# Patient Record
Sex: MALE | Race: WHITE | Employment: PART TIME | URBAN - METROPOLITAN AREA
[De-identification: names, ages, dates, MRNs, and addresses within clinical notes are randomized per-mention and may not be internally consistent; named-entity substitution may affect disease eponyms.]

---

## 2017-01-09 RX ORDER — KETOCONAZOLE 20 MG/G
CREAM TOPICAL
Qty: 60 G | Refills: 0 | Status: SHIPPED | OUTPATIENT
Start: 2017-01-09 | End: 2021-09-23

## 2017-02-24 ENCOUNTER — APPOINTMENT (OUTPATIENT)
Dept: GENERAL RADIOLOGY | Facility: HOSPITAL | Age: 67
DRG: 189 | End: 2017-02-24
Attending: EMERGENCY MEDICINE
Payer: COMMERCIAL

## 2017-02-24 ENCOUNTER — HOSPITAL ENCOUNTER (INPATIENT)
Facility: HOSPITAL | Age: 67
LOS: 3 days | Discharge: HOME | DRG: 189 | End: 2017-02-27
Attending: EMERGENCY MEDICINE | Admitting: HOSPITALIST
Payer: COMMERCIAL

## 2017-02-24 DIAGNOSIS — J44.1 COPD EXACERBATION (HCC): Primary | ICD-10-CM

## 2017-02-24 PROBLEM — R73.9 HYPERGLYCEMIA: Status: ACTIVE | Noted: 2017-02-24

## 2017-02-24 LAB
ALBUMIN SERPL BCP-MCNC: 3.6 G/DL (ref 3.5–4.8)
ALBUMIN/GLOB SERPL: 1.3 {RATIO} (ref 1–2)
ALP SERPL-CCNC: 66 U/L (ref 32–100)
ALT SERPL-CCNC: 23 U/L (ref 17–63)
ANION GAP SERPL CALC-SCNC: 2 MMOL/L (ref 0–18)
AST SERPL-CCNC: 17 U/L (ref 15–41)
BASOPHILS # BLD: 0.1 K/UL (ref 0–0.2)
BASOPHILS NFR BLD: 1 %
BILIRUB SERPL-MCNC: 0.5 MG/DL (ref 0.3–1.2)
BUN SERPL-MCNC: 20 MG/DL (ref 8–20)
BUN/CREAT SERPL: 18.5 (ref 10–20)
CALCIUM SERPL-MCNC: 8.5 MG/DL (ref 8.5–10.5)
CHLORIDE SERPL-SCNC: 106 MMOL/L (ref 95–110)
CO2 SERPL-SCNC: 30 MMOL/L (ref 22–32)
CREAT SERPL-MCNC: 1.08 MG/DL (ref 0.5–1.5)
EOSINOPHIL # BLD: 0.1 K/UL (ref 0–0.7)
EOSINOPHIL NFR BLD: 2 %
ERYTHROCYTE [DISTWIDTH] IN BLOOD BY AUTOMATED COUNT: 14.9 % (ref 11–15)
GLOBULIN PLAS-MCNC: 2.8 G/DL (ref 2.5–3.7)
GLUCOSE SERPL-MCNC: 119 MG/DL (ref 70–99)
HCT VFR BLD AUTO: 48.2 % (ref 41–52)
HGB BLD-MCNC: 15.6 G/DL (ref 13.5–17.5)
LYMPHOCYTES # BLD: 1.3 K/UL (ref 1–4)
LYMPHOCYTES NFR BLD: 18 %
MCH RBC QN AUTO: 30.6 PG (ref 27–32)
MCHC RBC AUTO-ENTMCNC: 32.4 G/DL (ref 32–37)
MCV RBC AUTO: 94.3 FL (ref 80–100)
MONOCYTES # BLD: 1.2 K/UL (ref 0–1)
MONOCYTES NFR BLD: 17 %
NEUTROPHILS # BLD AUTO: 4.5 K/UL (ref 1.8–7.7)
NEUTROPHILS NFR BLD: 62 %
OSMOLALITY UR CALC.SUM OF ELEC: 290 MOSM/KG (ref 275–295)
PLATELET # BLD AUTO: 136 K/UL (ref 140–400)
PMV BLD AUTO: 9.9 FL (ref 7.4–10.3)
POTASSIUM SERPL-SCNC: 4.6 MMOL/L (ref 3.3–5.1)
PROT SERPL-MCNC: 6.4 G/DL (ref 5.9–8.4)
RBC # BLD AUTO: 5.11 M/UL (ref 4.5–5.9)
SODIUM SERPL-SCNC: 138 MMOL/L (ref 136–144)
WBC # BLD AUTO: 7.3 K/UL (ref 4–11)

## 2017-02-24 PROCEDURE — 99222 1ST HOSP IP/OBS MODERATE 55: CPT | Performed by: HOSPITALIST

## 2017-02-24 PROCEDURE — 71010 XR CHEST AP PORTABLE  (CPT=71010): CPT

## 2017-02-24 RX ORDER — METHYLPREDNISOLONE SODIUM SUCCINATE 40 MG/ML
40 INJECTION, POWDER, LYOPHILIZED, FOR SOLUTION INTRAMUSCULAR; INTRAVENOUS EVERY 6 HOURS
Status: DISCONTINUED | OUTPATIENT
Start: 2017-02-24 | End: 2017-02-24

## 2017-02-24 RX ORDER — IPRATROPIUM BROMIDE AND ALBUTEROL SULFATE 2.5; .5 MG/3ML; MG/3ML
3 SOLUTION RESPIRATORY (INHALATION)
Status: DISCONTINUED | OUTPATIENT
Start: 2017-02-24 | End: 2017-02-26

## 2017-02-24 RX ORDER — ALBUTEROL SULFATE 2.5 MG/3ML
2.5 SOLUTION RESPIRATORY (INHALATION) ONCE
Status: COMPLETED | OUTPATIENT
Start: 2017-02-24 | End: 2017-02-24

## 2017-02-24 RX ORDER — 0.9 % SODIUM CHLORIDE 0.9 %
3 VIAL (ML) INJECTION AS NEEDED
Status: DISCONTINUED | OUTPATIENT
Start: 2017-02-24 | End: 2017-02-27

## 2017-02-24 RX ORDER — METHYLPREDNISOLONE SODIUM SUCCINATE 125 MG/2ML
125 INJECTION, POWDER, LYOPHILIZED, FOR SOLUTION INTRAMUSCULAR; INTRAVENOUS ONCE
Status: COMPLETED | OUTPATIENT
Start: 2017-02-24 | End: 2017-02-24

## 2017-02-24 RX ORDER — FUROSEMIDE 20 MG/1
20 TABLET ORAL DAILY
Status: DISCONTINUED | OUTPATIENT
Start: 2017-02-24 | End: 2017-02-27

## 2017-02-24 RX ORDER — METHYLPREDNISOLONE SODIUM SUCCINATE 40 MG/ML
40 INJECTION, POWDER, LYOPHILIZED, FOR SOLUTION INTRAMUSCULAR; INTRAVENOUS EVERY 8 HOURS
Status: DISCONTINUED | OUTPATIENT
Start: 2017-02-24 | End: 2017-02-26

## 2017-02-24 RX ORDER — HEPARIN SODIUM 5000 [USP'U]/ML
5000 INJECTION, SOLUTION INTRAVENOUS; SUBCUTANEOUS EVERY 8 HOURS
Status: DISCONTINUED | OUTPATIENT
Start: 2017-02-24 | End: 2017-02-27

## 2017-02-24 RX ORDER — 0.9 % SODIUM CHLORIDE 0.9 %
VIAL (ML) INJECTION
Status: COMPLETED
Start: 2017-02-24 | End: 2017-02-24

## 2017-02-24 RX ORDER — IPRATROPIUM BROMIDE AND ALBUTEROL SULFATE 2.5; .5 MG/3ML; MG/3ML
3 SOLUTION RESPIRATORY (INHALATION) ONCE
Status: COMPLETED | OUTPATIENT
Start: 2017-02-24 | End: 2017-02-24

## 2017-02-24 RX ORDER — ACETAMINOPHEN 325 MG/1
650 TABLET ORAL EVERY 6 HOURS PRN
Status: DISCONTINUED | OUTPATIENT
Start: 2017-02-24 | End: 2017-02-27

## 2017-02-24 RX ORDER — ONDANSETRON 2 MG/ML
4 INJECTION INTRAMUSCULAR; INTRAVENOUS EVERY 6 HOURS PRN
Status: DISCONTINUED | OUTPATIENT
Start: 2017-02-24 | End: 2017-02-27

## 2017-02-24 NOTE — ED PROVIDER NOTES
Patient Seen in: [facility] AND [facility] Emergency Department    History   Patient presents with:  Chest Pain Angina (cardiovascular)    Stated Complaint: sob cp    HPI    28-year-old male presents emergency department with 2 days of increased shortness of tucker Day Smoker        Packs/Day: 1.00  Years: 40        Types: Cigarettes    Comment: 0.20 units per day    Alcohol Use: Yes           0.0 oz/week       0 Standard drinks or equivalent per week       Comment: socially      Review of Systems    Positive for sta PANEL (14) - Abnormal; Notable for the following:     Glucose 119 (*)     All other components within normal limits   CBC W/ DIFFERENTIAL - Abnormal; Notable for the following:      (*)     Monocyte Absolute 1.2 (*)     All other components within n diagnosis)    Disposition:  Admit    Follow-up:  No follow-up provider specified.     Medications Prescribed:  Current Discharge Medication List        Present on Admission  Date Reviewed: 10/27/2016          ICD-10-CM Noted POA    COPD exacerbation ([facility] [address] J

## 2017-02-24 NOTE — H&P
[facility] Status:  In[facility]    1950 MRN [id number]   Location [facility] 3W/SW Attending Edmundo Byrne MD   Hosp Day # 0 PCP Jalen Hollins MD     Date:  2017 Dose Informant Patient Reported? Taking? Albuterol Sulfate  (90 BASE) MCG/ACT Inhalation Aero Soln 2/23/2017 at Unknown time  No Yes   Sig: Inhale 2 puffs into the lungs every 6 (six) hours as needed for Wheezing.  inhale 2 puff by inhalation route round and reactive to light, extraocular movements are intact, Normal conjunctiva. HENT:  Normocephalic, oral mucosa is moist.  Head:  Normocephalic, atraumatic.   Neck:  Supple, non-tender, no carotid bruit, no jugular venous distention, no lymphadenopath use    Prophylaxis  Subcutaneous heparin    CODE STATUS  Full    Primary care physician  Bibi Gilbert MD    Disposition  Clinical course will dictate outcome      Rubens Jarquin MD  2/24/2017  6:32 AM

## 2017-02-24 NOTE — RESPIRATORY THERAPY NOTE
MARIA M ASSESSMENT    Pulse ox. Study done one week ago at home. Patient does not know result. patient using 5 lpm nasal cannula, spo2 91%. Never done sleep study.

## 2017-02-24 NOTE — PLAN OF CARE
Patient/Family Goals    • Patient/Family Long Term Goal Progressing    • Patient/Family Short Term Goal Progressing        RESPIRATORY - ADULT    • Achieves optimal ventilation and oxygenation Progressing          Patient was on non-rebreather in ER, after

## 2017-02-24 NOTE — DIETARY NOTE
NUTRITION UPDATE:  Pt seen to d/t reported poor PO intake PTA. Pt states he had been eating well PTA & his appetite/intake is good this admission (100% for meals recorded). Pt declined supplement, did not feel it was needed.  Wt stable at 230-235# until rec

## 2017-02-24 NOTE — DISCHARGE PLANNING
ELDER following up on d/c planning for the patient. MD ordering a home nebulizer. Order for completed and faxed to [address] [facility].      Kelly Hampton [facility]  [id number]

## 2017-02-24 NOTE — PAYOR COMM NOTE
NO FAX# PROVIDED TO FAX CLINICALS  Assessment and Plan:    Acute exacerbation of COPD  We will start patient on duo nebs every 4 hours and as needed along with Solu-Medrol 40 mg IV every 6 hours.  Patient will require substantial teaching to understand the

## 2017-02-25 LAB
ANION GAP SERPL CALC-SCNC: 6 MMOL/L (ref 0–18)
BASOPHILS # BLD: 0 K/UL (ref 0–0.2)
BASOPHILS NFR BLD: 0 %
BUN SERPL-MCNC: 22 MG/DL (ref 8–20)
BUN/CREAT SERPL: 25.3 (ref 10–20)
CALCIUM SERPL-MCNC: 9.6 MG/DL (ref 8.5–10.5)
CHLORIDE SERPL-SCNC: 102 MMOL/L (ref 95–110)
CO2 SERPL-SCNC: 32 MMOL/L (ref 22–32)
CREAT SERPL-MCNC: 0.87 MG/DL (ref 0.5–1.5)
EOSINOPHIL # BLD: 0 K/UL (ref 0–0.7)
EOSINOPHIL NFR BLD: 0 %
ERYTHROCYTE [DISTWIDTH] IN BLOOD BY AUTOMATED COUNT: 14.8 % (ref 11–15)
GLUCOSE SERPL-MCNC: 154 MG/DL (ref 70–99)
HCT VFR BLD AUTO: 49.4 % (ref 41–52)
HGB BLD-MCNC: 16 G/DL (ref 13.5–17.5)
LYMPHOCYTES # BLD: 0.8 K/UL (ref 1–4)
LYMPHOCYTES NFR BLD: 7 %
MAGNESIUM SERPL-MCNC: 2.2 MG/DL (ref 1.8–2.5)
MCH RBC QN AUTO: 30.3 PG (ref 27–32)
MCHC RBC AUTO-ENTMCNC: 32.4 G/DL (ref 32–37)
MCV RBC AUTO: 93.6 FL (ref 80–100)
MONOCYTES # BLD: 0.6 K/UL (ref 0–1)
MONOCYTES NFR BLD: 6 %
NEUTROPHILS # BLD AUTO: 9.6 K/UL (ref 1.8–7.7)
NEUTROPHILS NFR BLD: 87 %
OSMOLALITY UR CALC.SUM OF ELEC: 296 MOSM/KG (ref 275–295)
PLATELET # BLD AUTO: 155 K/UL (ref 140–400)
PMV BLD AUTO: 9.7 FL (ref 7.4–10.3)
POTASSIUM SERPL-SCNC: 4.7 MMOL/L (ref 3.3–5.1)
RBC # BLD AUTO: 5.28 M/UL (ref 4.5–5.9)
SODIUM SERPL-SCNC: 140 MMOL/L (ref 136–144)
WBC # BLD AUTO: 11.1 K/UL (ref 4–11)

## 2017-02-25 PROCEDURE — 99233 SBSQ HOSP IP/OBS HIGH 50: CPT | Performed by: HOSPITALIST

## 2017-02-25 RX ORDER — ECHINACEA PURPUREA EXTRACT 125 MG
1 TABLET ORAL
Status: DISCONTINUED | OUTPATIENT
Start: 2017-02-25 | End: 2017-02-27

## 2017-02-25 RX ORDER — LEVOFLOXACIN 750 MG/1
750 TABLET ORAL DAILY
Status: DISCONTINUED | OUTPATIENT
Start: 2017-02-25 | End: 2017-02-27

## 2017-02-25 NOTE — PLAN OF CARE
Patient/Family Goals    • Patient/Family Long Term Goal Progressing    • Patient/Family Short Term Goal Progressing        RESPIRATORY - ADULT    • Achieves optimal ventilation and oxygenation Progressing          PATIENT BREATHING BETTER WITH NEBS AND SOL

## 2017-02-25 NOTE — PLAN OF CARE
Pt ambulated approximate 150ft on room air. resp status labored 26, though patient states he does not feel sob. Lowest o2 saturation 80%. Highest heart rate 170.

## 2017-02-25 NOTE — PLAN OF CARE
RESPIRATORY - ADULT    • Achieves optimal ventilation and oxygenation Not Progressing          Patient/Family Goals    • Patient/Family Long Term Goal Progressing    • Patient/Family Short Term Goal Progressing        Pt continues with oxygen at 5l per n/c

## 2017-02-25 NOTE — PROGRESS NOTES
[facility]    Progress Note    Deepti Mary Patient Status:  Inpatient    1950 MRN [id number]   Location [facility] 3W/SW Attending Nishant Hightower Day # 1 PCP Danis Diez MD     Subjective: copd          Results:     Lab Results  Component Value Date   WBC 11.1* 02/25/2017   HGB 16.0 02/25/2017   HCT 49.4 02/25/2017    02/25/2017   CREATSERUM 0.87 02/25/2017   BUN 22* 02/25/2017    02/25/2017   K 4.7 02/25/2017    02/25/201

## 2017-02-26 LAB
ANION GAP SERPL CALC-SCNC: 6 MMOL/L (ref 0–18)
BASOPHILS # BLD: 0 K/UL (ref 0–0.2)
BASOPHILS NFR BLD: 0 %
BUN SERPL-MCNC: 23 MG/DL (ref 8–20)
BUN/CREAT SERPL: 24.7 (ref 10–20)
CALCIUM SERPL-MCNC: 9.2 MG/DL (ref 8.5–10.5)
CHLORIDE SERPL-SCNC: 101 MMOL/L (ref 95–110)
CO2 SERPL-SCNC: 32 MMOL/L (ref 22–32)
CREAT SERPL-MCNC: 0.93 MG/DL (ref 0.5–1.5)
EOSINOPHIL # BLD: 0 K/UL (ref 0–0.7)
EOSINOPHIL NFR BLD: 0 %
ERYTHROCYTE [DISTWIDTH] IN BLOOD BY AUTOMATED COUNT: 14.7 % (ref 11–15)
GLUCOSE SERPL-MCNC: 139 MG/DL (ref 70–99)
HCT VFR BLD AUTO: 49.5 % (ref 41–52)
HGB BLD-MCNC: 16.2 G/DL (ref 13.5–17.5)
LYMPHOCYTES # BLD: 0.8 K/UL (ref 1–4)
LYMPHOCYTES NFR BLD: 7 %
MAGNESIUM SERPL-MCNC: 2.1 MG/DL (ref 1.8–2.5)
MCH RBC QN AUTO: 30.9 PG (ref 27–32)
MCHC RBC AUTO-ENTMCNC: 32.7 G/DL (ref 32–37)
MCV RBC AUTO: 94.6 FL (ref 80–100)
MONOCYTES # BLD: 0.4 K/UL (ref 0–1)
MONOCYTES NFR BLD: 4 %
NEUTROPHILS # BLD AUTO: 9.6 K/UL (ref 1.8–7.7)
NEUTROPHILS NFR BLD: 89 %
OSMOLALITY UR CALC.SUM OF ELEC: 294 MOSM/KG (ref 275–295)
PLATELET # BLD AUTO: 156 K/UL (ref 140–400)
PMV BLD AUTO: 10.2 FL (ref 7.4–10.3)
POTASSIUM SERPL-SCNC: 4.5 MMOL/L (ref 3.3–5.1)
RBC # BLD AUTO: 5.23 M/UL (ref 4.5–5.9)
SODIUM SERPL-SCNC: 139 MMOL/L (ref 136–144)
WBC # BLD AUTO: 10.7 K/UL (ref 4–11)

## 2017-02-26 PROCEDURE — 99232 SBSQ HOSP IP/OBS MODERATE 35: CPT | Performed by: HOSPITALIST

## 2017-02-26 RX ORDER — IPRATROPIUM BROMIDE AND ALBUTEROL SULFATE 2.5; .5 MG/3ML; MG/3ML
3 SOLUTION RESPIRATORY (INHALATION)
Status: DISCONTINUED | OUTPATIENT
Start: 2017-02-26 | End: 2017-02-27

## 2017-02-26 RX ORDER — IPRATROPIUM BROMIDE AND ALBUTEROL SULFATE 2.5; .5 MG/3ML; MG/3ML
3 SOLUTION RESPIRATORY (INHALATION) EVERY 4 HOURS PRN
Status: DISCONTINUED | OUTPATIENT
Start: 2017-02-26 | End: 2017-02-27

## 2017-02-26 RX ORDER — METHYLPREDNISOLONE SODIUM SUCCINATE 40 MG/ML
30 INJECTION, POWDER, LYOPHILIZED, FOR SOLUTION INTRAMUSCULAR; INTRAVENOUS EVERY 8 HOURS
Status: DISCONTINUED | OUTPATIENT
Start: 2017-02-26 | End: 2017-02-27

## 2017-02-26 NOTE — PLAN OF CARE
Patient/Family Goals    • Patient/Family Long Term Goal Progressing    • Patient/Family Short Term Goal Progressing          RESPIRATORY - ADULT    • Achieves optimal ventilation and oxygenation Not Progressing          Patient still needs O2 NC, resting i

## 2017-02-26 NOTE — PROGRESS NOTES
[facility]    Progress Note    Neto Arreaga Patient Status:  Inpatient    1950 MRN [id number]   Location [facility] 3W/SW Attending Nishant Hightower Day # 2 PCP Taj Conner MD     Subjective: tobacco use    Prophylaxis  Subcutaneous heparin    CODE STATUS  Full    Primary care physician  Elzbieta Weir MD    Disposition  Home when better    26 min spent on pt of which 15 min spent counseling pt and wife about bronchitis copd        Result

## 2017-02-27 VITALS
RESPIRATION RATE: 20 BRPM | DIASTOLIC BLOOD PRESSURE: 92 MMHG | TEMPERATURE: 98 F | HEART RATE: 114 BPM | SYSTOLIC BLOOD PRESSURE: 128 MMHG | WEIGHT: 230.5 LBS | OXYGEN SATURATION: 92 % | BODY MASS INDEX: 36.18 KG/M2 | HEIGHT: 67 IN

## 2017-02-27 LAB
ANION GAP SERPL CALC-SCNC: 7 MMOL/L (ref 0–18)
BASOPHILS # BLD: 0 K/UL (ref 0–0.2)
BASOPHILS NFR BLD: 0 %
BUN SERPL-MCNC: 27 MG/DL (ref 8–20)
BUN/CREAT SERPL: 30.3 (ref 10–20)
CALCIUM SERPL-MCNC: 9.4 MG/DL (ref 8.5–10.5)
CHLORIDE SERPL-SCNC: 101 MMOL/L (ref 95–110)
CO2 SERPL-SCNC: 31 MMOL/L (ref 22–32)
CREAT SERPL-MCNC: 0.89 MG/DL (ref 0.5–1.5)
EOSINOPHIL # BLD: 0 K/UL (ref 0–0.7)
EOSINOPHIL NFR BLD: 0 %
ERYTHROCYTE [DISTWIDTH] IN BLOOD BY AUTOMATED COUNT: 15 % (ref 11–15)
GLUCOSE SERPL-MCNC: 164 MG/DL (ref 70–99)
HCT VFR BLD AUTO: 49.3 % (ref 41–52)
HGB BLD-MCNC: 15.9 G/DL (ref 13.5–17.5)
LYMPHOCYTES # BLD: 1 K/UL (ref 1–4)
LYMPHOCYTES NFR BLD: 8 %
MAGNESIUM SERPL-MCNC: 2.1 MG/DL (ref 1.8–2.5)
MCH RBC QN AUTO: 30.5 PG (ref 27–32)
MCHC RBC AUTO-ENTMCNC: 32.2 G/DL (ref 32–37)
MCV RBC AUTO: 94.7 FL (ref 80–100)
MONOCYTES # BLD: 0.8 K/UL (ref 0–1)
MONOCYTES NFR BLD: 7 %
NEUTROPHILS # BLD AUTO: 10.2 K/UL (ref 1.8–7.7)
NEUTROPHILS NFR BLD: 85 %
OSMOLALITY UR CALC.SUM OF ELEC: 297 MOSM/KG (ref 275–295)
PLATELET # BLD AUTO: 164 K/UL (ref 140–400)
PMV BLD AUTO: 9.9 FL (ref 7.4–10.3)
POTASSIUM SERPL-SCNC: 4.7 MMOL/L (ref 3.3–5.1)
RBC # BLD AUTO: 5.21 M/UL (ref 4.5–5.9)
SODIUM SERPL-SCNC: 139 MMOL/L (ref 136–144)
WBC # BLD AUTO: 12.1 K/UL (ref 4–11)

## 2017-02-27 PROCEDURE — 99239 HOSP IP/OBS DSCHRG MGMT >30: CPT | Performed by: HOSPITALIST

## 2017-02-27 RX ORDER — IPRATROPIUM BROMIDE AND ALBUTEROL SULFATE 2.5; .5 MG/3ML; MG/3ML
3 SOLUTION RESPIRATORY (INHALATION) EVERY 4 HOURS PRN
Qty: 360 ML | Refills: 0 | Status: SHIPPED | OUTPATIENT
Start: 2017-02-27 | End: 2017-03-29

## 2017-02-27 RX ORDER — ECHINACEA PURPUREA EXTRACT 125 MG
1 TABLET ORAL
Qty: 1 BOTTLE | Refills: 0 | Status: SHIPPED | OUTPATIENT
Start: 2017-02-27

## 2017-02-27 RX ORDER — LEVOFLOXACIN 750 MG/1
750 TABLET ORAL DAILY
Qty: 6 TABLET | Refills: 0 | Status: SHIPPED | OUTPATIENT
Start: 2017-02-27 | End: 2018-07-28 | Stop reason: ALTCHOICE

## 2017-02-27 RX ORDER — PREDNISONE 20 MG/1
60 TABLET ORAL DAILY
Qty: 15 TABLET | Refills: 0 | Status: SHIPPED | OUTPATIENT
Start: 2017-02-27 | End: 2018-07-28 | Stop reason: ALTCHOICE

## 2017-02-27 NOTE — DISCHARGE PLANNING
2/27/17 CM Discharge planning / MDO for home nebulizer   Per wife pt has home O2 from [facility], requested nebulizer be ordered from [facility]. Referral and orders faxed to [facility] office requesting nebulizer be delivered to pt's home later today.  80

## 2017-02-27 NOTE — PLAN OF CARE
Patient/Family Goals    • Patient/Family Long Term Goal  Be able to go home. Progressing    • Patient/Family Short Term Goal  Follow plan of care, be stable.  Progressing        RESPIRATORY - ADULT    • Achieves optimal ventilation and oxygenation Progressi

## 2017-02-27 NOTE — PAYOR COMM NOTE
CLINICALS  ORDER  Sulema Chente #[id number]    Admission Info: Inpatient (Adm: 02/24/17)   Hospital Account: [de-identified]    Description: 73year old M   Primary Service: Medical   Unit Info: [facility] 3-W/SW             Admission Orders        ADMIT TO INPATI 230 lb 9.6 oz (104.599 kg), SpO2 92 %. Nursing note and vitals reviewed. Constitutional: He is oriented to person, place, and time and obese. He appears well-developed. HENT:    Head: Normocephalic and atraumatic.    Cardiovascular: Normal rate and r 2.1  02/26/2017    TROP  0.00  10/18/2016                          Hortensia Harris MD  2/26/2017            Nilsa Bishop #[id number]  (68 year old M)       [facility] [id number] Barnes MD Physician Signed  Progress Not continue daily Lasix.     Tobacco abuse  Patient encouraged to maintain abstinence from tobacco use    Prophylaxis  Subcutaneous heparin    CODE STATUS  Full    Primary care physician  Elizabeth Salter MD    Disposition  Home when better    38 min spent Rochelle Stallings MD      Date:  2/24/2017  Date of Admission:  2/24/2017    Chief Complaint:  Patient presents with:  Chest Pain Angina (cardiovascular)      History of Present Illness:  Fidel Hoyos is a(n) 73year old male, with a past medical history sig Yes    Sig: Inhale 2 puffs into the lungs every 6 (six) hours as needed for Wheezing.  inhale 2 puff by inhalation route  every 4 - 6 hours as needed    Budesonide-Formoterol Fumarate (SYMBICORT) 160-4.5 MCG/ACT Inhalation Aerosol  2/23/2017 at Unknown time oral mucosa is moist.  Head:  Normocephalic, atraumatic. Neck:  Supple, non-tender, no carotid bruit, no jugular venous distention, no lymphadenopathy, no thyromegaly.   Respiratory: Bilateral wheeze with poor air entry, extended expiratory phase, respirat STATUS  Full    Primary care physician  Melvi Villagomez MD    Disposition  Clinical course will dictate outcome      Eboni Gomes MD  2/24/2017  6:32 AM          Mariposa Garcia #[id number]  (68 year old M)       [facility] [id number]         [place]

## 2017-02-27 NOTE — PROGRESS NOTES
Patient ambulated 150 feet on 3L O2 per NC. Lowest SpO2 86%, highest . Labored breathing noted, though patient denies any feelings of shortness of breath. Dr. Zully Cordero notified.

## 2017-02-27 NOTE — SPIRITUAL CARE NOTE
Patient's wife is a friend of .  stopped by on Saturday, in capacity as friend of family. Not official visit. On this night, wife was gone and pt was asleep. Left a note.

## 2017-03-01 NOTE — DISCHARGE SUMMARY
[facility]    PATIENT'S NAME: Gay Roberts   ATTENDING PHYSICIAN: Sanjeev Hightower MD   PATIENT ACCOUNT#:   [id number]    LOCATION:  [address] [place] [address] RECORD #:   [id number]       YOB: 1950  ADMISSION DATE:       02/24 Patient's saturation without oxygen is 80% acutely _______ improved from that chronically. We will see how he does. 2.   Obesity. BMI 37. Needs obstructive sleep apnea workup. 3.   Chronic venous stasis. 4.   Continuing tobacco abuse. Needs to quit.

## 2017-03-07 ENCOUNTER — OFFICE VISIT (OUTPATIENT)
Dept: INTERNAL MEDICINE CLINIC | Facility: CLINIC | Age: 67
End: 2017-03-07

## 2017-03-07 VITALS
HEART RATE: 104 BPM | DIASTOLIC BLOOD PRESSURE: 80 MMHG | HEIGHT: 66 IN | TEMPERATURE: 98 F | BODY MASS INDEX: 37.77 KG/M2 | OXYGEN SATURATION: 92 % | WEIGHT: 235 LBS | SYSTOLIC BLOOD PRESSURE: 129 MMHG | RESPIRATION RATE: 16 BRPM

## 2017-03-07 DIAGNOSIS — J44.1 COPD EXACERBATION (HCC): Primary | ICD-10-CM

## 2017-03-07 PROCEDURE — 99212 OFFICE O/P EST SF 10 MIN: CPT | Performed by: INTERNAL MEDICINE

## 2017-03-07 PROCEDURE — 99214 OFFICE O/P EST MOD 30 MIN: CPT | Performed by: INTERNAL MEDICINE

## 2017-03-07 RX ORDER — PREDNISONE 10 MG/1
TABLET ORAL
Refills: 0 | COMMUNITY
Start: 2017-02-27 | End: 2018-07-28 | Stop reason: ALTCHOICE

## 2017-03-07 NOTE — PROGRESS NOTES
HPI:    Patient ID: Deepti Mary is a 73year old male. COPD  This is a recurrent (pt  here for post hospital ffup, admitted last month at [address] [place] [address] for exacerabation of COPD) problem. The current episode started more than 1 month ago.  The problem occurs Disp: 15 tablet Rfl: 0   KETOCONAZOLE 2 % External Cream APPLY EXTERNALLY TO THE AFFECTED AREA TWICE DAILY (Patient taking differently: APPLY EXTERNALLY TO THE AFFECTED AREA TWICE DAILY as needed) Disp: 60 g Rfl: 0   furosemide 20 MG Oral Tab Take 1 tablet & Referrals:  None       #[id number]

## 2017-04-18 ENCOUNTER — OFFICE VISIT (OUTPATIENT)
Dept: INTERNAL MEDICINE CLINIC | Facility: CLINIC | Age: 67
End: 2017-04-18

## 2017-04-18 VITALS
SYSTOLIC BLOOD PRESSURE: 107 MMHG | DIASTOLIC BLOOD PRESSURE: 76 MMHG | RESPIRATION RATE: 16 BRPM | HEIGHT: 67 IN | TEMPERATURE: 98 F | WEIGHT: 242 LBS | BODY MASS INDEX: 37.98 KG/M2 | OXYGEN SATURATION: 91 % | HEART RATE: 108 BPM

## 2017-04-18 DIAGNOSIS — Z72.0 TOBACCO ABUSE: ICD-10-CM

## 2017-04-18 DIAGNOSIS — M25.561 BILATERAL CHRONIC KNEE PAIN: ICD-10-CM

## 2017-04-18 DIAGNOSIS — M25.562 BILATERAL CHRONIC KNEE PAIN: ICD-10-CM

## 2017-04-18 DIAGNOSIS — G89.29 BILATERAL CHRONIC KNEE PAIN: ICD-10-CM

## 2017-04-18 DIAGNOSIS — J43.9 PULMONARY EMPHYSEMA, UNSPECIFIED EMPHYSEMA TYPE (HCC): Primary | ICD-10-CM

## 2017-04-18 PROCEDURE — 99214 OFFICE O/P EST MOD 30 MIN: CPT | Performed by: INTERNAL MEDICINE

## 2017-04-18 PROCEDURE — 99212 OFFICE O/P EST SF 10 MIN: CPT | Performed by: INTERNAL MEDICINE

## 2017-04-18 RX ORDER — IPRATROPIUM BROMIDE AND ALBUTEROL SULFATE 2.5; .5 MG/3ML; MG/3ML
SOLUTION RESPIRATORY (INHALATION)
Refills: 0 | COMMUNITY
Start: 2017-02-27 | End: 2017-05-25

## 2017-04-18 NOTE — PROGRESS NOTES
HPI:    Patient ID: Jose Vegas is a 78year old male. COPD  This is a chronic problem. The current episode started more than 1 year ago. The problem occurs constantly. The problem has been unchanged.  Pertinent negatives include no chills, coughing levofloxacin 750 MG Oral Tab Take 1 tablet (750 mg total) by mouth daily. Disp: 6 tablet Rfl: 0   predniSONE 20 MG Oral Tab Take 3 tablets (60 mg total) by mouth daily.  Disp: 15 tablet Rfl: 0     Allergies:No Known Allergies   PHYSICAL EXAM:   Physical E understood and agreed.    (Z72.0) Tobacco abuse  Plan: see above.     (M25.561,  M25.562,  G89.29) Bilateral chronic knee pain  Plan: XR KNEE (1 OR 2 VIEWS), RIGHT (CPT=73560), XR         KNEE (1 OR 2 VIEWS), LEFT (CPT=73560)        Pt has bilateral knee p

## 2017-04-19 PROBLEM — J43.9 PULMONARY EMPHYSEMA (HCC): Status: ACTIVE | Noted: 2017-04-19

## 2017-04-19 PROBLEM — G89.29 BILATERAL CHRONIC KNEE PAIN: Status: ACTIVE | Noted: 2017-04-19

## 2017-04-19 PROBLEM — M25.561 BILATERAL CHRONIC KNEE PAIN: Status: ACTIVE | Noted: 2017-04-19

## 2017-04-19 PROBLEM — M25.562 BILATERAL CHRONIC KNEE PAIN: Status: ACTIVE | Noted: 2017-04-19

## 2017-04-21 ENCOUNTER — TELEPHONE (OUTPATIENT)
Dept: INTERNAL MEDICINE CLINIC | Facility: CLINIC | Age: 67
End: 2017-04-21

## 2017-04-21 DIAGNOSIS — M79.674 PAIN OF TOE OF RIGHT FOOT: Primary | ICD-10-CM

## 2017-04-21 NOTE — TELEPHONE ENCOUNTER
Patient states the big toe on the right foot looks like it has a ruptured blood vessel for about 4 months. Patient seen  On 4/18/2017 and forgot to mention it to th Dr. Carolyn Rodriguez is now requesting an order for an xray.      Offered patient an appt and he ref

## 2017-04-21 NOTE — TELEPHONE ENCOUNTER
Actions Requested: Requesting x-ray of his toe. Pt is coming in for bilateral knee x-rays so he is wondering if Dr could x-ray the foot as well.   Situation/Background   Problem: Right great toe \"black and blue\"   Onset: 4 months   Associated Symptoms: Pa

## 2017-04-22 ENCOUNTER — HOSPITAL ENCOUNTER (OUTPATIENT)
Dept: GENERAL RADIOLOGY | Age: 67
Discharge: HOME | End: 2017-04-22
Attending: INTERNAL MEDICINE
Payer: COMMERCIAL

## 2017-04-22 DIAGNOSIS — M79.674 PAIN OF TOE OF RIGHT FOOT: ICD-10-CM

## 2017-04-22 DIAGNOSIS — M25.561 CHRONIC PAIN OF RIGHT KNEE: ICD-10-CM

## 2017-04-22 DIAGNOSIS — G89.29 OTHER CHRONIC PAIN: ICD-10-CM

## 2017-04-22 DIAGNOSIS — G89.29 CHRONIC PAIN OF LEFT KNEE: ICD-10-CM

## 2017-04-22 DIAGNOSIS — G89.29 CHRONIC PAIN OF RIGHT KNEE: ICD-10-CM

## 2017-04-22 DIAGNOSIS — M25.562 CHRONIC PAIN OF LEFT KNEE: ICD-10-CM

## 2017-04-22 PROCEDURE — 73620 X-RAY EXAM OF FOOT: CPT

## 2017-04-22 PROCEDURE — 73565 X-RAY EXAM OF KNEES: CPT

## 2017-04-23 ENCOUNTER — TELEPHONE (OUTPATIENT)
Dept: INTERNAL MEDICINE CLINIC | Facility: CLINIC | Age: 67
End: 2017-04-23

## 2017-04-23 DIAGNOSIS — M17.0 PRIMARY OSTEOARTHRITIS OF BOTH KNEES: Primary | ICD-10-CM

## 2017-04-23 DIAGNOSIS — I73.9 PAD (PERIPHERAL ARTERY DISEASE) (HCC): ICD-10-CM

## 2017-05-04 ENCOUNTER — HOSPITAL ENCOUNTER (OUTPATIENT)
Dept: ULTRASOUND IMAGING | Facility: HOSPITAL | Age: 67
Discharge: HOME | End: 2017-05-04
Attending: INTERNAL MEDICINE
Payer: COMMERCIAL

## 2017-05-04 DIAGNOSIS — I73.9 PAD (PERIPHERAL ARTERY DISEASE) (HCC): ICD-10-CM

## 2017-05-04 PROCEDURE — 93923 UPR/LXTR ART STDY 3+ LVLS: CPT | Performed by: INTERNAL MEDICINE

## 2017-05-15 ENCOUNTER — TELEPHONE (OUTPATIENT)
Dept: INTERNAL MEDICINE CLINIC | Facility: CLINIC | Age: 67
End: 2017-05-15

## 2017-05-15 NOTE — TELEPHONE ENCOUNTER
Notes Recorded by Paulina Stewart MD on 2017 at 4:31 PM  Notify pt; his arterial doppler test was normal so no blockage in arteries of his lower extremities.     Spoke with patient (verified name and ), reviewed information, patient verbalized

## 2017-05-25 RX ORDER — IPRATROPIUM BROMIDE AND ALBUTEROL SULFATE 2.5; .5 MG/3ML; MG/3ML
SOLUTION RESPIRATORY (INHALATION)
Qty: 360 VIAL | Refills: 0 | Status: SHIPPED | OUTPATIENT
Start: 2017-05-25 | End: 2019-02-14

## 2017-05-25 NOTE — TELEPHONE ENCOUNTER
Jillian calling in for a refill rx   ipratropium-albuterol 0.5-2.5 (3) MG/3ML Inhalation Solution  0 2/27/2017       Sig :  VVN Q 4 H PRN       Route:   (none)       Class:   Historical       Order #:   [id number]

## 2018-07-28 ENCOUNTER — OFFICE VISIT (OUTPATIENT)
Dept: INTERNAL MEDICINE CLINIC | Facility: CLINIC | Age: 68
End: 2018-07-28
Payer: COMMERCIAL

## 2018-07-28 VITALS
DIASTOLIC BLOOD PRESSURE: 84 MMHG | WEIGHT: 227 LBS | HEIGHT: 67 IN | HEART RATE: 87 BPM | BODY MASS INDEX: 35.63 KG/M2 | TEMPERATURE: 97 F | SYSTOLIC BLOOD PRESSURE: 122 MMHG

## 2018-07-28 DIAGNOSIS — Z00.00 ANNUAL PHYSICAL EXAM: Primary | ICD-10-CM

## 2018-07-28 DIAGNOSIS — J43.9 PULMONARY EMPHYSEMA, UNSPECIFIED EMPHYSEMA TYPE (HCC): ICD-10-CM

## 2018-07-28 DIAGNOSIS — R35.1 NOCTURIA: ICD-10-CM

## 2018-07-28 DIAGNOSIS — Z12.11 COLON CANCER SCREENING: ICD-10-CM

## 2018-07-28 DIAGNOSIS — Z23 NEED FOR VACCINATION: ICD-10-CM

## 2018-07-28 PROCEDURE — 90471 IMMUNIZATION ADMIN: CPT | Performed by: INTERNAL MEDICINE

## 2018-07-28 PROCEDURE — 90732 PPSV23 VACC 2 YRS+ SUBQ/IM: CPT | Performed by: INTERNAL MEDICINE

## 2018-07-28 PROCEDURE — 99397 PER PM REEVAL EST PAT 65+ YR: CPT | Performed by: INTERNAL MEDICINE

## 2018-07-28 NOTE — PROGRESS NOTES
HPI:    Patient ID: Susanna Elizabeth is a 80year old male. Patient presents today for his annual physical.        Review of Systems   Constitutional: Negative. HENT: Negative. Eyes: Negative. Respiratory: Positive for shortness of breath.  Isma Davidson ear normal.   Left Ear: External ear normal.   Nose: Nose normal.   Mouth/Throat: Oropharynx is clear and moist. No oropharyngeal exudate. Eyes: Conjunctivae are normal. Pupils are equal, round, and reactive to light. Right eye exhibits no discharge.  Lef Need for vaccination  Plan: PNEUMOCOCCAL IMM (PNEUMOVAX)        Pt given pneumovax 23 today.    (Z12.11) Colon cancer screening  Plan: OCCULT BLOOD, FECAL, IMMUNOASSAY        Pt due for his colon screenng and he doesn't want to do colonoscopy as screening

## 2018-07-29 PROBLEM — R73.9 HYPERGLYCEMIA: Status: RESOLVED | Noted: 2017-02-24 | Resolved: 2018-07-29

## 2018-07-29 PROBLEM — Z23 NEED FOR VACCINATION: Status: ACTIVE | Noted: 2018-07-29

## 2018-07-29 PROBLEM — R35.1 NOCTURIA: Status: ACTIVE | Noted: 2018-07-29

## 2018-08-24 ENCOUNTER — TELEPHONE (OUTPATIENT)
Dept: INTERNAL MEDICINE CLINIC | Facility: CLINIC | Age: 68
End: 2018-08-24

## 2018-08-24 NOTE — TELEPHONE ENCOUNTER
Prior Authorization for Chantix Starting Month Chetan 0.5mg x 11&1 mg x 42 completed with [employer] on cover my meds Key: [id number]. Turn around time 48-72 hrs.

## 2018-09-04 ENCOUNTER — TELEPHONE (OUTPATIENT)
Dept: INTERNAL MEDICINE CLINIC | Facility: CLINIC | Age: 68
End: 2018-09-04

## 2018-10-11 RX ORDER — BUDESONIDE AND FORMOTEROL FUMARATE DIHYDRATE 160; 4.5 UG/1; UG/1
AEROSOL RESPIRATORY (INHALATION)
Qty: 1 INHALER | Refills: 2 | Status: SHIPPED | OUTPATIENT
Start: 2018-10-11 | End: 2020-01-24

## 2019-02-08 RX ORDER — IPRATROPIUM BROMIDE AND ALBUTEROL SULFATE 2.5; .5 MG/3ML; MG/3ML
SOLUTION RESPIRATORY (INHALATION)
Qty: 360 VIAL | Refills: 0 | Status: CANCELLED | OUTPATIENT
Start: 2019-02-08

## 2019-02-08 NOTE — TELEPHONE ENCOUNTER
Pharmacist states he faxed refill request for Duoneb nebulizer solution and hasn't gotten a response. Verified fax number with pharmacist as no request noted in EMR.     Med last filled 5/25/17  Pended for review, please advise    Refill Protocol Appointmen

## 2019-02-14 RX ORDER — IPRATROPIUM BROMIDE AND ALBUTEROL SULFATE 2.5; .5 MG/3ML; MG/3ML
SOLUTION RESPIRATORY (INHALATION)
Qty: 360 VIAL | Refills: 0 | Status: ON HOLD | OUTPATIENT
Start: 2019-02-14 | End: 2021-04-17

## 2019-10-17 ENCOUNTER — OFFICE VISIT (OUTPATIENT)
Dept: INTERNAL MEDICINE CLINIC | Facility: CLINIC | Age: 69
End: 2019-10-17
Payer: COMMERCIAL

## 2019-10-17 ENCOUNTER — LAB ENCOUNTER (OUTPATIENT)
Dept: LAB | Age: 69
End: 2019-10-17
Attending: INTERNAL MEDICINE
Payer: COMMERCIAL

## 2019-10-17 VITALS
SYSTOLIC BLOOD PRESSURE: 113 MMHG | BODY MASS INDEX: 35.31 KG/M2 | DIASTOLIC BLOOD PRESSURE: 72 MMHG | HEIGHT: 67 IN | WEIGHT: 225 LBS | OXYGEN SATURATION: 92 % | TEMPERATURE: 98 F | HEART RATE: 81 BPM

## 2019-10-17 DIAGNOSIS — J43.9 PULMONARY EMPHYSEMA, UNSPECIFIED EMPHYSEMA TYPE (HCC): ICD-10-CM

## 2019-10-17 DIAGNOSIS — Z00.00 ANNUAL PHYSICAL EXAM: ICD-10-CM

## 2019-10-17 DIAGNOSIS — Z12.11 COLON CANCER SCREENING: ICD-10-CM

## 2019-10-17 DIAGNOSIS — Z00.00 ANNUAL PHYSICAL EXAM: Primary | ICD-10-CM

## 2019-10-17 DIAGNOSIS — F51.04 CHRONIC INSOMNIA: ICD-10-CM

## 2019-10-17 PROCEDURE — 81001 URINALYSIS AUTO W/SCOPE: CPT

## 2019-10-17 PROCEDURE — 80061 LIPID PANEL: CPT

## 2019-10-17 PROCEDURE — 85025 COMPLETE CBC W/AUTO DIFF WBC: CPT

## 2019-10-17 PROCEDURE — 80053 COMPREHEN METABOLIC PANEL: CPT

## 2019-10-17 PROCEDURE — 36415 COLL VENOUS BLD VENIPUNCTURE: CPT

## 2019-10-17 PROCEDURE — 99397 PER PM REEVAL EST PAT 65+ YR: CPT | Performed by: INTERNAL MEDICINE

## 2019-10-17 RX ORDER — POTASSIUM CHLORIDE 750 MG/1
10 TABLET, EXTENDED RELEASE ORAL DAILY
Qty: 30 TABLET | Refills: 0 | Status: ON HOLD | OUTPATIENT
Start: 2019-10-17 | End: 2021-04-17

## 2019-10-17 RX ORDER — FUROSEMIDE 20 MG/1
20 TABLET ORAL DAILY
Qty: 90 TABLET | Refills: 3 | Status: ON HOLD | OUTPATIENT
Start: 2019-10-17 | End: 2021-04-17

## 2019-10-17 NOTE — PROGRESS NOTES
HPI:    Patient ID: Frida Hussein is a 77year old male. Patient presents today for his annual physical.       Review of Systems   Constitutional: Negative. HENT: Negative. Eyes: Negative.     Respiratory: Positive for shortness of breath and wh External ear normal.   Left Ear: External ear normal.   Nose: Nose normal.   Mouth/Throat: Oropharynx is clear and moist. No oropharyngeal exudate. Eyes: Conjunctivae are normal. Right eye exhibits no discharge. Left eye exhibits no discharge.  No scleral home and uses O2 at night. Pt continues to decline to get ct chest for ffup of previously seen pulmonary nodule back in 2016.  .  (Z12.11) Colon cancer screening  Plan: OCCULT BLOOD, FECAL, IMMUNOASSAY  Pt again declines sceening colonoscopy and agreed to

## 2019-10-21 ENCOUNTER — TELEPHONE (OUTPATIENT)
Dept: INTERNAL MEDICINE CLINIC | Facility: CLINIC | Age: 69
End: 2019-10-21

## 2019-10-21 DIAGNOSIS — D75.89 MACROCYTOSIS WITHOUT ANEMIA: Primary | ICD-10-CM

## 2020-01-24 RX ORDER — BUDESONIDE AND FORMOTEROL FUMARATE DIHYDRATE 160; 4.5 UG/1; UG/1
AEROSOL RESPIRATORY (INHALATION)
Qty: 3 INHALER | Refills: 1 | Status: SHIPPED | OUTPATIENT
Start: 2020-01-24 | End: 2020-07-21

## 2020-02-04 RX ORDER — BUDESONIDE AND FORMOTEROL FUMARATE DIHYDRATE 160; 4.5 UG/1; UG/1
AEROSOL RESPIRATORY (INHALATION)
Qty: 3 INHALER | Refills: 1 | OUTPATIENT
Start: 2020-02-04

## 2020-02-04 NOTE — TELEPHONE ENCOUNTER
Budesonide-Formoterol Fumarate (SYMBICORT) 160-4.5 MCG/ACT Inhalation Aerosol, INHALE 2 PUFFS INTO THE LUNGS TWICE DAILY, Disp: 3 Inhaler, Rfl: 1

## 2020-06-01 ENCOUNTER — TELEPHONE (OUTPATIENT)
Dept: PULMONOLOGY | Facility: CLINIC | Age: 70
End: 2020-06-01

## 2020-06-01 DIAGNOSIS — R09.02 HYPOXEMIA: Primary | ICD-10-CM

## 2020-06-01 NOTE — TELEPHONE ENCOUNTER
Pt requesting order to discontinue oxygen with [facility] due to purchasing his own concentrator.   Please call [phone]

## 2020-06-01 NOTE — TELEPHONE ENCOUNTER
Pt requesting order to discontinue oxygen with [facility] due to purchasing his own concentrator. Dr Lucille Hancock can you make order ?

## 2020-06-04 NOTE — TELEPHONE ENCOUNTER
Called and spoke with pt who states that [address] needs discontinue order to  equipment from pt's home. Can place order dr Lucille Hancock ?

## 2020-06-08 NOTE — TELEPHONE ENCOUNTER
DME order for discontinue home oxygen concentrator placed. Dr Grabiel Pierson place check and sign off order.

## 2020-07-21 RX ORDER — BUDESONIDE AND FORMOTEROL FUMARATE DIHYDRATE 160; 4.5 UG/1; UG/1
2 AEROSOL RESPIRATORY (INHALATION) 2 TIMES DAILY
Qty: 3 INHALER | Refills: 1 | Status: SHIPPED | OUTPATIENT
Start: 2020-07-21 | End: 2021-01-22

## 2020-07-21 NOTE — TELEPHONE ENCOUNTER
Patient requesting refill     Budesonide-Formoterol Fumarate (SYMBICORT) 160-4.5 MCG/ACT Inhalation Aerosol

## 2020-09-04 ENCOUNTER — NURSE TRIAGE (OUTPATIENT)
Dept: INTERNAL MEDICINE CLINIC | Facility: CLINIC | Age: 70
End: 2020-09-04

## 2020-09-04 NOTE — TELEPHONE ENCOUNTER
Spoke with pt and MD message below given. Pt verb understanding and agrees to plan.   Pt added to Dr Daniela Terrell schedule 9/5/20 at 11:30

## 2020-09-04 NOTE — TELEPHONE ENCOUNTER
Action Requested: Summary for Provider     []  Critical Lab, Recommendations Needed  [x] Need Additional Advice  []   FYI    []   Need Orders  [] Need Medications Sent to Pharmacy  []  Other     SUMMARY: Spoke with patient and states he has had severe shou

## 2020-09-05 ENCOUNTER — OFFICE VISIT (OUTPATIENT)
Dept: INTERNAL MEDICINE CLINIC | Facility: CLINIC | Age: 70
End: 2020-09-05
Payer: MEDICARE

## 2020-09-05 VITALS
BODY MASS INDEX: 39.55 KG/M2 | HEART RATE: 83 BPM | SYSTOLIC BLOOD PRESSURE: 136 MMHG | DIASTOLIC BLOOD PRESSURE: 82 MMHG | WEIGHT: 252 LBS | HEIGHT: 67 IN | TEMPERATURE: 97 F

## 2020-09-05 DIAGNOSIS — M25.512 ACUTE PAIN OF LEFT SHOULDER: Primary | ICD-10-CM

## 2020-09-05 PROCEDURE — 90662 IIV NO PRSV INCREASED AG IM: CPT | Performed by: INTERNAL MEDICINE

## 2020-09-05 PROCEDURE — G0463 HOSPITAL OUTPT CLINIC VISIT: HCPCS | Performed by: INTERNAL MEDICINE

## 2020-09-05 PROCEDURE — G0008 ADMIN INFLUENZA VIRUS VAC: HCPCS | Performed by: INTERNAL MEDICINE

## 2020-09-05 PROCEDURE — 99214 OFFICE O/P EST MOD 30 MIN: CPT | Performed by: INTERNAL MEDICINE

## 2020-09-05 RX ORDER — UREA 40 %
1 CREAM (GRAM) TOPICAL DAILY
Qty: 227 G | Refills: 2 | Status: SHIPPED | OUTPATIENT
Start: 2020-09-05

## 2020-09-05 NOTE — PROGRESS NOTES
HPI:    Patient ID: Osei Lopez is a 72year old male. Shoulder Pain    The pain is present in the left shoulder. This is a new problem. The current episode started more than 1 month ago (2 mos). There has been no history of extremity trauma.  The p TWICE DAILY (Patient taking differently: APPLY EXTERNALLY TO THE AFFECTED AREA TWICE DAILY as needed) 60 g 0   • Albuterol Sulfate  (90 BASE) MCG/ACT Inhalation Aero Soln Inhale 2 puffs into the lungs every 6 (six) hours as needed for Wheezing.  inha to see ortho for possible cortisone shot. Pt given referral and agreed to see orthol      No orders of the defined types were placed in this encounter.       Meds This Visit:  Requested Prescriptions      No prescriptions requested or ordered in this encoun

## 2020-09-08 ENCOUNTER — TELEPHONE (OUTPATIENT)
Dept: INTERNAL MEDICINE CLINIC | Facility: CLINIC | Age: 70
End: 2020-09-08

## 2020-09-08 NOTE — TELEPHONE ENCOUNTER
Per pharmacy PA is needed for the following medication. •  Urea 40 % External Cream, Apply 1 Application topically daily. , Disp: 227 g, Rfl: 2    Message: Plan does not cover this medication. Please call plan at [phone] to initiate PA.  Patient ID

## 2020-09-08 NOTE — TELEPHONE ENCOUNTER
Prior authorization for urea 40 % has been initiated through [employer] using keycode: [id number] It takes about 1-5 business days for a decision to come back.      Spoke to patient to ask him what he will be using urea 40% cream for and he states its for crack

## 2020-09-18 ENCOUNTER — OFFICE VISIT (OUTPATIENT)
Dept: ORTHOPEDICS CLINIC | Facility: CLINIC | Age: 70
End: 2020-09-18
Payer: MEDICARE

## 2020-09-18 ENCOUNTER — HOSPITAL ENCOUNTER (OUTPATIENT)
Dept: GENERAL RADIOLOGY | Facility: HOSPITAL | Age: 70
Discharge: HOME | End: 2020-09-18
Attending: ORTHOPAEDIC SURGERY
Payer: MEDICARE

## 2020-09-18 VITALS
WEIGHT: 252 LBS | BODY MASS INDEX: 39.55 KG/M2 | HEIGHT: 67 IN | HEART RATE: 86 BPM | RESPIRATION RATE: 24 BRPM | DIASTOLIC BLOOD PRESSURE: 81 MMHG | SYSTOLIC BLOOD PRESSURE: 129 MMHG

## 2020-09-18 DIAGNOSIS — M25.512 LEFT SHOULDER PAIN, UNSPECIFIED CHRONICITY: Primary | ICD-10-CM

## 2020-09-18 DIAGNOSIS — M75.02 ADHESIVE CAPSULITIS OF LEFT SHOULDER: ICD-10-CM

## 2020-09-18 DIAGNOSIS — M25.512 LEFT SHOULDER PAIN, UNSPECIFIED CHRONICITY: ICD-10-CM

## 2020-09-18 PROCEDURE — 73030 X-RAY EXAM OF SHOULDER: CPT | Performed by: ORTHOPAEDIC SURGERY

## 2020-09-18 PROCEDURE — 99204 OFFICE O/P NEW MOD 45 MIN: CPT | Performed by: ORTHOPAEDIC SURGERY

## 2020-09-18 PROCEDURE — G0463 HOSPITAL OUTPT CLINIC VISIT: HCPCS | Performed by: ORTHOPAEDIC SURGERY

## 2020-09-18 PROCEDURE — 20610 DRAIN/INJ JOINT/BURSA W/O US: CPT | Performed by: ORTHOPAEDIC SURGERY

## 2020-09-18 RX ORDER — BUDESONIDE AND FORMOTEROL FUMARATE DIHYDRATE 160; 4.5 UG/1; UG/1
AEROSOL RESPIRATORY (INHALATION)
COMMUNITY
Start: 2020-05-28 | End: 2021-01-22

## 2020-09-18 RX ORDER — TRIAMCINOLONE ACETONIDE 40 MG/ML
40 INJECTION, SUSPENSION INTRA-ARTICULAR; INTRAMUSCULAR ONCE
Status: COMPLETED | OUTPATIENT
Start: 2020-09-18 | End: 2020-09-18

## 2020-09-18 RX ADMIN — TRIAMCINOLONE ACETONIDE 40 MG: 40 INJECTION, SUSPENSION INTRA-ARTICULAR; INTRAMUSCULAR at 12:15:00

## 2020-09-18 NOTE — PROGRESS NOTES
NURSING INTAKE COMMENTS: Patient presents with:  Shoulder Pain: Left - onset a couple of months ago - no injury recalled - rates pain as 4-36/40 with certain movements - no numbness or tingling - pt is R handed       HPI: This 72year old right-hand-domina Ipratropium-Albuterol (COMBIVENT RESPIMAT)  MCG/ACT Inhalation Aero Soln Inhale 1 puff into the lungs 4 (four) times daily as needed.  1 Inhaler 1   • ipratropium-albuterol 0.5-2.5 (3) MG/3ML Inhalation Solution Use with nebulizer q 6 hr 360 vial 0 cramps with exertion, palpitations, leg swelling  GI: denies abdominal pain, nausea, vomiting, diarrhea, constipation, hematochezia, worsening heartburn or stomach ulcers  : denies dysuria, hematuria, incontinence, increased frequency, urgency, difficult shoulder pain, unspecified chronicity  -     XR SHOULDER, COMPLETE (MIN 2 VIEWS), LEFT (CPT=73030);  Future  -     DRAIN/INJECT LARGE JOINT/BURSA  -     triamcinolone acetonide (KENALOG-40) 40 MG/ML injection 40 mg    Adhesive capsulitis of left shoulder  -

## 2020-09-18 NOTE — PROGRESS NOTES
Per verbal order from YOBANY Santos, draw up 3ml of 0.5% Marcaine & 2ml 1% lidocaine and 1ml of Kenalog 40 for cortisone injection to left shoulder. Nannette Dodd    Patient provided education handout for cortisone injection.    Patient left off

## 2020-10-30 ENCOUNTER — OFFICE VISIT (OUTPATIENT)
Dept: ORTHOPEDICS CLINIC | Facility: CLINIC | Age: 70
End: 2020-10-30
Payer: MEDICARE

## 2020-10-30 DIAGNOSIS — M75.02 ADHESIVE CAPSULITIS OF LEFT SHOULDER: Primary | ICD-10-CM

## 2020-10-30 PROCEDURE — 99212 OFFICE O/P EST SF 10 MIN: CPT | Performed by: ORTHOPAEDIC SURGERY

## 2020-10-30 PROCEDURE — G0463 HOSPITAL OUTPT CLINIC VISIT: HCPCS | Performed by: ORTHOPAEDIC SURGERY

## 2020-10-30 NOTE — PROGRESS NOTES
NURSING INTAKE COMMENTS: Patient presents with:  Shoulder Pain: Left f/u - had cortisone inj on 9/18/2020 - he did not do PT - has occasional pain rated as 1/10       HPI: This 72year old male presents today with complaints of pain follow-up.   He reports Varenicline Tartrate (CHANTIX STARTING MONTH ANA) 0.5 MG X 11 & 1 MG X 42 Oral Misc Take as directed (Patient not taking: Reported on 9/5/2020 ) 1 Package 0   • Saline Nasal Spray 0.65 % Nasal Solution 1 spray by Nasal route every 3 (three) hours as needed urinating  MUSCULOSKELETAL: denies musculoskeletal complaints other than in HPI  NEURO: denies numbness, tingling, weakness, balance issues, dizziness, memory loss  PSYCHIATRIC: denies Hx of depression, anxiety, other psychiatric disorders  HEMATOLOGIC: de If symptoms worsen consider outpatient therapy or repeat injections. Follow-up again as needed. Follow Up: No follow-ups on file.     Karine Oh MD

## 2021-01-20 ENCOUNTER — TELEPHONE (OUTPATIENT)
Dept: INTERNAL MEDICINE CLINIC | Facility: CLINIC | Age: 71
End: 2021-01-20

## 2021-01-20 NOTE — TELEPHONE ENCOUNTER
Unfortunately, I dont have any suggestions since all of inhalers are really expensive. He may want to ffup with Dr Tanna Vazquez pulmnologist who had seen him before for his copd and see if he has any other recommendations.

## 2021-01-20 NOTE — TELEPHONE ENCOUNTER
pls check with pharmacy if advair or Yaneth Quinn inhaers will be cheaper for the patient or to they have generic version of symbicort (as far as I know though no generics for these inhalers)

## 2021-01-20 NOTE — TELEPHONE ENCOUNTER
Called pharmacy and was informed that Advair and Dulera not preferred on patient's plan and not covered. Per pharmacy, patient will have to pay cash price for Symbicort because he has a deductible that needs to be met. Cristian Forbes is preferred. However, the cost is $401 out of pocket due to deductible.      Dr. Morillo Sons:   Please advise

## 2021-01-20 NOTE — TELEPHONE ENCOUNTER
Patient states medication is too expensive.  Requesting alternate medication     Budesonide-Formoterol Fumarate (SYMBICORT) 160-4.5 MCG/ACT Inhalation Aerosol

## 2021-01-22 RX ORDER — BUDESONIDE AND FORMOTEROL FUMARATE DIHYDRATE 160; 4.5 UG/1; UG/1
2 AEROSOL RESPIRATORY (INHALATION) 2 TIMES DAILY
Qty: 3 INHALER | Refills: 1 | Status: ON HOLD | OUTPATIENT
Start: 2021-01-22 | End: 2021-04-17

## 2021-01-25 ENCOUNTER — TELEPHONE (OUTPATIENT)
Dept: ORTHOPEDICS CLINIC | Facility: CLINIC | Age: 71
End: 2021-01-25

## 2021-01-25 NOTE — TELEPHONE ENCOUNTER
Spoke to pt and he states that his right knee is having +10/10 \"excruciating pain\" with WB. Knee pain began last Friday. Denies any fall or injury. States knee has swelling. Denies redness or any open wounds. No XR taken.    Scheduled pt appt with Teresa

## 2021-01-26 ENCOUNTER — HOSPITAL ENCOUNTER (OUTPATIENT)
Dept: GENERAL RADIOLOGY | Facility: HOSPITAL | Age: 71
Discharge: HOME | End: 2021-01-26
Attending: ORTHOPAEDIC SURGERY
Payer: MEDICARE

## 2021-01-26 ENCOUNTER — OFFICE VISIT (OUTPATIENT)
Dept: ORTHOPEDICS CLINIC | Facility: CLINIC | Age: 71
End: 2021-01-26
Payer: MEDICARE

## 2021-01-26 VITALS
HEIGHT: 67 IN | DIASTOLIC BLOOD PRESSURE: 84 MMHG | HEART RATE: 91 BPM | BODY MASS INDEX: 39.55 KG/M2 | WEIGHT: 252 LBS | SYSTOLIC BLOOD PRESSURE: 128 MMHG

## 2021-01-26 DIAGNOSIS — Z47.89 ORTHOPEDIC AFTERCARE: ICD-10-CM

## 2021-01-26 DIAGNOSIS — M17.11 PRIMARY OSTEOARTHRITIS OF RIGHT KNEE: Primary | ICD-10-CM

## 2021-01-26 PROCEDURE — 73564 X-RAY EXAM KNEE 4 OR MORE: CPT | Performed by: ORTHOPAEDIC SURGERY

## 2021-01-26 PROCEDURE — 20610 DRAIN/INJ JOINT/BURSA W/O US: CPT | Performed by: ORTHOPAEDIC SURGERY

## 2021-01-26 RX ORDER — TRIAMCINOLONE ACETONIDE 40 MG/ML
40 INJECTION, SUSPENSION INTRA-ARTICULAR; INTRAMUSCULAR ONCE
Status: COMPLETED | OUTPATIENT
Start: 2021-01-26 | End: 2021-01-26

## 2021-01-26 NOTE — PROGRESS NOTES
NURSING INTAKE COMMENTS: Patient presents with:  Consult: c/o of right knee pain x 1 year. Rates pain 3/10 at this time.        HPI: This 72year old male presents today with complaints of chronic right knee pain for many years that is progressed over the l Saline Nasal Spray 0.65 % Nasal Solution 1 spray by Nasal route every 3 (three) hours as needed for congestion.  1 Bottle 0   • KETOCONAZOLE 2 % External Cream APPLY EXTERNALLY TO THE AFFECTED AREA TWICE DAILY (Patient taking differently: APPLY EXTERNALLY T Knee is ligamentously stable.     Imaging: Xr Knee, Complete (4 Or More Views), Right (cpt=73564)    Result Date: 1/26/2021  PROCEDURE: XR KNEE, COMPLETE (4 OR MORE VIEWS), RIGHT (WOP=37942)  COMPARISON: [facility], XR KNEE BILAT STANDING (CP RIGHT (ROF=32690); Future        Assessment: Moderate to severe right knee primary osteoarthritis    Plan: I discussed further treatment options in detail the patient he has not tried injections and I recommend a corticosteroid injection.   We also discusse

## 2021-01-26 NOTE — PROCEDURES
The patient identified the right knee as the correct procedure site. This was verified with the consent and with 2 patient identifiers. The superolateral patellar injection site was prepped with betadine and alcohol.   A 22-gauge needle was introduced into

## 2021-02-01 DIAGNOSIS — Z23 NEED FOR VACCINATION: ICD-10-CM

## 2021-02-26 ENCOUNTER — TELEPHONE (OUTPATIENT)
Dept: INTERNAL MEDICINE CLINIC | Facility: CLINIC | Age: 71
End: 2021-02-26

## 2021-03-05 ENCOUNTER — IMMUNIZATION (OUTPATIENT)
Dept: LAB | Facility: HOSPITAL | Age: 71
End: 2021-03-05
Attending: HOSPITALIST
Payer: MEDICARE

## 2021-03-05 DIAGNOSIS — Z23 NEED FOR VACCINATION: Primary | ICD-10-CM

## 2021-03-05 PROCEDURE — 0011A SARSCOV2 VAC 100MCG/0.5ML IM: CPT

## 2021-04-02 ENCOUNTER — IMMUNIZATION (OUTPATIENT)
Dept: LAB | Facility: HOSPITAL | Age: 71
End: 2021-04-02
Attending: EMERGENCY MEDICINE
Payer: MEDICARE

## 2021-04-02 DIAGNOSIS — Z23 NEED FOR VACCINATION: Primary | ICD-10-CM

## 2021-04-02 PROCEDURE — 0012A SARSCOV2 VAC 100MCG/0.5ML IM: CPT

## 2021-04-10 ENCOUNTER — APPOINTMENT (OUTPATIENT)
Dept: CT IMAGING | Facility: HOSPITAL | Age: 71
DRG: 208 | End: 2021-04-10
Attending: EMERGENCY MEDICINE
Payer: MEDICARE

## 2021-04-10 ENCOUNTER — NURSE TRIAGE (OUTPATIENT)
Dept: INTERNAL MEDICINE CLINIC | Facility: CLINIC | Age: 71
End: 2021-04-10

## 2021-04-10 ENCOUNTER — APPOINTMENT (OUTPATIENT)
Dept: GENERAL RADIOLOGY | Facility: HOSPITAL | Age: 71
DRG: 208 | End: 2021-04-10
Attending: EMERGENCY MEDICINE
Payer: MEDICARE

## 2021-04-10 ENCOUNTER — HOSPITAL ENCOUNTER (INPATIENT)
Facility: HOSPITAL | Age: 71
LOS: 7 days | Discharge: HOME HEALTH | DRG: 208 | End: 2021-04-17
Attending: EMERGENCY MEDICINE | Admitting: INTERNAL MEDICINE
Payer: MEDICARE

## 2021-04-10 DIAGNOSIS — G47.33 OSA (OBSTRUCTIVE SLEEP APNEA): ICD-10-CM

## 2021-04-10 DIAGNOSIS — J44.1 COPD EXACERBATION (HCC): ICD-10-CM

## 2021-04-10 DIAGNOSIS — J96.01 ACUTE RESPIRATORY FAILURE WITH HYPOXIA (HCC): Primary | ICD-10-CM

## 2021-04-10 PROCEDURE — 71045 X-RAY EXAM CHEST 1 VIEW: CPT | Performed by: EMERGENCY MEDICINE

## 2021-04-10 PROCEDURE — 0BH17EZ INSERTION OF ENDOTRACHEAL AIRWAY INTO TRACHEA, VIA NATURAL OR ARTIFICIAL OPENING: ICD-10-PCS | Performed by: EMERGENCY MEDICINE

## 2021-04-10 PROCEDURE — 99291 CRITICAL CARE FIRST HOUR: CPT | Performed by: INTERNAL MEDICINE

## 2021-04-10 PROCEDURE — 5A1945Z RESPIRATORY VENTILATION, 24-96 CONSECUTIVE HOURS: ICD-10-PCS | Performed by: EMERGENCY MEDICINE

## 2021-04-10 PROCEDURE — 70450 CT HEAD/BRAIN W/O DYE: CPT | Performed by: EMERGENCY MEDICINE

## 2021-04-10 RX ORDER — METHYLPREDNISOLONE SODIUM SUCCINATE 125 MG/2ML
60 INJECTION, POWDER, LYOPHILIZED, FOR SOLUTION INTRAMUSCULAR; INTRAVENOUS EVERY 6 HOURS
Status: DISCONTINUED | OUTPATIENT
Start: 2021-04-10 | End: 2021-04-11

## 2021-04-10 RX ORDER — ALBUTEROL SULFATE 2.5 MG/3ML
SOLUTION RESPIRATORY (INHALATION)
Status: COMPLETED
Start: 2021-04-10 | End: 2021-04-10

## 2021-04-10 RX ORDER — IPRATROPIUM BROMIDE AND ALBUTEROL SULFATE 2.5; .5 MG/3ML; MG/3ML
3 SOLUTION RESPIRATORY (INHALATION) ONCE
Status: COMPLETED | OUTPATIENT
Start: 2021-04-10 | End: 2021-04-10

## 2021-04-10 RX ORDER — FUROSEMIDE 10 MG/ML
40 INJECTION INTRAMUSCULAR; INTRAVENOUS ONCE
Status: COMPLETED | OUTPATIENT
Start: 2021-04-10 | End: 2021-04-10

## 2021-04-10 RX ORDER — LORAZEPAM 2 MG/ML
1 INJECTION INTRAMUSCULAR EVERY 4 HOURS PRN
Status: DISCONTINUED | OUTPATIENT
Start: 2021-04-10 | End: 2021-04-13

## 2021-04-10 RX ORDER — HEPARIN SODIUM 5000 [USP'U]/ML
5000 INJECTION, SOLUTION INTRAVENOUS; SUBCUTANEOUS EVERY 8 HOURS SCHEDULED
Status: DISCONTINUED | OUTPATIENT
Start: 2021-04-10 | End: 2021-04-17

## 2021-04-10 RX ORDER — MELATONIN
100 DAILY
Status: DISCONTINUED | OUTPATIENT
Start: 2021-04-10 | End: 2021-04-17

## 2021-04-10 RX ORDER — IPRATROPIUM BROMIDE AND ALBUTEROL SULFATE 2.5; .5 MG/3ML; MG/3ML
3 SOLUTION RESPIRATORY (INHALATION)
Status: DISCONTINUED | OUTPATIENT
Start: 2021-04-10 | End: 2021-04-12

## 2021-04-10 RX ORDER — FOLIC ACID 1 MG/1
1 TABLET ORAL DAILY
Status: DISCONTINUED | OUTPATIENT
Start: 2021-04-10 | End: 2021-04-17

## 2021-04-10 RX ORDER — METHYLPREDNISOLONE SODIUM SUCCINATE 125 MG/2ML
125 INJECTION, POWDER, LYOPHILIZED, FOR SOLUTION INTRAMUSCULAR; INTRAVENOUS ONCE
Status: COMPLETED | OUTPATIENT
Start: 2021-04-10 | End: 2021-04-10

## 2021-04-10 RX ORDER — ETOMIDATE 2 MG/ML
25 INJECTION INTRAVENOUS ONCE
Status: COMPLETED | OUTPATIENT
Start: 2021-04-10 | End: 2021-04-10

## 2021-04-10 RX ORDER — SODIUM CHLORIDE 9 MG/ML
INJECTION, SOLUTION INTRAVENOUS CONTINUOUS
Status: DISCONTINUED | OUTPATIENT
Start: 2021-04-10 | End: 2021-04-14

## 2021-04-10 NOTE — PROGRESS NOTES
RN requested CP as wife was outside of room quite anxious as team were about to intubate Pt. CP arrived and walked wife to a private area along with Pt.'s dgtr Yuli Kinney.  Wife had difficulty focusing and recalling what had been said to her about Pt.'s conditio

## 2021-04-10 NOTE — H&P
[facility]    History and Physical    Kalie Apt Patient Status:  Inpatient    1950 MRN [id number]   Location [facility] 2W/SW Attending Rin Padilla MD   Hosp Day # 0 PCP Tiffanie Davis MD     Date:  4/10/2021 calcification on ct chest   • COPD (chronic obstructive pulmonary disease) ([facility] [address]     dx 2014. 2 episodes AECOPD.    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  inhale 2 puff by inhalation route  every 4 - 6 hours as needed        Review o 4/10/2021  CONCLUSION:   No acute intracranial process by noncontrast CT technique.    Dictated by (CST): Ivon Cho MD on 4/10/2021 at 12:10 PM     Finalized by (CST): Ivon Cho MD on 4/10/2021 at 12:12 PM          XR CHEST AP PORTABLE  (CPT=710 the wife in length  > 35 min cct                   Simmie Double.  Tracy Castorena MD  4/10/2021

## 2021-04-10 NOTE — TELEPHONE ENCOUNTER
Action Requested: Summary for Provider     []  Critical Lab, Recommendations Needed  [] Need Additional Advice  []   FYI    []   Need Orders  [] Need Medications Sent to Pharmacy  []  Other     SUMMARY: spouse called asking to talk to doctor to have pt adm

## 2021-04-10 NOTE — ED PROVIDER NOTES
Patient Seen in: [facility] AND [facility] Emergency Department    History   Patient presents with:  Altered Mental Status  Fall  Difficulty Breathing    Stated Complaint: CONFUSED    HPI    Patient is here with onset of confusion and difficulty breathing yester daily.   Urea 40 % External Cream,  Apply 1 Application topically daily. furosemide 20 MG Oral Tab,  Take 1 tablet (20 mg total) by mouth daily. Potassium Chloride ER 10 MEQ Oral Tab CR,  Take 1 tablet (10 mEq total) by mouth daily.    Ipratropium-Albut No scleral icterus. Eyelids appear normal, no lesions. HEENT: No visible sign of facial trauma or head injury noted  Cardiovascular:  Normal S1 and S2, no murmur, regular, with good peripheral perfusion.   Chronic appearing edema to lower extremities  Res stabilized at this point I discuss with her he is quite ill at this time.     I spent a total of 60 minutes of critical care time in obtaining history, performing a physical exam, bedside monitoring of interventions, collecting and interpreting tests and di individual orders. RAINBOW DRAW BLUE   RAINBOW DRAW LAVENDER   RAINBOW DRAW LIGHT GREEN   RAINBOW DRAW GOLD     EKG    Rate, intervals and axes as noted on EKG Report.   Rate: EKG shows rate 99 normal sinus rhythm no ST elevation or depression is seen

## 2021-04-10 NOTE — PROGRESS NOTES
[facility] Pharmacy Note: Antimicrobial Weight Based Dose Adjustment for: piperacillin/tazobactam ([place])    Darien Moreno is a 80year old patient who has been prescribed piperacillin/tazobactam (ZOSYN) 3375 mg every 8 hours.     Estimated Creatinine Clearance

## 2021-04-10 NOTE — ED INITIAL ASSESSMENT (HPI)
Pt to ED for difficulty breathing, AMS per family and appears pale/blue lips. Daughter reports worsening since last night. Pt also had unwitnessed fall yesterday, bruising to left chest. Not on blood thinners.     Hx COPD, on 3-4L NC but arrives to ED not o

## 2021-04-10 NOTE — PROGRESS NOTES
[facility] Pharmacy Note: Antimicrobial Weight Based Dose Adjustment for: piperacillin/tazobactam (Rylan [place])    Suyapa Agarwal is a 80year old patient who has been prescribed piperacillin/tazobactam (ZOSYN) 3.375 g IVPB x 1 in ER  .     Estimated Creatinine Cleara

## 2021-04-10 NOTE — ED QUICK NOTES
Patient placed on bipap prior to ct. Not improving on bipap. ET orders placed. Patient given etomidate and succinylcholine given prior to intubation. Patient intubated with 8.0 ETT, 26 at the lips. NG and moscoso inserted. patient tolerated insertions.  Propo

## 2021-04-11 ENCOUNTER — APPOINTMENT (OUTPATIENT)
Dept: GENERAL RADIOLOGY | Facility: HOSPITAL | Age: 71
DRG: 208 | End: 2021-04-11
Attending: INTERNAL MEDICINE
Payer: MEDICARE

## 2021-04-11 PROCEDURE — 99291 CRITICAL CARE FIRST HOUR: CPT | Performed by: INTERNAL MEDICINE

## 2021-04-11 PROCEDURE — 71045 X-RAY EXAM CHEST 1 VIEW: CPT | Performed by: INTERNAL MEDICINE

## 2021-04-11 RX ORDER — METHYLPREDNISOLONE SODIUM SUCCINATE 40 MG/ML
40 INJECTION, POWDER, LYOPHILIZED, FOR SOLUTION INTRAMUSCULAR; INTRAVENOUS EVERY 6 HOURS
Status: DISCONTINUED | OUTPATIENT
Start: 2021-04-11 | End: 2021-04-15

## 2021-04-11 RX ORDER — BISACODYL 10 MG
10 SUPPOSITORY, RECTAL RECTAL
Status: DISCONTINUED | OUTPATIENT
Start: 2021-04-11 | End: 2021-04-17

## 2021-04-11 RX ORDER — FUROSEMIDE 10 MG/ML
20 INJECTION INTRAMUSCULAR; INTRAVENOUS ONCE
Status: COMPLETED | OUTPATIENT
Start: 2021-04-11 | End: 2021-04-11

## 2021-04-11 NOTE — DIETARY NOTE
ADULT NUTRITION INITIAL ASSESSMENT    Pt is at high nutrition risk. Pt does not meet malnutrition criteria.       RECOMMENDATIONS TO MD:  See Nutrition Intervention for enteral nutrition specifics    ADMITTING DIAGNOSIS:   COPD exacerbation ([facility] [address] [J44.1] 0.9%  1,000 mL Intravenous Once   • MethylPREDNISolone Sodium Succ  60 mg Intravenous Q6H   • ipratropium-albuterol  3 mL Nebulization 6 times per day   • Heparin Sodium (Porcine)  5,000 Units Subcutaneous Q8H [facility]   • pantoprazole (PROTONIX) IV push  40 mg lb)    ESTIMATED NUTRITION NEEDS: Dosing wt: 127 kg (current wt)  Calories: 6264-3403 calories/day (11-14 calories per kg Actual body wt (ABW)) (PSU 2010 (Tmax 36.6C, 7.5 L/min) = 1942 kcal or 15 kcal/kg)  Protein: 109-145 grams protein/day (1.5-2 grams pr Measurement:      Monitor weight  - Nutrition Goals:      allow wt loss due to fluid losses, long term gradual wt loss, tube feed meets greater than 80% of needs, labs WNL, prevent skin breakdown and monitor fluid status    DIETITIAN FOLLOW UP: DULCE MARIA mukherjee

## 2021-04-11 NOTE — PROGRESS NOTES
Pt received on vent settings noted, no acute changes made overnight, pt is receiving nebs Q4, suctioned as needed.  RT will continue to monitor pt.        04/11/21 9580   Vent Information   $ RT Standby Charge (per 15 min) 1   $ Vent Care / Non-Invasive Sub

## 2021-04-11 NOTE — PROGRESS NOTES
[facility]    Progress Note    Som Friendly Patient Status:  Inpatient    1950 MRN [id number]   Location [facility] 2W/SW Attending Cortney Baker MD   Hosp Day # 1 PCP Melvi Villagomez MD     HPI/Subjective:     Kat Sanders Dictated by (CST): Maru Mcfarlane MD on 4/10/2021 at 12:10 PM     Finalized by (CST): Maru Mcfarlane MD on 4/10/2021 at 12:12 PM          XR CHEST AP PORTABLE  (CPT=71045)    Result Date: 4/11/2021  CONCLUSION:  1. CHF with pulmonary interstitial edema. bronchodilator  Empiric antibiotics  Lasix 20 mg x 1 today            2-very severe COPD with acute exacerbation (HCC)  Smoking addiction with ongoing heavy tobacco abuse over 1 pack a day     Smoking cessation  Bronchodilator  Nebulizers  Trelegy inhaler

## 2021-04-11 NOTE — PLAN OF CARE
Problem: Safety Risk - Non-Violent Restraints  Goal: Patient will remain free from self-harm  Description: INTERVENTIONS:  - Apply the least restrictive restraint to prevent harm  - Notify patient and family of reasons restraints applied  - Assess for an and behaviors that affect risk of falls.   - [place] fall precautions as indicated by assessment.  - Educate pt/family on patient safety including physical limitations  - Instruct pt to call for assistance with activity based on assessment  - Modify envir Progressing  Goal: Hemodynamic stability and optimal renal function maintained  Description: INTERVENTIONS:  - Monitor labs and assess for signs and symptoms of volume excess or deficit  - Monitor intake, output and patient weight  - Monitor urine specific blowing  Outcome: Progressing     Problem: MUSCULOSKELETAL - ADULT  Goal: Return mobility to safest level of function  Description: INTERVENTIONS:  - Assess patient stability and activity tolerance for standing, transferring and ambulating w/ or w/o assist

## 2021-04-11 NOTE — PLAN OF CARE
Problem: Safety Risk - Non-Violent Restraints  Goal: Patient will remain free from self-harm  Description: INTERVENTIONS:  - Apply the least restrictive restraint to prevent harm  - Notify patient and family of reasons restraints applied  - Assess for an behaviors that affect risk of falls.   - [place] fall precautions as indicated by assessment.  - Educate pt/family on patient safety including physical limitations  - Instruct pt to call for assistance with activity based on assessment  - Modify environme Progressing  Goal: Hemodynamic stability and optimal renal function maintained  Description: INTERVENTIONS:  - Monitor labs and assess for signs and symptoms of volume excess or deficit  - Monitor intake, output and patient weight  - Monitor urine specific blowing  Outcome: Progressing     Problem: MUSCULOSKELETAL - ADULT  Goal: Return mobility to safest level of function  Description: INTERVENTIONS:  - Assess patient stability and activity tolerance for standing, transferring and ambulating w/ or w/o assist

## 2021-04-11 NOTE — PLAN OF CARE
Problem: Safety Risk - Non-Violent Restraints  Goal: Patient will remain free from self-harm  Description: INTERVENTIONS:  - Apply the least restrictive restraint to prevent harm  - Notify patient and family of reasons restraints applied  - Assess for an None

## 2021-04-12 ENCOUNTER — APPOINTMENT (OUTPATIENT)
Dept: CV DIAGNOSTICS | Facility: HOSPITAL | Age: 71
DRG: 208 | End: 2021-04-12
Attending: INTERNAL MEDICINE
Payer: MEDICARE

## 2021-04-12 ENCOUNTER — APPOINTMENT (OUTPATIENT)
Dept: PICC SERVICES | Facility: HOSPITAL | Age: 71
DRG: 208 | End: 2021-04-12
Attending: INTERNAL MEDICINE
Payer: MEDICARE

## 2021-04-12 ENCOUNTER — APPOINTMENT (OUTPATIENT)
Dept: GENERAL RADIOLOGY | Facility: HOSPITAL | Age: 71
DRG: 208 | End: 2021-04-12
Attending: INTERNAL MEDICINE
Payer: MEDICARE

## 2021-04-12 PROCEDURE — 99291 CRITICAL CARE FIRST HOUR: CPT | Performed by: INTERNAL MEDICINE

## 2021-04-12 PROCEDURE — B5181ZA FLUOROSCOPY OF SUPERIOR VENA CAVA USING LOW OSMOLAR CONTRAST, GUIDANCE: ICD-10-PCS | Performed by: HOSPITALIST

## 2021-04-12 PROCEDURE — 93306 TTE W/DOPPLER COMPLETE: CPT | Performed by: INTERNAL MEDICINE

## 2021-04-12 PROCEDURE — 71045 X-RAY EXAM CHEST 1 VIEW: CPT | Performed by: INTERNAL MEDICINE

## 2021-04-12 PROCEDURE — 02HV33Z INSERTION OF INFUSION DEVICE INTO SUPERIOR VENA CAVA, PERCUTANEOUS APPROACH: ICD-10-PCS | Performed by: HOSPITALIST

## 2021-04-12 RX ORDER — IPRATROPIUM BROMIDE AND ALBUTEROL SULFATE 2.5; .5 MG/3ML; MG/3ML
3 SOLUTION RESPIRATORY (INHALATION)
Status: DISCONTINUED | OUTPATIENT
Start: 2021-04-13 | End: 2021-04-13

## 2021-04-12 RX ORDER — FUROSEMIDE 10 MG/ML
20 INJECTION INTRAMUSCULAR; INTRAVENOUS ONCE
Status: COMPLETED | OUTPATIENT
Start: 2021-04-12 | End: 2021-04-12

## 2021-04-12 RX ORDER — MORPHINE SULFATE 2 MG/ML
2 INJECTION, SOLUTION INTRAMUSCULAR; INTRAVENOUS EVERY 2 HOUR PRN
Status: DISCONTINUED | OUTPATIENT
Start: 2021-04-12 | End: 2021-04-13

## 2021-04-12 RX ORDER — HYDRALAZINE HYDROCHLORIDE 20 MG/ML
10 INJECTION INTRAMUSCULAR; INTRAVENOUS ONCE
Status: COMPLETED | OUTPATIENT
Start: 2021-04-12 | End: 2021-04-12

## 2021-04-12 RX ORDER — LIDOCAINE HYDROCHLORIDE 10 MG/ML
5 INJECTION, SOLUTION EPIDURAL; INFILTRATION; INTRACAUDAL; PERINEURAL ONCE
Status: DISCONTINUED | OUTPATIENT
Start: 2021-04-12 | End: 2021-04-17

## 2021-04-12 RX ORDER — IPRATROPIUM BROMIDE AND ALBUTEROL SULFATE 2.5; .5 MG/3ML; MG/3ML
3 SOLUTION RESPIRATORY (INHALATION)
Status: DISCONTINUED | OUTPATIENT
Start: 2021-04-12 | End: 2021-04-12

## 2021-04-12 RX ORDER — DEXMEDETOMIDINE HYDROCHLORIDE 4 UG/ML
INJECTION, SOLUTION INTRAVENOUS CONTINUOUS
Status: DISCONTINUED | OUTPATIENT
Start: 2021-04-12 | End: 2021-04-13

## 2021-04-12 NOTE — RESPIRATORY THERAPY NOTE
Received pt on full vent support  AC 16/500/+5/40%. Decreased FIO2 to 30%- Pt maintaining spo2 95% and above. Small amount of secretions suctioned endotracheally from ETT size 8.0 secured at 23. [place] equal B/L BS. Failed SBT TRIAL today.     @8:35- Place p

## 2021-04-12 NOTE — TELEPHONE ENCOUNTER
FYI Pt is admitted.     Hospital Problem List       COPD exacerbation [facility])     Acute respiratory failure with hypoxia [facility])  Care Timeline    04/10   Admitted to [facility] AND [facility] 2W/SW from ED 1426

## 2021-04-12 NOTE — PROGRESS NOTES
[facility]    Progress Note    Susanna Stable Patient Status:  Inpatient    1950 MRN [id number]   Location [facility] 2W/SW Attending Gil Khan MD   Hosp Day # 2 PCP Bibi Gilbert MD     HPI/Subjective:     Maria Alejandra Kelley 10/17/2019    TP 6.9 10/17/2019    AST 16 10/17/2019    ALT 35 10/17/2019    DDIMER 0.40 10/18/2016    MG 2.6 04/12/2021    PHOS 2.9 04/12/2021    TROP <0.045 04/10/2021       XR CHEST AP PORTABLE  (CPT=71045)    Result Date: 4/12/2021  CONCLUSION:   1. Ov MD  4/12/2021

## 2021-04-12 NOTE — PROGRESS NOTES
Pt received on vent settings noted, no acute changes made overnight, pt is receiving nebs Q4, suctioned as needed. SBT is planned for today.  RT will continue to monitor pt.        04/12/21 5853   Vent Information   $ RT Standby Charge (per 15 min) 1   $ Ve

## 2021-04-12 NOTE — PLAN OF CARE
Pt was agitated today during breathing trial, consulted with MD, plan of care to extubate pt tomorrow 4/13. Pt put on precedex to alleviate anxiety. Wife and daughter were at bedside. Pt had a bowel movement this morning.  PICC line inserted in left basilic extubated    Interventions:   - Breathing trials as tolerated, ongoing reassessments  - See additional Care Plan goals for specific interventions  Outcome: Not Progressing     Problem: SAFETY ADULT - FALL  Goal: Free from fall injury  Description: Yemi Ryan and rhythm and assess patient for signs and symptoms of electrolyte imbalances  - Administer electrolyte replacement as ordered  - Monitor response to electrolyte replacements, including rhythm and repeat lab results as appropriate  - Fluid restriction as pressure on venipuncture sites to achieve adequate hemostasis  - Assess for signs and symptoms of internal bleeding  - Monitor lab trends  - Patient is to report abnormal signs of bleeding to staff  - Avoid use of toothpicks and dental floss  - Use electri

## 2021-04-12 NOTE — PLAN OF CARE
Problem: Safety Risk - Non-Violent Restraints  Goal: Patient will remain free from self-harm  Description: INTERVENTIONS:  - Apply the least restrictive restraint to prevent harm  - Notify patient and family of reasons restraints applied  - Assess for an and behaviors that affect risk of falls.   - [place] fall precautions as indicated by assessment.  - Educate pt/family on patient safety including physical limitations  - Instruct pt to call for assistance with activity based on assessment  - Modify envir Progressing  Goal: Hemodynamic stability and optimal renal function maintained  Description: INTERVENTIONS:  - Monitor labs and assess for signs and symptoms of volume excess or deficit  - Monitor intake, output and patient weight  - Monitor urine specific blowing  Outcome: Progressing     Problem: MUSCULOSKELETAL - ADULT  Goal: Return mobility to safest level of function  Description: INTERVENTIONS:  - Assess patient stability and activity tolerance for standing, transferring and ambulating w/ or w/o assist

## 2021-04-12 NOTE — PROGRESS NOTES
Vascular Access Note    Vascular Access Screening:   Allergies to Lidocaine: no  Allergies to Latex: no  Presence of Pacemaker/Defibrillator: No  Mastectomy with Lymph Node Dissection: No  AV Fistula / AV Graft: No  Dialysis Catheter: No  Central Line: No the insertion site    Inserted by Jael PERSON RN [facility]

## 2021-04-13 ENCOUNTER — APPOINTMENT (OUTPATIENT)
Dept: GENERAL RADIOLOGY | Facility: HOSPITAL | Age: 71
DRG: 208 | End: 2021-04-13
Attending: INTERNAL MEDICINE
Payer: MEDICARE

## 2021-04-13 PROCEDURE — 71045 X-RAY EXAM CHEST 1 VIEW: CPT | Performed by: INTERNAL MEDICINE

## 2021-04-13 PROCEDURE — 99233 SBSQ HOSP IP/OBS HIGH 50: CPT | Performed by: INTERNAL MEDICINE

## 2021-04-13 RX ORDER — IPRATROPIUM BROMIDE AND ALBUTEROL SULFATE 2.5; .5 MG/3ML; MG/3ML
3 SOLUTION RESPIRATORY (INHALATION)
Status: DISCONTINUED | OUTPATIENT
Start: 2021-04-13 | End: 2021-04-16

## 2021-04-13 RX ORDER — FUROSEMIDE 10 MG/ML
20 INJECTION INTRAMUSCULAR; INTRAVENOUS ONCE
Status: COMPLETED | OUTPATIENT
Start: 2021-04-13 | End: 2021-04-13

## 2021-04-13 NOTE — PROGRESS NOTES
Patient received on full vent support A/C 16 500 40% +5 with 8.0 ETT taped 24 @ the lip with bilateral breath sounds. Patient has moderate amount of tan secretions and received treatments as ordered. No other changes made.

## 2021-04-13 NOTE — RESPIRATORY THERAPY NOTE
Patient received on full vent support A/C 16 500 40% +5 with 8.0 ETT taped 24 @ the lip with bilateral breath sounds. @0900: Pt is off the sedation, awake, following commands. Per Dr Mendoza Fill place pt on CPAP, 5, ps 10, 30%.  Weaning parameters were don

## 2021-04-13 NOTE — PLAN OF CARE
Received report at 1900. Patient sedated and intubated. Restraints in place to prevent patient from pulling out tubes and lines. Precedex and Propofol infusing per orders.  Patient agitated and times and pulling on restraints, somewhat easy to reorient jose alfredo smoking, manage health conditions better    Interventions:  - Educate on medications and educate on how to maintain health conditions  - See additional Care Plan goals for specific interventions  Outcome: Progressing  Goal: Patient/Family Short Term Goal difficulty  - Respiratory Therapy support as indicated  - Manage/alleviate anxiety  - Monitor for signs/symptoms of CO2 retention  Outcome: Progressing     Problem: METABOLIC/FLUID AND ELECTROLYTES - ADULT  Goal: Electrolytes maintained within normal limit products/factors as ordered and appropriate  Outcome: Progressing  Goal: Free from bleeding injury  Description: (Example usage: patient with low platelets)  INTERVENTIONS:  - Avoid intramuscular injections, enemas and rectal medication administration  - E

## 2021-04-13 NOTE — RESPIRATORY THERAPY NOTE
Received pt intubated with ETT 8.0, Secured [email or web address]. Pt is on the following vent settings:AC/ VC 16 500 +5/40%- Pt maintaining appropriate sat's at this time. Sx as needed. [place] equal B/L BS. RT to continue monitor patient.

## 2021-04-13 NOTE — SLP NOTE
SLP orders received and acknowledged. Chart reviewed. Per RN, patient recently extubated and continues to produce thick secretions. Defer assessment to a later date. SLP to f/u as patient appropriate and as schedule allows. Thank you.     Kwan Meraz

## 2021-04-13 NOTE — PLAN OF CARE
Weaned off propofol, moderate agitation during SBT, extubated at 0955 on low-dose precedex which was subsequently DC'd - pt became calm and cooperative after extubation. Placed on 5L NC, lungs clear, occasional wheeze with exertion.  AOx3, did not remember better    Interventions:  - Educate on medications and educate on how to maintain health conditions  - See additional Care Plan goals for specific interventions  Outcome: Progressing  Goal: Patient/Family Short Term Goal  Description: Patient's Short Term indicated  - Manage/alleviate anxiety  - Monitor for signs/symptoms of CO2 retention  Outcome: Progressing     Problem: METABOLIC/FLUID AND ELECTROLYTES - ADULT  Goal: Electrolytes maintained within normal limits  Description: INTERVENTIONS:  - Monitor lab appropriate  Outcome: Progressing  Goal: Free from bleeding injury  Description: (Example usage: patient with low platelets)  INTERVENTIONS:  - Avoid intramuscular injections, enemas and rectal medication administration  - Ensure safe mobilization of patie

## 2021-04-13 NOTE — PROGRESS NOTES
[place] [facility] - [facility]    Progress Note    Ibapah Chesterfield Patient Status:  Inpatient    1950 MRN [id number]   Location [facility] 2W/SW Attending Ashley Arana, [address] [facility] Se Day # 3 PCP Brian Melvin MD     HPI/Subjective:   Sub DDIMER 0.40 10/18/2016    MG 2.6 04/12/2021    PHOS 2.9 04/12/2021    TROP <0.045 04/10/2021       XR CHEST AP PORTABLE  (CPT=71045)    Result Date: 4/13/2021  CONCLUSION:  1. New left-sided PICC is in customary positioning. No pneumothorax.  2. Pattie Acharya

## 2021-04-14 PROCEDURE — 99233 SBSQ HOSP IP/OBS HIGH 50: CPT | Performed by: HOSPITALIST

## 2021-04-14 PROCEDURE — 99233 SBSQ HOSP IP/OBS HIGH 50: CPT | Performed by: INTERNAL MEDICINE

## 2021-04-14 RX ORDER — ECHINACEA PURPUREA EXTRACT 125 MG
1 TABLET ORAL
Status: DISCONTINUED | OUTPATIENT
Start: 2021-04-14 | End: 2021-04-17

## 2021-04-14 RX ORDER — VANCOMYCIN HYDROCHLORIDE 125 MG/1
125 CAPSULE ORAL DAILY
Status: DISCONTINUED | OUTPATIENT
Start: 2021-04-14 | End: 2021-04-17

## 2021-04-14 RX ORDER — AZITHROMYCIN 250 MG/1
250 TABLET, FILM COATED ORAL
Status: DISCONTINUED | OUTPATIENT
Start: 2021-04-14 | End: 2021-04-17

## 2021-04-14 RX ORDER — PANTOPRAZOLE SODIUM 40 MG/1
40 TABLET, DELAYED RELEASE ORAL
Status: DISCONTINUED | OUTPATIENT
Start: 2021-04-14 | End: 2021-04-17

## 2021-04-14 RX ORDER — FUROSEMIDE 10 MG/ML
20 INJECTION INTRAMUSCULAR; INTRAVENOUS DAILY
Status: DISCONTINUED | OUTPATIENT
Start: 2021-04-14 | End: 2021-04-17

## 2021-04-14 RX ORDER — ZOLPIDEM TARTRATE 5 MG/1
5 TABLET ORAL NIGHTLY PRN
Status: DISCONTINUED | OUTPATIENT
Start: 2021-04-14 | End: 2021-04-17

## 2021-04-14 RX ORDER — FUROSEMIDE 10 MG/ML
20 INJECTION INTRAMUSCULAR; INTRAVENOUS ONCE
Status: COMPLETED | OUTPATIENT
Start: 2021-04-14 | End: 2021-04-14

## 2021-04-14 NOTE — CM/SW NOTE
Met with patient at the bedside to obtain baseline and he reports that he lives with his wife and was independent with all ADLs/IADLs prior to this admission. Per chart review, pt has had Home O2, however he states he is not currently using Home O2.     Add

## 2021-04-14 NOTE — PROGRESS NOTES
[facility]    Progress Note    Kingsley Koch Patient Status:  Inpatient    1950 MRN [id number]   Location [address] [facility] Attending Phani Woodruff MD   Hosp Day # 4 PCP Daniel Chakraborty MD       Subjective:     Doing I encouraged patient to follow medical advice    DVT prophylaxis with heparin subcu  full code     D/w pt and wife at bedside  D/w staff    Results:     Lab Results   Component Value Date    WBC 14.0 (H) 04/14/2021    HGB 17.1 04/14/2021    HCT 52.5 04/14/

## 2021-04-14 NOTE — PLAN OF CARE
Problem: Patient Centered Care  Goal: Patient preferences are identified and integrated in the patient's plan of care  Description: Interventions:  - What would you like us to know as we care for you?  \"We want our dad/ to get the tube out\"  - Pr INTERVENTIONS:  - Assess for changes in respiratory status  - Assess for changes in mentation and behavior  - Position to facilitate oxygenation and minimize respiratory effort  - Oxygen supplementation based on oxygen saturation or ABGs  - Provide Smoking needed  - Advance activity as appropriate  - Communicate ordered activity level and limitations with patient/family  Outcome: Progressing

## 2021-04-14 NOTE — PHYSICAL THERAPY NOTE
PHYSICAL THERAPY EVALUATION - INPATIENT     Room Number: 212/212-A  Evaluation Date: 4/14/2021  Type of Evaluation: Initial   Physician Order: PT Eval and Treat    Presenting Problem: acute respiratory failure with hypoxia intubated 4/10-4/13  Reason for discharge. DISCHARGE RECOMMENDATIONS  PT Discharge Recommendations: 24 hour care/supervision;Sub-acute rehabilitation    PLAN  PT Treatment Plan: Bed mobility; Body mechanics; Endurance; Patient education; Energy conservation; Family education;Gait training; functional limits    RANGE OF MOTION AND STRENGTH ASSESSMENT  Upper extremity ROM and strength are within functional limits     Lower extremity ROM is within functional limits     Lower extremity strength is within functional limits     BALANCE  Static Sit met by: 4/21/21  Patient Goal Patient's self-stated goal is: to get stronger   Goal #1 Patient is able to demonstrate supine - sit EOB @ level: modified independent     Goal #1   Current Status    Goal #2 Patient is able to demonstrate transfers Sit to/fro

## 2021-04-14 NOTE — PLAN OF CARE
Problem: Patient Centered Care  Goal: Patient preferences are identified and integrated in the patient's plan of care  Description: Interventions:  - What would you like us to know as we care for you?  \"We want our dad/ to get the tube out\"  - Pr INTERVENTIONS:  - Assess for changes in respiratory status  - Assess for changes in mentation and behavior  - Position to facilitate oxygenation and minimize respiratory effort  - Oxygen supplementation based on oxygen saturation or ABGs  - Provide Smoking integrity  - Monitor for areas of redness and/or skin breakdown  - Initiate interventions, skin care algorithm/standards of care as needed  Outcome: Progressing     Problem: HEMATOLOGIC - ADULT  Goal: Maintains hematologic stability  Description: INTERVENT Promote wakefulness i.e. lights on, blinds open  - Promote sleep, encourage patient's normal rest cycle i.e. lights off, TV off, minimize noise and interruptions  - Encourage family to assist in orientation and promotion of home routines  Outcome: Progress

## 2021-04-14 NOTE — SLP NOTE
ADULT SWALLOWING EVALUATION    ASSESSMENT    ASSESSMENT/OVERALL IMPRESSION:  PPE REQUIRED. THIS SLP WORE GOWN, GLOVES, AND DROPLET MASK. HANDS SANITIZED/WASHED UPON ENTRANCE/EXIT. SLP BSSE orders received and acknowledged.  A swallow evaluation warrante precautions  Discharge Recommendations/Plan: Undetermined    HISTORY   MEDICAL HISTORY  Reason for Referral:  (s/p extubation )    Problem List  Principal Problem:    Acute respiratory failure with hypoxia ([facility] [address]  Active Problems:    COPD exacerbation ([facility] [address] assistance;Spoon;Cup;Straw;Single sips  Patient Positioning: Upright    Oral Phase of Swallow: Within Functional Limits                      Pharyngeal Phase of Swallow:  Within Functional Limits           (Please note: Silent aspiration cannot be evaluated

## 2021-04-14 NOTE — PROGRESS NOTES
[place] [facility] - [facility]    Progress Note    Gena Rosado Patient Status:  Inpatient    1950 MRN [id number]   Location [facility] 2W/SW Attending Kenney Draper, [address] [facility] Se Day # 4 PCP Elizabeth Salter MD     HPI/Subjective:     C CO2 35.0 (H) 04/14/2021     (H) 04/14/2021    CA 9.1 04/14/2021    ALB 3.7 10/17/2019    ALKPHO 68 10/17/2019    BILT 0.6 10/17/2019    TP 6.9 10/17/2019    AST 16 10/17/2019    ALT 35 10/17/2019    DDIMER 0.40 10/18/2016    MG 2.6 04/12/2021

## 2021-04-14 NOTE — PROGRESS NOTES
Double RN skin check with Karolina Rimma RN    Wounds as followed:  NONE    Will remain available for further questions

## 2021-04-15 PROCEDURE — 99233 SBSQ HOSP IP/OBS HIGH 50: CPT | Performed by: PHYSICIAN ASSISTANT

## 2021-04-15 PROCEDURE — 99233 SBSQ HOSP IP/OBS HIGH 50: CPT | Performed by: HOSPITALIST

## 2021-04-15 RX ORDER — METHYLPREDNISOLONE SODIUM SUCCINATE 40 MG/ML
40 INJECTION, POWDER, LYOPHILIZED, FOR SOLUTION INTRAMUSCULAR; INTRAVENOUS EVERY 12 HOURS
Status: DISCONTINUED | OUTPATIENT
Start: 2021-04-16 | End: 2021-04-17

## 2021-04-15 NOTE — PROGRESS NOTES
[facility]    Progress Note    Joanna Butcher Patient Status:  Inpatient    1950 MRN [id number]   Location [facility]W Attending Jesus Jauregui MD   Hosp Day # 5 PCP Kenith Landau, MD       Subjective:     Doing nl  Stress test few years ago normal  Current EKG on admission with evidence of possible old anteroseptal infarct in the left atrial enlargement  Prn lasix      medical noncompliance--> I encouraged patient to follow medical advice    DVT prophylaxis with

## 2021-04-15 NOTE — SLP NOTE
SPEECH DAILY NOTE - INPATIENT    ASSESSMENT & PLAN   ASSESSMENT  PPE REQUIRED. THIS THERAPIST WORE GLOVES, DROPLET MASK, AND GOGGLES FOR DURATION OF TX SESSION. HANDS WASHED UPON ENTRANCE/EXIT.     SLP f/u for ongoing dysphagia tx/meal assessment per recomm rate;Upright position;Small bites and sips  Medication Administration Recommendations: No restrictions    Patient Experiencing Pain: No                Discharge Recommendations  Discharge Recommendations/Plan: Undetermined    Treatment Plan  Treatment Plan

## 2021-04-15 NOTE — PROGRESS NOTES
[facility]    Progress Note    Laurie Los Patient Status:  Inpatient    1950 MRN [id number]   Location [address] [facility] Attending Mike Mina MD   Hosp Day # 5 PCP Rusty Hunter MD     HPI/Subjective:   See on chronic respiratory failure with hypercapnia and hypoxia  -2/2 severe COPD exacerbation  -Intubated 4/10, extubated 4/13, now tolerating extubation  -On baseline 3-4 L O2  -Doing well clinically and significantly improved  -Plan:  -Ongoing supplemental

## 2021-04-15 NOTE — DIETARY NOTE
ADULT NUTRITION REASSESSMENT    Pt is at moderate nutrition risk. Pt does not meet malnutrition criteria.       RECOMMENDATIONS TO MD:  See Nutrition Intervention for enteral nutrition specifics    ADMITTING DIAGNOSIS:   COPD exacerbation ([facility] [address] [J44.1]  Ac Obtained    MEDICATIONS: reviewed;  • azithromycin  250 mg Oral Daily   • Pantoprazole Sodium  40 mg Oral QAM AC   • furosemide  20 mg Intravenous Daily   • vancomycin HCl  125 mg Oral Daily   • ipratropium-albuterol  3 mL Nebulization 4 times per day   • Last 10 Encounters:  04/12/21 : 110.9 kg (244 lb 7.8 oz)  01/26/21 : 114.3 kg (252 lb)  09/18/20 : 114.3 kg (252 lb)  09/05/20 : 114.3 kg (252 lb)  10/17/19 : 102.1 kg (225 lb)  07/28/18 : 103 kg (227 lb)  04/18/17 : 109.8 kg (242 lb)  03/07/17 : 106.6 kg FOLLOW UP: RD to follow and monitor nutrition status    Suzanne Quan MS, [address] [place] [address], N  [phone]

## 2021-04-16 ENCOUNTER — TELEPHONE (OUTPATIENT)
Dept: PULMONOLOGY | Facility: CLINIC | Age: 71
End: 2021-04-16

## 2021-04-16 PROCEDURE — 99233 SBSQ HOSP IP/OBS HIGH 50: CPT | Performed by: INTERNAL MEDICINE

## 2021-04-16 RX ORDER — IPRATROPIUM BROMIDE AND ALBUTEROL SULFATE 2.5; .5 MG/3ML; MG/3ML
3 SOLUTION RESPIRATORY (INHALATION)
Status: DISCONTINUED | OUTPATIENT
Start: 2021-04-16 | End: 2021-04-17

## 2021-04-16 RX ORDER — IPRATROPIUM BROMIDE AND ALBUTEROL SULFATE 2.5; .5 MG/3ML; MG/3ML
3 SOLUTION RESPIRATORY (INHALATION) EVERY 6 HOURS PRN
Status: DISCONTINUED | OUTPATIENT
Start: 2021-04-16 | End: 2021-04-17

## 2021-04-16 NOTE — CM/SW NOTE
CM met with pt at bedside with dtr. Per pt is not refusing bipap order. Pt does not want [place], open to out patient PT.    NO hx of HH. Pt will need [facility] services. 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     Dtr voiced to

## 2021-04-16 NOTE — PROGRESS NOTES
Pulmonary/Critical Care Follow Up Note    HPI:   Fabio Reed is a 80year old male with Patient presents with:  Altered Mental Status  Fall  Difficulty Breathing      PCP Richard Mitchell MD  Admission Attending Perry Choi MD    Hospital Day dextrose 5% 100 mL IVPB-ADDV, 4.5 g, Intravenous, Q8H  •  Thiamine HCl (Vitamin B-1) tab 100 mg, 100 mg, Oral, Daily  •  folic acid (FOLVITE) tab 1 mg, 1 mg, Oral, Daily      Allergies:  No Known Allergies        PHYSICAL EXAM:   Blood pressure 114/74, pul

## 2021-04-16 NOTE — CM/SW NOTE
Addendum 2:01 :    Pt will require sleep study and documentation that CPAP failed and then the  need for Bipap with titration settings per Kamar granados @ E.     Pt accepted with the following [facility] agency:    [address] [place] [address]

## 2021-04-16 NOTE — CM/SW NOTE
MDO to SW to arrange Bipap prior to discharge. Pt will need a sleep study. CM to follow up with pt. SW/CM to remain available for support and/or discharge planning.      Cherylann Lombard, MSW, [facility] Work   EPW:#[phone]

## 2021-04-16 NOTE — PHYSICAL THERAPY NOTE
PHYSICAL THERAPY TREATMENT NOTE - INPATIENT     Room Number: 450/614-T       Presenting Problem: acute respiratory failure with hypoxia intubated 4/10-4/13    Problem List  Principal Problem:    Acute respiratory failure with hypoxia [facility])  Active Problems Sitting: Good  Dynamic Sitting: Fair +           Static Standing: Fair -  Dynamic Standing: Fair -    ACTIVITY TOLERANCE                         O2 WALK       AM-PAC '6-Clicks' INPATIENT SHORT FORM - BASIC MOBILITY  How much difficulty does the patient cur demonstrate independence with home activity/exercise instructions provided to patient in preparation for discharge.    Goal #5   Current Status In progress   Goal #6    Goal #6  Current Status
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
## 2021-04-17 ENCOUNTER — APPOINTMENT (OUTPATIENT)
Dept: GENERAL RADIOLOGY | Facility: HOSPITAL | Age: 71
DRG: 208 | End: 2021-04-17
Attending: HOSPITALIST
Payer: MEDICARE

## 2021-04-17 VITALS
SYSTOLIC BLOOD PRESSURE: 105 MMHG | OXYGEN SATURATION: 93 % | WEIGHT: 244.5 LBS | TEMPERATURE: 98 F | RESPIRATION RATE: 18 BRPM | BODY MASS INDEX: 36.21 KG/M2 | DIASTOLIC BLOOD PRESSURE: 64 MMHG | HEART RATE: 91 BPM | HEIGHT: 69.02 IN

## 2021-04-17 PROCEDURE — 99232 SBSQ HOSP IP/OBS MODERATE 35: CPT | Performed by: INTERNAL MEDICINE

## 2021-04-17 PROCEDURE — 99232 SBSQ HOSP IP/OBS MODERATE 35: CPT | Performed by: HOSPITALIST

## 2021-04-17 PROCEDURE — 71046 X-RAY EXAM CHEST 2 VIEWS: CPT | Performed by: HOSPITALIST

## 2021-04-17 RX ORDER — POTASSIUM CHLORIDE 750 MG/1
10 TABLET, EXTENDED RELEASE ORAL DAILY
Qty: 30 TABLET | Refills: 0 | Status: SHIPPED | OUTPATIENT
Start: 2021-04-17 | End: 2021-10-11

## 2021-04-17 RX ORDER — BUDESONIDE AND FORMOTEROL FUMARATE DIHYDRATE 160; 4.5 UG/1; UG/1
2 AEROSOL RESPIRATORY (INHALATION) 2 TIMES DAILY
Qty: 2 INHALER | Refills: 0 | Status: SHIPPED | OUTPATIENT
Start: 2021-04-17 | End: 2021-04-21

## 2021-04-17 RX ORDER — PREDNISONE 10 MG/1
TABLET ORAL
Qty: 24 TABLET | Refills: 0 | Status: SHIPPED | OUTPATIENT
Start: 2021-04-17 | End: 2021-04-26

## 2021-04-17 RX ORDER — FUROSEMIDE 40 MG/1
40 TABLET ORAL DAILY
Qty: 30 TABLET | Refills: 0 | Status: SHIPPED | OUTPATIENT
Start: 2021-04-17 | End: 2021-10-11

## 2021-04-17 RX ORDER — IPRATROPIUM BROMIDE AND ALBUTEROL SULFATE 2.5; .5 MG/3ML; MG/3ML
3 SOLUTION RESPIRATORY (INHALATION)
Qty: 120 VIAL | Refills: 1 | Status: SHIPPED | OUTPATIENT
Start: 2021-04-17 | End: 2022-01-05

## 2021-04-17 NOTE — PROGRESS NOTES
O2 sat on 4L O2 at rest = 97%  O2 sat on 4L walking =93%  O2 sat on Room air at rest=90%  O2 sat on Room air walking =85%

## 2021-04-17 NOTE — PLAN OF CARE
Alert and OOB most of day. Wife here at bedside. Pt walked halls x2 and tolerated well with O2 use of 3.5-4L. Has orders for discharge to home today from Dr. Ines Schaefer and will go home after current dose of IV Zosyn. PICC will be discontinued.  Has 2 portab strengthening/mobility  - Encourage toileting schedule  Outcome: Adequate for Discharge     Problem: RISK FOR INFECTION - ADULT  Goal: Absence of fever/infection during anticipated neutropenic period  Description: INTERVENTIONS  - Monitor WBC  - Administer response to interventions for patient's volume status, including labs, urine output, blood pressure (other measures as available)  - Encourage oral intake as appropriate  - Instruct patient on fluid and nutrition restrictions as appropriate  Outcome: Adequ ambulation using safe patient handling equipment as needed  - Ensure adequate protection for wounds/incisions during mobilization  - Obtain PT/OT consults as needed  - Advance activity as appropriate  - Communicate ordered activity level and limitations wi

## 2021-04-17 NOTE — PHYSICAL THERAPY NOTE
Chart reviewed, pt received resting in bed with wife present. Per chart and pt, likely to d/c home today with spouse assist. Pt performed supine to sit tx at MOD IND via log roll. tolerated seated at EOB x 10 min at IND during discussion.  Pt reported impro

## 2021-04-17 NOTE — CM/SW NOTE
12: 30PM  Received call from pt's RN/Cely - pt and wife asking about [employer]. SW reviewed pt's chart - unsure if this was discussed previously.  Per EDUARDO Hammond and Elicia Bonilla - pt has O2 concentrator at home but unsure if through a DME company or where it came clinical information - ELDER sent for review. ELDER notified [facility] of pt's intended DC on Sunday 4/18. Contact info for [facility] added to pt's AVS.    02:45PM  ELDER obtained O2 sats and cosigned MDO for home O2. ELDER sent via [facility] to [facility].     Osito Harden understanding. ELDER updated pt's RN/Cely via Epic messenger. SW also added these instructions to pt's AVS.    PLAN: Home w/ [facility] and [facility] O2    SW/CM to remain available for support and/or discharge planning.          Dolly Fletcher, [facility]

## 2021-04-18 NOTE — PROGRESS NOTES
Pulmonary/Critical Care Follow Up Note    HPI:   Tatiana Tobias is a 80year old male with Patient presents with:  Altered Mental Status  Fall  Difficulty Breathing      PCP Nevaeh Wills MD  Admission Attending Alyson Art MD    Hospital Day So (ZOSYN) 4.5 g in dextrose 5% 100 mL IVPB-ADDV, 4.5 g, Intravenous, Q8H  •  Thiamine HCl (Vitamin B-1) tab 100 mg, 100 mg, Oral, Daily  •  folic acid (FOLVITE) tab 1 mg, 1 mg, Oral, Daily      Allergies:  No Known Allergies        PHYSICAL EXAM:   Blood

## 2021-04-18 NOTE — DISCHARGE SUMMARY
[facility] HOSPITALIST  DISCHARGE SUMMARY     Katherleen Homans Patient Status:  Inpatient    1950 MRN [id number]   Location [facility] Attending No att. providers found   [facility] Day # 7 PCP Viki Romero MD     Date of Admission: 4/10/20 extremity edema and skin changes  No reported focal weakness or numbness          Brief Synopsis:   Acute on chronic respiratory failure with hypercapnia and hypoxia (HCC)  Severe copd with acute exacerbation   Smoking addiction with ongoing heavy tobacco known as: LASIX  What changed:   · medication strength  · how much to take      Take 1 tablet (40 mg total) by mouth daily.    Quantity: 30 tablet  Refills: 0     ipratropium-albuterol 0.5-2.5 (3) MG/3ML Soln  Commonly known as: DUONEB  What changed:   · ho the location directed by your doctor or nurse    Bring a paper prescription for each of these medications  · furosemide 40 MG Tabs  · ipratropium-albuterol 0.5-2.5 (3) MG/3ML Soln  · Potassium Chloride ER 10 MEQ Tbcr  · predniSONE 10 MG Tabs  · Symbicort 1

## 2021-04-19 ENCOUNTER — TELEPHONE (OUTPATIENT)
Dept: INTERNAL MEDICINE CLINIC | Facility: CLINIC | Age: 71
End: 2021-04-19

## 2021-04-19 ENCOUNTER — PATIENT OUTREACH (OUTPATIENT)
Dept: CASE MANAGEMENT | Age: 71
End: 2021-04-19

## 2021-04-19 DIAGNOSIS — R09.02 HYPOXEMIA: ICD-10-CM

## 2021-04-19 DIAGNOSIS — J44.1 COPD EXACERBATION (HCC): ICD-10-CM

## 2021-04-19 DIAGNOSIS — R06.02 SHORTNESS OF BREATH: ICD-10-CM

## 2021-04-19 DIAGNOSIS — Z72.0 TOBACCO ABUSE: ICD-10-CM

## 2021-04-19 DIAGNOSIS — J43.9 PULMONARY EMPHYSEMA, UNSPECIFIED EMPHYSEMA TYPE (HCC): ICD-10-CM

## 2021-04-19 DIAGNOSIS — J96.01 ACUTE RESPIRATORY FAILURE WITH HYPOXIA (HCC): ICD-10-CM

## 2021-04-19 DIAGNOSIS — Z02.9 ENCOUNTERS FOR UNSPECIFIED ADMINISTRATIVE PURPOSE: ICD-10-CM

## 2021-04-19 PROCEDURE — 1111F DSCHRG MED/CURRENT MED MERGE: CPT

## 2021-04-19 NOTE — PROGRESS NOTES
Initial Post Discharge Follow Up   Discharge Date: 4/17/21  Contact Date: 4/19/2021    Consent Verification:  Assessment Completed With: Patient  Spouse: Nimco Ellisherve received per patient?  written  HIPAA Verified?   Yes    Discharge Dx:     Severe your discharge instructions explained to you?   o On a scale of 1-5   1- Very Poor and 5- Very well   o Very well  • Do you have any questions about your discharge instructions? No  • Before leaving the hospital was your diagnoses explained to you?  Yes  • Inhalation Aero Soln Inhale 2 puffs into the lungs every 6 (six) hours as needed for Wheezing.  inhale 2 puff by inhalation route  every 4 - 6 hours as needed 1 Inhaler 5     • (NCM)  Were there any medication changes noted on AVS?  yes  o If so, were these Natalya Maximiliano is using his inhalers and nebulizer as directed. Are you currently on oxygen? yes   How many Liters? 4 Liters per N/C  Do you have any questions about Oxygen use?  no     Are you familiar with the signs and symptoms of worsening COPD? Yes       Do scheduled at D/C within 7-14 days yes     NCM Reviewed/scheduled/rescheduled PCP TCM/HFU appointment with pt:  Yes, NCM confirmed with patient that he has a HFU on 4/27/2021 at 10:00 am.    Have you made all of your follow up appointments? no    Is there a

## 2021-04-19 NOTE — TELEPHONE ENCOUNTER
Fax received from [facility] stating prior Luisito Butcher was required for Symbicort inhaler that was prescribed at [facility], Per pharmacist plan prefers Breo inhaler. Discussed w Dr Shoshana Uriarte whom states pt specifically requested symbicort.      Routing to PCP astrid

## 2021-04-19 NOTE — TELEPHONE ENCOUNTER
Prior authorization for Symbicort completed w/ [employer] on cover my meds Key: [id number], turn around time 1-5 days.

## 2021-04-19 NOTE — TELEPHONE ENCOUNTER
May 3rd Ian  Might be able to see earlier [facility]

## 2021-04-19 NOTE — TELEPHONE ENCOUNTER
SYMBICORT -4.5, use as directed, is approved for non-formulary exception through 12/31/2021  under your Medicare Part D benefit. Reviewed by: System.

## 2021-04-19 NOTE — TELEPHONE ENCOUNTER
Spoke with patient's wife, Armaan Berger (RAISA on file), hospital follow-up appointment scheduled for 4/21/21 at 10am.  Verified date, time, location & parking. Wife verbalized understanding.

## 2021-04-19 NOTE — TELEPHONE ENCOUNTER
Pts wife called again but she states that pt was told to see Dr. Carmen Acosta in [place] on 4/26/21.   Please call before 12pm or after 1:30pm.

## 2021-04-20 ENCOUNTER — OFFICE VISIT (OUTPATIENT)
Dept: CARDIOLOGY CLINIC | Facility: HOSPITAL | Age: 71
End: 2021-04-20
Attending: NURSE PRACTITIONER
Payer: MEDICARE

## 2021-04-20 VITALS
OXYGEN SATURATION: 92 % | HEART RATE: 105 BPM | WEIGHT: 241 LBS | SYSTOLIC BLOOD PRESSURE: 114 MMHG | DIASTOLIC BLOOD PRESSURE: 67 MMHG | BODY MASS INDEX: 36 KG/M2

## 2021-04-20 DIAGNOSIS — Z72.0 TOBACCO ABUSE: ICD-10-CM

## 2021-04-20 DIAGNOSIS — J44.1 COPD EXACERBATION (HCC): Primary | ICD-10-CM

## 2021-04-20 PROCEDURE — 94618 PULMONARY STRESS TESTING: CPT

## 2021-04-20 PROCEDURE — 99202 OFFICE O/P NEW SF 15 MIN: CPT | Performed by: NURSE PRACTITIONER

## 2021-04-20 PROCEDURE — 99204 OFFICE O/P NEW MOD 45 MIN: CPT | Performed by: NURSE PRACTITIONER

## 2021-04-20 RX ORDER — FLUTICASONE FUROATE, UMECLIDINIUM BROMIDE AND VILANTEROL TRIFENATATE 100; 62.5; 25 UG/1; UG/1; UG/1
1 POWDER RESPIRATORY (INHALATION) DAILY
Qty: 1 EACH | Refills: 0 | Status: SHIPPED | OUTPATIENT
Start: 2021-04-20 | End: 2021-09-30

## 2021-04-20 NOTE — PATIENT INSTRUCTIONS
Start Trelegy 1 puff daily. DO NOT USE SYMBICORT OR SPIRIVA WHILE ON TRELEGY  Please discuss with Dr. Morales Necessary tomorrow his plan for your future inhalers    Please contact your pharmacy and  your rescue inhaler, Albuterol.  Please keep this on you and u

## 2021-04-20 NOTE — PROGRESS NOTES
[facility] Patient Status:  No patient class for patient encounter    1950 MRN [id number]   Location MD Dr. Yg Jimenez is a 80year old m 103 04/15/2021 04:30 AM    CO2 33.0 (H) 04/15/2021 04:30 AM     (H) 04/15/2021 04:30 AM    CA 9.4 04/15/2021 04:30 AM    ALB 3.7 10/17/2019 10:05 AM    ALKPHO 68 10/17/2019 10:05 AM    BILT 0.6 10/17/2019 10:05 AM    TP 6.9 10/17/2019 10:05 AM    AS resting level: 1  Recovery SpO2: 90  Recovery HR: 114  Recovery oxygen flow (liters per minute): none     Repeat walk:      Exertion Scale: very light  Angina Scale: none  Dyspnea Scale: mild, noticable      Vaccines:  Flu:  PNA:    Education:  Patient and weight gain, fever, chills or worsening symptoms.       Follow up with the specialty care clinic as needed or directed    Follow up with Dr. Sharla Mcconnell 4/21/21    Follow up with Dr. Angeline Shafer 4/27/21         I spent 50* minutes with this patient providing counse

## 2021-04-21 ENCOUNTER — TELEPHONE (OUTPATIENT)
Dept: INTERNAL MEDICINE UNIT | Facility: HOSPITAL | Age: 71
End: 2021-04-21

## 2021-04-21 ENCOUNTER — OFFICE VISIT (OUTPATIENT)
Dept: PULMONOLOGY | Facility: CLINIC | Age: 71
End: 2021-04-21
Payer: MEDICARE

## 2021-04-21 VITALS
RESPIRATION RATE: 18 BRPM | OXYGEN SATURATION: 98 % | SYSTOLIC BLOOD PRESSURE: 116 MMHG | TEMPERATURE: 98 F | HEIGHT: 67 IN | DIASTOLIC BLOOD PRESSURE: 81 MMHG | WEIGHT: 238.38 LBS | BODY MASS INDEX: 37.42 KG/M2 | HEART RATE: 97 BPM

## 2021-04-21 DIAGNOSIS — Z72.0 TOBACCO ABUSE: ICD-10-CM

## 2021-04-21 DIAGNOSIS — G47.33 OSA (OBSTRUCTIVE SLEEP APNEA): ICD-10-CM

## 2021-04-21 DIAGNOSIS — R09.02 HYPOXEMIA: ICD-10-CM

## 2021-04-21 DIAGNOSIS — J96.22 ACUTE ON CHRONIC RESPIRATORY FAILURE WITH HYPOXIA AND HYPERCAPNIA (HCC): ICD-10-CM

## 2021-04-21 DIAGNOSIS — J43.2 CENTRILOBULAR EMPHYSEMA (HCC): Primary | ICD-10-CM

## 2021-04-21 DIAGNOSIS — Q55.64 HIDDEN PENIS: ICD-10-CM

## 2021-04-21 DIAGNOSIS — I50.31 ACUTE DIASTOLIC CONGESTIVE HEART FAILURE (HCC): ICD-10-CM

## 2021-04-21 DIAGNOSIS — J96.21 ACUTE ON CHRONIC RESPIRATORY FAILURE WITH HYPOXIA AND HYPERCAPNIA (HCC): ICD-10-CM

## 2021-04-21 PROCEDURE — 99214 OFFICE O/P EST MOD 30 MIN: CPT | Performed by: INTERNAL MEDICINE

## 2021-04-21 RX ORDER — FLUTICASONE FUROATE AND VILANTEROL TRIFENATATE 100; 25 UG/1; UG/1
1 POWDER RESPIRATORY (INHALATION) DAILY
Qty: 30 EACH | Refills: 11 | Status: SHIPPED | OUTPATIENT
Start: 2021-04-21 | End: 2021-09-23

## 2021-04-21 NOTE — PROGRESS NOTES
Pulmonary Follow Up Note    HPI:   Adriana Ahn is a 80year old male with Patient presents with:   Follow - Up: check up    Estelita Monaco MD    States feeling well    Wife notes symbicort too expensive  Has coupon for Trelegy- free  Now on Trele days. 24 tablet 0   • Urea 40 % External Cream Apply 1 Application topically daily. 227 g 2   • Saline Nasal Spray 0.65 % Nasal Solution 1 spray by Nasal route every 3 (three) hours as needed for congestion.  1 Bottle 0   • Albuterol Sulfate  (90 BAS deviation  Lung Normal effort, CTA bilaterally  CV Reg, NL S1S2, No M/G/R appreciated   Abd Soft, NT/ND,  no masses, normoactive bowel sounds  Ext + 2edema  MSK No cyanosis, No clubbing  Skin Normal inspection and palpation without acute appearing rash  Ps (ipratropium)  TBM Tracheobronchomalacia  VCD Vocal cord dysfunction

## 2021-04-21 NOTE — PATIENT INSTRUCTIONS
If you feel short of breath or chest tightness or cough, you can use your albuterol inhaler (Or DuoNeb nebulizer) 4 times per day As Needed. Continues to use the Trelegy until in runs out. Then start taking Breo 1 puff once per day.

## 2021-04-21 NOTE — TELEPHONE ENCOUNTER
Call received from [facility] Pharmacist requesting dx for Ipatropium nebulizer solution prescribed at [facility] and purchase date for pts nebulizer machine. Confirmed w pts wife pt has a working nebulizer machine at home which they obtained 6 years ago.  Per w

## 2021-04-26 ENCOUNTER — TELEPHONE (OUTPATIENT)
Dept: PULMONOLOGY | Facility: CLINIC | Age: 71
End: 2021-04-26

## 2021-04-26 NOTE — TELEPHONE ENCOUNTER
pt. states that he is getting a sleep study test done tomorrow 4/27/2021, so pt. wants to get instructions.

## 2021-04-27 ENCOUNTER — LAB ENCOUNTER (OUTPATIENT)
Dept: LAB | Age: 71
End: 2021-04-27
Attending: INTERNAL MEDICINE
Payer: MEDICARE

## 2021-04-27 ENCOUNTER — OFFICE VISIT (OUTPATIENT)
Dept: SLEEP CENTER | Age: 71
End: 2021-04-27
Attending: INTERNAL MEDICINE
Payer: MEDICARE

## 2021-04-27 ENCOUNTER — OFFICE VISIT (OUTPATIENT)
Dept: INTERNAL MEDICINE CLINIC | Facility: CLINIC | Age: 71
End: 2021-04-27
Payer: MEDICARE

## 2021-04-27 VITALS
HEART RATE: 99 BPM | TEMPERATURE: 98 F | BODY MASS INDEX: 37.96 KG/M2 | OXYGEN SATURATION: 94 % | RESPIRATION RATE: 24 BRPM | SYSTOLIC BLOOD PRESSURE: 110 MMHG | WEIGHT: 239 LBS | DIASTOLIC BLOOD PRESSURE: 71 MMHG | HEIGHT: 66.5 IN

## 2021-04-27 DIAGNOSIS — J96.22 ACUTE ON CHRONIC RESPIRATORY FAILURE WITH HYPOXIA AND HYPERCAPNIA (HCC): ICD-10-CM

## 2021-04-27 DIAGNOSIS — J44.1 COPD EXACERBATION (HCC): Primary | ICD-10-CM

## 2021-04-27 DIAGNOSIS — Z12.5 PROSTATE CANCER SCREENING: ICD-10-CM

## 2021-04-27 DIAGNOSIS — R53.83 FATIGUE, UNSPECIFIED TYPE: ICD-10-CM

## 2021-04-27 DIAGNOSIS — D75.89 MACROCYTOSIS: ICD-10-CM

## 2021-04-27 DIAGNOSIS — I50.9 CONGESTIVE HEART FAILURE, UNSPECIFIED HF CHRONICITY, UNSPECIFIED HEART FAILURE TYPE (HCC): ICD-10-CM

## 2021-04-27 DIAGNOSIS — Z76.89 SLEEP CONCERN: Primary | ICD-10-CM

## 2021-04-27 DIAGNOSIS — J96.21 ACUTE ON CHRONIC RESPIRATORY FAILURE WITH HYPOXIA AND HYPERCAPNIA (HCC): ICD-10-CM

## 2021-04-27 DIAGNOSIS — Z12.11 COLON CANCER SCREENING: ICD-10-CM

## 2021-04-27 DIAGNOSIS — E78.5 DYSLIPIDEMIA: ICD-10-CM

## 2021-04-27 DIAGNOSIS — J43.2 CENTRILOBULAR EMPHYSEMA (HCC): ICD-10-CM

## 2021-04-27 DIAGNOSIS — G47.33 OSA (OBSTRUCTIVE SLEEP APNEA): ICD-10-CM

## 2021-04-27 DIAGNOSIS — I77.89 ASCENDING AORTA ENLARGEMENT (HCC): ICD-10-CM

## 2021-04-27 DIAGNOSIS — Z72.0 TOBACCO ABUSE: ICD-10-CM

## 2021-04-27 PROCEDURE — 95810 POLYSOM 6/> YRS 4/> PARAM: CPT

## 2021-04-27 PROCEDURE — 82746 ASSAY OF FOLIC ACID SERUM: CPT | Performed by: INTERNAL MEDICINE

## 2021-04-27 PROCEDURE — 84443 ASSAY THYROID STIM HORMONE: CPT

## 2021-04-27 PROCEDURE — 1111F DSCHRG MED/CURRENT MED MERGE: CPT | Performed by: INTERNAL MEDICINE

## 2021-04-27 PROCEDURE — 36415 COLL VENOUS BLD VENIPUNCTURE: CPT

## 2021-04-27 PROCEDURE — 80061 LIPID PANEL: CPT

## 2021-04-27 PROCEDURE — 99495 TRANSJ CARE MGMT MOD F2F 14D: CPT | Performed by: INTERNAL MEDICINE

## 2021-04-27 PROCEDURE — 85025 COMPLETE CBC W/AUTO DIFF WBC: CPT

## 2021-04-27 PROCEDURE — 80048 BASIC METABOLIC PNL TOTAL CA: CPT

## 2021-04-27 PROCEDURE — 82607 VITAMIN B-12: CPT | Performed by: INTERNAL MEDICINE

## 2021-04-27 NOTE — TELEPHONE ENCOUNTER
Spoke with pt wife who states that PFT was scheduled for 4/30/21 but pt has not yet received a email (as he was told) or instructions. Pt wife provided with Central scheduling number to f/u [phone].

## 2021-04-28 PROBLEM — M17.11 PRIMARY OSTEOARTHRITIS OF RIGHT KNEE: Status: RESOLVED | Noted: 2021-01-26 | Resolved: 2021-04-28

## 2021-04-28 PROBLEM — J43.9 PULMONARY EMPHYSEMA (HCC): Status: RESOLVED | Noted: 2017-04-19 | Resolved: 2021-04-28

## 2021-04-29 ENCOUNTER — TELEPHONE (OUTPATIENT)
Dept: INTERNAL MEDICINE CLINIC | Facility: CLINIC | Age: 71
End: 2021-04-29

## 2021-04-29 PROBLEM — G89.29 BILATERAL CHRONIC KNEE PAIN: Status: RESOLVED | Noted: 2017-04-19 | Resolved: 2021-04-29

## 2021-04-29 PROBLEM — Z23 NEED FOR VACCINATION: Status: RESOLVED | Noted: 2018-07-29 | Resolved: 2021-04-29

## 2021-04-29 PROBLEM — J96.01 ACUTE RESPIRATORY FAILURE WITH HYPOXIA (HCC): Status: RESOLVED | Noted: 2021-04-10 | Resolved: 2021-04-29

## 2021-04-29 PROBLEM — M25.562 BILATERAL CHRONIC KNEE PAIN: Status: RESOLVED | Noted: 2017-04-19 | Resolved: 2021-04-29

## 2021-04-29 PROBLEM — M25.561 BILATERAL CHRONIC KNEE PAIN: Status: RESOLVED | Noted: 2017-04-19 | Resolved: 2021-04-29

## 2021-04-29 RX ORDER — ATORVASTATIN CALCIUM 20 MG/1
20 TABLET, FILM COATED ORAL NIGHTLY
Qty: 90 TABLET | Refills: 0 | Status: SHIPPED | OUTPATIENT
Start: 2021-04-29 | End: 2023-01-12

## 2021-04-29 NOTE — TELEPHONE ENCOUNTER
Talked to spouse about his labs. All generally ok except high tchol and LDL. ;start atorvastatin 20mg q hs. Recheck in 3mos. Discussed potential side effects.

## 2021-04-30 ENCOUNTER — HOSPITAL ENCOUNTER (OUTPATIENT)
Dept: RESPIRATORY THERAPY | Facility: HOSPITAL | Age: 71
Discharge: HOME | End: 2021-04-30
Attending: INTERNAL MEDICINE
Payer: MEDICARE

## 2021-04-30 DIAGNOSIS — J96.22 ACUTE ON CHRONIC RESPIRATORY FAILURE WITH HYPOXIA AND HYPERCAPNIA (HCC): ICD-10-CM

## 2021-04-30 DIAGNOSIS — J43.2 CENTRILOBULAR EMPHYSEMA (HCC): ICD-10-CM

## 2021-04-30 DIAGNOSIS — G47.33 OSA (OBSTRUCTIVE SLEEP APNEA): ICD-10-CM

## 2021-04-30 DIAGNOSIS — J96.21 ACUTE ON CHRONIC RESPIRATORY FAILURE WITH HYPOXIA AND HYPERCAPNIA (HCC): ICD-10-CM

## 2021-04-30 PROCEDURE — 94060 EVALUATION OF WHEEZING: CPT | Performed by: INTERNAL MEDICINE

## 2021-04-30 PROCEDURE — 94729 DIFFUSING CAPACITY: CPT | Performed by: INTERNAL MEDICINE

## 2021-04-30 PROCEDURE — 94726 PLETHYSMOGRAPHY LUNG VOLUMES: CPT | Performed by: INTERNAL MEDICINE

## 2021-04-30 NOTE — PROGRESS NOTES
HPI:    Juda Leventhal is a 80year old male here today for hospital follow up.    He was discharged from Inpatient hospital, [facility] AND [facility]  to Home   Admission Date: 4/10/21   Discharge Date: 4/17/21  Hospital Discharge Diagnoses (since 3/30/2021) Activated, Inhale 1 puff into the lungs daily. furosemide 40 MG Oral Tab, Take 1 tablet (40 mg total) by mouth daily. Potassium Chloride ER 10 MEQ Oral Tab CR, Take 1 tablet (10 mEq total) by mouth daily.   ipratropium-albuterol 0.5-2.5 (3) MG/3ML Inhalat lesions  EYES: denies blurred vision or double vision  HEENT: denies nasal congestion, sinus pain or ST  LUNGS: chronic  shortness of breath with exertion  CARDIOVASCULAR: denies chest pain on exertion or palpitations  GI: denies abdominal pain, denies hea continue his inahlears and O2 supplement.     (I50.9) Congestive heart failure, unspecified HF chronicity, unspecified heart failure type ([facility] [address]  Plan: BASIC METABOLIC PANEL (8)        Pt on diuretics; will check bmp.     (I77.89) Ascending aorta enlargemen Complications, Morbidity, and/or Mortality: moderate    Overall Risk:   moderate    Patient seen within 14 days from date of discharge.       Claudene Newborn, MD, 2021

## 2021-05-10 NOTE — PROCEDURES
[facility]    PFT Interpretation    Yamilet Sevilla     1950 MRN [id number]   Height  77 inh  Age 80year old   Weight   239 lbs  Sex Male         Spirometry:   FEV1 1.71 L 60 %   FEV1/FVC 63 %     No significant bronchodilator

## 2021-05-10 NOTE — ADDENDUM NOTE
Encounter addended by: Ashley Griffin MD on: 5/10/2021 4:50 PM   Actions taken: Clinical Note Signed, Charge Capture section accepted

## 2021-05-12 DIAGNOSIS — G47.33 OSA (OBSTRUCTIVE SLEEP APNEA): Primary | ICD-10-CM

## 2021-06-03 ENCOUNTER — TELEPHONE (OUTPATIENT)
Dept: PULMONOLOGY | Facility: CLINIC | Age: 71
End: 2021-06-03

## 2021-06-03 NOTE — TELEPHONE ENCOUNTER
RN spoke with patient and advised him to call [facility] to have titration study done. Phone number provided. Pt verbalized understanding and state he will schedule asap. No further questions at this time.

## 2021-09-20 ENCOUNTER — HOSPITAL ENCOUNTER (INPATIENT)
Facility: HOSPITAL | Age: 71
LOS: 3 days | Discharge: HOME | DRG: 545 | End: 2021-09-23
Attending: EMERGENCY MEDICINE | Admitting: HOSPITALIST
Payer: MEDICARE

## 2021-09-20 ENCOUNTER — APPOINTMENT (OUTPATIENT)
Dept: GENERAL RADIOLOGY | Facility: HOSPITAL | Age: 71
DRG: 545 | End: 2021-09-20
Attending: EMERGENCY MEDICINE
Payer: MEDICARE

## 2021-09-20 ENCOUNTER — NURSE TRIAGE (OUTPATIENT)
Dept: INTERNAL MEDICINE CLINIC | Facility: CLINIC | Age: 71
End: 2021-09-20

## 2021-09-20 ENCOUNTER — APPOINTMENT (OUTPATIENT)
Dept: CT IMAGING | Facility: HOSPITAL | Age: 71
DRG: 545 | End: 2021-09-20
Attending: EMERGENCY MEDICINE
Payer: MEDICARE

## 2021-09-20 DIAGNOSIS — R79.81 CARBON DIOXIDE BLOOD INCREASED: ICD-10-CM

## 2021-09-20 DIAGNOSIS — T20.10XA FACIAL BURN, FIRST DEGREE, INITIAL ENCOUNTER: Primary | ICD-10-CM

## 2021-09-20 LAB
ANION GAP SERPL CALC-SCNC: 3 MMOL/L (ref 0–18)
BASE EXCESS BLD CALC-SCNC: 11.1 MMOL/L (ref ?–2)
BASOPHILS # BLD AUTO: 0.09 X10(3) UL (ref 0–0.2)
BASOPHILS NFR BLD AUTO: 0.8 %
BILIRUB UR QL: NEGATIVE
BUN BLD-MCNC: 21 MG/DL (ref 7–18)
BUN/CREAT SERPL: 30.9 (ref 10–20)
CA-I BLD-SCNC: 1.24 MMOL/L (ref 0.95–1.32)
CALCIUM BLD-MCNC: 9.2 MG/DL (ref 8.5–10.1)
CHLORIDE SERPL-SCNC: 100 MMOL/L (ref 98–112)
CLARITY UR: CLEAR
CO2 SERPL-SCNC: 40 MMOL/L (ref 21–32)
COHGB MFR BLD: 4.7 % (ref 0–1.5)
COLOR UR: YELLOW
CREAT BLD-MCNC: 0.68 MG/DL
DEPRECATED RDW RBC AUTO: 50.3 FL (ref 35.1–46.3)
EOSINOPHIL # BLD AUTO: 0.08 X10(3) UL (ref 0–0.7)
EOSINOPHIL NFR BLD AUTO: 0.7 %
ERYTHROCYTE [DISTWIDTH] IN BLOOD BY AUTOMATED COUNT: 12.9 % (ref 11–15)
GLUCOSE BLD-MCNC: 112 MG/DL (ref 70–99)
GLUCOSE UR-MCNC: NEGATIVE MG/DL
HCO3 BLDA-SCNC: 33.4 MEQ/L (ref 21–27)
HCT VFR BLD AUTO: 50.6 %
HGB BLD-MCNC: 15.1 G/DL
HGB BLD-MCNC: 15.3 G/DL (ref 13.5–17.5)
HGB UR QL STRIP.AUTO: NEGATIVE
IMM GRANULOCYTES # BLD AUTO: 0.04 X10(3) UL (ref 0–1)
IMM GRANULOCYTES NFR BLD: 0.4 %
LACTATE BLD-SCNC: 0.6 MMOL/L (ref 0.5–2.2)
LACTATE SERPL-SCNC: 0.7 MMOL/L (ref 0.4–2)
LEUKOCYTE ESTERASE UR QL STRIP.AUTO: NEGATIVE
LYMPHOCYTES # BLD AUTO: 1.37 X10(3) UL (ref 1–4)
LYMPHOCYTES NFR BLD AUTO: 12.2 %
MCH RBC QN AUTO: 30.9 PG (ref 26–34)
MCHC RBC AUTO-ENTMCNC: 29.8 G/DL (ref 31–37)
MCV RBC AUTO: 103.7 FL
METHGB MFR BLD: 1.1 % SAT (ref 0.4–1.5)
MONOCYTES # BLD AUTO: 1.16 X10(3) UL (ref 0.1–1)
MONOCYTES NFR BLD AUTO: 10.3 %
NEUTROPHILS # BLD AUTO: 8.52 X10 (3) UL (ref 1.5–7.7)
NEUTROPHILS # BLD AUTO: 8.52 X10(3) UL (ref 1.5–7.7)
NEUTROPHILS NFR BLD AUTO: 75.6 %
NITRITE UR QL STRIP.AUTO: NEGATIVE
NT-PROBNP SERPL-MCNC: 125 PG/ML (ref ?–125)
O2 CT BLD-SCNC: 20 VOL% (ref 15–23)
OSMOLALITY SERPL CALC.SUM OF ELEC: 300 MOSM/KG (ref 275–295)
OXYGEN LITERS/MINUTE: 3 L/MIN
PCO2 BLDA: 115 MM HG (ref 35–45)
PH BLDA: 7.2 [PH] (ref 7.35–7.45)
PH UR: 6 [PH] (ref 5–8)
PLATELET # BLD AUTO: 167 10(3)UL (ref 150–450)
PO2 BLDA: 96 MM HG (ref 80–100)
POTASSIUM BLD-SCNC: 4.2 MMOL/L (ref 3.3–5.1)
POTASSIUM SERPL-SCNC: 4.1 MMOL/L (ref 3.5–5.1)
PROCALCITONIN SERPL-MCNC: 0.05 NG/ML (ref ?–0.16)
PROT UR-MCNC: 30 MG/DL
PUNCTURE CHARGE: YES
RBC # BLD AUTO: 4.88 X10(6)UL
SAO2 % BLDA: 98.2 % (ref 94–100)
SARS-COV-2 RNA RESP QL NAA+PROBE: NOT DETECTED
SODIUM BLD-SCNC: 139 MMOL/L (ref 135–145)
SODIUM SERPL-SCNC: 143 MMOL/L (ref 136–145)
SP GR UR STRIP: 1.03 (ref 1–1.03)
UROBILINOGEN UR STRIP-ACNC: <2
WBC # BLD AUTO: 11.3 X10(3) UL (ref 4–11)

## 2021-09-20 PROCEDURE — 99223 1ST HOSP IP/OBS HIGH 75: CPT | Performed by: HOSPITALIST

## 2021-09-20 PROCEDURE — 5A09357 ASSISTANCE WITH RESPIRATORY VENTILATION, LESS THAN 24 CONSECUTIVE HOURS, CONTINUOUS POSITIVE AIRWAY PRESSURE: ICD-10-PCS | Performed by: INTERNAL MEDICINE

## 2021-09-20 PROCEDURE — 99223 1ST HOSP IP/OBS HIGH 75: CPT | Performed by: INTERNAL MEDICINE

## 2021-09-20 PROCEDURE — 71045 X-RAY EXAM CHEST 1 VIEW: CPT | Performed by: EMERGENCY MEDICINE

## 2021-09-20 PROCEDURE — 70450 CT HEAD/BRAIN W/O DYE: CPT | Performed by: EMERGENCY MEDICINE

## 2021-09-20 RX ORDER — IPRATROPIUM BROMIDE AND ALBUTEROL SULFATE 2.5; .5 MG/3ML; MG/3ML
3 SOLUTION RESPIRATORY (INHALATION) ONCE
Status: COMPLETED | OUTPATIENT
Start: 2021-09-20 | End: 2021-09-20

## 2021-09-20 RX ORDER — METHYLPREDNISOLONE SODIUM SUCCINATE 125 MG/2ML
125 INJECTION, POWDER, LYOPHILIZED, FOR SOLUTION INTRAMUSCULAR; INTRAVENOUS ONCE
Status: COMPLETED | OUTPATIENT
Start: 2021-09-20 | End: 2021-09-20

## 2021-09-20 RX ORDER — METHYLPREDNISOLONE SODIUM SUCCINATE 40 MG/ML
40 INJECTION, POWDER, LYOPHILIZED, FOR SOLUTION INTRAMUSCULAR; INTRAVENOUS EVERY 6 HOURS
Status: DISCONTINUED | OUTPATIENT
Start: 2021-09-20 | End: 2021-09-22

## 2021-09-20 RX ORDER — IPRATROPIUM BROMIDE AND ALBUTEROL SULFATE 2.5; .5 MG/3ML; MG/3ML
3 SOLUTION RESPIRATORY (INHALATION) EVERY 6 HOURS PRN
Status: DISCONTINUED | OUTPATIENT
Start: 2021-09-20 | End: 2021-09-23

## 2021-09-20 RX ORDER — METOCLOPRAMIDE HYDROCHLORIDE 5 MG/ML
10 INJECTION INTRAMUSCULAR; INTRAVENOUS EVERY 8 HOURS PRN
Status: DISCONTINUED | OUTPATIENT
Start: 2021-09-20 | End: 2021-09-23

## 2021-09-20 RX ORDER — ACETAMINOPHEN 325 MG/1
650 TABLET ORAL EVERY 6 HOURS PRN
Status: DISCONTINUED | OUTPATIENT
Start: 2021-09-20 | End: 2021-09-23

## 2021-09-20 RX ORDER — ENOXAPARIN SODIUM 100 MG/ML
40 INJECTION SUBCUTANEOUS DAILY
Status: DISCONTINUED | OUTPATIENT
Start: 2021-09-20 | End: 2021-09-23

## 2021-09-20 RX ORDER — IPRATROPIUM BROMIDE AND ALBUTEROL SULFATE 2.5; .5 MG/3ML; MG/3ML
3 SOLUTION RESPIRATORY (INHALATION) EVERY 6 HOURS
Status: DISCONTINUED | OUTPATIENT
Start: 2021-09-20 | End: 2021-09-21

## 2021-09-20 RX ORDER — ONDANSETRON 2 MG/ML
4 INJECTION INTRAMUSCULAR; INTRAVENOUS EVERY 6 HOURS PRN
Status: DISCONTINUED | OUTPATIENT
Start: 2021-09-20 | End: 2021-09-23

## 2021-09-20 RX ORDER — NICOTINE 21 MG/24HR
1 PATCH, TRANSDERMAL 24 HOURS TRANSDERMAL DAILY
Status: DISCONTINUED | OUTPATIENT
Start: 2021-09-20 | End: 2021-09-23
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
## 2021-09-20 NOTE — RESPIRATORY THERAPY NOTE
Received pt in ER on continuous BiPAP with the following settings: S/T 15/5/40%/R 12. Pt tolerating well, saturating appropriately. @ 32 61 16 pt was transferred to the unit, no acute events during transport, tolerated well.

## 2021-09-20 NOTE — ED PROVIDER NOTES
Patient Seen in: [facility] AND [facility] Emergency Department      History   Patient presents with:  Difficulty Breathing  Burn    Stated Complaint: eval P    Subjective:   HPI    Pt is 79 yo M who arrives with wife for AMS and hallucinations x 6 days.  No feve and negative except as noted above. Physical Exam     ED Triage Vitals [09/20/21 1015]   BP (!) 141/110   Pulse 82   Resp (!) 27   Temp 99.1 °F (37.3 °C)   Temp src Oral   SpO2 99 %   O2 Device Nasal cannula       Current:/67   Pulse 86   Temp 99. created for panel order CBC With Differential With Platelet.   Procedure                               Abnormality         Status                     ---------                               -----------         ------                     CBC W/ DIFFERENTIAL[ interpreting tests and discussion with consultants but not including time spent performing procedures. PHYSICIAN NOTE:  Patient lethargic but arousable. Work-up remarkable for CO2 retention on ABG and patient placed on BiPAP.   Patient started on duo

## 2021-09-20 NOTE — PROGRESS NOTES
[facility] Pharmacy Note: Antimicrobial Weight Based Dose Adjustment for: ceftriaxone (ROCEPHIN)    Jeanette Mcburney is a 80year old patient who has been prescribed ceftriaxone (ROCEPHIN) 1000 mg every 24 hours.     Estimated Creatinine Clearance: 97.8 mL/min (

## 2021-09-20 NOTE — ED QUICK NOTES
Orders for admission, patient is aware of plan and ready to go upstairs. Any questions, please call ED RN Abhay Pearce  at extension [phone].    Type of COVID test sent:Rapid negative/vaccinated  COVID Suspicion level: Low    Titratable drug(s) infusing:Azithromyc
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
## 2021-09-20 NOTE — PROGRESS NOTES
[facility] Pharmacy Note: Antimicrobial Weight Based Dose Adjustment for: ceftriaxone (ROCEPHIN)    Esteban Encarnacion is a 80year old patient who has been prescribed ceftriaxone (ROCEPHIN) 1 g x 1 in ER. Wt Readings from Last 6 Encounters:  09/20/21 : 117.
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
## 2021-09-20 NOTE — TELEPHONE ENCOUNTER
Action Requested: Summary for Provider     []  Critical Lab, Recommendations Needed  [] Need Additional Advice  [x]   FYI    []   Need Orders  [] Need Medications Sent to Pharmacy  []  Other     SUMMARY: Spouse was advised ED now, reports patient has hallu

## 2021-09-21 LAB
ANION GAP SERPL CALC-SCNC: <0 MMOL/L (ref 0–18)
BASOPHILS # BLD AUTO: 0.02 X10(3) UL (ref 0–0.2)
BASOPHILS NFR BLD AUTO: 0.1 %
BUN BLD-MCNC: 20 MG/DL (ref 7–18)
BUN/CREAT SERPL: 26 (ref 10–20)
CALCIUM BLD-MCNC: 9.4 MG/DL (ref 8.5–10.1)
CHLORIDE SERPL-SCNC: 103 MMOL/L (ref 98–112)
CO2 SERPL-SCNC: 41 MMOL/L (ref 21–32)
CREAT BLD-MCNC: 0.77 MG/DL
DEPRECATED RDW RBC AUTO: 49 FL (ref 35.1–46.3)
EOSINOPHIL # BLD AUTO: 0 X10(3) UL (ref 0–0.7)
EOSINOPHIL NFR BLD AUTO: 0 %
ERYTHROCYTE [DISTWIDTH] IN BLOOD BY AUTOMATED COUNT: 12.8 % (ref 11–15)
GLUCOSE BLD-MCNC: 130 MG/DL (ref 70–99)
HCT VFR BLD AUTO: 50.2 %
HGB BLD-MCNC: 15.1 G/DL
IMM GRANULOCYTES # BLD AUTO: 0.05 X10(3) UL (ref 0–1)
IMM GRANULOCYTES NFR BLD: 0.4 %
LYMPHOCYTES # BLD AUTO: 0.98 X10(3) UL (ref 1–4)
LYMPHOCYTES NFR BLD AUTO: 7.1 %
MCH RBC QN AUTO: 31.1 PG (ref 26–34)
MCHC RBC AUTO-ENTMCNC: 30.1 G/DL (ref 31–37)
MCV RBC AUTO: 103.3 FL
MONOCYTES # BLD AUTO: 0.58 X10(3) UL (ref 0.1–1)
MONOCYTES NFR BLD AUTO: 4.2 %
NEUTROPHILS # BLD AUTO: 12.2 X10 (3) UL (ref 1.5–7.7)
NEUTROPHILS # BLD AUTO: 12.2 X10(3) UL (ref 1.5–7.7)
NEUTROPHILS NFR BLD AUTO: 88.2 %
OSMOLALITY SERPL CALC.SUM OF ELEC: 300 MOSM/KG (ref 275–295)
PLATELET # BLD AUTO: 168 10(3)UL (ref 150–450)
POTASSIUM SERPL-SCNC: 5.1 MMOL/L (ref 3.5–5.1)
RBC # BLD AUTO: 4.86 X10(6)UL
SODIUM SERPL-SCNC: 143 MMOL/L (ref 136–145)
WBC # BLD AUTO: 13.8 X10(3) UL (ref 4–11)

## 2021-09-21 PROCEDURE — 99233 SBSQ HOSP IP/OBS HIGH 50: CPT | Performed by: INTERNAL MEDICINE

## 2021-09-21 PROCEDURE — 99233 SBSQ HOSP IP/OBS HIGH 50: CPT | Performed by: HOSPITALIST

## 2021-09-21 RX ORDER — ECHINACEA PURPUREA EXTRACT 125 MG
1 TABLET ORAL
Status: DISCONTINUED | OUTPATIENT
Start: 2021-09-21 | End: 2021-09-23

## 2021-09-21 RX ORDER — IPRATROPIUM BROMIDE AND ALBUTEROL SULFATE 2.5; .5 MG/3ML; MG/3ML
3 SOLUTION RESPIRATORY (INHALATION)
Status: DISCONTINUED | OUTPATIENT
Start: 2021-09-21 | End: 2021-09-23

## 2021-09-21 RX ORDER — FUROSEMIDE 40 MG/1
40 TABLET ORAL DAILY
Status: DISCONTINUED | OUTPATIENT
Start: 2021-09-21 | End: 2021-09-23

## 2021-09-21 NOTE — CM/SW NOTE
09/21/21 1200   CM/SW Referral Data   Referral Source Physician   Reason for Referral Discharge planning   Informant Patient   Patient Info   Patient's Current Mental Status at Time of Assessment Alert; Oriented   Patient's [address]   Numb
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
## 2021-09-21 NOTE — PLAN OF CARE
Patient is alert and oriented x 2-3, forgetful at times but able to be reoriented. BiPAP while sleeping otherwise on 6L HFNC. Sputum culture obtained. Instructed patient on how to self-suction with Debbie Brewsterion. Adequate urine output.  Attempted to ambulate to

## 2021-09-21 NOTE — PROGRESS NOTES
[facility]    Progress Note    Koko Matos Patient Status:  Inpatient    1950 MRN [id number]   Location [facility] 2W/SW Attending Kartik Hubbard MD   Hosp Day # 1 PCP Yvette Dallas MD     Subjective:   Sub matter ischemia. Dictated by (CST): Julio Jeronimo MD on 9/20/2021 at 12:52 PM     Finalized by (CST): Julio Jeronimo MD on 9/20/2021 at 12:56 PM          XR CHEST AP PORTABLE  (CPT=71045)    Result Date: 9/20/2021  CONCLUSION:  1.  Borderline card documentation in H+P. Based on patients current state of illness, I anticipate that, after discharge, patient will require TBD.       Rajat Allen MD
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
## 2021-09-21 NOTE — PLAN OF CARE
Pain transitioned from BiPAP to 6L nasal cannula at 0800 this AM; wore BiPAP most of the night. AOx4 though occasionally forgetful or impulsive. Restarted home furosemide per wife's request. Pt denies SOB at rest but has dyspnea on exertion.  Wife at bedsid Encourage toileting schedule  Outcome: Progressing     Problem: DISCHARGE PLANNING  Goal: Discharge to home or other facility with appropriate resources  Description: INTERVENTIONS:  - Identify barriers to discharge w/pt and caregiver  - Include patient/fa Cessation handout, if applicable  - Encourage broncho-pulmonary hygiene including cough, deep breathe, Incentive Spirometry  - Assess the need for suctioning and perform as needed  - Assess and instruct to report SOB or any respiratory difficulty  - Respir

## 2021-09-22 LAB
ANION GAP SERPL CALC-SCNC: 4 MMOL/L (ref 0–18)
BASOPHILS # BLD AUTO: 0.04 X10(3) UL (ref 0–0.2)
BASOPHILS NFR BLD AUTO: 0.2 %
BUN BLD-MCNC: 23 MG/DL (ref 7–18)
BUN/CREAT SERPL: 26.4 (ref 10–20)
CALCIUM BLD-MCNC: 9.3 MG/DL (ref 8.5–10.1)
CHLORIDE SERPL-SCNC: 101 MMOL/L (ref 98–112)
CO2 SERPL-SCNC: 38 MMOL/L (ref 21–32)
CREAT BLD-MCNC: 0.87 MG/DL
DEPRECATED RDW RBC AUTO: 49 FL (ref 35.1–46.3)
EOSINOPHIL # BLD AUTO: 0 X10(3) UL (ref 0–0.7)
EOSINOPHIL NFR BLD AUTO: 0 %
ERYTHROCYTE [DISTWIDTH] IN BLOOD BY AUTOMATED COUNT: 13.2 % (ref 11–15)
GLUCOSE BLD-MCNC: 113 MG/DL (ref 70–99)
HCT VFR BLD AUTO: 51.1 %
HGB BLD-MCNC: 15.8 G/DL
IMM GRANULOCYTES # BLD AUTO: 0.09 X10(3) UL (ref 0–1)
IMM GRANULOCYTES NFR BLD: 0.5 %
LYMPHOCYTES # BLD AUTO: 0.94 X10(3) UL (ref 1–4)
LYMPHOCYTES NFR BLD AUTO: 5.5 %
MAGNESIUM SERPL-MCNC: 2.4 MG/DL (ref 1.6–2.6)
MCH RBC QN AUTO: 30.8 PG (ref 26–34)
MCHC RBC AUTO-ENTMCNC: 30.9 G/DL (ref 31–37)
MCV RBC AUTO: 99.6 FL
MONOCYTES # BLD AUTO: 1.17 X10(3) UL (ref 0.1–1)
MONOCYTES NFR BLD AUTO: 6.8 %
NEUTROPHILS # BLD AUTO: 14.98 X10 (3) UL (ref 1.5–7.7)
NEUTROPHILS # BLD AUTO: 14.98 X10(3) UL (ref 1.5–7.7)
NEUTROPHILS NFR BLD AUTO: 87 %
OSMOLALITY SERPL CALC.SUM OF ELEC: 300 MOSM/KG (ref 275–295)
PHOSPHATE SERPL-MCNC: 2.5 MG/DL (ref 2.5–4.9)
PLATELET # BLD AUTO: 195 10(3)UL (ref 150–450)
POTASSIUM SERPL-SCNC: 4.2 MMOL/L (ref 3.5–5.1)
RBC # BLD AUTO: 5.13 X10(6)UL
SODIUM SERPL-SCNC: 143 MMOL/L (ref 136–145)
WBC # BLD AUTO: 17.2 X10(3) UL (ref 4–11)

## 2021-09-22 PROCEDURE — 99233 SBSQ HOSP IP/OBS HIGH 50: CPT | Performed by: INTERNAL MEDICINE

## 2021-09-22 RX ORDER — AMOXICILLIN AND CLAVULANATE POTASSIUM 875; 125 MG/1; MG/1
875 TABLET, FILM COATED ORAL EVERY 12 HOURS SCHEDULED
Status: DISCONTINUED | OUTPATIENT
Start: 2021-09-22 | End: 2021-09-23

## 2021-09-22 RX ORDER — PREDNISONE 20 MG/1
40 TABLET ORAL
Status: DISCONTINUED | OUTPATIENT
Start: 2021-09-23 | End: 2021-09-23

## 2021-09-22 NOTE — CM/SW NOTE
BPCI-Advanced Medicare Program Note:  Plan of care reviewed for care coordination and discharge planning. Noted patient falls under  BPCI/Medicare program, with  for simple pneumonia. EVAN tool was used to help determine next care setting.  Thus, N
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
## 2021-09-22 NOTE — PROGRESS NOTES
[facility]    Progress Note    Swapna Rico Patient Status:  Inpatient    1950 MRN [id number]   Location [facility] 2W/SW Attending Kyle Diez MD   Hosp Day # 2 PCP Danis Diez MD     Subjective: DDIMER 0.40 10/18/2016    MG 2.4 09/22/2021    PHOS 2.5 09/22/2021    TROP <0.045 04/10/2021    B12 765 04/27/2021       CT BRAIN OR HEAD (25985)    Result Date: 9/20/2021  CONCLUSION:   No acute intracranial abnormality.   Mild chronic microvascular white

## 2021-09-22 NOTE — PROGRESS NOTES
[facility]     Hospitalist Progress Note     Melany Echeverria Patient Status:  Inpatient    1950 MRN [id number]   Location [facility] 2W/SW Attending Reid Bui, [address] [facility] Day # 2 PCP Rubia Beck MD     [facility] WBC 11.3* 13.8* 17.2*   HGB 15.1 15.1 15.8   .7* 103.3* 99.6   .0 168.0 195.0       Recent Labs   Lab 09/20/21  1037 09/21/21  0438 09/22/21  0456   * 130* 113*   BUN 21* 20* 23*   CREATSERUM 0.68* 0.77 0.87   GFRAA 112 106 101   GFR with breakfast   • silver sulfADIAZINE   Topical BID   • ipratropium-albuterol  3 mL Nebulization 4 times per day   • furosemide  40 mg Oral Daily   • enoxaparin  40 mg Subcutaneous Daily   • fluticasone-Umeclidin-Vilant  1 puff Inhalation Daily   • sheri

## 2021-09-23 ENCOUNTER — TELEPHONE (OUTPATIENT)
Dept: INTERNAL MEDICINE CLINIC | Facility: CLINIC | Age: 71
End: 2021-09-23

## 2021-09-23 VITALS
DIASTOLIC BLOOD PRESSURE: 74 MMHG | SYSTOLIC BLOOD PRESSURE: 96 MMHG | BODY MASS INDEX: 38.79 KG/M2 | WEIGHT: 255.94 LBS | HEART RATE: 93 BPM | RESPIRATION RATE: 18 BRPM | HEIGHT: 68 IN | OXYGEN SATURATION: 93 % | TEMPERATURE: 98 F

## 2021-09-23 LAB
ANION GAP SERPL CALC-SCNC: 7 MMOL/L (ref 0–18)
BASOPHILS # BLD AUTO: 0.04 X10(3) UL (ref 0–0.2)
BASOPHILS NFR BLD AUTO: 0.3 %
BUN BLD-MCNC: 28 MG/DL (ref 7–18)
BUN/CREAT SERPL: 35 (ref 10–20)
CALCIUM BLD-MCNC: 9.3 MG/DL (ref 8.5–10.1)
CHLORIDE SERPL-SCNC: 101 MMOL/L (ref 98–112)
CO2 SERPL-SCNC: 35 MMOL/L (ref 21–32)
CREAT BLD-MCNC: 0.8 MG/DL
DEPRECATED RDW RBC AUTO: 51.5 FL (ref 35.1–46.3)
EOSINOPHIL # BLD AUTO: 0.02 X10(3) UL (ref 0–0.7)
EOSINOPHIL NFR BLD AUTO: 0.1 %
ERYTHROCYTE [DISTWIDTH] IN BLOOD BY AUTOMATED COUNT: 13.7 % (ref 11–15)
GLUCOSE BLD-MCNC: 93 MG/DL (ref 70–99)
HCT VFR BLD AUTO: 49.4 %
HGB BLD-MCNC: 15.4 G/DL
IMM GRANULOCYTES # BLD AUTO: 0.07 X10(3) UL (ref 0–1)
IMM GRANULOCYTES NFR BLD: 0.5 %
LYMPHOCYTES # BLD AUTO: 2.66 X10(3) UL (ref 1–4)
LYMPHOCYTES NFR BLD AUTO: 18.7 %
MCH RBC QN AUTO: 31.4 PG (ref 26–34)
MCHC RBC AUTO-ENTMCNC: 31.2 G/DL (ref 31–37)
MCV RBC AUTO: 100.6 FL
MONOCYTES # BLD AUTO: 1.59 X10(3) UL (ref 0.1–1)
MONOCYTES NFR BLD AUTO: 11.2 %
NEUTROPHILS # BLD AUTO: 9.83 X10 (3) UL (ref 1.5–7.7)
NEUTROPHILS # BLD AUTO: 9.83 X10(3) UL (ref 1.5–7.7)
NEUTROPHILS NFR BLD AUTO: 69.2 %
OSMOLALITY SERPL CALC.SUM OF ELEC: 301 MOSM/KG (ref 275–295)
PHOSPHATE SERPL-MCNC: 4.9 MG/DL (ref 2.5–4.9)
PLATELET # BLD AUTO: 184 10(3)UL (ref 150–450)
POTASSIUM SERPL-SCNC: 3.8 MMOL/L (ref 3.5–5.1)
RBC # BLD AUTO: 4.91 X10(6)UL
SODIUM SERPL-SCNC: 143 MMOL/L (ref 136–145)
WBC # BLD AUTO: 14.2 X10(3) UL (ref 4–11)

## 2021-09-23 PROCEDURE — 99232 SBSQ HOSP IP/OBS MODERATE 35: CPT | Performed by: PHYSICIAN ASSISTANT

## 2021-09-23 PROCEDURE — 99239 HOSP IP/OBS DSCHRG MGMT >30: CPT | Performed by: INTERNAL MEDICINE

## 2021-09-23 RX ORDER — AMOXICILLIN AND CLAVULANATE POTASSIUM 875; 125 MG/1; MG/1
875 TABLET, FILM COATED ORAL EVERY 12 HOURS SCHEDULED
Qty: 10 TABLET | Refills: 0 | Status: SHIPPED | OUTPATIENT
Start: 2021-09-23 | End: 2021-09-28

## 2021-09-23 RX ORDER — PREDNISONE 20 MG/1
40 TABLET ORAL
Qty: 8 TABLET | Refills: 0 | Status: SHIPPED | OUTPATIENT
Start: 2021-09-24 | End: 2021-09-28
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
## 2021-09-23 NOTE — DISCHARGE SUMMARY
[facility]    Discharge Summary    Bassem Pizarro Patient Status:  Inpatient    1950 MRN [id number]   Location [facility] 5SW/SE Attending Manolo Bowser MD   Hosp Day # 3 PCP Natty Pandya MD     Date of Admis arm Left arm Right arm   Pulse: 92 92 83 92   Resp: 20 18 18 18   Temp: 98.1 °F (36.7 °C) 98.3 °F (36.8 °C) 98 °F (36.7 °C) 98.2 °F (36.8 °C)   TempSrc: Oral Oral Oral Oral   SpO2: 96% 92% 91% 92%   Weight:       Height:         Patient Weight for the past MD on 9/20/2021 at 11:20 AM     Finalized by (CST): Rolly Manzo MD on 9/20/2021 at 11:21 AM            LABS :     Lab Results   Component Value Date    WBC 14.2 (H) 09/23/2021    HGB 15.4 09/23/2021    HCT 49.4 09/23/2021    .0 09/23/2021 tablet  Refills: 0        CONTINUE taking these medications      Instructions Prescription details   Albuterol Sulfate  (90 Base) MCG/ACT Aers      Inhale 2 puffs into the lungs every 6 (six) hours as needed for Wheezing.  inhale 2 puff by inhalation Vikki Gonzalez MD    Consultants  Chat With All Active Members    Provider Role Specialty    Yanick Chapa MD  Consulting Physician  PULMONARY DISEASES            Other Discharge Instructions:       ----------------------------------------------------  45

## 2021-09-23 NOTE — TELEPHONE ENCOUNTER
Patient's wife called and advised patient is being discharged from the hospital today. The Hospital Follow Up suggests to be seen no later than next week (9/27 - 10/01). Is there any way we can squeeze him into 's schedule for a TCM? Thank You.

## 2021-09-23 NOTE — DISCHARGE PLANNING
Discharge instructions given to patient. Patient was instructed to follow up with doctor for appointments. Saline lock removed. Patient transported by wheelchair and discharge home.

## 2021-09-23 NOTE — PLAN OF CARE
Patient alert and oriented x4, denies pain at this time. Resting in bed, ambulatory in room. Vital signs stable on 4L O2. Call light in reach.  Got to shower this PM.    Problem: PAIN - ADULT  Goal: Verbalizes/displays adequate comfort level or patient's st barriers to discharge w/pt and caregiver  - Include patient/family/discharge partner in discharge planning  - Arrange for needed discharge resources and transportation as appropriate  - Identify discharge learning needs (meds, wound care, etc)  - Arrange f to report SOB or any respiratory difficulty  - Respiratory Therapy support as indicated  - Manage/alleviate anxiety  - Monitor for signs/symptoms of CO2 retention  Outcome: Progressing     Problem: Patient/Family Goals  Goal: Patient/Family Long Term Goal

## 2021-09-23 NOTE — PLAN OF CARE
Problem: PAIN - ADULT  Goal: Verbalizes/displays adequate comfort level or patient's stated pain goal  Description: INTERVENTIONS:  - Encourage pt to monitor pain and request assistance  - Assess pain using appropriate pain scale  - Administer analgesics appropriate  - Identify discharge learning needs (meds, wound care, etc)  - Arrange for interpreters to assist at discharge as needed  - Consider post-discharge preferences of patient/family/discharge partner  - Complete POLST form as appropriate  - Assess retention  Outcome: Progressing     Problem: Patient/Family Goals  Goal: Patient/Family Long Term Goal  Description: Patient's Long Term Goal: go home    Interventions:  - per medical team  - transfer to medical floor  - See additional Care Plan goals for

## 2021-09-23 NOTE — PROGRESS NOTES
Discharge orders in. Patient to be discharged to home. IV still needs to be taken out by Jeyson and Stuart. Patient informed to follow up with PCP and Pulmonology in 1 week.  Patient notified to  medications (amoxicillin and prednisone); prescriptions elect

## 2021-09-24 ENCOUNTER — PATIENT OUTREACH (OUTPATIENT)
Dept: CASE MANAGEMENT | Age: 71
End: 2021-09-24

## 2021-09-24 DIAGNOSIS — Z02.9 ENCOUNTERS FOR UNSPECIFIED ADMINISTRATIVE PURPOSE: ICD-10-CM

## 2021-09-24 PROCEDURE — 1111F DSCHRG MED/CURRENT MED MERGE: CPT

## 2021-09-24 NOTE — PROGRESS NOTES
Initial Post Discharge Follow Up   Discharge Date: 9/23/21  Contact Date: 9/24/2021    Consent Verification:  Assessment Completed With: Patient  HIPAA Verified?   No; The patient prefers to confirm the NCM's number prior to providing personal informatio

## 2021-09-27 ENCOUNTER — OFFICE VISIT (OUTPATIENT)
Dept: INTERNAL MEDICINE CLINIC | Facility: CLINIC | Age: 71
End: 2021-09-27
Payer: MEDICARE

## 2021-09-27 ENCOUNTER — TELEPHONE (OUTPATIENT)
Dept: PULMONOLOGY | Facility: CLINIC | Age: 71
End: 2021-09-27

## 2021-09-27 VITALS
HEART RATE: 99 BPM | WEIGHT: 264 LBS | BODY MASS INDEX: 40.01 KG/M2 | SYSTOLIC BLOOD PRESSURE: 110 MMHG | HEIGHT: 68 IN | DIASTOLIC BLOOD PRESSURE: 77 MMHG

## 2021-09-27 DIAGNOSIS — J96.21 ACUTE ON CHRONIC RESPIRATORY FAILURE WITH HYPOXIA AND HYPERCAPNIA (HCC): ICD-10-CM

## 2021-09-27 DIAGNOSIS — J96.22 ACUTE ON CHRONIC RESPIRATORY FAILURE WITH HYPOXIA AND HYPERCAPNIA (HCC): ICD-10-CM

## 2021-09-27 DIAGNOSIS — I77.89 ASCENDING AORTA ENLARGEMENT (HCC): ICD-10-CM

## 2021-09-27 DIAGNOSIS — J44.1 COPD WITH ACUTE EXACERBATION (HCC): Primary | ICD-10-CM

## 2021-09-27 DIAGNOSIS — Z72.0 TOBACCO ABUSE: ICD-10-CM

## 2021-09-27 DIAGNOSIS — T20.10XD SUPERFICIAL BURN OF FACE, SUBSEQUENT ENCOUNTER: ICD-10-CM

## 2021-09-27 PROCEDURE — 90662 IIV NO PRSV INCREASED AG IM: CPT | Performed by: INTERNAL MEDICINE

## 2021-09-27 PROCEDURE — 99496 TRANSJ CARE MGMT HIGH F2F 7D: CPT | Performed by: INTERNAL MEDICINE

## 2021-09-27 PROCEDURE — G0008 ADMIN INFLUENZA VIRUS VAC: HCPCS | Performed by: INTERNAL MEDICINE

## 2021-09-27 NOTE — PROGRESS NOTES
Initial Post Discharge Follow Up   Discharge Date: 9/23/21  Contact Date: 9/27/2021    Consent Verification:  Assessment Completed With: Patient  HIPAA Verified?   Yes    Discharge Dx:     Acute on chronic respiratory failure with hypoxia   COPD exacerba MCG/INH Aepb  Generic drug: Fluticasone Furoate-Vilanterol         ketoconazole 2 % Crea  Commonly known as: NIZORAL       Current Outpatient Medications   Medication Sig Dispense Refill   • amoxicillin clavulanate 875-125 MG Oral Tab Take 1 tablet (875 mg prescribed regardless of symptom improvement. • Did you  your discharge medications when you left the hospital? Yes  • May I go over your medications with you to make sure we are not missing anything? yes  • Are there any reasons that keep you from potential risk factors for COPD in your home environment? Primary or secondary tobacco smoke   yes; NCM did review oxygen safety with the patient.    Occupational dusts and chemicals organic and inorganic    no  Indoor air pollution from heating and cookin any reason as to why you cannot make your appointments? No     NCM Reviewed upcoming Specialist Appt with patient     Not Applicable    Overall Rating:    How would you rate the care you received while in the hospital?  excellent     Interventions by NCM:

## 2021-09-27 NOTE — TELEPHONE ENCOUNTER
Dr. Cristela Kelley, ok to add patient for hospital follow up? Discharged 9/23/21 for COPD exacerbation.

## 2021-09-28 NOTE — TELEPHONE ENCOUNTER
Spoke with patient. Appointment scheduled for 9/30/21 at [address] [place] [address]. Verified date, time, place, and where to park. Patient verbalized understanding.

## 2021-09-30 ENCOUNTER — OFFICE VISIT (OUTPATIENT)
Dept: PULMONOLOGY | Facility: CLINIC | Age: 71
End: 2021-09-30
Payer: MEDICARE

## 2021-09-30 VITALS
OXYGEN SATURATION: 92 % | BODY MASS INDEX: 40 KG/M2 | HEART RATE: 105 BPM | WEIGHT: 262 LBS | DIASTOLIC BLOOD PRESSURE: 77 MMHG | SYSTOLIC BLOOD PRESSURE: 113 MMHG

## 2021-09-30 DIAGNOSIS — J43.2 CENTRILOBULAR EMPHYSEMA (HCC): ICD-10-CM

## 2021-09-30 DIAGNOSIS — G47.33 OSA (OBSTRUCTIVE SLEEP APNEA): Primary | ICD-10-CM

## 2021-09-30 DIAGNOSIS — Z72.0 TOBACCO ABUSE: ICD-10-CM

## 2021-09-30 DIAGNOSIS — T20.10XD SUPERFICIAL BURN OF FACE, SUBSEQUENT ENCOUNTER: ICD-10-CM

## 2021-09-30 DIAGNOSIS — J96.12 CHRONIC RESPIRATORY FAILURE WITH HYPERCAPNIA (HCC): ICD-10-CM

## 2021-09-30 PROCEDURE — 99214 OFFICE O/P EST MOD 30 MIN: CPT | Performed by: INTERNAL MEDICINE

## 2021-09-30 RX ORDER — BUDESONIDE AND FORMOTEROL FUMARATE DIHYDRATE 160; 4.5 UG/1; UG/1
2 AEROSOL RESPIRATORY (INHALATION) 2 TIMES DAILY
Qty: 3 EACH | Refills: 4 | Status: SHIPPED | OUTPATIENT
Start: 2021-09-30 | End: 2022-01-05

## 2021-09-30 RX ORDER — UMECLIDINIUM 62.5 UG/1
1 AEROSOL, POWDER ORAL DAILY
Qty: 3 EACH | Refills: 4 | Status: SHIPPED | OUTPATIENT
Start: 2021-09-30 | End: 2022-01-05
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
## 2021-10-03 PROBLEM — J96.22 ACUTE ON CHRONIC RESPIRATORY FAILURE WITH HYPOXIA AND HYPERCAPNIA (HCC): Status: ACTIVE | Noted: 2021-04-10

## 2021-10-03 PROBLEM — T20.10XA FIRST DEGREE BURN OF FACE: Status: ACTIVE | Noted: 2021-09-20

## 2021-10-03 PROBLEM — I77.89 ASCENDING AORTA ENLARGEMENT (HCC): Status: ACTIVE | Noted: 2021-10-03

## 2021-10-03 PROBLEM — I77.89 ASCENDING AORTA ENLARGEMENT: Status: ACTIVE | Noted: 2021-10-03

## 2021-10-03 PROBLEM — J96.21 ACUTE ON CHRONIC RESPIRATORY FAILURE WITH HYPOXIA AND HYPERCAPNIA (HCC): Status: ACTIVE | Noted: 2021-04-10
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
## 2021-10-03 NOTE — PATIENT INSTRUCTIONS
Please get an influenza \"Flu\" vaccine in the fall or when available. If you feel short of breath or chest tightness or cough, you can use your albuterol inhaler 4 times per day As Needed.

## 2021-11-24 ENCOUNTER — TELEPHONE (OUTPATIENT)
Dept: PULMONOLOGY | Facility: CLINIC | Age: 71
End: 2021-11-24

## 2021-11-24 NOTE — TELEPHONE ENCOUNTER
Received STEVE Walsh for Ipratrop/ALbuterol 0.5mg INH 3ML Refill: 2  [facility] [address]    Placed on Dr. Nuria Renteria for signed off

## 2021-12-09 ENCOUNTER — PATIENT MESSAGE (OUTPATIENT)
Dept: INTERNAL MEDICINE CLINIC | Facility: CLINIC | Age: 71
End: 2021-12-09

## 2021-12-10 NOTE — TELEPHONE ENCOUNTER
From: Hugo Sosa  To: Elizabeth Salter MD  Sent: 12/9/2021 4:23 PM CST  Subject: CO2 Level    Dr. Kristy Singh this is Armaan Berger (Reno's Wife). He has an appointment scheduled for Tuesday with you.  We would like to get a blood gas test done prior t

## 2021-12-10 NOTE — TELEPHONE ENCOUNTER
Call and check how pt is doing; if he is again getting lethargic, he needs to go to ER; he   Has chronic respiratory  failure due to copd and has had acute decompensations before and needed to be on vent in the past and more recent one on a bipap.  We dont

## 2021-12-11 NOTE — TELEPHONE ENCOUNTER
Spoke with pt spouse who states pt is not lethargic or SOB. Per spouse \"I thought we could just start checking his blood gases on a regular basis, he is not that bad\"    Per spouse she will f/u with pt at [address] on 12/14/21 and bring pt to ER if necessary.

## 2021-12-14 ENCOUNTER — OFFICE VISIT (OUTPATIENT)
Dept: INTERNAL MEDICINE CLINIC | Facility: CLINIC | Age: 71
End: 2021-12-14
Payer: MEDICARE

## 2021-12-14 VITALS
SYSTOLIC BLOOD PRESSURE: 119 MMHG | OXYGEN SATURATION: 90 % | TEMPERATURE: 99 F | HEIGHT: 68 IN | HEART RATE: 98 BPM | BODY MASS INDEX: 43.72 KG/M2 | DIASTOLIC BLOOD PRESSURE: 75 MMHG | WEIGHT: 288.5 LBS

## 2021-12-14 DIAGNOSIS — E66.01 MORBID OBESITY DUE TO EXCESS CALORIES (HCC): ICD-10-CM

## 2021-12-14 DIAGNOSIS — E78.5 DYSLIPIDEMIA: ICD-10-CM

## 2021-12-14 DIAGNOSIS — J43.9 PULMONARY EMPHYSEMA, UNSPECIFIED EMPHYSEMA TYPE (HCC): Primary | ICD-10-CM

## 2021-12-14 PROCEDURE — 99213 OFFICE O/P EST LOW 20 MIN: CPT | Performed by: INTERNAL MEDICINE

## 2021-12-14 RX ORDER — POTASSIUM CHLORIDE 750 MG/1
TABLET, EXTENDED RELEASE ORAL
Qty: 45 TABLET | Refills: 2 | Status: SHIPPED | OUTPATIENT
Start: 2021-12-14

## 2021-12-14 RX ORDER — FLUTICASONE PROPIONATE 50 MCG
2 SPRAY, SUSPENSION (ML) NASAL DAILY
Qty: 1 EACH | Refills: 2 | Status: SHIPPED | OUTPATIENT
Start: 2021-12-14 | End: 2022-03-14

## 2021-12-14 RX ORDER — FUROSEMIDE 40 MG/1
40 TABLET ORAL DAILY
Qty: 45 TABLET | Refills: 2 | Status: SHIPPED | OUTPATIENT
Start: 2021-12-14

## 2021-12-15 PROBLEM — E66.01 MORBID (SEVERE) OBESITY DUE TO EXCESS CALORIES (HCC): Status: ACTIVE | Noted: 2021-12-15

## 2021-12-15 NOTE — PROGRESS NOTES
Subjective:     Patient ID: Juda Leventhal is a 80year old male. COPD  This is a chronic problem. The current episode started more than 1 year ago. The problem occurs constantly. The problem has been unchanged. Associated symptoms include fatigue. differently: Inhale 2 puffs into the lungs every 6 (six) hours as needed for Wheezing. inhale 2 puff by inhalation route  every 4 - 6 hours as needed) 1 Inhaler 5   • atorvastatin 20 MG Oral Tab Take 1 tablet (20 mg total) by mouth nightly.  (Patient not ta normal.   Eyes:      General: No scleral icterus. Right eye: No discharge. Left eye: No discharge. Conjunctiva/sclera: Conjunctivae normal.   Cardiovascular:      Rate and Rhythm: Normal rate. Pulses: Normal pulses.       Heart soun eating more since he had  Quit smoking; I told him this will worsen his MARIA M and also cause obesity hypoventilation compounding on his CoPD. Pt told         No orders of the defined types were placed in this encounter.       Meds This Visit:  Requested Pres

## 2022-01-05 ENCOUNTER — OFFICE VISIT (OUTPATIENT)
Dept: PULMONOLOGY | Facility: CLINIC | Age: 72
End: 2022-01-05
Payer: MEDICARE

## 2022-01-05 VITALS
RESPIRATION RATE: 22 BRPM | BODY MASS INDEX: 44.71 KG/M2 | WEIGHT: 295 LBS | HEIGHT: 68 IN | HEART RATE: 101 BPM | OXYGEN SATURATION: 95 % | DIASTOLIC BLOOD PRESSURE: 82 MMHG | SYSTOLIC BLOOD PRESSURE: 121 MMHG

## 2022-01-05 DIAGNOSIS — J96.12 CHRONIC RESPIRATORY FAILURE WITH HYPERCAPNIA (HCC): ICD-10-CM

## 2022-01-05 DIAGNOSIS — R09.02 HYPOXEMIA: ICD-10-CM

## 2022-01-05 DIAGNOSIS — Z72.0 TOBACCO ABUSE: ICD-10-CM

## 2022-01-05 DIAGNOSIS — G47.33 OSA (OBSTRUCTIVE SLEEP APNEA): ICD-10-CM

## 2022-01-05 DIAGNOSIS — J43.2 CENTRILOBULAR EMPHYSEMA (HCC): Primary | ICD-10-CM

## 2022-01-05 PROCEDURE — 99214 OFFICE O/P EST MOD 30 MIN: CPT | Performed by: INTERNAL MEDICINE

## 2022-01-05 RX ORDER — IPRATROPIUM BROMIDE AND ALBUTEROL SULFATE 2.5; .5 MG/3ML; MG/3ML
3 SOLUTION RESPIRATORY (INHALATION)
Qty: 60 EACH | Refills: 3 | Status: SHIPPED | OUTPATIENT
Start: 2022-01-05

## 2022-01-05 RX ORDER — FLUTICASONE FUROATE, UMECLIDINIUM BROMIDE AND VILANTEROL TRIFENATATE 100; 62.5; 25 UG/1; UG/1; UG/1
1 POWDER RESPIRATORY (INHALATION) DAILY
Qty: 3 EACH | Refills: 4 | Status: SHIPPED | OUTPATIENT
Start: 2022-01-05

## 2022-01-05 NOTE — PROGRESS NOTES
Pulmonary Follow Up Note    HPI:   Meeta Webb is a 80year old male with Patient presents with:   Follow - Up: 3 month follow up    Juan Holder MD    Wife and pt having disagreement  -Wife wants me to tell him CPAP will have his chronic res daily. And as needed wheezing 120 vial 1   • Urea 40 % External Cream Apply 1 Application topically daily. 227 g 2   • Saline Nasal Spray 0.65 % Nasal Solution 1 spray by Nasal route every 3 (three) hours as needed for congestion.  1 Bottle 0   • Albuterol b/l upper and lower extremities, no lacerations  Skin Normal inspection and palpation without acute appearing rash  Psych Normal mood, Insight and judgement appropriate  Lymph no cervical GRECIA            ASSESSMENT/PLAN:     (J43.2) Centrilobular emphysema

## 2022-01-06 ENCOUNTER — ORDER TRANSCRIPTION (OUTPATIENT)
Dept: SLEEP CENTER | Age: 72
End: 2022-01-06

## 2022-01-06 DIAGNOSIS — G47.33 OBSTRUCTIVE SLEEP APNEA (ADULT) (PEDIATRIC): Primary | ICD-10-CM

## 2022-01-08 ENCOUNTER — LAB ENCOUNTER (OUTPATIENT)
Dept: LAB | Age: 72
End: 2022-01-08
Attending: INTERNAL MEDICINE
Payer: MEDICARE

## 2022-01-08 DIAGNOSIS — G47.33 OBSTRUCTIVE SLEEP APNEA (ADULT) (PEDIATRIC): ICD-10-CM

## 2022-01-10 LAB — SARS-COV-2 RNA RESP QL NAA+PROBE: NOT DETECTED

## 2022-01-11 ENCOUNTER — OFFICE VISIT (OUTPATIENT)
Dept: SLEEP CENTER | Age: 72
End: 2022-01-11
Attending: INTERNAL MEDICINE
Payer: MEDICARE

## 2022-01-11 DIAGNOSIS — J96.12 CHRONIC RESPIRATORY FAILURE WITH HYPERCAPNIA (HCC): ICD-10-CM

## 2022-01-11 DIAGNOSIS — G47.33 OSA (OBSTRUCTIVE SLEEP APNEA): ICD-10-CM

## 2022-01-11 PROCEDURE — 95811 POLYSOM 6/>YRS CPAP 4/> PARM: CPT

## 2022-01-26 ENCOUNTER — TELEPHONE (OUTPATIENT)
Dept: PULMONOLOGY | Facility: CLINIC | Age: 72
End: 2022-01-26

## 2022-01-26 DIAGNOSIS — G47.33 OSA (OBSTRUCTIVE SLEEP APNEA): Primary | ICD-10-CM

## 2022-01-26 NOTE — TELEPHONE ENCOUNTER
----- Message from Ángel Santiago MD sent at 1/20/2022  8:10 PM CST -----  Can we set up nPAP based on recent PSG? 1.  The patient should be prescribed auto-titrating CPAP at 5-15 CWP with 2 LPM in line O2.  2. The patient was fitted with a Full face - F

## 2022-01-27 NOTE — TELEPHONE ENCOUNTER
CPAP order, face sheet, office visit notes, baseline sleep study and CPAP titration faxed to Javier Ramirez at [phone]. Fax confirmation received.     Spoke with patient informed them of Dr. Anabell De order, to follow up in clinic between 27 and 9

## 2022-01-28 ENCOUNTER — TELEPHONE (OUTPATIENT)
Dept: PULMONOLOGY | Facility: CLINIC | Age: 72
End: 2022-01-28

## 2022-02-23 ENCOUNTER — PATIENT MESSAGE (OUTPATIENT)
Dept: FAMILY MEDICINE CLINIC | Facility: CLINIC | Age: 72
End: 2022-02-23

## 2022-02-24 ENCOUNTER — OFFICE VISIT (OUTPATIENT)
Dept: INTERNAL MEDICINE CLINIC | Facility: CLINIC | Age: 72
End: 2022-02-24
Payer: MEDICARE

## 2022-02-24 VITALS
HEIGHT: 68 IN | DIASTOLIC BLOOD PRESSURE: 76 MMHG | TEMPERATURE: 98 F | SYSTOLIC BLOOD PRESSURE: 121 MMHG | BODY MASS INDEX: 44.21 KG/M2 | OXYGEN SATURATION: 88 % | HEART RATE: 88 BPM | WEIGHT: 291.69 LBS

## 2022-02-24 DIAGNOSIS — J43.9 PULMONARY EMPHYSEMA, UNSPECIFIED EMPHYSEMA TYPE (HCC): Primary | ICD-10-CM

## 2022-02-24 DIAGNOSIS — R09.81 NASAL CONGESTION: ICD-10-CM

## 2022-02-24 PROCEDURE — 99213 OFFICE O/P EST LOW 20 MIN: CPT | Performed by: INTERNAL MEDICINE

## 2022-02-25 ENCOUNTER — TELEPHONE (OUTPATIENT)
Dept: INTERNAL MEDICINE CLINIC | Facility: CLINIC | Age: 72
End: 2022-02-25

## 2022-02-25 NOTE — TELEPHONE ENCOUNTER
Handicapped sticker form completed by Dr. Gagan Judd. Placed in preaddressed and stamped envelope. Copy of form made for spouse as instructed by Dr. Gilbert Poster. Phone call to patient's number on file. Phone rings and then stops. No answering machine. Tried again. Same results. Unable to leave message that copy ready to .

## 2022-03-07 RX ORDER — FUROSEMIDE 40 MG/1
40 TABLET ORAL DAILY
Qty: 45 TABLET | Refills: 2 | Status: SHIPPED | OUTPATIENT
Start: 2022-03-07 | End: 2022-09-01

## 2022-03-07 NOTE — TELEPHONE ENCOUNTER
Refill passed per [place] [facility] [place] protocol.      Requested Prescriptions   Pending Prescriptions Disp Refills    FUROSEMIDE 40 MG Oral Tab [Pharmacy Med Name: FUROSEMIDE 40MG TABLETS] 45 tablet 2     Sig: TAKE 1 TABLET BY MOUTH EVERY OTHER DAY ALTERNATING WITH 80 MG(2 TABLETS) EVERY OTHER DAY        Hypertensive Medications Protocol Passed - 3/5/2022 11:50 AM        Passed - CMP or BMP in past 12 months        Passed - Appointment in past 6 or next 3 months        Passed - GFR Non- > 50     Lab Results   Component Value Date    GFRNAA 91 09/23/2021                       [unfilled]      [unfilled]

## 2022-03-07 NOTE — TELEPHONE ENCOUNTER
Refill passed per [place] [employer] [place] protocol. Requested Prescriptions   Pending Prescriptions Disp Refills    furosemide 40 MG Oral Tab [Pharmacy Med Name: FUROSEMIDE 40MG TABLETS] 45 tablet 2     Sig: Take 1 tablet (40 mg total) by mouth.         Hypertensive Medications Protocol Passed - 3/5/2022 11:50 AM        Passed - CMP or BMP in past 12 months        Passed - Appointment in past 6 or next 3 months        Passed - GFR Non- > 50     Lab Results   Component Value Date    GFRNAA 91 09/23/2021                       [unfilled]      @[facility]@

## 2022-05-09 ENCOUNTER — TELEPHONE (OUTPATIENT)
Dept: PULMONOLOGY | Facility: CLINIC | Age: 72
End: 2022-05-09

## 2022-05-09 NOTE — TELEPHONE ENCOUNTER
Received detailed written for cpap supplies from [facility]. [facility] for review and signature.

## 2022-08-20 ENCOUNTER — APPOINTMENT (OUTPATIENT)
Dept: GENERAL RADIOLOGY | Facility: HOSPITAL | Age: 72
End: 2022-08-20
Attending: STUDENT IN AN ORGANIZED HEALTH CARE EDUCATION/TRAINING PROGRAM
Payer: MEDICARE

## 2022-08-20 ENCOUNTER — HOSPITAL ENCOUNTER (INPATIENT)
Facility: HOSPITAL | Age: 72
LOS: 7 days | Discharge: HOME | End: 2022-08-27
Attending: STUDENT IN AN ORGANIZED HEALTH CARE EDUCATION/TRAINING PROGRAM | Admitting: INTERNAL MEDICINE
Payer: MEDICARE

## 2022-08-20 ENCOUNTER — APPOINTMENT (OUTPATIENT)
Dept: GENERAL RADIOLOGY | Facility: HOSPITAL | Age: 72
DRG: 190 | End: 2022-08-20
Payer: MEDICARE

## 2022-08-20 ENCOUNTER — HOSPITAL ENCOUNTER (INPATIENT)
Facility: HOSPITAL | Age: 72
LOS: 7 days | Discharge: HOME | DRG: 190 | End: 2022-08-27
Attending: STUDENT IN AN ORGANIZED HEALTH CARE EDUCATION/TRAINING PROGRAM | Admitting: INTERNAL MEDICINE
Payer: MEDICARE

## 2022-08-20 ENCOUNTER — APPOINTMENT (OUTPATIENT)
Dept: GENERAL RADIOLOGY | Facility: HOSPITAL | Age: 72
End: 2022-08-20
Payer: MEDICARE

## 2022-08-20 ENCOUNTER — APPOINTMENT (OUTPATIENT)
Dept: GENERAL RADIOLOGY | Facility: HOSPITAL | Age: 72
DRG: 190 | End: 2022-08-20
Attending: STUDENT IN AN ORGANIZED HEALTH CARE EDUCATION/TRAINING PROGRAM
Payer: MEDICARE

## 2022-08-20 DIAGNOSIS — J96.02 ACUTE RESPIRATORY FAILURE WITH HYPOXIA AND HYPERCAPNIA (HCC): Primary | ICD-10-CM

## 2022-08-20 DIAGNOSIS — J44.1 COPD EXACERBATION (HCC): ICD-10-CM

## 2022-08-20 DIAGNOSIS — J96.01 ACUTE RESPIRATORY FAILURE WITH HYPOXIA AND HYPERCAPNIA (HCC): Primary | ICD-10-CM

## 2022-08-20 LAB
ANION GAP SERPL CALC-SCNC: <0 MMOL/L (ref 0–18)
BASE EXCESS BLD CALC-SCNC: 10.9 MMOL/L (ref ?–2)
BASE EXCESS BLD CALC-SCNC: 14.7 MMOL/L (ref ?–2)
BASOPHILS # BLD AUTO: 0.1 X10(3) UL (ref 0–0.2)
BASOPHILS NFR BLD AUTO: 1 %
BLOOD GAS EPAP: 5 CM H2O
BLOOD GAS EPAP: 5 CM H2O
BLOOD GAS IPAP: 14 CM H2O
BLOOD GAS IPAP: 20 CM H2O
BUN BLD-MCNC: 25 MG/DL (ref 7–18)
BUN/CREAT SERPL: 23.1 (ref 10–20)
CA-I BLD-SCNC: 1.21 MMOL/L (ref 0.95–1.32)
CA-I BLD-SCNC: 1.22 MMOL/L (ref 0.95–1.32)
CALCIUM BLD-MCNC: 8.8 MG/DL (ref 8.5–10.1)
CHLORIDE SERPL-SCNC: 102 MMOL/L (ref 98–112)
CO2 SERPL-SCNC: 43 MMOL/L (ref 21–32)
COHGB MFR BLD: 2.3 % (ref 0–3)
COHGB MFR BLD: 2.5 % (ref 0–3)
CREAT BLD-MCNC: 1.08 MG/DL
DEPRECATED RDW RBC AUTO: 55.8 FL (ref 35.1–46.3)
EOSINOPHIL # BLD AUTO: 0.34 X10(3) UL (ref 0–0.7)
EOSINOPHIL NFR BLD AUTO: 3.6 %
ERYTHROCYTE [DISTWIDTH] IN BLOOD BY AUTOMATED COUNT: 13.4 % (ref 11–15)
GFR SERPLBLD BASED ON 1.73 SQ M-ARVRAT: 73 ML/MIN/1.73M2 (ref 60–?)
GLUCOSE BLD-MCNC: 223 MG/DL (ref 70–99)
GLUCOSE BLDC GLUCOMTR-MCNC: 184 MG/DL (ref 70–99)
HCO3 BLDA-SCNC: 33.4 MEQ/L (ref 21–27)
HCO3 BLDA-SCNC: 36.3 MEQ/L (ref 21–27)
HCT VFR BLD AUTO: 49.3 %
HGB BLD-MCNC: 13.6 G/DL
HGB BLD-MCNC: 13.7 G/DL
HGB BLD-MCNC: 13.9 G/DL
IMM GRANULOCYTES # BLD AUTO: 0.08 X10(3) UL (ref 0–1)
IMM GRANULOCYTES NFR BLD: 0.8 %
LACTATE BLD-SCNC: 1.1 MMOL/L (ref 0.5–2)
LACTATE BLD-SCNC: 1.4 MMOL/L (ref 0.5–2)
LACTATE SERPL-SCNC: 1.5 MMOL/L (ref 0.4–2)
LYMPHOCYTES # BLD AUTO: 1.69 X10(3) UL (ref 1–4)
LYMPHOCYTES NFR BLD AUTO: 17.7 %
MCH RBC QN AUTO: 29.9 PG (ref 26–34)
MCHC RBC AUTO-ENTMCNC: 27.6 G/DL (ref 31–37)
MCV RBC AUTO: 108.4 FL
METHGB MFR BLD: 1 % SAT (ref 0.4–1.5)
METHGB MFR BLD: 1.3 % SAT (ref 0.4–1.5)
MODIFIED ALLEN TEST: POSITIVE
MONOCYTES # BLD AUTO: 0.82 X10(3) UL (ref 0.1–1)
MONOCYTES NFR BLD AUTO: 8.6 %
NEUTROPHILS # BLD AUTO: 6.52 X10 (3) UL (ref 1.5–7.7)
NEUTROPHILS # BLD AUTO: 6.52 X10(3) UL (ref 1.5–7.7)
NEUTROPHILS NFR BLD AUTO: 68.3 %
NT-PROBNP SERPL-MCNC: 41 PG/ML (ref ?–125)
O2 CT BLD-SCNC: 18 VOL% (ref 15–23)
O2 CT BLD-SCNC: 18.7 VOL% (ref 15–23)
O2 CT BLD-SCNC: 18.8 VOL% (ref 15–23)
O2/TOTAL GAS SETTING VFR VENT: 100 %
O2/TOTAL GAS SETTING VFR VENT: 40 %
O2/TOTAL GAS SETTING VFR VENT: 40 %
OSMOLALITY SERPL CALC.SUM OF ELEC: 307 MOSM/KG (ref 275–295)
PCO2 BLDA: 114 MM HG (ref 35–45)
PCO2 BLDA: 119 MM HG (ref 35–45)
PCO2 BLDA: 81 MM HG (ref 35–45)
PEEP SETTING VENT: 5 CM H2O
PH BLDA: 7.19 [PH] (ref 7.35–7.45)
PH BLDA: 7.23 [PH] (ref 7.35–7.45)
PH BLDA: 7.31 [PH] (ref 7.35–7.45)
PLATELET # BLD AUTO: 182 10(3)UL (ref 150–450)
PO2 BLDA: 120 MM HG (ref 80–100)
PO2 BLDA: 133 MM HG (ref 80–100)
PO2 BLDA: 78 MM HG (ref 80–100)
POTASSIUM BLD-SCNC: 4.3 MMOL/L (ref 3.6–5.1)
POTASSIUM BLD-SCNC: 4.6 MMOL/L (ref 3.6–5.1)
POTASSIUM SERPL-SCNC: 4.8 MMOL/L (ref 3.5–5.1)
PROCALCITONIN SERPL-MCNC: 0.05 NG/ML (ref ?–0.16)
PUNCTURE CHARGE: YES
RBC # BLD AUTO: 4.55 X10(6)UL
RESP RATE: 14 BPM
RESP RATE: 20 BPM
SAO2 % BLDA: 96.6 % (ref 94–100)
SAO2 % BLDA: 98.9 % (ref 94–100)
SAO2 % BLDA: 99 % (ref 94–100)
SARS-COV-2 RNA RESP QL NAA+PROBE: NOT DETECTED
SODIUM BLD-SCNC: 141 MMOL/L (ref 135–145)
SODIUM BLD-SCNC: 141 MMOL/L (ref 135–145)
SODIUM SERPL-SCNC: 143 MMOL/L (ref 136–145)
SPECIMEN VOL 24H UR: 550 ML
TROPONIN I HIGH SENSITIVITY: 8 NG/L
WBC # BLD AUTO: 9.6 X10(3) UL (ref 4–11)

## 2022-08-20 PROCEDURE — 31500 INSERT EMERGENCY AIRWAY: CPT

## 2022-08-20 PROCEDURE — 84145 PROCALCITONIN (PCT): CPT | Performed by: STUDENT IN AN ORGANIZED HEALTH CARE EDUCATION/TRAINING PROGRAM

## 2022-08-20 PROCEDURE — 82805 BLOOD GASES W/O2 SATURATION: CPT | Performed by: STUDENT IN AN ORGANIZED HEALTH CARE EDUCATION/TRAINING PROGRAM

## 2022-08-20 PROCEDURE — 80048 BASIC METABOLIC PNL TOTAL CA: CPT | Performed by: STUDENT IN AN ORGANIZED HEALTH CARE EDUCATION/TRAINING PROGRAM

## 2022-08-20 PROCEDURE — 84295 ASSAY OF SERUM SODIUM: CPT | Performed by: STUDENT IN AN ORGANIZED HEALTH CARE EDUCATION/TRAINING PROGRAM

## 2022-08-20 PROCEDURE — 96365 THER/PROPH/DIAG IV INF INIT: CPT

## 2022-08-20 PROCEDURE — 96375 TX/PRO/DX INJ NEW DRUG ADDON: CPT

## 2022-08-20 PROCEDURE — 94645 CONT INHLJ TX EACH ADDL HOUR: CPT

## 2022-08-20 PROCEDURE — 96367 TX/PROPH/DG ADDL SEQ IV INF: CPT

## 2022-08-20 PROCEDURE — 82962 GLUCOSE BLOOD TEST: CPT

## 2022-08-20 PROCEDURE — 83880 ASSAY OF NATRIURETIC PEPTIDE: CPT | Performed by: STUDENT IN AN ORGANIZED HEALTH CARE EDUCATION/TRAINING PROGRAM

## 2022-08-20 PROCEDURE — 71045 X-RAY EXAM CHEST 1 VIEW: CPT | Performed by: STUDENT IN AN ORGANIZED HEALTH CARE EDUCATION/TRAINING PROGRAM

## 2022-08-20 PROCEDURE — 82330 ASSAY OF CALCIUM: CPT | Performed by: STUDENT IN AN ORGANIZED HEALTH CARE EDUCATION/TRAINING PROGRAM

## 2022-08-20 PROCEDURE — 87040 BLOOD CULTURE FOR BACTERIA: CPT | Performed by: STUDENT IN AN ORGANIZED HEALTH CARE EDUCATION/TRAINING PROGRAM

## 2022-08-20 PROCEDURE — 84478 ASSAY OF TRIGLYCERIDES: CPT | Performed by: INTERNAL MEDICINE

## 2022-08-20 PROCEDURE — 99291 CRITICAL CARE FIRST HOUR: CPT

## 2022-08-20 PROCEDURE — 82375 ASSAY CARBOXYHB QUANT: CPT | Performed by: STUDENT IN AN ORGANIZED HEALTH CARE EDUCATION/TRAINING PROGRAM

## 2022-08-20 PROCEDURE — 84484 ASSAY OF TROPONIN QUANT: CPT | Performed by: STUDENT IN AN ORGANIZED HEALTH CARE EDUCATION/TRAINING PROGRAM

## 2022-08-20 PROCEDURE — 84132 ASSAY OF SERUM POTASSIUM: CPT | Performed by: STUDENT IN AN ORGANIZED HEALTH CARE EDUCATION/TRAINING PROGRAM

## 2022-08-20 PROCEDURE — 36415 COLL VENOUS BLD VENIPUNCTURE: CPT | Performed by: STUDENT IN AN ORGANIZED HEALTH CARE EDUCATION/TRAINING PROGRAM

## 2022-08-20 PROCEDURE — 83050 HGB METHEMOGLOBIN QUAN: CPT | Performed by: STUDENT IN AN ORGANIZED HEALTH CARE EDUCATION/TRAINING PROGRAM

## 2022-08-20 PROCEDURE — 36600 WITHDRAWAL OF ARTERIAL BLOOD: CPT | Performed by: STUDENT IN AN ORGANIZED HEALTH CARE EDUCATION/TRAINING PROGRAM

## 2022-08-20 PROCEDURE — 83605 ASSAY OF LACTIC ACID: CPT | Performed by: STUDENT IN AN ORGANIZED HEALTH CARE EDUCATION/TRAINING PROGRAM

## 2022-08-20 PROCEDURE — 85018 HEMOGLOBIN: CPT | Performed by: STUDENT IN AN ORGANIZED HEALTH CARE EDUCATION/TRAINING PROGRAM

## 2022-08-20 PROCEDURE — 93010 ELECTROCARDIOGRAM REPORT: CPT | Performed by: STUDENT IN AN ORGANIZED HEALTH CARE EDUCATION/TRAINING PROGRAM

## 2022-08-20 PROCEDURE — 96376 TX/PRO/DX INJ SAME DRUG ADON: CPT

## 2022-08-20 PROCEDURE — 94002 VENT MGMT INPAT INIT DAY: CPT

## 2022-08-20 PROCEDURE — 51702 INSERT TEMP BLADDER CATH: CPT

## 2022-08-20 PROCEDURE — 85025 COMPLETE CBC W/AUTO DIFF WBC: CPT | Performed by: STUDENT IN AN ORGANIZED HEALTH CARE EDUCATION/TRAINING PROGRAM

## 2022-08-20 PROCEDURE — 94644 CONT INHLJ TX 1ST HOUR: CPT

## 2022-08-20 PROCEDURE — 93005 ELECTROCARDIOGRAM TRACING: CPT

## 2022-08-20 RX ORDER — ROCURONIUM BROMIDE 10 MG/ML
100 INJECTION, SOLUTION INTRAVENOUS ONCE
Status: COMPLETED | OUTPATIENT
Start: 2022-08-20 | End: 2022-08-20

## 2022-08-20 RX ORDER — METHYLPREDNISOLONE SODIUM SUCCINATE 40 MG/ML
40 INJECTION, POWDER, LYOPHILIZED, FOR SOLUTION INTRAMUSCULAR; INTRAVENOUS EVERY 6 HOURS
Status: DISCONTINUED | OUTPATIENT
Start: 2022-08-21 | End: 2022-08-24

## 2022-08-20 RX ORDER — ACETAMINOPHEN 160 MG/5ML
650 SOLUTION ORAL EVERY 4 HOURS PRN
Status: DISCONTINUED | OUTPATIENT
Start: 2022-08-20 | End: 2022-08-27

## 2022-08-20 RX ORDER — FLUTICASONE PROPIONATE 50 MCG
1 SPRAY, SUSPENSION (ML) NASAL DAILY
Status: DISCONTINUED | OUTPATIENT
Start: 2022-08-21 | End: 2022-08-27

## 2022-08-20 RX ORDER — ACETAMINOPHEN 325 MG/1
650 TABLET ORAL EVERY 4 HOURS PRN
Status: DISCONTINUED | OUTPATIENT
Start: 2022-08-20 | End: 2022-08-27

## 2022-08-20 RX ORDER — SENNOSIDES 8.6 MG
17.2 TABLET ORAL NIGHTLY PRN
Status: DISCONTINUED | OUTPATIENT
Start: 2022-08-20 | End: 2022-08-27

## 2022-08-20 RX ORDER — ACETAMINOPHEN 10 MG/ML
1000 INJECTION, SOLUTION INTRAVENOUS EVERY 6 HOURS PRN
Status: DISCONTINUED | OUTPATIENT
Start: 2022-08-20 | End: 2022-08-27

## 2022-08-20 RX ORDER — MAGNESIUM SULFATE HEPTAHYDRATE 40 MG/ML
2 INJECTION, SOLUTION INTRAVENOUS ONCE
Status: COMPLETED | OUTPATIENT
Start: 2022-08-20 | End: 2022-08-20

## 2022-08-20 RX ORDER — ONDANSETRON 2 MG/ML
4 INJECTION INTRAMUSCULAR; INTRAVENOUS EVERY 6 HOURS PRN
Status: DISCONTINUED | OUTPATIENT
Start: 2022-08-20 | End: 2022-08-27

## 2022-08-20 RX ORDER — METHYLPREDNISOLONE SODIUM SUCCINATE 125 MG/2ML
125 INJECTION, POWDER, LYOPHILIZED, FOR SOLUTION INTRAMUSCULAR; INTRAVENOUS ONCE
Status: COMPLETED | OUTPATIENT
Start: 2022-08-20 | End: 2022-08-20

## 2022-08-20 RX ORDER — BISACODYL 10 MG
10 SUPPOSITORY, RECTAL RECTAL
Status: DISCONTINUED | OUTPATIENT
Start: 2022-08-20 | End: 2022-08-27

## 2022-08-20 RX ORDER — ACETAMINOPHEN 650 MG/1
650 SUPPOSITORY RECTAL EVERY 4 HOURS PRN
Status: DISCONTINUED | OUTPATIENT
Start: 2022-08-20 | End: 2022-08-27

## 2022-08-20 RX ORDER — ETOMIDATE 2 MG/ML
20 INJECTION INTRAVENOUS ONCE
Status: COMPLETED | OUTPATIENT
Start: 2022-08-20 | End: 2022-08-20

## 2022-08-20 RX ORDER — ACETAMINOPHEN 325 MG/1
650 TABLET ORAL EVERY 6 HOURS PRN
Status: DISCONTINUED | OUTPATIENT
Start: 2022-08-20 | End: 2022-08-24

## 2022-08-20 RX ORDER — SENNOSIDES 8.8 MG/5ML
10 LIQUID ORAL NIGHTLY PRN
Status: DISCONTINUED | OUTPATIENT
Start: 2022-08-20 | End: 2022-08-20

## 2022-08-20 RX ORDER — SODIUM PHOSPHATE, DIBASIC AND SODIUM PHOSPHATE, MONOBASIC 7; 19 G/133ML; G/133ML
1 ENEMA RECTAL ONCE AS NEEDED
Status: DISCONTINUED | OUTPATIENT
Start: 2022-08-20 | End: 2022-08-27

## 2022-08-20 RX ORDER — HEPARIN SODIUM 5000 [USP'U]/ML
7500 INJECTION, SOLUTION INTRAVENOUS; SUBCUTANEOUS EVERY 12 HOURS SCHEDULED
Status: DISCONTINUED | OUTPATIENT
Start: 2022-08-20 | End: 2022-08-27

## 2022-08-20 RX ORDER — POLYETHYLENE GLYCOL 3350 17 G/17G
17 POWDER, FOR SOLUTION ORAL DAILY PRN
Status: DISCONTINUED | OUTPATIENT
Start: 2022-08-20 | End: 2022-08-23

## 2022-08-20 RX ORDER — ALBUTEROL SULFATE 2.5 MG/3ML
10 SOLUTION RESPIRATORY (INHALATION) ONCE
Status: COMPLETED | OUTPATIENT
Start: 2022-08-20 | End: 2022-08-20

## 2022-08-20 RX ORDER — IPRATROPIUM BROMIDE AND ALBUTEROL SULFATE 2.5; .5 MG/3ML; MG/3ML
3 SOLUTION RESPIRATORY (INHALATION)
Status: DISCONTINUED | OUTPATIENT
Start: 2022-08-21 | End: 2022-08-21

## 2022-08-20 RX ORDER — ATORVASTATIN CALCIUM 20 MG/1
20 TABLET, FILM COATED ORAL NIGHTLY
Status: DISCONTINUED | OUTPATIENT
Start: 2022-08-21 | End: 2022-08-27

## 2022-08-20 RX ORDER — POLYETHYLENE GLYCOL 3350 17 G/17G
17 POWDER, FOR SOLUTION ORAL DAILY PRN
Status: DISCONTINUED | OUTPATIENT
Start: 2022-08-20 | End: 2022-08-27

## 2022-08-20 RX ORDER — IPRATROPIUM BROMIDE AND ALBUTEROL SULFATE 2.5; .5 MG/3ML; MG/3ML
3 SOLUTION RESPIRATORY (INHALATION)
Status: DISCONTINUED | OUTPATIENT
Start: 2022-08-20 | End: 2022-08-26

## 2022-08-20 RX ORDER — CHLORHEXIDINE GLUCONATE 0.12 MG/ML
15 RINSE ORAL
Status: DISCONTINUED | OUTPATIENT
Start: 2022-08-21 | End: 2022-08-24

## 2022-08-20 RX ORDER — METHYLPREDNISOLONE SODIUM SUCCINATE 40 MG/ML
40 INJECTION, POWDER, LYOPHILIZED, FOR SOLUTION INTRAMUSCULAR; INTRAVENOUS EVERY 8 HOURS
Status: DISCONTINUED | OUTPATIENT
Start: 2022-08-20 | End: 2022-08-20

## 2022-08-20 NOTE — ED QUICK NOTES
Pt hallucinating and lethargic at home.   Sats improved with nrb  Placed on bipap   Resp at bedside to adm neb

## 2022-08-21 ENCOUNTER — APPOINTMENT (OUTPATIENT)
Dept: GENERAL RADIOLOGY | Facility: HOSPITAL | Age: 72
DRG: 190 | End: 2022-08-21
Attending: INTERNAL MEDICINE
Payer: MEDICARE

## 2022-08-21 ENCOUNTER — APPOINTMENT (OUTPATIENT)
Dept: CV DIAGNOSTICS | Facility: HOSPITAL | Age: 72
DRG: 190 | End: 2022-08-21
Attending: HOSPITALIST
Payer: MEDICARE

## 2022-08-21 ENCOUNTER — APPOINTMENT (OUTPATIENT)
Dept: CV DIAGNOSTICS | Facility: HOSPITAL | Age: 72
End: 2022-08-21
Attending: HOSPITALIST
Payer: MEDICARE

## 2022-08-21 ENCOUNTER — APPOINTMENT (OUTPATIENT)
Dept: GENERAL RADIOLOGY | Facility: HOSPITAL | Age: 72
End: 2022-08-21
Attending: INTERNAL MEDICINE
Payer: MEDICARE

## 2022-08-21 LAB
ADENOVIRUS PCR:: NOT DETECTED
ANION GAP SERPL CALC-SCNC: 0 MMOL/L (ref 0–18)
B PARAPERT DNA SPEC QL NAA+PROBE: NOT DETECTED
B PERT DNA SPEC QL NAA+PROBE: NOT DETECTED
BASE EXCESS BLD CALC-SCNC: 15.1 MMOL/L (ref ?–2)
BASOPHILS # BLD AUTO: 0.03 X10(3) UL (ref 0–0.2)
BASOPHILS NFR BLD AUTO: 0.3 %
BUN BLD-MCNC: 22 MG/DL (ref 7–18)
BUN/CREAT SERPL: 23.7 (ref 10–20)
C PNEUM DNA SPEC QL NAA+PROBE: NOT DETECTED
CALCIUM BLD-MCNC: 8.6 MG/DL (ref 8.5–10.1)
CHLORIDE SERPL-SCNC: 105 MMOL/L (ref 98–112)
CO2 SERPL-SCNC: 38 MMOL/L (ref 21–32)
CORONAVIRUS 229E PCR:: NOT DETECTED
CORONAVIRUS HKU1 PCR:: NOT DETECTED
CORONAVIRUS NL63 PCR:: NOT DETECTED
CORONAVIRUS OC43 PCR:: NOT DETECTED
CREAT BLD-MCNC: 0.93 MG/DL
DEPRECATED RDW RBC AUTO: 52.7 FL (ref 35.1–46.3)
EOSINOPHIL # BLD AUTO: 0 X10(3) UL (ref 0–0.7)
EOSINOPHIL NFR BLD AUTO: 0 %
ERYTHROCYTE [DISTWIDTH] IN BLOOD BY AUTOMATED COUNT: 13.6 % (ref 11–15)
FLUAV RNA SPEC QL NAA+PROBE: NOT DETECTED
FLUBV RNA SPEC QL NAA+PROBE: NOT DETECTED
GFR SERPLBLD BASED ON 1.73 SQ M-ARVRAT: 88 ML/MIN/1.73M2 (ref 60–?)
GLUCOSE BLD-MCNC: 185 MG/DL (ref 70–99)
GLUCOSE BLDC GLUCOMTR-MCNC: 184 MG/DL (ref 70–99)
HCO3 BLDA-SCNC: 36.6 MEQ/L (ref 21–27)
HCT VFR BLD AUTO: 45.1 %
HGB BLD-MCNC: 12.5 G/DL
IMM GRANULOCYTES # BLD AUTO: 0.07 X10(3) UL (ref 0–1)
IMM GRANULOCYTES NFR BLD: 0.6 %
LYMPHOCYTES # BLD AUTO: 0.77 X10(3) UL (ref 1–4)
LYMPHOCYTES NFR BLD AUTO: 7 %
MCH RBC QN AUTO: 29.1 PG (ref 26–34)
MCHC RBC AUTO-ENTMCNC: 27.7 G/DL (ref 31–37)
MCV RBC AUTO: 105.1 FL
METAPNEUMOVIRUS PCR:: NOT DETECTED
MODIFIED ALLEN TEST: POSITIVE
MONOCYTES # BLD AUTO: 0.24 X10(3) UL (ref 0.1–1)
MONOCYTES NFR BLD AUTO: 2.2 %
MYCOPLASMA PNEUMONIA PCR:: NOT DETECTED
NEUTROPHILS # BLD AUTO: 9.89 X10 (3) UL (ref 1.5–7.7)
NEUTROPHILS # BLD AUTO: 9.89 X10(3) UL (ref 1.5–7.7)
NEUTROPHILS NFR BLD AUTO: 89.9 %
O2 CT BLD-SCNC: 17.4 VOL% (ref 15–23)
O2/TOTAL GAS SETTING VFR VENT: 50 %
OSMOLALITY SERPL CALC.SUM OF ELEC: 304 MOSM/KG (ref 275–295)
PARAINFLUENZA 1 PCR:: NOT DETECTED
PARAINFLUENZA 2 PCR:: NOT DETECTED
PARAINFLUENZA 3 PCR:: NOT DETECTED
PARAINFLUENZA 4 PCR:: NOT DETECTED
PCO2 BLDA: 68 MM HG (ref 35–45)
PEEP SETTING VENT: 8 CM H2O
PH BLDA: 7.41 [PH] (ref 7.35–7.45)
PLATELET # BLD AUTO: 169 10(3)UL (ref 150–450)
PO2 BLDA: 67 MM HG (ref 80–100)
POTASSIUM SERPL-SCNC: 4.8 MMOL/L (ref 3.5–5.1)
PUNCTURE CHARGE: YES
RBC # BLD AUTO: 4.29 X10(6)UL
RESP RATE: 16 BPM
RHINOVIRUS/ENTERO PCR:: NOT DETECTED
RSV RNA SPEC QL NAA+PROBE: NOT DETECTED
SAO2 % BLDA: 95.3 % (ref 94–100)
SARS-COV-2 RNA NPH QL NAA+NON-PROBE: NOT DETECTED
SODIUM SERPL-SCNC: 143 MMOL/L (ref 136–145)
SPECIMEN VOL 24H UR: 500 ML
TRIGL SERPL-MCNC: 103 MG/DL (ref 30–149)
WBC # BLD AUTO: 11 X10(3) UL (ref 4–11)

## 2022-08-21 PROCEDURE — 71045 X-RAY EXAM CHEST 1 VIEW: CPT | Performed by: INTERNAL MEDICINE

## 2022-08-21 PROCEDURE — 0202U NFCT DS 22 TRGT SARS-COV-2: CPT | Performed by: HOSPITALIST

## 2022-08-21 PROCEDURE — 82962 GLUCOSE BLOOD TEST: CPT

## 2022-08-21 PROCEDURE — 93306 TTE W/DOPPLER COMPLETE: CPT | Performed by: HOSPITALIST

## 2022-08-21 PROCEDURE — 94640 AIRWAY INHALATION TREATMENT: CPT

## 2022-08-21 PROCEDURE — 36600 WITHDRAWAL OF ARTERIAL BLOOD: CPT | Performed by: CLINICAL NURSE SPECIALIST

## 2022-08-21 PROCEDURE — 94003 VENT MGMT INPAT SUBQ DAY: CPT

## 2022-08-21 PROCEDURE — 85025 COMPLETE CBC W/AUTO DIFF WBC: CPT | Performed by: HOSPITALIST

## 2022-08-21 PROCEDURE — 82805 BLOOD GASES W/O2 SATURATION: CPT | Performed by: CLINICAL NURSE SPECIALIST

## 2022-08-21 PROCEDURE — C9113 INJ PANTOPRAZOLE SODIUM, VIA: HCPCS | Performed by: INTERNAL MEDICINE

## 2022-08-21 PROCEDURE — 80048 BASIC METABOLIC PNL TOTAL CA: CPT | Performed by: HOSPITALIST

## 2022-08-21 RX ORDER — FUROSEMIDE 10 MG/ML
20 INJECTION INTRAMUSCULAR; INTRAVENOUS ONCE
Status: COMPLETED | OUTPATIENT
Start: 2022-08-21 | End: 2022-08-21

## 2022-08-21 NOTE — ED QUICK NOTES
25 at the lip. Positive color change. Bilateral breath sounds auscultated by MD.     20   550  5  100%.

## 2022-08-21 NOTE — CM/SW NOTE
MDO for Adv Dir. Pt is sedated, intubated in ICU. Unable to execute at this time. Aishwarya [id number] will be followed for decision making, Pt has a spouse. / to remain available for support and/or discharge planning.      Lorie Olmedo RN    Ext [phone]

## 2022-08-21 NOTE — PROGRESS NOTES
Received PT intubated with a (7.5) ETT secured at (25) on vent with the following settings; BS heard bilaterally, SXN provided as needed. Per MD rate of 20 and volume of 550 was changed to rate of 16 and volume of 500. Pt tolerating well. RT to continue to monitor.         08/21/22 0240   Vent Information   Vent Mode VC/AC   Settings   FiO2 (%) 100 %   Resp Rate (Set) 16   Vt (Set, mL) 500 mL   Waveform Decelerating ramp   PEEP/CPAP (cm H2O) 5 cm H20   Insp Flow (L/sec) 60 L/sec   Readings   Total RR 16   Minute Ventilation (L/min) 8.1 L/min   Expiratory Tidal Volume 506 mL   PIP Observed (cm H2O) 24 cm H2O   MAP (cm H2O) 11   I/E Ratio 1:2.4   Plateau Pressure (cm H2O) 20 cm H2O   Static Compliance (L/cm H2O) 33

## 2022-08-21 NOTE — PLAN OF CARE
Patient is sedated & vented with settings below, able to follow commands and intermittently nod/gesture. Propofol infusing. Orellana with bloody output - MD made aware, Heparin on hold until color clears up. Orogastric tube to low-intermittent suction with minimal bile output. Bilateral soft wrist restraints in place for safety. Wife and daughter updated on plan of care.     Vent Mode: VC/AC  FiO2 (%):  [100 %] 100 %  S RR:  [16-20] 16  S VT:  [500 mL-550 mL] 500 mL  PEEP/CPAP (cm H2O):  [5 cm H20] 5 cm H20  MAP (cm H2O):  [11-13] 11      Problem: Safety Risk - Non-Violent Restraints  Goal: Patient will remain free from self-harm  Description: INTERVENTIONS:  - Apply the least restrictive restraint to prevent harm  - Notify patient and family of reasons restraints applied  - Assess for any contributing factors to confusion (electrolyte disturbances, delirium, medications)  - Discontinue any unnecessary medical devices as soon as possible  - Assess the patient's physical comfort, circulation, skin condition, hydration, nutrition and elimination needs   - Reorient and redirection as needed  - Assess for the need to continue restraints  8/21/2022 0545 by Yg Cobos, RN  Outcome: Progressing  8/20/2022 2320 by Yg Cobos, RN  Outcome: Progressing     Problem: RESPIRATORY - ADULT  Goal: Achieves optimal ventilation and oxygenation  Description: INTERVENTIONS:  - Assess for changes in respiratory status  - Assess for changes in mentation and behavior  - Position to facilitate oxygenation and minimize respiratory effort  - Oxygen supplementation based on oxygen saturation or ABGs  - Provide Smoking Cessation handout, if applicable  - Encourage broncho-pulmonary hygiene including cough, deep breathe, Incentive Spirometry  - Assess the need for suctioning and perform as needed  - Assess and instruct to report SOB or any respiratory difficulty  - Respiratory Therapy support as indicated  - Manage/alleviate anxiety  - Monitor for signs/symptoms of CO2 retention  Outcome: Progressing     Problem: NEUROLOGICAL - ADULT  Goal: Achieves stable or improved neurological status  Description: INTERVENTIONS  - Assess for and report changes in neurological status  - Initiate measures to prevent increased intracranial pressure  - Maintain blood pressure and fluid volume within ordered parameters to optimize cerebral perfusion and minimize risk of hemorrhage  - Monitor temperature, glucose, and sodium.  Initiate appropriate interventions as ordered  Outcome: Progressing

## 2022-08-21 NOTE — RESPIRATORY THERAPY NOTE
Pt received in ED on BiPAP settings 20/5/40%. ABG done with critical results reported to MD.    Pt intubated by ED MD.  Pt placed on vent settings VC/20/550/+5/100% ; ETT 7.5 @ 25 cm. 10mg Albuterol treatment given inline ventilator. Suctioned large amounts of white-clear secretions orally. Suctioned small amounts of thick white and frothy-thick pink-tinged secretions endotracheally. ABG done and reported to MD.  No changes made. Pt on current vent settings VC/20/550/+5/100% ; ETT 7.5 @ 25 cm. Pt tolerating and saturating appropriately. RT to continue to monitor.

## 2022-08-22 ENCOUNTER — APPOINTMENT (OUTPATIENT)
Dept: GENERAL RADIOLOGY | Facility: HOSPITAL | Age: 72
End: 2022-08-22
Attending: INTERNAL MEDICINE
Payer: MEDICARE

## 2022-08-22 ENCOUNTER — APPOINTMENT (OUTPATIENT)
Dept: GENERAL RADIOLOGY | Facility: HOSPITAL | Age: 72
DRG: 190 | End: 2022-08-22
Attending: INTERNAL MEDICINE
Payer: MEDICARE

## 2022-08-22 LAB
ANION GAP SERPL CALC-SCNC: 4 MMOL/L (ref 0–18)
BASOPHILS # BLD AUTO: 0.02 X10(3) UL (ref 0–0.2)
BASOPHILS NFR BLD AUTO: 0.1 %
BUN BLD-MCNC: 22 MG/DL (ref 7–18)
BUN/CREAT SERPL: 25.3 (ref 10–20)
CALCIUM BLD-MCNC: 8.9 MG/DL (ref 8.5–10.1)
CHLORIDE SERPL-SCNC: 101 MMOL/L (ref 98–112)
CO2 SERPL-SCNC: 38 MMOL/L (ref 21–32)
CREAT BLD-MCNC: 0.87 MG/DL
DEPRECATED RDW RBC AUTO: 50.9 FL (ref 35.1–46.3)
EOSINOPHIL # BLD AUTO: 0 X10(3) UL (ref 0–0.7)
EOSINOPHIL NFR BLD AUTO: 0 %
ERYTHROCYTE [DISTWIDTH] IN BLOOD BY AUTOMATED COUNT: 13.7 % (ref 11–15)
GFR SERPLBLD BASED ON 1.73 SQ M-ARVRAT: 92 ML/MIN/1.73M2 (ref 60–?)
GLUCOSE BLD-MCNC: 164 MG/DL (ref 70–99)
HCT VFR BLD AUTO: 45.4 %
HGB BLD-MCNC: 13.4 G/DL
IMM GRANULOCYTES # BLD AUTO: 0.07 X10(3) UL (ref 0–1)
IMM GRANULOCYTES NFR BLD: 0.5 %
LYMPHOCYTES # BLD AUTO: 0.88 X10(3) UL (ref 1–4)
LYMPHOCYTES NFR BLD AUTO: 5.8 %
MCH RBC QN AUTO: 29.8 PG (ref 26–34)
MCHC RBC AUTO-ENTMCNC: 29.5 G/DL (ref 31–37)
MCV RBC AUTO: 101.1 FL
MONOCYTES # BLD AUTO: 0.61 X10(3) UL (ref 0.1–1)
MONOCYTES NFR BLD AUTO: 4 %
NEUTROPHILS # BLD AUTO: 13.5 X10 (3) UL (ref 1.5–7.7)
NEUTROPHILS # BLD AUTO: 13.5 X10(3) UL (ref 1.5–7.7)
NEUTROPHILS NFR BLD AUTO: 89.6 %
OSMOLALITY SERPL CALC.SUM OF ELEC: 303 MOSM/KG (ref 275–295)
PLATELET # BLD AUTO: 190 10(3)UL (ref 150–450)
POTASSIUM SERPL-SCNC: 4.3 MMOL/L (ref 3.5–5.1)
RBC # BLD AUTO: 4.49 X10(6)UL
SODIUM SERPL-SCNC: 143 MMOL/L (ref 136–145)
WBC # BLD AUTO: 15.1 X10(3) UL (ref 4–11)

## 2022-08-22 PROCEDURE — 71045 X-RAY EXAM CHEST 1 VIEW: CPT | Performed by: INTERNAL MEDICINE

## 2022-08-22 PROCEDURE — 80048 BASIC METABOLIC PNL TOTAL CA: CPT | Performed by: INTERNAL MEDICINE

## 2022-08-22 PROCEDURE — 85025 COMPLETE CBC W/AUTO DIFF WBC: CPT | Performed by: INTERNAL MEDICINE

## 2022-08-22 PROCEDURE — C9113 INJ PANTOPRAZOLE SODIUM, VIA: HCPCS | Performed by: INTERNAL MEDICINE

## 2022-08-22 PROCEDURE — 94640 AIRWAY INHALATION TREATMENT: CPT

## 2022-08-22 PROCEDURE — 94003 VENT MGMT INPAT SUBQ DAY: CPT

## 2022-08-22 NOTE — PROGRESS NOTES
08/22/22 0729   Respiratory Therapy / Neb Tx   $ Initial Tx & Set up Luis Has   MD Order DuoNeb   Pre-Treatment Pulse 81   Pre-Treatment Respirations 20   Pre-Treatment O2 Saturation 90 %   Solution Normal saline   Flow rate 8   Delivery device In line with ventilator   Duration (minutes) 10 min   Respiratory Pattern Tachypneic   Breath Sounds Pre-Treatment Bilateral Diminished   Breath Sounds Post-Treatment Bilateral Diminished   Post-Treatment Pulse 82   Post-Treatment Respirations 20   Post-Treatment O2 Saturation 93 %   Delivery Source Oxygen   Cough Productive   Sputum Amount Scant   Sputum Color Clear   Sputum Consistency Thin   Sputum How Obtained Endotracheal   Position Semi fowlers   Treatment Tolerance Well   Time of treatment 0729   RT Bronchodilator Protocol   Observations Sedated;Unresponsive   Is Patient on Hospice?  No   Pulse 83   Resp 20   SpO2 94 %   FiO2 (%) 55 %   Pulmonary History 4  (Severe COPD exacerbation)   Breath Sounds 2  (Bilaterally diminished)   Subjective Response 4  (Intubated for respiratory failure but comfortable on vent)   Respiratory Pattern 1  (Regular respiratory rate)   Cough 0  (Strong non-productive cough with gag reflex)   Total Points 11

## 2022-08-22 NOTE — PROGRESS NOTES
Received PT intubated with a (7.5) ETT secured at (25) on vent with the following settings;  BS heard bilaterally, SXN provided as needed. Titrated FiO2 to 60%. RT to continue to monitor.         08/22/22 0517   Vent Information   Vent Mode VC/AC   Settings   FiO2 (%) 60 %   Resp Rate (Set) 16   Vt (Set, mL) 450 mL   Waveform Decelerating ramp   PEEP/CPAP (cm H2O) 8 cm H20   Peak Flow LPM 60   Trigger Sensitivity Flow (L/min) 3 L/min   Humidification Heat and moisture exchanger   Readings   Total RR 23   Minute Ventilation (L/min) 10.7 L/min   Expiratory Tidal Volume 433 mL   PIP Observed (cm H2O) 37 cm H2O   MAP (cm H2O) 16   I/E Ratio 1:2.0   Plateau Pressure (cm H2O) 25 cm H2O   Static Compliance (L/cm H2O) 25

## 2022-08-22 NOTE — PROGRESS NOTES
Patient has size 7.5 ETT secured 25 cm at the lips. Patient's HME has been changed. Ventilator circuit is clean intact and secure. Patient was received with the following ventilator documentation. 08/22/22 0710   Vent Information   $ Vent Care / Non-Invasive Subsequent Day Yes   Ventilator Initiation 08/20/22   Ventilation Day(s) 2   Interface Invasive   Vent Type    Vent plugged into main power? Yes   Vent ID [id number]   Vent Mode VC/AC   Settings   FiO2 (%) 55 %   Resp Rate (Set) 16   Vt (Set, mL) 450 mL   Waveform Decelerating ramp   PEEP/CPAP (cm H2O) 8 cm H20   Peak Flow LPM 60   Trigger Sensitivity Flow (L/min) 3 L/min   Humidification Heat and moisture exchanger   Readings   Total RR 21   Minute Ventilation (L/min) 9.3 L/min   Expiratory Tidal Volume 462 mL   PIP Observed (cm H2O) 35 cm H2O   MAP (cm H2O) 16   Auto PEEP Observed (cm H2O) 0 cm H2O   I/E Ratio 1:3.3   Plateau Pressure (cm H2O) 24 cm H2O   Static Compliance (L/cm H2O) 35   Dynamic Compliance (L/cm H2O) 31 L/cm H2O   Alarms   High RR 40   Insp Pressure High (cm H2O) 50 cm H2O   MV High (L/min) 20 L/min   MV Low (L/min) 3 L/min   Apnea Interval (sec) 20 seconds   Apnea Rate 16   Apnea Volume (mL) 450 mL   ETT   Placement Date/Time: 08/20/22 2020   Airway Size: 7.5 mm  Cuffed: Cuffed  Insertion attempts: 1  Technique: Video laryngoscopy  Placement Verification: Auscultation;Colorimetric EtCO2 device  Placed By: ED physician   Secured at (cm) 25 cm   Suctioned?  Y   Measured From Lips   Secured Location Left   Secured by Commercial tube corado   Site Condition Atraumatic   Site rotated date 08/22/22   Site rotated (time) 7446   Req'd equipment at bedside Bag mask

## 2022-08-22 NOTE — PLAN OF CARE
Patient on vent AC, sedated on propofol gtt, responsive to verbal stimuli and nods appropriately. No spontaneous breathing trial attempted today per Dr Katrina Dangelo. Kaushik SWR to prevent IV therapy removal. NSR on tele-monitor. OGT to LIS with bile, brown output. [place] cath to gravity, patent. Skin intact, mepilex to sacrum. Aspiration precautions, fall precautions maintained. Family updated in plan of care. Problem: Safety Risk - Non-Violent Restraints  Goal: Patient will remain free from self-harm  Description: INTERVENTIONS:  - Apply the least restrictive restraint to prevent harm  - Notify patient and family of reasons restraints applied  - Assess for any contributing factors to confusion (electrolyte disturbances, delirium, medications)  - Discontinue any unnecessary medical devices as soon as possible  - Assess the patient's physical comfort, circulation, skin condition, hydration, nutrition and elimination needs   - Reorient and redirection as needed  - Assess for the need to continue restraints  Outcome: Progressing     Problem: Patient Centered Care  Goal: Patient preferences are identified and integrated in the patient's plan of care  Description: Interventions:  - What would you like us to know as we care for you?  Wife likes to be updated on plan of care  - Provide timely, complete, and accurate information to patient/family  - Incorporate patient and family knowledge, values, beliefs, and cultural backgrounds into the planning and delivery of care  - Encourage patient/family to participate in care and decision-making at the level they choose  - Honor patient and family perspectives and choices  Outcome: Progressing     Problem: RESPIRATORY - ADULT  Goal: Achieves optimal ventilation and oxygenation  Description: INTERVENTIONS:  - Assess for changes in respiratory status  - Assess for changes in mentation and behavior  - Position to facilitate oxygenation and minimize respiratory effort  - Oxygen supplementation based on oxygen saturation or ABGs  - Provide Smoking Cessation handout, if applicable  - Encourage broncho-pulmonary hygiene including cough, deep breathe, Incentive Spirometry  - Assess the need for suctioning and perform as needed  - Assess and instruct to report SOB or any respiratory difficulty  - Respiratory Therapy support as indicated  - Manage/alleviate anxiety  - Monitor for signs/symptoms of CO2 retention  Outcome: Progressing     Problem: NEUROLOGICAL - ADULT  Goal: Achieves stable or improved neurological status  Description: INTERVENTIONS  - Assess for and report changes in neurological status  - Initiate measures to prevent increased intracranial pressure  - Maintain blood pressure and fluid volume within ordered parameters to optimize cerebral perfusion and minimize risk of hemorrhage  - Monitor temperature, glucose, and sodium.  Initiate appropriate interventions as ordered  Outcome: Progressing     Problem: PAIN - ADULT  Goal: Verbalizes/displays adequate comfort level or patient's stated pain goal  Description: INTERVENTIONS:  - Encourage pt to monitor pain and request assistance  - Assess pain using appropriate pain scale  - Administer analgesics based on type and severity of pain and evaluate response  - Implement non-pharmacological measures as appropriate and evaluate response  - Consider cultural and social influences on pain and pain management  - Manage/alleviate anxiety  - Utilize distraction and/or relaxation techniques  - Monitor for opioid side effects  - Notify MD/LIP if interventions unsuccessful or patient reports new pain  - Anticipate increased pain with activity and pre-medicate as appropriate  Outcome: Progressing     Problem: RISK FOR INFECTION - ADULT  Goal: Absence of fever/infection during anticipated neutropenic period  Description: INTERVENTIONS  - Monitor WBC  - Administer growth factors as ordered  - Implement neutropenic guidelines  Outcome: Progressing Problem: SAFETY ADULT - FALL  Goal: Free from fall injury  Description: INTERVENTIONS:  - Assess pt frequently for physical needs  - Identify cognitive and physical deficits and behaviors that affect risk of falls.   - [place] fall precautions as indicated by assessment.  - Educate pt/family on patient safety including physical limitations  - Instruct pt to call for assistance with activity based on assessment  - Modify environment to reduce risk of injury  - Provide assistive devices as appropriate  - Consider OT/PT consult to assist with strengthening/mobility  - Encourage toileting schedule  Outcome: Progressing     Problem: DISCHARGE PLANNING  Goal: Discharge to home or other facility with appropriate resources  Description: INTERVENTIONS:  - Identify barriers to discharge w/pt and caregiver  - Include patient/family/discharge partner in discharge planning  - Arrange for needed discharge resources and transportation as appropriate  - Identify discharge learning needs (meds, wound care, etc)  - Arrange for interpreters to assist at discharge as needed  - Consider post-discharge preferences of patient/family/discharge partner  - Complete POLST form as appropriate  - Assess patient's ability to be responsible for managing their own health  - Refer to Case Management Department for coordinating discharge planning if the patient needs post-hospital services based on physician/LIP order or complex needs related to functional status, cognitive ability or social support system  Outcome: Progressing

## 2022-08-22 NOTE — PLAN OF CARE
Patient remains resting in bed, intubated and sedated. Propofol continued. Bilateral wrist restraints continued for patient safety. ROM performed every 2 hours. Problem: Safety Risk - Non-Violent Restraints  Goal: Patient will remain free from self-harm  Description: INTERVENTIONS:  - Apply the least restrictive restraint to prevent harm  - Notify patient and family of reasons restraints applied  - Assess for any contributing factors to confusion (electrolyte disturbances, delirium, medications)  - Discontinue any unnecessary medical devices as soon as possible  - Assess the patient's physical comfort, circulation, skin condition, hydration, nutrition and elimination needs   - Reorient and redirection as needed  - Assess for the need to continue restraints  8/22/2022 0657 by Monse Blackwell RN  Outcome: Progressing  8/21/2022 2042 by Monse Blackwell RN  Outcome: Progressing     Problem: Patient Centered Care  Goal: Patient preferences are identified and integrated in the patient's plan of care  Description: Interventions:  - What would you like us to know as we care for you?  Wife likes to be updated on plan of care  - Provide timely, complete, and accurate information to patient/family  - Incorporate patient and family knowledge, values, beliefs, and cultural backgrounds into the planning and delivery of care  - Encourage patient/family to participate in care and decision-making at the level they choose  - Honor patient and family perspectives and choices  Outcome: Progressing     Problem: RESPIRATORY - ADULT  Goal: Achieves optimal ventilation and oxygenation  Description: INTERVENTIONS:  - Assess for changes in respiratory status  - Assess for changes in mentation and behavior  - Position to facilitate oxygenation and minimize respiratory effort  - Oxygen supplementation based on oxygen saturation or ABGs  - Provide Smoking Cessation handout, if applicable  - Encourage broncho-pulmonary hygiene including cough, deep breathe, Incentive Spirometry  - Assess the need for suctioning and perform as needed  - Assess and instruct to report SOB or any respiratory difficulty  - Respiratory Therapy support as indicated  - Manage/alleviate anxiety  - Monitor for signs/symptoms of CO2 retention  Outcome: Progressing     Problem: NEUROLOGICAL - ADULT  Goal: Achieves stable or improved neurological status  Description: INTERVENTIONS  - Assess for and report changes in neurological status  - Initiate measures to prevent increased intracranial pressure  - Maintain blood pressure and fluid volume within ordered parameters to optimize cerebral perfusion and minimize risk of hemorrhage  - Monitor temperature, glucose, and sodium.  Initiate appropriate interventions as ordered  Outcome: Progressing

## 2022-08-22 NOTE — PROGRESS NOTES
Patient was seen and assessed. Patient's pulse remained between . Respiratory rate remained between 12-20, and SpO2 remained above 90%. Breath sounds were bilaterally diminished. Patient was intubated for respiratory failure due to COPD exacerbation. Aerogen has been placed in the ventilator circuit and DuoNeb will be given Q6. No SBTs or ABGs will be performed today per Doctor Shannen Araiza. Patient's family is at bedside and have been informed of patient status. Respiratory will continue to monitor and wean FiO2 as tolerated. 08/22/22 0710   Respiratory Assessment   Assessment Type Assess only   Respiratory Pattern Regular   Chest Assessment Chest expansion symmetrical   Cough Productive   Sputum Amount Scant   Sputum Color Clear   Sputum Consistency Thin   Sputum How Obtained Endotracheal   Bilateral Breath Sounds Diminished   R Breath Sounds Diminished   L Breath Sounds Diminished   Airways   Airway LDA ETT   ETT   Placement Date/Time: 08/20/22 2020   Airway Size: 7.5 mm  Cuffed: Cuffed  Insertion attempts: 1  Technique: Video laryngoscopy  Placement Verification: Auscultation;Colorimetric EtCO2 device  Placed By: ED physician   Secured at (cm) 25 cm   Suctioned?  Y   Measured From Lips   Secured Location Left   Secured by Commercial tube corado   Site Condition Atraumatic   Site rotated date 08/22/22   Site rotated (time) 0719   Req'd equipment at bedside Bag mask   Additional Assessments   Pulse 86   Resp 18   SpO2 95 %   Position [employer]   Oral Care Mouth suctioned   $ RT Standby Charge (per 15 min) 1   $ RT Assessment Yes

## 2022-08-22 NOTE — CM/SW NOTE
Received MD order for \"family wants him to go to a rehab facility\" , [address] and Advanced Directives    Called wife, Patricia Tirado to discuss and she understands that pt still in critical care unit on vent support so will follow, pending progress and make referrals as appropriate, pending progress. [address] discussions ongoing with Dr Fausto Joshi:  Pt is intubated and sedated and unable to discuss. Under [place] Surrogacy [place], wife is decision maker    / to remain available for support and/or discharge planning.      Gwen GOODA MSN, RN CTL/  [id number]

## 2022-08-23 ENCOUNTER — APPOINTMENT (OUTPATIENT)
Dept: GENERAL RADIOLOGY | Facility: HOSPITAL | Age: 72
DRG: 190 | End: 2022-08-23
Attending: INTERNAL MEDICINE
Payer: MEDICARE

## 2022-08-23 ENCOUNTER — APPOINTMENT (OUTPATIENT)
Dept: GENERAL RADIOLOGY | Facility: HOSPITAL | Age: 72
End: 2022-08-23
Attending: INTERNAL MEDICINE
Payer: MEDICARE

## 2022-08-23 LAB
ANION GAP SERPL CALC-SCNC: 4 MMOL/L (ref 0–18)
BASOPHILS # BLD AUTO: 0.03 X10(3) UL (ref 0–0.2)
BASOPHILS NFR BLD AUTO: 0.2 %
BUN BLD-MCNC: 22 MG/DL (ref 7–18)
BUN/CREAT SERPL: 26.8 (ref 10–20)
CALCIUM BLD-MCNC: 8.6 MG/DL (ref 8.5–10.1)
CHLORIDE SERPL-SCNC: 102 MMOL/L (ref 98–112)
CO2 SERPL-SCNC: 36 MMOL/L (ref 21–32)
CREAT BLD-MCNC: 0.82 MG/DL
DEPRECATED RDW RBC AUTO: 50.6 FL (ref 35.1–46.3)
EOSINOPHIL # BLD AUTO: 0 X10(3) UL (ref 0–0.7)
EOSINOPHIL NFR BLD AUTO: 0 %
ERYTHROCYTE [DISTWIDTH] IN BLOOD BY AUTOMATED COUNT: 14 % (ref 11–15)
GFR SERPLBLD BASED ON 1.73 SQ M-ARVRAT: 94 ML/MIN/1.73M2 (ref 60–?)
GLUCOSE BLD-MCNC: 158 MG/DL (ref 70–99)
HCT VFR BLD AUTO: 45.8 %
HGB BLD-MCNC: 13.8 G/DL
IMM GRANULOCYTES # BLD AUTO: 0.15 X10(3) UL (ref 0–1)
IMM GRANULOCYTES NFR BLD: 1 %
LYMPHOCYTES # BLD AUTO: 0.93 X10(3) UL (ref 1–4)
LYMPHOCYTES NFR BLD AUTO: 6.5 %
MCH RBC QN AUTO: 29.6 PG (ref 26–34)
MCHC RBC AUTO-ENTMCNC: 30.1 G/DL (ref 31–37)
MCV RBC AUTO: 98.3 FL
MONOCYTES # BLD AUTO: 0.96 X10(3) UL (ref 0.1–1)
MONOCYTES NFR BLD AUTO: 6.7 %
NEUTROPHILS # BLD AUTO: 12.3 X10 (3) UL (ref 1.5–7.7)
NEUTROPHILS # BLD AUTO: 12.3 X10(3) UL (ref 1.5–7.7)
NEUTROPHILS NFR BLD AUTO: 85.6 %
OSMOLALITY SERPL CALC.SUM OF ELEC: 301 MOSM/KG (ref 275–295)
PLATELET # BLD AUTO: 189 10(3)UL (ref 150–450)
POTASSIUM SERPL-SCNC: 4.2 MMOL/L (ref 3.5–5.1)
RBC # BLD AUTO: 4.66 X10(6)UL
SODIUM SERPL-SCNC: 142 MMOL/L (ref 136–145)
WBC # BLD AUTO: 14.4 X10(3) UL (ref 4–11)

## 2022-08-23 PROCEDURE — C9113 INJ PANTOPRAZOLE SODIUM, VIA: HCPCS | Performed by: INTERNAL MEDICINE

## 2022-08-23 PROCEDURE — 94640 AIRWAY INHALATION TREATMENT: CPT

## 2022-08-23 PROCEDURE — 94003 VENT MGMT INPAT SUBQ DAY: CPT

## 2022-08-23 PROCEDURE — 85025 COMPLETE CBC W/AUTO DIFF WBC: CPT | Performed by: INTERNAL MEDICINE

## 2022-08-23 PROCEDURE — 94150 VITAL CAPACITY TEST: CPT

## 2022-08-23 PROCEDURE — 94660 CPAP INITIATION&MGMT: CPT

## 2022-08-23 PROCEDURE — 71045 X-RAY EXAM CHEST 1 VIEW: CPT | Performed by: INTERNAL MEDICINE

## 2022-08-23 PROCEDURE — 80048 BASIC METABOLIC PNL TOTAL CA: CPT | Performed by: INTERNAL MEDICINE

## 2022-08-23 RX ORDER — IPRATROPIUM BROMIDE AND ALBUTEROL SULFATE 2.5; .5 MG/3ML; MG/3ML
SOLUTION RESPIRATORY (INHALATION)
Status: COMPLETED
Start: 2022-08-23 | End: 2022-08-23

## 2022-08-23 RX ORDER — FUROSEMIDE 10 MG/ML
40 INJECTION INTRAMUSCULAR; INTRAVENOUS ONCE
Status: COMPLETED | OUTPATIENT
Start: 2022-08-23 | End: 2022-08-23

## 2022-08-23 NOTE — PLAN OF CARE
Pt extubated today, alert and oriented. On 4L O2, baseline. Heart rate is ST at times. Passed swallow, advanced diet. Able to dangle for a few hours. Pt comfortable with frequent nurse rounding. Problem: Patient Centered Care  Goal: Patient preferences are identified and integrated in the patient's plan of care  Description: Interventions:  - What would you like us to know as we care for you?  Wife likes to be updated on plan of care  - Provide timely, complete, and accurate information to patient/family  - Incorporate patient and family knowledge, values, beliefs, and cultural backgrounds into the planning and delivery of care  - Encourage patient/family to participate in care and decision-making at the level they choose  - Honor patient and family perspectives and choices  Outcome: Progressing     Problem: RESPIRATORY - ADULT  Goal: Achieves optimal ventilation and oxygenation  Description: INTERVENTIONS:  - Assess for changes in respiratory status  - Assess for changes in mentation and behavior  - Position to facilitate oxygenation and minimize respiratory effort  - Oxygen supplementation based on oxygen saturation or ABGs  - Provide Smoking Cessation handout, if applicable  - Encourage broncho-pulmonary hygiene including cough, deep breathe, Incentive Spirometry  - Assess the need for suctioning and perform as needed  - Assess and instruct to report SOB or any respiratory difficulty  - Respiratory Therapy support as indicated  - Manage/alleviate anxiety  - Monitor for signs/symptoms of CO2 retention  Outcome: Progressing     Problem: NEUROLOGICAL - ADULT  Goal: Achieves stable or improved neurological status  Description: INTERVENTIONS  - Assess for and report changes in neurological status  - Initiate measures to prevent increased intracranial pressure  - Maintain blood pressure and fluid volume within ordered parameters to optimize cerebral perfusion and minimize risk of hemorrhage  - Monitor temperature, glucose, and sodium. Initiate appropriate interventions as ordered  Outcome: Progressing     Problem: PAIN - ADULT  Goal: Verbalizes/displays adequate comfort level or patient's stated pain goal  Description: INTERVENTIONS:  - Encourage pt to monitor pain and request assistance  - Assess pain using appropriate pain scale  - Administer analgesics based on type and severity of pain and evaluate response  - Implement non-pharmacological measures as appropriate and evaluate response  - Consider cultural and social influences on pain and pain management  - Manage/alleviate anxiety  - Utilize distraction and/or relaxation techniques  - Monitor for opioid side effects  - Notify MD/LIP if interventions unsuccessful or patient reports new pain  - Anticipate increased pain with activity and pre-medicate as appropriate  Outcome: Progressing     Problem: RISK FOR INFECTION - ADULT  Goal: Absence of fever/infection during anticipated neutropenic period  Description: INTERVENTIONS  - Monitor WBC  - Administer growth factors as ordered  - Implement neutropenic guidelines  Outcome: Progressing     Problem: SAFETY ADULT - FALL  Goal: Free from fall injury  Description: INTERVENTIONS:  - Assess pt frequently for physical needs  - Identify cognitive and physical deficits and behaviors that affect risk of falls.   - [place] fall precautions as indicated by assessment.  - Educate pt/family on patient safety including physical limitations  - Instruct pt to call for assistance with activity based on assessment  - Modify environment to reduce risk of injury  - Provide assistive devices as appropriate  - Consider OT/PT consult to assist with strengthening/mobility  - Encourage toileting schedule  Outcome: Progressing     Problem: DISCHARGE PLANNING  Goal: Discharge to home or other facility with appropriate resources  Description: INTERVENTIONS:  - Identify barriers to discharge w/pt and caregiver  - Include patient/family/discharge partner in discharge planning  - Arrange for needed discharge resources and transportation as appropriate  - Identify discharge learning needs (meds, wound care, etc)  - Arrange for interpreters to assist at discharge as needed  - Consider post-discharge preferences of patient/family/discharge partner  - Complete POLST form as appropriate  - Assess patient's ability to be responsible for managing their own health  - Refer to Case Management Department for coordinating discharge planning if the patient needs post-hospital services based on physician/LIP order or complex needs related to functional status, cognitive ability or social support system  Outcome: Progressing

## 2022-08-23 NOTE — PLAN OF CARE
Problem: Safety Risk - Non-Violent Restraints  Goal: Patient will remain free from self-harm  Description: INTERVENTIONS:  - Apply the least restrictive restraint to prevent harm  - Notify patient and family of reasons restraints applied  - Assess for any contributing factors to confusion (electrolyte disturbances, delirium, medications)  - Discontinue any unnecessary medical devices as soon as possible  - Assess the patient's physical comfort, circulation, skin condition, hydration, nutrition and elimination needs   - Reorient and redirection as needed  - Assess for the need to continue restraints  Outcome: Progressing  Note: Pt on vent. When sedation lightened pt awakens easily and tries to move arms/hands up to face.   Restraints necessary for ETT safety

## 2022-08-23 NOTE — PLAN OF CARE
Problem: Safety Risk - Non-Violent Restraints  Goal: Patient will remain free from self-harm  Description: INTERVENTIONS:  - Apply the least restrictive restraint to prevent harm  - Notify patient and family of reasons restraints applied  - Assess for any contributing factors to confusion (electrolyte disturbances, delirium, medications)  - Discontinue any unnecessary medical devices as soon as possible  - Assess the patient's physical comfort, circulation, skin condition, hydration, nutrition and elimination needs   - Reorient and redirection as needed  - Assess for the need to continue restraints  8/23/2022 0324 by Marco A Gomez RN  Outcome: Progressing  Note:   Pt on vent. When sedation lightened pt awakens easily and tries to move arms/hands up to face. Restraints necessary for ETT safety  8/22/2022 2221 by Marco A Gomez RN  Outcome: Progressing  Note: Pt on vent. When sedation lightened pt awakens easily and tries to move arms/hands up to face.   Restraints necessary for ETT safety     Problem: RESPIRATORY - ADULT  Goal: Achieves optimal ventilation and oxygenation  Description: INTERVENTIONS:  - Assess for changes in respiratory status  - Assess for changes in mentation and behavior  - Position to facilitate oxygenation and minimize respiratory effort  - Oxygen supplementation based on oxygen saturation or ABGs  - Provide Smoking Cessation handout, if applicable  - Encourage broncho-pulmonary hygiene including cough, deep breathe, Incentive Spirometry  - Assess the need for suctioning and perform as needed  - Assess and instruct to report SOB or any respiratory difficulty  - Respiratory Therapy support as indicated  - Manage/alleviate anxiety  - Monitor for signs/symptoms of CO2 retention  Outcome: Progressing  Note: Pt sating 90-93% on 40% on vent      Problem: NEUROLOGICAL - ADULT  Goal: Achieves stable or improved neurological status  Description: INTERVENTIONS  - Assess for and report changes in neurological status  - Initiate measures to prevent increased intracranial pressure  - Maintain blood pressure and fluid volume within ordered parameters to optimize cerebral perfusion and minimize risk of hemorrhage  - Monitor temperature, glucose, and sodium. Initiate appropriate interventions as ordered  Outcome: Progressing  Note: Pt awakens when sedation lightened and follows simple commands     Problem: PAIN - ADULT  Goal: Verbalizes/displays adequate comfort level or patient's stated pain goal  Description: INTERVENTIONS:  - Encourage pt to monitor pain and request assistance  - Assess pain using appropriate pain scale  - Administer analgesics based on type and severity of pain and evaluate response  - Implement non-pharmacological measures as appropriate and evaluate response  - Consider cultural and social influences on pain and pain management  - Manage/alleviate anxiety  - Utilize distraction and/or relaxation techniques  - Monitor for opioid side effects  - Notify MD/LIP if interventions unsuccessful or patient reports new pain  - Anticipate increased pain with activity and pre-medicate as appropriate  Outcome: Progressing     Problem: RISK FOR INFECTION - ADULT  Goal: Absence of fever/infection during anticipated neutropenic period  Description: INTERVENTIONS  - Monitor WBC  - Administer growth factors as ordered  - Implement neutropenic guidelines  Outcome: Progressing     Problem: SAFETY ADULT - FALL  Goal: Free from fall injury  Description: INTERVENTIONS:  - Assess pt frequently for physical needs  - Identify cognitive and physical deficits and behaviors that affect risk of falls.   - [place] fall precautions as indicated by assessment.  - Educate pt/family on patient safety including physical limitations  - Instruct pt to call for assistance with activity based on assessment  - Modify environment to reduce risk of injury  - Provide assistive devices as appropriate  - Consider OT/PT consult to assist with strengthening/mobility  - Encourage toileting schedule  Outcome: Progressing     Problem: DISCHARGE PLANNING  Goal: Discharge to home or other facility with appropriate resources  Description: INTERVENTIONS:  - Identify barriers to discharge w/pt and caregiver  - Include patient/family/discharge partner in discharge planning  - Arrange for needed discharge resources and transportation as appropriate  - Identify discharge learning needs (meds, wound care, etc)  - Arrange for interpreters to assist at discharge as needed  - Consider post-discharge preferences of patient/family/discharge partner  - Complete POLST form as appropriate  - Assess patient's ability to be responsible for managing their own health  - Refer to Case Management Department for coordinating discharge planning if the patient needs post-hospital services based on physician/LIP order or complex needs related to functional status, cognitive ability or social support system  Outcome: Progressing

## 2022-08-23 NOTE — PLAN OF CARE
Problem: Safety Risk - Non-Violent Restraints  Goal: Patient will remain free from self-harm  Description: INTERVENTIONS:  - Apply the least restrictive restraint to prevent harm  - Notify patient and family of reasons restraints applied  - Assess for any contributing factors to confusion (electrolyte disturbances, delirium, medications)  - Discontinue any unnecessary medical devices as soon as possible  - Assess the patient's physical comfort, circulation, skin condition, hydration, nutrition and elimination needs   - Reorient and redirection as needed  - Assess for the need to continue restraints  8/23/2022 0324 by Nicole Zavala RN  Outcome: Progressing  Note:   Pt on vent. When sedation lightened pt awakens easily and tries to move arms/hands up to face.   Restraints necessary for ETT safety

## 2022-08-23 NOTE — PLAN OF CARE
Problem: Safety Risk - Non-Violent Restraints  Goal: Patient will remain free from self-harm  Description: INTERVENTIONS:  - Apply the least restrictive restraint to prevent harm  - Notify patient and family of reasons restraints applied  - Assess for any contributing factors to confusion (electrolyte disturbances, delirium, medications)  - Discontinue any unnecessary medical devices as soon as possible  - Assess the patient's physical comfort, circulation, skin condition, hydration, nutrition and elimination needs   - Reorient and redirection as needed  - Assess for the need to continue restraints  8/23/2022 0953 by Queta Hernandez, RN  Outcome: Completed  8/23/2022 0844 by Queta Hernandez, RN  Outcome: Progressing

## 2022-08-23 NOTE — PLAN OF CARE
Problem: RESPIRATORY - ADULT  Goal: Achieves optimal ventilation and oxygenation  Description: INTERVENTIONS:  - Assess for changes in respiratory status  - Assess for changes in mentation and behavior  - Position to facilitate oxygenation and minimize respiratory effort  - Oxygen supplementation based on oxygen saturation or ABGs  - Provide Smoking Cessation handout, if applicable  - Encourage broncho-pulmonary hygiene including cough, deep breathe, Incentive Spirometry  - Assess the need for suctioning and perform as needed  - Assess and instruct to report SOB or any respiratory difficulty  - Respiratory Therapy support as indicated  - Manage/alleviate anxiety  - Monitor for signs/symptoms of CO2 retention  Outcome: Progressing   Received intubated pt on the following vent settings:    08/23/22 0517   Vent Information   Vent Mode VC/AC   Settings   FiO2 (%) 40 %   Resp Rate (Set) 16   Vt (Set, mL) 450 mL   Waveform Decelerating ramp   PEEP/CPAP (cm H2O) 5 cm H20   No ventilator changes made overnight. No acute events observed overnight. Suction was provided as needed. BS auscultated bilaterally. Nebs were given as scheduled. Pt appears to be tolerating full support settings well. ETT was re-advanced and secured to proper position overnight. Pt remains intubated w/ a size 7.5 ETT secured at 24cm at the lip. Pt oxygenating & ventilating properly w/ consistent, adequate Vts. RT to continue to monitor.

## 2022-08-24 ENCOUNTER — APPOINTMENT (OUTPATIENT)
Dept: GENERAL RADIOLOGY | Facility: HOSPITAL | Age: 72
DRG: 190 | End: 2022-08-24
Attending: INTERNAL MEDICINE
Payer: MEDICARE

## 2022-08-24 ENCOUNTER — APPOINTMENT (OUTPATIENT)
Dept: GENERAL RADIOLOGY | Facility: HOSPITAL | Age: 72
End: 2022-08-24
Attending: INTERNAL MEDICINE
Payer: MEDICARE

## 2022-08-24 PROCEDURE — 97162 PT EVAL MOD COMPLEX 30 MIN: CPT

## 2022-08-24 PROCEDURE — 97535 SELF CARE MNGMENT TRAINING: CPT

## 2022-08-24 PROCEDURE — 71045 X-RAY EXAM CHEST 1 VIEW: CPT | Performed by: INTERNAL MEDICINE

## 2022-08-24 PROCEDURE — C9113 INJ PANTOPRAZOLE SODIUM, VIA: HCPCS | Performed by: INTERNAL MEDICINE

## 2022-08-24 PROCEDURE — 97166 OT EVAL MOD COMPLEX 45 MIN: CPT

## 2022-08-24 PROCEDURE — 94640 AIRWAY INHALATION TREATMENT: CPT

## 2022-08-24 PROCEDURE — 97530 THERAPEUTIC ACTIVITIES: CPT

## 2022-08-24 RX ORDER — METHYLPREDNISOLONE SODIUM SUCCINATE 40 MG/ML
40 INJECTION, POWDER, LYOPHILIZED, FOR SOLUTION INTRAMUSCULAR; INTRAVENOUS EVERY 12 HOURS
Status: DISCONTINUED | OUTPATIENT
Start: 2022-08-25 | End: 2022-08-25

## 2022-08-24 RX ORDER — FUROSEMIDE 40 MG/1
80 TABLET ORAL EVERY OTHER DAY
Status: DISCONTINUED | OUTPATIENT
Start: 2022-08-24 | End: 2022-08-27

## 2022-08-24 RX ORDER — POTASSIUM CHLORIDE 750 MG/1
10 TABLET, EXTENDED RELEASE ORAL DAILY
Status: DISCONTINUED | OUTPATIENT
Start: 2022-08-24 | End: 2022-08-27

## 2022-08-24 RX ORDER — FUROSEMIDE 40 MG/1
40 TABLET ORAL EVERY OTHER DAY
Status: DISCONTINUED | OUTPATIENT
Start: 2022-08-25 | End: 2022-08-27

## 2022-08-24 NOTE — CM/SW NOTE
Per dc rounds pt is extubated, now on 4L NC. Up to chair, PT eval sts they will rec [facility], note pending. Per RN pt is A&O now. CM spoke with pt re [facility] rec. Pt is agreeable to ref being sent. No prior hx with [facility]. At baseline pt lives at home with spouse, indep w/ ADLs no DME in place. CM requested department  [facility]) to initiate [facility] referral for [facility]. PASRR completed. Jarek emailed list to pts wife at [email or web address] as requested. CM called wife to confirm receipt and obtain choice. Wife requested additional ref be sent to [facility] [place] [id number]. Ref sent. Wife will be available tomorrow after 10am to review and provide choice    8/26 1145  CM spoke with wife. She no longer wants to consider [facility], she wants to dc pt to home and do all services as an outpt. Wife is aware to ask MD for scripts prior to dc. CM sent secure chat msg to MD and RN with above info. CM cancelled [facility] ref in aidin. Plan  home    / to remain available for support and/or discharge planning.      Americo Lynn, RN    Ext [phone]

## 2022-08-24 NOTE — PROGRESS NOTES
Double RN skin check done prior to transfer off Unit. Skin check performed by this RN and Tyree Lindo. Wounds are as follows: None    Will remain available for any further questions or concerns.

## 2022-08-24 NOTE — PLAN OF CARE
Patient comfortable and on baseline oxygen. Patient oriented to room. Patient given IV antibiotics and potassium given. No complaints of pain. Dangles at times. Plan is subacute rehab. Problem: Patient Centered Care  Goal: Patient preferences are identified and integrated in the patient's plan of care  Description: Interventions:  - What would you like us to know as we care for you?  Wife likes to be updated on plan of care  - Provide timely, complete, and accurate information to patient/family  - Incorporate patient and family knowledge, values, beliefs, and cultural backgrounds into the planning and delivery of care  - Encourage patient/family to participate in care and decision-making at the level they choose  - Honor patient and family perspectives and choices  Outcome: Progressing     Problem: RESPIRATORY - ADULT  Goal: Achieves optimal ventilation and oxygenation  Description: INTERVENTIONS:  - Assess for changes in respiratory status  - Assess for changes in mentation and behavior  - Position to facilitate oxygenation and minimize respiratory effort  - Oxygen supplementation based on oxygen saturation or ABGs  - Provide Smoking Cessation handout, if applicable  - Encourage broncho-pulmonary hygiene including cough, deep breathe, Incentive Spirometry  - Assess the need for suctioning and perform as needed  - Assess and instruct to report SOB or any respiratory difficulty  - Respiratory Therapy support as indicated  - Manage/alleviate anxiety  - Monitor for signs/symptoms of CO2 retention  Outcome: Progressing     Problem: NEUROLOGICAL - ADULT  Goal: Achieves stable or improved neurological status  Description: INTERVENTIONS  - Assess for and report changes in neurological status  - Initiate measures to prevent increased intracranial pressure  - Maintain blood pressure and fluid volume within ordered parameters to optimize cerebral perfusion and minimize risk of hemorrhage  - Monitor temperature, glucose, and sodium. Initiate appropriate interventions as ordered  Outcome: Progressing     Problem: PAIN - ADULT  Goal: Verbalizes/displays adequate comfort level or patient's stated pain goal  Description: INTERVENTIONS:  - Encourage pt to monitor pain and request assistance  - Assess pain using appropriate pain scale  - Administer analgesics based on type and severity of pain and evaluate response  - Implement non-pharmacological measures as appropriate and evaluate response  - Consider cultural and social influences on pain and pain management  - Manage/alleviate anxiety  - Utilize distraction and/or relaxation techniques  - Monitor for opioid side effects  - Notify MD/LIP if interventions unsuccessful or patient reports new pain  - Anticipate increased pain with activity and pre-medicate as appropriate  Outcome: Progressing     Problem: RISK FOR INFECTION - ADULT  Goal: Absence of fever/infection during anticipated neutropenic period  Description: INTERVENTIONS  - Monitor WBC  - Administer growth factors as ordered  - Implement neutropenic guidelines  Outcome: Progressing     Problem: SAFETY ADULT - FALL  Goal: Free from fall injury  Description: INTERVENTIONS:  - Assess pt frequently for physical needs  - Identify cognitive and physical deficits and behaviors that affect risk of falls.   - [place] fall precautions as indicated by assessment.  - Educate pt/family on patient safety including physical limitations  - Instruct pt to call for assistance with activity based on assessment  - Modify environment to reduce risk of injury  - Provide assistive devices as appropriate  - Consider OT/PT consult to assist with strengthening/mobility  - Encourage toileting schedule  Outcome: Progressing     Problem: DISCHARGE PLANNING  Goal: Discharge to home or other facility with appropriate resources  Description: INTERVENTIONS:  - Identify barriers to discharge w/pt and caregiver  - Include patient/family/discharge partner in discharge planning  - Arrange for needed discharge resources and transportation as appropriate  - Identify discharge learning needs (meds, wound care, etc)  - Arrange for interpreters to assist at discharge as needed  - Consider post-discharge preferences of patient/family/discharge partner  - Complete POLST form as appropriate  - Assess patient's ability to be responsible for managing their own health  - Refer to Case Management Department for coordinating discharge planning if the patient needs post-hospital services based on physician/LIP order or complex needs related to functional status, cognitive ability or social support system  Outcome: Progressing

## 2022-08-25 ENCOUNTER — APPOINTMENT (OUTPATIENT)
Dept: GENERAL RADIOLOGY | Facility: HOSPITAL | Age: 72
DRG: 190 | End: 2022-08-25
Attending: INTERNAL MEDICINE
Payer: MEDICARE

## 2022-08-25 ENCOUNTER — APPOINTMENT (OUTPATIENT)
Dept: GENERAL RADIOLOGY | Facility: HOSPITAL | Age: 72
End: 2022-08-25
Attending: INTERNAL MEDICINE
Payer: MEDICARE

## 2022-08-25 LAB
ANION GAP SERPL CALC-SCNC: 5 MMOL/L (ref 0–18)
BASOPHILS # BLD: 0 X10(3) UL (ref 0–0.2)
BASOPHILS NFR BLD: 0 %
BUN BLD-MCNC: 32 MG/DL (ref 7–18)
BUN/CREAT SERPL: 36.8 (ref 10–20)
CALCIUM BLD-MCNC: 8.6 MG/DL (ref 8.5–10.1)
CHLORIDE SERPL-SCNC: 100 MMOL/L (ref 98–112)
CO2 SERPL-SCNC: 34 MMOL/L (ref 21–32)
CREAT BLD-MCNC: 0.87 MG/DL
DEPRECATED RDW RBC AUTO: 52.8 FL (ref 35.1–46.3)
EOSINOPHIL # BLD: 0 X10(3) UL (ref 0–0.7)
EOSINOPHIL NFR BLD: 0 %
ERYTHROCYTE [DISTWIDTH] IN BLOOD BY AUTOMATED COUNT: 14.5 % (ref 11–15)
GFR SERPLBLD BASED ON 1.73 SQ M-ARVRAT: 92 ML/MIN/1.73M2 (ref 60–?)
GLUCOSE BLD-MCNC: 170 MG/DL (ref 70–99)
HCT VFR BLD AUTO: 49.6 %
HGB BLD-MCNC: 15 G/DL
LYMPHOCYTES NFR BLD: 1.32 X10(3) UL (ref 1–4)
LYMPHOCYTES NFR BLD: 9 %
MCH RBC QN AUTO: 29.8 PG (ref 26–34)
MCHC RBC AUTO-ENTMCNC: 30.2 G/DL (ref 31–37)
MCV RBC AUTO: 98.4 FL
METAMYELOCYTES # BLD: 0.29 X10(3) UL
METAMYELOCYTES NFR BLD: 2 %
MONOCYTES # BLD: 1.32 X10(3) UL (ref 0.1–1)
MONOCYTES NFR BLD: 9 %
MORPHOLOGY: NORMAL
NEUTROPHILS # BLD AUTO: 11.8 X10 (3) UL (ref 1.5–7.7)
NEUTROPHILS NFR BLD: 80 %
NEUTS HYPERSEG # BLD: 11.76 X10(3) UL (ref 1.5–7.7)
OSMOLALITY SERPL CALC.SUM OF ELEC: 299 MOSM/KG (ref 275–295)
PLATELET # BLD AUTO: 162 10(3)UL (ref 150–450)
PLATELET MORPHOLOGY: NORMAL
POTASSIUM SERPL-SCNC: 4.3 MMOL/L (ref 3.5–5.1)
RBC # BLD AUTO: 5.04 X10(6)UL
SODIUM SERPL-SCNC: 139 MMOL/L (ref 136–145)
TOTAL CELLS COUNTED BLD: 100
WBC # BLD AUTO: 14.7 X10(3) UL (ref 4–11)

## 2022-08-25 PROCEDURE — 94799 UNLISTED PULMONARY SVC/PX: CPT

## 2022-08-25 PROCEDURE — 94640 AIRWAY INHALATION TREATMENT: CPT

## 2022-08-25 PROCEDURE — 94003 VENT MGMT INPAT SUBQ DAY: CPT

## 2022-08-25 PROCEDURE — 85025 COMPLETE CBC W/AUTO DIFF WBC: CPT | Performed by: INTERNAL MEDICINE

## 2022-08-25 PROCEDURE — 80048 BASIC METABOLIC PNL TOTAL CA: CPT | Performed by: INTERNAL MEDICINE

## 2022-08-25 PROCEDURE — 71045 X-RAY EXAM CHEST 1 VIEW: CPT | Performed by: INTERNAL MEDICINE

## 2022-08-25 PROCEDURE — C9113 INJ PANTOPRAZOLE SODIUM, VIA: HCPCS | Performed by: INTERNAL MEDICINE

## 2022-08-25 PROCEDURE — 85027 COMPLETE CBC AUTOMATED: CPT | Performed by: INTERNAL MEDICINE

## 2022-08-25 PROCEDURE — 85007 BL SMEAR W/DIFF WBC COUNT: CPT | Performed by: INTERNAL MEDICINE

## 2022-08-25 RX ORDER — METHYLPREDNISOLONE SODIUM SUCCINATE 40 MG/ML
40 INJECTION, POWDER, LYOPHILIZED, FOR SOLUTION INTRAMUSCULAR; INTRAVENOUS DAILY
Status: DISCONTINUED | OUTPATIENT
Start: 2022-08-25 | End: 2022-08-25

## 2022-08-25 RX ORDER — METHYLPREDNISOLONE SODIUM SUCCINATE 40 MG/ML
40 INJECTION, POWDER, LYOPHILIZED, FOR SOLUTION INTRAMUSCULAR; INTRAVENOUS ONCE
Status: COMPLETED | OUTPATIENT
Start: 2022-08-25 | End: 2022-08-25

## 2022-08-25 RX ORDER — PREDNISONE 20 MG/1
40 TABLET ORAL
Status: DISCONTINUED | OUTPATIENT
Start: 2022-08-26 | End: 2022-08-27

## 2022-08-25 NOTE — PROGRESS NOTES
[facility] Pharmacy Note:  Discontinuation of Stress Ulcer Prophylaxis     Katrina Camarillo is a 71year old patient who was initiated on stress ulcer prophylaxis with pantoprazole (PROTONIX). The patient no longer meets criteria for stress ulcer prophylaxis. It has been discontinued per the pharmacy consult.     Thank you,  Hortencia Belcher, PharmD  8/25/2022 9:52 AM

## 2022-08-25 NOTE — PLAN OF CARE
Problem: Patient Centered Care  Goal: Patient preferences are identified and integrated in the patient's plan of care  Description: Interventions:  - What would you like us to know as we care for you? Wife likes to be updated on plan of care  - Provide timely, complete, and accurate information to patient/family  - Incorporate patient and family knowledge, values, beliefs, and cultural backgrounds into the planning and delivery of care  - Encourage patient/family to participate in care and decision-making at the level they choose  - Honor patient and family perspectives and choices  Outcome: Progressing     Problem: RESPIRATORY - ADULT  Goal: Achieves optimal ventilation and oxygenation  Description: INTERVENTIONS:  - Assess for changes in respiratory status  - Assess for changes in mentation and behavior  - Position to facilitate oxygenation and minimize respiratory effort  - Oxygen supplementation based on oxygen saturation or ABGs  - Provide Smoking Cessation handout, if applicable  - Encourage broncho-pulmonary hygiene including cough, deep breathe, Incentive Spirometry  - Assess the need for suctioning and perform as needed  - Assess and instruct to report SOB or any respiratory difficulty  - Respiratory Therapy support as indicated  - Manage/alleviate anxiety  - Monitor for signs/symptoms of CO2 retention  Outcome: Progressing     Problem: NEUROLOGICAL - ADULT  Goal: Achieves stable or improved neurological status  Description: INTERVENTIONS  - Assess for and report changes in neurological status  - Initiate measures to prevent increased intracranial pressure  - Maintain blood pressure and fluid volume within ordered parameters to optimize cerebral perfusion and minimize risk of hemorrhage  - Monitor temperature, glucose, and sodium.  Initiate appropriate interventions as ordered  Outcome: Progressing     Problem: PAIN - ADULT  Goal: Verbalizes/displays adequate comfort level or patient's stated pain goal  Description: INTERVENTIONS:  - Encourage pt to monitor pain and request assistance  - Assess pain using appropriate pain scale  - Administer analgesics based on type and severity of pain and evaluate response  - Implement non-pharmacological measures as appropriate and evaluate response  - Consider cultural and social influences on pain and pain management  - Manage/alleviate anxiety  - Utilize distraction and/or relaxation techniques  - Monitor for opioid side effects  - Notify MD/LIP if interventions unsuccessful or patient reports new pain  - Anticipate increased pain with activity and pre-medicate as appropriate  Outcome: Progressing     Problem: RISK FOR INFECTION - ADULT  Goal: Absence of fever/infection during anticipated neutropenic period  Description: INTERVENTIONS  - Monitor WBC  - Administer growth factors as ordered  - Implement neutropenic guidelines  Outcome: Progressing     Problem: SAFETY ADULT - FALL  Goal: Free from fall injury  Description: INTERVENTIONS:  - Assess pt frequently for physical needs  - Identify cognitive and physical deficits and behaviors that affect risk of falls.   - [place] fall precautions as indicated by assessment.  - Educate pt/family on patient safety including physical limitations  - Instruct pt to call for assistance with activity based on assessment  - Modify environment to reduce risk of injury  - Provide assistive devices as appropriate  - Consider OT/PT consult to assist with strengthening/mobility  - Encourage toileting schedule  Outcome: Progressing     Problem: DISCHARGE PLANNING  Goal: Discharge to home or other facility with appropriate resources  Description: INTERVENTIONS:  - Identify barriers to discharge w/pt and caregiver  - Include patient/family/discharge partner in discharge planning  - Arrange for needed discharge resources and transportation as appropriate  - Identify discharge learning needs (meds, wound care, etc)  - Arrange for interpreters to assist at discharge as needed  - Consider post-discharge preferences of patient/family/discharge partner  - Complete POLST form as appropriate  - Assess patient's ability to be responsible for managing their own health  - Refer to Case Management Department for coordinating discharge planning if the patient needs post-hospital services based on physician/LIP order or complex needs related to functional status, cognitive ability or social support system  Outcome: Progressing   Patient currently stable at this time. Vitals stable. Denies any pain or discomfort. Fall and safety precautions in place. Bed at lowest position, bed alarm in place. Call light and personal belongings within reach. Frequent nursing rounds completed.

## 2022-08-25 NOTE — PLAN OF CARE
Patient wears BiPap at night and whenever sleeping. Patient showered today. Patient having great urine output. Patient up in chair for meals. Patient going to be on PO prednisone tomorrow. IV antibiotics given. Patient had a bowel movement. Patient standby assist with the walker. Plan is subacute rehab. Problem: Patient Centered Care  Goal: Patient preferences are identified and integrated in the patient's plan of care  Description: Interventions:  - What would you like us to know as we care for you?  Wife likes to be updated on plan of care  - Provide timely, complete, and accurate information to patient/family  - Incorporate patient and family knowledge, values, beliefs, and cultural backgrounds into the planning and delivery of care  - Encourage patient/family to participate in care and decision-making at the level they choose  - Honor patient and family perspectives and choices  Outcome: Progressing     Problem: RESPIRATORY - ADULT  Goal: Achieves optimal ventilation and oxygenation  Description: INTERVENTIONS:  - Assess for changes in respiratory status  - Assess for changes in mentation and behavior  - Position to facilitate oxygenation and minimize respiratory effort  - Oxygen supplementation based on oxygen saturation or ABGs  - Provide Smoking Cessation handout, if applicable  - Encourage broncho-pulmonary hygiene including cough, deep breathe, Incentive Spirometry  - Assess the need for suctioning and perform as needed  - Assess and instruct to report SOB or any respiratory difficulty  - Respiratory Therapy support as indicated  - Manage/alleviate anxiety  - Monitor for signs/symptoms of CO2 retention  Outcome: Progressing     Problem: NEUROLOGICAL - ADULT  Goal: Achieves stable or improved neurological status  Description: INTERVENTIONS  - Assess for and report changes in neurological status  - Initiate measures to prevent increased intracranial pressure  - Maintain blood pressure and fluid volume within ordered parameters to optimize cerebral perfusion and minimize risk of hemorrhage  - Monitor temperature, glucose, and sodium. Initiate appropriate interventions as ordered  Outcome: Progressing     Problem: PAIN - ADULT  Goal: Verbalizes/displays adequate comfort level or patient's stated pain goal  Description: INTERVENTIONS:  - Encourage pt to monitor pain and request assistance  - Assess pain using appropriate pain scale  - Administer analgesics based on type and severity of pain and evaluate response  - Implement non-pharmacological measures as appropriate and evaluate response  - Consider cultural and social influences on pain and pain management  - Manage/alleviate anxiety  - Utilize distraction and/or relaxation techniques  - Monitor for opioid side effects  - Notify MD/LIP if interventions unsuccessful or patient reports new pain  - Anticipate increased pain with activity and pre-medicate as appropriate  Outcome: Progressing     Problem: RISK FOR INFECTION - ADULT  Goal: Absence of fever/infection during anticipated neutropenic period  Description: INTERVENTIONS  - Monitor WBC  - Administer growth factors as ordered  - Implement neutropenic guidelines  Outcome: Progressing     Problem: SAFETY ADULT - FALL  Goal: Free from fall injury  Description: INTERVENTIONS:  - Assess pt frequently for physical needs  - Identify cognitive and physical deficits and behaviors that affect risk of falls.   - [place] fall precautions as indicated by assessment.  - Educate pt/family on patient safety including physical limitations  - Instruct pt to call for assistance with activity based on assessment  - Modify environment to reduce risk of injury  - Provide assistive devices as appropriate  - Consider OT/PT consult to assist with strengthening/mobility  - Encourage toileting schedule  Outcome: Progressing     Problem: DISCHARGE PLANNING  Goal: Discharge to home or other facility with appropriate resources  Description: INTERVENTIONS:  - Identify barriers to discharge w/pt and caregiver  - Include patient/family/discharge partner in discharge planning  - Arrange for needed discharge resources and transportation as appropriate  - Identify discharge learning needs (meds, wound care, etc)  - Arrange for interpreters to assist at discharge as needed  - Consider post-discharge preferences of patient/family/discharge partner  - Complete POLST form as appropriate  - Assess patient's ability to be responsible for managing their own health  - Refer to Case Management Department for coordinating discharge planning if the patient needs post-hospital services based on physician/LIP order or complex needs related to functional status, cognitive ability or social support system  Outcome: Progressing

## 2022-08-26 LAB
ANION GAP SERPL CALC-SCNC: 2 MMOL/L (ref 0–18)
BASOPHILS # BLD: 0 X10(3) UL (ref 0–0.2)
BASOPHILS NFR BLD: 0 %
BUN BLD-MCNC: 29 MG/DL (ref 7–18)
BUN/CREAT SERPL: 34.1 (ref 10–20)
CALCIUM BLD-MCNC: 8.5 MG/DL (ref 8.5–10.1)
CHLORIDE SERPL-SCNC: 102 MMOL/L (ref 98–112)
CO2 SERPL-SCNC: 35 MMOL/L (ref 21–32)
CREAT BLD-MCNC: 0.85 MG/DL
DEPRECATED RDW RBC AUTO: 52.1 FL (ref 35.1–46.3)
EOSINOPHIL # BLD: 0 X10(3) UL (ref 0–0.7)
EOSINOPHIL NFR BLD: 0 %
ERYTHROCYTE [DISTWIDTH] IN BLOOD BY AUTOMATED COUNT: 14.4 % (ref 11–15)
GFR SERPLBLD BASED ON 1.73 SQ M-ARVRAT: 93 ML/MIN/1.73M2 (ref 60–?)
GLUCOSE BLD-MCNC: 145 MG/DL (ref 70–99)
HCT VFR BLD AUTO: 48 %
HGB BLD-MCNC: 14.5 G/DL
LYMPHOCYTES NFR BLD: 18 %
LYMPHOCYTES NFR BLD: 2.47 X10(3) UL (ref 1–4)
MCH RBC QN AUTO: 30.1 PG (ref 26–34)
MCHC RBC AUTO-ENTMCNC: 30.2 G/DL (ref 31–37)
MCV RBC AUTO: 99.6 FL
MONOCYTES # BLD: 0.69 X10(3) UL (ref 0.1–1)
MONOCYTES NFR BLD: 5 %
MORPHOLOGY: NORMAL
NEUTROPHILS # BLD AUTO: 9.45 X10 (3) UL (ref 1.5–7.7)
NEUTROPHILS NFR BLD: 76 %
NEUTS BAND NFR BLD: 1 %
NEUTS HYPERSEG # BLD: 10.55 X10(3) UL (ref 1.5–7.7)
OSMOLALITY SERPL CALC.SUM OF ELEC: 296 MOSM/KG (ref 275–295)
PLATELET # BLD AUTO: 149 10(3)UL (ref 150–450)
PLATELET MORPHOLOGY: NORMAL
POTASSIUM SERPL-SCNC: 3.9 MMOL/L (ref 3.5–5.1)
RBC # BLD AUTO: 4.82 X10(6)UL
SODIUM SERPL-SCNC: 139 MMOL/L (ref 136–145)
TOTAL CELLS COUNTED BLD: 100
WBC # BLD AUTO: 13.7 X10(3) UL (ref 4–11)

## 2022-08-26 PROCEDURE — 94799 UNLISTED PULMONARY SVC/PX: CPT

## 2022-08-26 PROCEDURE — 85025 COMPLETE CBC W/AUTO DIFF WBC: CPT | Performed by: HOSPITALIST

## 2022-08-26 PROCEDURE — 94640 AIRWAY INHALATION TREATMENT: CPT

## 2022-08-26 PROCEDURE — 80048 BASIC METABOLIC PNL TOTAL CA: CPT | Performed by: HOSPITALIST

## 2022-08-26 PROCEDURE — 85027 COMPLETE CBC AUTOMATED: CPT | Performed by: HOSPITALIST

## 2022-08-26 PROCEDURE — 85007 BL SMEAR W/DIFF WBC COUNT: CPT | Performed by: HOSPITALIST

## 2022-08-26 RX ORDER — IPRATROPIUM BROMIDE AND ALBUTEROL SULFATE 2.5; .5 MG/3ML; MG/3ML
3 SOLUTION RESPIRATORY (INHALATION) EVERY 6 HOURS PRN
Status: DISCONTINUED | OUTPATIENT
Start: 2022-08-26 | End: 2022-08-27

## 2022-08-26 RX ORDER — IPRATROPIUM BROMIDE AND ALBUTEROL SULFATE 2.5; .5 MG/3ML; MG/3ML
3 SOLUTION RESPIRATORY (INHALATION)
Status: DISCONTINUED | OUTPATIENT
Start: 2022-08-26 | End: 2022-08-27

## 2022-08-26 NOTE — PLAN OF CARE
Problem: Patient Centered Care  Goal: Patient preferences are identified and integrated in the patient's plan of care  Description: Interventions:  - What would you like us to know as we care for you? Wife likes to be updated on plan of care  - Provide timely, complete, and accurate information to patient/family  - Incorporate patient and family knowledge, values, beliefs, and cultural backgrounds into the planning and delivery of care  - Encourage patient/family to participate in care and decision-making at the level they choose  - Honor patient and family perspectives and choices  Outcome: Progressing     Problem: RESPIRATORY - ADULT  Goal: Achieves optimal ventilation and oxygenation  Description: INTERVENTIONS:  - Assess for changes in respiratory status  - Assess for changes in mentation and behavior  - Position to facilitate oxygenation and minimize respiratory effort  - Oxygen supplementation based on oxygen saturation or ABGs  - Provide Smoking Cessation handout, if applicable  - Encourage broncho-pulmonary hygiene including cough, deep breathe, Incentive Spirometry  - Assess the need for suctioning and perform as needed  - Assess and instruct to report SOB or any respiratory difficulty  - Respiratory Therapy support as indicated  - Manage/alleviate anxiety  - Monitor for signs/symptoms of CO2 retention  Outcome: Progressing     Problem: NEUROLOGICAL - ADULT  Goal: Achieves stable or improved neurological status  Description: INTERVENTIONS  - Assess for and report changes in neurological status  - Initiate measures to prevent increased intracranial pressure  - Maintain blood pressure and fluid volume within ordered parameters to optimize cerebral perfusion and minimize risk of hemorrhage  - Monitor temperature, glucose, and sodium.  Initiate appropriate interventions as ordered  Outcome: Progressing     Problem: PAIN - ADULT  Goal: Verbalizes/displays adequate comfort level or patient's stated pain goal  Description: INTERVENTIONS:  - Encourage pt to monitor pain and request assistance  - Assess pain using appropriate pain scale  - Administer analgesics based on type and severity of pain and evaluate response  - Implement non-pharmacological measures as appropriate and evaluate response  - Consider cultural and social influences on pain and pain management  - Manage/alleviate anxiety  - Utilize distraction and/or relaxation techniques  - Monitor for opioid side effects  - Notify MD/LIP if interventions unsuccessful or patient reports new pain  - Anticipate increased pain with activity and pre-medicate as appropriate  Outcome: Progressing     Problem: RISK FOR INFECTION - ADULT  Goal: Absence of fever/infection during anticipated neutropenic period  Description: INTERVENTIONS  - Monitor WBC  - Administer growth factors as ordered  - Implement neutropenic guidelines  Outcome: Progressing     Problem: SAFETY ADULT - FALL  Goal: Free from fall injury  Description: INTERVENTIONS:  - Assess pt frequently for physical needs  - Identify cognitive and physical deficits and behaviors that affect risk of falls.   - [place] fall precautions as indicated by assessment.  - Educate pt/family on patient safety including physical limitations  - Instruct pt to call for assistance with activity based on assessment  - Modify environment to reduce risk of injury  - Provide assistive devices as appropriate  - Consider OT/PT consult to assist with strengthening/mobility  - Encourage toileting schedule  Outcome: Progressing     Problem: DISCHARGE PLANNING  Goal: Discharge to home or other facility with appropriate resources  Description: INTERVENTIONS:  - Identify barriers to discharge w/pt and caregiver  - Include patient/family/discharge partner in discharge planning  - Arrange for needed discharge resources and transportation as appropriate  - Identify discharge learning needs (meds, wound care, etc)  - Arrange for interpreters to assist at discharge as needed  - Consider post-discharge preferences of patient/family/discharge partner  - Complete POLST form as appropriate  - Assess patient's ability to be responsible for managing their own health  - Refer to Case Management Department for coordinating discharge planning if the patient needs post-hospital services based on physician/LIP order or complex needs related to functional status, cognitive ability or social support system  Outcome: Progressing

## 2022-08-26 NOTE — PLAN OF CARE
Problem: Patient Centered Care  Goal: Patient preferences are identified and integrated in the patient's plan of care  Description: Interventions:  - What would you like us to know as we care for you? Wife likes to be updated on plan of care  - Provide timely, complete, and accurate information to patient/family  - Incorporate patient and family knowledge, values, beliefs, and cultural backgrounds into the planning and delivery of care  - Encourage patient/family to participate in care and decision-making at the level they choose  - Honor patient and family perspectives and choices  Outcome: Progressing     Problem: RESPIRATORY - ADULT  Goal: Achieves optimal ventilation and oxygenation  Description: INTERVENTIONS:  - Assess for changes in respiratory status  - Assess for changes in mentation and behavior  - Position to facilitate oxygenation and minimize respiratory effort  - Oxygen supplementation based on oxygen saturation or ABGs  - Provide Smoking Cessation handout, if applicable  - Encourage broncho-pulmonary hygiene including cough, deep breathe, Incentive Spirometry  - Assess the need for suctioning and perform as needed  - Assess and instruct to report SOB or any respiratory difficulty  - Respiratory Therapy support as indicated  - Manage/alleviate anxiety  - Monitor for signs/symptoms of CO2 retention  Outcome: Progressing     Problem: NEUROLOGICAL - ADULT  Goal: Achieves stable or improved neurological status  Description: INTERVENTIONS  - Assess for and report changes in neurological status  - Initiate measures to prevent increased intracranial pressure  - Maintain blood pressure and fluid volume within ordered parameters to optimize cerebral perfusion and minimize risk of hemorrhage  - Monitor temperature, glucose, and sodium.  Initiate appropriate interventions as ordered  Outcome: Progressing     Problem: PAIN - ADULT  Goal: Verbalizes/displays adequate comfort level or patient's stated pain goal  Description: INTERVENTIONS:  - Encourage pt to monitor pain and request assistance  - Assess pain using appropriate pain scale  - Administer analgesics based on type and severity of pain and evaluate response  - Implement non-pharmacological measures as appropriate and evaluate response  - Consider cultural and social influences on pain and pain management  - Manage/alleviate anxiety  - Utilize distraction and/or relaxation techniques  - Monitor for opioid side effects  - Notify MD/LIP if interventions unsuccessful or patient reports new pain  - Anticipate increased pain with activity and pre-medicate as appropriate  Outcome: Progressing     Problem: RISK FOR INFECTION - ADULT  Goal: Absence of fever/infection during anticipated neutropenic period  Description: INTERVENTIONS  - Monitor WBC  - Administer growth factors as ordered  - Implement neutropenic guidelines  Outcome: Progressing     Problem: SAFETY ADULT - FALL  Goal: Free from fall injury  Description: INTERVENTIONS:  - Assess pt frequently for physical needs  - Identify cognitive and physical deficits and behaviors that affect risk of falls.   - [place] fall precautions as indicated by assessment.  - Educate pt/family on patient safety including physical limitations  - Instruct pt to call for assistance with activity based on assessment  - Modify environment to reduce risk of injury  - Provide assistive devices as appropriate  - Consider OT/PT consult to assist with strengthening/mobility  - Encourage toileting schedule  Outcome: Progressing     Problem: DISCHARGE PLANNING  Goal: Discharge to home or other facility with appropriate resources  Description: INTERVENTIONS:  - Identify barriers to discharge w/pt and caregiver  - Include patient/family/discharge partner in discharge planning  - Arrange for needed discharge resources and transportation as appropriate  - Identify discharge learning needs (meds, wound care, etc)  - Arrange for interpreters to assist at discharge as needed  - Consider post-discharge preferences of patient/family/discharge partner  - Complete POLST form as appropriate  - Assess patient's ability to be responsible for managing their own health  - Refer to Case Management Department for coordinating discharge planning if the patient needs post-hospital services based on physician/LIP order or complex needs related to functional status, cognitive ability or social support system  Outcome: ProgressingSNF post discharge, family to pick a place. P>T. On case. Remote tele, cpap at . [facility]. Diet.  Blood sugar mid 100's

## 2022-08-27 VITALS
RESPIRATION RATE: 18 BRPM | OXYGEN SATURATION: 92 % | BODY MASS INDEX: 43.5 KG/M2 | SYSTOLIC BLOOD PRESSURE: 136 MMHG | HEART RATE: 80 BPM | DIASTOLIC BLOOD PRESSURE: 80 MMHG | HEIGHT: 67.99 IN | WEIGHT: 287 LBS | TEMPERATURE: 98 F

## 2022-08-27 PROCEDURE — 94799 UNLISTED PULMONARY SVC/PX: CPT

## 2022-08-27 PROCEDURE — 97116 GAIT TRAINING THERAPY: CPT

## 2022-08-27 PROCEDURE — 97535 SELF CARE MNGMENT TRAINING: CPT

## 2022-08-27 PROCEDURE — 97530 THERAPEUTIC ACTIVITIES: CPT

## 2022-08-27 PROCEDURE — 97110 THERAPEUTIC EXERCISES: CPT

## 2022-08-27 PROCEDURE — 94640 AIRWAY INHALATION TREATMENT: CPT

## 2022-08-27 RX ORDER — PREDNISONE 20 MG/1
TABLET ORAL
Qty: 10 TABLET | Refills: 0 | Status: SHIPPED | OUTPATIENT
Start: 2022-08-27 | End: 2022-09-04

## 2022-08-27 NOTE — PLAN OF CARE
Patient medically cleared for discharge. All discharge instructions, medications, follow up reviewed with patient and wife at bedside. Wife here to transport patient home.

## 2022-08-27 NOTE — PHYSICAL THERAPY NOTE
PHYSICAL THERAPY TREATMENT NOTE - INPATIENT     Room Number: 562/562-A       Presenting Problem: weakness and fall at home, hallucinating and low O2, acute reps failure, 4L O2  Co-Morbidities : home O2    Problem List  Principal Problem:    Acute respiratory failure with hypoxia and hypercapnia (HCC)  Active Problems:    COPD exacerbation (HCC)      PHYSICAL THERAPY ASSESSMENT     Pt seen lying in bed supine across the bed. Wife in room getting things ready to go home. Pt given option for therapy. They were agreeable . Pt's wife assisted pt to come up to sitting with use of bilateral hands. Once sitting , pt static sitting on EOB with no unsteadiness. Pt's O2 adjusted to 4L thro portable. Pt sit to stand with RW with SBA. Pt ambulated with RW x 250 ft with one sitting rest break after 125 ft. Went over all BLE ex x 10 reps while sitting rest break was taking place. Pt sit to stand from bench with proper hand positioning. Pt ambulated back to room and used the restroom . Wife kept insisting to not use RW for small distances. Pt did not use the RW for about 8 ft , able do all his needs in toileting himself. Ambulated back with no RW about 12 ft with furniture walking. Educated pt to use RW for all safety reasons. Wrote down and went over all HEP for BLEs and gait training at home. Tolerated well. The patient's Approx Degree of Impairment: 61.29% has been calculated based on documentation in the [facility] '6 clicks' Inpatient Basic Mobility Short Form. Research supports that patients with this level of impairment may benefit from DC home with home health PT.    DISCHARGE RECOMMENDATIONS  PT Discharge Recommendations: Home with home health PT     PLAN  PT Treatment Plan: Patient education;Gait training;Stair training;Transfer training; Body mechanics; Bed mobility  Frequency (Obs): 3-5x/week    SUBJECTIVE  Ok I can do this before I go.     OBJECTIVE  Precautions: None    WEIGHT BEARING RESTRICTION                PAIN ASSESSMENT   Rating: Unable to rate  Location: soreness all over especially right knee but better after moving  Management Techniques: Activity promotion;Breathing techniques;Repositioning    BALANCE  Static Sitting: Good  Dynamic Sitting: Fair  Static Standing: Poor +  Dynamic Standing: Not tested    ACTIVITY TOLERANCE                          O2 WALK       AM-PAC '6-Clicks' INPATIENT SHORT FORM - BASIC MOBILITY  How much difficulty does the patient currently have. .. Patient Difficulty: Turning over in bed (including adjusting bedclothes, sheets and blankets)?: A Lot   Patient Difficulty: Sitting down on and standing up from a chair with arms (e.g., wheelchair, bedside commode, etc.): A Little   Patient Difficulty: Moving from lying on back to sitting on the side of the bed?: A Lot   How much help from another person does the patient currently need. .. Help from Another: Moving to and from a bed to a chair (including a wheelchair)?: A Little   Help from Another: Need to walk in hospital room?: A Little   Help from Another: Climbing 3-5 steps with a railing?: Total     AM-PAC Score:  Raw Score: 14   Approx Degree of Impairment: 61.29%   Standardized Score (AM-PAC Scale): 38.1   CMS Modifier (G-Code): CL    FUNCTIONAL ABILITY STATUS  Functional Mobility/Gait Assessment  Gait Assistance: Contact guard assist  Distance (ft): 250 ft   Assistive Device: Rolling walker  Pattern: Shuffle (stops every few steps to breathe and rest)    Additional information:     THERAPEUTIC EXERCISES  Lower Extremity BLE Ex x 10 reps with HEP given      Position Sitting on hallway bench       Patient End of Session: Up in chair;Needs met;Call light within reach; Family present    CURRENT GOALS   Goals to be met by: 9/5/22  Patient Goal Patient's self-stated goal is: to go to rehab and get stronger   Goal #1 Patient is able to demonstrate supine - sit EOB @ level: minimum assistance     Goal #1   Current Status Min A    Goal #2 Patient is able to demonstrate transfers Sit to/from Stand at assistance level: supervision with walker - rolling     Goal #2  Current Status SBA   Goal #3 Patient is able to ambulate 25 feet with assist device: walker - rolling and 4L O2 at assistance level: minimum assistance   Goal #3   Current Status 125 ft x 2 with SBA/CGA   Goal #4 Patient will negotiate bed to/from chair transfers with a RW with minimal assistance   Goal #4   Current Status CGA   Goal #5 Patient to demonstrate independence with home activity/exercise instructions provided to patient in preparation for discharge.    Goal #5   Current Status In progress   Goal #6    Goal #6  Current Status

## 2022-08-27 NOTE — PLAN OF CARE
No acute changes. Feeling better. Ambulating throughout room. Anticipating discharge to home today. Problem: Patient Centered Care  Goal: Patient preferences are identified and integrated in the patient's plan of care  Description: Interventions:  - What would you like us to know as we care for you? Wife likes to be updated on plan of care  - Provide timely, complete, and accurate information to patient/family  - Incorporate patient and family knowledge, values, beliefs, and cultural backgrounds into the planning and delivery of care  - Encourage patient/family to participate in care and decision-making at the level they choose  - Honor patient and family perspectives and choices  Outcome: Adequate for Discharge     Problem: RESPIRATORY - ADULT  Goal: Achieves optimal ventilation and oxygenation  Description: INTERVENTIONS:  - Assess for changes in respiratory status  - Assess for changes in mentation and behavior  - Position to facilitate oxygenation and minimize respiratory effort  - Oxygen supplementation based on oxygen saturation or ABGs  - Provide Smoking Cessation handout, if applicable  - Encourage broncho-pulmonary hygiene including cough, deep breathe, Incentive Spirometry  - Assess the need for suctioning and perform as needed  - Assess and instruct to report SOB or any respiratory difficulty  - Respiratory Therapy support as indicated  - Manage/alleviate anxiety  - Monitor for signs/symptoms of CO2 retention  Outcome: Adequate for Discharge     Problem: NEUROLOGICAL - ADULT  Goal: Achieves stable or improved neurological status  Description: INTERVENTIONS  - Assess for and report changes in neurological status  - Initiate measures to prevent increased intracranial pressure  - Maintain blood pressure and fluid volume within ordered parameters to optimize cerebral perfusion and minimize risk of hemorrhage  - Monitor temperature, glucose, and sodium.  Initiate appropriate interventions as ordered  Outcome: Adequate for Discharge     Problem: PAIN - ADULT  Goal: Verbalizes/displays adequate comfort level or patient's stated pain goal  Description: INTERVENTIONS:  - Encourage pt to monitor pain and request assistance  - Assess pain using appropriate pain scale  - Administer analgesics based on type and severity of pain and evaluate response  - Implement non-pharmacological measures as appropriate and evaluate response  - Consider cultural and social influences on pain and pain management  - Manage/alleviate anxiety  - Utilize distraction and/or relaxation techniques  - Monitor for opioid side effects  - Notify MD/LIP if interventions unsuccessful or patient reports new pain  - Anticipate increased pain with activity and pre-medicate as appropriate  Outcome: Adequate for Discharge     Problem: RISK FOR INFECTION - ADULT  Goal: Absence of fever/infection during anticipated neutropenic period  Description: INTERVENTIONS  - Monitor WBC  - Administer growth factors as ordered  - Implement neutropenic guidelines  Outcome: Adequate for Discharge     Problem: SAFETY ADULT - FALL  Goal: Free from fall injury  Description: INTERVENTIONS:  - Assess pt frequently for physical needs  - Identify cognitive and physical deficits and behaviors that affect risk of falls.   - [place] fall precautions as indicated by assessment.  - Educate pt/family on patient safety including physical limitations  - Instruct pt to call for assistance with activity based on assessment  - Modify environment to reduce risk of injury  - Provide assistive devices as appropriate  - Consider OT/PT consult to assist with strengthening/mobility  - Encourage toileting schedule  Outcome: Adequate for Discharge     Problem: DISCHARGE PLANNING  Goal: Discharge to home or other facility with appropriate resources  Description: INTERVENTIONS:  - Identify barriers to discharge w/pt and caregiver  - Include patient/family/discharge partner in discharge planning  - Arrange for needed discharge resources and transportation as appropriate  - Identify discharge learning needs (meds, wound care, etc)  - Arrange for interpreters to assist at discharge as needed  - Consider post-discharge preferences of patient/family/discharge partner  - Complete POLST form as appropriate  - Assess patient's ability to be responsible for managing their own health  - Refer to Case Management Department for coordinating discharge planning if the patient needs post-hospital services based on physician/LIP order or complex needs related to functional status, cognitive ability or social support system  Outcome: Adequate for Discharge

## 2022-08-27 NOTE — PLAN OF CARE
Problem: Patient Centered Care  Goal: Patient preferences are identified and integrated in the patient's plan of care  Description: Interventions:  - What would you like us to know as we care for you? Wife likes to be updated on plan of care  - Provide timely, complete, and accurate information to patient/family  - Incorporate patient and family knowledge, values, beliefs, and cultural backgrounds into the planning and delivery of care  - Encourage patient/family to participate in care and decision-making at the level they choose  - Honor patient and family perspectives and choices  Outcome: Progressing     Problem: RESPIRATORY - ADULT  Goal: Achieves optimal ventilation and oxygenation  Description: INTERVENTIONS:  - Assess for changes in respiratory status  - Assess for changes in mentation and behavior  - Position to facilitate oxygenation and minimize respiratory effort  - Oxygen supplementation based on oxygen saturation or ABGs  - Provide Smoking Cessation handout, if applicable  - Encourage broncho-pulmonary hygiene including cough, deep breathe, Incentive Spirometry  - Assess the need for suctioning and perform as needed  - Assess and instruct to report SOB or any respiratory difficulty  - Respiratory Therapy support as indicated  - Manage/alleviate anxiety  - Monitor for signs/symptoms of CO2 retention  Outcome: Progressing     Problem: NEUROLOGICAL - ADULT  Goal: Achieves stable or improved neurological status  Description: INTERVENTIONS  - Assess for and report changes in neurological status  - Initiate measures to prevent increased intracranial pressure  - Maintain blood pressure and fluid volume within ordered parameters to optimize cerebral perfusion and minimize risk of hemorrhage  - Monitor temperature, glucose, and sodium.  Initiate appropriate interventions as ordered  Outcome: Progressing     Problem: PAIN - ADULT  Goal: Verbalizes/displays adequate comfort level or patient's stated pain goal  Description: INTERVENTIONS:  - Encourage pt to monitor pain and request assistance  - Assess pain using appropriate pain scale  - Administer analgesics based on type and severity of pain and evaluate response  - Implement non-pharmacological measures as appropriate and evaluate response  - Consider cultural and social influences on pain and pain management  - Manage/alleviate anxiety  - Utilize distraction and/or relaxation techniques  - Monitor for opioid side effects  - Notify MD/LIP if interventions unsuccessful or patient reports new pain  - Anticipate increased pain with activity and pre-medicate as appropriate  Outcome: Progressing     Problem: RISK FOR INFECTION - ADULT  Goal: Absence of fever/infection during anticipated neutropenic period  Description: INTERVENTIONS  - Monitor WBC  - Administer growth factors as ordered  - Implement neutropenic guidelines  Outcome: Progressing     Problem: SAFETY ADULT - FALL  Goal: Free from fall injury  Description: INTERVENTIONS:  - Assess pt frequently for physical needs  - Identify cognitive and physical deficits and behaviors that affect risk of falls.   - [place] fall precautions as indicated by assessment.  - Educate pt/family on patient safety including physical limitations  - Instruct pt to call for assistance with activity based on assessment  - Modify environment to reduce risk of injury  - Provide assistive devices as appropriate  - Consider OT/PT consult to assist with strengthening/mobility  - Encourage toileting schedule  Outcome: Progressing     Problem: DISCHARGE PLANNING  Goal: Discharge to home or other facility with appropriate resources  Description: INTERVENTIONS:  - Identify barriers to discharge w/pt and caregiver  - Include patient/family/discharge partner in discharge planning  - Arrange for needed discharge resources and transportation as appropriate  - Identify discharge learning needs (meds, wound care, etc)  - Arrange for interpreters to assist at discharge as needed  - Consider post-discharge preferences of patient/family/discharge partner  - Complete POLST form as appropriate  - Assess patient's ability to be responsible for managing their own health  - Refer to Case Management Department for coordinating discharge planning if the patient needs post-hospital services based on physician/LIP order or complex needs related to functional status, cognitive ability or social support system  Outcome: Progressing    Patient requested a walk in the devi prior to going to bed. He stated that he wanted personal reassurance that he would be capable of walking at least short distances, post discharge.

## 2022-08-29 ENCOUNTER — PATIENT OUTREACH (OUTPATIENT)
Dept: CASE MANAGEMENT | Age: 72
End: 2022-08-29

## 2022-08-29 DIAGNOSIS — J44.1 COPD EXACERBATION (HCC): Primary | ICD-10-CM

## 2022-08-29 DIAGNOSIS — Z02.9 ENCOUNTERS FOR UNSPECIFIED ADMINISTRATIVE PURPOSE: ICD-10-CM

## 2022-08-29 DIAGNOSIS — I50.31 ACUTE DIASTOLIC CONGESTIVE HEART FAILURE (HCC): ICD-10-CM

## 2022-08-29 PROCEDURE — 1111F DSCHRG MED/CURRENT MED MERGE: CPT

## 2022-08-29 NOTE — PROGRESS NOTES
Attempted to reach the patient to complete a [facility]-Hospital FU call. Left a message for the pt to call the NCM back at, [phone]. A CCM referral was entered.

## 2022-08-30 ENCOUNTER — TELEPHONE (OUTPATIENT)
Dept: INTERNAL MEDICINE CLINIC | Facility: CLINIC | Age: 72
End: 2022-08-30

## 2022-08-30 ENCOUNTER — OFFICE VISIT (OUTPATIENT)
Dept: INTERNAL MEDICINE CLINIC | Facility: CLINIC | Age: 72
End: 2022-08-30
Payer: MEDICARE

## 2022-08-30 VITALS
SYSTOLIC BLOOD PRESSURE: 113 MMHG | HEART RATE: 91 BPM | TEMPERATURE: 98 F | BODY MASS INDEX: 43.95 KG/M2 | HEIGHT: 68 IN | OXYGEN SATURATION: 97 % | DIASTOLIC BLOOD PRESSURE: 76 MMHG | WEIGHT: 290 LBS

## 2022-08-30 DIAGNOSIS — J44.1 COPD EXACERBATION (HCC): ICD-10-CM

## 2022-08-30 DIAGNOSIS — J96.22 ACUTE ON CHRONIC RESPIRATORY FAILURE WITH HYPOXIA AND HYPERCAPNIA (HCC): Primary | ICD-10-CM

## 2022-08-30 DIAGNOSIS — Z12.11 COLON CANCER SCREENING: ICD-10-CM

## 2022-08-30 DIAGNOSIS — E66.01 MORBID OBESITY WITH BMI OF 40.0-44.9, ADULT (HCC): ICD-10-CM

## 2022-08-30 DIAGNOSIS — G47.33 OSA (OBSTRUCTIVE SLEEP APNEA): ICD-10-CM

## 2022-08-30 DIAGNOSIS — I77.89 ASCENDING AORTA ENLARGEMENT (HCC): ICD-10-CM

## 2022-08-30 DIAGNOSIS — Z72.0 TOBACCO ABUSE: ICD-10-CM

## 2022-08-30 DIAGNOSIS — Z12.5 PROSTATE CANCER SCREENING: ICD-10-CM

## 2022-08-30 DIAGNOSIS — J96.21 ACUTE ON CHRONIC RESPIRATORY FAILURE WITH HYPOXIA AND HYPERCAPNIA (HCC): Primary | ICD-10-CM

## 2022-08-30 PROCEDURE — 1111F DSCHRG MED/CURRENT MED MERGE: CPT | Performed by: INTERNAL MEDICINE

## 2022-08-30 PROCEDURE — 99214 OFFICE O/P EST MOD 30 MIN: CPT | Performed by: INTERNAL MEDICINE

## 2022-08-30 NOTE — CDS QUERY
.Heart Failure  CLINICAL DOCUMENTATION CLARIFICATION FORM  Dear Doctor: Jayne Smith information (provided below) includes a diagnosis of Heart Failure. For accurate ICD-10-CM code assignment to reflect severity of illness and risk of mortality,  PLEASE (X) ALL DIAGNOSES THAT APPLY. (    ) Acute Systolic Heart Failure   (    ) Acute on Chronic Systolic Heart Failure    (    ) Chronic Systolic Heart Failure      (    ) Acute Diastolic Heart Failure  (    ) Acute on Chronic Diastolic Heart Failure   (  x  ) Chronic Diastolic Heart Failure       (    ) Acute combined systolic and diastolic Heart Failure  (    ) Acute on chronic combined systolic and diastolic Heart Failure  (    ) Chronic combined systolic and diastolic Heart Failure     (    ) Other - please specify:   (    ) Unable to determine - please comment:         Documentation from the Medical Record:      LE edema  Chronic venous stasis changes. - cont oral lasix   - echo with ef 60%, mild lvh, grade 1 dd       * Past medical history: Congestive Heart Disease          If you have any questions, please contact Clinical :  Bambi Casper RN at [phone]     Thank You!      THIS FORM IS A PERMANENT PART OF THE MEDICAL RECORD

## 2022-08-31 ENCOUNTER — TELEPHONE (OUTPATIENT)
Dept: CARDIOLOGY CLINIC | Facility: HOSPITAL | Age: 72
End: 2022-08-31

## 2022-08-31 ENCOUNTER — TELEPHONE (OUTPATIENT)
Dept: PULMONOLOGY | Facility: CLINIC | Age: 72
End: 2022-08-31

## 2022-08-31 NOTE — TELEPHONE ENCOUNTER
Patient's wife is calling to request a 1 week follow up for hospital follow up. Patient was released 8/27/22.  Please call

## 2022-09-01 ENCOUNTER — OFFICE VISIT (OUTPATIENT)
Dept: CARDIOLOGY CLINIC | Facility: HOSPITAL | Age: 72
End: 2022-09-01
Attending: NURSE PRACTITIONER
Payer: MEDICARE

## 2022-09-01 VITALS
OXYGEN SATURATION: 98 % | DIASTOLIC BLOOD PRESSURE: 80 MMHG | WEIGHT: 291.38 LBS | SYSTOLIC BLOOD PRESSURE: 118 MMHG | HEART RATE: 81 BPM | RESPIRATION RATE: 18 BRPM | BODY MASS INDEX: 44 KG/M2

## 2022-09-01 DIAGNOSIS — Z72.0 TOBACCO ABUSE: ICD-10-CM

## 2022-09-01 DIAGNOSIS — J96.21 ACUTE ON CHRONIC RESPIRATORY FAILURE WITH HYPOXIA AND HYPERCAPNIA (HCC): ICD-10-CM

## 2022-09-01 DIAGNOSIS — G47.33 OSA ON CPAP: ICD-10-CM

## 2022-09-01 DIAGNOSIS — J44.1 COPD EXACERBATION (HCC): Primary | ICD-10-CM

## 2022-09-01 DIAGNOSIS — Z99.89 OSA ON CPAP: ICD-10-CM

## 2022-09-01 DIAGNOSIS — R60.0 BILATERAL LEG EDEMA: ICD-10-CM

## 2022-09-01 DIAGNOSIS — J96.22 ACUTE ON CHRONIC RESPIRATORY FAILURE WITH HYPOXIA AND HYPERCAPNIA (HCC): ICD-10-CM

## 2022-09-01 DIAGNOSIS — I50.32 CHRONIC DIASTOLIC HEART FAILURE (HCC): ICD-10-CM

## 2022-09-01 DIAGNOSIS — J43.2 CENTRILOBULAR EMPHYSEMA (HCC): ICD-10-CM

## 2022-09-01 DIAGNOSIS — E66.01 MORBID (SEVERE) OBESITY DUE TO EXCESS CALORIES (HCC): ICD-10-CM

## 2022-09-01 PROCEDURE — 94761 N-INVAS EAR/PLS OXIMETRY MLT: CPT | Performed by: NURSE PRACTITIONER

## 2022-09-01 PROCEDURE — 99204 OFFICE O/P NEW MOD 45 MIN: CPT | Performed by: NURSE PRACTITIONER

## 2022-09-01 RX ORDER — POTASSIUM CHLORIDE 750 MG/1
10 TABLET, EXTENDED RELEASE ORAL DAILY
COMMUNITY
Start: 2022-09-01

## 2022-09-01 RX ORDER — FUROSEMIDE 40 MG/1
TABLET ORAL
COMMUNITY
Start: 2022-09-01

## 2022-09-01 RX ORDER — ALBUTEROL SULFATE 90 UG/1
2 AEROSOL, METERED RESPIRATORY (INHALATION)
Qty: 1 EACH | Refills: 0 | Status: SHIPPED | OUTPATIENT
Start: 2022-09-01

## 2022-09-01 NOTE — TELEPHONE ENCOUNTER
Pt wife stopped at desk regarding setting up  hosp fu appt -    Wife has limited availability -due to work and being caregiver for pt- would like to be seen on a Wed btw 10-2 if possible-    Pl call wife cell [phone]

## 2022-09-01 NOTE — PATIENT INSTRUCTIONS
Continue all your other medications as prescribed     Call if having any dizziness, lightheadedness, heart racing, palpitations, chest pain, shortness of breath, coughing,  wheezing, swelling, weight gain,  or weakness    Call 911 with severe shortness of breath, chest pain or chest pressure not improved after 15 minutes of rest or if feeling faint,  passing out or having a fall     Weigh yourself daily in the morning before breakfast, call if gaining 3 lbs or more overnight or more than 5 lbs in one week. Follow 2000 mg sodium restricted , heart healthy diet, Keep daily fluids at 48-64 ounces per day    Follow up with Dr. Iva Viera in 1-2 weeks    Follow up with the specialty care clinic in 3-4 weeks    Use incentive spirometer- 4 sets of 10 slow inhaled breaths daily and use acapella, 5 blowing breaths with pause and cough then another 5 blowing breaths and cough, 4 times a day    Begin pulmonary rehab when able . Call to make an orientation appointment at [phone]    Wear cpap at nighttime and can use during the day with napping    Wear home oxygen at 3-4 liters per nasal cannula with rest and exertion. Can have oxygen off at rest for 15 minutes or more as long as oxygen saturations are at 90 % or greater         Limit sodium to  2000 mg daily. Common high sodium foods include frozen dinners, soups (not homemade), some cereal, vegetable juice, canned vegetables, lunch meats, processed meats like hotdogs, sausage, green, pepperoni, soy sauce, pre-packaged rice or potatoes. Please remember to read nutrition labels for sodium content.     www.healthyeating. nhlbi.nih.gov      Exercise daily as tolerated, with goal of doing moderate aerobic exercise like walking for about 30 minutes 5 days per week. Start by walking at a slow to moderate pace for 5-10 minutes 2-3 times a day. Pace your activity to prevent shortness of breath or fatigue.  Stop exercise if you develop chest pain, lightheadedness, or significant shortness of breath

## 2022-09-01 NOTE — PROGRESS NOTES
Here with his wife. Patient has used oxygen for years, now uses 4 liters continuously . Use his inhaler and neb treatments daily as ordered. Has c pap , but uses it more during the day to \"aid in CO2 exchange\" . DURÁN with walking. Patient walked in devi 350 feet , 98% on 4 liters, down to 97 % and HR increased to 116.

## 2022-09-01 NOTE — TELEPHONE ENCOUNTER
Attempted to contact patient's wife regarding appointment, left message to call back. CSS- Please confirm appointment.

## 2022-09-02 NOTE — TELEPHONE ENCOUNTER
Pts wife called and confirmed 9/21/22 appt at 11am at [facility]

## 2022-09-12 NOTE — PROGRESS NOTES
Multiple attempts to reach pt and messages left with no return call. Pt went in for HFU appt with Dr. Yasmine Villanueva on 8/30/2022. Encounter closing.

## 2022-09-19 ENCOUNTER — TELEPHONE (OUTPATIENT)
Dept: PULMONOLOGY | Facility: CLINIC | Age: 72
End: 2022-09-19

## 2022-09-19 NOTE — TELEPHONE ENCOUNTER
Physician's order for CPAP supplies received from [facility]. Placed in Dr. Belen Blackwood folder for signature.

## 2022-09-21 ENCOUNTER — TELEPHONE (OUTPATIENT)
Dept: PULMONOLOGY | Facility: CLINIC | Age: 72
End: 2022-09-21

## 2022-09-21 ENCOUNTER — OFFICE VISIT (OUTPATIENT)
Dept: PULMONOLOGY | Facility: CLINIC | Age: 72
End: 2022-09-21

## 2022-09-21 VITALS
OXYGEN SATURATION: 96 % | RESPIRATION RATE: 20 BRPM | DIASTOLIC BLOOD PRESSURE: 78 MMHG | WEIGHT: 294.19 LBS | SYSTOLIC BLOOD PRESSURE: 113 MMHG | HEART RATE: 95 BPM | BODY MASS INDEX: 44.59 KG/M2 | HEIGHT: 68 IN

## 2022-09-21 DIAGNOSIS — G47.33 OSA (OBSTRUCTIVE SLEEP APNEA): Primary | ICD-10-CM

## 2022-09-21 DIAGNOSIS — J96.12 CHRONIC RESPIRATORY FAILURE WITH HYPOXIA AND HYPERCAPNIA (HCC): ICD-10-CM

## 2022-09-21 DIAGNOSIS — R09.02 HYPOXEMIA: ICD-10-CM

## 2022-09-21 DIAGNOSIS — J96.11 CHRONIC RESPIRATORY FAILURE WITH HYPOXIA AND HYPERCAPNIA (HCC): ICD-10-CM

## 2022-09-21 DIAGNOSIS — I50.32 CHRONIC DIASTOLIC CONGESTIVE HEART FAILURE (HCC): ICD-10-CM

## 2022-09-21 DIAGNOSIS — J43.2 CENTRILOBULAR EMPHYSEMA (HCC): ICD-10-CM

## 2022-09-21 PROCEDURE — 1125F AMNT PAIN NOTED PAIN PRSNT: CPT | Performed by: INTERNAL MEDICINE

## 2022-09-21 PROCEDURE — 1111F DSCHRG MED/CURRENT MED MERGE: CPT | Performed by: INTERNAL MEDICINE

## 2022-09-21 PROCEDURE — 99214 OFFICE O/P EST MOD 30 MIN: CPT | Performed by: INTERNAL MEDICINE

## 2022-09-21 RX ORDER — FLUTICASONE PROPIONATE 50 MCG
2 SPRAY, SUSPENSION (ML) NASAL DAILY
Qty: 3 EACH | Refills: 3 | Status: SHIPPED | OUTPATIENT
Start: 2022-09-21

## 2022-09-21 NOTE — TELEPHONE ENCOUNTER
Received DWO for CPAP supplies from [facility]. Order placed in Dr. Genaro Goodson folder for signature.

## 2022-10-03 NOTE — TELEPHONE ENCOUNTER
Signed by Dr. Lucille Hancock and faxed back to [facility]. Confirmation received. Signed order sent out for scanning.

## 2022-10-06 ENCOUNTER — OFFICE VISIT (OUTPATIENT)
Dept: ORTHOPEDICS CLINIC | Facility: CLINIC | Age: 72
End: 2022-10-06
Payer: MEDICARE

## 2022-10-06 ENCOUNTER — HOSPITAL ENCOUNTER (OUTPATIENT)
Dept: GENERAL RADIOLOGY | Facility: HOSPITAL | Age: 72
Discharge: HOME | End: 2022-10-06
Attending: ORTHOPAEDIC SURGERY
Payer: MEDICARE

## 2022-10-06 VITALS
HEART RATE: 94 BPM | HEIGHT: 68 IN | WEIGHT: 294 LBS | SYSTOLIC BLOOD PRESSURE: 128 MMHG | DIASTOLIC BLOOD PRESSURE: 87 MMHG | BODY MASS INDEX: 44.56 KG/M2

## 2022-10-06 DIAGNOSIS — M17.0 PRIMARY OSTEOARTHRITIS OF BOTH KNEES: Primary | ICD-10-CM

## 2022-10-06 DIAGNOSIS — M25.562 PAIN IN BOTH KNEES, UNSPECIFIED CHRONICITY: ICD-10-CM

## 2022-10-06 DIAGNOSIS — M25.561 PAIN IN BOTH KNEES, UNSPECIFIED CHRONICITY: ICD-10-CM

## 2022-10-06 PROCEDURE — 73562 X-RAY EXAM OF KNEE 3: CPT | Performed by: ORTHOPAEDIC SURGERY

## 2022-10-06 PROCEDURE — 1125F AMNT PAIN NOTED PAIN PRSNT: CPT | Performed by: ORTHOPAEDIC SURGERY

## 2022-10-06 PROCEDURE — 99214 OFFICE O/P EST MOD 30 MIN: CPT | Performed by: ORTHOPAEDIC SURGERY

## 2022-10-06 PROCEDURE — 20610 DRAIN/INJ JOINT/BURSA W/O US: CPT | Performed by: ORTHOPAEDIC SURGERY

## 2022-10-06 RX ORDER — TRIAMCINOLONE ACETONIDE 40 MG/ML
40 INJECTION, SUSPENSION INTRA-ARTICULAR; INTRAMUSCULAR
Status: SHIPPED | OUTPATIENT
Start: 2022-10-06 | End: 2022-10-06

## 2022-10-06 RX ADMIN — TRIAMCINOLONE ACETONIDE 40 MG: 40 INJECTION, SUSPENSION INTRA-ARTICULAR; INTRAMUSCULAR at 11:09:00

## 2022-10-06 RX ADMIN — TRIAMCINOLONE ACETONIDE 40 MG: 40 INJECTION, SUSPENSION INTRA-ARTICULAR; INTRAMUSCULAR at 11:06:00

## 2022-10-06 NOTE — PROGRESS NOTES
Per verbal order from Dr. Sourav Briscoe draw up 3ml of 0.5% Marcaine & 2ml 1% lidocaine and 1ml of Kenalog 40 for cortisone injection to bilateral knee. Nannette Dodd    Patient provided education handout for cortisone injection.

## 2022-11-03 ENCOUNTER — MED REC SCAN ONLY (OUTPATIENT)
Dept: INTERNAL MEDICINE CLINIC | Facility: CLINIC | Age: 72
End: 2022-11-03

## 2022-11-03 ENCOUNTER — TELEPHONE (OUTPATIENT)
Dept: INTERNAL MEDICINE CLINIC | Facility: CLINIC | Age: 72
End: 2022-11-03

## 2022-11-03 NOTE — TELEPHONE ENCOUNTER
Spoke with pt, verified , informed pt that he should complete cologuard test that has been ordered since 2022, pt verbalized understanding.

## 2023-01-07 ENCOUNTER — APPOINTMENT (OUTPATIENT)
Dept: GENERAL RADIOLOGY | Facility: HOSPITAL | Age: 73
End: 2023-01-07
Attending: EMERGENCY MEDICINE
Payer: MEDICARE

## 2023-01-07 ENCOUNTER — HOSPITAL ENCOUNTER (INPATIENT)
Facility: HOSPITAL | Age: 73
LOS: 3 days | Discharge: HOME | End: 2023-01-10
Attending: EMERGENCY MEDICINE | Admitting: HOSPITALIST
Payer: MEDICARE

## 2023-01-07 DIAGNOSIS — J96.02 ACUTE RESPIRATORY FAILURE WITH HYPOXIA AND HYPERCAPNIA (HCC): Primary | ICD-10-CM

## 2023-01-07 DIAGNOSIS — J96.01 ACUTE RESPIRATORY FAILURE WITH HYPOXIA AND HYPERCAPNIA (HCC): Primary | ICD-10-CM

## 2023-01-07 LAB
ANION GAP SERPL CALC-SCNC: 1 MMOL/L (ref 0–18)
BASE EXCESS BLD CALC-SCNC: 12.3 MMOL/L (ref ?–2)
BASOPHILS # BLD AUTO: 0.08 X10(3) UL (ref 0–0.2)
BASOPHILS NFR BLD AUTO: 0.9 %
BLOOD GAS EPAP: 5 CM H2O
BLOOD GAS IPAP: 12 CM H2O
BUN BLD-MCNC: 22 MG/DL (ref 7–18)
BUN/CREAT SERPL: 25.3 (ref 10–20)
CALCIUM BLD-MCNC: 8.8 MG/DL (ref 8.5–10.1)
CHLORIDE SERPL-SCNC: 104 MMOL/L (ref 98–112)
CO2 SERPL-SCNC: 39 MMOL/L (ref 21–32)
CREAT BLD-MCNC: 0.87 MG/DL
DEPRECATED RDW RBC AUTO: 53.4 FL (ref 35.1–46.3)
EOSINOPHIL # BLD AUTO: 0.28 X10(3) UL (ref 0–0.7)
EOSINOPHIL NFR BLD AUTO: 3.1 %
ERYTHROCYTE [DISTWIDTH] IN BLOOD BY AUTOMATED COUNT: 14.2 % (ref 11–15)
GFR SERPLBLD BASED ON 1.73 SQ M-ARVRAT: 92 ML/MIN/1.73M2 (ref 60–?)
GLUCOSE BLD-MCNC: 69 MG/DL (ref 70–99)
GLUCOSE BLDC GLUCOMTR-MCNC: 130 MG/DL (ref 70–99)
GLUCOSE BLDC GLUCOMTR-MCNC: 147 MG/DL (ref 70–99)
HCO3 BLDA-SCNC: 34.5 MEQ/L (ref 21–27)
HCT VFR BLD AUTO: 45.6 %
HGB BLD-MCNC: 13.5 G/DL
IMM GRANULOCYTES # BLD AUTO: 0.05 X10(3) UL (ref 0–1)
IMM GRANULOCYTES NFR BLD: 0.5 %
LYMPHOCYTES # BLD AUTO: 1.71 X10(3) UL (ref 1–4)
LYMPHOCYTES NFR BLD AUTO: 18.8 %
MAGNESIUM SERPL-MCNC: 2.1 MG/DL (ref 1.6–2.6)
MCH RBC QN AUTO: 30.1 PG (ref 26–34)
MCHC RBC AUTO-ENTMCNC: 29.6 G/DL (ref 31–37)
MCV RBC AUTO: 101.8 FL
MODIFIED ALLEN TEST: POSITIVE
MONOCYTES # BLD AUTO: 1.09 X10(3) UL (ref 0.1–1)
MONOCYTES NFR BLD AUTO: 12 %
NEUTROPHILS # BLD AUTO: 5.89 X10 (3) UL (ref 1.5–7.7)
NEUTROPHILS # BLD AUTO: 5.89 X10(3) UL (ref 1.5–7.7)
NEUTROPHILS NFR BLD AUTO: 64.7 %
NT-PROBNP SERPL-MCNC: 49 PG/ML (ref ?–125)
O2 CT BLD-SCNC: 18.5 VOL% (ref 15–23)
O2/TOTAL GAS SETTING VFR VENT: 40 %
OSMOLALITY SERPL CALC.SUM OF ELEC: 300 MOSM/KG (ref 275–295)
PCO2 BLDA: 95 MM HG (ref 35–45)
PH BLDA: 7.27 [PH] (ref 7.35–7.45)
PLATELET # BLD AUTO: 182 10(3)UL (ref 150–450)
PO2 BLDA: 113 MM HG (ref 80–100)
POTASSIUM SERPL-SCNC: 4.3 MMOL/L (ref 3.5–5.1)
PROCALCITONIN SERPL-MCNC: 0.05 NG/ML (ref ?–0.16)
PUNCTURE CHARGE: YES
RBC # BLD AUTO: 4.48 X10(6)UL
RESP RATE: 12 BPM
SAO2 % BLDA: 98.6 % (ref 94–100)
SARS-COV-2 RNA RESP QL NAA+PROBE: NOT DETECTED
SODIUM SERPL-SCNC: 144 MMOL/L (ref 136–145)
WBC # BLD AUTO: 9.1 X10(3) UL (ref 4–11)

## 2023-01-07 PROCEDURE — 71045 X-RAY EXAM CHEST 1 VIEW: CPT | Performed by: EMERGENCY MEDICINE

## 2023-01-07 PROCEDURE — 99223 1ST HOSP IP/OBS HIGH 75: CPT | Performed by: INTERNAL MEDICINE

## 2023-01-07 RX ORDER — ACETAMINOPHEN 500 MG
500 TABLET ORAL EVERY 4 HOURS PRN
Status: DISCONTINUED | OUTPATIENT
Start: 2023-01-07 | End: 2023-01-10

## 2023-01-07 RX ORDER — BISACODYL 10 MG
10 SUPPOSITORY, RECTAL RECTAL
Status: DISCONTINUED | OUTPATIENT
Start: 2023-01-07 | End: 2023-01-10

## 2023-01-07 RX ORDER — ACETAMINOPHEN 325 MG/1
650 TABLET ORAL EVERY 4 HOURS PRN
Status: DISCONTINUED | OUTPATIENT
Start: 2023-01-07 | End: 2023-01-10

## 2023-01-07 RX ORDER — MORPHINE SULFATE 2 MG/ML
1 INJECTION, SOLUTION INTRAMUSCULAR; INTRAVENOUS EVERY 2 HOUR PRN
Status: DISCONTINUED | OUTPATIENT
Start: 2023-01-07 | End: 2023-01-08

## 2023-01-07 RX ORDER — FLUTICASONE PROPIONATE 50 MCG
2 SPRAY, SUSPENSION (ML) NASAL DAILY
Status: DISCONTINUED | OUTPATIENT
Start: 2023-01-07 | End: 2023-01-10

## 2023-01-07 RX ORDER — DEXTROSE MONOHYDRATE 25 G/50ML
50 INJECTION, SOLUTION INTRAVENOUS ONCE
Status: COMPLETED | OUTPATIENT
Start: 2023-01-07 | End: 2023-01-07

## 2023-01-07 RX ORDER — HYDROCODONE BITARTRATE AND ACETAMINOPHEN 5; 325 MG/1; MG/1
1 TABLET ORAL EVERY 4 HOURS PRN
Status: DISCONTINUED | OUTPATIENT
Start: 2023-01-07 | End: 2023-01-10

## 2023-01-07 RX ORDER — MORPHINE SULFATE 4 MG/ML
4 INJECTION, SOLUTION INTRAMUSCULAR; INTRAVENOUS EVERY 2 HOUR PRN
Status: DISCONTINUED | OUTPATIENT
Start: 2023-01-07 | End: 2023-01-08

## 2023-01-07 RX ORDER — METHYLPREDNISOLONE SODIUM SUCCINATE 40 MG/ML
40 INJECTION, POWDER, LYOPHILIZED, FOR SOLUTION INTRAMUSCULAR; INTRAVENOUS EVERY 6 HOURS
Status: DISCONTINUED | OUTPATIENT
Start: 2023-01-08 | End: 2023-01-10

## 2023-01-07 RX ORDER — METOCLOPRAMIDE HYDROCHLORIDE 5 MG/ML
10 INJECTION INTRAMUSCULAR; INTRAVENOUS EVERY 8 HOURS PRN
Status: DISCONTINUED | OUTPATIENT
Start: 2023-01-07 | End: 2023-01-10

## 2023-01-07 RX ORDER — SODIUM PHOSPHATE, DIBASIC AND SODIUM PHOSPHATE, MONOBASIC 7; 19 G/133ML; G/133ML
1 ENEMA RECTAL ONCE AS NEEDED
Status: DISCONTINUED | OUTPATIENT
Start: 2023-01-07 | End: 2023-01-10

## 2023-01-07 RX ORDER — IPRATROPIUM BROMIDE AND ALBUTEROL SULFATE 2.5; .5 MG/3ML; MG/3ML
3 SOLUTION RESPIRATORY (INHALATION) EVERY 6 HOURS PRN
Status: DISCONTINUED | OUTPATIENT
Start: 2023-01-07 | End: 2023-01-10

## 2023-01-07 RX ORDER — HYDROCODONE BITARTRATE AND ACETAMINOPHEN 5; 325 MG/1; MG/1
2 TABLET ORAL EVERY 4 HOURS PRN
Status: DISCONTINUED | OUTPATIENT
Start: 2023-01-07 | End: 2023-01-10

## 2023-01-07 RX ORDER — HEPARIN SODIUM 5000 [USP'U]/ML
5000 INJECTION, SOLUTION INTRAVENOUS; SUBCUTANEOUS EVERY 8 HOURS SCHEDULED
Status: DISCONTINUED | OUTPATIENT
Start: 2023-01-07 | End: 2023-01-08

## 2023-01-07 RX ORDER — ONDANSETRON 2 MG/ML
4 INJECTION INTRAMUSCULAR; INTRAVENOUS EVERY 6 HOURS PRN
Status: DISCONTINUED | OUTPATIENT
Start: 2023-01-07 | End: 2023-01-10

## 2023-01-07 RX ORDER — METHYLPREDNISOLONE SODIUM SUCCINATE 125 MG/2ML
125 INJECTION, POWDER, LYOPHILIZED, FOR SOLUTION INTRAMUSCULAR; INTRAVENOUS ONCE
Status: COMPLETED | OUTPATIENT
Start: 2023-01-07 | End: 2023-01-07

## 2023-01-07 RX ORDER — MORPHINE SULFATE 2 MG/ML
2 INJECTION, SOLUTION INTRAMUSCULAR; INTRAVENOUS EVERY 2 HOUR PRN
Status: DISCONTINUED | OUTPATIENT
Start: 2023-01-07 | End: 2023-01-08

## 2023-01-07 RX ORDER — IPRATROPIUM BROMIDE AND ALBUTEROL SULFATE 2.5; .5 MG/3ML; MG/3ML
3 SOLUTION RESPIRATORY (INHALATION)
Status: DISCONTINUED | OUTPATIENT
Start: 2023-01-07 | End: 2023-01-09

## 2023-01-07 RX ORDER — SODIUM CHLORIDE 9 MG/ML
INJECTION, SOLUTION INTRAVENOUS CONTINUOUS
Status: DISCONTINUED | OUTPATIENT
Start: 2023-01-07 | End: 2023-01-08

## 2023-01-07 RX ORDER — SENNOSIDES 8.6 MG
17.2 TABLET ORAL NIGHTLY PRN
Status: DISCONTINUED | OUTPATIENT
Start: 2023-01-07 | End: 2023-01-10

## 2023-01-07 RX ORDER — ECHINACEA PURPUREA EXTRACT 125 MG
1 TABLET ORAL
Status: DISCONTINUED | OUTPATIENT
Start: 2023-01-07 | End: 2023-01-10

## 2023-01-07 RX ORDER — POLYETHYLENE GLYCOL 3350 17 G/17G
17 POWDER, FOR SOLUTION ORAL DAILY PRN
Status: DISCONTINUED | OUTPATIENT
Start: 2023-01-07 | End: 2023-01-10

## 2023-01-08 ENCOUNTER — APPOINTMENT (OUTPATIENT)
Dept: GENERAL RADIOLOGY | Facility: HOSPITAL | Age: 73
End: 2023-01-08
Attending: INTERNAL MEDICINE
Payer: MEDICARE

## 2023-01-08 LAB
ANION GAP SERPL CALC-SCNC: 4 MMOL/L (ref 0–18)
ATRIAL RATE: 89 BPM
BASE EXCESS BLD CALC-SCNC: 12.3 MMOL/L (ref ?–2)
BASOPHILS # BLD AUTO: 0.02 X10(3) UL (ref 0–0.2)
BASOPHILS NFR BLD AUTO: 0.2 %
BLOOD GAS EPAP: 5 CM H2O
BLOOD GAS IPAP: 15 CM H2O
BUN BLD-MCNC: 18 MG/DL (ref 7–18)
BUN/CREAT SERPL: 24.3 (ref 10–20)
CALCIUM BLD-MCNC: 8.9 MG/DL (ref 8.5–10.1)
CHLORIDE SERPL-SCNC: 104 MMOL/L (ref 98–112)
CO2 SERPL-SCNC: 36 MMOL/L (ref 21–32)
CREAT BLD-MCNC: 0.74 MG/DL
DEPRECATED RDW RBC AUTO: 51.8 FL (ref 35.1–46.3)
EOSINOPHIL # BLD AUTO: 0 X10(3) UL (ref 0–0.7)
EOSINOPHIL NFR BLD AUTO: 0 %
ERYTHROCYTE [DISTWIDTH] IN BLOOD BY AUTOMATED COUNT: 13.9 % (ref 11–15)
GFR SERPLBLD BASED ON 1.73 SQ M-ARVRAT: 97 ML/MIN/1.73M2 (ref 60–?)
GLUCOSE BLD-MCNC: 187 MG/DL (ref 70–99)
GLUCOSE BLDC GLUCOMTR-MCNC: 139 MG/DL (ref 70–99)
GLUCOSE BLDC GLUCOMTR-MCNC: 169 MG/DL (ref 70–99)
HCO3 BLDA-SCNC: 34.4 MEQ/L (ref 21–27)
HCT VFR BLD AUTO: 43 %
HGB BLD-MCNC: 12.7 G/DL
IMM GRANULOCYTES # BLD AUTO: 0.04 X10(3) UL (ref 0–1)
IMM GRANULOCYTES NFR BLD: 0.5 %
LYMPHOCYTES # BLD AUTO: 0.64 X10(3) UL (ref 1–4)
LYMPHOCYTES NFR BLD AUTO: 7.8 %
MCH RBC QN AUTO: 29.7 PG (ref 26–34)
MCHC RBC AUTO-ENTMCNC: 29.5 G/DL (ref 31–37)
MCV RBC AUTO: 100.7 FL
MONOCYTES # BLD AUTO: 0.06 X10(3) UL (ref 0.1–1)
MONOCYTES NFR BLD AUTO: 0.7 %
NEUTROPHILS # BLD AUTO: 7.44 X10 (3) UL (ref 1.5–7.7)
NEUTROPHILS # BLD AUTO: 7.44 X10(3) UL (ref 1.5–7.7)
NEUTROPHILS NFR BLD AUTO: 90.8 %
O2 CT BLD-SCNC: 17.6 VOL% (ref 15–23)
O2/TOTAL GAS SETTING VFR VENT: 40 %
OSMOLALITY SERPL CALC.SUM OF ELEC: 305 MOSM/KG (ref 275–295)
P AXIS: 27 DEGREES
P-R INTERVAL: 170 MS
PCO2 BLDA: 75 MM HG (ref 35–45)
PH BLDA: 7.35 [PH] (ref 7.35–7.45)
PLATELET # BLD AUTO: 159 10(3)UL (ref 150–450)
PO2 BLDA: 64 MM HG (ref 80–100)
POTASSIUM SERPL-SCNC: 4.6 MMOL/L (ref 3.5–5.1)
PUNCTURE CHARGE: YES
Q-T INTERVAL: 378 MS
QRS DURATION: 90 MS
QTC CALCULATION (BEZET): 459 MS
R AXIS: 10 DEGREES
RBC # BLD AUTO: 4.27 X10(6)UL
SAO2 % BLDA: 94.4 % (ref 94–100)
SODIUM SERPL-SCNC: 144 MMOL/L (ref 136–145)
T AXIS: 10 DEGREES
VENTRICULAR RATE: 89 BPM
WBC # BLD AUTO: 8.2 X10(3) UL (ref 4–11)

## 2023-01-08 PROCEDURE — 71045 X-RAY EXAM CHEST 1 VIEW: CPT | Performed by: INTERNAL MEDICINE

## 2023-01-08 PROCEDURE — 99233 SBSQ HOSP IP/OBS HIGH 50: CPT | Performed by: INTERNAL MEDICINE

## 2023-01-08 RX ORDER — HEPARIN SODIUM 5000 [USP'U]/ML
7500 INJECTION, SOLUTION INTRAVENOUS; SUBCUTANEOUS EVERY 8 HOURS SCHEDULED
Status: DISCONTINUED | OUTPATIENT
Start: 2023-01-08 | End: 2023-01-10

## 2023-01-08 RX ORDER — FUROSEMIDE 40 MG/1
40 TABLET ORAL DAILY
Status: DISCONTINUED | OUTPATIENT
Start: 2023-01-09 | End: 2023-01-10

## 2023-01-08 RX ORDER — LORAZEPAM 2 MG/ML
1.5 INJECTION INTRAMUSCULAR ONCE
Status: COMPLETED | OUTPATIENT
Start: 2023-01-08 | End: 2023-01-08

## 2023-01-08 NOTE — ED INITIAL ASSESSMENT (HPI)
Pt presents with wife and daughter for c/o hallucinations since yesterday. Wife and daughter report pt c/o SOB xtoday. Wife reports hx of extremely elevated CO2 levels in the past d/t hx of COPD, pt wears 4L NC at baseline.

## 2023-01-08 NOTE — PLAN OF CARE
Problem: Patient Centered Care  Goal: Patient preferences are identified and integrated in the patient's plan of care  Description: Interventions:  - What would you like us to know as we care for you?   - Provide timely, complete, and accurate information to patient/family  - Incorporate patient and family knowledge, values, beliefs, and cultural backgrounds into the planning and delivery of care  - Encourage patient/family to participate in care and decision-making at the level they choose  - Honor patient and family perspectives and choices  1/8/2023 0627 by Titus Bosch RN  Outcome: Progressing  1/8/2023 0509 by Titus Bosch RN  Outcome: Progressing     Problem: SAFETY ADULT - FALL  Goal: Free from fall injury  Description: INTERVENTIONS:  - Assess pt frequently for physical needs  - Identify cognitive and physical deficits and behaviors that affect risk of falls.   - [place] fall precautions as indicated by assessment.  - Educate pt/family on patient safety including physical limitations  - Instruct pt to call for assistance with activity based on assessment  - Modify environment to reduce risk of injury  - Provide assistive devices as appropriate  - Consider OT/PT consult to assist with strengthening/mobility  - Encourage toileting schedule  1/8/2023 0627 by Titus Bosch RN  Outcome: Progressing  1/8/2023 0509 by Titus Bosch RN  Outcome: Progressing     Problem: Patient/Family Goals  Goal: Patient/Family Long Term Goal  Description: Patient's Long Term Goal:     Interventions:  -   - See additional Care Plan goals for specific interventions  1/8/2023 0627 by Titus Bosch RN  Outcome: Not Progressing  1/8/2023 0509 by Titus Bosch RN  Outcome: Not Progressing  Goal: Patient/Family Short Term Goal  Description: Patient's Short Term Goal:    Interventions:   -   - See additional Care Plan goals for specific interventions  1/8/2023 0627 by Titus Bosch RN  Outcome: Not Progressing  1/8/2023 0509 by Vipul Daley RN  Outcome: Not Progressing     Problem: RESPIRATORY - ADULT  Goal: Achieves optimal ventilation and oxygenation  Description: INTERVENTIONS:  - Assess for changes in respiratory status  - Assess for changes in mentation and behavior  - Position to facilitate oxygenation and minimize respiratory effort  - Oxygen supplementation based on oxygen saturation or ABGs  - Provide Smoking Cessation handout, if applicable  - Encourage broncho-pulmonary hygiene including cough, deep breathe, Incentive Spirometry  - Assess the need for suctioning and perform as needed  - Assess and instruct to report SOB or any respiratory difficulty  - Respiratory Therapy support as indicated  - Manage/alleviate anxiety  - Monitor for signs/symptoms of CO2 retention  1/8/2023 0627 by Vipul Daley RN  Outcome: Not Progressing  1/8/2023 0509 by Vipul Daley RN  Outcome: Not Progressing     Problem: NEUROLOGICAL - ADULT  Goal: Achieves stable or improved neurological status  Description: INTERVENTIONS  - Assess for and report changes in neurological status  - Initiate measures to prevent increased intracranial pressure  - Maintain blood pressure and fluid volume within ordered parameters to optimize cerebral perfusion and minimize risk of hemorrhage  - Monitor temperature, glucose, and sodium.  Initiate appropriate interventions as ordered  1/8/2023 4941 by Vipul Daley RN  Outcome: Not Progressing  1/8/2023 0509 by Vipul Daley RN  Outcome: Progressing     Problem: Safety Risk - Non-Violent Restraints  Goal: Patient will remain free from self-harm  Description: INTERVENTIONS:  - Apply the least restrictive restraint to prevent harm  - Notify patient and family of reasons restraints applied  - Assess for any contributing factors to confusion (electrolyte disturbances, delirium, medications)  - Discontinue any unnecessary medical devices as soon as possible  - Assess the patient's physical comfort, circulation, skin condition, hydration, nutrition and elimination needs   - Reorient and redirection as needed  - Assess for the need to continue restraints  Outcome: Not Progressing      New admit from the ED. Admission process done and admission orders received / noted. Awake and Ox4 upon arrival to unit. Had periodic confusion. Slept then woke up very confused/ agitated,pulled all devices/  tubings and combative towards staff when attempted to calm him down. Was not following directions. Soft wrist restraints applied and a dose of ativan given as per order. Hemodynamically stable. Afebrile. On continuous BIPAP overnight. Desaturated when removed BIPAP but recovered once reapplied. Voiding per urinal. Wife was updated on patient condition.

## 2023-01-08 NOTE — CM/SW NOTE
Received MDO for home health evaluation. PT/OT evaluations pending. Spoke w/ patient's wife Gogo Scott for assessment. Patient and Gogo Scott live in a one level home. Patient owns a walker and cane but does not use an assisted walking device. He is independent with ADLs, she manages IADLs. Patient home home O2 through HME. Denies h/o SNF or HH. Discussed potential discharge needs. Spouse declined SNF or HH. She prefers patient f/u with OP therapy at [facility]. She can assist w/ transportation. Will need Rx prior to discharge.     Nini Door, [address]

## 2023-01-08 NOTE — RESPIRATORY THERAPY NOTE
Pt presented in the ER with SOB and hallucinations. Bipap initiated R12/12/5/40%. Continuous nebs administered. Abg drawn with results read back to MD. IPAP now at 15 post abg. Pt tolerating therapy and later transported to #219. No other changes. RT continue to monitor.

## 2023-01-08 NOTE — PLAN OF CARE
Problem: Patient Centered Care  Goal: Patient preferences are identified and integrated in the patient's plan of care  Description: Interventions:  - What would you like us to know as we care for you?   - Provide timely, complete, and accurate information to patient/family  - Incorporate patient and family knowledge, values, beliefs, and cultural backgrounds into the planning and delivery of care  - Encourage patient/family to participate in care and decision-making at the level they choose  - Honor patient and family perspectives and choices  Outcome: Progressing     Problem: Patient/Family Goals  Goal: Patient/Family Long Term Goal  Description: Patient's Long Term Goal:     Interventions:  -   Problem: NEUROLOGICAL - ADULT  Goal: Achieves stable or improved neurological status  Description: INTERVENTIONS  - Assess for and report changes in neurological status  - Initiate measures to prevent increased intracranial pressure  - Maintain blood pressure and fluid volume within ordered parameters to optimize cerebral perfusion and minimize risk of hemorrhage  - Monitor temperature, glucose, and sodium. Initiate appropriate interventions as ordered  Outcome: Progressing     Problem: SAFETY ADULT - FALL  Goal: Free from fall injury  Description: INTERVENTIONS:  - Assess pt frequently for physical needs  - Identify cognitive and physical deficits and behaviors that affect risk of falls.   - [place] fall precautions as indicated by assessment.  - Educate pt/family on patient safety including physical limitations  - Instruct pt to call for assistance with activity based on assessment  - Modify environment to reduce risk of injury  - Provide assistive devices as appropriate  - Consider OT/PT consult to assist with strengthening/mobility  - Encourage toileting schedule  Outcome: Progressing     Problem: Safety Risk - Non-Violent Restraints  Goal: Patient will remain free from self-harm  Description: INTERVENTIONS:  - Apply the least restrictive restraint to prevent harm  - Notify patient and family of reasons restraints applied  - Assess for any contributing factors to confusion (electrolyte disturbances, delirium, medications)  - Discontinue any unnecessary medical devices as soon as possible  - Assess the patient's physical comfort, circulation, skin condition, hydration, nutrition and elimination needs   - Reorient and redirection as needed  - Assess for the need to continue restraints  1/8/2023 1251 by Capri Quezada RN  Outcome: Not Progressing  1/8/2023 1220 by Capri Quezada RN  Outcome: Not Progressing     - See additional Care Plan goals for specific interventions  Outcome: Progressing  Goal: Patient/Family Short Term Goal  Description: Patient's Short Term Goal:     Interventions:   -   - See additional Care Plan goals for specific interventions  Outcome: Progressing     Problem: RESPIRATORY - ADULT  Goal: Achieves optimal ventilation and oxygenation  Description: INTERVENTIONS:  - Assess for changes in respiratory status  - Assess for changes in mentation and behavior  - Position to facilitate oxygenation and minimize respiratory effort  - Oxygen supplementation based on oxygen saturation or ABGs  - Provide Smoking Cessation handout, if applicable  - Encourage broncho-pulmonary hygiene including cough, deep breathe, Incentive Spirometry  - Assess the need for suctioning and perform as needed  - Assess and instruct to report SOB or any respiratory difficulty  - Respiratory Therapy support as indicated  - Manage/alleviate anxiety  - Monitor for signs/symptoms of CO2 retention  Outcome: Progressing       Patient is alert and oriented, 2L NC, BIPAP at night, Bilateral wrist restraints in place. ABG obtained, CO2 significantly improved, BIPAP off, on 2L. Cognitive status much approved. Wife updated and will visit this afternoon.

## 2023-01-09 LAB
ANION GAP SERPL CALC-SCNC: 5 MMOL/L (ref 0–18)
BASE EXCESS BLD CALC-SCNC: 12.7 MMOL/L (ref ?–2)
BUN BLD-MCNC: 24 MG/DL (ref 7–18)
BUN/CREAT SERPL: 23.3 (ref 10–20)
CALCIUM BLD-MCNC: 9.5 MG/DL (ref 8.5–10.1)
CHLORIDE SERPL-SCNC: 104 MMOL/L (ref 98–112)
CO2 SERPL-SCNC: 34 MMOL/L (ref 21–32)
CREAT BLD-MCNC: 1.03 MG/DL
DEPRECATED RDW RBC AUTO: 51.9 FL (ref 35.1–46.3)
ERYTHROCYTE [DISTWIDTH] IN BLOOD BY AUTOMATED COUNT: 14.2 % (ref 11–15)
GFR SERPLBLD BASED ON 1.73 SQ M-ARVRAT: 78 ML/MIN/1.73M2 (ref 60–?)
GLUCOSE BLD-MCNC: 244 MG/DL (ref 70–99)
GLUCOSE BLDC GLUCOMTR-MCNC: 136 MG/DL (ref 70–99)
HCO3 BLDA-SCNC: 34.7 MEQ/L (ref 21–27)
HCT VFR BLD AUTO: 45 %
HGB BLD-MCNC: 13.9 G/DL
MCH RBC QN AUTO: 30.5 PG (ref 26–34)
MCHC RBC AUTO-ENTMCNC: 30.9 G/DL (ref 31–37)
MCV RBC AUTO: 98.7 FL
MODIFIED ALLEN TEST: POSITIVE
O2 CT BLD-SCNC: 19.4 VOL% (ref 15–23)
OSMOLALITY SERPL CALC.SUM OF ELEC: 308 MOSM/KG (ref 275–295)
OXYGEN LITERS/MINUTE: 5 L/MIN
PCO2 BLDA: 66 MM HG (ref 35–45)
PH BLDA: 7.4 [PH] (ref 7.35–7.45)
PLATELET # BLD AUTO: 183 10(3)UL (ref 150–450)
PO2 BLDA: 68 MM HG (ref 80–100)
POTASSIUM SERPL-SCNC: 4.6 MMOL/L (ref 3.5–5.1)
PUNCTURE CHARGE: YES
RBC # BLD AUTO: 4.56 X10(6)UL
SAO2 % BLDA: 95.3 % (ref 94–100)
SODIUM SERPL-SCNC: 143 MMOL/L (ref 136–145)
WBC # BLD AUTO: 14.3 X10(3) UL (ref 4–11)

## 2023-01-09 PROCEDURE — 99233 SBSQ HOSP IP/OBS HIGH 50: CPT | Performed by: INTERNAL MEDICINE

## 2023-01-09 RX ORDER — IPRATROPIUM BROMIDE AND ALBUTEROL SULFATE 2.5; .5 MG/3ML; MG/3ML
3 SOLUTION RESPIRATORY (INHALATION)
Status: DISCONTINUED | OUTPATIENT
Start: 2023-01-10 | End: 2023-01-10

## 2023-01-09 NOTE — PLAN OF CARE
Patient transferred to this unit around 2200 on 1/8/23. No acute changes in the patient's status overnight. Patient tolerated his bipap for approximately 5 hours. Problem: Patient Centered Care  Goal: Patient preferences are identified and integrated in the patient's plan of care  Description: Interventions:  - What would you like us to know as we care for you? I live at home with my wife and daughter.  - Provide timely, complete, and accurate information to patient/family  - Incorporate patient and family knowledge, values, beliefs, and cultural backgrounds into the planning and delivery of care  - Encourage patient/family to participate in care and decision-making at the level they choose  - Honor patient and family perspectives and choices  Outcome: Progressing    Problem: RESPIRATORY - ADULT  Goal: Achieves optimal ventilation and oxygenation  Description: INTERVENTIONS:  - Assess for changes in respiratory status  - Assess for changes in mentation and behavior  - Position to facilitate oxygenation and minimize respiratory effort  - Oxygen supplementation based on oxygen saturation or ABGs  - Provide Smoking Cessation handout, if applicable  - Encourage broncho-pulmonary hygiene including cough, deep breathe, Incentive Spirometry  - Assess the need for suctioning and perform as needed  - Assess and instruct to report SOB or any respiratory difficulty  - Respiratory Therapy support as indicated  - Manage/alleviate anxiety  - Monitor for signs/symptoms of CO2 retention  Outcome: Progressing     Problem: NEUROLOGICAL - ADULT  Goal: Achieves stable or improved neurological status  Description: INTERVENTIONS  - Assess for and report changes in neurological status  - Initiate measures to prevent increased intracranial pressure  - Maintain blood pressure and fluid volume within ordered parameters to optimize cerebral perfusion and minimize risk of hemorrhage  - Monitor temperature, glucose, and sodium.  Initiate appropriate interventions as ordered  Outcome: Progressing     Problem: SAFETY ADULT - FALL  Goal: Free from fall injury  Description: INTERVENTIONS:  - Assess pt frequently for physical needs  - Identify cognitive and physical deficits and behaviors that affect risk of falls.   - [place] fall precautions as indicated by assessment.  - Educate pt/family on patient safety including physical limitations  - Instruct pt to call for assistance with activity based on assessment  - Modify environment to reduce risk of injury  - Provide assistive devices as appropriate  - Consider OT/PT consult to assist with strengthening/mobility  - Encourage toileting schedule  Outcome: Progressing     Problem: Safety Risk - Non-Violent Restraints  Goal: Patient will remain free from self-harm  Description: INTERVENTIONS:  - Apply the least restrictive restraint to prevent harm  - Notify patient and family of reasons restraints applied  - Assess for any contributing factors to confusion (electrolyte disturbances, delirium, medications)  - Discontinue any unnecessary medical devices as soon as possible  - Assess the patient's physical comfort, circulation, skin condition, hydration, nutrition and elimination needs   - Reorient and redirection as needed  - Assess for the need to continue restraints  Outcome: Progressing

## 2023-01-09 NOTE — PLAN OF CARE
No acute changes. Patient noted to be more confused and hallucinating in the morning, Dr. Laurel Goldman notified. ABG ordered. CO2 was 66. Dr. Rene Nathan notified- patient to use BiPAP when napping and sleeping. Tolerating BiPAP for brief periods of time while napping. BiPAP on for sleep. Safety precautions in place. Problem: Patient Centered Care  Goal: Patient preferences are identified and integrated in the patient's plan of care  Description: Interventions:  - What would you like us to know as we care for you?  From home with wife  - Provide timely, complete, and accurate information to patient/family  - Incorporate patient and family knowledge, values, beliefs, and cultural backgrounds into the planning and delivery of care  - Encourage patient/family to participate in care and decision-making at the level they choose  - Honor patient and family perspectives and choices  Outcome: Progressing     Problem: Patient/Family Goals  Goal: Patient/Family Long Term Goal  Description: Patient's Long Term Goal: Discharge home with outpatient therapy    Interventions:  -BiPAP when napping and sleeping  -Oxygen protocol  -IV solu-medrol  -Monitor vitals  -Monitor daily labs  - See additional Care Plan goals for specific interventions  Outcome: Progressing  Goal: Patient/Family Short Term Goal  Description: Patient's Short Term Goal: Stable CO2 levels    Interventions:   -BiPAP when napping and sleeping  -Monitor ABG  - See additional Care Plan goals for specific interventions  Outcome: Progressing     Problem: RESPIRATORY - ADULT  Goal: Achieves optimal ventilation and oxygenation  Description: INTERVENTIONS:  - Assess for changes in respiratory status  - Assess for changes in mentation and behavior  - Position to facilitate oxygenation and minimize respiratory effort  - Oxygen supplementation based on oxygen saturation or ABGs  - Provide Smoking Cessation handout, if applicable  - Encourage broncho-pulmonary hygiene including cough, deep breathe, Incentive Spirometry  - Assess the need for suctioning and perform as needed  - Assess and instruct to report SOB or any respiratory difficulty  - Respiratory Therapy support as indicated  - Manage/alleviate anxiety  - Monitor for signs/symptoms of CO2 retention  Outcome: Progressing     Problem: NEUROLOGICAL - ADULT  Goal: Achieves stable or improved neurological status  Description: INTERVENTIONS  - Assess for and report changes in neurological status  - Initiate measures to prevent increased intracranial pressure  - Maintain blood pressure and fluid volume within ordered parameters to optimize cerebral perfusion and minimize risk of hemorrhage  - Monitor temperature, glucose, and sodium. Initiate appropriate interventions as ordered  Outcome: Progressing     Problem: SAFETY ADULT - FALL  Goal: Free from fall injury  Description: INTERVENTIONS:  - Assess pt frequently for physical needs  - Identify cognitive and physical deficits and behaviors that affect risk of falls.   - [place] fall precautions as indicated by assessment.  - Educate pt/family on patient safety including physical limitations  - Instruct pt to call for assistance with activity based on assessment  - Modify environment to reduce risk of injury  - Provide assistive devices as appropriate  - Consider OT/PT consult to assist with strengthening/mobility  - Encourage toileting schedule  Outcome: Progressing     Problem: Safety Risk - Non-Violent Restraints  Goal: Patient will remain free from self-harm  Description: INTERVENTIONS:  - Apply the least restrictive restraint to prevent harm  - Notify patient and family of reasons restraints applied  - Assess for any contributing factors to confusion (electrolyte disturbances, delirium, medications)  - Discontinue any unnecessary medical devices as soon as possible  - Assess the patient's physical comfort, circulation, skin condition, hydration, nutrition and elimination needs   - Reorient and redirection as needed  - Assess for the need to continue restraints  Outcome: Progressing

## 2023-01-09 NOTE — PLAN OF CARE
Problem: Patient Centered Care  Goal: Patient preferences are identified and integrated in the patient's plan of care  Description: Interventions:  - What would you like us to know as we care for you?   - Provide timely, complete, and accurate information to patient/family  - Incorporate patient and family knowledge, values, beliefs, and cultural backgrounds into the planning and delivery of care  - Encourage patient/family to participate in care and decision-making at the level they choose  - Honor patient and family perspectives and choices  Outcome: Progressing     Problem: Patient/Family Goals  Goal: Patient/Family Long Term Goal  Description: Patient's Long Term Goal:     Interventions:  -   - See additional Care Plan goals for specific interventions  Outcome: Progressing  Goal: Patient/Family Short Term Goal  Description: Patient's Short Term Goal:     Interventions:   -  - See additional Care Plan goals for specific interventions  Outcome: Progressing     Problem: RESPIRATORY - ADULT  Goal: Achieves optimal ventilation and oxygenation  Description: INTERVENTIONS:  - Assess for changes in respiratory status  - Assess for changes in mentation and behavior  - Position to facilitate oxygenation and minimize respiratory effort  - Oxygen supplementation based on oxygen saturation or ABGs  - Provide Smoking Cessation handout, if applicable  - Encourage broncho-pulmonary hygiene including cough, deep breathe, Incentive Spirometry  - Assess the need for suctioning and perform as needed  - Assess and instruct to report SOB or any respiratory difficulty  - Respiratory Therapy support as indicated  - Manage/alleviate anxiety  - Monitor for signs/symptoms of CO2 retention  Outcome: Progressing     Problem: SAFETY ADULT - FALL  Goal: Free from fall injury  Description: INTERVENTIONS:  - Assess pt frequently for physical needs  - Identify cognitive and physical deficits and behaviors that affect risk of falls.   - [place] fall precautions as indicated by assessment.  - Educate pt/family on patient safety including physical limitations  - Instruct pt to call for assistance with activity based on assessment  - Modify environment to reduce risk of injury  - Provide assistive devices as appropriate  - Consider OT/PT consult to assist with strengthening/mobility  - Encourage toileting schedule  Outcome: Progressing     Problem: NEUROLOGICAL - ADULT  Goal: Achieves stable or improved neurological status  Description: INTERVENTIONS  - Assess for and report changes in neurological status  - Initiate measures to prevent increased intracranial pressure  - Maintain blood pressure and fluid volume within ordered parameters to optimize cerebral perfusion and minimize risk of hemorrhage  - Monitor temperature, glucose, and sodium. Initiate appropriate interventions as ordered  Outcome: Progressing     Problem: Safety Risk - Non-Violent Restraints  Goal: Patient will remain free from self-harm  Description: INTERVENTIONS:  - Apply the least restrictive restraint to prevent harm  - Notify patient and family of reasons restraints applied  - Assess for any contributing factors to confusion (electrolyte disturbances, delirium, medications)  - Discontinue any unnecessary medical devices as soon as possible  - Assess the patient's physical comfort, circulation, skin condition, hydration, nutrition and elimination needs   - Reorient and redirection as needed  - Assess for the need to continue restraints  Outcome: Progressing   Patient alert and oriented x3-4. Bipap worn while sleeping. Orders to transfer to  , family updated and report called to 5th floor nurse. Requested prn ativan order from MD, want to avoid d/t resp failure.

## 2023-01-10 VITALS
TEMPERATURE: 98 F | OXYGEN SATURATION: 92 % | RESPIRATION RATE: 24 BRPM | SYSTOLIC BLOOD PRESSURE: 119 MMHG | WEIGHT: 302.69 LBS | HEIGHT: 67 IN | BODY MASS INDEX: 47.51 KG/M2 | DIASTOLIC BLOOD PRESSURE: 72 MMHG | HEART RATE: 102 BPM

## 2023-01-10 LAB
ANION GAP SERPL CALC-SCNC: 3 MMOL/L (ref 0–18)
BASOPHILS # BLD AUTO: 0.02 X10(3) UL (ref 0–0.2)
BASOPHILS NFR BLD AUTO: 0.2 %
BUN BLD-MCNC: 26 MG/DL (ref 7–18)
BUN/CREAT SERPL: 27.4 (ref 10–20)
CALCIUM BLD-MCNC: 8.8 MG/DL (ref 8.5–10.1)
CHLORIDE SERPL-SCNC: 102 MMOL/L (ref 98–112)
CO2 SERPL-SCNC: 37 MMOL/L (ref 21–32)
CREAT BLD-MCNC: 0.95 MG/DL
DEPRECATED RDW RBC AUTO: 51.9 FL (ref 35.1–46.3)
EOSINOPHIL # BLD AUTO: 0 X10(3) UL (ref 0–0.7)
EOSINOPHIL NFR BLD AUTO: 0 %
ERYTHROCYTE [DISTWIDTH] IN BLOOD BY AUTOMATED COUNT: 14.5 % (ref 11–15)
GFR SERPLBLD BASED ON 1.73 SQ M-ARVRAT: 86 ML/MIN/1.73M2 (ref 60–?)
GLUCOSE BLD-MCNC: 147 MG/DL (ref 70–99)
HCT VFR BLD AUTO: 45.4 %
HGB BLD-MCNC: 14.1 G/DL
IMM GRANULOCYTES # BLD AUTO: 0.16 X10(3) UL (ref 0–1)
IMM GRANULOCYTES NFR BLD: 1.2 %
LYMPHOCYTES # BLD AUTO: 0.8 X10(3) UL (ref 1–4)
LYMPHOCYTES NFR BLD AUTO: 6.1 %
MAGNESIUM SERPL-MCNC: 2.4 MG/DL (ref 1.6–2.6)
MCH RBC QN AUTO: 30.1 PG (ref 26–34)
MCHC RBC AUTO-ENTMCNC: 31.1 G/DL (ref 31–37)
MCV RBC AUTO: 97 FL
MONOCYTES # BLD AUTO: 0.58 X10(3) UL (ref 0.1–1)
MONOCYTES NFR BLD AUTO: 4.4 %
NEUTROPHILS # BLD AUTO: 11.5 X10 (3) UL (ref 1.5–7.7)
NEUTROPHILS # BLD AUTO: 11.5 X10(3) UL (ref 1.5–7.7)
NEUTROPHILS NFR BLD AUTO: 88.1 %
OSMOLALITY SERPL CALC.SUM OF ELEC: 301 MOSM/KG (ref 275–295)
PLATELET # BLD AUTO: 195 10(3)UL (ref 150–450)
POTASSIUM SERPL-SCNC: 4.4 MMOL/L (ref 3.5–5.1)
RBC # BLD AUTO: 4.68 X10(6)UL
SODIUM SERPL-SCNC: 142 MMOL/L (ref 136–145)
WBC # BLD AUTO: 13.1 X10(3) UL (ref 4–11)

## 2023-01-10 PROCEDURE — 99232 SBSQ HOSP IP/OBS MODERATE 35: CPT | Performed by: PHYSICIAN ASSISTANT

## 2023-01-10 RX ORDER — PREDNISONE 20 MG/1
TABLET ORAL
Qty: 20 TABLET | Refills: 0 | Status: SHIPPED | OUTPATIENT
Start: 2023-01-11 | End: 2023-01-23

## 2023-01-10 RX ORDER — METHYLPREDNISOLONE SODIUM SUCCINATE 40 MG/ML
40 INJECTION, POWDER, LYOPHILIZED, FOR SOLUTION INTRAMUSCULAR; INTRAVENOUS EVERY 12 HOURS
Status: DISCONTINUED | OUTPATIENT
Start: 2023-01-11 | End: 2023-01-10

## 2023-01-10 NOTE — PLAN OF CARE
Pt medically cleared for discharge. Provided discharge paperwork and education. Gave signed paper script for outpatient physical therapy to pt's spouse. IV access removed. All belongings returned to patient. Patient to be wheeled down to main lobby and picked up by pt's spouse.    Problem: Patient Centered Care  Goal: Patient preferences are identified and integrated in the patient's plan of care  Description: Interventions:  - What would you like us to know as we care for you?   - Provide timely, complete, and accurate information to patient/family  - Incorporate patient and family knowledge, values, beliefs, and cultural backgrounds into the planning and delivery of care  - Encourage patient/family to participate in care and decision-making at the level they choose  - Honor patient and family perspectives and choices  Outcome: Completed     Problem: Patient/Family Goals  Goal: Patient/Family Long Term Goal  Description: Patient's Long Term Goal:     Interventions:  -   - See additional Care Plan goals for specific interventions  Outcome: Completed  Goal: Patient/Family Short Term Goal  Description: Patient's Short Term Goal:     Interventions:   -   - See additional Care Plan goals for specific interventions  Outcome: Completed     Problem: RESPIRATORY - ADULT  Goal: Achieves optimal ventilation and oxygenation  Description: INTERVENTIONS:  - Assess for changes in respiratory status  - Assess for changes in mentation and behavior  - Position to facilitate oxygenation and minimize respiratory effort  - Oxygen supplementation based on oxygen saturation or ABGs  - Provide Smoking Cessation handout, if applicable  - Encourage broncho-pulmonary hygiene including cough, deep breathe, Incentive Spirometry  - Assess the need for suctioning and perform as needed  - Assess and instruct to report SOB or any respiratory difficulty  - Respiratory Therapy support as indicated  - Manage/alleviate anxiety  - Monitor for signs/symptoms of CO2 retention  Outcome: Completed     Problem: SAFETY ADULT - FALL  Goal: Free from fall injury  Description: INTERVENTIONS:  - Assess pt frequently for physical needs  - Identify cognitive and physical deficits and behaviors that affect risk of falls. - [place] fall precautions as indicated by assessment.  - Educate pt/family on patient safety including physical limitations  - Instruct pt to call for assistance with activity based on assessment  - Modify environment to reduce risk of injury  - Provide assistive devices as appropriate  - Consider OT/PT consult to assist with strengthening/mobility  - Encourage toileting schedule  Outcome: Completed     Problem: NEUROLOGICAL - ADULT  Goal: Achieves stable or improved neurological status  Description: INTERVENTIONS  - Assess for and report changes in neurological status  - Initiate measures to prevent increased intracranial pressure  - Maintain blood pressure and fluid volume within ordered parameters to optimize cerebral perfusion and minimize risk of hemorrhage  - Monitor temperature, glucose, and sodium.  Initiate appropriate interventions as ordered  Outcome: Completed     Problem: Safety Risk - Non-Violent Restraints  Goal: Patient will remain free from self-harm  Description: INTERVENTIONS:  - Apply the least restrictive restraint to prevent harm  - Notify patient and family of reasons restraints applied  - Assess for any contributing factors to confusion (electrolyte disturbances, delirium, medications)  - Discontinue any unnecessary medical devices as soon as possible  - Assess the patient's physical comfort, circulation, skin condition, hydration, nutrition and elimination needs   - Reorient and redirection as needed  - Assess for the need to continue restraints  Outcome: Completed

## 2023-01-10 NOTE — PLAN OF CARE
Patient A/Ox4, forgetful at times. BiPAP on at night, tolerated well overnight. Patient could not get comfortable in bed, attempted to sleep in chair, and stated that he wanted to sleep on couch. Thoroughly educated patient on fall risk due BiPAP machine and no alarm on couch. Patient verbalized understanding and still wanted to sleep on couch. Purposeful, frequent rounding completed. Belongings within reach. Call light within reach. Plan of care continued. Problem: RESPIRATORY - ADULT  Goal: Achieves optimal ventilation and oxygenation  Description: INTERVENTIONS:  - Assess for changes in respiratory status  - Assess for changes in mentation and behavior  - Position to facilitate oxygenation and minimize respiratory effort  - Oxygen supplementation based on oxygen saturation or ABGs  - Provide Smoking Cessation handout, if applicable  - Encourage broncho-pulmonary hygiene including cough, deep breathe, Incentive Spirometry  - Assess the need for suctioning and perform as needed  - Assess and instruct to report SOB or any respiratory difficulty  - Respiratory Therapy support as indicated  - Manage/alleviate anxiety  - Monitor for signs/symptoms of CO2 retention  Outcome: Progressing     Problem: SAFETY ADULT - FALL  Goal: Free from fall injury  Description: INTERVENTIONS:  - Assess pt frequently for physical needs  - Identify cognitive and physical deficits and behaviors that affect risk of falls.   - [place] fall precautions as indicated by assessment.  - Educate pt/family on patient safety including physical limitations  - Instruct pt to call for assistance with activity based on assessment  - Modify environment to reduce risk of injury  - Provide assistive devices as appropriate  - Consider OT/PT consult to assist with strengthening/mobility  - Encourage toileting schedule  Outcome: Progressing     Problem: NEUROLOGICAL - ADULT  Goal: Achieves stable or improved neurological status  Description: INTERVENTIONS  - Assess for and report changes in neurological status  - Initiate measures to prevent increased intracranial pressure  - Maintain blood pressure and fluid volume within ordered parameters to optimize cerebral perfusion and minimize risk of hemorrhage  - Monitor temperature, glucose, and sodium.  Initiate appropriate interventions as ordered  Outcome: Progressing     Problem: Safety Risk - Non-Violent Restraints  Goal: Patient will remain free from self-harm  Description: INTERVENTIONS:  - Apply the least restrictive restraint to prevent harm  - Notify patient and family of reasons restraints applied  - Assess for any contributing factors to confusion (electrolyte disturbances, delirium, medications)  - Discontinue any unnecessary medical devices as soon as possible  - Assess the patient's physical comfort, circulation, skin condition, hydration, nutrition and elimination needs   - Reorient and redirection as needed  - Assess for the need to continue restraints  Outcome: Progressing

## 2023-01-10 NOTE — DISCHARGE PLANNING
MDO for discharge today. Patient chart reviewed for discharge: Medication Reconciliation completed, Specialist/PCP follow up listed, and disease specific Instructions/Education included in After Visit Summary. Discharge RN notified patient's RN of AVS completion and verified all consultants have signed off. Patient's RN to notify DC RN if discharge status changes.       Aaron Nichols RN, Discharge Leader

## 2023-01-11 ENCOUNTER — PATIENT OUTREACH (OUTPATIENT)
Dept: CASE MANAGEMENT | Age: 73
End: 2023-01-11

## 2023-01-11 DIAGNOSIS — J96.21 ACUTE ON CHRONIC RESPIRATORY FAILURE WITH HYPOXIA AND HYPERCAPNIA (HCC): ICD-10-CM

## 2023-01-11 DIAGNOSIS — R79.81 CARBON DIOXIDE BLOOD INCREASED: ICD-10-CM

## 2023-01-11 DIAGNOSIS — I50.32 CHRONIC DIASTOLIC CONGESTIVE HEART FAILURE (HCC): ICD-10-CM

## 2023-01-11 DIAGNOSIS — Z02.9 ENCOUNTERS FOR UNSPECIFIED ADMINISTRATIVE PURPOSE: ICD-10-CM

## 2023-01-11 DIAGNOSIS — J96.22 ACUTE ON CHRONIC RESPIRATORY FAILURE WITH HYPOXIA AND HYPERCAPNIA (HCC): ICD-10-CM

## 2023-01-11 DIAGNOSIS — J44.1 COPD WITH ACUTE EXACERBATION (HCC): ICD-10-CM

## 2023-01-11 PROCEDURE — 1111F DSCHRG MED/CURRENT MED MERGE: CPT

## 2023-01-11 NOTE — PROGRESS NOTES
Patient's wife arrived at approximately 1945 to  patient. Patient taken to [facility] via wheelchair, discharged home with wife.

## 2023-01-12 ENCOUNTER — TELEPHONE (OUTPATIENT)
Dept: INTERNAL MEDICINE CLINIC | Facility: CLINIC | Age: 73
End: 2023-01-12

## 2023-01-12 ENCOUNTER — TELEPHONE (OUTPATIENT)
Dept: PULMONOLOGY | Facility: CLINIC | Age: 73
End: 2023-01-12

## 2023-01-12 RX ORDER — ATORVASTATIN CALCIUM 20 MG/1
20 TABLET, FILM COATED ORAL NIGHTLY
Qty: 90 TABLET | Refills: 0 | Status: SHIPPED | OUTPATIENT
Start: 2023-01-12

## 2023-01-12 RX ORDER — FUROSEMIDE 40 MG/1
TABLET ORAL
Qty: 135 TABLET | Refills: 0 | Status: SHIPPED | OUTPATIENT
Start: 2023-01-12

## 2023-01-12 NOTE — TELEPHONE ENCOUNTER
Hailee Will the nurse from patient out reach is calling to speak with one of the nurses about the ordering a cpap machine or chip for patient so it registers correctly.  Please call

## 2023-01-12 NOTE — TELEPHONE ENCOUNTER
Spoke with Marian Forbes and clarified with her that someone will contact patient regarding CPAP issue. Marian Forbes also state patient needs 3 week hospital follow up with Dr. Sierra Alcala, schedule is booked.

## 2023-01-12 NOTE — TELEPHONE ENCOUNTER
Patient reports he does not have Atorvastatin at home, also his RX for the Furosemide change in dosage was never sent to pharmacy and now he is not able to refill without a new RX with the dosage adjustment. Message sent to MD's office to advise. atorvastatin 20 MG Tabs  Commonly known as: Lipitor  Next dose due: Continue home routine  Take 1 tablet (20 mg total) by mouth nightly. You are not taking this medication. If you have questions about this medication, ask the prescribing provider: Abdirizak Tran MD.    Also patient states that his Furosemide dosage was changed by PCP, however, the pharmacy does not have a new prescription with the dosage change and patient is not able to purchase enough Furosemide without a new RX.    furosemide 40 MG Tabs  Commonly known as: Lasix  Next dose due: Continue home routine  Take 1 tablet (40 mg total) by mouth every other day   AND 2 tablets (80 mg total) every other day. Triage: Please have Dr. Erica Chacon clarify medications and send in new prescriptions to pharmacy if appropriate.

## 2023-01-12 NOTE — PROGRESS NOTES
BC received a call back from Bradley Arias in Dr. Robert Gray office. Bradley Arias spoke with E and they are going to call the patient to trouble shoot the problem with the CPAP machine, and get it taken care of. NC placed a call to patient to update him of the above.

## 2023-01-12 NOTE — TELEPHONE ENCOUNTER
Spoke to pt for TCM today. Pt does not have HFU appt scheduled at this time. TCM/HFU appt recommended by 1/17/2023 as pt is at High risk for readmission. BC attempted to schedule a TCM HFU, patient and his wife are requesting an appointment between 10:00 & 11:00 am, so his wife can bring him. Due to MD's full schedule BC sent message to MD's office for assistance in scheduling the appointment. BOOK BY DATE (last date for TCM): 1/24/2023    Clinical staff:  Please call patient to schedule the TCM HFU within the TCM timeframe. Thank you!

## 2023-01-12 NOTE — TELEPHONE ENCOUNTER
Spoke with Latasha at [facility], patient has a [employer], she will contact patient to assist patient with CPAP machine. Left message for Bonita Luna RN that [facility] will contact patient.

## 2023-01-17 ENCOUNTER — PATIENT MESSAGE (OUTPATIENT)
Dept: PULMONOLOGY | Facility: CLINIC | Age: 73
End: 2023-01-17

## 2023-01-18 ENCOUNTER — OFFICE VISIT (OUTPATIENT)
Dept: INTERNAL MEDICINE CLINIC | Facility: CLINIC | Age: 73
End: 2023-01-18

## 2023-01-18 ENCOUNTER — TELEPHONE (OUTPATIENT)
Dept: INTERNAL MEDICINE CLINIC | Facility: CLINIC | Age: 73
End: 2023-01-18

## 2023-01-18 VITALS
WEIGHT: 305.69 LBS | DIASTOLIC BLOOD PRESSURE: 76 MMHG | HEIGHT: 67 IN | BODY MASS INDEX: 47.98 KG/M2 | SYSTOLIC BLOOD PRESSURE: 124 MMHG | OXYGEN SATURATION: 96 % | HEART RATE: 90 BPM | TEMPERATURE: 98 F

## 2023-01-18 DIAGNOSIS — G47.33 OSA AND COPD OVERLAP SYNDROME (HCC): ICD-10-CM

## 2023-01-18 DIAGNOSIS — I77.89 ASCENDING AORTA ENLARGEMENT (HCC): ICD-10-CM

## 2023-01-18 DIAGNOSIS — J96.21 ACUTE ON CHRONIC RESPIRATORY FAILURE WITH HYPOXIA AND HYPERCAPNIA (HCC): Primary | ICD-10-CM

## 2023-01-18 DIAGNOSIS — E66.2 OBESITY HYPOVENTILATION SYNDROME (HCC): ICD-10-CM

## 2023-01-18 DIAGNOSIS — G47.33 OSA (OBSTRUCTIVE SLEEP APNEA): Primary | ICD-10-CM

## 2023-01-18 DIAGNOSIS — E66.01 MORBID (SEVERE) OBESITY DUE TO EXCESS CALORIES (HCC): ICD-10-CM

## 2023-01-18 DIAGNOSIS — J96.22 ACUTE ON CHRONIC RESPIRATORY FAILURE WITH HYPOXIA AND HYPERCAPNIA (HCC): Primary | ICD-10-CM

## 2023-01-18 DIAGNOSIS — J44.9 OSA AND COPD OVERLAP SYNDROME (HCC): ICD-10-CM

## 2023-01-18 DIAGNOSIS — I51.9 LEFT VENTRICULAR DIASTOLIC DYSFUNCTION: ICD-10-CM

## 2023-01-18 PROCEDURE — 1125F AMNT PAIN NOTED PAIN PRSNT: CPT | Performed by: INTERNAL MEDICINE

## 2023-01-18 PROCEDURE — 99495 TRANSJ CARE MGMT MOD F2F 14D: CPT | Performed by: INTERNAL MEDICINE

## 2023-01-18 PROCEDURE — 1111F DSCHRG MED/CURRENT MED MERGE: CPT | Performed by: INTERNAL MEDICINE

## 2023-01-18 RX ORDER — FUROSEMIDE 40 MG/1
40 TABLET ORAL 2 TIMES DAILY
Qty: 180 TABLET | Refills: 1 | Status: SHIPPED | OUTPATIENT
Start: 2023-01-18

## 2023-01-18 NOTE — TELEPHONE ENCOUNTER
Order for CPAP and supplies with face to face notes from 1/18/23 faxed to [facility] at [phone], confirmation received.

## 2023-01-18 NOTE — TELEPHONE ENCOUNTER
[facility] at [phone], spoke with Shelley who states pt has not been compliant with the usage of CPAP machine and therefore asset recovery will be reaching out to the pt to get machine due to the non compliance. Pt was seen by [facility] yesterday and they were able to see that he is in need of CPAP machine and will hold off on obtaining it for a little while longer. A new order for CPAP and supplies along with face to face notes will need be submitted to E via fax at [phone] in order to complete a restart for the pt because the current machine will be taken back due to non compliance.

## 2023-01-18 NOTE — TELEPHONE ENCOUNTER
3rd attempt, left message for pt to call back to schedule a TCM appt with Dr Jose Rivero with pt's wife Cedric Angulo, verified , pt scheduled on / at 2:30pm with Dr Erica Chacon.

## 2023-01-18 NOTE — TELEPHONE ENCOUNTER
Reference 1/12/23 TE. Discussed with RN, [facility] was to contact patient. Requested patient clarify what is needed. Dr. Tree Delgado- patient is requesting hospital follow up appointment, next available appointment is first week of March. When would you like to see patient?

## 2023-01-25 ENCOUNTER — TELEPHONE (OUTPATIENT)
Dept: ORTHOPEDICS CLINIC | Facility: CLINIC | Age: 73
End: 2023-01-25

## 2023-01-25 NOTE — TELEPHONE ENCOUNTER
Patient requesting sooner appointment for cortisone injection. States he is in a lot of pain and unable to walk.  Patient currently scheduled for 02/23 and on wait list. Please advise

## 2023-01-26 NOTE — TELEPHONE ENCOUNTER
Left message for patient to call back to confirm appointment 2/6/23 10:00 AM. MyChart message also sent to patient.

## 2023-01-31 NOTE — TELEPHONE ENCOUNTER
Spoke with patient and confirmed 2/6/23 appointment with Dr. Alexis Odonnell. Patient is having trouble accessing his [employer] account to which I provided log in details and assisted with password reset. Advised to contact [facility] if still unable to access account.

## 2023-02-02 ENCOUNTER — OFFICE VISIT (OUTPATIENT)
Dept: ORTHOPEDICS CLINIC | Facility: CLINIC | Age: 73
End: 2023-02-02

## 2023-02-02 VITALS — DIASTOLIC BLOOD PRESSURE: 75 MMHG | HEART RATE: 90 BPM | SYSTOLIC BLOOD PRESSURE: 117 MMHG

## 2023-02-02 DIAGNOSIS — M17.0 PRIMARY OSTEOARTHRITIS OF BOTH KNEES: Primary | ICD-10-CM

## 2023-02-02 PROCEDURE — 1111F DSCHRG MED/CURRENT MED MERGE: CPT | Performed by: ORTHOPAEDIC SURGERY

## 2023-02-02 PROCEDURE — 1125F AMNT PAIN NOTED PAIN PRSNT: CPT | Performed by: ORTHOPAEDIC SURGERY

## 2023-02-02 PROCEDURE — 20610 DRAIN/INJ JOINT/BURSA W/O US: CPT

## 2023-02-02 PROCEDURE — 99213 OFFICE O/P EST LOW 20 MIN: CPT | Performed by: ORTHOPAEDIC SURGERY

## 2023-02-02 RX ORDER — TRIAMCINOLONE ACETONIDE 40 MG/ML
40 INJECTION, SUSPENSION INTRA-ARTICULAR; INTRAMUSCULAR
Status: COMPLETED | OUTPATIENT
Start: 2023-02-02 | End: 2023-02-02

## 2023-02-02 RX ADMIN — TRIAMCINOLONE ACETONIDE 40 MG: 40 INJECTION, SUSPENSION INTRA-ARTICULAR; INTRAMUSCULAR at 17:08:00

## 2023-02-02 RX ADMIN — TRIAMCINOLONE ACETONIDE 40 MG: 40 INJECTION, SUSPENSION INTRA-ARTICULAR; INTRAMUSCULAR at 17:09:00

## 2023-02-02 NOTE — PROGRESS NOTES
Per verbal order from Dr. Crystal Moritz draw up 3ml of 0.5% Marcaine & 2ml 1% lidocaine and 1ml of Kenalog 40 for cortisone injection to bilateral knee Luca Mole    Patient provided education handout for cortisone injection.

## 2023-02-06 ENCOUNTER — OFFICE VISIT (OUTPATIENT)
Dept: PULMONOLOGY | Facility: CLINIC | Age: 73
End: 2023-02-06

## 2023-02-06 VITALS
OXYGEN SATURATION: 96 % | HEIGHT: 67 IN | DIASTOLIC BLOOD PRESSURE: 76 MMHG | HEART RATE: 94 BPM | WEIGHT: 304 LBS | BODY MASS INDEX: 47.71 KG/M2 | SYSTOLIC BLOOD PRESSURE: 118 MMHG

## 2023-02-06 DIAGNOSIS — J43.2 CENTRILOBULAR EMPHYSEMA (HCC): Primary | ICD-10-CM

## 2023-02-06 DIAGNOSIS — J96.12 CHRONIC RESPIRATORY FAILURE WITH HYPOXIA AND HYPERCAPNIA (HCC): ICD-10-CM

## 2023-02-06 DIAGNOSIS — I50.9 CONGESTIVE HEART FAILURE, UNSPECIFIED HF CHRONICITY, UNSPECIFIED HEART FAILURE TYPE (HCC): ICD-10-CM

## 2023-02-06 DIAGNOSIS — J96.11 CHRONIC RESPIRATORY FAILURE WITH HYPOXIA AND HYPERCAPNIA (HCC): ICD-10-CM

## 2023-02-06 PROCEDURE — 99214 OFFICE O/P EST MOD 30 MIN: CPT | Performed by: INTERNAL MEDICINE

## 2023-02-06 NOTE — PROGRESS NOTES
Faxed signed order for pulmonary rehab along with office visit notes from 2/6/23, PFTs from 4/30/21 and facesheet to [facility] pulmonary rehab. Received confirmation.

## 2023-02-17 ENCOUNTER — OFFICE VISIT (OUTPATIENT)
Dept: INTERNAL MEDICINE CLINIC | Facility: CLINIC | Age: 73
End: 2023-02-17

## 2023-02-17 VITALS
DIASTOLIC BLOOD PRESSURE: 84 MMHG | OXYGEN SATURATION: 95 % | BODY MASS INDEX: 47.61 KG/M2 | HEIGHT: 67 IN | SYSTOLIC BLOOD PRESSURE: 120 MMHG | HEART RATE: 85 BPM | TEMPERATURE: 98 F | WEIGHT: 303.31 LBS

## 2023-02-17 DIAGNOSIS — J43.9 PULMONARY EMPHYSEMA, UNSPECIFIED EMPHYSEMA TYPE (HCC): ICD-10-CM

## 2023-02-17 DIAGNOSIS — J96.11 CHRONIC RESPIRATORY FAILURE WITH HYPOXIA AND HYPERCAPNIA (HCC): ICD-10-CM

## 2023-02-17 DIAGNOSIS — Z91.81 AT RISK FOR FALLS: ICD-10-CM

## 2023-02-17 DIAGNOSIS — E66.2 OBESITY HYPOVENTILATION SYNDROME (HCC): ICD-10-CM

## 2023-02-17 DIAGNOSIS — E78.5 DYSLIPIDEMIA: ICD-10-CM

## 2023-02-17 DIAGNOSIS — G47.33 OSA AND COPD OVERLAP SYNDROME (HCC): ICD-10-CM

## 2023-02-17 DIAGNOSIS — Z72.0 TOBACCO ABUSE: ICD-10-CM

## 2023-02-17 DIAGNOSIS — Z12.11 COLON CANCER SCREENING: ICD-10-CM

## 2023-02-17 DIAGNOSIS — I50.32 CHRONIC DIASTOLIC CONGESTIVE HEART FAILURE (HCC): ICD-10-CM

## 2023-02-17 DIAGNOSIS — I77.89 ASCENDING AORTA ENLARGEMENT (HCC): ICD-10-CM

## 2023-02-17 DIAGNOSIS — H57.9 SENSATION OF FOREIGN BODY IN EYE: ICD-10-CM

## 2023-02-17 DIAGNOSIS — J44.9 OSA AND COPD OVERLAP SYNDROME (HCC): ICD-10-CM

## 2023-02-17 DIAGNOSIS — J96.12 CHRONIC RESPIRATORY FAILURE WITH HYPOXIA AND HYPERCAPNIA (HCC): ICD-10-CM

## 2023-02-17 DIAGNOSIS — G47.33 OSA (OBSTRUCTIVE SLEEP APNEA): ICD-10-CM

## 2023-02-17 DIAGNOSIS — Z00.00 MEDICARE ANNUAL WELLNESS VISIT, SUBSEQUENT: Primary | ICD-10-CM

## 2023-02-17 DIAGNOSIS — R35.0 URINARY FREQUENCY: ICD-10-CM

## 2023-02-17 DIAGNOSIS — Z12.5 PROSTATE CANCER SCREENING: ICD-10-CM

## 2023-02-17 DIAGNOSIS — E66.01 MORBID (SEVERE) OBESITY DUE TO EXCESS CALORIES (HCC): ICD-10-CM

## 2023-02-19 PROBLEM — J96.21 ACUTE ON CHRONIC RESPIRATORY FAILURE WITH HYPOXIA AND HYPERCAPNIA (HCC): Status: RESOLVED | Noted: 2021-04-10 | Resolved: 2023-02-19

## 2023-02-19 PROBLEM — R35.1 NOCTURIA: Status: RESOLVED | Noted: 2018-07-29 | Resolved: 2023-02-19

## 2023-02-19 PROBLEM — H57.8A9 SENSATION OF FOREIGN BODY IN EYE: Status: ACTIVE | Noted: 2023-02-19

## 2023-02-19 PROBLEM — J96.01 ACUTE RESPIRATORY FAILURE WITH HYPOXIA AND HYPERCAPNIA (HCC): Status: RESOLVED | Noted: 2022-08-20 | Resolved: 2023-02-19

## 2023-02-19 PROBLEM — Z91.81 AT RISK FOR FALLS: Status: ACTIVE | Noted: 2023-02-19

## 2023-02-19 PROBLEM — J44.1 COPD EXACERBATION (HCC): Status: RESOLVED | Noted: 2022-08-20 | Resolved: 2023-02-19

## 2023-02-19 PROBLEM — J96.11 CHRONIC RESPIRATORY FAILURE WITH HYPOXIA AND HYPERCAPNIA (HCC): Status: ACTIVE | Noted: 2023-02-19

## 2023-02-19 PROBLEM — G47.33 OSA AND COPD OVERLAP SYNDROME (HCC): Status: ACTIVE | Noted: 2023-02-19

## 2023-02-19 PROBLEM — E66.2 OBESITY HYPOVENTILATION SYNDROME (HCC): Status: ACTIVE | Noted: 2023-02-19

## 2023-02-19 PROBLEM — J96.22 ACUTE ON CHRONIC RESPIRATORY FAILURE WITH HYPOXIA AND HYPERCAPNIA (HCC): Status: RESOLVED | Noted: 2021-04-10 | Resolved: 2023-02-19

## 2023-02-19 PROBLEM — J96.12 CHRONIC RESPIRATORY FAILURE WITH HYPOXIA AND HYPERCAPNIA (HCC): Status: ACTIVE | Noted: 2023-02-19

## 2023-02-19 PROBLEM — R35.0 URINARY FREQUENCY: Status: ACTIVE | Noted: 2023-02-19

## 2023-02-19 PROBLEM — J96.02 ACUTE RESPIRATORY FAILURE WITH HYPOXIA AND HYPERCAPNIA (HCC): Status: RESOLVED | Noted: 2022-08-20 | Resolved: 2023-02-19

## 2023-02-19 PROBLEM — J43.2 CENTRILOBULAR EMPHYSEMA (HCC): Status: RESOLVED | Noted: 2017-04-19 | Resolved: 2023-02-19

## 2023-02-19 PROBLEM — E78.5 DYSLIPIDEMIA: Status: ACTIVE | Noted: 2023-02-19

## 2023-02-19 PROBLEM — G47.33 OSA (OBSTRUCTIVE SLEEP APNEA): Status: ACTIVE | Noted: 2023-02-19

## 2023-02-19 PROBLEM — T20.10XA FIRST DEGREE BURN OF FACE: Status: RESOLVED | Noted: 2021-09-20 | Resolved: 2023-02-19

## 2023-02-19 PROBLEM — H57.9 SENSATION OF FOREIGN BODY IN EYE: Status: ACTIVE | Noted: 2023-02-19

## 2023-02-19 PROBLEM — J44.9 OSA AND COPD OVERLAP SYNDROME (HCC): Status: ACTIVE | Noted: 2023-02-19

## 2023-03-10 ENCOUNTER — LAB ENCOUNTER (OUTPATIENT)
Dept: LAB | Age: 73
End: 2023-03-10
Attending: INTERNAL MEDICINE
Payer: MEDICARE

## 2023-03-10 DIAGNOSIS — E78.5 DYSLIPIDEMIA: ICD-10-CM

## 2023-03-10 DIAGNOSIS — Z12.5 PROSTATE CANCER SCREENING: ICD-10-CM

## 2023-03-10 LAB
ALBUMIN SERPL-MCNC: 3.6 G/DL (ref 3.4–5)
ALP LIVER SERPL-CCNC: 91 U/L
ALT SERPL-CCNC: 56 U/L
AST SERPL-CCNC: 20 U/L (ref 15–37)
BILIRUB DIRECT SERPL-MCNC: <0.1 MG/DL (ref 0–0.2)
BILIRUB SERPL-MCNC: 0.5 MG/DL (ref 0.1–2)
CHOLEST SERPL-MCNC: 212 MG/DL (ref ?–200)
COMPLEXED PSA SERPL-MCNC: 0.48 NG/ML (ref ?–4)
FASTING PATIENT LIPID ANSWER: YES
HDLC SERPL-MCNC: 57 MG/DL (ref 40–59)
LDLC SERPL CALC-MCNC: 145 MG/DL (ref ?–100)
NONHDLC SERPL-MCNC: 155 MG/DL (ref ?–130)
PROT SERPL-MCNC: 6.8 G/DL (ref 6.4–8.2)
TRIGL SERPL-MCNC: 57 MG/DL (ref 30–149)
VLDLC SERPL CALC-MCNC: 11 MG/DL (ref 0–30)

## 2023-03-10 PROCEDURE — 36415 COLL VENOUS BLD VENIPUNCTURE: CPT

## 2023-03-10 PROCEDURE — 80061 LIPID PANEL: CPT

## 2023-03-10 PROCEDURE — 80076 HEPATIC FUNCTION PANEL: CPT

## 2023-03-15 ENCOUNTER — TELEPHONE (OUTPATIENT)
Dept: INTERNAL MEDICINE CLINIC | Facility: CLINIC | Age: 73
End: 2023-03-15

## 2023-03-15 NOTE — TELEPHONE ENCOUNTER
Verified name and . Patient was advised of Dr. Joanne Haywood message. Patient verbalizes understanding and agrees with plan.

## 2023-05-16 ENCOUNTER — TELEPHONE (OUTPATIENT)
Dept: ORTHOPEDICS CLINIC | Facility: CLINIC | Age: 73
End: 2023-05-16

## 2023-05-16 NOTE — TELEPHONE ENCOUNTER
S/w patient- Offered Friday appointment with Yoko Amos PA-C for cortisone injection. Appointment on 5/19/23 at 10:30am. Patient accepted.

## 2023-05-16 NOTE — TELEPHONE ENCOUNTER
Patient requesting for a sooner appt for injection on knee. Next available June 2 patient refused. Patient would like something sonner on a Friday.

## 2023-05-19 ENCOUNTER — OFFICE VISIT (OUTPATIENT)
Dept: ORTHOPEDICS CLINIC | Facility: CLINIC | Age: 73
End: 2023-05-19

## 2023-05-19 VITALS — SYSTOLIC BLOOD PRESSURE: 116 MMHG | DIASTOLIC BLOOD PRESSURE: 75 MMHG | HEART RATE: 102 BPM

## 2023-05-19 DIAGNOSIS — M17.0 PRIMARY OSTEOARTHRITIS OF BOTH KNEES: Primary | ICD-10-CM

## 2023-05-19 PROCEDURE — 20610 DRAIN/INJ JOINT/BURSA W/O US: CPT

## 2023-05-19 PROCEDURE — 1125F AMNT PAIN NOTED PAIN PRSNT: CPT

## 2023-05-19 RX ORDER — TRIAMCINOLONE ACETONIDE 40 MG/ML
40 INJECTION, SUSPENSION INTRA-ARTICULAR; INTRAMUSCULAR ONCE
Status: COMPLETED | OUTPATIENT
Start: 2023-05-19 | End: 2023-05-19

## 2023-05-19 RX ADMIN — TRIAMCINOLONE ACETONIDE 40 MG: 40 INJECTION, SUSPENSION INTRA-ARTICULAR; INTRAMUSCULAR at 11:04:00

## 2023-05-19 RX ADMIN — TRIAMCINOLONE ACETONIDE 40 MG: 40 INJECTION, SUSPENSION INTRA-ARTICULAR; INTRAMUSCULAR at 11:05:00

## 2023-05-19 NOTE — PROGRESS NOTES
Per verbal order from Dr. Davida Smith draw up 3ml of 0.5% Marcaine & 2ml 1% lidocaine and 1ml of Kenalog 40 for cortisone injection to bilateral knee. Jimmy Curry MA    Patient provided education handout for cortisone injection.

## 2023-06-16 RX ORDER — POTASSIUM CHLORIDE 750 MG/1
TABLET, EXTENDED RELEASE ORAL
Qty: 135 TABLET | Refills: 1 | Status: SHIPPED | OUTPATIENT
Start: 2023-06-16

## 2023-10-25 ENCOUNTER — APPOINTMENT (OUTPATIENT)
Dept: GENERAL RADIOLOGY | Facility: HOSPITAL | Age: 73
End: 2023-10-25
Attending: EMERGENCY MEDICINE
Payer: MEDICARE

## 2023-10-25 ENCOUNTER — HOSPITAL ENCOUNTER (INPATIENT)
Facility: HOSPITAL | Age: 73
LOS: 4 days | Discharge: HOME | End: 2023-10-29
Attending: EMERGENCY MEDICINE | Admitting: HOSPITALIST
Payer: MEDICARE

## 2023-10-25 DIAGNOSIS — J96.22 ACUTE ON CHRONIC RESPIRATORY FAILURE WITH HYPERCAPNIA (HCC): Primary | ICD-10-CM

## 2023-10-25 LAB
ALBUMIN SERPL-MCNC: 3.5 G/DL (ref 3.4–5)
ALP LIVER SERPL-CCNC: 99 U/L
ALT SERPL-CCNC: 85 U/L
ANION GAP SERPL CALC-SCNC: 2 MMOL/L (ref 0–18)
AST SERPL-CCNC: 27 U/L (ref 15–37)
ATRIAL RATE: 88 BPM
BASE EXCESS BLD CALC-SCNC: 16.5 MMOL/L (ref ?–2)
BASOPHILS # BLD AUTO: 0.09 X10(3) UL (ref 0–0.2)
BASOPHILS NFR BLD AUTO: 1 %
BILIRUB DIRECT SERPL-MCNC: 0.2 MG/DL (ref 0–0.2)
BILIRUB SERPL-MCNC: 0.7 MG/DL (ref 0.1–2)
BLOOD GAS EPAP: 5 CM H2O
BLOOD GAS IPAP: 12 CM H2O
BUN BLD-MCNC: 16 MG/DL (ref 7–18)
BUN/CREAT SERPL: 18 (ref 10–20)
CALCIUM BLD-MCNC: 8.8 MG/DL (ref 8.5–10.1)
CHLORIDE SERPL-SCNC: 102 MMOL/L (ref 98–112)
CO2 SERPL-SCNC: 40 MMOL/L (ref 21–32)
CREAT BLD-MCNC: 0.89 MG/DL
DEPRECATED RDW RBC AUTO: 54.5 FL (ref 35.1–46.3)
EGFRCR SERPLBLD CKD-EPI 2021: 91 ML/MIN/1.73M2 (ref 60–?)
EOSINOPHIL # BLD AUTO: 0.27 X10(3) UL (ref 0–0.7)
EOSINOPHIL NFR BLD AUTO: 2.9 %
ERYTHROCYTE [DISTWIDTH] IN BLOOD BY AUTOMATED COUNT: 13.9 % (ref 11–15)
FLUAV + FLUBV RNA SPEC NAA+PROBE: NEGATIVE
FLUAV + FLUBV RNA SPEC NAA+PROBE: NEGATIVE
GLUCOSE BLD-MCNC: 114 MG/DL (ref 70–99)
HCO3 BLDA-SCNC: 37.6 MEQ/L (ref 21–27)
HCT VFR BLD AUTO: 47.9 %
HGB BLD-MCNC: 13.7 G/DL
IMM GRANULOCYTES # BLD AUTO: 0.09 X10(3) UL (ref 0–1)
IMM GRANULOCYTES NFR BLD: 1 %
LYMPHOCYTES # BLD AUTO: 1.51 X10(3) UL (ref 1–4)
LYMPHOCYTES NFR BLD AUTO: 16.4 %
MCH RBC QN AUTO: 30.2 PG (ref 26–34)
MCHC RBC AUTO-ENTMCNC: 28.6 G/DL (ref 31–37)
MCV RBC AUTO: 105.5 FL
MODIFIED ALLEN TEST: POSITIVE
MONOCYTES # BLD AUTO: 0.88 X10(3) UL (ref 0.1–1)
MONOCYTES NFR BLD AUTO: 9.6 %
NEUTROPHILS # BLD AUTO: 6.37 X10 (3) UL (ref 1.5–7.7)
NEUTROPHILS # BLD AUTO: 6.37 X10(3) UL (ref 1.5–7.7)
NEUTROPHILS NFR BLD AUTO: 69.1 %
NT-PROBNP SERPL-MCNC: 98 PG/ML (ref ?–125)
O2 CT BLD-SCNC: 17.4 VOL% (ref 15–23)
O2/TOTAL GAS SETTING VFR VENT: 30 %
OSMOLALITY SERPL CALC.SUM OF ELEC: 300 MOSM/KG (ref 275–295)
P AXIS: 51 DEGREES
P-R INTERVAL: 146 MS
PCO2 BLDA: 92 MM HG (ref 35–45)
PH BLDA: 7.32 [PH] (ref 7.35–7.45)
PLATELET # BLD AUTO: 175 10(3)UL (ref 150–450)
PO2 BLDA: 59 MM HG (ref 80–100)
POTASSIUM SERPL-SCNC: 4.6 MMOL/L (ref 3.5–5.1)
PROT SERPL-MCNC: 6.9 G/DL (ref 6.4–8.2)
PUNCTURE CHARGE: YES
Q-T INTERVAL: 372 MS
QRS DURATION: 92 MS
QTC CALCULATION (BEZET): 450 MS
R AXIS: 15 DEGREES
RBC # BLD AUTO: 4.54 X10(6)UL
RSV RNA SPEC NAA+PROBE: NEGATIVE
SAO2 % BLDA: 92.8 % (ref 94–100)
SARS-COV-2 RNA RESP QL NAA+PROBE: NOT DETECTED
SODIUM SERPL-SCNC: 144 MMOL/L (ref 136–145)
T AXIS: 58 DEGREES
TROPONIN I HIGH SENSITIVITY: 9 NG/L
VENTRICULAR RATE: 88 BPM
WBC # BLD AUTO: 9.2 X10(3) UL (ref 4–11)

## 2023-10-25 PROCEDURE — 99223 1ST HOSP IP/OBS HIGH 75: CPT | Performed by: INTERNAL MEDICINE

## 2023-10-25 PROCEDURE — 99223 1ST HOSP IP/OBS HIGH 75: CPT | Performed by: HOSPITALIST

## 2023-10-25 PROCEDURE — 71045 X-RAY EXAM CHEST 1 VIEW: CPT | Performed by: EMERGENCY MEDICINE

## 2023-10-25 RX ORDER — ACETAMINOPHEN 500 MG
500 TABLET ORAL EVERY 4 HOURS PRN
Status: DISCONTINUED | OUTPATIENT
Start: 2023-10-25 | End: 2023-10-29

## 2023-10-25 RX ORDER — METOCLOPRAMIDE HYDROCHLORIDE 5 MG/ML
10 INJECTION INTRAMUSCULAR; INTRAVENOUS EVERY 8 HOURS PRN
Status: DISCONTINUED | OUTPATIENT
Start: 2023-10-25 | End: 2023-10-29

## 2023-10-25 RX ORDER — MORPHINE SULFATE 2 MG/ML
2 INJECTION, SOLUTION INTRAMUSCULAR; INTRAVENOUS ONCE
Status: COMPLETED | OUTPATIENT
Start: 2023-10-25 | End: 2023-10-25

## 2023-10-25 RX ORDER — ONDANSETRON 2 MG/ML
4 INJECTION INTRAMUSCULAR; INTRAVENOUS EVERY 6 HOURS PRN
Status: DISCONTINUED | OUTPATIENT
Start: 2023-10-25 | End: 2023-10-29

## 2023-10-25 RX ORDER — FUROSEMIDE 10 MG/ML
40 INJECTION INTRAMUSCULAR; INTRAVENOUS
Status: DISCONTINUED | OUTPATIENT
Start: 2023-10-25 | End: 2023-10-27

## 2023-10-25 RX ORDER — ENOXAPARIN SODIUM 100 MG/ML
40 INJECTION SUBCUTANEOUS DAILY
Status: DISCONTINUED | OUTPATIENT
Start: 2023-10-25 | End: 2023-10-29

## 2023-10-25 RX ORDER — METHYLPREDNISOLONE SODIUM SUCCINATE 40 MG/ML
40 INJECTION, POWDER, LYOPHILIZED, FOR SOLUTION INTRAMUSCULAR; INTRAVENOUS EVERY 6 HOURS
Status: DISCONTINUED | OUTPATIENT
Start: 2023-10-25 | End: 2023-10-25

## 2023-10-25 RX ORDER — METHYLPREDNISOLONE SODIUM SUCCINATE 125 MG/2ML
125 INJECTION, POWDER, LYOPHILIZED, FOR SOLUTION INTRAMUSCULAR; INTRAVENOUS ONCE
Status: COMPLETED | OUTPATIENT
Start: 2023-10-25 | End: 2023-10-25

## 2023-10-25 RX ORDER — BENZONATATE 100 MG/1
200 CAPSULE ORAL 3 TIMES DAILY PRN
Status: DISCONTINUED | OUTPATIENT
Start: 2023-10-25 | End: 2023-10-29

## 2023-10-25 RX ORDER — FUROSEMIDE 10 MG/ML
60 INJECTION INTRAMUSCULAR; INTRAVENOUS ONCE
Status: COMPLETED | OUTPATIENT
Start: 2023-10-25 | End: 2023-10-25

## 2023-10-25 RX ORDER — METHYLPREDNISOLONE SODIUM SUCCINATE 40 MG/ML
40 INJECTION, POWDER, LYOPHILIZED, FOR SOLUTION INTRAMUSCULAR; INTRAVENOUS EVERY 12 HOURS
Status: DISCONTINUED | OUTPATIENT
Start: 2023-10-25 | End: 2023-10-28

## 2023-10-25 RX ORDER — IPRATROPIUM BROMIDE AND ALBUTEROL SULFATE 2.5; .5 MG/3ML; MG/3ML
3 SOLUTION RESPIRATORY (INHALATION) EVERY 6 HOURS PRN
Status: DISCONTINUED | OUTPATIENT
Start: 2023-10-25 | End: 2023-10-29

## 2023-10-25 RX ORDER — IPRATROPIUM BROMIDE AND ALBUTEROL SULFATE 2.5; .5 MG/3ML; MG/3ML
3 SOLUTION RESPIRATORY (INHALATION) EVERY 6 HOURS
Status: DISCONTINUED | OUTPATIENT
Start: 2023-10-25 | End: 2023-10-26

## 2023-10-25 RX ORDER — IPRATROPIUM BROMIDE AND ALBUTEROL SULFATE 2.5; .5 MG/3ML; MG/3ML
3 SOLUTION RESPIRATORY (INHALATION) ONCE
Status: COMPLETED | OUTPATIENT
Start: 2023-10-25 | End: 2023-10-25

## 2023-10-25 NOTE — ED INITIAL ASSESSMENT (HPI)
Pt to ED with daughter for hallucinations and difficulty breathing. Patient's daughter states that the patient has COPD and that the pt will hallucinate \"when CO2 levels get too high\". States this has happened in the past.     Patient states he is having a difficult time catching his breath. Normally on 3-4L O2 at home. Triage SPO2 monitor having difficult time getting accurate reading d/t poor circulation.

## 2023-10-25 NOTE — ED QUICK NOTES
Respiratory Therapist adjusted patient mask and inspected BiPAP machine due to patient stating the mask felt uncomfortable and the machine wasn't working properly. RT reassured patient that the machine was in fact working properly and the mask is secured correctly.

## 2023-10-25 NOTE — RESPIRATORY THERAPY NOTE
Called to room by RN to assess patient and BIPAP mask. Mask tightened and rise increased to improve synchrony and comfort between BIPAP and patient. Patient currently on 18/+6/30%. IPAP increased d/t low exhaled volumes. Leak within acceptable range. Assured patient machine and mask are functioning properly. Exhaled volumes are now at 6-8 ml/kg IBW. RR reads 22. SpO2 reads 99%. Will continue to monitor and assist with transport to ICU when necessary. Current settings: BIPAP/ST: 18/+6/30%.

## 2023-10-25 NOTE — RESPIRATORY THERAPY NOTE
BIPAP settings changed to 15/5 based on ABG results.         10/25/23 11:32   ABG PH 7.32 (L)   ABG PCO2 92 (HH)   ABG PO2 59 (L)   ABG HCO3 37.6 (H)   ABG O2 SATURATION 92.8 (L)   Blood Gas Base Excess 16.5 (H)   Oxygen Delivery Device CPAP/BIPAP   Oxygen Content 17.4   FiO2 30.00   SAMPLE SITE Right Radial   (

## 2023-10-25 NOTE — RESPIRATORY THERAPY NOTE
Patient placed on BIPAP 12/5 30% due to respiratory distress. Patient is tolerating well at this time. Will continue to monitor patient.

## 2023-10-25 NOTE — H&P
[facility]    PATIENT'S NAME: Jonathan Whitt   ATTENDING PHYSICIAN: Yenifer Kong MD   PATIENT ACCOUNT#:   [id number]    LOCATION:  [address]  MEDICAL RECORD #:   [id number]       YOB: 1950  ADMISSION DATE:       10/25/2023    HISTORY AND PHYSICAL EXAMINATION    CHIEF COMPLAINT:  Acute on chronic hypoxemic, hypercarbic respiratory failure. HISTORY OF PRESENT ILLNESS:  The patient is a 42-year-old  male who was noted by family to be drowsy and fatigued for the last several days. Last night he did not wear his BiPAP mask because he was agitated. This morning family noted cyanosis on his lips and drowsiness, and they brought him in for evaluation. CBC and chemistry were unremarkable except for bicarb of 40. Liver function tests, troponin, and proBNP were negative. GeneXpert viral panel was negative. Arterial blood gas showed pH 7.32, pCO2 of 92, and pO2 of 59. Chest x-ray showed cardiomegaly with pulmonary vascular congestion, bibasilar reticular opacities, may be atelectasis. The patient was started on BiPAP support and IV Lasix, and he will be admitted to the hospital for further management. Upon arrival to the emergency room, he was saturating 86% on nasal cannula oxygen. PAST MEDICAL HISTORY:  Morbid obesity, overlap syndrome with severe obstructive sleep apnea, chronic obstructive pulmonary disease, chronic hypercapnia with multiple hospitalizations. He is home oxygen dependent. He has left ventricular diastolic dysfunction, hypertension, hyperlipidemia, osteoarthritis, and degenerative joint disease of lumbar spine. PAST SURGICAL HISTORY:  Appendectomy, adenoidectomy. MEDICATIONS:  Please see medication reconciliation list.    ALLERGIES:  No known drug allergies. FAMILY HISTORY:  Mother had lung cancer. Father had lung cancer. SOCIAL HISTORY:  Chronic tobacco use. No alcohol or drug use. Lives with his family.   Sedentary lifestyle but independent in his basic activities of daily living. REVIEW OF SYSTEMS:  Difficult to obtain from the patient himself. His daughter and his wife noted in the last few days he has been more drowsy than usual and coughing more. They did not hear wheezing. No fever or chills. No sick contacts. Last night per his wife he was taking off his BiPAP mask and a bit agitated. She noted this morning that he is drowsy and visual hallucinations with some cyanosis of his lips, and she decided to bring him in for evaluation. Other 12-point review of systems negative. PHYSICAL EXAMINATION:    GENERAL:  Drowsy, easily arousable. VITAL SIGNS:  Temperature 96.6, pulse 79, respiratory rate 29, blood pressure 146/85, pulse ox 86% on nasal cannula and 99% on BiPAP support. HEENT:  Atraumatic. Oropharynx clear. Dry mucous membranes. NECK:  Supple. No lymphadenopathy. LUNGS:  Clear to auscultation. Diminished breathing sounds both lung fields. HEART:  Regular rate and rhythm. S1 and S2 auscultated. ABDOMEN:  Soft. Obese. Positive bowel sounds. No distention. No tenderness. EXTREMITIES:  Chronic stasis dermatitis with +2 to 3 edema both legs with skin crustation. No open ulcers. NEUROLOGIC:  No clear focal defect. ASSESSMENT AND PLAN:    1. Acute on chronic hypoxemic, hypercarbic respiratory failure. 2.   Severe overlap syndrome, obstructive sleep apnea and chronic obstructive pulmonary disease. 3.   Morbid obesity. The patient will be admitted to progressive care unit. Continue with BiPAP support for now and oxygen support. Continue to monitor arterial blood gas level, pCO2 and pO2. IV Solu-Medrol. Nebulizer treatments. DVT prophylaxis. Cough suppressant. Pulmonary consult. Aspiration and fall precautions. Further recommendations to follow.     Dictated By Osmani Roman MD  d: 10/25/2023 12:56:38  t: 10/25/2023 13:08:58  Job [id number]  MW/

## 2023-10-26 LAB
ANION GAP SERPL CALC-SCNC: 6 MMOL/L (ref 0–18)
BASE EXCESS BLD CALC-SCNC: 16.6 MMOL/L (ref ?–2)
BASE EXCESS BLD CALC-SCNC: 16.6 MMOL/L (ref ?–2)
BASOPHILS # BLD AUTO: 0.02 X10(3) UL (ref 0–0.2)
BASOPHILS NFR BLD AUTO: 0.2 %
BLOOD GAS EPAP: 6 CM H2O
BLOOD GAS EPAP: 6 CM H2O
BLOOD GAS IPAP: 16 CM H2O
BLOOD GAS IPAP: 18 CM H2O
BUN BLD-MCNC: 17 MG/DL (ref 7–18)
BUN/CREAT SERPL: 20 (ref 10–20)
CALCIUM BLD-MCNC: 9 MG/DL (ref 8.5–10.1)
CHLORIDE SERPL-SCNC: 99 MMOL/L (ref 98–112)
CO2 SERPL-SCNC: 37 MMOL/L (ref 21–32)
CREAT BLD-MCNC: 0.85 MG/DL
DEPRECATED RDW RBC AUTO: 51 FL (ref 35.1–46.3)
EGFRCR SERPLBLD CKD-EPI 2021: 92 ML/MIN/1.73M2 (ref 60–?)
EOSINOPHIL # BLD AUTO: 0 X10(3) UL (ref 0–0.7)
EOSINOPHIL NFR BLD AUTO: 0 %
ERYTHROCYTE [DISTWIDTH] IN BLOOD BY AUTOMATED COUNT: 13.9 % (ref 11–15)
GLUCOSE BLD-MCNC: 148 MG/DL (ref 70–99)
HCO3 BLDA-SCNC: 37.7 MEQ/L (ref 21–27)
HCO3 BLDA-SCNC: 37.7 MEQ/L (ref 21–27)
HCT VFR BLD AUTO: 46.2 %
HGB BLD-MCNC: 14.2 G/DL
IMM GRANULOCYTES # BLD AUTO: 0.06 X10(3) UL (ref 0–1)
IMM GRANULOCYTES NFR BLD: 0.5 %
INSPIRATION SETTING TIME VENT: 1 SEC (ref 0.1–5)
LYMPHOCYTES # BLD AUTO: 0.76 X10(3) UL (ref 1–4)
LYMPHOCYTES NFR BLD AUTO: 5.9 %
MCH RBC QN AUTO: 30.3 PG (ref 26–34)
MCHC RBC AUTO-ENTMCNC: 30.7 G/DL (ref 31–37)
MCV RBC AUTO: 98.7 FL
MODIFIED ALLEN TEST: POSITIVE
MONOCYTES # BLD AUTO: 0.78 X10(3) UL (ref 0.1–1)
MONOCYTES NFR BLD AUTO: 6 %
NEUTROPHILS # BLD AUTO: 11.33 X10 (3) UL (ref 1.5–7.7)
NEUTROPHILS # BLD AUTO: 11.33 X10(3) UL (ref 1.5–7.7)
NEUTROPHILS NFR BLD AUTO: 87.4 %
O2 CT BLD-SCNC: 18.7 VOL% (ref 15–23)
O2 CT BLD-SCNC: 19 VOL% (ref 15–23)
O2/TOTAL GAS SETTING VFR VENT: 30 %
O2/TOTAL GAS SETTING VFR VENT: 35 %
OSMOLALITY SERPL CALC.SUM OF ELEC: 298 MOSM/KG (ref 275–295)
PCO2 BLDA: 55 MM HG (ref 35–45)
PCO2 BLDA: 60 MM HG (ref 35–45)
PH BLDA: 7.47 [PH] (ref 7.35–7.45)
PH BLDA: 7.5 [PH] (ref 7.35–7.45)
PLATELET # BLD AUTO: 182 10(3)UL (ref 150–450)
PO2 BLDA: 60 MM HG (ref 80–100)
PO2 BLDA: 60 MM HG (ref 80–100)
POTASSIUM SERPL-SCNC: 3.9 MMOL/L (ref 3.5–5.1)
PUNCTURE CHARGE: YES
PUNCTURE CHARGE: YES
RBC # BLD AUTO: 4.68 X10(6)UL
RESP RATE: 16 BPM
SAO2 % BLDA: 93.3 % (ref 94–100)
SAO2 % BLDA: 93.8 % (ref 94–100)
SODIUM SERPL-SCNC: 142 MMOL/L (ref 136–145)
WBC # BLD AUTO: 13 X10(3) UL (ref 4–11)

## 2023-10-26 PROCEDURE — 99233 SBSQ HOSP IP/OBS HIGH 50: CPT | Performed by: INTERNAL MEDICINE

## 2023-10-26 RX ORDER — IPRATROPIUM BROMIDE AND ALBUTEROL SULFATE 2.5; .5 MG/3ML; MG/3ML
3 SOLUTION RESPIRATORY (INHALATION) EVERY 6 HOURS
Status: DISCONTINUED | OUTPATIENT
Start: 2023-10-26 | End: 2023-10-29

## 2023-10-26 RX ORDER — DEXMEDETOMIDINE HYDROCHLORIDE 4 UG/ML
INJECTION, SOLUTION INTRAVENOUS CONTINUOUS
Status: DISCONTINUED | OUTPATIENT
Start: 2023-10-26 | End: 2023-10-26

## 2023-10-26 RX ORDER — LORAZEPAM 2 MG/ML
1 INJECTION INTRAMUSCULAR ONCE
Status: COMPLETED | OUTPATIENT
Start: 2023-10-26 | End: 2023-10-26

## 2023-10-26 NOTE — CM/SW NOTE
Patient known to SW from past admission. Patient & wife Patricia Tirado live in a one level home. Patient owns a walker and cane. He is independent with ADLs, wife manages IADLs. Patient home home O2 through HME. Patient/wife declined HH/SNF in the past.    SW to monitor for any discharge needs.     Hardy Staff, [address]

## 2023-10-26 NOTE — RESPIRATORY THERAPY NOTE
Patient received on BiPAP   Rate 16  IPAP 16  EPAP 6  FiO2 35%    ABG drawn at 0900 , results shown to Dr. Reyna Clark and CCU APN. Patient somnolent on BiPAP all morning. Sedation weaned by RN, now on standby. Mental status improved early afternoon. Patient taken off BiPAP for meal. Placed on 4 L HFNC, tolerating well. RT will continue to encourage NIV use nightly.      Problem: RESPIRATORY - ADULT  Goal: Achieves optimal ventilation and oxygenation  Description: INTERVENTIONS:  - Assess for changes in respiratory status  - Assess for changes in mentation and behavior  - Position to facilitate oxygenation and minimize respiratory effort  - Oxygen supplementation based on oxygen saturation or ABGs  - Provide Smoking Cessation handout, if applicable  - Encourage broncho-pulmonary hygiene including cough, deep breathe, Incentive Spirometry  - Assess the need for suctioning and perform as needed  - Assess and instruct to report SOB or any respiratory difficulty  - Respiratory Therapy support as indicated  - Manage/alleviate anxiety  - Monitor for signs/symptoms of CO2 retention  Outcome: Progressing

## 2023-10-26 NOTE — RESPIRATORY THERAPY NOTE
Pt received last night at 1900 on continuous BiPAP 18/6, 30% FiO2. Pt remained tachypneic and anxious most the night. STAT ABG drawn at 0426. Pt was taking in very large tidal volumes, IPAP and EPAP titrated down to 16/6 and FiO2 increased to 35% tolerating well and O2 sats in the low 90s. RT will continue to monitor.       Latest Reference Range & Units 10/26/23 04:26   ABG PH 7.35 - 7.45  7.50 (H)   ABG PCO2 35 - 45 mm Hg 55 (H)   ABG PO2 80 - 100 mm Hg 60 (L)   ABG HCO3 21.0 - 27.0 mEq/L 37.7 (H)   ABG O2 SATURATION 94.0 - 100.0 % 93.8 (L)   Blood Gas Base Excess -2.0 - 2.0 mmol/L 16.6 (H)   (H): Data is abnormally high  (L): Data is abnormally low

## 2023-10-26 NOTE — PLAN OF CARE
Problem: Patient Centered Care  Goal: Patient preferences are identified and integrated in the patient's plan of care  Description: Interventions:  - What would you like us to know as we care for you? From home with wife  - Provide timely, complete, and accurate information to patient/family  - Incorporate patient and family knowledge, values, beliefs, and cultural backgrounds into the planning and delivery of care  - Encourage patient/family to participate in care and decision-making at the level they choose  - Honor patient and family perspectives and choices  10/26/2023 0534 by Marissa Munson RN  Outcome: Not Progressing  10/26/2023 0533 by Marissa Munson RN  Outcome: Not Progressing     Problem: Patient/Family Goals  Goal: Patient/Family Long Term Goal  Description: Patient's Long Term Goal: to return home    Interventions:  - iv steroids, manage respiratory status   - See additional Care Plan goals for specific interventions  10/26/2023 0534 by Marissa Munson RN  Outcome: Not Progressing  10/26/2023 0533 by Marissa Munson RN  Outcome: Not Progressing  Goal: Patient/Family Short Term Goal  Description: Patient's Short Term Goal: decrease hallucinations/confusion. Interventions:   - ABG's, cont. Bipap.    - See additional Care Plan goals for specific interventions  10/26/2023 0534 by Marissa Munson RN  Outcome: Not Progressing  10/26/2023 0533 by Marissa Munson RN  Outcome: Not Progressing     Problem: Safety Risk - Non-Violent Restraints  Goal: Patient will remain free from self-harm  Description: INTERVENTIONS:  - Apply the least restrictive restraint to prevent harm  - Notify patient and family of reasons restraints applied  - Assess for any contributing factors to confusion (electrolyte disturbances, delirium, medications)  - Discontinue any unnecessary medical devices as soon as possible  - Assess the patient's physical comfort, circulation, skin condition, hydration, nutrition and elimination needs   - Reorient and redirection as needed  - Assess for the need to continue restraints  10/26/2023 0534 by Manolo Koch RN  Outcome: Not Progressing  10/26/2023 0533 by Manolo Koch RN  Outcome: Not Progressing  10/26/2023 0404 by Manolo Koch RN  Outcome: Not Progressing

## 2023-10-26 NOTE — PLAN OF CARE
Juan House is awake and oriented at this time, no longer pulling on equipment.    Problem: Safety Risk - Non-Violent Restraints  Goal: Patient will remain free from self-harm  Description: INTERVENTIONS:  - Apply the least restrictive restraint to prevent harm  - Notify patient and family of reasons restraints applied  - Assess for any contributing factors to confusion (electrolyte disturbances, delirium, medications)  - Discontinue any unnecessary medical devices as soon as possible  - Assess the patient's physical comfort, circulation, skin condition, hydration, nutrition and elimination needs   - Reorient and redirection as needed  - Assess for the need to continue restraints  10/26/2023 1252 by Megha Reyes, KATHRYN  Outcome: Completed  10/26/2023 0802 by Megha Reyes, RN  Outcome: Not Progressing

## 2023-10-27 LAB
ANION GAP SERPL CALC-SCNC: 5 MMOL/L (ref 0–18)
BASOPHILS # BLD AUTO: 0.06 X10(3) UL (ref 0–0.2)
BASOPHILS NFR BLD AUTO: 0.4 %
BUN BLD-MCNC: 29 MG/DL (ref 7–18)
BUN/CREAT SERPL: 24.6 (ref 10–20)
CALCIUM BLD-MCNC: 9.2 MG/DL (ref 8.5–10.1)
CHLORIDE SERPL-SCNC: 99 MMOL/L (ref 98–112)
CO2 SERPL-SCNC: 39 MMOL/L (ref 21–32)
CREAT BLD-MCNC: 1.18 MG/DL
DEPRECATED RDW RBC AUTO: 53.1 FL (ref 35.1–46.3)
EGFRCR SERPLBLD CKD-EPI 2021: 66 ML/MIN/1.73M2 (ref 60–?)
EOSINOPHIL # BLD AUTO: 0.01 X10(3) UL (ref 0–0.7)
EOSINOPHIL NFR BLD AUTO: 0.1 %
ERYTHROCYTE [DISTWIDTH] IN BLOOD BY AUTOMATED COUNT: 14.5 % (ref 11–15)
GLUCOSE BLD-MCNC: 120 MG/DL (ref 70–99)
HCT VFR BLD AUTO: 52.1 %
HGB BLD-MCNC: 16 G/DL
IMM GRANULOCYTES # BLD AUTO: 0.13 X10(3) UL (ref 0–1)
IMM GRANULOCYTES NFR BLD: 0.8 %
LYMPHOCYTES # BLD AUTO: 1.14 X10(3) UL (ref 1–4)
LYMPHOCYTES NFR BLD AUTO: 7.2 %
MCH RBC QN AUTO: 30.4 PG (ref 26–34)
MCHC RBC AUTO-ENTMCNC: 30.7 G/DL (ref 31–37)
MCV RBC AUTO: 98.9 FL
MONOCYTES # BLD AUTO: 0.89 X10(3) UL (ref 0.1–1)
MONOCYTES NFR BLD AUTO: 5.6 %
NEUTROPHILS # BLD AUTO: 13.6 X10 (3) UL (ref 1.5–7.7)
NEUTROPHILS # BLD AUTO: 13.6 X10(3) UL (ref 1.5–7.7)
NEUTROPHILS NFR BLD AUTO: 85.9 %
OSMOLALITY SERPL CALC.SUM OF ELEC: 303 MOSM/KG (ref 275–295)
PLATELET # BLD AUTO: 197 10(3)UL (ref 150–450)
POTASSIUM SERPL-SCNC: 3.8 MMOL/L (ref 3.5–5.1)
RBC # BLD AUTO: 5.27 X10(6)UL
SODIUM SERPL-SCNC: 143 MMOL/L (ref 136–145)
WBC # BLD AUTO: 15.8 X10(3) UL (ref 4–11)

## 2023-10-27 PROCEDURE — 99233 SBSQ HOSP IP/OBS HIGH 50: CPT | Performed by: HOSPITALIST

## 2023-10-27 PROCEDURE — 99233 SBSQ HOSP IP/OBS HIGH 50: CPT | Performed by: INTERNAL MEDICINE

## 2023-10-27 RX ORDER — FUROSEMIDE 10 MG/ML
40 INJECTION INTRAMUSCULAR; INTRAVENOUS DAILY
Status: DISCONTINUED | OUTPATIENT
Start: 2023-10-28 | End: 2023-10-29

## 2023-10-27 NOTE — PLAN OF CARE
Problem: Patient Centered Care  Goal: Patient preferences are identified and integrated in the patient's plan of care  Description: Interventions:  - What would you like us to know as we care for you? From home with wife  - Provide timely, complete, and accurate information to patient/family  - Incorporate patient and family knowledge, values, beliefs, and cultural backgrounds into the planning and delivery of care  - Encourage patient/family to participate in care and decision-making at the level they choose  - Honor patient and family perspectives and choices  Outcome: Progressing     Problem: Patient/Family Goals  Goal: Patient/Family Long Term Goal  Description: Patient's Long Term Goal: to return home    Interventions:  - iv steroids, manage respiratory status   - See additional Care Plan goals for specific interventions  Outcome: Progressing  Goal: Patient/Family Short Term Goal  Description: Patient's Short Term Goal: decrease hallucinations/confusion. Interventions:   - ABG's, cont. Bipap.    - See additional Care Plan goals for specific interventions  Outcome: Progressing     Problem: RESPIRATORY - ADULT  Goal: Achieves optimal ventilation and oxygenation  Description: INTERVENTIONS:  - Assess for changes in respiratory status  - Assess for changes in mentation and behavior  - Position to facilitate oxygenation and minimize respiratory effort  - Oxygen supplementation based on oxygen saturation or ABGs  - Provide Smoking Cessation handout, if applicable  - Encourage broncho-pulmonary hygiene including cough, deep breathe, Incentive Spirometry  - Assess the need for suctioning and perform as needed  - Assess and instruct to report SOB or any respiratory difficulty  - Respiratory Therapy support as indicated  - Manage/alleviate anxiety  - Monitor for signs/symptoms of CO2 retention  Outcome: Progressing     Problem: METABOLIC/FLUID AND ELECTROLYTES - ADULT  Goal: Electrolytes maintained within normal limits  Description: INTERVENTIONS:  - Monitor labs and rhythm and assess patient for signs and symptoms of electrolyte imbalances  - Administer electrolyte replacement as ordered  - Monitor response to electrolyte replacements, including rhythm and repeat lab results as appropriate  - Fluid restriction as ordered  - Instruct patient on fluid and nutrition restrictions as appropriate  Outcome: Progressing  Goal: Hemodynamic stability and optimal renal function maintained  Description: INTERVENTIONS:  - Monitor labs and assess for signs and symptoms of volume excess or deficit  - Monitor intake, output and patient weight  - Monitor urine specific gravity, serum osmolarity and serum sodium as indicated or ordered  - Monitor response to interventions for patient's volume status, including labs, urine output, blood pressure (other measures as available)  - Encourage oral intake as appropriate  - Instruct patient on fluid and nutrition restrictions as appropriate  Outcome: Progressing     Problem: Impaired Functional Mobility  Goal: Achieve highest/safest level of mobility/gait  Description: Interventions:  - Assess patient's functional ability and stability  - Promote increasing activity/tolerance for mobility and gait  - Educate and engage patient/family in tolerated activity level and precautions  - When transferring patient, block weaker knee for safety  Outcome: Progressing     Problem: RISK FOR INFECTION - ADULT  Goal: Absence of fever/infection during anticipated neutropenic period  Description: INTERVENTIONS  - Monitor WBC  - Administer growth factors as ordered  - Implement neutropenic guidelines  Outcome: Progressing     Problem: SAFETY ADULT - FALL  Goal: Free from fall injury  Description: INTERVENTIONS:  - Assess pt frequently for physical needs  - Identify cognitive and physical deficits and behaviors that affect risk of falls.   - [place] fall precautions as indicated by assessment.  - Educate pt/family on patient safety including physical limitations  - Instruct pt to call for assistance with activity based on assessment  - Modify environment to reduce risk of injury  - Provide assistive devices as appropriate  - Consider OT/PT consult to assist with strengthening/mobility  - Encourage toileting schedule  Outcome: Progressing     Monitoring vital signs. No acute changes during shift. On 4L of oxygen. NSR on the monitor. Voiding freely. Up with assist to chair with a walker. Denies pain. Safety precautions in place, call light within reach. Bed locked and in lowest position. Patient updated on plan of care. Plan to transfer to [facility], patient updated and will update wife per patient.

## 2023-10-27 NOTE — PLAN OF CARE
Problem: Patient Centered Care  Goal: Patient preferences are identified and integrated in the patient's plan of care  Description: Interventions:  - What would you like us to know as we care for you? From home with wife  - Provide timely, complete, and accurate information to patient/family  - Incorporate patient and family knowledge, values, beliefs, and cultural backgrounds into the planning and delivery of care  - Encourage patient/family to participate in care and decision-making at the level they choose  - Honor patient and family perspectives and choices  Outcome: Progressing     Problem: Patient/Family Goals  Goal: Patient/Family Long Term Goal  Description: Patient's Long Term Goal: to return home    Interventions:  - iv steroids, manage respiratory status   - See additional Care Plan goals for specific interventions  Outcome: Progressing  Goal: Patient/Family Short Term Goal  Description: Patient's Short Term Goal: decrease hallucinations/confusion. Interventions:   - ABG's, cont. Bipap.    - See additional Care Plan goals for specific interventions  Outcome: Progressing

## 2023-10-27 NOTE — CDS QUERY
How to answer this Query:    1.) DON'T CLICK COSIGN BUTTON FIRST  2.) Click \"3 dots. Jose Reveal Jose Reveal \" to the right of cosign button and click EDIT on the toolbar.  2.) Type an \"X\" in the bracket for the diagnosis that applies. (You may also add additional clinical details as you feel necessary to substantiate your response). 3.) Finally click \"Sign\" to complete response. Thank You    CLINICAL DOCUMENTATION CLARIFICATION FORM  Dear Daniella Diez information (provided below) includes a diagnosis of Heart Failure. PLEASE (X)  DIAGNOSIS THAT APPLIES    (    ) Acute on Chronic Diastolic Heart Failure   (    ) Chronic Diastolic Heart Failure    (    ) Other - please specify:       Documentation from the Medical Record: [address]- He has left ventricular diastolic dysfunction,     1026 DR LOPEZ-5. CHF     HOME MEDS-LASIX PO  BNP 98  MEDS: IV LASIX  10/26 dr Lopez=resp =scattered crackles, jake lower extremity edema    CXR-Cardiomegaly with pulmonary vascular congestion and mild, diffuse pulmonary interstitial edema. 2. Additional basilar reticular opacities may be atelectatic in nature or indicative of atypical infectious/viral process. If you have any questions, please contact Clinical :  Nina Joyner RN at [phone]     Thank You!      THIS FORM IS A PERMANENT PART OF THE MEDICAL RECORD

## 2023-10-28 LAB
ANION GAP SERPL CALC-SCNC: 8 MMOL/L (ref 0–18)
BASOPHILS # BLD AUTO: 0.04 X10(3) UL (ref 0–0.2)
BASOPHILS NFR BLD AUTO: 0.3 %
BUN BLD-MCNC: 28 MG/DL (ref 7–18)
BUN/CREAT SERPL: 27.5 (ref 10–20)
CALCIUM BLD-MCNC: 8.9 MG/DL (ref 8.5–10.1)
CHLORIDE SERPL-SCNC: 101 MMOL/L (ref 98–112)
CO2 SERPL-SCNC: 33 MMOL/L (ref 21–32)
CREAT BLD-MCNC: 1.02 MG/DL
DEPRECATED RDW RBC AUTO: 53.4 FL (ref 35.1–46.3)
EGFRCR SERPLBLD CKD-EPI 2021: 78 ML/MIN/1.73M2 (ref 60–?)
EOSINOPHIL # BLD AUTO: 0 X10(3) UL (ref 0–0.7)
EOSINOPHIL NFR BLD AUTO: 0 %
ERYTHROCYTE [DISTWIDTH] IN BLOOD BY AUTOMATED COUNT: 14.6 % (ref 11–15)
GLUCOSE BLD-MCNC: 167 MG/DL (ref 70–99)
HCT VFR BLD AUTO: 51.2 %
HGB BLD-MCNC: 16.1 G/DL
IMM GRANULOCYTES # BLD AUTO: 0.13 X10(3) UL (ref 0–1)
IMM GRANULOCYTES NFR BLD: 0.9 %
LYMPHOCYTES # BLD AUTO: 0.82 X10(3) UL (ref 1–4)
LYMPHOCYTES NFR BLD AUTO: 5.6 %
MCH RBC QN AUTO: 31 PG (ref 26–34)
MCHC RBC AUTO-ENTMCNC: 31.4 G/DL (ref 31–37)
MCV RBC AUTO: 98.5 FL
MONOCYTES # BLD AUTO: 0.66 X10(3) UL (ref 0.1–1)
MONOCYTES NFR BLD AUTO: 4.5 %
NEUTROPHILS # BLD AUTO: 13.09 X10 (3) UL (ref 1.5–7.7)
NEUTROPHILS # BLD AUTO: 13.09 X10(3) UL (ref 1.5–7.7)
NEUTROPHILS NFR BLD AUTO: 88.7 %
OSMOLALITY SERPL CALC.SUM OF ELEC: 303 MOSM/KG (ref 275–295)
PLATELET # BLD AUTO: 221 10(3)UL (ref 150–450)
POTASSIUM SERPL-SCNC: 4.3 MMOL/L (ref 3.5–5.1)
RBC # BLD AUTO: 5.2 X10(6)UL
SODIUM SERPL-SCNC: 142 MMOL/L (ref 136–145)
WBC # BLD AUTO: 14.7 X10(3) UL (ref 4–11)

## 2023-10-28 PROCEDURE — 99233 SBSQ HOSP IP/OBS HIGH 50: CPT | Performed by: HOSPITALIST

## 2023-10-28 PROCEDURE — 99232 SBSQ HOSP IP/OBS MODERATE 35: CPT | Performed by: INTERNAL MEDICINE

## 2023-10-28 NOTE — PLAN OF CARE
Patient is Aox4, vital signs stable, up in the chair, nasal cannula at 4 L, up with stand by assist with walker, call light within reach, fall precautions.        Problem: RESPIRATORY - ADULT  Goal: Achieves optimal ventilation and oxygenation  Description: INTERVENTIONS:  - Assess for changes in respiratory status  - Assess for changes in mentation and behavior  - Position to facilitate oxygenation and minimize respiratory effort  - Oxygen supplementation based on oxygen saturation or ABGs  - Provide Smoking Cessation handout, if applicable  - Encourage broncho-pulmonary hygiene including cough, deep breathe, Incentive Spirometry  - Assess the need for suctioning and perform as needed  - Assess and instruct to report SOB or any respiratory difficulty  - Respiratory Therapy support as indicated  - Manage/alleviate anxiety  - Monitor for signs/symptoms of CO2 retention  Outcome: Progressing

## 2023-10-28 NOTE — PLAN OF CARE
Problem: Patient Centered Care  Goal: Patient preferences are identified and integrated in the patient's plan of care  Description: Interventions:  - What would you like us to know as we care for you? From home with wife  - Provide timely, complete, and accurate information to patient/family  - Incorporate patient and family knowledge, values, beliefs, and cultural backgrounds into the planning and delivery of care  - Encourage patient/family to participate in care and decision-making at the level they choose  - Honor patient and family perspectives and choices  Outcome: Progressing     Problem: Patient/Family Goals  Goal: Patient/Family Long Term Goal  Description: Patient's Long Term Goal: to return home    Interventions:  - iv steroids, manage respiratory status   - See additional Care Plan goals for specific interventions  Outcome: Progressing  Goal: Patient/Family Short Term Goal  Description: Patient's Short Term Goal: decrease hallucinations/confusion. Interventions:   - ABG's, cont. Bipap.    - See additional Care Plan goals for specific interventions  Outcome: Progressing     Problem: RESPIRATORY - ADULT  Goal: Achieves optimal ventilation and oxygenation  Description: INTERVENTIONS:  - Assess for changes in respiratory status  - Assess for changes in mentation and behavior  - Position to facilitate oxygenation and minimize respiratory effort  - Oxygen supplementation based on oxygen saturation or ABGs  - Provide Smoking Cessation handout, if applicable  - Encourage broncho-pulmonary hygiene including cough, deep breathe, Incentive Spirometry  - Assess the need for suctioning and perform as needed  - Assess and instruct to report SOB or any respiratory difficulty  - Respiratory Therapy support as indicated  - Manage/alleviate anxiety  - Monitor for signs/symptoms of CO2 retention  Outcome: Progressing     Problem: METABOLIC/FLUID AND ELECTROLYTES - ADULT  Goal: Electrolytes maintained within normal limits  Description: INTERVENTIONS:  - Monitor labs and rhythm and assess patient for signs and symptoms of electrolyte imbalances  - Administer electrolyte replacement as ordered  - Monitor response to electrolyte replacements, including rhythm and repeat lab results as appropriate  - Fluid restriction as ordered  - Instruct patient on fluid and nutrition restrictions as appropriate  Outcome: Progressing  Goal: Hemodynamic stability and optimal renal function maintained  Description: INTERVENTIONS:  - Monitor labs and assess for signs and symptoms of volume excess or deficit  - Monitor intake, output and patient weight  - Monitor urine specific gravity, serum osmolarity and serum sodium as indicated or ordered  - Monitor response to interventions for patient's volume status, including labs, urine output, blood pressure (other measures as available)  - Encourage oral intake as appropriate  - Instruct patient on fluid and nutrition restrictions as appropriate  Outcome: Progressing     Problem: Impaired Functional Mobility  Goal: Achieve highest/safest level of mobility/gait  Description: Interventions:  - Assess patient's functional ability and stability  - Promote increasing activity/tolerance for mobility and gait  - Educate and engage patient/family in tolerated activity level and precautions  - Recommend use of chair position in bed 3 times per day  Outcome: Progressing     Problem: RISK FOR INFECTION - ADULT  Goal: Absence of fever/infection during anticipated neutropenic period  Description: INTERVENTIONS  - Monitor WBC  - Administer growth factors as ordered  - Implement neutropenic guidelines  Outcome: Progressing     Problem: SAFETY ADULT - FALL  Goal: Free from fall injury  Description: INTERVENTIONS:  - Assess pt frequently for physical needs  - Identify cognitive and physical deficits and behaviors that affect risk of falls.   - [place] fall precautions as indicated by assessment.  - Educate pt/family on patient safety including physical limitations  - Instruct pt to call for assistance with activity based on assessment  - Modify environment to reduce risk of injury  - Provide assistive devices as appropriate  - Consider OT/PT consult to assist with strengthening/mobility  - Encourage toileting schedule  Outcome: Progressing     Monitoring vital signs, stable at this moment, patient on 3-4L nasal cannula per baseline, using PAP while sleeping. Call light within reach, bed locked in lowest position, all fall precautions in place. All needs addressed. Frequent rounding maintained by nursing staff. Patient completing IV steroid therapy and breathing treatments per MD order, no acute changes at this time.

## 2023-10-28 NOTE — PHYSICAL THERAPY NOTE
PHYSICAL THERAPY EVALUATION - INPATIENT     Room Number: 112/129-W  Evaluation Date: 10/28/2023  Type of Evaluation: Initial   Physician Order: PT Eval and Treat    Presenting Problem: Acute on Chronic Severe COPD / Acute on Chronic respiratory failure with metabolic encephalopathy related to hypercania / Obesity / Chronic CHF / Delirium resolved . Pt transfer from ICU to 5th floor 10/27/23    PMH sign for Chronic O2 use at 4L /Household ambulation no AD uses furniture / use of w/c in community due to deconditioned status . Reason for Therapy: Mobility Dysfunction and Discharge Planning    PHYSICAL THERAPY ASSESSMENT   Orders received and chart reviewed. RN  approved participation in physical therapy. No family is present. PPE worn by therapist: mask and gloves. Patient was not wearing a mask during session. Patient is a 68year old male admitted 10/25/2023 for Presenting Problem: Acute on Chronic Severe COPD / Acute on Chronic respiratory failure with metabolic encephalopathy related to hypercania / Obesity / Chronic CHF / Delirium resolved . Pt transfer from ICU to 5th floor 10/27/23    PMH sign for Chronic O2 use at 4L /Household ambulation no AD uses furniture / use of w/c in community due to deconditioned status . Lucho Fournier Patient presented in bedside chair with 0/10 pain. Pt is alert , cooperative motivated for therapy. Pt able to follow one step directions and provided HPI. Pt denies SOB during activity . Observed B LE edema , poor skin integrity of B distal LE . Pt currently on 3 3/4 L O2 resting o2 sats 92 % HR 90  /94     Education provided on Physical therapy plan of care, physiological benefits of out of bed mobility, and fall risk prevention ,pacing and energy conservation strategies / modified 5 pt RPE scale to pace activity , B AP , LE elevation , fxn mobility training with RW and DC planning  . Patient with fair carryover.     Bed mobility:  NA  Transfers: SBA extra time and cues encouraged slower descent for controlled sit in chair     Gait Assistance: Contact guard assist;Minimum assistance (CGA to min assist for lines and pacing cues)  Amb 45 ft  4L O2 using RW  O2 sats 93 %  --rest provided HR returned quickly to 98            50 ft x 1 rest provided  4L  / RW  O2 sats 93 %    rest provided for HR to return to baseline            45 ft x 1   returned to chair in room  HR  116 O2 sats 96 %     End of session HR 97 O2 sats 97 % 4L /81      Assistive Device: Rolling walker (4L O2)  Pattern: R Steppage;L Steppage;R Foot flat;L Foot flat (flexed posturing  Cues for proper use of AD / pacing / posture  / breathing -)        Patient was left in bedside chair and alarm activated at end of session with all needs in reach. Patient's current functional deficits include limitations in bed mobility , transfers ,ambulation and stairs. Pt presents with increased fall risk , decrease balance , B LE edema , and decrease activity tolerance. Pt delirium appears resolved. . The patient's Approx Degree of Impairment: 46.58% has been calculated based on documentation in the [facility] '6 clicks' Inpatient Basic Mobility Short Form. Research supports that patients with this level of impairment may benefit from Home with home health PT. No family  present for DC planning. Therapy is recommending pt need for 24 hr assist and support at DC due to current status above . Therapy also recommend pt need for RW use at DC for fall prevention and energy conservation. RN Informed of pt need for RW use at DC , therapy can provide on day of DC if pt needs one for home use. Pt with goal for DC to home . Pt  with chronic o2 use at 4L. Pt has w/c . Pt with questions regarding community mobility and spouse difficulty  pushing w/c ---discussed considering getting motorized scooter for community mobility .   Therapy anticipates pt will progress to DC to  home setting with 24hr care of family recommended at DC . SW working with pt on optimal DC Plan. RN aware of patient status post session. Patient will benefit from continued IP PT services to address these deficits in preparation for discharge. DISCHARGE RECOMMENDATIONS  PT Discharge Recommendations: 24 hour care/supervision;Home;Home with home health PT    PLAN  PT Treatment Plan: Bed mobility; Body mechanics; Endurance; Energy conservation;Patient education; Family education;Gait training;Balance training;Transfer training;Stoop training  Rehab Potential : Good  Frequency (Obs): 5x/week       PHYSICAL THERAPY MEDICAL/SOCIAL HISTORY     History related to current admission:   Acute on chronic hypercapnic hypoxemic respiratory failure  At home on 4 L suppl O2  COPD with acute exacerbation  MARIA M  CXR with some vascular congestion   -pulmonary is following  -solumedrol  -nebs  -trelegy  -BIPAP while asleep  -avoid sedatives     Obesity, BMI 46  Chronic diastolic CHF  BNP nl on admission  Edema likely from venous stasis  On lasix bid-->ok change to daily now     Acute toxic metabolic encephalopathy with hypercapnia  Delirium     DVT prophylaxis: lovenox     Dispo: ok to floor        Chart reviewed, including current vitals, notes, labs and imaging  Pertinent past medical records reviewed  Labs ordered and medications adjusted as outlined above  Coordinate care with care team/consultants  Discussed with patient and family at bedside results of tests, management plan as outlined above, and the need for ongoing hospitalization  D/w RN     MDM high  severe acute illness/or exacerbation of chronic illness posing a threat to life, IV medications, requiring close monitoring in hospital.         Problem List  Principal Problem:    Acute on chronic respiratory failure with hypercapnia ([facility] [address]      Past Medical History  Past Medical History:   Diagnosis Date    Atherosclerosis of coronary artery     coronary calcification on ct chest    Back problem Cataract     Chronic respiratory failure (HCC)     Congestive heart disease (HCC)     COPD (HCC)     freq exacerbations. ? 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: One level  Stairs to Enter :  (1 stoop step)           Lives With: Spouse (spouse works part time)  Drives: No  Patient Owned Equipment: Wheelchair (??RW at home but did not  use on admission  used furniture)  Patient Regularly Uses:  (Indep ADL  / uses furniture for support as needed indep household ambulation / spouse pushes pt in w/c if leaves home for community distance. denies falls   chronic O2 use 4L)    Prior Level of [place]: Patient Regularly Uses:  (Indep ADL  / uses furniture for support as needed indep household ambulation / spouse pushes pt in w/c if leaves home for community distance.   denies falls   chronic O2 use 4L)    SUBJECTIVE  \" I am feeling better today \"    Denies SOB      \" My wife has a hard time pushing me in my w/c in the community \"     PHYSICAL THERAPY EXAMINATION     OBJECTIVE  Precautions: Cardiac;Bed/chair alarm (chronic O2 use 4L)  Fall Risk: Standard fall risk    WEIGHT BEARING RESTRICTION  Weight Bearing Restriction: None                PAIN ASSESSMENT  Ratin          COGNITION  Overall Cognitive Status:  WFL - within functional limits    RANGE OF MOTION AND STRENGTH ASSESSMENT  Upper extremity ROM and strength are within functional limits     Lower extremity ROM is within functional limits     Lower extremity strength is within functional limits for fxn mobility of short distance ambulation   Pt returned demonstration of B AP     BALANCE  Static Sitting: Good  Dynamic Sitting: Fair  Static Standing: Fair -  Dynamic Standing: Fair -    ADDITIONAL TESTS  Additional Tests: Edema (pt with moderate B LE edema with decrease skin integrity)                 ACTIVITY TOLERANCE  Pulse: (!) 124 (activity with therapy  resting HR 90)        BP: (!) 121/94 (resting therapy   post activity  141/81)             O2 WALK  Oxygen Therapy  SPO2% on Oxygen at Rest: 92  At rest oxygen flow (liters per minute):  (3 3/4)  SPO2% Ambulation on Oxygen: 92 (92-96 during activity on 4L)  Ambulation oxygen flow (liters per minute): 4    AM-PAC '6-Clicks' INPATIENT SHORT FORM - BASIC MOBILITY  How much difficulty does the patient currently have. .. Patient Difficulty: Turning over in bed (including adjusting bedclothes, sheets and blankets)?: A Little   Patient Difficulty: Sitting down on and standing up from a chair with arms (e.g., wheelchair, bedside commode, etc.): A Little   Patient Difficulty: Moving from lying on back to sitting on the side of the bed?: A Little   How much help from another person does the patient currently need. .. Help from Another: Moving to and from a bed to a chair (including a wheelchair)?: A Little   Help from Another: Need to walk in hospital room?: A Little   Help from Another: Climbing 3-5 steps with a railing?: A Little     AM-PAC Score:  Raw Score: 18   Approx Degree of Impairment: 46.58%   Standardized Score (AM-PAC Scale): 43.63   CMS Modifier (G-Code): CK  Patient End of Session: Up in chair;Needs met;Call light within reach;RN aware of session/findings; All patient questions and concerns addressed; Alarm set    CURRENT GOALS    Goals to be met by: 11/15/23  Patient Goal Patient's self-stated goal is: home with spouse    Goal #1 Patient is able to demonstrate supine - sit EOB @ level: supervision     Goal #1   Current Status    Goal #2 Patient is able to demonstrate transfers EOB to/from Chair/Wheelchair at assistance level: supervision with walker - rolling     Goal #2  Current Status    Goal #3 Patient is able to ambulate 100  feet with assist device: walker - rolling at assistance level: supervision   Goal #3   Current Status    Goal #4 Patient will negotiate 1 stairs/one curb w/ assistive device and supervision   Goal #4   Current Status    Goal #5 Patient to demonstrate independence with home activity/exercise instructions provided to patient in preparation for discharge.    Goal #5   Current Status    Goal #6    Goal #6  Current Status     Patient Evaluation Complexity Level:  History Moderate - 1 or 2 personal factors and/or co-morbidities   Examination of body systems Moderate - addressing a total of 3 or more elements   Clinical Presentation Low - Stable   Clinical Decision Making Moderate Complexity   PT eval and therapy activity 2 units

## 2023-10-29 ENCOUNTER — APPOINTMENT (OUTPATIENT)
Dept: GENERAL RADIOLOGY | Facility: HOSPITAL | Age: 73
End: 2023-10-29
Attending: HOSPITALIST
Payer: MEDICARE

## 2023-10-29 VITALS
HEART RATE: 85 BPM | SYSTOLIC BLOOD PRESSURE: 127 MMHG | OXYGEN SATURATION: 94 % | HEIGHT: 69 IN | RESPIRATION RATE: 20 BRPM | WEIGHT: 312.19 LBS | TEMPERATURE: 98 F | DIASTOLIC BLOOD PRESSURE: 81 MMHG | BODY MASS INDEX: 46.24 KG/M2

## 2023-10-29 PROCEDURE — 99239 HOSP IP/OBS DSCHRG MGMT >30: CPT | Performed by: HOSPITALIST

## 2023-10-29 PROCEDURE — 99232 SBSQ HOSP IP/OBS MODERATE 35: CPT | Performed by: INTERNAL MEDICINE

## 2023-10-29 PROCEDURE — 71046 X-RAY EXAM CHEST 2 VIEWS: CPT | Performed by: HOSPITALIST

## 2023-10-29 RX ORDER — ATORVASTATIN CALCIUM 20 MG/1
20 TABLET, FILM COATED ORAL NIGHTLY
Qty: 90 TABLET | Refills: 0 | Status: SHIPPED | OUTPATIENT
Start: 2023-10-29

## 2023-10-29 RX ORDER — PREDNISONE 10 MG/1
TABLET ORAL
Qty: 12 TABLET | Refills: 0 | Status: SHIPPED | OUTPATIENT
Start: 2023-10-30 | End: 2023-11-03

## 2023-10-29 RX ORDER — POTASSIUM CHLORIDE 750 MG/1
TABLET, EXTENDED RELEASE ORAL
Qty: 135 TABLET | Refills: 1 | Status: SHIPPED | OUTPATIENT
Start: 2023-10-29

## 2023-10-29 RX ORDER — ALBUTEROL SULFATE 90 UG/1
2 AEROSOL, METERED RESPIRATORY (INHALATION)
Qty: 1 EACH | Refills: 1 | Status: SHIPPED | OUTPATIENT
Start: 2023-10-29

## 2023-10-29 RX ORDER — FUROSEMIDE 40 MG/1
40 TABLET ORAL
Qty: 180 TABLET | Refills: 0 | Status: SHIPPED | OUTPATIENT
Start: 2023-10-29 | End: 2023-11-06

## 2023-10-29 RX ORDER — IPRATROPIUM BROMIDE AND ALBUTEROL SULFATE 2.5; .5 MG/3ML; MG/3ML
3 SOLUTION RESPIRATORY (INHALATION)
Qty: 100 EACH | Refills: 1 | Status: SHIPPED | OUTPATIENT
Start: 2023-10-29

## 2023-10-29 NOTE — CM/SW NOTE
10/29/23 1500   Discharge disposition   Expected discharge disposition Home or Self   DME/Infusion Providers [facility]   Discharge transportation Private car     Pt received MDO for Discharge. Pt is current with HME for home oxygen. Pt confirmed that wife will bring portable tank when patient is ready to DC. SW/CM to remain available for support and/or discharge planning.      Carmelita Moreno, MSW, LSW   x [phone]

## 2023-10-29 NOTE — PLAN OF CARE
Patient is alert and oriented x's 4, vital signs are stable. Patient denies pain/ discomfort. Fall and safety precautions are in place, call light within reach. Frequent rounding performed by RN and POCT. Discharge order in place. AVS printed and explained to patient. IV removed. Patient to be transported home by wife, all questions answered. Problem: Patient Centered Care  Goal: Patient preferences are identified and integrated in the patient's plan of care  Description: Interventions:  - What would you like us to know as we care for you? From home with wife  - Provide timely, complete, and accurate information to patient/family  - Incorporate patient and family knowledge, values, beliefs, and cultural backgrounds into the planning and delivery of care  - Encourage patient/family to participate in care and decision-making at the level they choose  - Honor patient and family perspectives and choices  Outcome: Progressing     Problem: Patient/Family Goals  Goal: Patient/Family Long Term Goal  Description: Patient's Long Term Goal: to return home    Interventions:  - iv steroids, manage respiratory status   - See additional Care Plan goals for specific interventions  Outcome: Progressing  Goal: Patient/Family Short Term Goal  Description: Patient's Short Term Goal: decrease hallucinations/confusion. Interventions:   - ABG's, cont. Bipap.    - See additional Care Plan goals for specific interventions  Outcome: Progressing     Problem: RESPIRATORY - ADULT  Goal: Achieves optimal ventilation and oxygenation  Description: INTERVENTIONS:  - Assess for changes in respiratory status  - Assess for changes in mentation and behavior  - Position to facilitate oxygenation and minimize respiratory effort  - Oxygen supplementation based on oxygen saturation or ABGs  - Provide Smoking Cessation handout, if applicable  - Encourage broncho-pulmonary hygiene including cough, deep breathe, Incentive Spirometry  - Assess the need for suctioning and perform as needed  - Assess and instruct to report SOB or any respiratory difficulty  - Respiratory Therapy support as indicated  - Manage/alleviate anxiety  - Monitor for signs/symptoms of CO2 retention  Outcome: Progressing     Problem: METABOLIC/FLUID AND ELECTROLYTES - ADULT  Goal: Electrolytes maintained within normal limits  Description: INTERVENTIONS:  - Monitor labs and rhythm and assess patient for signs and symptoms of electrolyte imbalances  - Administer electrolyte replacement as ordered  - Monitor response to electrolyte replacements, including rhythm and repeat lab results as appropriate  - Fluid restriction as ordered  - Instruct patient on fluid and nutrition restrictions as appropriate  Outcome: Progressing  Goal: Hemodynamic stability and optimal renal function maintained  Description: INTERVENTIONS:  - Monitor labs and assess for signs and symptoms of volume excess or deficit  - Monitor intake, output and patient weight  - Monitor urine specific gravity, serum osmolarity and serum sodium as indicated or ordered  - Monitor response to interventions for patient's volume status, including labs, urine output, blood pressure (other measures as available)  - Encourage oral intake as appropriate  - Instruct patient on fluid and nutrition restrictions as appropriate  Outcome: Progressing     Problem: Impaired Functional Mobility  Goal: Achieve highest/safest level of mobility/gait  Description: Interventions:  - Assess patient's functional ability and stability  - Promote increasing activity/tolerance for mobility and gait  - Educate and engage patient/family in tolerated activity level and precautions  - Recommend use of  RW for transfers and ambulation  Outcome: Progressing     Problem: RISK FOR INFECTION - ADULT  Goal: Absence of fever/infection during anticipated neutropenic period  Description: INTERVENTIONS  - Monitor WBC  - Administer growth factors as ordered  - Implement neutropenic guidelines  Outcome: Progressing     Problem: SAFETY ADULT - FALL  Goal: Free from fall injury  Description: INTERVENTIONS:  - Assess pt frequently for physical needs  - Identify cognitive and physical deficits and behaviors that affect risk of falls.   - [place] fall precautions as indicated by assessment.  - Educate pt/family on patient safety including physical limitations  - Instruct pt to call for assistance with activity based on assessment  - Modify environment to reduce risk of injury  - Provide assistive devices as appropriate  - Consider OT/PT consult to assist with strengthening/mobility  - Encourage toileting schedule  Outcome: Progressing

## 2023-10-29 NOTE — PLAN OF CARE
Pt. Alma Rosa Cat,  is A&Ox 4 . Lives at home with wife . Patient is ambulating using standby assist and walker . Patient is not experiencing pain. Continent of bowel and bladder. Pt O2 flow is 4 L, baseline is 3-4 L . PT uses Bipap at night. Call light within reach, fall precautions maintained. Plan is PO steroids, IV lasix, scheduled nebs, and potential discharge 10/29 . Problem: Patient Centered Care  Goal: Patient preferences are identified and integrated in the patient's plan of care  Description: Interventions:  - What would you like us to know as we care for you? From home with wife  - Provide timely, complete, and accurate information to patient/family  - Incorporate patient and family knowledge, values, beliefs, and cultural backgrounds into the planning and delivery of care  - Encourage patient/family to participate in care and decision-making at the level they choose  - Honor patient and family perspectives and choices  Outcome: Progressing     Problem: Patient/Family Goals  Goal: Patient/Family Long Term Goal  Description: Patient's Long Term Goal: to return home    Interventions:  - iv steroids, manage respiratory status   - See additional Care Plan goals for specific interventions  Outcome: Progressing  Goal: Patient/Family Short Term Goal  Description: Patient's Short Term Goal: decrease hallucinations/confusion. Interventions:   - ABG's, cont. Bipap.    - See additional Care Plan goals for specific interventions  Outcome: Progressing     Problem: RESPIRATORY - ADULT  Goal: Achieves optimal ventilation and oxygenation  Description: INTERVENTIONS:  - Assess for changes in respiratory status  - Assess for changes in mentation and behavior  - Position to facilitate oxygenation and minimize respiratory effort  - Oxygen supplementation based on oxygen saturation or ABGs  - Provide Smoking Cessation handout, if applicable  - Encourage broncho-pulmonary hygiene including cough, deep breathe, Incentive Spirometry  - Assess the need for suctioning and perform as needed  - Assess and instruct to report SOB or any respiratory difficulty  - Respiratory Therapy support as indicated  - Manage/alleviate anxiety  - Monitor for signs/symptoms of CO2 retention  Outcome: Progressing     Problem: METABOLIC/FLUID AND ELECTROLYTES - ADULT  Goal: Electrolytes maintained within normal limits  Description: INTERVENTIONS:  - Monitor labs and rhythm and assess patient for signs and symptoms of electrolyte imbalances  - Administer electrolyte replacement as ordered  - Monitor response to electrolyte replacements, including rhythm and repeat lab results as appropriate  - Fluid restriction as ordered  - Instruct patient on fluid and nutrition restrictions as appropriate  Outcome: Progressing  Goal: Hemodynamic stability and optimal renal function maintained  Description: INTERVENTIONS:  - Monitor labs and assess for signs and symptoms of volume excess or deficit  - Monitor intake, output and patient weight  - Monitor urine specific gravity, serum osmolarity and serum sodium as indicated or ordered  - Monitor response to interventions for patient's volume status, including labs, urine output, blood pressure (other measures as available)  - Encourage oral intake as appropriate  - Instruct patient on fluid and nutrition restrictions as appropriate  Outcome: Progressing     Problem: Impaired Functional Mobility  Goal: Achieve highest/safest level of mobility/gait  Description: Interventions:  - Assess patient's functional ability and stability  - Promote increasing activity/tolerance for mobility and gait  - Educate and engage patient/family in tolerated activity level and precautions  - Recommend use of chair position in bed 3 times per day  Outcome: Progressing     Problem: RISK FOR INFECTION - ADULT  Goal: Absence of fever/infection during anticipated neutropenic period  Description: INTERVENTIONS  - Monitor WBC  - Administer growth factors as ordered  - Implement neutropenic guidelines  Outcome: Progressing     Problem: SAFETY ADULT - FALL  Goal: Free from fall injury  Description: INTERVENTIONS:  - Assess pt frequently for physical needs  - Identify cognitive and physical deficits and behaviors that affect risk of falls.   - [place] fall precautions as indicated by assessment.  - Educate pt/family on patient safety including physical limitations  - Instruct pt to call for assistance with activity based on assessment  - Modify environment to reduce risk of injury  - Provide assistive devices as appropriate  - Consider OT/PT consult to assist with strengthening/mobility  - Encourage toileting schedule  Outcome: Progressing

## 2023-10-30 ENCOUNTER — PATIENT OUTREACH (OUTPATIENT)
Dept: CASE MANAGEMENT | Age: 73
End: 2023-10-30

## 2023-10-30 DIAGNOSIS — J44.1 COPD WITH ACUTE EXACERBATION (HCC): Primary | ICD-10-CM

## 2023-10-30 DIAGNOSIS — Z02.9 ENCOUNTERS FOR UNSPECIFIED ADMINISTRATIVE PURPOSE: ICD-10-CM

## 2023-10-30 PROCEDURE — 1111F DSCHRG MED/CURRENT MED MERGE: CPT

## 2023-10-30 NOTE — PROGRESS NOTES
NCM attempted to reach the patient to complete a TCM/HFU call. Left message to call back. [facility] provided direct contact info at [phone].

## 2023-11-01 ENCOUNTER — TELEPHONE (OUTPATIENT)
Dept: PULMONOLOGY | Facility: CLINIC | Age: 73
End: 2023-11-01

## 2023-11-01 ENCOUNTER — TELEPHONE (OUTPATIENT)
Dept: INTERNAL MEDICINE CLINIC | Facility: CLINIC | Age: 73
End: 2023-11-01

## 2023-11-01 NOTE — TELEPHONE ENCOUNTER
Dr. Brunilda Reynoso - Patient discharged 10/29/23. When do you want to see in clinic for hospital follow up?

## 2023-11-01 NOTE — PROGRESS NOTES
Initial Post Discharge Follow Up   Discharge Date: 10/29/23  Contact Date: 10/30/2023    Consent Verification:  Assessment Completed With: Patient  HIPAA Verified? Yes    Discharge Dx:   Acute on chronic hypercapnic hypoxemic respiratory failure  COPD with acute exacerbation  MARIA M    General:   How have you been since your discharge from the hospital? Pt reports feeling much better, since hospital discharge--95% sat on chronic 4L NC, tolerating Prednisone, using Duonebs and inhalers, as prescribed, tolerating diet. Patient denies fever, chills, nausea, vomiting, diarrhea, chest pain or shortness of breath at this time. Pt speaking in full, clear sentences. Do you have any pain since discharge? No    How well was your pain managed while in the hospital?   On a scale of 1-5   1- Very Poor and 5- Very well   Very Well  When you were leaving the hospital were your discharge instructions reviewed with you? Yes  How well were your discharge instructions explained to you? On a scale of 1-5   1- Very Poor and 5- Very well   Very Well  Do you have any questions about your discharge instructions? No  Before leaving the hospital was your diagnoses explained to you? Yes  Do you have any questions about your diagnoses? No  Are you able to perform normal daily activities of living as you have prior to your hospital stay (dressing, bathing, ambulating to the bathroom, etc)? yes  (NCM) Was patient given a different diet per AVS? no    Medications:   Current Outpatient Medications   Medication Sig Dispense Refill    fluticasone-umeclidin-vilant (TRELEGY ELLIPTA) 100-62.5-25 MCG/ACT Inhalation Aerosol Powder, Breath Activated Inhale 1 puff into the lungs daily. 3 each 0    albuterol 108 (90 Base) MCG/ACT Inhalation Aero Soln Inhale 2 puffs into the lungs every 4 to 6 hours as needed for Wheezing.  inhale 2 puff by inhalation route  every 4 - 6 hours as needed 1 each 1    atorvastatin 20 MG Oral Tab Take 1 tablet (20 mg total) by mouth nightly. 90 tablet 0    furosemide 40 MG Oral Tab Take 1 tablet (40 mg total) by mouth BID (Diuretic). 180 tablet 0    ipratropium-albuterol 0.5-2.5 (3) MG/3ML Inhalation Solution Take 3 mL by nebulization in the morning and 3 mL at noon and 3 mL in the evening. And as needed. 100 each 1    potassium chloride 10 MEQ Oral Tab CR Take one tab (10 meq) po/day  alternating with 2 tabs (20meq)/day 135 tablet 1    predniSONE 10 MG Oral Tab Take 3 tablets (30 mg total) by mouth daily with breakfast for 2 days, THEN 2 tablets (20 mg total) daily with breakfast for 2 days. 1 tablet of 10 mg daily for 2 days, THEN stop. . 12 tablet 0    fluticasone propionate 50 MCG/ACT Nasal Suspension 2 sprays by Nasal route daily. 3 each 3    Urea 40 % External Cream Apply 1 Application topically daily. 227 g 2    Saline Nasal Spray 0.65 % Nasal Solution 1 spray by Nasal route every 3 (three) hours as needed for congestion. 1 Bottle 0     Were there any changes to your current medication(s) noted on the AVS? Yes  START taking:  ipratropium-albuterol (Duoneb)  predniSONE (Deltasone)  Start taking on: October 30, 2023  CHANGE how you take:  fluticasone-umeclidin-vilant (Trelegy Ellipta)  furosemide (Lasix)  If so, were these medication changes discussed with you prior to leaving the hospital? Yes  If a new medication was prescribed:    Was the new medication's purpose & side effects reviewed? Yes  Do you have any questions about your new medication? No  Did you  your discharge medications when you left the hospital? Yes  Let's go over your medications together to make sure we are not missing anything. Medications Reviewed  Are there any reasons that keep you from taking your medication as prescribed? No  Are you having any concerns with constipation? No  Did patient receive their flu shot (Sept-March)? No    Discharge medications reviewed/discussed/and reconciled against outpatient medications with patient.   Any changes or updates to medications sent to PCP. Patient Acknowledged     Referrals/orders at D/C:  Referrals/orders placed at D/C? no    DME ordered at D/C? No--chronic O2    Discharge orders, AVS reviewed and discussed with patient. Any changes or updates to orders sent to PCP. Patient Acknowledged      SDOH:   Transportation:  Transportation Needs: No Transportation Needs (11/1/2023)      Transportation Needs          Lack of Transportation: No  Financial Strain:  Financial Resource Strain: Low Risk  (11/1/2023)      Financial Resource Strain          Difficulty of Paying Living Expenses: Not very hard          Med Affordability: No      Diagnosis specifics:   COPD: COPD:  Have you participated in a pulmonary rehab program?   no   Would you like more information? no  We reviewed your medications/inhalers, how often are you using your inhaler? As prescribed  Are you currently on oxygen? yes   How many Liters? 4L NC  Do you have any questions about Oxygen use?  no  Are you familiar with the signs and symptoms of worsening COPD? Yes       Who do you call with worsening COPD symptoms? Dr. Cassandra Gardner   Do you know when to call with COPD symptoms? Yes   Do you have any of the following potential risk factors for COPD in your home environment? Primary or secondary tobacco smoke   no  Occupational dusts and chemicals organic and inorganic    no  Indoor air pollution from heating and cooking with poor ventilation   no    Mold      no    Pets        yes-cat    Extreme heat no    Follow up appointments:     Follow-up Information    Follow up With Specialties Details Why Contact Info   Jose Lee MD PULMONARY DISEASES, Internal Medicine, Critical Care Schedule an appointment as soon as possible for a visit in 2 week(s)  [address]  [place] [id number] [phone]   Mike Granger MD BARIATRICS Schedule an appointment as soon as possible for a visit as soon as possible for weight management [address] [facility]   Obinna Mckeon MD Internal Medicine Schedule an appointment as soon as possible for a visit in 1 week(s) post hospialization follow up [address] [place] [address]  [phone]         TCC  Was TCC ordered: No    PCP (If no TCC appointment)  Does patient already have a PCP appointment scheduled? No  NCM Attempted to schedule PCP office TCM appointment with patient   If no appointment scheduled: Explain: unable to book d/t schedule restrictions--sent TE to office    Specialist    Does the patient have any other follow up appointment(s) needing to be scheduled? Yes  If yes: NCM reviewed upcoming specialist appointment with patient: Yes  Does the patient need assistance scheduling appointment(s): No--this NCM spoke with carmela at Dr. Xavier Gee did send TE to [facility] office staff for appt f/u. Pt will call for appt with Dr. Markus Roque, himself. Is there any reason as to why you cannot make your appointment(s)? He has to check with his wife's schedule, as she drives him and she works part-time     Needs post D/C:   Now that you are home, are there any needs or concerns you need addressed before your next visit with your PCP?  (DME, meds, questions, etc.): No    Interventions by NCM:   Spoke with pt--feeling much better, since hospital discharge--95% sat on chronic 4L NC, tolerating Prednisone, using Duonebs and inhalers, as prescribed, tolerating diet. Patient denies fever, chills, nausea, vomiting, diarrhea, chest pain or shortness of breath at this time. Pt speaking in full, clear sentences. Discussed diet, activity, medications and need for f/u visits. Pt aware to schedule with Dr. Kaelyn Canales (spoke with Carmela--she sent TE to [facility] staff for appt f/u) and Dr. Markus Roque. Patient agreeable to TCM appt with Dr. Micha Valdez NCM unable to book d/t schedule restrictions--sent TE to office staff for appt clarification and f/u.   Patient aware when to contact PCP/specialists and when to seek emergency care. No further questions/concerns at this time. Overall Rating:    How would you rate the care you received while in the hospital? excellent    CCM referral placed:    No-CCM order previously placed    BOOK BY DATE: 11/12/2023

## 2023-11-01 NOTE — TELEPHONE ENCOUNTER
Spoke to patient for TCM today. Pt aware to schedule with Dr. Joellen Mancera and Dr. Mikki Carter. Patient agreeable to TCM appt with Dr. Megan Box [facility] unable to book d/t schedule restrictions. TCM appointment recommended by 11/05/2023, as patient is a High risk for readmission. Please advise. BOOK BY DATE (last date for TCM): 11/12/2023    Please discuss with PCP and follow-up with patient, accordingly. Thank you! Follow up appointments:     Follow-up Information    Follow up With Specialties Details Why Contact Info   Joellen Mancera, Osmel Barros MD PULMONARY DISEASES, Internal Medicine, Critical Care Schedule an appointment as soon as possible for a visit in 2 week(s)  [address]  [place] [id number] [phone]   Ritesh Granger MD BARIATRICS Schedule an appointment as soon as possible for a visit as soon as possible for weight management [address] [facility] 3589 [facility]   Pati Burton MD Internal Medicine Schedule an appointment as soon as possible for a visit in 1 week(s) post hospialization follow up [address] [facility] 7988 [facility]  [phone]

## 2023-11-02 NOTE — TELEPHONE ENCOUNTER
Spoke with pt, verified , informed pt of Dr Estella Flood message, pt states that his wife is working tomorrow and he is only available after 2pm. Please advise if pt can come in after 2pm.

## 2023-11-02 NOTE — TELEPHONE ENCOUNTER
Spoke with patient, hospital follow up appointment scheduled with Dr. Reina Hall on 12/13/23 at 4:30pm.  Verified date, time and location, patient verbalized understanding.

## 2023-11-03 ENCOUNTER — OFFICE VISIT (OUTPATIENT)
Dept: INTERNAL MEDICINE CLINIC | Facility: CLINIC | Age: 73
End: 2023-11-03

## 2023-11-03 ENCOUNTER — PATIENT OUTREACH (OUTPATIENT)
Dept: CASE MANAGEMENT | Age: 73
End: 2023-11-03

## 2023-11-03 VITALS
OXYGEN SATURATION: 97 % | HEIGHT: 69 IN | DIASTOLIC BLOOD PRESSURE: 71 MMHG | SYSTOLIC BLOOD PRESSURE: 109 MMHG | BODY MASS INDEX: 44.9 KG/M2 | HEART RATE: 90 BPM | WEIGHT: 303.13 LBS | TEMPERATURE: 98 F

## 2023-11-03 DIAGNOSIS — J96.22 ACUTE ON CHRONIC RESPIRATORY FAILURE WITH HYPOXIA AND HYPERCAPNIA (HCC): Primary | ICD-10-CM

## 2023-11-03 DIAGNOSIS — I50.32 CHRONIC DIASTOLIC CONGESTIVE HEART FAILURE (HCC): ICD-10-CM

## 2023-11-03 DIAGNOSIS — G47.33 OSA AND COPD OVERLAP SYNDROME (HCC): ICD-10-CM

## 2023-11-03 DIAGNOSIS — J96.21 ACUTE ON CHRONIC RESPIRATORY FAILURE WITH HYPOXIA AND HYPERCAPNIA (HCC): Primary | ICD-10-CM

## 2023-11-03 DIAGNOSIS — E66.01 MORBID OBESITY WITH BMI OF 40.0-44.9, ADULT (HCC): ICD-10-CM

## 2023-11-03 DIAGNOSIS — J44.9 OSA AND COPD OVERLAP SYNDROME (HCC): ICD-10-CM

## 2023-11-03 DIAGNOSIS — K92.1 HEMATOCHEZIA: ICD-10-CM

## 2023-11-03 DIAGNOSIS — J44.1 COPD EXACERBATION (HCC): ICD-10-CM

## 2023-11-03 PROCEDURE — 99495 TRANSJ CARE MGMT MOD F2F 14D: CPT | Performed by: INTERNAL MEDICINE

## 2023-11-03 PROCEDURE — 1125F AMNT PAIN NOTED PAIN PRSNT: CPT | Performed by: INTERNAL MEDICINE

## 2023-11-03 PROCEDURE — G0008 ADMIN INFLUENZA VIRUS VAC: HCPCS | Performed by: INTERNAL MEDICINE

## 2023-11-03 PROCEDURE — 90662 IIV NO PRSV INCREASED AG IM: CPT | Performed by: INTERNAL MEDICINE

## 2023-11-03 PROCEDURE — 1111F DSCHRG MED/CURRENT MED MERGE: CPT | Performed by: INTERNAL MEDICINE

## 2023-11-03 NOTE — PROGRESS NOTES
Called to introduce CCM program no answer left detailed message to call back. Also sending note to Dr. Rita Monsalve pt has scheduled visit today.

## 2023-11-06 ENCOUNTER — TELEPHONE (OUTPATIENT)
Dept: INTERNAL MEDICINE CLINIC | Facility: CLINIC | Age: 73
End: 2023-11-06

## 2023-11-06 RX ORDER — TORSEMIDE 20 MG/1
20 TABLET ORAL 2 TIMES DAILY
Qty: 60 TABLET | Refills: 0 | Status: SHIPPED | OUTPATIENT
Start: 2023-11-06

## 2023-11-06 NOTE — TELEPHONE ENCOUNTER
Switch furosemide  to torsemide 20mg po bid which is better absorbed than his furosemide. Call back in 2 to 3 days for progress. Erx sent.

## 2023-11-06 NOTE — TELEPHONE ENCOUNTER
Patient's wife Valentino Loyd, on RAISA, verified patient's name/. Calling update on lower leg edema. Seen in office -. \"Feet are blown up, hugmongous, ankles are worse than on Friday. \"  Presently on furosemide 40 mg, two tabs once a day. Denies wheezing or shortness of breath. Home scale does not measure over 300 lbs. She states he does not have a cardiologist.  Dr Stewart Rubio, please advise?

## 2023-11-08 ENCOUNTER — TELEPHONE (OUTPATIENT)
Dept: INTERNAL MEDICINE CLINIC | Facility: CLINIC | Age: 73
End: 2023-11-08

## 2023-11-08 NOTE — TELEPHONE ENCOUNTER
Left message for pt to call back to inform that letter for [employer] is ready for  at the [address] [place] [address] office .

## 2023-11-15 ENCOUNTER — LAB ENCOUNTER (OUTPATIENT)
Dept: LAB | Age: 73
End: 2023-11-15
Attending: INTERNAL MEDICINE
Payer: MEDICARE

## 2023-11-15 DIAGNOSIS — I50.32 CHRONIC DIASTOLIC CONGESTIVE HEART FAILURE (HCC): ICD-10-CM

## 2023-11-15 DIAGNOSIS — E78.00 HYPERCHOLESTEREMIA: ICD-10-CM

## 2023-11-15 LAB
ALBUMIN SERPL-MCNC: 4.3 G/DL (ref 3.2–4.8)
ALBUMIN/GLOB SERPL: 1.5 {RATIO} (ref 1–2)
ALP LIVER SERPL-CCNC: 103 U/L
ALT SERPL-CCNC: 68 U/L
ANION GAP SERPL CALC-SCNC: 5 MMOL/L (ref 0–18)
AST SERPL-CCNC: 29 U/L (ref ?–34)
BILIRUB SERPL-MCNC: 0.9 MG/DL (ref 0.2–1.1)
BUN BLD-MCNC: 20 MG/DL (ref 9–23)
BUN/CREAT SERPL: 15.9 (ref 10–20)
CALCIUM BLD-MCNC: 9.9 MG/DL (ref 8.7–10.4)
CHLORIDE SERPL-SCNC: 95 MMOL/L (ref 98–112)
CHOLEST SERPL-MCNC: 168 MG/DL (ref ?–200)
CO2 SERPL-SCNC: 38 MMOL/L (ref 21–32)
CREAT BLD-MCNC: 1.26 MG/DL
EGFRCR SERPLBLD CKD-EPI 2021: 61 ML/MIN/1.73M2 (ref 60–?)
FASTING PATIENT LIPID ANSWER: NO
FASTING STATUS PATIENT QL REPORTED: NO
GLOBULIN PLAS-MCNC: 2.8 G/DL (ref 2.8–4.4)
GLUCOSE BLD-MCNC: 124 MG/DL (ref 70–99)
HDLC SERPL-MCNC: 44 MG/DL (ref 40–59)
LDLC SERPL CALC-MCNC: 106 MG/DL (ref ?–100)
NONHDLC SERPL-MCNC: 124 MG/DL (ref ?–130)
OSMOLALITY SERPL CALC.SUM OF ELEC: 290 MOSM/KG (ref 275–295)
POTASSIUM SERPL-SCNC: 4.2 MMOL/L (ref 3.5–5.1)
PROT SERPL-MCNC: 7.1 G/DL (ref 5.7–8.2)
SODIUM SERPL-SCNC: 138 MMOL/L (ref 136–145)
TRIGL SERPL-MCNC: 98 MG/DL (ref 30–149)
VLDLC SERPL CALC-MCNC: 17 MG/DL (ref 0–30)

## 2023-11-15 PROCEDURE — 80061 LIPID PANEL: CPT

## 2023-11-15 PROCEDURE — 36415 COLL VENOUS BLD VENIPUNCTURE: CPT

## 2023-11-15 PROCEDURE — 80053 COMPREHEN METABOLIC PANEL: CPT

## 2023-11-18 ENCOUNTER — TELEPHONE (OUTPATIENT)
Dept: INTERNAL MEDICINE CLINIC | Facility: CLINIC | Age: 73
End: 2023-11-18

## 2023-11-18 DIAGNOSIS — R79.89 ELEVATED LFTS: Primary | ICD-10-CM

## 2023-11-20 ENCOUNTER — TELEPHONE (OUTPATIENT)
Dept: PULMONOLOGY | Facility: CLINIC | Age: 73
End: 2023-11-20

## 2023-11-20 NOTE — TELEPHONE ENCOUNTER
Received fax from [facility] for [address] in Dr Lisa Patterson folder for signature.

## 2023-11-21 NOTE — TELEPHONE ENCOUNTER
Order signed by Dr. Anand Ford and faxed to [facility] with confirmation. Order sent for scanning. Copy placed in [facility]'s folder for pickup.

## 2023-12-01 RX ORDER — TORSEMIDE 20 MG/1
20 TABLET ORAL 2 TIMES DAILY
Qty: 180 TABLET | Refills: 0 | OUTPATIENT
Start: 2023-12-01

## 2023-12-01 RX ORDER — TORSEMIDE 20 MG/1
20 TABLET ORAL 2 TIMES DAILY
Qty: 180 TABLET | Refills: 3 | Status: SHIPPED | OUTPATIENT
Start: 2023-12-01

## 2023-12-01 NOTE — TELEPHONE ENCOUNTER
Refill passed per [place], [employer] protocol.   Requested Prescriptions   Pending Prescriptions Disp Refills    TORSEMIDE 20 MG Oral Tab [Pharmacy Med Name: TORSEMIDE 20MG TABLETS] 180 tablet 0     Sig: TAKE 1 TABLET BY MOUTH EVERY MORNING AND 1 TABLET BY MOUTH EVERY NIGHT AT BEDTIME       Hypertensive Medications Protocol Passed - 11/30/2023 12:33 PM        Passed - In person appointment in the past 12 or next 3 months     Recent Outpatient Visits              4 weeks ago Acute on chronic respiratory failure with hypoxia and hypercapnia ([facility] [address]    [address], [address], Jose Maria Mayberry MD    Office Visit    6 months ago Primary osteoarthritis of both knees    [address], [address] [facility] [address], [place], [place]    Office Visit    9 months ago [employer] annual wellness visit, subsequent    [address] [facility], Jose Maria Mayberry MD    Office Visit    9 months ago Centrilobular emphysema [facility])    [address], [address] [facility] Verner Poe, MD    Office Visit    10 months ago Primary osteoarthritis of both knees    [address], [address] [facility] [address], [place]    Office Visit          Future Appointments         Provider Department Appt Notes    In 1 week Nicol Mitchell MD [address], 602 [facility] f/u               Passed - Last BP reading less than 140/90     BP Readings from Last 1 Encounters:   11/03/23 109/71               Passed - CMP or BMP in past 6 months     Recent Results (from the past 4392 hour(s))   Comp Metabolic Panel (14) [E]    Collection Time: 11/15/23 12:22 PM   Result Value Ref Range    Glucose 124 (H) 70 - 99 mg/dL    Sodium 138 136 - 145 mmol/L    Potassium 4.2 3.5 - 5.1 mmol/L    Chloride 95 (L) 98 - 112 mmol/L    CO2 38.0 (H) 21.0 - 32.0 mmol/L    Anion Gap 5 0 - 18 mmol/L    BUN 20 9 - 23 mg/dL    Creatinine 1.26 0.70 - 1.30 mg/dL    BUN/CREA Ratio 15.9 10.0 - 20.0    Calcium, Total 9.9 8.7 - 10.4 mg/dL    Calculated Osmolality 290 275 - 295 mOsm/kg    eGFR-Cr 61 >=60 mL/min/1.73m2    ALT 68 (H) 10 - 49 U/L    AST 29 <=34 U/L    Alkaline Phosphatase 103 45 - 117 U/L    Bilirubin, Total 0.9 0.2 - 1.1 mg/dL    Total Protein 7.1 5.7 - 8.2 g/dL    Albumin 4.3 3.2 - 4.8 g/dL    Globulin  2.8 2.8 - 4.4 g/dL    A/G Ratio 1.5 1.0 - 2.0    Patient Fasting for CMP? No      *Note: Due to a large number of results and/or encounters for the requested time period, some results have not been displayed. A complete set of results can be found in Results Review.                Passed - In person appointment or virtual visit in the past 6 months     Recent Outpatient Visits              4 weeks ago Acute on chronic respiratory failure with hypoxia and hypercapnia ([facility] [address]    [address], [facility], Adry Iglesias MD    Office Visit    6 months ago Primary osteoarthritis of both knees    [facility], [address] [place] [address],[address], [place], [place]    Office Visit    9 months ago [employer] annual wellness visit, subsequent    Maxine Cool MD    Office Visit    9 months ago Centrilobular emphysema [facility])    Iraj Daly MD    Office Visit    10 months ago Primary osteoarthritis of both Agus Drain, Cora, Macarena Aguilera MD    Office Visit          Future Appointments         Provider Department Appt Notes    In 1 week Bacilio Hooper MD [address], [address] [facility], [facility] f/u               [facility] or [facility] > 50     GFR Evaluation  EGFRCR: 61 , resulted on 11/15/2023             Recent Outpatient Visits              4 weeks ago Acute on chronic respiratory failure with hypoxia and hypercapnia ([address] [facility], Ian Miranda MD    Office Visit    6 months ago Primary osteoarthritis of both knees    [facility], [address] [facility] [address] [facility] [address], [facility] [facility] [employer]    Office Visit    9 months ago [employer] annual wellness visit, subsequent    [address], [place] [address], Jakob Joyner MD    Office Visit    9 months ago Centrilobular emphysema [facility])    Susan Hoffman MD    Office Visit    10 months ago Primary osteoarthritis of both Elzbieta Hebert, Lara, Millicent Michael MD    Office Visit          Future Appointments         Provider Department Appt Notes    In 1 week Sandro Bunch MD [address], [address] [place] [address] f/u

## 2023-12-14 ENCOUNTER — PATIENT OUTREACH (OUTPATIENT)
Dept: CASE MANAGEMENT | Age: 73
End: 2023-12-14

## 2024-01-16 ENCOUNTER — PATIENT OUTREACH (OUTPATIENT)
Dept: CASE MANAGEMENT | Age: 74
End: 2024-01-16

## 2024-01-16 DIAGNOSIS — G47.33 OSA (OBSTRUCTIVE SLEEP APNEA): ICD-10-CM

## 2024-01-16 DIAGNOSIS — E66.01 MORBID (SEVERE) OBESITY DUE TO EXCESS CALORIES (HCC): ICD-10-CM

## 2024-01-16 DIAGNOSIS — J96.12 CHRONIC RESPIRATORY FAILURE WITH HYPOXIA AND HYPERCAPNIA (HCC): ICD-10-CM

## 2024-01-16 DIAGNOSIS — I50.32 CHRONIC DIASTOLIC CONGESTIVE HEART FAILURE (HCC): ICD-10-CM

## 2024-01-16 DIAGNOSIS — J96.22 ACUTE ON CHRONIC RESPIRATORY FAILURE WITH HYPERCAPNIA (HCC): Primary | ICD-10-CM

## 2024-01-16 DIAGNOSIS — E78.5 DYSLIPIDEMIA: ICD-10-CM

## 2024-01-16 DIAGNOSIS — E66.2 OBESITY HYPOVENTILATION SYNDROME (HCC): ICD-10-CM

## 2024-01-16 DIAGNOSIS — J96.11 CHRONIC RESPIRATORY FAILURE WITH HYPOXIA AND HYPERCAPNIA (HCC): ICD-10-CM

## 2024-01-16 DIAGNOSIS — J43.9 PULMONARY EMPHYSEMA, UNSPECIFIED EMPHYSEMA TYPE (HCC): ICD-10-CM

## 2024-01-16 NOTE — PROGRESS NOTES
Spoke to Campbell at length about CCM, current care plan and performed CCM call with Campbell reviewed meds and compliance.     Interventions for following categories based on CCM initial call.      I want to know a little about you.  What do you do in your spare time? Watching television and Youtube (Any hobbies? What Hobbies?) pt use to like fishing   Are you retired or what do you do for a living? 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     Social support:  Do you live with someone? Yes, wife.   Do you have someone you can turn to when you are very stressed and or need help? Yes   Who? Daughter   Do you have someone you check in with or talk to on a regular basis? Yes, daughter or wife.   Are you responsible for anyone's care? No   How are you doing with that? N/A  Do you feel you take time for yourself too? Yes    Do you have any pets at home? Yes cat, tell me about your pets its his wifes pet and he doesn't like cats.      Let's talk about stress.  How much stress do you feel you have in your life? Little   What stress's you out? Financial   How do you manage this stress? Keeping an open line of communication about finances.      Medication review:  Update medications with meds from other providers as well  Is there any reason you are unable to take your medications as prescribed? No   Do you take them every day on time? Yes   Reasons why you don't? N/A  Do you have any questions about your medications? Side effects? Etc.? Yes, today they were answered.      Meds: Detailed medication review with Campbell. Discussed with Campbell if any potential barriers are present that could prevent compliance with medication regimen. At this time, no barriers reported. Campbell reports is stable on all medications and denies experiencing any side effects.    Care Team:  Review specialists and add to care team.- will review at next outreach     Disease specific:   Do you feel you have a good understanding of your _yes  _ (dm, HTN, etc.)    Do you have chronic pain: yes, knee pain especially when he walks and worse when he walks.      Provider Goals:  What would you say is your biggest concerns about your health? Weight management.    Have any of your providers set forth a goal they wanted you to achieve? No   What steps have you taken? Diet control.    Any barriers? Has an issue of eating till he's full.  Carnivore.    Example: eating breakfast daily, walk around the block once a week, cut down on portions for one meal a day.  Etc.    Follow up:  You can call me anytime you have a question or need help with achieving your goals.  I will follow up with you in a few weeks to see how you are doing and if we need to change anything to help you meet your needs.  Remember: what works for one person may not always work for another. We will continue to work on what will help you meet your needs.    SDOH, depression and fall risk completed today.     Will follow up next outreach exercise, nutrition.    Care Plan: Reviewed and updated where needed.  Care manager f/up: 1 month.    Time Spent This Encounter Total: 22 min medical record review, telephone communication, care plan updates where needed, and education. Provided acknowledgment and validation to patient's concerns.   Monthly Minute Total including today: 22    Physical assessment, complete health history, and need for CCM established by Johny Kang MD.

## 2024-02-09 ENCOUNTER — TELEPHONE (OUTPATIENT)
Dept: ORTHOPEDICS CLINIC | Facility: CLINIC | Age: 74
End: 2024-02-09

## 2024-02-09 NOTE — TELEPHONE ENCOUNTER
Patient is requesting to be seen sooner then first available as he is having a lot of pain and can barely walk and is requesting to get a cortisone injection. Patient was last seen on 5/19/2023.

## 2024-02-09 NOTE — TELEPHONE ENCOUNTER
Pt last seen by katarzyna on 5/19/23 for bilateral cortisone injections.   S/w pt and he states right knee pain came back 1-2 months ago, but pain has increased the past few days. No injury. He denies doing anything to treat his pain, but has tried Tylenol in the past. I recommend RICE and otc tylenol per label directions, do not exceed 3,000mg in 24 hours if no medical contradictions. Pt has appt scheduled on 3/7/24 with Dr Verduzco. I added him to wait list and advised he will get notification in [facility] for sooner opening.   Ok to add to katarzyna schedule sooner or does pt need to see Dr Barrientos since last appt with Dr Barrientos was 1 yr ago?

## 2024-02-14 ENCOUNTER — HOSPITAL ENCOUNTER (OUTPATIENT)
Dept: GENERAL RADIOLOGY | Facility: HOSPITAL | Age: 74
Discharge: HOME | End: 2024-02-14
Attending: ORTHOPAEDIC SURGERY
Payer: MEDICARE

## 2024-02-14 ENCOUNTER — OFFICE VISIT (OUTPATIENT)
Dept: ORTHOPEDICS CLINIC | Facility: CLINIC | Age: 74
End: 2024-02-14

## 2024-02-14 VITALS — DIASTOLIC BLOOD PRESSURE: 72 MMHG | HEART RATE: 86 BPM | SYSTOLIC BLOOD PRESSURE: 113 MMHG

## 2024-02-14 DIAGNOSIS — M25.561 CHRONIC PAIN OF BOTH KNEES: ICD-10-CM

## 2024-02-14 DIAGNOSIS — G89.29 CHRONIC PAIN OF BOTH KNEES: ICD-10-CM

## 2024-02-14 DIAGNOSIS — M17.0 PRIMARY OSTEOARTHRITIS OF BOTH KNEES: Primary | ICD-10-CM

## 2024-02-14 DIAGNOSIS — M25.562 CHRONIC PAIN OF BOTH KNEES: ICD-10-CM

## 2024-02-14 PROCEDURE — 20610 DRAIN/INJ JOINT/BURSA W/O US: CPT | Performed by: ORTHOPAEDIC SURGERY

## 2024-02-14 PROCEDURE — 99214 OFFICE O/P EST MOD 30 MIN: CPT | Performed by: ORTHOPAEDIC SURGERY

## 2024-02-14 PROCEDURE — 73564 X-RAY EXAM KNEE 4 OR MORE: CPT | Performed by: ORTHOPAEDIC SURGERY

## 2024-02-14 RX ORDER — TRIAMCINOLONE ACETONIDE 40 MG/ML
40 INJECTION, SUSPENSION INTRA-ARTICULAR; INTRAMUSCULAR
Status: COMPLETED | OUTPATIENT
Start: 2024-02-14 | End: 2024-02-14

## 2024-02-14 RX ADMIN — TRIAMCINOLONE ACETONIDE 40 MG: 40 INJECTION, SUSPENSION INTRA-ARTICULAR; INTRAMUSCULAR at 14:30:00

## 2024-02-14 RX ADMIN — TRIAMCINOLONE ACETONIDE 40 MG: 40 INJECTION, SUSPENSION INTRA-ARTICULAR; INTRAMUSCULAR at 14:31:00

## 2024-02-14 NOTE — PROGRESS NOTES
NURSING INTAKE COMMENTS:   Chief Complaint   Patient presents with    Knee Pain     Bilateral f/u - had cortisone inj on 2/2/23 - states the injection helped for a few weeks but it got really worse in the past month - L is worse than R today - rates pain as 4-8/10 at all the time - no numbness or tingling        HPI: This 73 year old male presents today with complaints of bilateral knee pain follow-up.  He he had injections a number of months ago which helped for a number of months.  He has had no recent injury.  No recent immunizations.  He is able to walk in the house but uses a wheelchair for long distance mobility    Past Medical History:   Diagnosis Date    Atherosclerosis of coronary artery     coronary calcification on ct chest    Back problem     Cataract     Chronic respiratory failure (HCC)     Congestive heart disease (HCC)     COPD (HCC)     freq exacerbations. ? 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 180 tablet 3    fluticasone-umeclidin-vilant (TRELEGY ELLIPTA) 100-62.5-25 MCG/ACT Inhalation Aerosol Powder, Breath Activated Inhale 1 puff into the lungs daily. 3 each 0    albuterol 108 (90 Base) MCG/ACT Inhalation Aero Soln Inhale 2 puffs into the lungs every 4 to 6 hours as needed for Wheezing. inhale 2 puff by inhalation route  every 4 - 6 hours as needed 1 each 1    atorvastatin 20 MG  Oral Tab Take 1 tablet (20 mg total) by mouth nightly. 90 tablet 0    ipratropium-albuterol 0.5-2.5 (3) MG/3ML Inhalation Solution Take 3 mL by nebulization in the morning and 3 mL at noon and 3 mL in the evening. And as needed. 100 each 1    potassium chloride 10 MEQ Oral Tab CR Take one tab (10 meq) po/day  alternating with 2 tabs (20meq)/day 135 tablet 1    fluticasone propionate 50 MCG/ACT Nasal Suspension 2 sprays by Nasal route daily. 3 each 3    Urea 40 % External Cream Apply 1 Application topically daily. 227 g 2    Saline Nasal Spray 0.65 % Nasal Solution 1 spray by Nasal route every 3 (three) hours as needed for congestion. 1 Bottle 0     No Known Allergies  Family History   Problem Relation Age of Onset    Cancer Father         lung cancer    Cancer Mother         lung cancer    No Known Problems Daughter     No Known Problems Son     No Known Problems Maternal Grandmother     No Known Problems Maternal Grandfather     No Known Problems Paternal Grandmother     No Known Problems Paternal Grandfather     No Known Problems Sister     Diabetes Brother     Lipids Brother         hyperlipidemia    Hypertension Brother     No Known Problems Other        Social History     Occupational History    Not on file   Tobacco Use    Smoking status: Former     Packs/day: 2.00     Years: 40.00     Additional pack years: 0.00     Total pack years: 80.00     Types: Cigarettes     Quit date: 2021     Years since quittin.2     Passive exposure: Past    Smokeless tobacco: Never   Vaping Use    Vaping Use: Never used   Substance and Sexual Activity    Alcohol use: Not Currently     Alcohol/week: 0.0 standard drinks of alcohol     Comment: socially    Drug use: Not Currently    Sexual activity: Not on file        Review of Systems:  GENERAL: denies fevers, chills, night sweats, fatigue, unintentional weight loss/gain  SKIN: denies skin lesions, open sores, rash  HEENT:denies recent vision change, new nasal  congestion,hearing loss, tinnitus, sore throat, headaches  RESPIRATORY: denies new shortness of breath, cough, asthma, wheezing  CARDIOVASCULAR: denies chest pain, leg cramps with exertion, palpitations, leg swelling  GI: denies abdominal pain, nausea, vomiting, diarrhea, constipation, hematochezia, worsening heartburn or stomach ulcers  : denies dysuria, hematuria, incontinence, increased frequency, urgency, difficulty urinating  MUSCULOSKELETAL: denies musculoskeletal complaints other than in HPI  NEURO: denies numbness, tingling, weakness, balance issues, dizziness, memory loss  PSYCHIATRIC: denies Hx of depression, anxiety, other psychiatric disorders  HEMATOLOGIC: denies blood clots, anemia, blood clotting disorders, blood transfusion  ENDOCRINE: denies autoimmune disease, thyroid issues, or diabetes  ALLERGY: denies asthma, seasonal allergies    Physical Examination:    /72   Pulse 86   Constitutional: appears well hydrated, alert and responsive, no acute distress noted  Extremities: Bilateral knees with mild varus deformities.  Diffuse tenderness to palpation.  Trace effusion bilaterally  Neurological: Unchanged    Imaging:   X-rays both knees were obtained in the office today.  They demonstrate advanced tricompartmental degenerative changes most pronounced in the medial compartment.  No evidence of osteonecrosis.  No soft tissue calcifications noted.    Labs:  Lab Results   Component Value Date    WBC 14.7 (H) 10/28/2023    HGB 16.1 10/28/2023    .0 10/28/2023      Lab Results   Component Value Date     (H) 11/15/2023    BUN 20 11/15/2023    CREATSERUM 1.26 11/15/2023    GFRNAA 91 09/23/2021    GFRAA 105 09/23/2021        Assessment and Plan:  Diagnoses and all orders for this visit:    Primary osteoarthritis of both knees  -     arthrocentesis major joint  -     triamcinolone acetonide (Kenalog-40) 40 MG/ML injection 40 mg    Chronic pain of both knees  -     XR KNEE COMPLETE BILAT  MEÑO(CPT=73564-50); Future        Assessment: Bilateral knee osteoarthritis, primary    Plan: I discussed operative and nonoperative treatment options.  He is morbidly obese and is on home oxygen.  He would not necessarily be a good surgical candidate.  Advised continued nonoperative care for now.  Consider outpatient physical therapy.  He elected for injections today.  Both knees were injected with 40 mg of Kenalog without complication today.  Follow-up with me again as needed.  Daughter was present for the visit today.    Follow Up: Return if symptoms worsen or fail to improve.    ASAD MCKEON MD

## 2024-02-14 NOTE — PROGRESS NOTES
Per verbal order from Dr. Goldsmith draw up 3ml of 0.5% Marcaine & 2ml 1% lidocaine and 1ml of Kenalog 40 for cortisone injection to bilateral knee. Nannette Dodd    Patient provided education handout for cortisone injection.

## 2024-02-19 ENCOUNTER — PATIENT OUTREACH (OUTPATIENT)
Dept: CASE MANAGEMENT | Age: 74
End: 2024-02-19

## 2024-02-19 DIAGNOSIS — E66.01 MORBID (SEVERE) OBESITY DUE TO EXCESS CALORIES (HCC): ICD-10-CM

## 2024-02-19 DIAGNOSIS — E78.5 DYSLIPIDEMIA: ICD-10-CM

## 2024-02-19 DIAGNOSIS — J96.22 ACUTE ON CHRONIC RESPIRATORY FAILURE WITH HYPERCAPNIA (HCC): Primary | ICD-10-CM

## 2024-02-19 DIAGNOSIS — I50.32 CHRONIC DIASTOLIC CONGESTIVE HEART FAILURE (HCC): ICD-10-CM

## 2024-02-19 DIAGNOSIS — J43.9 PULMONARY EMPHYSEMA, UNSPECIFIED EMPHYSEMA TYPE (HCC): ICD-10-CM

## 2024-02-19 RX ORDER — OFLOXACIN 3 MG/ML
SOLUTION/ DROPS OPHTHALMIC
COMMUNITY
Start: 2024-01-29

## 2024-02-19 RX ORDER — PREDNISOLONE ACETATE 10 MG/ML
SUSPENSION/ DROPS OPHTHALMIC
COMMUNITY
Start: 2024-01-29

## 2024-02-19 NOTE — PROGRESS NOTES
Spoke to Campbell for CCM.      Updates to patient care team/comments: yes, added and updated.   Patient reported changes in medications: together we reviewed with no updates.   Med Adherence  Comment: pt reports taking all medications as prescribed.       Health Maintenance:   Reviewed with pt    Health Maintenance   Topic Date Due    Colorectal Cancer Screening  Never done- reminded      Zoster Vaccines (1 of 2) Never done- unsure     COVID-19 Vaccine (6 - 2023-24 season) 09/01/2023- unsure     Annual Physical  02/17/2024- scheduled     PSA  03/10/2025    Influenza Vaccine  Completed    Annual Depression Screening  Completed    Fall Risk Screening (Annual)  Completed    Pneumococcal Vaccine: 65+ Years  Completed    Lung Cancer Screening  Discontinued       Patient updates/concerns:   Listened to pt and provided support.     Campbell complains of having some right sided back pain ongoing for the past couple months.  I asked pt if he'd like a nurse to call him to further assess.  Pt deferred and would like to schedule a visit with Dr. Kang.  Scheduled.      Pt says he was having knee pain so he recently had injections and now has some relieve.  Last injections lasted several months. Seeing Dr. Goldsmith.      Cataract surgery left eye completed 2/7/24 by Dr. Collins.  Pt has scheduled follow up 2/29/24 at 1:10 pm I confirmed today for pt.      Diet  Could be better pt says.  Pt admits he can overeat.  Plans on watching his portions more.  Likes eating till he feels full but understands he is over weight and needs more discipline.       Wt Readings from Last 6 Encounters:   02/02/24 (!) 303 lb 8 oz   11/03/23 (!) 303 lb 1.6 oz   10/25/23 (!) 312 lb 2.7 oz   02/17/23 (!) 303 lb 4.8 oz   02/06/23 (!) 304 lb   01/18/23 (!) 305 lb 11.2 oz     Discussed diet and nutrition along with the importance of compliance for overall health and well being.      Goals/Action Plan:    Active goal from previous outreach: weight  management     Patient reported progress towards goals: ongoing                - What: diet control            - Where/When/How: pt plans on controlling his portions for dinner at least 3 days a week  Patient Reported Barriers and Concerns: pt says he usually likes to eat till he's full.  Pt says he's kind of a carnivore.                     - Plan for overcoming barriers: he plans on only serving himself one portion and not going back for seconds just  because his stomach isn't full.      Care Managers Interventions:   Encouraged patient:   Self care: Take the time to do the things you love.   Nutrition:  Good nutrition helps us to maintain our weight, fight off infection, and help reduce the risk of developing other chronic issues.   Physical activity:  Physical activity is important to help maintain independence and improve quality of life.     Future Appointments:   Future Appointments   Date Time Provider Department Center   3/8/2024  3:00 PM Johny Kang MD [facility]   3/28/2024  3:00 PM Johny Kang MD [facility]         Next Care Manager Follow Up Date: in 1 month.     Reason For Follow Up: review progress and or barriers towards patient's goals.     Time Spent This Encounter Total: 28 min medical record review, telephone communication, care plan updates where needed, education, goals, and action plan recreation/update. Provided acknowledgment and validation to patient's concerns.   Monthly Minute Total including today: 28  Physical assessment, complete health history, and need for CCM established by Johny Kang MD.

## 2024-03-08 ENCOUNTER — TELEPHONE (OUTPATIENT)
Dept: INTERNAL MEDICINE CLINIC | Facility: CLINIC | Age: 74
End: 2024-03-08

## 2024-03-08 ENCOUNTER — HOSPITAL ENCOUNTER (OUTPATIENT)
Dept: GENERAL RADIOLOGY | Age: 74
Discharge: HOME | End: 2024-03-08
Attending: INTERNAL MEDICINE
Payer: MEDICARE

## 2024-03-08 ENCOUNTER — OFFICE VISIT (OUTPATIENT)
Dept: INTERNAL MEDICINE CLINIC | Facility: CLINIC | Age: 74
End: 2024-03-08

## 2024-03-08 VITALS
WEIGHT: 308.19 LBS | HEIGHT: 68 IN | SYSTOLIC BLOOD PRESSURE: 126 MMHG | TEMPERATURE: 98 F | OXYGEN SATURATION: 92 % | DIASTOLIC BLOOD PRESSURE: 79 MMHG | HEART RATE: 105 BPM | BODY MASS INDEX: 46.71 KG/M2

## 2024-03-08 DIAGNOSIS — M54.50 CHRONIC RIGHT-SIDED LOW BACK PAIN WITHOUT SCIATICA: Primary | ICD-10-CM

## 2024-03-08 DIAGNOSIS — G89.29 CHRONIC RIGHT-SIDED LOW BACK PAIN WITHOUT SCIATICA: Primary | ICD-10-CM

## 2024-03-08 DIAGNOSIS — M54.50 CHRONIC RIGHT-SIDED LOW BACK PAIN WITHOUT SCIATICA: ICD-10-CM

## 2024-03-08 DIAGNOSIS — G89.29 CHRONIC RIGHT-SIDED LOW BACK PAIN WITHOUT SCIATICA: ICD-10-CM

## 2024-03-08 PROCEDURE — 72100 X-RAY EXAM L-S SPINE 2/3 VWS: CPT | Performed by: INTERNAL MEDICINE

## 2024-03-08 PROCEDURE — 99213 OFFICE O/P EST LOW 20 MIN: CPT | Performed by: INTERNAL MEDICINE

## 2024-03-08 NOTE — PROGRESS NOTES
Subjective:     Patient ID: Campbell Rao is a 73 year old male.    Low Back Pain  This is a chronic problem. The current episode started more than 1 month ago (3mos). The problem occurs constantly. The problem is unchanged. The pain is present in the lumbar spine. The quality of the pain is described as aching. The pain does not radiate. The pain is at a severity of 8/10. The pain is severe. The symptoms are aggravated by bending and twisting. Pertinent negatives include no bladder incontinence, bowel incontinence, dysuria, fever, numbness, paresis, perianal numbness, weakness or weight loss. Risk factors include obesity. He has tried analgesics and heat for the symptoms. The treatment provided no relief.       History/Other:   Review of Systems   Constitutional: Negative.  Negative for fever and weight loss.   Gastrointestinal: Negative.  Negative for bowel incontinence.   Genitourinary: Negative.  Negative for bladder incontinence and dysuria.   Musculoskeletal:  Positive for back pain.   Neurological:  Negative for weakness and numbness.     Current Outpatient Medications   Medication Sig Dispense Refill    prednisoLONE 1 % Ophthalmic Suspension Place into the left eye.      torsemide 20 MG Oral Tab Take 1 tablet (20 mg total) by mouth 2 (two) times daily. 180 tablet 3    fluticasone-umeclidin-vilant (TRELEGY ELLIPTA) 100-62.5-25 MCG/ACT Inhalation Aerosol Powder, Breath Activated Inhale 1 puff into the lungs daily. 3 each 0    albuterol 108 (90 Base) MCG/ACT Inhalation Aero Soln Inhale 2 puffs into the lungs every 4 to 6 hours as needed for Wheezing. inhale 2 puff by inhalation route  every 4 - 6 hours as needed 1 each 1    atorvastatin 20 MG Oral Tab Take 1 tablet (20 mg total) by mouth nightly. 90 tablet 0    potassium chloride 10 MEQ Oral Tab CR Take one tab (10 meq) po/day  alternating with 2 tabs (20meq)/day 135 tablet 1    fluticasone propionate 50 MCG/ACT Nasal Suspension 2 sprays by Nasal route  daily. 3 each 3    Saline Nasal Spray 0.65 % Nasal Solution 1 spray by Nasal route every 3 (three) hours as needed for congestion. 1 Bottle 0    ofloxacin 0.3 % Ophthalmic Solution Place into the left eye. (Patient not taking: Reported on 3/8/2024)      ipratropium-albuterol 0.5-2.5 (3) MG/3ML Inhalation Solution Take 3 mL by nebulization in the morning and 3 mL at noon and 3 mL in the evening. And as needed. 100 each 1    Urea 40 % External Cream Apply 1 Application topically daily. 227 g 2     Allergies:No Known Allergies    Past Medical History:   Diagnosis Date    Atherosclerosis of coronary artery     coronary calcification on ct chest    Back problem     Cataract     Chronic respiratory failure (HCC)     Congestive heart disease (HCC)     COPD (HCC)     freq exacerbations. ? 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    Osteoarthritis     Pneumonia     Pulmonary nodules 10/2016    Tobacco abuse       Past Surgical History:   Procedure Laterality Date    APPENDECTOMY      APPENDECTOMY      ELECTROCARDIOGRAM, COMPLETE  1/30/2012    scanned to media tab      Family History   Problem Relation Age of Onset    Cancer Father         lung cancer    Cancer Mother         lung cancer    No Known Problems Daughter     No Known Problems Son     No Known Problems Maternal Grandmother     No Known Problems Maternal Grandfather     No Known Problems Paternal Grandmother     No Known Problems Paternal Grandfather     No Known Problems Sister     Diabetes Brother     Lipids Brother         hyperlipidemia    Hypertension Brother     No Known Problems Other       Social History:   Social History     Socioeconomic History    Marital status:    Tobacco Use    Smoking status: Former     Packs/day:  2.00     Years: 40.00     Additional pack years: 0.00     Total pack years: 80.00     Types: Cigarettes     Quit date: 2021     Years since quittin.3     Passive exposure: Past    Smokeless tobacco: Never   Vaping Use    Vaping Use: Never used   Substance and Sexual Activity    Alcohol use: Not Currently     Alcohol/week: 0.0 standard drinks of alcohol     Comment: socially    Drug use: Not Currently   Other Topics Concern    Caffeine Concern Yes     Comment: coffee, >32 oz daily     Social Determinants of Health     Financial Resource Strain: Low Risk  (2023)    Financial Resource Strain     Difficulty of Paying Living Expenses: Not very hard     Med Affordability: No   Food Insecurity: No Food Insecurity (10/26/2023)    Food Insecurity     Food Insecurity: Never true   Transportation Needs: No Transportation Needs (2023)    Transportation Needs     Lack of Transportation: No   Physical Activity: Inactive (2024)    Exercise Vital Sign     Days of Exercise per Week: 0 days     Minutes of Exercise per Session: 0 min   Stress: No Stress Concern Present (2024)    Stress     Feeling of Stress : No   Social Connections: Somewhat Isolated (2024)    Social Connections     Frequency of Socialization with Friends and Family: 1   Housing Stability: Low Risk  (10/26/2023)    Housing Stability     Housing Instability: No        Objective:   Physical Exam  Constitutional:       General: He is not in acute distress.     Appearance: He is obese. He is not ill-appearing or diaphoretic.   Cardiovascular:      Rate and Rhythm: Normal rate and regular rhythm.      Heart sounds: Normal heart sounds. No murmur heard.  Pulmonary:      Effort: Pulmonary effort is normal. No respiratory distress.      Breath sounds: No wheezing or rales.   Abdominal:      General: Abdomen is protuberant. Bowel sounds are normal.      Palpations: Abdomen is soft. There is no mass.      Tenderness: There is no abdominal  tenderness. There is no guarding.   Musculoskeletal:      Lumbar back: Tenderness present. No swelling, edema, deformity or bony tenderness. Decreased range of motion. Negative right straight leg raise test and negative left straight leg raise test.      Right lower leg: Edema present.      Left lower leg: Edema present.      Comments: Trace edema both legs/ankles.  Chronic stasis dermatitis both legs   Skin:     Coloration: Skin is not jaundiced or pale.   Neurological:      Mental Status: He is alert.         Assessment & Plan:   (M54.50,  G89.29) Chronic right-sided low back pain without sciatica  (primary encounter diagnosis)  Plan: XR LUMBAR SPINE (MIN 2 VIEWS) (CPT=72100)        Will get xr lumbar spine given chronicity of pain.   There was no neurodeficit on exam. Advised to continue with tylenol. Also add otc lidocaine patch.;  plan for PT.         No orders of the defined types were placed in this encounter.      Meds This Visit:  Requested Prescriptions      No prescriptions requested or ordered in this encounter       Imaging & Referrals:  None

## 2024-03-18 ENCOUNTER — PATIENT OUTREACH (OUTPATIENT)
Dept: CASE MANAGEMENT | Age: 74
End: 2024-03-18

## 2024-03-18 DIAGNOSIS — J96.22 ACUTE ON CHRONIC RESPIRATORY FAILURE WITH HYPERCAPNIA (HCC): ICD-10-CM

## 2024-03-18 DIAGNOSIS — I50.32 CHRONIC DIASTOLIC CONGESTIVE HEART FAILURE (HCC): Primary | ICD-10-CM

## 2024-03-18 DIAGNOSIS — E66.01 MORBID (SEVERE) OBESITY DUE TO EXCESS CALORIES (HCC): ICD-10-CM

## 2024-03-18 DIAGNOSIS — J43.9 PULMONARY EMPHYSEMA, UNSPECIFIED EMPHYSEMA TYPE (HCC): ICD-10-CM

## 2024-03-18 NOTE — PROGRESS NOTES
Spoke to Campbell for [facility].      Updates to patient care team/comments: UTD.   Patient reported changes in medications: together we reviewed with no updates.   Med Adherence  Comment: pt reports taking all medications as prescribed.       Health Maintenance:   Reviewed with pt     Health Maintenance   Topic Date Due    Colorectal Cancer Screening  Never done- reminded     Zoster Vaccines (1 of 2) Never done- unsure     Annual Physical  02/17/2024- scheduled     COVID-19 Vaccine (6 - 2023-24 season) 04/18/2024 (Originally 9/1/2023)    PSA  03/10/2025    Influenza Vaccine  Completed    Annual Depression Screening  Completed    Fall Risk Screening (Annual)  Completed    Pneumococcal Vaccine: 65+ Years  Completed    Lung Cancer Screening  Discontinued       Patient updates/concerns:   Listened to pt and provided support.     Knee pain  Seeing Dr. Goldsmith.  Recent knee injections have helped and he is feeling relief.  Suggested since his knees are feeling better try to move around more like walking for some exercise.      Cataract surgery right eye scheduled with Dr. Collins.   Left eye completed.     Diet  Could be better pt says.  Pt admits he can overeat.  Plans on watching his portions more.  Likes eating till he feels full but understands he is over weight and needs more discipline.      Discussed diet and nutrition along with the importance of compliance for overall health and well being.   Pt says he will try to watch is portions twice a week for dinner meals.     Goals/Action Plan:    Active goal from previous outreach: weight management      Patient reported progress towards goals: ongoing                - What: diet control            - Where/When/How: pt plans on controlling his portions for dinner at least 3 days a week  Patient Reported Barriers and Concerns: pt says he usually likes to eat till he's full.  Pt says he's kind of a carnivore.                     - Plan for overcoming barriers: he plans on only  serving himself one portion and not going back for seconds just  because his stomach isn't full.      Care Managers Interventions:   Encouraged patient:   Self care: Take the time to do the things you love.   Nutrition:  Good nutrition helps us to maintain our weight, fight off infection, and help reduce the risk of developing other chronic issues.   Physical activity:  Physical activity is important to help maintain independence and improve quality of life.     Future Appointments:   Future Appointments   Date Time Provider Department Center   3/28/2024  3:00 PM Johny Kang MD [facility]         [facility] Care Manager Follow Up Date: in 1 month.    Reason For Follow Up: review progress and or barriers towards patient's goals.     Time Spent This Encounter Total: 20 min medical record review, telephone communication, care plan updates where needed, education, goals, and action plan recreation/update. Provided acknowledgment and validation to patient's concerns.   Monthly Minute Total including today: 20  Physical assessment, complete health history, and need for CCM established by Johny Kang MD.

## 2024-03-27 ENCOUNTER — LAB ENCOUNTER (OUTPATIENT)
Dept: LAB | Age: 74
End: 2024-03-27
Attending: INTERNAL MEDICINE
Payer: MEDICARE

## 2024-03-27 DIAGNOSIS — R79.89 ELEVATED LFTS: ICD-10-CM

## 2024-03-27 LAB
ALBUMIN SERPL-MCNC: 4.2 G/DL (ref 3.2–4.8)
ALBUMIN/GLOB SERPL: 1.4 {RATIO} (ref 1–2)
ALP LIVER SERPL-CCNC: 159 U/L
ALT SERPL-CCNC: 45 U/L
ANION GAP SERPL CALC-SCNC: <0 MMOL/L (ref 0–18)
AST SERPL-CCNC: 23 U/L (ref ?–34)
BILIRUB SERPL-MCNC: 0.5 MG/DL (ref 0.2–1.1)
BUN BLD-MCNC: 17 MG/DL (ref 9–23)
BUN/CREAT SERPL: 16.7 (ref 10–20)
CALCIUM BLD-MCNC: 9.8 MG/DL (ref 8.7–10.4)
CHLORIDE SERPL-SCNC: 108 MMOL/L (ref 98–112)
CO2 SERPL-SCNC: 35 MMOL/L (ref 21–32)
CREAT BLD-MCNC: 1.02 MG/DL
EGFRCR SERPLBLD CKD-EPI 2021: 78 ML/MIN/1.73M2 (ref 60–?)
FASTING STATUS PATIENT QL REPORTED: YES
GLOBULIN PLAS-MCNC: 3 G/DL (ref 2.8–4.4)
GLUCOSE BLD-MCNC: 123 MG/DL (ref 70–99)
HBV CORE AB SERPL QL IA: NONREACTIVE
HBV SURFACE AB SER QL: NONREACTIVE
HBV SURFACE AB SERPL IA-ACNC: <3.1 MIU/ML
HBV SURFACE AG SER-ACNC: 0.13 [IU]/L
HBV SURFACE AG SERPL QL IA: NONREACTIVE
HCV AB SERPL QL IA: NONREACTIVE
OSMOLALITY SERPL CALC.SUM OF ELEC: 297 MOSM/KG (ref 275–295)
POTASSIUM SERPL-SCNC: 4.7 MMOL/L (ref 3.5–5.1)
PROT SERPL-MCNC: 7.2 G/DL (ref 5.7–8.2)
SODIUM SERPL-SCNC: 142 MMOL/L (ref 136–145)

## 2024-03-27 PROCEDURE — 36415 COLL VENOUS BLD VENIPUNCTURE: CPT

## 2024-03-27 PROCEDURE — 86803 HEPATITIS C AB TEST: CPT

## 2024-03-27 PROCEDURE — 84466 ASSAY OF TRANSFERRIN: CPT

## 2024-03-27 PROCEDURE — 86706 HEP B SURFACE ANTIBODY: CPT

## 2024-03-27 PROCEDURE — 82103 ALPHA-1-ANTITRYPSIN TOTAL: CPT

## 2024-03-27 PROCEDURE — 80053 COMPREHEN METABOLIC PANEL: CPT

## 2024-03-27 PROCEDURE — 86704 HEP B CORE ANTIBODY TOTAL: CPT

## 2024-03-27 PROCEDURE — 82390 ASSAY OF CERULOPLASMIN: CPT

## 2024-03-27 PROCEDURE — 83540 ASSAY OF IRON: CPT

## 2024-03-27 PROCEDURE — 82728 ASSAY OF FERRITIN: CPT

## 2024-03-27 PROCEDURE — 87340 HEPATITIS B SURFACE AG IA: CPT

## 2024-03-28 ENCOUNTER — OFFICE VISIT (OUTPATIENT)
Dept: INTERNAL MEDICINE CLINIC | Facility: CLINIC | Age: 74
End: 2024-03-28
Payer: MEDICARE

## 2024-03-28 VITALS
SYSTOLIC BLOOD PRESSURE: 120 MMHG | BODY MASS INDEX: 46.57 KG/M2 | HEART RATE: 103 BPM | HEIGHT: 68 IN | TEMPERATURE: 99 F | WEIGHT: 307.31 LBS | DIASTOLIC BLOOD PRESSURE: 68 MMHG | OXYGEN SATURATION: 91 %

## 2024-03-28 DIAGNOSIS — J44.9 OSA AND COPD OVERLAP SYNDROME (HCC): ICD-10-CM

## 2024-03-28 DIAGNOSIS — Z00.00 MEDICARE ANNUAL WELLNESS VISIT, SUBSEQUENT: Primary | ICD-10-CM

## 2024-03-28 DIAGNOSIS — I77.89 ASCENDING AORTA ENLARGEMENT (HCC): ICD-10-CM

## 2024-03-28 DIAGNOSIS — Z91.81 AT RISK FOR FALLS: ICD-10-CM

## 2024-03-28 DIAGNOSIS — R73.01 IFG (IMPAIRED FASTING GLUCOSE): ICD-10-CM

## 2024-03-28 DIAGNOSIS — Z12.5 PROSTATE CANCER SCREENING: ICD-10-CM

## 2024-03-28 DIAGNOSIS — I50.32 CHRONIC DIASTOLIC CONGESTIVE HEART FAILURE (HCC): ICD-10-CM

## 2024-03-28 DIAGNOSIS — J96.12 CHRONIC RESPIRATORY FAILURE WITH HYPOXIA AND HYPERCAPNIA (HCC): ICD-10-CM

## 2024-03-28 DIAGNOSIS — E66.01 MORBID (SEVERE) OBESITY DUE TO EXCESS CALORIES (HCC): ICD-10-CM

## 2024-03-28 DIAGNOSIS — G47.33 OSA AND COPD OVERLAP SYNDROME (HCC): ICD-10-CM

## 2024-03-28 DIAGNOSIS — R79.89 ELEVATED LFTS: ICD-10-CM

## 2024-03-28 DIAGNOSIS — E66.2 OBESITY HYPOVENTILATION SYNDROME (HCC): ICD-10-CM

## 2024-03-28 DIAGNOSIS — Z12.11 COLON CANCER SCREENING: ICD-10-CM

## 2024-03-28 DIAGNOSIS — Z53.20 LUNG CANCER SCREENING DECLINED BY PATIENT: ICD-10-CM

## 2024-03-28 DIAGNOSIS — E78.5 DYSLIPIDEMIA: ICD-10-CM

## 2024-03-28 DIAGNOSIS — J96.11 CHRONIC RESPIRATORY FAILURE WITH HYPOXIA AND HYPERCAPNIA (HCC): ICD-10-CM

## 2024-03-28 LAB
A1AT SERPL-MCNC: 124 MG/DL (ref 90–200)
CARTRIDGE LOT#: ABNORMAL NUMERIC
CERULOPLASMIN SERPL-MCNC: 24.9 MG/DL (ref 20–60)
DEPRECATED HBV CORE AB SER IA-ACNC: 111.7 NG/ML
HEMOGLOBIN A1C: 6.3 % (ref 4.3–5.6)
IRON SATN MFR SERPL: 20 %
IRON SERPL-MCNC: 59 UG/DL
TIBC SERPL-MCNC: 292 UG/DL (ref 250–425)
TRANSFERRIN SERPL-MCNC: 196 MG/DL (ref 215–365)

## 2024-03-28 NOTE — PROGRESS NOTES
Subjective:   Campbell Rao is a 73 year old male who presents for a Medicare Subsequent Annual Wellness visit (Pt already had Initial Annual Wellness) and scheduled follow up of multiple significant but stable problems.       History/Other:   Fall Risk Assessment:   He has been screened for Falls and is low risk.      Cognitive Assessment:   He had a completely normal cognitive assessment - see flowsheet entries     Functional Ability/Status:   Campbell Rao has some abnormal functions as listed below:  He has difficulties Shopping for Groceries based on screening of functional status. He has difficulties Affording Meds based on screening of functional status. He has Walking problems based on screening of functional status. He has problems with Daily Activities based on screening of functional status.       Depression Screening (PHQ-2/PHQ-9): PHQ-2 SCORE: 0  , done 3/27/2024   Trouble falling or staying asleep, or sleeping too much: 2     Feeling tired or having little energy: 1    If you checked off any problems, how difficult have these problems made it for you to do your work, take care of things at home, or get along with other people?: Not difficult at all    Last [place] Suicide Screening on 3/28/2024 was No Risk.         Advanced Directives:   He does NOT have a Living Will. [Do you have a living will?: No]  He does NOT have a Power of  for Health Care. [Do you have a healthcare power of ?: No]  Discussed Advance Care Planning with patient (and family/surrogate if present). Standard forms made available to patient in After Visit Summary.      Patient Active Problem List   Diagnosis    Colon cancer screening    Medicare annual wellness visit, subsequent    Pulmonary emphysema (HCC)    Congestive heart failure (HCC)    Ascending aorta enlargement (HCC)    Morbid (severe) obesity due to excess calories (HCC)    Obesity hypoventilation syndrome (HCC)    MARIA M (obstructive sleep apnea)     Chronic respiratory failure with hypoxia and hypercapnia (HCC)    Dyslipidemia    At risk for falls     Allergies:  He has No Known Allergies.    Current Medications:  Outpatient Medications Marked as Taking for the 3/28/24 encounter (Office Visit) with Johny Kang MD   Medication Sig    prednisoLONE 1 % Ophthalmic Suspension Place into the left eye.    ofloxacin 0.3 % Ophthalmic Solution Place into the left eye.    torsemide 20 MG Oral Tab Take 1 tablet (20 mg total) by mouth 2 (two) times daily.    fluticasone-umeclidin-vilant (TRELEGY ELLIPTA) 100-62.5-25 MCG/ACT Inhalation Aerosol Powder, Breath Activated Inhale 1 puff into the lungs daily.    albuterol 108 (90 Base) MCG/ACT Inhalation Aero Soln Inhale 2 puffs into the lungs every 4 to 6 hours as needed for Wheezing. inhale 2 puff by inhalation route  every 4 - 6 hours as needed    atorvastatin 20 MG Oral Tab Take 1 tablet (20 mg total) by mouth nightly.    ipratropium-albuterol 0.5-2.5 (3) MG/3ML Inhalation Solution Take 3 mL by nebulization in the morning and 3 mL at noon and 3 mL in the evening. And as needed.    potassium chloride 10 MEQ Oral Tab CR Take one tab (10 meq) po/day  alternating with 2 tabs (20meq)/day    fluticasone propionate 50 MCG/ACT Nasal Suspension 2 sprays by Nasal route daily.    Urea 40 % External Cream Apply 1 Application topically daily.    Saline Nasal Spray 0.65 % Nasal Solution 1 spray by Nasal route every 3 (three) hours as needed for congestion.       Medical History:  He  has a past medical history of Atherosclerosis of coronary artery, Back problem, Cataract, Chronic respiratory failure (HCC), Congestive heart disease (HCC), COPD (HCC), Coronary atherosclerosis, Diastolic dysfunction, Essential hypertension, Hearing impairment, High blood pressure, High cholesterol, Hyperlipidemia, Obesity, On home O2, Osteoarthritis, Pneumonia, Pulmonary nodules (10/2016), Sleep apnea, and Tobacco abuse.  Surgical History:  He  has a  past surgical history that includes electrocardiogram, complete (2012); appendectomy; appendectomy; and cataract (Bilateral).   Family History:  His family history includes Cancer in his father and mother; Diabetes in his brother; Hypertension in his brother; Lipids in his brother; No Known Problems in his daughter, maternal grandfather, maternal grandmother, paternal grandfather, paternal grandmother, sister, son, and another family member.  Social History:  He  reports that he quit smoking about 2 years ago. His smoking use included cigarettes. He has a 80 pack-year smoking history. He has been exposed to tobacco smoke. He has never used smokeless tobacco. He reports that he does not currently use alcohol. He reports that he does not currently use drugs.    Tobacco:  He smoked tobacco in the past but quit greater than 12 months ago.  Social History    Tobacco Use      Smoking status: Former        Packs/day: 2.00        Years: 40.00        Additional pack years: 0.00        Total pack years: 80.00        Types: Cigarettes        Quit date: 2021        Years since quittin.4        Passive exposure: Past      Smokeless tobacco: Never       CAGE Alcohol Screen:   CAGE screening score of 0 on 3/27/2024, showing low risk of alcohol abuse.      Patient Care Team:  Johny Kang MD as PCP - General (Internal Medicine)  Joni Anderson MD (PULMONARY DISEASES)  Nerissa Warren CMA as [facility] Care Manager (Care Management)  Domitila Collins MD as Referring Physician (OPHTHALMOLOGY)  Levy Goldsmith MD (SURGERY, ORTHOPEDIC)  Yesi Salinas MD (PULMONARY DISEASES)    Review of Systems  GENERAL: feels well otherwise  SKIN: denies any unusual skin lesions  EYES: denies blurred vision or double vision  HEENT: denies nasal congestion, sinus pain or ST  LUNGS: chronic  shortness of breath on home O2, no hemoptysis  CARDIOVASCULAR: denies chest pain on exertion; no pnd  GI: denies abdominal pain, denies  heartburn; no hematochezia/no chronic constipation.  : 0 per night nocturia, no complaint of urinary incontinence; no hematuria  MUSCULOSKELETAL: denies back pain  NEURO: denies headaches  PSYCHE: denies depression or anxiety  HEMATOLOGIC: denies hx of anemia  ENDOCRINE: denies thyroid history  ALL/ASTHMA: denies hx of allergy or asthma    Objective:   Physical Exam  General Appearance:  Alert, cooperative, no distress, appears stated age   Head:  Normocephalic, without obvious abnormality, atraumatic   Eyes:  PERRL, conjunctiva/corneas clear, EOM's intact, both eyes   Ears:  Normal TM's and external ear canals, both ears   Nose: Nares normal, septum midline, mucosa normal, no drainage or sinus tenderness   Throat: Lips, mucosa, and tongue normal; teeth and gums normal   Neck: Supple, symmetrical, trachea midline, no adenopathy, thyroid: not enlarged, symmetric, no tenderness/mass/nodules, no carotid bruit or JVD   Back:   Symmetric, no curvature, ROM normal, no CVA tenderness   Lungs:   Clear to auscultation bilaterally, respirations unlabored   Chest Wall:  No tenderness or deformity   Heart:  Regular rate and rhythm, S1, S2 normal, no murmur, rub or gallop   Abdomen:   Soft, non-tender, bowel sounds active all four quadrants,  no masses, no organomegaly   Genitalia: deferred   Rectal: deferred   Extremities: Extremities normal, atraumatic, no cyanosis or edema   Pulses: 2+ and symmetric   Skin: Skin color, texture, turgor normal, chronic stasis dermatitis on both legs; very dry skin both legs   Lymph nodes: Cervical, supraclavicular, nodes normal   Neurologic: Normal     /68   Pulse 103   Temp 98.5 °F (36.9 °C) (Tympanic)   Ht 5' 8\" (1.727 m)   Wt (!) 307 lb 4.8 oz (139.4 kg)   SpO2 91%   BMI 46.72 kg/m²  Estimated body mass index is 46.72 kg/m² as calculated from the following:    Height as of this encounter: 5' 8\" (1.727 m).    Weight as of this encounter: 307 lb 4.8 oz (139.4 kg).    Medicare  Hearing Assessment:   Hearing Screening    Time taken: 3/28/2024  3:02 PM  Screening Method: Questionnaire  I have a problem hearing over the telephone: Sometimes I have trouble following the conversations when two or more people are talking at the same time: No   I have trouble understanding things on the TV: Yes I have to strain to understand conversations: No   I have to worry about missing the telephone ring or doorbell: Yes I have trouble hearing conversations in a noisy background such as a crowded room or restaurant: No   I get confused about where sounds come from: No I misunderstand some words in a sentence and need to ask people to repeat themselves: No   I especially have trouble understanding the speech of women and children: No I have trouble understanding the speaker in a large room such as at a meeting or place of [employer]: No   Many people I talk to seem to mumble (or don't speak clearly): Yes People get annoyed because I misunderstand what they say: No   I misunderstand what others are saying and make inappropriate responses: No I avoid social activities because I cannot hear well and fear I will reply improperly: No   Family members and friends have told me they think I may have hearing loss: No             Visual Acuity:   Right Eye Visual Acuity: Corrected Right Eye Chart Acuity: 20/40   Left Eye Visual Acuity: Corrected Left Eye Chart Acuity: 20/30   Both Eyes Visual Acuity: Corrected Both Eyes Chart Acuity: 20/25   Able To Tolerate Visual Acuity: Yes        Assessment & Plan:   Campbell Rao is a 73 year old male who presents for a Medicare Assessment.     (Z00.00) Medicare annual wellness visit, subsequent  (primary encounter diagnosis)  Plan: Lipid Panel        Patient given Prevnar 20 today.  Advised to get RSV vaccine as well as Shingrix vaccine from pharmacy.    (I50.32) Chronic diastolic congestive heart failure (HCC)  Plan: Has a known history of mild diastolic heart failure and  currently on diuretics.  Had known hx of coronarycalcification noted on CT few years ago and had negative nuclear stress test then.     (G47.33,  J44.9) MARIA M and COPD overlap syndrome (HCC)  Plan: Patient does use his CPAP machine according to spouse.  He is on home O2 for his COPD as well as his current inhalers.  Follow-up with his pulmonologist Dr. Torrez.    (E66.2) Obesity hypoventilation syndrome (HCC)  Plan: As to work on obesity referred to our weight clinic.    (I77.89) Ascending aorta enlargement (HCC)  Plan: This has been noted before on a 2D echo was done less than 2 years ago and that was mild.    (J96.11,  J96.12) Chronic respiratory failure with hypoxia and hypercapnia (HCC)  Plan: There are combination of his COPD, obesity hypoventilation syndrome as well as obstructive sleep apnea.  He is on chronic O2 therapy.  I had also discussed with the patient and the spouse regarding following up with pulmonologist and may consider doing pulmonary rehab since the patient is very sedentary at home.    (E66.01) Morbid (severe) obesity due to excess calories (HCC)  Plan: [facility] - Dr. Phoenix Campos [facility] 9        Patient again advised the need for him to work on losing weight.  I had again discussed with the patient regarding seeing her weight clinic.  The spouse says that the patient will not follow any recommendations but I told her that the we should decrease dry up.  Referral given.    (E78.5) Dyslipidemia  Plan: Lipid Panel        Check lipid panel; continue with statin.     (Z91.81) At risk for falls  Plan: fall precautions.     (R79.89) Elevated LFTs  Plan: Alpha-1-antirypsin, serum, Ceruloplasmin,         Ferritin, Iron And Tibc, Mitochondrial (M2)         Antibody, CANCELED: Mitochondrial (M2) Antibody        Mildly elevated lft on recent labs; additional labs ordered.     (R73.01) IFG (impaired fasting glucose)  Plan: POC Glycohemoglobin [74932]        We  did A1c and was 6.3 ; advised to cut down on carbs in diet and work on losing weight.     (Z12.5) Prostate cancer screening  Plan: PSA Total, Screen, PSA Total, Screen        Check psa.     (Z12.11) Colon cancer screening  Plan: Occult Blood, Fecal, FIT Immunoassay        Pt chose to do fit testing; spouse states pt eugenie not do it but I gave him collection kit.     (Z53.20) Lung cancer screening declined by patient  Plan: pt declined lung ct screening.     The patient indicates understanding of these issues and agrees to the plan.  Reinforced healthy diet, lifestyle, and exercise.      No follow-ups on file.     Johny Kang MD, 3/28/2024     Supplementary Documentation:   General Health:  In the past six months, have you lost more than 10 pounds without trying?: 2 - No  Has your appetite been poor?: No  Type of Diet: Balanced  How does the patient maintain a good energy level?: Other  How would you describe your daily physical activity?: None  How would you describe your current health state?: Fair  How do you maintain positive mental well-being?: Puzzles;Games;Visiting Family  On a scale of 0 to 10, with 0 being no pain and 10 being severe pain, what is your pain level?: 7 - (Severe)  In the past six months, have you experienced urine leakage?: 0-No  At any time do you feel concerned for the safety/well-being of yourself and/or your children, in your home or elsewhere?: No  Have you had any immunizations at another office such as Influenza, Hepatitis B, Tetanus, or Pneumococcal?: No        Campbell Rao's SCREENING SCHEDULE   Tests on this list are recommended by your physician but may not be covered, or covered at this frequency, by your insurer.   Please check with your insurance carrier before scheduling to verify coverage.   PREVENTATIVE SERVICES FREQUENCY &  COVERAGE DETAILS LAST COMPLETION DATE   Diabetes Screening    Fasting Blood Sugar / Glucose    One screening every 12 months if never tested  or if previously tested but not diagnosed with pre-diabetes   One screening every 6 months if diagnosed with pre-diabetes Lab Results   Component Value Date     (H) 03/27/2024        Cardiovascular Disease Screening    Lipid Panel  Cholesterol  Lipoprotein (HDL)  Triglycerides Covered every 5 years for all Medicare beneficiaries without apparent signs or symptoms of cardiovascular disease Lab Results   Component Value Date    CHOLEST 168 11/15/2023    HDL 44 11/15/2023     (H) 11/15/2023    TRIG 98 11/15/2023         Electrocardiogram (EKG)   Covered if needed at Welcome to Medicare, and non-screening if indicated for medical reasons 10/25/2023      Ultrasound Screening for Abdominal Aortic Aneurysm (AAA) Covered once in a lifetime for one of the following risk factors    Men who are 65-75 years old and have ever smoked    Anyone with a family history -     Colorectal Cancer Screening  Covered for ages 50-85; only need ONE of the following:    Colonoscopy   Covered every 10 years    Covered every 2 years if patient is at high risk or previous colonoscopy was abnormal -    Health Maintenance   Topic Date Due    Colorectal Cancer Screening  Never done       Flexible Sigmoidoscopy   Covered every 4 years -    Fecal Occult Blood Test Covered annually -   Prostate Cancer Screening    Prostate-Specific Antigen (PSA) Annually No results found for: \"PSA\"  Health Maintenance   Topic Date Due    PSA  03/10/2025      Immunizations    Influenza Covered once per flu season  Please get every year 11/03/2023  No recommendations at this time    Pneumococcal Each vaccine ([id number] & [id number]) covered once after 65 Prevnar 13: 11/04/2016    [id number]: 07/28/2018     No recommendations at this time    Hepatitis B One screening covered for patients with certain risk factors   -  No recommendations at this time    Tetanus Toxoid Not covered by Medicare Part B unless medically necessary (cut with metal); may be  covered with your pharmacy prescription benefits -    Tetanus, Diptheria and Pertusis TD and TDaP Not covered by Medicare Part B -  No recommendations at this time    Zoster Not covered by Medicare Part B; may be covered with your pharmacy  prescription benefits -  Zoster Vaccines(1 of 2) Never done     Annual Monitoring of Persistent Medications (ACE/ARB, digoxin diuretics, anticonvulsants)    Potassium Annually Lab Results   Component Value Date    K 4.7 03/27/2024         Creatinine   Annually Lab Results   Component Value Date    CREATSERUM 1.02 03/27/2024         BUN Annually Lab Results   Component Value Date    BUN 17 03/27/2024       Drug Serum Conc Annually No results found for: \"DIGOXIN\", \"DIG\", \"VALP\"           Chronic Obstructive Pulmonary Disease (COPD)    Spirometry Annually Spirometry date: 04/30/2021

## 2024-03-31 ENCOUNTER — TELEPHONE (OUTPATIENT)
Dept: INTERNAL MEDICINE CLINIC | Facility: CLINIC | Age: 74
End: 2024-03-31

## 2024-03-31 DIAGNOSIS — R79.89 ELEVATED LFTS: Primary | ICD-10-CM

## 2024-03-31 PROBLEM — R35.0 URINARY FREQUENCY: Status: RESOLVED | Noted: 2023-02-19 | Resolved: 2024-03-31

## 2024-03-31 PROBLEM — J96.22 ACUTE ON CHRONIC RESPIRATORY FAILURE WITH HYPERCAPNIA (HCC): Status: RESOLVED | Noted: 2023-10-25 | Resolved: 2024-03-31

## 2024-03-31 PROBLEM — H57.8A9 SENSATION OF FOREIGN BODY IN EYE: Status: RESOLVED | Noted: 2023-02-19 | Resolved: 2024-03-31

## 2024-04-08 ENCOUNTER — APPOINTMENT (OUTPATIENT)
Dept: GENERAL RADIOLOGY | Facility: HOSPITAL | Age: 74
End: 2024-04-08
Payer: MEDICARE

## 2024-04-08 ENCOUNTER — NURSE TRIAGE (OUTPATIENT)
Dept: CASE MANAGEMENT | Age: 74
End: 2024-04-08

## 2024-04-08 ENCOUNTER — APPOINTMENT (OUTPATIENT)
Dept: CT IMAGING | Facility: HOSPITAL | Age: 74
End: 2024-04-08
Attending: EMERGENCY MEDICINE
Payer: MEDICARE

## 2024-04-08 ENCOUNTER — PATIENT OUTREACH (OUTPATIENT)
Dept: CASE MANAGEMENT | Age: 74
End: 2024-04-08

## 2024-04-08 ENCOUNTER — HOSPITAL ENCOUNTER (EMERGENCY)
Facility: HOSPITAL | Age: 74
Discharge: HOME | End: 2024-04-08
Attending: EMERGENCY MEDICINE
Payer: MEDICARE

## 2024-04-08 VITALS
TEMPERATURE: 97 F | SYSTOLIC BLOOD PRESSURE: 113 MMHG | DIASTOLIC BLOOD PRESSURE: 61 MMHG | OXYGEN SATURATION: 97 % | RESPIRATION RATE: 22 BRPM | HEART RATE: 82 BPM

## 2024-04-08 DIAGNOSIS — I50.32 CHRONIC DIASTOLIC CONGESTIVE HEART FAILURE (HCC): Primary | ICD-10-CM

## 2024-04-08 DIAGNOSIS — G47.33 OSA (OBSTRUCTIVE SLEEP APNEA): ICD-10-CM

## 2024-04-08 DIAGNOSIS — M54.9 MODERATE BACK PAIN: ICD-10-CM

## 2024-04-08 DIAGNOSIS — E66.01 MORBID (SEVERE) OBESITY DUE TO EXCESS CALORIES (HCC): ICD-10-CM

## 2024-04-08 DIAGNOSIS — R07.9 CHEST PAIN OF UNCERTAIN ETIOLOGY: Primary | ICD-10-CM

## 2024-04-08 DIAGNOSIS — E78.5 DYSLIPIDEMIA: ICD-10-CM

## 2024-04-08 DIAGNOSIS — J43.9 PULMONARY EMPHYSEMA, UNSPECIFIED EMPHYSEMA TYPE (HCC): ICD-10-CM

## 2024-04-08 LAB
ALBUMIN SERPL-MCNC: 4.6 G/DL (ref 3.2–4.8)
ALBUMIN/GLOB SERPL: 1.6 {RATIO} (ref 1–2)
ALP LIVER SERPL-CCNC: 174 U/L
ALT SERPL-CCNC: 46 U/L
ANION GAP SERPL CALC-SCNC: 4 MMOL/L (ref 0–18)
AST SERPL-CCNC: 25 U/L (ref ?–34)
BASOPHILS # BLD AUTO: 0.08 X10(3) UL (ref 0–0.2)
BASOPHILS NFR BLD AUTO: 0.8 %
BILIRUB SERPL-MCNC: 0.7 MG/DL (ref 0.2–1.1)
BUN BLD-MCNC: 27 MG/DL (ref 9–23)
BUN/CREAT SERPL: 25.2 (ref 10–20)
CALCIUM BLD-MCNC: 10 MG/DL (ref 8.7–10.4)
CHLORIDE SERPL-SCNC: 101 MMOL/L (ref 98–112)
CO2 SERPL-SCNC: 37 MMOL/L (ref 21–32)
CREAT BLD-MCNC: 1.07 MG/DL
DEPRECATED RDW RBC AUTO: 47.3 FL (ref 35.1–46.3)
EGFRCR SERPLBLD CKD-EPI 2021: 73 ML/MIN/1.73M2 (ref 60–?)
EOSINOPHIL # BLD AUTO: 0.26 X10(3) UL (ref 0–0.7)
EOSINOPHIL NFR BLD AUTO: 2.6 %
ERYTHROCYTE [DISTWIDTH] IN BLOOD BY AUTOMATED COUNT: 13.9 % (ref 11–15)
GLOBULIN PLAS-MCNC: 2.9 G/DL (ref 2.8–4.4)
GLUCOSE BLD-MCNC: 121 MG/DL (ref 70–99)
HCT VFR BLD AUTO: 46.1 %
HGB BLD-MCNC: 14.8 G/DL
IMM GRANULOCYTES # BLD AUTO: 0.09 X10(3) UL (ref 0–1)
IMM GRANULOCYTES NFR BLD: 0.9 %
LYMPHOCYTES # BLD AUTO: 1.96 X10(3) UL (ref 1–4)
LYMPHOCYTES NFR BLD AUTO: 19.6 %
MCH RBC QN AUTO: 30 PG (ref 26–34)
MCHC RBC AUTO-ENTMCNC: 32.1 G/DL (ref 31–37)
MCV RBC AUTO: 93.3 FL
MONOCYTES # BLD AUTO: 0.86 X10(3) UL (ref 0.1–1)
MONOCYTES NFR BLD AUTO: 8.6 %
NEUTROPHILS # BLD AUTO: 6.77 X10 (3) UL (ref 1.5–7.7)
NEUTROPHILS # BLD AUTO: 6.77 X10(3) UL (ref 1.5–7.7)
NEUTROPHILS NFR BLD AUTO: 67.5 %
OSMOLALITY SERPL CALC.SUM OF ELEC: 300 MOSM/KG (ref 275–295)
PLATELET # BLD AUTO: 217 10(3)UL (ref 150–450)
POTASSIUM SERPL-SCNC: 3.9 MMOL/L (ref 3.5–5.1)
PROT SERPL-MCNC: 7.5 G/DL (ref 5.7–8.2)
RBC # BLD AUTO: 4.94 X10(6)UL
SODIUM SERPL-SCNC: 142 MMOL/L (ref 136–145)
TROPONIN I SERPL HS-MCNC: 3 NG/L
WBC # BLD AUTO: 10 X10(3) UL (ref 4–11)

## 2024-04-08 PROCEDURE — 71260 CT THORAX DX C+: CPT | Performed by: EMERGENCY MEDICINE

## 2024-04-08 PROCEDURE — 85025 COMPLETE CBC W/AUTO DIFF WBC: CPT | Performed by: EMERGENCY MEDICINE

## 2024-04-08 PROCEDURE — 80053 COMPREHEN METABOLIC PANEL: CPT | Performed by: EMERGENCY MEDICINE

## 2024-04-08 PROCEDURE — 85025 COMPLETE CBC W/AUTO DIFF WBC: CPT

## 2024-04-08 PROCEDURE — 71045 X-RAY EXAM CHEST 1 VIEW: CPT

## 2024-04-08 PROCEDURE — 93010 ELECTROCARDIOGRAM REPORT: CPT

## 2024-04-08 PROCEDURE — 93005 ELECTROCARDIOGRAM TRACING: CPT

## 2024-04-08 PROCEDURE — 99285 EMERGENCY DEPT VISIT HI MDM: CPT

## 2024-04-08 PROCEDURE — 99284 EMERGENCY DEPT VISIT MOD MDM: CPT

## 2024-04-08 PROCEDURE — 84484 ASSAY OF TROPONIN QUANT: CPT

## 2024-04-08 PROCEDURE — 80053 COMPREHEN METABOLIC PANEL: CPT

## 2024-04-08 PROCEDURE — 84484 ASSAY OF TROPONIN QUANT: CPT | Performed by: EMERGENCY MEDICINE

## 2024-04-08 PROCEDURE — 36415 COLL VENOUS BLD VENIPUNCTURE: CPT

## 2024-04-08 RX ORDER — HYDROCODONE BITARTRATE AND ACETAMINOPHEN 5; 325 MG/1; MG/1
1 TABLET ORAL ONCE
Status: COMPLETED | OUTPATIENT
Start: 2024-04-08 | End: 2024-04-08

## 2024-04-08 NOTE — PROGRESS NOTES
Spoke to Campbell for [facility].      Updates to patient care team/comments: UTD.   Patient reported changes in medications: together we reviewed with no updates.   Med Adherence  Comment: pt reports taking all medications as prescribed.       Health Maintenance:   Reviewed with pt     Health Maintenance   Topic Date Due    Colorectal Cancer Screening  Never done- reminded     Zoster Vaccines (1 of 2) Never done- unsure     COVID-19 Vaccine (6 - 2023-24 season) 04/18/2024 (Originally 9/1/2023)    PSA  03/10/2025    Annual Physical  03/28/2025    Influenza Vaccine  Completed    Annual Depression Screening  Completed    Fall Risk Screening (Annual)  Completed    Pneumococcal Vaccine: 65+ Years  Completed    Lung Cancer Screening  Discontinued       Patient updates/concerns:   Listened to pt and provided support.     Back and spine pain states he would like assistance scheduling a visit with Dr. Black.  Pt complaining of severe back pain 11 out of 10. He asked for assistance in scheduling a visit with Dr. Goldsmith. I called there office but he does not see pt's for back [place] did say pt can see one of his colleges Dr. Marshall but he doesn't have openings till 4/29/24 and pt can't wait that long. Pt is wondering if Dr. Kang can advise? Notified I would contact the office and a nurse will call him back for further assessment. Understanding verbalized by pt.   Telephone encounter sent to EM RN triage pool    Completed cataract surgery both eyes Dr. Grigsby.     Diet  Could be better pt says.  Pt admits he can overeat.  Plans on watching his portions more.  Likes eating till he feels full but understands he is over weight and needs more discipline.       Discussed diet and nutrition along with the importance of compliance for overall health and well being.   Pt says he will try to watch is portions twice a week for dinner meals.     Goals/Action Plan:    Active goal from previous outreach: weight management      Patient  reported progress towards goals: ongoing                - What: diet control            - Where/When/How: pt plans on controlling his portions for dinner at least 3 days a week  Patient Reported Barriers and Concerns: pt says he usually likes to eat till he's full.  Pt says he's kind of a carnivore.                     - Plan for overcoming barriers: he plans on only serving himself one portion and not going back for seconds just  because his stomach isn't full.      Care Managers Interventions: Encouraged patient:   Self care: Take the time to do the things you love.   Nutrition:  Good nutrition helps us to maintain our weight, fight off infection, and help reduce the risk of developing other chronic issues.   Physical activity:  Physical activity is important to help maintain independence and improve quality of life.     Future Appointments: No future appointments.      Next Care Manager Follow Up Date: in 1 month.     Reason For Follow Up: review progress and or barriers towards patient's goals.     Time Spent This Encounter Total: 20 min medical record review, telephone communication, care plan updates where needed, education, goals, and action plan recreation/update. Provided acknowledgment and validation to patient's concerns.   Monthly Minute Total including today: 20  Physical assessment, complete health history, and need for CCM established by Johny Kang MD.

## 2024-04-08 NOTE — TELEPHONE ENCOUNTER
Triage: Good Day :)     Pt complaining of severe back pain 11 out of 10.  He asked for assistance in scheduling a visit with Dr. Goldsmith.  I called there office but he does not see pt's for back [place] did say pt can see one of his colleges Dr. Marshall but he doesn't have openings till 4/29/24 and pt can't wait that long.  Pt is wondering if Dr. Kang can advise?  Notified I would contact the office and a nurse will call him back for further assessment.  Understanding verbalized by pt.      Please contact pt and thank you in advance.

## 2024-04-08 NOTE — ED INITIAL ASSESSMENT (HPI)
X1.5 weeks left upper back pain and dyspnea, chest pain when lying down.  Denies falls or trauma, denies history blood clots    Wears 4L NC at baseline, currently 93% saturation, afebrile

## 2024-04-08 NOTE — TELEPHONE ENCOUNTER
Action Requested: Summary for Provider     []  Critical Lab, Recommendations Needed  [] Need Additional Advice  [x]   FYI    []   Need Orders  [] Need Medications Sent to Pharmacy  []  Other     SUMMARY: Patient has been having severe upper back pain radiating out to shoulder blades that is constant, hurts to breath in, walk, sit, unable to sleep or get comfortable. Came on randomly. Advised patient to go to the ER now. Patient agreed. Will go to [place] ED-wife will drive him.    Reason for call: Back Pain  Onset: 3/29/24    Denies falls, heavy lifting, injuries, twisting wrong, shooting pain, numbness, weakness to arms or legs, neck pain, headaches    Has been taking/doing over the counter:  Arthritis Tylenol with minimal relief     Reason for Disposition   SEVERE back pain of sudden onset and age > 60 years    Protocols used: Back Pain-A-[place]  Went over home care advice. Call back  if new symptoms arise or if s/sx worsen or has questions or concerns. Patient verbalized understanding and agrees with plan.

## 2024-04-09 LAB
ATRIAL RATE: 89 BPM
P AXIS: 54 DEGREES
P-R INTERVAL: 154 MS
Q-T INTERVAL: 384 MS
QRS DURATION: 96 MS
QTC CALCULATION (BEZET): 467 MS
R AXIS: 27 DEGREES
T AXIS: 50 DEGREES
VENTRICULAR RATE: 89 BPM

## 2024-04-09 NOTE — DISCHARGE INSTRUCTIONS
--Return for worsening symptoms or any other concerns as we discussed including the following but not limited to: worsening chest pain, worsening back pain, extremity sensation or strength changes, change in mentation, recurrent fainting episodes  --Rest, instructions for home care as discussed  --Please take all discharge medications as prescribed

## 2024-04-09 NOTE — ED PROVIDER NOTES
Patient Seen in: [facility] Emergency Department      History     Chief Complaint   Patient presents with    Chest Pain    Back Pain     Stated Complaint: chest pain    Subjective:   HPI    74-year-old male with history of ascending aortic root enlargement, COPD, CHF with preserved ejection fraction, hyperlipidemia presenting to the emergency department for complaints of chest pain with back pain.    Significant other at the bedside helping with history.    Chest pain and back pain noted since March 29.  Has been seen by primary care physician.  Advised to use lidocaine patches and over-the-counter medications for pain symptoms.  Lidocaine patches only minimally helping.  Patient reports pain has been ongoing with no acute worsening.  Describes chest pain to be left-sided in nature with radiation through to the back.  Patient does not detail any acute fall or trauma as an inciting event.  Does not detail specific aggravating relieving factors such as change with activity or rest.  Does not detail shortness of breath symptoms.  Wears 4 L nasal cannula at baseline with no increased oxygen requirements.    Patient otherwise denies fever, head or neck pain, acute change in vision or hearing, abdominal pain, extremity sensation or strength changes.    Objective:   Past Medical History:   Diagnosis Date    Atherosclerosis of coronary artery     coronary calcification on ct chest    Back problem     Cataract     Chronic respiratory failure (HCC)     Congestive heart disease (HCC)     COPD (HCC)     freq exacerbations. ? 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    Osteoarthritis     Pneumonia     Pulmonary nodules 10/2016    Sleep apnea     Tobacco abuse                Past Surgical History:   Procedure Laterality Date    APPENDECTOMY      APPENDECTOMY      CATARACT Bilateral     2024, 3/2024    ELECTROCARDIOGRAM, COMPLETE  2012    scanned to media tab                Social History     Socioeconomic History    Marital status:    Tobacco Use    Smoking status: Former     Packs/day: 2.00     Years: 40.00     Additional pack years: 0.00     Total pack years: 80.00     Types: Cigarettes     Quit date: 2021     Years since quittin.4     Passive exposure: Past    Smokeless tobacco: Never   Vaping Use    Vaping Use: Never used   Substance and Sexual Activity    Alcohol use: Not Currently     Alcohol/week: 0.0 standard drinks of alcohol     Comment: socially    Drug use: Not Currently   Other Topics Concern    Caffeine Concern Yes     Comment: coffee, >32 oz daily     Social Determinants of Health     Financial Resource Strain: Low Risk  (2023)    Financial Resource Strain     Difficulty of Paying Living Expenses: Not very hard     Med Affordability: No   Food Insecurity: No Food Insecurity (10/26/2023)    Food Insecurity     Food Insecurity: Never true   Transportation Needs: No Transportation Needs (2023)    Transportation Needs     Lack of Transportation: No   Physical Activity: Inactive (2024)    Exercise Vital Sign     Days of Exercise per Week: 0 days     Minutes of Exercise per Session: 0 min   Stress: No Stress Concern Present (2024)    Stress     Feeling of Stress : No   Social Connections: Somewhat Isolated (2024)    Social Connections     Frequency of Socialization with Friends and Family: 1   Housing Stability: Low Risk  (10/26/2023)    Housing Stability     Housing Instability: No              Review of Systems    Positive for stated complaint: chest pain  Other systems are as noted in HPI.  Constitutional and vital signs reviewed.      All other systems reviewed and negative except as noted above.    Physical Exam     ED  Triage Vitals [04/08/24 1730]   /88   Pulse 96   Resp 24   Temp 97.4 °F (36.3 °C)   Temp src Temporal   SpO2 93 %   O2 Device Nasal cannula     Physical Exam:   /61   Pulse 82   Temp 97.4 °F (36.3 °C) (Temporal)   Resp 22   SpO2 97%  - I reviewed these vital signs    Constitutional: Pt is well appearing, in no distress  HEENT: Normocephalic/Atraumatic, EOM grossly normal, Conjunctiva Clear, MMM   Neck: Range of motion intact, no midline tenderness palpation, no paraspinal tenderness to palpation  Lungs: CTA B, No crepitus, Talking in full sentences in no respiratory distress  Cardiovascular: RRR: yes, equal pulses  Abdominal: No rebound or guarding, soft, non-distended, no masses, no discomfort to palpation   Back: No midline tenderness to palpation, tenderness to palpation over right and left paraspinal musculature of thoracic region  Rectal: deferred  : deferred  Musculoskeletal: No deformities noted, No cyanosis/clubbing noted, No tenderness to palpation  Neurologic: A&O x 3, Speech clear, No facial asymmetry noted, Equal strength and sensation in extremities appreciated  Psychiatric: Mood and affect are appropriate, Speech not pressured, Thought process is logical  Skin: Warm and dry, No rash, no pitting edema, chronic lower extremity superficial skin changes with dry skin noted    ED Course     Labs Reviewed   COMP METABOLIC PANEL (14) - Abnormal; Notable for the following components:       Result Value    Glucose 121 (*)     CO2 37.0 (*)     BUN 27 (*)     BUN/CREA Ratio 25.2 (*)     Calculated Osmolality 300 (*)     Alkaline Phosphatase 174 (*)     All other components within normal limits   CBC W/ DIFFERENTIAL - Abnormal; Notable for the following components:    RDW-SD 47.3 (*)     All other components within normal limits   TROPONIN I HIGH SENSITIVITY - Normal   CBC WITH DIFFERENTIAL WITH PLATELET    Narrative:     The following orders were created for panel order CBC With Differential  With Platelet.  Procedure                               Abnormality         Status                     ---------                               -----------         ------                     CBC W/ DIFFERENTIAL[[id number]]          Abnormal            Final result                 Please view results for these tests on the individual orders.   RAINBOW DRAW LAVENDER   RAINBOW DRAW LIGHT GREEN   RAINBOW DRAW BLUE   RAINBOW DRAW GOLD          MDM             Medical Decision Making  74-year-old male with history of ascending aortic root enlargement, COPD, CHF, hyperlipidemia presenting to the emergency department for complaints of chest pain and back pain which been ongoing since end of March.    On arrival to the emergency department patient overall well-appearing and in no acute distress.    Differential diagnosis with the patient includes the following but is not limited to: musculoskeletal etiology such as strain, do not suspect acute exoskeletal fracture versus subluxation as the patient does not describe trauma or fall, atypical ACS, dissection, other acute infectious etiology.  Do not suspect acute compressive cord etiology as the patient does not detail lower extremity weakness, sensation or strength deficits, incontinence or retention.    Patient treated with Norco for pain symptoms in the emergency department.    ED Course as of 04/08/24 2121  ------------------------------------------------------------  Time: 04/08 1751  Comment: EKG read and interpreted by me showing rate 89, , QRS 96, QTc 467, sinus rhythm, no STEMI  ------------------------------------------------------------  Time: 04/08 1816  Comment: Patient on home 4 L nasal cannula with no hypoxia noted  ------------------------------------------------------------  Time: 04/08 2011  Comment: Lessening concern for atypical ACS  ------------------------------------------------------------  Time: 04/08 2011  Comment: CBC with no leukocytosis  lessening concern for acute infection.  Patient afebrile. Hemoglobin and platelets normal.  ------------------------------------------------------------  Time: 04/08 2012  Comment: CMP with glucose at 121.  No creatinine abnormality noted, slight alk phos elevation noted  ------------------------------------------------------------  Time: 04/08 2013  Comment: Chest x-ray read interpreted by me showing no evidence of effusion.  Radiology read agreeable noting no cardiac silhouette abnormality or cardiomegaly linear opacity likely due to atelectasis or scar, no effusion or pleural thickening.  No significant change from prior exam.     At this time suspect musculoskeletal etiology as a cause for the patient's pain complaints.  CT aorta still pending for further evaluation to ensure no aneurysmal or dissection changes to the aorta.    SIGN-OUT REPORT    Patient was signed out to Dr. Lovett, and the following is pending:  IMAGING RESULT:  CT    Patient will be able to be discharged after CT if negative.    Will be discharged with proper follow-up instructions in addition to return precautions including the following but not limited to: worsening chest pain, worsening back pain, extremity sensation or strength changes, change in mentation, recurrent fainting episodes          Amount and/or Complexity of Data Reviewed  Independent Historian: spouse  Labs: ordered. Decision-making details documented in ED Course.  Radiology: ordered and independent interpretation performed. Decision-making details documented in ED Course.  ECG/medicine tests: ordered and independent interpretation performed. Decision-making details documented in ED Course.    Risk  Prescription drug management.        Disposition and Plan     Clinical Impression:  1. Chest pain of uncertain etiology    2. Moderate back pain         Disposition:  Discharge  4/8/2024  9:12 pm    Follow-up:  [facility] Emergency Department  [address]  [place] [address]  [phone]  Follow up  As needed, If symptoms worsen    Johny Kang MD  [address]  [address]  [place] [address]  [phone]    Schedule an appointment as soon as possible for a visit in 1 week(s)  For follow up    We recommend that you schedule follow up care with a primary care provider within the next three months to obtain basic health screening including reassessment of your blood pressure.      Medications Prescribed:  Current Discharge Medication List

## 2024-04-10 ENCOUNTER — PATIENT OUTREACH (OUTPATIENT)
Dept: CASE MANAGEMENT | Age: 74
End: 2024-04-10

## 2024-04-10 NOTE — PROGRESS NOTES
Returned pt's call states he was seen in the ED for his pain they found nothing he voices.  Pt says he is still having back pain and is wondering if he can follow up with Dr. Kang.  Scheduled pt for a follow up visit.  He is asking if Dr. Kang will prescribe a pain medication but I told pt I can't answer that but I'm sure he will assess and give further direction at his visit.  I asked pt if he'd like to follow through will scheduling a visit with Dr. Goldsmith's [facility] Dr. Marshall because he doesn't have openings till 4/29/24 pt deferred.      Total time spent with patient including chart review: 14  Time spent with patient this month: 34    Total time spent with communication and chart review this month to date: 34

## 2024-04-17 ENCOUNTER — TELEPHONE (OUTPATIENT)
Dept: CASE MANAGEMENT | Age: 74
End: 2024-04-17

## 2024-04-17 ENCOUNTER — PATIENT OUTREACH (OUTPATIENT)
Dept: CASE MANAGEMENT | Age: 74
End: 2024-04-17

## 2024-04-17 NOTE — TELEPHONE ENCOUNTER
Triage: Good Day : )     Pt was seen in ER 4/8/24 for back/chest pain.  He says test showed no abnormal results.  However, he is still having severe back pain and is wondering if Dr. Kang can prescribe something for relief?  He does have a scheduled visit 4/22/24 but says he can't even get through his day because of the pain and wondering if there is an earlier appointment.  Notified I would contact the office and a nurse will call him back for further assessment.  Understanding verbalized by pt.      Please contact pt and thank you in advance!

## 2024-04-17 NOTE — TELEPHONE ENCOUNTER
Spoke to patient.   Future Appointments   Date Time Provider Department Center   4/18/2024 10:30 AM Johny Kang MD [facility]   4/22/2024  3:00 PM Johny Kang MD [facility]

## 2024-04-17 NOTE — PROGRESS NOTES
Pt was seen in ER 4/8/24 for back/chest pain. He says test showed no abnormal results. However, he is still having severe back pain and is wondering if Dr. Kang can prescribe something for relief? He does have a scheduled visit 4/22/24 but says he can't even get through his day because of the pain and wondering if there is an earlier appointment. Notified I would contact the office and a nurse will call him back for further assessment. Understanding verbalized by pt.   TELEPHONE ENCOUNTER SENT TO EM RN TRIAGE POOL     Total time spent with patient including chart review: 5  Time spent with patient this month: 39    Total time spent with communication and chart review this month to date: 39     SLEEP IN CHILDREN AGED 6 TO 12 YEARS    WHAT TO EXPECT  School-aged children typically get between 9 and 10 hours of sleep. However, not getting enough sleep is becoming more common. This is often because of homework, after-school activities, evening use of electronics (television, computers, cell phones), and late bedtimes.    Sleep problems are also common. These include difficulty falling asleep, waking up at night, sleepwalking, and sleep terrors. Other sleep problems include teeth grinding, nighttime fears, nightmares, bedwetting, snoring, and noisy breathing. However, as children get older, parents often become less aware that their child is having sleep problems. Be sure and know the signs that your child may not be getting enough sleep:  Snell: Not getting enough sleep may cause your school-aged child to be snell, irritable, and cranky. He may become frustrated or upset more easily.  Behavior problems: Children who do not get enough sleep are more likely to have behavior problems. They may be noncompliant or aggressive. They may have poor impulse control or become overactive. Even just 30 to 60 minutes less sleep at night can affect your child’s behavior.  Poor thinking skills: Not getting enough sleep may result in problems with attention, memory, decision-making, organization, and creativity. All of these are important for success in school.  Dozing off: Children should be wide awake during the day. This means that if they are falling asleep in class, in the car, or in front of the TV on a regular basis, something is wrong! Assume that if this is happening to your child, that he is not getting enough sleep. If he is getting enough sleep at night but is still sleepy during the day, then he may be getting poor quality sleep.  Weight problems: There is a link between not getting enough sleep and being overweight. Children who sleep less tend to be hungrier and eat more. They may exercise less because they are  tired. Being overweight is also a risk factor for sleep apnea.    HOW TO HELP YOUR SCHOOL-AGED CHILD SLEEP WELL  Develop a regular sleep schedule: Your child should go to bed and wake up at about the same time each day. Set and stick to a bedtime that ensures that your child gets enough sleep, preferably before 9:00 p.m.  Maintain a consistent bedtime routine: Children continue to benefit from a bedtime routine that is the same every night. It should include calm and enjoyable activities, such as reading. Include one-on-one time with a parent. It is helpful in maintaining communication with your child and having a clear connection every day.  Set up a soothing sleep environment: Make sure your child’s bedroom is comfortable, dark, cool, and quiet. A nightlight is fine. A television, computer, or devorah system is not.  Make sleep a priority: As school-aged kids become more and more involved in academic, social, athletic, and other activities, sleep often becomes less of a priority for families. Parents’ work and activity schedules spilling over into the evening hours may also conflict with time for sleep. Dinner and homework time may get pushed later and later. Older school-aged children may need help learning to manage their time in the evening, so they can get to bed at a reasonable time.  Set limits: If your child stalls at bedtime, be sure to set clear limits ahead of time. Be clear about what time lights must be turned off and how many stories you will read together.  Turn off televisions, computers, tablets, and smartphones: Television viewing, computer game playing, Internet use, and other stimulating activities just before or at bedtime will often result in sleep problems. Children also can become “dependent” on the TV in order to fall asleep.  Avoid caffeine: Caffeine can be found in sodas, coffee, iced tea, energy drinks, and many other things.  Contact your child’s doctor: Talk to your child’s doctor if  your child has difficulties falling asleep or staying asleep, snores, experiences unusual awakenings, or has sleep issues that are causing problems during the day.    Ryan ARIAS & Diogo ARIAS (2015). A Clinical Guide to Pediatric Sleep: Diagnosis and Management of Sleep Problems, 3rd ed. [place]: [employer]

## 2024-04-17 NOTE — TELEPHONE ENCOUNTER
Spoke to patient. He said the day after he saw Dr. Kang, he started with  this upper back pain/rib cage. He did go to Urgent Care, they did not find anything acute.     He is using a lidocaine patch which helps for a little while but wears off quickly. He said that pain is so bad that he can barely move around.     Patient is scheduled for 4/22/24. Dr. Kang, are you able to see patient sooner? Are you able to provide pain medication?

## 2024-04-18 ENCOUNTER — OFFICE VISIT (OUTPATIENT)
Dept: INTERNAL MEDICINE CLINIC | Facility: CLINIC | Age: 74
End: 2024-04-18

## 2024-04-18 ENCOUNTER — LAB ENCOUNTER (OUTPATIENT)
Dept: LAB | Age: 74
End: 2024-04-18
Attending: INTERNAL MEDICINE
Payer: MEDICARE

## 2024-04-18 VITALS
WEIGHT: 309.88 LBS | TEMPERATURE: 98 F | HEART RATE: 91 BPM | BODY MASS INDEX: 46.96 KG/M2 | OXYGEN SATURATION: 98 % | HEIGHT: 68 IN | SYSTOLIC BLOOD PRESSURE: 131 MMHG | DIASTOLIC BLOOD PRESSURE: 82 MMHG

## 2024-04-18 DIAGNOSIS — I25.10 CORONARY ARTERY CALCIFICATION SEEN ON CT SCAN: ICD-10-CM

## 2024-04-18 DIAGNOSIS — S22.060D CLOSED WEDGE COMPRESSION FRACTURE OF T7 VERTEBRA WITH ROUTINE HEALING, SUBSEQUENT ENCOUNTER: Primary | ICD-10-CM

## 2024-04-18 DIAGNOSIS — R74.8 ELEVATED ALKALINE PHOSPHATASE LEVEL: ICD-10-CM

## 2024-04-18 DIAGNOSIS — R79.89 ELEVATED LFTS: ICD-10-CM

## 2024-04-18 DIAGNOSIS — S22.060D CLOSED WEDGE COMPRESSION FRACTURE OF T7 VERTEBRA WITH ROUTINE HEALING, SUBSEQUENT ENCOUNTER: ICD-10-CM

## 2024-04-18 DIAGNOSIS — Z12.5 PROSTATE CANCER SCREENING: ICD-10-CM

## 2024-04-18 DIAGNOSIS — E55.9 VITAMIN D DEFICIENCY: ICD-10-CM

## 2024-04-18 LAB
COMPLEXED PSA SERPL-MCNC: 0.25 NG/ML (ref ?–4)
GGT SERPL-CCNC: 38 U/L
PTH-INTACT SERPL-MCNC: 94.4 PG/ML (ref 18.5–88)
TSI SER-ACNC: 2.78 MIU/ML (ref 0.55–4.78)

## 2024-04-18 PROCEDURE — 86334 IMMUNOFIX E-PHORESIS SERUM: CPT

## 2024-04-18 PROCEDURE — 82977 ASSAY OF GGT: CPT

## 2024-04-18 PROCEDURE — 84443 ASSAY THYROID STIM HORMONE: CPT

## 2024-04-18 PROCEDURE — 36415 COLL VENOUS BLD VENIPUNCTURE: CPT

## 2024-04-18 PROCEDURE — 99214 OFFICE O/P EST MOD 30 MIN: CPT | Performed by: INTERNAL MEDICINE

## 2024-04-18 PROCEDURE — 83516 IMMUNOASSAY NONANTIBODY: CPT

## 2024-04-18 PROCEDURE — 84165 PROTEIN E-PHORESIS SERUM: CPT

## 2024-04-18 PROCEDURE — 83970 ASSAY OF PARATHORMONE: CPT

## 2024-04-18 RX ORDER — HYDROCODONE BITARTRATE AND ACETAMINOPHEN 5; 325 MG/1; MG/1
1 TABLET ORAL EVERY 6 HOURS PRN
Qty: 30 TABLET | Refills: 0 | Status: ON HOLD | OUTPATIENT
Start: 2024-04-18

## 2024-04-18 RX ORDER — CALCITONIN SALMON 200 [IU]/.09ML
1 SPRAY, METERED NASAL DAILY
Qty: 1 EACH | Refills: 0 | Status: ON HOLD | OUTPATIENT
Start: 2024-04-18

## 2024-04-18 NOTE — PROGRESS NOTES
Subjective:     Patient ID: Campbell Rao is a 74 year old male.    Back Pain  This is a new problem. The current episode started 1 to 4 weeks ago. The problem occurs constantly. The problem has been waxing and waning since onset. The pain is present in the thoracic spine. The quality of the pain is described as aching. The pain does not radiate. The pain is severe. The symptoms are aggravated by bending, twisting and standing (walking/standing). Pertinent negatives include no bladder incontinence, bowel incontinence, dysuria, fever, numbness, paresis, perianal numbness or weakness. Risk factors include obesity. Treatments tried: pt went to ER was given norco and got some relief. The treatment provided mild relief.       History/Other:   Review of Systems   Constitutional:  Negative for fever.   Respiratory:  Positive for shortness of breath. Negative for cough and wheezing.         No hemoptysis   Cardiovascular: Negative.    Gastrointestinal:  Negative for bowel incontinence.   Genitourinary: Negative.  Negative for bladder incontinence and dysuria.   Musculoskeletal:  Positive for back pain.   Neurological:  Negative for weakness and numbness.     Current Outpatient Medications   Medication Sig Dispense Refill    prednisoLONE 1 % Ophthalmic Suspension Place into the left eye.      torsemide 20 MG Oral Tab Take 1 tablet (20 mg total) by mouth 2 (two) times daily. 180 tablet 3    fluticasone-umeclidin-vilant (TRELEGY ELLIPTA) 100-62.5-25 MCG/ACT Inhalation Aerosol Powder, Breath Activated Inhale 1 puff into the lungs daily. 3 each 0    albuterol 108 (90 Base) MCG/ACT Inhalation Aero Soln Inhale 2 puffs into the lungs every 4 to 6 hours as needed for Wheezing. inhale 2 puff by inhalation route  every 4 - 6 hours as needed 1 each 1    atorvastatin 20 MG Oral Tab Take 1 tablet (20 mg total) by mouth nightly. 90 tablet 0    ipratropium-albuterol 0.5-2.5 (3) MG/3ML Inhalation Solution Take 3 mL by nebulization in  the morning and 3 mL at noon and 3 mL in the evening. And as needed. 100 each 1    potassium chloride 10 MEQ Oral Tab CR Take one tab (10 meq) po/day  alternating with 2 tabs (20meq)/day 135 tablet 1    fluticasone propionate 50 MCG/ACT Nasal Suspension 2 sprays by Nasal route daily. 3 each 3    Urea 40 % External Cream Apply 1 Application topically daily. 227 g 2    Saline Nasal Spray 0.65 % Nasal Solution 1 spray by Nasal route every 3 (three) hours as needed for congestion. 1 Bottle 0    ofloxacin 0.3 % Ophthalmic Solution Place into the left eye. (Patient not taking: Reported on 4/18/2024)       Allergies:No Known Allergies    Past Medical History:    Atherosclerosis of coronary artery    coronary calcification on ct chest    Back problem    Cataract    Chronic respiratory failure (HCC)    Congestive heart disease (HCC)    COPD (HCC)    freq exacerbations. ? 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    Osteoarthritis    Pneumonia    Pulmonary nodules    Sleep apnea    Tobacco abuse      Past Surgical History:   Procedure Laterality Date    Appendectomy      Appendectomy      Cataract Bilateral     2/2024, 3/2024    Electrocardiogram, complete  01/30/2012    scanned to media tab      Family History   Problem Relation Age of Onset    Cancer Father         lung cancer    Cancer Mother         lung cancer    No Known Problems Daughter     No Known Problems Son     No Known Problems Maternal Grandmother     No Known Problems Maternal Grandfather     No Known Problems Paternal Grandmother     No Known Problems Paternal Grandfather     No Known Problems Sister     Diabetes Brother     Lipids Brother         hyperlipidemia    Hypertension Brother     No Known Problems Other        Social History:   Social History     Socioeconomic History    Marital status:    Tobacco Use    Smoking status: Former     Current packs/day: 0.00     Average packs/day: 2.0 packs/day for 40.0 years (80.0 ttl pk-yrs)     Types: Cigarettes     Start date: 1981     Quit date: 2021     Years since quittin.4     Passive exposure: Past    Smokeless tobacco: Never   Vaping Use    Vaping status: Never Used   Substance and Sexual Activity    Alcohol use: Not Currently     Alcohol/week: 0.0 standard drinks of alcohol     Comment: socially    Drug use: Not Currently   Other Topics Concern    Caffeine Concern Yes     Comment: coffee, >32 oz daily     Social Determinants of Health     Financial Resource Strain: Low Risk  (2023)    Financial Resource Strain     Difficulty of Paying Living Expenses: Not very hard     Med Affordability: No   Food Insecurity: No Food Insecurity (10/26/2023)    Food Insecurity     Food Insecurity: Never true   Transportation Needs: No Transportation Needs (2023)    Transportation Needs     Lack of Transportation: No   Physical Activity: Inactive (2024)    Exercise Vital Sign     Days of Exercise per Week: 0 days     Minutes of Exercise per Session: 0 min   Stress: No Stress Concern Present (2024)    Stress     Feeling of Stress : No   Social Connections: Somewhat Isolated (2024)    Social Connections     Frequency of Socialization with Friends and Family: 1   Housing Stability: Low Risk  (10/26/2023)    Housing Stability     Housing Instability: No        Objective:   Physical Exam  Constitutional:       General: He is not in acute distress.     Appearance: He is obese. He is not ill-appearing, toxic-appearing or diaphoretic.   Eyes:      General: No scleral icterus.        Right eye: No discharge.         Left eye: No discharge.      Conjunctiva/sclera: Conjunctivae normal.   Cardiovascular:      Rate and Rhythm: Normal rate and regular rhythm.      Heart  sounds: Normal heart sounds. No murmur heard.  Pulmonary:      Effort: Pulmonary effort is normal. No respiratory distress.      Breath sounds: Normal breath sounds. No wheezing or rales.   Abdominal:      General: Bowel sounds are normal. There is no distension.      Palpations: Abdomen is soft. There is no mass.      Tenderness: There is no abdominal tenderness. There is no guarding.   Musculoskeletal:      Cervical back: Normal range of motion and neck supple. No rigidity or tenderness.      Thoracic back: Tenderness and bony tenderness present. No swelling, edema, deformity or lacerations. Decreased range of motion.        Back:       Right lower leg: Edema present.      Left lower leg: Edema present.      Comments: Tenderness on the thoracic spine    Lymphadenopathy:      Cervical: No cervical adenopathy.   Neurological:      Mental Status: He is alert.      Sensory: Sensation is intact. No sensory deficit.      Motor: Motor function is intact. No weakness, tremor or abnormal muscle tone.      Comments: Ankle clonus negative bilaterally         Assessment & Plan:   (S22.060D) Closed wedge compression fracture of T7 vertebra with routine healing, subsequent encounter  (primary encounter diagnosis)  Plan: HYDROcodone-acetaminophen (NORCO) 5-325 MG Oral        Tab, PTH, Intact [E], Serum Protein         Electrophoresis [E], Immunofixation (MELBA) [E],         TSH W Reflex To Free T4, Interventional         Radiology - [facility]        Patient has some mild compression fracture was noted in CT scan of the T7 with no retropulsion.  He has no other neurologic symptoms or deficits.  Pain is quite severe 10/10.  I told him we will put him on Norco and I will put him on calcitonin nasal spray and he may continue using lidocaine spray on his back.  If his pain persist, he will have to go back to ER and possibly admitted for intractable back pain.  He may need to be considered for kyphoplasty for his compression  fracture if his pain continues to be uncontrolled.  Spouse had asked about admitting the patient to nursing facility temporarily however I told the only way that this will be covered insurance is for him to be admitted in the hospital then will be transferred to a skilled nursing facility.  I told him that the pain does not really get controlled even after today then he has to go to ER and eventually admitted to the hospital.    (R74.8) Elevated alkaline phosphatase level  Plan: TSH W Reflex To Free T4, GGT (Gamma Glutamyl         Transpeptidase) [E]        He had an elevated alkaline phosphatase in ER so we will get a check GGT to see if this is coming from the bone or not.  Also ordered serum protein electrophoresis.    (R79.89) Elevated LFTs  Plan: GGT (Gamma Glutamyl Transpeptidase) [E],         Mitochondrial (M2) Antibody            (E55.9) Vitamin D deficiency  Plan: Vitamin D [E]        Check vitamin D level.    (I25.10) Coronary artery calcification seen on CT scan  Plan: Patient had no findings as noted on CT scan done in ER.  His cardiac enzymes were negative in ER.  He is not really having anterior chest pain but is more pain in the back radiating towards the front which is reproducible with movement of his spine as well as palpation of his thoracic spine.  I told the patient and spouse mentally he will need to see cardiologist for further evaluation of his coronary calcifications.  He is having severe pain from his compression fracture thoracic spine so were not able to even send him for any stress test at this time.  (Z12.5) Prostate cancer screening  Plan: PSA Total, Screen        Check psa.        No orders of the defined types were placed in this encounter.      Meds This Visit:  Requested Prescriptions      No prescriptions requested or ordered in this encounter       Imaging & Referrals:  None

## 2024-04-20 LAB — M2 MITOCHONDRIAL AB: <20 UNITS

## 2024-04-22 ENCOUNTER — HOSPITAL ENCOUNTER (OUTPATIENT)
Facility: HOSPITAL | Age: 74
Setting detail: OBSERVATION
Discharge: HOME | End: 2024-04-26
Attending: EMERGENCY MEDICINE | Admitting: INTERNAL MEDICINE
Payer: MEDICARE

## 2024-04-22 ENCOUNTER — APPOINTMENT (OUTPATIENT)
Dept: MRI IMAGING | Facility: HOSPITAL | Age: 74
End: 2024-04-22
Attending: EMERGENCY MEDICINE
Payer: MEDICARE

## 2024-04-22 DIAGNOSIS — M54.9 INTRACTABLE BACK PAIN: Primary | ICD-10-CM

## 2024-04-22 PROBLEM — R79.89 AZOTEMIA: Status: ACTIVE | Noted: 2024-04-22

## 2024-04-22 LAB
ALBUMIN SERPL ELPH-MCNC: 3.89 G/DL (ref 3.75–5.21)
ALBUMIN/GLOB SERPL: 1.43 {RATIO} (ref 1–2)
ALPHA1 GLOB SERPL ELPH-MCNC: 0.26 G/DL (ref 0.19–0.46)
ALPHA2 GLOB SERPL ELPH-MCNC: 0.81 G/DL (ref 0.48–1.05)
ANION GAP SERPL CALC-SCNC: 3 MMOL/L (ref 0–18)
B-GLOBULIN SERPL ELPH-MCNC: 0.88 G/DL (ref 0.68–1.23)
BASOPHILS # BLD AUTO: 0.07 X10(3) UL (ref 0–0.2)
BASOPHILS NFR BLD AUTO: 0.9 %
BUN BLD-MCNC: 22 MG/DL (ref 9–23)
BUN/CREAT SERPL: 25.6 (ref 10–20)
CALCIUM BLD-MCNC: 9.3 MG/DL (ref 8.7–10.4)
CHLORIDE SERPL-SCNC: 107 MMOL/L (ref 98–112)
CO2 SERPL-SCNC: 33 MMOL/L (ref 21–32)
CREAT BLD-MCNC: 0.86 MG/DL
DEPRECATED RDW RBC AUTO: 51.8 FL (ref 35.1–46.3)
EGFRCR SERPLBLD CKD-EPI 2021: 91 ML/MIN/1.73M2 (ref 60–?)
EOSINOPHIL # BLD AUTO: 0.22 X10(3) UL (ref 0–0.7)
EOSINOPHIL NFR BLD AUTO: 2.9 %
ERYTHROCYTE [DISTWIDTH] IN BLOOD BY AUTOMATED COUNT: 14.3 % (ref 11–15)
GAMMA GLOB SERPL ELPH-MCNC: 0.77 G/DL (ref 0.62–1.7)
GLUCOSE BLD-MCNC: 169 MG/DL (ref 70–99)
HCT VFR BLD AUTO: 42.7 %
HGB BLD-MCNC: 13.2 G/DL
IMM GRANULOCYTES # BLD AUTO: 0.05 X10(3) UL (ref 0–1)
IMM GRANULOCYTES NFR BLD: 0.7 %
LYMPHOCYTES # BLD AUTO: 2.05 X10(3) UL (ref 1–4)
LYMPHOCYTES NFR BLD AUTO: 27.5 %
MCH RBC QN AUTO: 30.1 PG (ref 26–34)
MCHC RBC AUTO-ENTMCNC: 30.9 G/DL (ref 31–37)
MCV RBC AUTO: 97.3 FL
MONOCYTES # BLD AUTO: 0.68 X10(3) UL (ref 0.1–1)
MONOCYTES NFR BLD AUTO: 9.1 %
NEUTROPHILS # BLD AUTO: 4.39 X10 (3) UL (ref 1.5–7.7)
NEUTROPHILS # BLD AUTO: 4.39 X10(3) UL (ref 1.5–7.7)
NEUTROPHILS NFR BLD AUTO: 58.9 %
OSMOLALITY SERPL CALC.SUM OF ELEC: 303 MOSM/KG (ref 275–295)
PLATELET # BLD AUTO: 187 10(3)UL (ref 150–450)
POTASSIUM SERPL-SCNC: 4.6 MMOL/L (ref 3.5–5.1)
PROT SERPL-MCNC: 6.6 G/DL (ref 5.7–8.2)
RBC # BLD AUTO: 4.39 X10(6)UL
SODIUM SERPL-SCNC: 143 MMOL/L (ref 136–145)
WBC # BLD AUTO: 7.5 X10(3) UL (ref 4–11)

## 2024-04-22 PROCEDURE — 99223 1ST HOSP IP/OBS HIGH 75: CPT | Performed by: INTERNAL MEDICINE

## 2024-04-22 RX ORDER — MORPHINE SULFATE 4 MG/ML
4 INJECTION, SOLUTION INTRAMUSCULAR; INTRAVENOUS EVERY 2 HOUR PRN
Status: DISCONTINUED | OUTPATIENT
Start: 2024-04-22 | End: 2024-04-26

## 2024-04-22 RX ORDER — TORSEMIDE 20 MG/1
20 TABLET ORAL 2 TIMES DAILY
Status: DISCONTINUED | OUTPATIENT
Start: 2024-04-22 | End: 2024-04-26

## 2024-04-22 RX ORDER — ONDANSETRON 4 MG/1
4 TABLET, FILM COATED ORAL EVERY 6 HOURS PRN
Status: DISCONTINUED | OUTPATIENT
Start: 2024-04-22 | End: 2024-04-26

## 2024-04-22 RX ORDER — HYDROCODONE BITARTRATE AND ACETAMINOPHEN 5; 325 MG/1; MG/1
1 TABLET ORAL EVERY 6 HOURS PRN
Status: DISCONTINUED | OUTPATIENT
Start: 2024-04-22 | End: 2024-04-24

## 2024-04-22 RX ORDER — UREA 40 %
1 CREAM (GRAM) TOPICAL DAILY
Status: DISCONTINUED | OUTPATIENT
Start: 2024-04-22 | End: 2024-04-23

## 2024-04-22 RX ORDER — BISACODYL 10 MG
10 SUPPOSITORY, RECTAL RECTAL
Status: DISCONTINUED | OUTPATIENT
Start: 2024-04-22 | End: 2024-04-26

## 2024-04-22 RX ORDER — ENEMA 19; 7 G/133ML; G/133ML
1 ENEMA RECTAL ONCE AS NEEDED
Status: DISCONTINUED | OUTPATIENT
Start: 2024-04-22 | End: 2024-04-26

## 2024-04-22 RX ORDER — MORPHINE SULFATE 4 MG/ML
4 INJECTION, SOLUTION INTRAMUSCULAR; INTRAVENOUS ONCE
Status: COMPLETED | OUTPATIENT
Start: 2024-04-22 | End: 2024-04-22

## 2024-04-22 RX ORDER — ONDANSETRON 2 MG/ML
4 INJECTION INTRAMUSCULAR; INTRAVENOUS EVERY 6 HOURS PRN
Status: DISCONTINUED | OUTPATIENT
Start: 2024-04-22 | End: 2024-04-26

## 2024-04-22 RX ORDER — POLYETHYLENE GLYCOL 3350 17 G/17G
17 POWDER, FOR SOLUTION ORAL DAILY PRN
Status: DISCONTINUED | OUTPATIENT
Start: 2024-04-22 | End: 2024-04-26

## 2024-04-22 RX ORDER — ALBUTEROL SULFATE 90 UG/1
2 AEROSOL, METERED RESPIRATORY (INHALATION) EVERY 4 HOURS PRN
Status: DISCONTINUED | OUTPATIENT
Start: 2024-04-22 | End: 2024-04-26

## 2024-04-22 RX ORDER — ATORVASTATIN CALCIUM 20 MG/1
20 TABLET, FILM COATED ORAL NIGHTLY
Status: DISCONTINUED | OUTPATIENT
Start: 2024-04-22 | End: 2024-04-26

## 2024-04-22 RX ORDER — ONDANSETRON 4 MG/1
4 TABLET, ORALLY DISINTEGRATING ORAL EVERY 6 HOURS PRN
Status: DISCONTINUED | OUTPATIENT
Start: 2024-04-22 | End: 2024-04-26

## 2024-04-22 RX ORDER — PREDNISOLONE ACETATE 10 MG/ML
1 SUSPENSION/ DROPS OPHTHALMIC
Status: DISCONTINUED | OUTPATIENT
Start: 2024-04-22 | End: 2024-04-26

## 2024-04-22 RX ORDER — ACETAMINOPHEN 325 MG/1
650 TABLET ORAL EVERY 6 HOURS PRN
Status: DISCONTINUED | OUTPATIENT
Start: 2024-04-22 | End: 2024-04-26

## 2024-04-22 RX ORDER — MORPHINE SULFATE 2 MG/ML
2 INJECTION, SOLUTION INTRAMUSCULAR; INTRAVENOUS EVERY 2 HOUR PRN
Status: DISCONTINUED | OUTPATIENT
Start: 2024-04-22 | End: 2024-04-26

## 2024-04-22 RX ORDER — ORPHENADRINE CITRATE 30 MG/ML
30 INJECTION INTRAMUSCULAR; INTRAVENOUS ONCE
Status: COMPLETED | OUTPATIENT
Start: 2024-04-22 | End: 2024-04-22

## 2024-04-22 RX ORDER — CALCITONIN SALMON 200 [IU]/.09ML
1 SPRAY, METERED NASAL DAILY
Status: DISCONTINUED | OUTPATIENT
Start: 2024-04-22 | End: 2024-04-26

## 2024-04-22 RX ORDER — SENNOSIDES 8.6 MG
17.2 TABLET ORAL NIGHTLY PRN
Status: DISCONTINUED | OUTPATIENT
Start: 2024-04-22 | End: 2024-04-26

## 2024-04-22 RX ORDER — MORPHINE SULFATE 4 MG/ML
6 INJECTION, SOLUTION INTRAMUSCULAR; INTRAVENOUS EVERY 2 HOUR PRN
Status: DISCONTINUED | OUTPATIENT
Start: 2024-04-22 | End: 2024-04-26

## 2024-04-22 RX ORDER — IPRATROPIUM BROMIDE AND ALBUTEROL SULFATE 2.5; .5 MG/3ML; MG/3ML
3 SOLUTION RESPIRATORY (INHALATION) EVERY 4 HOURS PRN
Status: DISCONTINUED | OUTPATIENT
Start: 2024-04-22 | End: 2024-04-26

## 2024-04-22 RX ORDER — FLUTICASONE PROPIONATE 50 MCG
2 SPRAY, SUSPENSION (ML) NASAL DAILY
Status: DISCONTINUED | OUTPATIENT
Start: 2024-04-22 | End: 2024-04-26

## 2024-04-22 NOTE — CONSULTS
[facility]  part of [facility]    Report of Consultation    Campbell Rao Patient Status:  Observation    1950 MRN [id number]   Location [facility] 4W/SW/SE Attending Fuad Gardiner MD   Hosp Day # 0 PCP Johny Kang MD     Reason for Consultation:  Back pain, T7 compression fracture.     History of Present Illness:  Campbell Rao is a a(n) 74 year old male who is admitted with back pain that started about 3 weeks ago without trauma.  He had a CT scan on  that showed a T7  compression fracture, age indeterminate.  Reno describes the pain in his mid thoracic spine as radiating to both sides.  He denies leg numbness or weakness, denies incontinence.    Hx includes CAD, COPD, on home O2, HTN, morbid obesity, sleep apnea.     History:  Past Medical History:    Atherosclerosis of coronary artery    coronary calcification on ct chest    Back problem    Cataract    Chronic respiratory failure (HCC)    Congestive heart disease (HCC)    COPD (HCC)    freq exacerbations. ? 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    Osteoarthritis    Pneumonia    Pulmonary nodules    Sleep apnea    Tobacco abuse     Past Surgical History:   Procedure Laterality Date    Appendectomy      Appendectomy      Cataract Bilateral     2024, 3/2024    Electrocardiogram, complete  2012    scanned to media tab     Family History   Problem Relation Age of Onset    Cancer Father         lung cancer    Cancer Mother         lung cancer    No Known Problems Daughter     No Known Problems Son     No Known Problems Maternal Grandmother     No Known Problems Maternal Grandfather     No Known Problems Paternal Grandmother     No Known Problems  Paternal Grandfather     No Known Problems Sister     Diabetes Brother     Lipids Brother         hyperlipidemia    Hypertension Brother     No Known Problems Other       reports that he quit smoking about 2 years ago. His smoking use included cigarettes. He started smoking about 42 years ago. He has a 80 pack-year smoking history. He has been exposed to tobacco smoke. He has never used smokeless tobacco. He reports that he does not currently use alcohol. He reports that he does not currently use drugs.    Allergies:  No Known Allergies    Medications:  No current facility-administered medications for this encounter.    Review of Systems:  A comprehensive review of systems was negative except for: Musculoskeletal: positive for back pain    Physical Exam:   General: Alert, orientated x3.  Cooperative.  No apparent distress.  Vital Signs:  Blood pressure 152/89, pulse 74, temperature 97.8 °F (36.6 °C), temperature source Temporal, resp. rate 20, height 68\", weight (!) 307 lb (139.3 kg), SpO2 99%.  HEENT: Exam is unremarkable.  Neck: Supple.  Lungs: Normal respiratory effort  Cardiac: Regular rate and rhythm.  Abdomen:  Nontender.  Extremities:  No lower extremity edema noted.  Skin: Normal texture and turgor.  Musculoskeletal:  Pain over mid thoracic spine.       Laboratory Data:  Lab Results   Component Value Date    WBC 7.5 04/22/2024    HGB 13.2 04/22/2024    HCT 42.7 04/22/2024    .0 04/22/2024    CREATSERUM 0.86 04/22/2024    BUN 22 04/22/2024     04/22/2024    K 4.6 04/22/2024     04/22/2024    CO2 33.0 04/22/2024     04/22/2024    CA 9.3 04/22/2024       Imaging:  CT CHEST PE AORTA (IV ONLY) (CPT=71260)    Result Date: 4/8/2024  CONCLUSION:  1. No acute intrathoracic process.  No large central pulmonary embolism.  No thoracic aortic aneurysm or dissection. 2. Moderate dextroscoliosis again noted to the thoracic spine with a mild vertebral body compression fracture at T7, new from  October 2023 but of uncertain chronicity.  Correlate clinically for overlying point tenderness.  No associated posterior osseous  retropulsion. 3. Cardiomegaly and multi-vessel coronary atherosclerosis. 4. Small hiatal hernia. 5. Lesser incidental findings as above.    Dictated by (CST): Rome Liu MD on 4/08/2024 at 8:27 PM     Finalized by (CST): Rome Liu MD on 4/08/2024 at 8:35 PM          XR CHEST AP PORTABLE  (CPT=71045)    Result Date: 4/8/2024  CONCLUSION: Interstitial prominence suggesting mild fibrosis/scarring or edema.  No significant change since prior exam from 10/29/23.    Dictated by (CST): Sujit Quintana MD on 4/08/2024 at 6:59 PM     Finalized by (CST): Sujit Quintana MD on 4/08/2024 at 6:59 PM            Impression:  Patient Active Problem List   Diagnosis    Colon cancer screening    Medicare annual wellness visit, subsequent    Pulmonary emphysema (HCC)    Congestive heart failure (HCC)    Ascending aorta enlargement (HCC)    Morbid (severe) obesity due to excess calories (HCC)    Obesity hypoventilation syndrome (HCC)    MARIA M (obstructive sleep apnea)    Chronic respiratory failure with hypoxia and hypercapnia (HCC)    Dyslipidemia    At risk for falls    Azotemia    Intractable back pain     Assessment/Plan:  73yo admitted with back pain.  CT from 4/8 shows T7 compression fracture which corresponds to his pain.   He was not able to tolerate an MRI due to the pain.  Plan is for attempt at bone scan to assess acuity of the fracture.  Further recommendations to follow.     Thank you for allowing me to participate in the care of your patient.    PENG HIGGINS, APRN  4/22/2024  12:01 PM

## 2024-04-22 NOTE — ED INITIAL ASSESSMENT (HPI)
Pt with Hx of closed wedge compression fracture to ED with c/o continued pain. Pt states pain is making him feel SOB. Pt on home oxygen, 3L via [place].

## 2024-04-22 NOTE — H&P
History and Physical     Campbell Rao Patient Status:  Observation    1950 MRN [id number]   Location [facility] [place] 4W/SW/SE Attending Fuad Gardiner MD   Hosp Day # 0 PCP Johny Kang MD     Chief Complaint: back pain    Subjective:    Campbell Rao is a 74 year old male with SPMHx chronic hypox resp failure, COPD, HFpEF G1DD, HLD p;w back pain.  Was in our ER 24 with same but c/o cp.  At that time CT showed T7 compression Fx.     History/Other:      Past Medical History:  Past Medical History:    Atherosclerosis of coronary artery    coronary calcification on ct chest    Back problem    Cataract    Chronic respiratory failure (HCC)    Congestive heart disease (HCC)    COPD (HCC)    freq exacerbations. ? 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 His smoking use included cigarettes. He started smoking about 42 years ago. He has a 80 pack-year smoking history. He has been exposed to tobacco smoke. He has never used smokeless tobacco. He reports that he does not currently use alcohol. He reports that he does not currently use drugs.    Family History:   Family History   Problem Relation Age of Onset    Cancer Father         lung cancer    Cancer Mother         lung cancer    No Known Problems Daughter     No Known Problems Son     No Known Problems Maternal  Grandmother     No Known Problems Maternal Grandfather     No Known Problems Paternal Grandmother     No Known Problems Paternal Grandfather     No Known Problems Sister     Diabetes Brother     Lipids Brother         hyperlipidemia    Hypertension Brother     No Known Problems Other        Allergies: No Known Allergies    Medications:    Current Facility-Administered Medications on File Prior to Encounter   Medication Dose Route Frequency Provider Last Rate Last Admin    [COMPLETED] HYDROcodone-acetaminophen (Norco) 5-325 MG per tab 1 tablet  1 tablet Oral Once Ho Brown, DO   1 tablet at 24 1838    [COMPLETED] iopamidol 76% (ISOVUE-370) injection for power injector  85 mL Intravenous ONCE PRN Ho Brown, DO   85 mL at 24    [COMPLETED] phenylephrine/cyclopentolate/tropicamide/ofloxacin ophthalmic mixture  1 drop Right Eye Q5 Min Domitila Collins MD   1 drop at 24 0801    [] atropine (Atropine-Care) 1 % ophthalmic solution 1 drop  1 drop Right Eye Q5 Min Domitila Collins MD        [COMPLETED] proparacaine (Alcaine) 0.5 % ophthalmic solution 1 drop  1 drop Right Eye Once Domitila Collins MD   1 drop at 24 0750    [] acetaminophen (Tylenol) tab 650 mg  650 mg Oral Once PRN Quirino Henderson MD        [] HYDROcodone-acetaminophen (Norco) 5-325 MG per tab 1 tablet  1 tablet Oral Once PRN Quirino Henderson MD        [COMPLETED] tetracaine (Pontocaine) 0.5 % ophthalmic solution 1 drop  1 drop Left Eye Once Domitila Collins MD   1 drop at 24 1238    [COMPLETED] phenylephrine/cyclopentolate/tropicamide/ofloxacin ophthalmic mixture  1 drop Left Eye Q5 Min Domitila Collins MD   1 drop at 24 1248    [] atropine (Atropine-Care) 1 % ophthalmic solution 1 drop  1 drop Left Eye Q5 Min Domitila Collins MD         Current Outpatient Medications on File Prior to Encounter   Medication Sig Dispense Refill    HYDROcodone-acetaminophen (NORCO) 5-325 MG  Oral Tab Take 1 tablet by mouth every 6 (six) hours as needed for Pain. 30 tablet 0    calcitonin, salmon, 200 UNIT/ACT Nasal Solution 1 spray by Nasal route daily. 1 each 0    prednisoLONE 1 % Ophthalmic Suspension Place into the left eye.      torsemide 20 MG Oral Tab Take 1 tablet (20 mg total) by mouth 2 (two) times daily. 180 tablet 3    fluticasone-umeclidin-vilant (TRELEGY ELLIPTA) 100-62.5-25 MCG/ACT Inhalation Aerosol Powder, Breath Activated Inhale 1 puff into the lungs daily. 3 each 0    albuterol 108 (90 Base) MCG/ACT Inhalation Aero Soln Inhale 2 puffs into the lungs every 4 to 6 hours as needed for Wheezing. inhale 2 puff by inhalation route  every 4 - 6 hours as needed 1 each 1    atorvastatin 20 MG Oral Tab Take 1 tablet (20 mg total) by mouth nightly. 90 tablet 0    potassium chloride 10 MEQ Oral Tab CR Take one tab (10 meq) po/day  alternating with 2 tabs (20meq)/day 135 tablet 1    fluticasone propionate 50 MCG/ACT Nasal Suspension 2 sprays by Nasal route daily. 3 each 3    Saline Nasal Spray 0.65 % Nasal Solution 1 spray by Nasal route every 3 (three) hours as needed for congestion. 1 Bottle 0    ofloxacin 0.3 % Ophthalmic Solution Place into the left eye. (Patient not taking: Reported on 4/18/2024)      ipratropium-albuterol 0.5-2.5 (3) MG/3ML Inhalation Solution Take 3 mL by nebulization in the morning and 3 mL at noon and 3 mL in the evening. And as needed. 100 each 1    Urea 40 % External Cream Apply 1 Application topically daily. 227 g 2       Review of Systems:   A comprehensive 14 point review of systems was completed.    Pertinent positives and negatives noted in the HPI.    Objective:     /82 (BP Location: Right arm)   Pulse 91   Temp 97.8 °F (36.6 °C) (Temporal)   Resp 14   Ht 5' 8\" (1.727 m)   Wt (!) 307 lb (139.3 kg)   SpO2 94%   BMI 46.68 kg/m²   General: No acute distress.  Alert ,      , morbidly obese  HEENT: Normocephalic atraumatic. Moist mucous membranes. EOM-I.  PERRLA. Anicteric.  Neck: No lymphadenopathy. No JVD. No carotid bruits.  Respiratory: Clear to auscultation bilaterally. No wheezes. No rhonchi.    Cardiovascular: S1, S2. Regular rate and rhythm. No murmurs, rubs or gallops. Equal pulses.   Chest and Back: No tenderness or deformity.  Abdomen: Soft, nontender, nondistended.  Positive bowel sounds. No rebound, guarding or organomegaly.  Neurologic: tender on palp of thoracic spine. No motro weakness  Musculoskeletal: Moves all extremities.  Extremities: No edema or cyanosis.  Psychiatric: Appropriate mood and affect.  Integument: swollen legs with dry flacky skin c/w poor hygiene and PVD.   Peripheral Radial Pulse: Right 1+ or Left 1+  Capillary Refill : < 3 Secs    Results:    Labs:    Selected labs - last 24 hours:  Endo  Lytes  Renal   Glu 169  Na 143 Ca 9.3  BUN 22   POC Gluc  -  K 4.6 PO4 -  Cr 0.86   A1c -  Cl 107 Mg -  eGFR 91   TSH -  CO2 33.0         LFT  CBC  Other   AST -  WBC 7.5  PTT - Procal -   ALT -  Hb 13.2  INR - CRP -   APk -  Hct 42.7  Trop - D dim -   T jake -  .0  pBNP -  BNP -  - Ferritin  -   Prot -    CK  - Lactate  -   Alb -    LDL  - COVID  -       Imaging: Imaging data reviewed in Epic.    Assessment & Plan:    75 y/o admitted with T7 compression fracture and pain.    T7 compression fx (acute vs. Subacute)  IR to eval   Bone scan tomorrow  Possible kyphoplasty  Pain control with morphine for now, may benefit from duragesic  Chronic hypoxic respiratory failure   On home o2  CPM  COPD, chronic without exacerbation  Continue home meds  HFpEF with grade 1 diastolic dyfuctions  Continue torsemide   HLD  Continue atorvastatin      Quality:  DVT Mechanical Prophylaxis:        DVT Pharmacologic Prophylaxis   Medication   None                Code Status: Full Code  Orellana: No urinary catheter in place  Orellana Duration (in days):   Central line:    PETRA: 4/25/2024    Plan of care discussed with Marni Gardiner,  MD  4/22/2024      60 minutes spent on this admission - examining patient, obtaining history, reviewing previous medical records, going over test results/imaging and discussing plan of care. All questions answered.

## 2024-04-22 NOTE — ED QUICK NOTES
.Orders for admission, patient is aware of plan and ready to go upstairs. Any questions, please call ED KATHRYN sosa at extension [phone].   Type of COVID test sent:  COVID Suspicion level: Low/High  Titratable drug(s) infusing:  Rate:  LOC at time of transport: alert and oriented  Other pertinent information: On 2L oxygen

## 2024-04-22 NOTE — ED PROVIDER NOTES
Patient Seen in: [facility] Emergency Department      History     Chief Complaint   Patient presents with    Back Pain    Shortness Of Breath     Stated Complaint: back pain.    Subjective:   HPI    74 year old female with multiple medical issues including CAD, chronic respiratory failure on 3 L home O2 continuous, CHF, COPD, hypertension, high cholesterol, morbid obesity, sleep apnea who presents with 1+ months of persistent thoracic back pain.  Patient states the pain started approximately 1-1/2 months ago, progressively worsening.  He was here 2 weeks ago and had a CT of his chest that showed a T7 compression fracture of unknown chronicity.  Patient saw his PMD and is taking topical lidocaine, Norco with no relief.  Wife states that neither she nor the patient are sleeping and the patient is starting to feel short of breath from the awkward positions that he takes to relieve his pain.  No fever or chills. Wife states pt is walking normally, no LE sx.    Denies: fever/chills, abd pain, n/v/d, hematuria, change in uop including incontinence or inability to urinate, saddle anesthesia, motor weakness, injury, rash or skin change.       Objective:   No pertinent past medical history.            No pertinent past surgical history.              No pertinent social history.            Review of Systems    Positive for stated complaint: back pain.  Other systems are as noted in HPI.  Constitutional and vital signs reviewed.      All other systems reviewed and negative except as noted above.    Physical Exam     ED Triage Vitals [04/22/24 0506]   /58   Pulse 91   Resp 24   Temp 97.8 °F (36.6 °C)   Temp src Temporal   SpO2 (!) 88 %   O2 Device Nasal cannula       Current:/70   Pulse 70   Temp 97.8 °F (36.6 °C) (Temporal)   Resp 20   Ht 172.7 cm (5' 8\")   Wt (!) 139.3 kg   SpO2 95%   BMI 46.68 kg/m²         Physical Exam  Vitals and nursing note reviewed.   Constitutional:       General: He is not  in acute distress.     Appearance: Normal appearance. He is well-developed. He is morbidly obese. He is not ill-appearing or diaphoretic.      Interventions: Nasal cannula in place.      Comments: Patient sitting up in the wheelchair by the side of the bed hunched over.  He is able to move around and participate in the exam.   HENT:      Head: Normocephalic and atraumatic.   Eyes:      Conjunctiva/sclera: Conjunctivae normal.      Pupils: Pupils are equal, round, and reactive to light.   Cardiovascular:      Rate and Rhythm: Normal rate and regular rhythm.      Pulses:           Dorsalis pedis pulses are 2+ on the right side and 2+ on the left side.      Heart sounds: Normal heart sounds.   Pulmonary:      Effort: Pulmonary effort is normal. No tachypnea or respiratory distress.      Breath sounds: No transmitted upper airway sounds. No wheezing or rhonchi.   Abdominal:      General: There is no distension.      Palpations: Abdomen is soft.      Tenderness: There is no abdominal tenderness. There is no guarding.   Musculoskeletal:         General: No deformity. Normal range of motion.      Cervical back: Normal, normal range of motion and neck supple.      Thoracic back: Spasms and tenderness present.      Lumbar back: Normal.        Back:    Skin:     General: Skin is warm and dry.      Findings: No rash.   Neurological:      Mental Status: He is alert and oriented to person, place, and time. He is not disoriented.      GCS: GCS eye subscore is 4. GCS verbal subscore is 5. GCS motor subscore is 6.      Sensory: Sensation is intact. No sensory deficit.      Motor: Motor function is intact. No weakness or abnormal muscle tone.      Coordination: Coordination is intact. Coordination normal.   Psychiatric:         Behavior: Behavior normal. Behavior is cooperative.             ED Course     Labs Reviewed   BASIC METABOLIC PANEL (8) - Abnormal; Notable for the following components:       Result Value    Glucose 169 (*)      CO2 33.0 (*)     BUN/CREA Ratio 25.6 (*)     Calculated Osmolality 303 (*)     All other components within normal limits   CBC W/ DIFFERENTIAL - Abnormal; Notable for the following components:    MCHC 30.9 (*)     RDW-SD 51.8 (*)     All other components within normal limits   CBC WITH DIFFERENTIAL WITH PLATELET    Narrative:     The following orders were created for panel order CBC With Differential With Platelet.  Procedure                               Abnormality         Status                     ---------                               -----------         ------                     CBC W/ DIFFERENTIAL[[id number]]          Abnormal            Final result                 Please view results for these tests on the individual orders.            MDM      Pulse Ox: 95%, Normal, RA    Medications   morphINE PF 4 MG/ML injection 4 mg (4 mg Intravenous Given 4/22/24 0558)   orphenadrine citrate (Norflex) 30 mg/mL injection 30 mg (30 mg Intravenous Given 4/22/24 0558)         Admission disposition: 4/22/2024  7:29 AM         This is a patient with ongoing back pain for the past month and a half, worsening.  T7 vertebral compression fracture found on prior CT scan, the patient's pain on palpation is slightly higher than expected.  We attempted to do MRI of the T-spine here for further evaluation but the patient could not lay flat and could not tolerate it so imaging was aborted.  Patient will be admitted for intractable pain.    Discussed with Dr. Gardiner - will admit, sonam STOUT to consult  Discussed with GIRISH Potter -will consult    Disposition and Plan     Clinical Impression:  1. Intractable back pain         Disposition:  Admit  4/22/2024  7:29 am    Follow-up:  No follow-up provider specified.  We recommend that you schedule follow up care with a primary care provider within the next three months to obtain basic health screening including reassessment of your blood pressure.      Medications Prescribed:  Current  Discharge Medication List                            Hospital Problems       Present on Admission  Date Reviewed: 4/18/2024            ICD-10-CM Noted POA    * (Principal) Intractable back pain M54.9 4/22/2024 Unknown    Azotemia R79.89 4/22/2024 Yes

## 2024-04-22 NOTE — PLAN OF CARE
Campbell is A&Ox4 on 2-3L of NC. He is baseline on oxygen. He is currently on tele. He is voiding freely, but has not had a BM.  Miralax was given. No BM yet. Pain is being managed with PRN Norco and morphine. He is ambulating standby with a walker. Plan is to get a bone scan, possible MRI and kyphoplasty tomorrow. NPO at midnight. Saline locked  Problem: Patient Centered Care  Goal: Patient preferences are identified and integrated in the patient's plan of care  Description: Interventions:  - What would you like us to know as we care for you?   - Provide timely, complete, and accurate information to patient/family  - Incorporate patient and family knowledge, values, beliefs, and cultural backgrounds into the planning and delivery of care  - Encourage patient/family to participate in care and decision-making at the level they choose  - Honor patient and family perspectives and choices  Outcome: Progressing     Problem: SAFETY ADULT - FALL  Goal: Free from fall injury  Description: INTERVENTIONS:  - Assess pt frequently for physical needs  - Identify cognitive and physical deficits and behaviors that affect risk of falls.  - [place] fall precautions as indicated by assessment.  - Educate pt/family on patient safety including physical limitations  - Instruct pt to call for assistance with activity based on assessment  - Modify environment to reduce risk of injury  - Provide assistive devices as appropriate  - Consider OT/PT consult to assist with strengthening/mobility  - Encourage toileting schedule  Outcome: Progressing     Problem: Patient/Family Goals  Goal: Patient/Family Long Term Goal  Description: Patient's Long Term Goal: to get rid of back pain    Interventions:  - do IR scan    - See additional Care Plan goals for specific interventions  Outcome: Progressing  Goal: Patient/Family Short Term Goal  Description: Patient's Short Term Goal: to ambulate with no pain    Interventions:   - Medicate as needed  -  -  See additional Care Plan goals for specific interventions  Outcome: Progressing

## 2024-04-23 ENCOUNTER — APPOINTMENT (OUTPATIENT)
Dept: INTERVENTIONAL RADIOLOGY/VASCULAR | Facility: HOSPITAL | Age: 74
End: 2024-04-23
Attending: CLINICAL NURSE SPECIALIST
Payer: MEDICARE

## 2024-04-23 ENCOUNTER — ANESTHESIA (OUTPATIENT)
Dept: INTERVENTIONAL RADIOLOGY/VASCULAR | Facility: HOSPITAL | Age: 74
End: 2024-04-23
Payer: MEDICARE

## 2024-04-23 PROCEDURE — 99233 SBSQ HOSP IP/OBS HIGH 50: CPT | Performed by: HOSPITALIST

## 2024-04-23 PROCEDURE — 0PS43ZZ REPOSITION THORACIC VERTEBRA, PERCUTANEOUS APPROACH: ICD-10-PCS | Performed by: RADIOLOGY

## 2024-04-23 PROCEDURE — 0PU43JZ SUPPLEMENT THORACIC VERTEBRA WITH SYNTHETIC SUBSTITUTE, PERCUTANEOUS APPROACH: ICD-10-PCS | Performed by: RADIOLOGY

## 2024-04-23 RX ORDER — ROCURONIUM BROMIDE 10 MG/ML
INJECTION, SOLUTION INTRAVENOUS AS NEEDED
Status: DISCONTINUED | OUTPATIENT
Start: 2024-04-23 | End: 2024-04-23 | Stop reason: SURG

## 2024-04-23 RX ORDER — SODIUM CHLORIDE 9 MG/ML
INJECTION, SOLUTION INTRAVENOUS CONTINUOUS PRN
Status: DISCONTINUED | OUTPATIENT
Start: 2024-04-23 | End: 2024-04-23 | Stop reason: SURG

## 2024-04-23 RX ORDER — ONDANSETRON 2 MG/ML
INJECTION INTRAMUSCULAR; INTRAVENOUS AS NEEDED
Status: DISCONTINUED | OUTPATIENT
Start: 2024-04-23 | End: 2024-04-23 | Stop reason: SURG

## 2024-04-23 RX ORDER — CEFAZOLIN SODIUM/WATER 2 G/20 ML
SYRINGE (ML) INTRAVENOUS AS NEEDED
Status: DISCONTINUED | OUTPATIENT
Start: 2024-04-23 | End: 2024-04-23 | Stop reason: SURG

## 2024-04-23 RX ORDER — DEXAMETHASONE SODIUM PHOSPHATE 4 MG/ML
VIAL (ML) INJECTION AS NEEDED
Status: DISCONTINUED | OUTPATIENT
Start: 2024-04-23 | End: 2024-04-23 | Stop reason: SURG

## 2024-04-23 RX ORDER — ONDANSETRON 2 MG/ML
4 INJECTION INTRAMUSCULAR; INTRAVENOUS EVERY 6 HOURS PRN
Status: DISCONTINUED | OUTPATIENT
Start: 2024-04-23 | End: 2024-04-23 | Stop reason: HOSPADM

## 2024-04-23 RX ORDER — NALOXONE HYDROCHLORIDE 0.4 MG/ML
0.08 INJECTION, SOLUTION INTRAMUSCULAR; INTRAVENOUS; SUBCUTANEOUS AS NEEDED
Status: DISCONTINUED | OUTPATIENT
Start: 2024-04-23 | End: 2024-04-23 | Stop reason: HOSPADM

## 2024-04-23 RX ORDER — HYDROMORPHONE HYDROCHLORIDE 1 MG/ML
0.2 INJECTION, SOLUTION INTRAMUSCULAR; INTRAVENOUS; SUBCUTANEOUS EVERY 5 MIN PRN
Status: DISCONTINUED | OUTPATIENT
Start: 2024-04-23 | End: 2024-04-23 | Stop reason: HOSPADM

## 2024-04-23 RX ORDER — MORPHINE SULFATE 4 MG/ML
4 INJECTION, SOLUTION INTRAMUSCULAR; INTRAVENOUS EVERY 10 MIN PRN
Status: DISCONTINUED | OUTPATIENT
Start: 2024-04-23 | End: 2024-04-23 | Stop reason: HOSPADM

## 2024-04-23 RX ORDER — LIDOCAINE HYDROCHLORIDE 10 MG/ML
INJECTION, SOLUTION EPIDURAL; INFILTRATION; INTRACAUDAL; PERINEURAL AS NEEDED
Status: DISCONTINUED | OUTPATIENT
Start: 2024-04-23 | End: 2024-04-23 | Stop reason: SURG

## 2024-04-23 RX ORDER — CEFAZOLIN SODIUM/WATER 2 G/20 ML
SYRINGE (ML) INTRAVENOUS
Status: COMPLETED
Start: 2024-04-23 | End: 2024-04-23

## 2024-04-23 RX ORDER — SODIUM CHLORIDE, SODIUM LACTATE, POTASSIUM CHLORIDE, CALCIUM CHLORIDE 600; 310; 30; 20 MG/100ML; MG/100ML; MG/100ML; MG/100ML
INJECTION, SOLUTION INTRAVENOUS CONTINUOUS
Status: DISCONTINUED | OUTPATIENT
Start: 2024-04-23 | End: 2024-04-23 | Stop reason: HOSPADM

## 2024-04-23 RX ORDER — METOCLOPRAMIDE HYDROCHLORIDE 5 MG/ML
10 INJECTION INTRAMUSCULAR; INTRAVENOUS EVERY 8 HOURS PRN
Status: DISCONTINUED | OUTPATIENT
Start: 2024-04-23 | End: 2024-04-23 | Stop reason: HOSPADM

## 2024-04-23 RX ORDER — LIDOCAINE HYDROCHLORIDE 20 MG/ML
INJECTION, SOLUTION INFILTRATION; PERINEURAL
Status: COMPLETED
Start: 2024-04-23 | End: 2024-04-23

## 2024-04-23 RX ORDER — HYDROMORPHONE HYDROCHLORIDE 1 MG/ML
0.4 INJECTION, SOLUTION INTRAMUSCULAR; INTRAVENOUS; SUBCUTANEOUS EVERY 5 MIN PRN
Status: DISCONTINUED | OUTPATIENT
Start: 2024-04-23 | End: 2024-04-23 | Stop reason: HOSPADM

## 2024-04-23 RX ORDER — EPHEDRINE SULFATE 50 MG/ML
INJECTION, SOLUTION INTRAVENOUS AS NEEDED
Status: DISCONTINUED | OUTPATIENT
Start: 2024-04-23 | End: 2024-04-23 | Stop reason: SURG

## 2024-04-23 RX ORDER — MORPHINE SULFATE 2 MG/ML
2 INJECTION, SOLUTION INTRAMUSCULAR; INTRAVENOUS EVERY 10 MIN PRN
Status: DISCONTINUED | OUTPATIENT
Start: 2024-04-23 | End: 2024-04-23 | Stop reason: HOSPADM

## 2024-04-23 RX ADMIN — CEFAZOLIN SODIUM/WATER 2 G: 2 G/20 ML SYRINGE (ML) INTRAVENOUS at 14:09:00

## 2024-04-23 RX ADMIN — SODIUM CHLORIDE: 9 INJECTION, SOLUTION INTRAVENOUS at 13:53:00

## 2024-04-23 RX ADMIN — LIDOCAINE HYDROCHLORIDE 50 MG: 10 INJECTION, SOLUTION EPIDURAL; INFILTRATION; INTRACAUDAL; PERINEURAL at 13:57:00

## 2024-04-23 RX ADMIN — ROCURONIUM BROMIDE 5 MG: 10 INJECTION, SOLUTION INTRAVENOUS at 13:56:00

## 2024-04-23 RX ADMIN — DEXAMETHASONE SODIUM PHOSPHATE 4 MG: 4 MG/ML VIAL (ML) INJECTION at 14:12:00

## 2024-04-23 RX ADMIN — EPHEDRINE SULFATE 10 MG: 50 INJECTION, SOLUTION INTRAVENOUS at 14:20:00

## 2024-04-23 RX ADMIN — SODIUM CHLORIDE: 9 INJECTION, SOLUTION INTRAVENOUS at 14:20:00

## 2024-04-23 RX ADMIN — ONDANSETRON 4 MG: 2 INJECTION INTRAMUSCULAR; INTRAVENOUS at 14:12:00

## 2024-04-23 RX ADMIN — EPHEDRINE SULFATE 10 MG: 50 INJECTION, SOLUTION INTRAVENOUS at 14:12:00

## 2024-04-23 NOTE — PROGRESS NOTES
[facility]  part of [facility]    Progress Note    Campbell Rao Patient Status:  Observation    1950 MRN [id number]   Location [facility] 4W/SW/SE Attending Geena Simmons MD   Hosp Day # 0 PCP Johny Kang MD     Chief Complaint:   Chief Complaint   Patient presents with    Back Pain    Shortness Of Breath       Subjective:   Campbell Rao is doing well. Pain controlled. No F/C no SOB or CP.       Objective:   Objective:    Blood pressure 147/85, pulse 91, temperature 97.5 °F (36.4 °C), temperature source Temporal, resp. rate 18, height 5' 8\" (1.727 m), weight (!) 307 lb (139.3 kg), SpO2 94%.    Physical Exam:    General: No acute distress.   Respiratory: Clear to auscultation bilaterally. No wheezes. No rhonchi.  Cardiovascular: S1, S2. Regular rate and rhythm. No murmurs, rubs or gallops.   Abdomen: Soft, nontender, nondistended.  Positive bowel sounds. No rebound or guarding.  Neurologic: No focal neurological deficits.   Musculoskeletal: Moves all extremities.  Extremities: No edema.      Results:   Results:    Labs:  Recent Labs   Lab 24  0600   WBC 7.5   HGB 13.2   MCV 97.3   .0       Recent Labs   Lab 24  1156 24  0600   GLU  --  169*   BUN  --  22   CREATSERUM  --  0.86   CA  --  9.3   ALB 3.89  --    NA  --  143   K  --  4.6   CL  --  107   CO2  --  33.0*   TP 6.6  --        Estimated Creatinine Clearance: 74 mL/min (based on SCr of 0.86 mg/dL).    No results for input(s): \"PTP\", \"INR\" in the last 168 hours.         Culture:  No results found for this visit on 24.    Cardiac  No results for input(s): \"TROP\", \"PBNP\" in the last 168 hours.      Imaging: Imaging data reviewed in Epic.  No results found.    Medications:    atorvastatin  20 mg Oral Nightly    calcitonin (salmon)  1 spray Alternating Nares Daily    fluticasone propionate  2 spray Nasal Daily    fluticasone-umeclidin-vilant  1 puff Inhalation Daily     prednisoLONE  1 drop Right Eye 6 times per day    torsemide  20 mg Oral BID         Assessment and Plan:   Assessment & Plan:      T7 compression fx (acute vs. Subacute)  IR on consult.   Plans for kyphoplasty today   Pain control with morphine and norco PRN     2.  Chronic hypoxic respiratory failure             On home o2 2-3L continuous   CPM  COPD, chronic without exacerbation  Continue home meds  HFpEF with grade 1 diastolic dyfuctions  Continue torsemide   HLD  Continue atorvastatin       >55min spent, >50% spent counseling and coordinating care in the form of educating pt/family and d/w consultants and staff. Most of the time spent discussing the above plan.        Plan of care discussed with patient or family at bedside.    Geena Simmons MD  Hospitalist          Supplementary Documentation:     Quality:  DVT Prophylaxis: SCD for now   CODE status: Full  Dispo: per clinical course           Estimated date of discharge: TBD  Discharge is dependent on: clinical stability  At this point Mr. Rao is expected to be discharge to: home

## 2024-04-23 NOTE — PLAN OF CARE
Problem: Patient Centered Care  Goal: Patient preferences are identified and integrated in the patient's plan of care  Description: Interventions:  - What would you like us to know as we care for you?  - Provide timely, complete, and accurate information to patient/family  - Incorporate patient and family knowledge, values, beliefs, and cultural backgrounds into the planning and delivery of care  - Encourage patient/family to participate in care and decision-making at the level they choose  - Honor patient and family perspectives and choices  4/23/2024 0342 by Sally Richter RN  Outcome: Progressing  4/23/2024 0342 by Sally Richter RN  Outcome: Progressing     Problem: Patient/Family Goals  Goal: Patient/Family Long Term Goal  Description: Patient's Long Term Goal:     Interventions:  -   - See additional Care Plan goals for specific interventions  4/23/2024 0342 by Sally Richter RN  Outcome: Progressing  4/23/2024 0342 by Sally Richter RN  Outcome: Progressing  Goal: Patient/Family Short Term Goal  Description: Patient's Short Term Goal:     Interventions:   -   - See additional Care Plan goals for specific interventions  4/23/2024 0342 by Sally Richter RN  Outcome: Progressing  4/23/2024 0342 by Sally Richter RN  Outcome: Progressing     Problem: SAFETY ADULT - FALL  Goal: Free from fall injury  Description: INTERVENTIONS:  - Assess pt frequently for physical needs  - Identify cognitive and physical deficits and behaviors that affect risk of falls.  - [place] fall precautions as indicated by assessment.  - Educate pt/family on patient safety including physical limitations  - Instruct pt to call for assistance with activity based on assessment  - Modify environment to reduce risk of injury  - Provide assistive devices as appropriate  - Consider OT/PT consult to assist with strengthening/mobility  - Encourage toileting schedule  4/23/2024 0342 by Sally Richter RN  Outcome:  Progressing  4/23/2024 0342 by Sally Richter RN  Outcome: Progressing     Problem: PAIN - ADULT  Goal: Verbalizes/displays adequate comfort level or patient's stated pain goal  Description: INTERVENTIONS:  - Encourage pt to monitor pain and request assistance  - Assess pain using appropriate pain scale  - Administer analgesics based on type and severity of pain and evaluate response  - Implement non-pharmacological measures as appropriate and evaluate response  - Consider cultural and social influences on pain and pain management  - Manage/alleviate anxiety  - Utilize distraction and/or relaxation techniques  - Monitor for opioid side effects  - Notify MD/LIP if interventions unsuccessful or patient reports new pain  - Anticipate increased pain with activity and pre-medicate as appropriate  Outcome: Progressing     Pt alert and oriented. Ambulates with assist and the walker. NPO. Morphine for pain PRN. On remote tele. 2-3L O2 baseline. Call light within reach. Bed in lowest and locked position. Plan for NM scan and possible kyphoplasty.

## 2024-04-23 NOTE — PROCEDURES
[facility]  part of [facility]  Procedure Note    Campbell Rao Patient Status:  Observation    1950 MRN [id number]   Location [facility] INTERVENTIONAL SUITES Attending Geena Simmons MD   Hosp Day # 0 PCP Johny Kang MD     Procedure: T7 kyphoplasty    Pre-Procedure Diagnosis: Intractable back pain [M54.9]      Post-Procedure Diagnosis: Intractable back pain [M54.9]    Anesthesia:  Local and General    Findings:  T7 vertebral body accessed under fluoroscopic guidance via left transpedicular approach.  Cavity created with balloon.  4mL PMMA infused with good fill across midline and subjacent to superior endplate fracture.    Specimens: None    Blood Loss:  5mL      Complications:  None    Plan:  Bedrest 2 hours.    JERICHO MUJICA MD  2024

## 2024-04-23 NOTE — ANESTHESIA PROCEDURE NOTES
Airway  Date/Time: 4/23/2024 2:01 PM  Urgency: Elective      General Information and Staff    Patient location during procedure: OR  Anesthesiologist: Tino Meza DO  Performed: anesthesiologist   Performed by: Tino Meza DO  Authorized by: Tino Meza DO      Indications and Patient Condition  Indications for airway management: anesthesia  Sedation level: deep  Preoxygenated: yes  Patient position: sniffing  Mask difficulty assessment: 1 - vent by mask    Final Airway Details  Final airway type: endotracheal airway      Successful airway: ETT  Cuffed: yes   Successful intubation technique: direct laryngoscopy  Endotracheal tube insertion site: oral  Blade type: [place].  Blade size: #3  ETT size (mm): 8.0    Placement verified by: capnometry   Measured from: lips  ETT to lips (cm): 23  Number of attempts at approach: 1  Number of other approaches attempted: 0

## 2024-04-23 NOTE — ANESTHESIA PREPROCEDURE EVALUATION
Anesthesia PreOp Note    HPI:     Campbell Rao is a 74 year old male who presents for preoperative consultation requested by: * No surgeons listed *    Date of Surgery: 4/23/2024    * No procedures listed *  Indication: * No pre-op diagnosis entered *    Relevant Problems   No relevant active problems       NPO:  Last Liquid Consumption Date: 04/23/24  Last Liquid Consumption Time: 0015  Last Solid Consumption Date: 04/23/24  Last Solid Consumption Time: 0015  Last Liquid Consumption Date: 04/23/24          History Review:  Patient Active Problem List    Diagnosis Date Noted   • Azotemia 04/22/2024   • Intractable back pain 04/22/2024   • Obesity hypoventilation syndrome (HCC) 02/19/2023   • MARIA M (obstructive sleep apnea) 02/19/2023   • Chronic respiratory failure with hypoxia and hypercapnia (HCC) 02/19/2023   • Dyslipidemia 02/19/2023   • At risk for falls 02/19/2023   • Morbid (severe) obesity due to excess calories (HCC) 12/15/2021   • Ascending aorta enlargement (HCC) 10/03/2021   • Congestive heart failure (HCC)    • Pulmonary emphysema (HCC) 04/19/2017   • Colon cancer screening 07/07/2016   • Medicare annual wellness visit, subsequent 07/07/2016       Past Medical History:   • Atherosclerosis of coronary artery    coronary calcification on ct chest   • Back problem   • Cataract   • Chronic respiratory failure (HCC)   • Congestive heart disease (HCC)   • COPD (HCC)    freq exacerbations. ? 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   • Osteoarthritis   • Pneumonia   • Pulmonary nodules   • Sleep apnea   • Tobacco abuse       Past Surgical History:   Procedure Laterality Date   • Appendectomy     • Appendectomy     • Cataract Bilateral     2/2024, 3/2024    • Electrocardiogram, complete  01/30/2012    scanned to media tab       Facility-Administered Medications Prior to Admission   Medication Dose Route Frequency Provider Last Rate Last Admin   • [COMPLETED] triamcinolone acetonide (Kenalog-40) 40 MG/ML injection 40 mg  40 mg Intra-articular 2 now doses Levy Goldsmith MD   40 mg at 02/14/24 1431     Medications Prior to Admission   Medication Sig Dispense Refill Last Dose   • HYDROcodone-acetaminophen (NORCO) 5-325 MG Oral Tab Take 1 tablet by mouth every 6 (six) hours as needed for Pain. 30 tablet 0 4/22/2024 at 0000   • calcitonin, salmon, 200 UNIT/ACT Nasal Solution 1 spray by Nasal route daily. 1 each 0 4/21/2024   • prednisoLONE 1 % Ophthalmic Suspension Place into the left eye.   4/21/2024   • torsemide 20 MG Oral Tab Take 1 tablet (20 mg total) by mouth 2 (two) times daily. 180 tablet 3 Past Week   • fluticasone-umeclidin-vilant (TRELEGY ELLIPTA) 100-62.5-25 MCG/ACT Inhalation Aerosol Powder, Breath Activated Inhale 1 puff into the lungs daily. 3 each 0 4/21/2024   • albuterol 108 (90 Base) MCG/ACT Inhalation Aero Soln Inhale 2 puffs into the lungs every 4 to 6 hours as needed for Wheezing. inhale 2 puff by inhalation route  every 4 - 6 hours as needed 1 each 1 Past Week   • atorvastatin 20 MG Oral Tab Take 1 tablet (20 mg total) by mouth nightly. 90 tablet 0 4/21/2024   • potassium chloride 10 MEQ Oral Tab CR Take one tab (10 meq) po/day  alternating with 2 tabs (20meq)/day 135 tablet 1 Past Week   • fluticasone propionate 50 MCG/ACT Nasal Suspension 2 sprays by Nasal route daily. 3 each 3 4/21/2024   • Saline Nasal Spray 0.65 % Nasal Solution 1 spray by Nasal route every 3 (three) hours as needed for congestion. 1 Bottle 0 Past Month   • ofloxacin 0.3 % Ophthalmic Solution Place into the left eye. (Patient not taking: Reported on 4/18/2024)      • ipratropium-albuterol 0.5-2.5 (3) MG/3ML Inhalation Solution Take 3 mL by nebulization in the morning and 3  mL at noon and 3 mL in the evening. And as needed. 100 each 1 More than a month   • Urea 40 % External Cream Apply 1 Application topically daily. 227 g 2      Current Facility-Administered Medications Ordered in Epic   Medication Dose Route Frequency Provider Last Rate Last Admin   • albuterol (Ventolin HFA) 108 (90 Base) MCG/ACT inhaler 2 puff  2 puff Inhalation Q4H PRN Fuad Gardiner MD       • atorvastatin (Lipitor) tab 20 mg  20 mg Oral Nightly Fuad Gardiner MD   20 mg at 04/22/24 2019   • calcitonin (salmon) (Miacalcin) 200 UNIT/ACT nasal solution 1 spray  1 spray Alternating Nares Daily Fuad Gardiner MD   1 spray at 04/23/24 0849   • fluticasone propionate (Flonase) 50 MCG/ACT nasal suspension 2 spray  2 spray Nasal Daily Fuad Gardiner MD   2 spray at 04/23/24 0849   • fluticasone-umeclidin-vilant (Trelegy Ellipta) 100-62.5-25 MCG/ACT inhaler 1 puff  1 puff Inhalation Daily Fuad Gardiner MD   1 puff at 04/23/24 0849   • HYDROcodone-acetaminophen (Norco) 5-325 MG per tab 1 tablet  1 tablet Oral Q6H PRN Fuad Gardiner MD   1 tablet at 04/22/24 1319   • ipratropium-albuterol (Duoneb) 0.5-2.5 (3) MG/3ML inhalation solution 3 mL  3 mL Nebulization Q4H PRN Fuad Gardiner MD       • prednisoLONE (Pred Forte) 1 % ophthalmic suspension 1 drop  1 drop Right Eye 6 times per day Fuad Gadriner MD   1 drop at 04/23/24 1321   • torsemide (Demadex) tab 20 mg  20 mg Oral BID Fuad Gardiner MD   20 mg at 04/22/24 2019   • acetaminophen (Tylenol) tab 650 mg  650 mg Oral Q6H PRN Fuad Gardiner MD       • morphINE PF 2 MG/ML injection 2 mg  2 mg Intravenous Q2H PRN Fuad Gardiner MD   2 mg at 04/22/24 1811    Or   • morphINE PF 4 MG/ML injection 4 mg  4 mg Intravenous Q2H PRN Fuad Gardiner MD   4 mg at 04/22/24 2351    Or   • morphINE PF 4 MG/ML injection 6 mg  6 mg Intravenous Q2H PRN Fuad Gardiner MD       • polyethylene glycol (PEG  3350) (Miralax) 17 g oral packet 17 g  17 g Oral Daily PRN Fuad Gardiner MD   17 g at 24 1319   • sennosides (Senokot) tab 17.2 mg  17.2 mg Oral Nightly PRN Fuad Gardiner MD   17.2 mg at 24 2019   • bisacodyl (Dulcolax) 10 MG rectal suppository 10 mg  10 mg Rectal Daily PRN Fuad Gardiner MD       • fleet enema (Fleet) 7-19 GM/118ML rectal enema 133 mL  1 enema Rectal Once PRN Fuad Gardiner MD       • ondansetron (Zofran) 4 MG/2ML injection 4 mg  4 mg Intravenous Q6H PRN Fuad Gardiner MD        Or   • ondansetron (Zofran) tab 4 mg  4 mg Oral Q6H PRN Fuad Gardiner MD        Or   • ondansetron (Zofran-ODT) disintegrating tab 4 mg  4 mg Oral Q6H PRN Fuad Gardiner MD         No current [facility]-ordered outpatient medications on file.       No Known Allergies    Family History   Problem Relation Age of Onset   • Cancer Father         lung cancer   • Cancer Mother         lung cancer   • No Known Problems Daughter    • No Known Problems Son    • No Known Problems Maternal Grandmother    • No Known Problems Maternal Grandfather    • No Known Problems Paternal Grandmother    • No Known Problems Paternal Grandfather    • No Known Problems Sister    • Diabetes Brother    • Lipids Brother         hyperlipidemia   • Hypertension Brother    • No Known Problems Other      Social History     Socioeconomic History   • Marital status:    Tobacco Use   • Smoking status: Former     Current packs/day: 0.00     Average packs/day: 2.0 packs/day for 40.0 years (80.0 ttl pk-yrs)     Types: Cigarettes     Start date: 1981     Quit date: 2021     Years since quittin.4     Passive exposure: Past   • Smokeless tobacco: Never   Vaping Use   • Vaping status: Never Used   Substance and Sexual Activity   • Alcohol use: Not Currently     Alcohol/week: 0.0 standard drinks of alcohol     Comment: socially   • Drug use: Not Currently   Other Topics Concern   • Caffeine  Concern Yes     Comment: coffee, >32 oz daily       Available pre-op labs reviewed.  Lab Results   Component Value Date    WBC 7.5 04/22/2024    RBC 4.39 04/22/2024    HGB 13.2 04/22/2024    HCT 42.7 04/22/2024    MCV 97.3 04/22/2024    MCH 30.1 04/22/2024    MCHC 30.9 (L) 04/22/2024    RDW 14.3 04/22/2024    .0 04/22/2024     Lab Results   Component Value Date     04/22/2024    K 4.6 04/22/2024     04/22/2024    CO2 33.0 (H) 04/22/2024    BUN 22 04/22/2024    CREATSERUM 0.86 04/22/2024     (H) 04/22/2024    CA 9.3 04/22/2024          Vital Signs:  Body mass index is 46.68 kg/m².   height is 1.727 m (5' 8\") and weight is 139.3 kg (307 lb) (abnormal). His temporal temperature is 97.5 °F (36.4 °C). His blood pressure is 147/85 and his pulse is 91. His respiration is 18 and oxygen saturation is 94%.   Vitals:    04/22/24 1110 04/22/24 1643 04/23/24 0440 04/23/24 0850   BP:  128/82 130/68 147/85   Pulse: 75 91     Resp:  14 18 18   Temp:  97.6 °F (36.4 °C) 97.5 °F (36.4 °C)    TempSrc:   Temporal    SpO2:  94% 95% 94%   Weight:       Height:            Anesthesia Evaluation      Airway   Mallampati: II  TM distance: >3 FB  Neck ROM: full  Dental    (+) upper dentures and lower dentures    Pulmonary - normal exam   Cardiovascular - normal exam    Neuro/Psych - negative ROS     GI/Hepatic/Renal - negative ROS     Endo/Other - negative ROS   Abdominal                Anesthesia Plan:   ASA:  3  Plan:   General  Plan Comments: I have discussed the anesthetic plan, major risks and alternatives with the patient and answered all questions. The patient desires to proceed with surgery and anesthesia as planned.     Informed Consent Plan and Risks Discussed With:  Patient      I have informed Campbell Rao and/or legal guardian or family member of the nature of the anesthetic plan, benefits, risks including possible dental damage if relevant, major complications, and any alternative forms of  anesthetic management.   All of the patient's questions were answered to the best of my ability. The patient desires the anesthetic management as planned.  ANA SEWELL DO  4/23/2024 1:41 PM  Present on Admission:  • Azotemia

## 2024-04-23 NOTE — PROGRESS NOTES
[facility]  part of [facility]  Progress Note    Campbell Rao Patient Status:  Observation    1950 MRN [id number]   Location [facility] 4W/SW/SE Attending Geena Simmons MD   Hosp Day # 0 PCP Johny Kang MD       Subjective:   Continued intractable thoracic back pain with the least bit of movement.     Objective:   Laying in bed on his side, states it's very painful to lay flat.      Blood pressure 147/85, pulse 91, temperature 97.5 °F (36.4 °C), temperature source Temporal, resp. rate 18, height 68\", weight (!) 307 lb (139.3 kg), SpO2 94%.      Assessment and Plan:   Compression fracture - T7.  Kyphoplasty scheduled today with anesthesia.        PENG HIGGINS, APRN  2024

## 2024-04-23 NOTE — PLAN OF CARE
Campbell is A&Ox4 on 3L of O2 at baseline. He is Post op day 0 of a Kyphoplasty. He is to be strict Flat bed rest for 2 hrs. He is on telemetry. He is a check void with a primofit in place for the bed rest. Has been getting PRN morphine for pain. Has not complained of pain since coming from procedure. PT/OT on consult for him. Saline locked on a general diet. Frequent rounding. Call light in reach.  Problem: Patient Centered Care  Goal: Patient preferences are identified and integrated in the patient's plan of care  Description: Interventions:  - What would you like us to know as we care for you?   - Provide timely, complete, and accurate information to patient/family  - Incorporate patient and family knowledge, values, beliefs, and cultural backgrounds into the planning and delivery of care  - Encourage patient/family to participate in care and decision-making at the level they choose  - Honor patient and family perspectives and choices  Outcome: Progressing     Problem: Patient/Family Goals  Goal: Patient/Family Long Term Goal  Description: Patient's Long Term Goal: To go home    Interventions:  - follow MD orders  -discharge planning  -PT/OT  - See additional Care Plan goals for specific interventions  Outcome: Progressing  Goal: Patient/Family Short Term Goal  Description: Patient's Short Term Goal: have less pain      Interventions:   - -PT/OT  -Pain medication as needed  - See additional Care Plan goals for specific interventions  Outcome: Progressing     Problem: SAFETY ADULT - FALL  Goal: Free from fall injury  Description: INTERVENTIONS:  - Assess pt frequently for physical needs  - Identify cognitive and physical deficits and behaviors that affect risk of falls.  - [place] fall precautions as indicated by assessment.  - Educate pt/family on patient safety including physical limitations  - Instruct pt to call for assistance with activity based on assessment  - Modify environment to reduce risk of injury  -  Provide assistive devices as appropriate  - Consider OT/PT consult to assist with strengthening/mobility  - Encourage toileting schedule  Outcome: Progressing     Problem: PAIN - ADULT  Goal: Verbalizes/displays adequate comfort level or patient's stated pain goal  Description: INTERVENTIONS:  - Encourage pt to monitor pain and request assistance  - Assess pain using appropriate pain scale  - Administer analgesics based on type and severity of pain and evaluate response  - Implement non-pharmacological measures as appropriate and evaluate response  - Consider cultural and social influences on pain and pain management  - Manage/alleviate anxiety  - Utilize distraction and/or relaxation techniques  - Monitor for opioid side effects  - Notify MD/LIP if interventions unsuccessful or patient reports new pain  - Anticipate increased pain with activity and pre-medicate as appropriate  Outcome: Progressing

## 2024-04-23 NOTE — ANESTHESIA POSTPROCEDURE EVALUATION
Patient: Campbell Rao    Procedure Summary       Date: 04/23/24 Room / Location: [facility]    Anesthesia Start: 1353 Anesthesia Stop: 1508    Procedure: IR KYPHOPLASTY Diagnosis: (T7 compression Fx)    Scheduled Providers: Isma Alcantar MD; Tino Meza DO Anesthesiologist: Marlee Joseph MD    Anesthesia Type: general ASA Status: 3            Anesthesia Type: general    Vitals Value Taken Time   /71 04/23/24 1505   Temp 97.4 04/23/24 1508   Pulse 98 04/23/24 1508   Resp 23 04/23/24 1508   SpO2 92 % 04/23/24 1508   Vitals shown include unfiled device data.    [facility] AN Post Evaluation:   Patient Evaluated in PACU  Patient Participation: complete - patient cannot participate  Level of Consciousness: awake  Pain Score: 0  Pain Management: adequate  Airway Patency:patent  Yes    Nausea/Vomiting: none  Respiratory Status: acceptable  Postoperative Hydration acceptable      Marlee Joseph MD  4/23/2024 3:08 PM

## 2024-04-23 NOTE — IVS NOTE
Inpatient Throughput Communication:    Called inpatient RN Dara and notified of scheduled procedure.     Verified that appropriate consent is signed: Yes  Appropriate Consent Signed: Yes  Access Site Hair Clipped and skin prepped: Not Applicable  Patient has functional IV site: Yes  Patient received all pre-treatment medications: Not Applicable  Family aware of approximate time of procedure: Not Applicable    Sally BALDERAS, RN, RN

## 2024-04-24 PROCEDURE — 99233 SBSQ HOSP IP/OBS HIGH 50: CPT | Performed by: HOSPITALIST

## 2024-04-24 RX ORDER — HYDROCODONE BITARTRATE AND ACETAMINOPHEN 10; 325 MG/1; MG/1
1 TABLET ORAL EVERY 4 HOURS PRN
Status: DISCONTINUED | OUTPATIENT
Start: 2024-04-24 | End: 2024-04-26

## 2024-04-24 NOTE — PROGRESS NOTES
[facility]  part of [facility]  Progress Note    Campbell Rao Patient Status:  Observation    1950 MRN [id number]   Location [facility] 4W/SW/SE Attending Nicanor Ervin MD   Hosp Day # 0 PCP Johny Kang MD       Subjective:   Back pain improved.    Objective:   Sitting up in chair.  NAD.  Dressing removed from T7 kyphoplasty site, clean and dry.    Blood pressure 110/82, pulse 96, temperature 97.2 °F (36.2 °C), temperature source Temporal, resp. rate 18, height 68\", weight (!) 307 lb (139.3 kg), SpO2 96%.      Assessment and Plan:   Day #1 s/p successful T7 kyphoplasty.  He is able to get out of bed, has been walking around his room.  Back pain much improved.     Recommend advancing activity.  Ok to discharge home from IR perspective.  Follow up office visit will be scheduled.       PENG HIGGINS, APRN  2024

## 2024-04-24 NOTE — PROGRESS NOTES
[facility]  part of [facility]    Progress Note    Campbell Rao Patient Status:  Observation    1950 MRN [id number]   Location [facility] 4W/SW/SE Attending Nicanor Ervin MD   Hosp Day # 0 PCP Johny Kang MD     Chief Complaint:   Chief Complaint   Patient presents with    Back Pain    Shortness Of Breath       Subjective:   Campbell Rao was seen and examined  Sitting in chair, pain controlled at the moment   Working with PT/OT      Objective:   Objective:    Blood pressure 108/57, pulse 97, temperature 97.2 °F (36.2 °C), temperature source Temporal, resp. rate 18, height 5' 8\" (1.727 m), weight (!) 307 lb (139.3 kg), SpO2 97%.    Physical Exam:    General: No acute distress.   Respiratory: Clear to auscultation bilaterally. No wheezes. No rhonchi.  Cardiovascular: S1, S2. Regular rate and rhythm. No murmurs, rubs or gallops.   Abdomen: Soft, nontender, nondistended.  Positive bowel sounds. No rebound or guarding.  Neurologic: No focal neurological deficits.   Musculoskeletal: Moves all extremities.  Extremities: No edema.      Results:   Results:    Labs:  Recent Labs   Lab 24  0600   WBC 7.5   HGB 13.2   MCV 97.3   .0       Recent Labs   Lab 24  1156 24  0600   GLU  --  169*   BUN  --  22   CREATSERUM  --  0.86   CA  --  9.3   ALB 3.89  --    NA  --  143   K  --  4.6   CL  --  107   CO2  --  33.0*   TP 6.6  --        Estimated Creatinine Clearance: 74 mL/min (based on SCr of 0.86 mg/dL).    No results for input(s): \"PTP\", \"INR\" in the last 168 hours.         Culture:  No results found for this visit on 24.    Cardiac  No results for input(s): \"TROP\", \"PBNP\" in the last 168 hours.      Imaging: Imaging data reviewed in Epic.  No results found.    Medications:    atorvastatin  20 mg Oral Nightly    calcitonin (salmon)  1 spray Alternating Nares Daily    fluticasone propionate  2 spray Nasal Daily     fluticasone-umeclidin-vilant  1 puff Inhalation Daily    prednisoLONE  1 drop Right Eye 6 times per day    torsemide  20 mg Oral BID         Assessment and Plan:   Assessment & Plan:      T7 compression fx (acute vs. Subacute)  IR on consult.   S/p t7 kyphoplasty   Pain control with morphine and norco PRN     2.  Chronic hypoxic respiratory failure             On home o2 2-3L continuous   CPM  COPD, chronic without exacerbation  Continue home meds  HFpEF with grade 1 diastolic dyfuctions  Continue torsemide   HLD  Continue atorvastatin       >55min spent, >50% spent counseling and coordinating care in the form of educating pt/family and d/w consultants and staff. Most of the time spent discussing the above plan.        Plan of care discussed with patient or family at bedside.    Supplementary Documentation:     Quality:  DVT Prophylaxis: SCD for now   CODE status: Full  Dispo: per clinical course           Estimated date of discharge: TBD  Discharge is dependent on: clinical stability  At this point Mr. Rao is expected to be discharge to: home

## 2024-04-24 NOTE — OCCUPATIONAL THERAPY NOTE
OCCUPATIONAL THERAPY EVALUATION - INPATIENT     Room Number: 428/428-A  Evaluation Date: 2024  Type of Evaluation: Quick Eval  Presenting Problem: T7 compression fx s/p kypho 24  Hx CAD, HTN, COPD, CHF     Physician Order: IP Consult to Occupational Therapy  Reason for Therapy: ADL/IADL Dysfunction and Discharge Planning    OCCUPATIONAL THERAPY ASSESSMENT   Patient is a 74 year old male admitted 2024 for T7 compression fx s/p kypho 24.  Prior to admission, patient's baseline is indep with ADLs except assist with bathing and LB dressing (wears slip-on shoes). MI for fx mobility using RW. Patient is currently functioning near baseline with  ADLs and fx mobility/transfers . Anticipate patient will progress to safely discharge home with assist pending medical clearance.    PLAN  Patient has been evaluated and presents with no skilled Occupational Therapy needs  at this time.  Patient will be discharged from Occupational Therapy services. Please re-order if a new functional limitation presents during this admission.  OT Device Recommendations: Reacher (Leg )    OCCUPATIONAL THERAPY MEDICAL/SOCIAL HISTORY   Problem List  Principal Problem:    Intractable back pain  Active Problems:    Azotemia    HOME SITUATION  Type of Home: House  Home Layout: One level  Lives With: Spouse  Toilet and Equipment: Standard height toilet (sink nearby for UE support)  Shower/Tub and Equipment: Tub-shower combo; Tub transfer bench  Drives: No  Patient Regularly Uses: Home O2; Glasses (3L)  Stairs in Home: 1 MOLLY  Use of Assistive Device(s): RW    SUBJECTIVE  \"I had a hard time getting dressed even before.\"    OCCUPATIONAL THERAPY EXAMINATION    OBJECTIVE  Precautions: Spine  Fall Risk: Standard fall risk    WEIGHT BEARING RESTRICTION  None    PAIN ASSESSMENT  Ratin  Location: back  Management Techniques: Body mechanics; Activity promotion; Repositioning    COGNITION  Overall Cognitive Status:  WFL - within  functional limits    SENSATION  Light touch:  intact    RANGE OF MOTION   Upper extremity ROM is within functional limits     STRENGTH ASSESSMENT  Upper extremity strength is within functional limits     COORDINATION  Gross Motor: WFL   Fine Motor: WFL     ACTIVITIES OF DAILY LIVING ASSESSMENT  AM-PAC ‘6-Clicks’ Inpatient Daily Activity Short Form  How much help from another person does the patient currently need…  -   Putting on and taking off regular lower body clothing?: A Little  -   Bathing (including washing, rinsing, drying)?: A Little  -   Toileting, which includes using toilet, bedpan or urinal? : A Little  -   Putting on and taking off regular upper body clothing?: None  -   Taking care of personal grooming such as brushing teeth?: None  -   Eating meals?: None    AM-PAC Score:  Score: 21  Approx Degree of Impairment: 32.79%  Standardized Score (AM-PAC Scale): 44.27  CMS Modifier (G-Code): CJ    BED MOBILITY  Not Tested - patient in chair upon therapist arrival  Comments:  Provided education on log roll technique to facilitate bed mobility. Patient reported difficulty with facilitating LE on/off of bed. Receptive to recommendation for leg /use of a belt as a leg .    FUNCTIONAL TRANSFER ASSESSMENT  Sit to Stand: SUP  Toilet Transfer: SUP  Chair Transfer: SUP    FUNCTIONAL MOBILITY  SUP for in-room mobility using RW    FUNCTIONAL ADL ASSESSMENT  Eating: independent (per obs)   Grooming: supervision standing at sink  UB Dressing: independent (per obs)   LB Dressing: min assist - at baseline  Toileting: supervision  Comments:  Provided education on use of reacher to facilitate LB dressing d/t limited fx reach.    EDUCATION PROVIDED  Patient: Role of Occupational Therapy; Plan of Care; Discharge Recommendations; Functional Transfer Techniques; Fall Prevention; Posture/Positioning; Energy Conservation; Proper Body Mechanics; Adaptive Equipment Recommendations; Surgical Precautions; Compensatory ADL  Techniques  Patient's Response to Education: Verbalized Understanding; Returned Demonstration    The patient's Approx Degree of Impairment: 32.79% has been calculated based on documentation in the [facility] '6 clicks' Inpatient Daily Activity Short Form.  Research supports that patients with this level of impairment may benefit from discharge home without skilled OT needs.  Final disposition will be made by interdisciplinary medical team.    Patient End of Session: Up in chair;Needs met;RN aware of session/findings;Call light within reach;All patient questions and concerns addressed    Patient was able to achieve the following ...   Patient able to toilet transfer   SUP    Patient able to dress lower extremities   Min assist - baseline    Patient/Caregiver able to demonstrate safety with ADLS  at previous functional level     Patient Evaluation Complexity Level:   Occupational Profile/Medical History LOW - Brief history including review of medical or therapy records    Specific performance deficits impacting engagement in ADL/IADL LOW  1 - 3 performance deficits    Client Assessment/Performance Deficits LOW - No comorbidities nor modifications of tasks    Clinical Decision Making LOW - Analysis of occupational profile, problem-focused assessments, limited treatment options    Overall Complexity LOW     OT Session Time  Therapeutic Activity: 12 minutes    LAKESHA Parker/L  [facility]  #[id number]

## 2024-04-24 NOTE — PLAN OF CARE
Post-op day 1 kyphoplasty. Alert and oriented x4, uses 3L O2 NC baseline. PRN norco given for pain. Remote tele, no calls. Up x1 and a walker. Voiding freely in bathroom or primofit. Call light within reach.     Problem: Patient Centered Care  Goal: Patient preferences are identified and integrated in the patient's plan of care  Description: Interventions:  - What would you like us to know as we care for you?   - Provide timely, complete, and accurate information to patient/family  - Incorporate patient and family knowledge, values, beliefs, and cultural backgrounds into the planning and delivery of care  - Encourage patient/family to participate in care and decision-making at the level they choose  - Honor patient and family perspectives and choices  Outcome: Progressing     Problem: Patient/Family Goals  Goal: Patient/Family Long Term Goal  Description: Patient's Long Term Goal:     Interventions:  -   - See additional Care Plan goals for specific interventions  Outcome: Progressing  Goal: Patient/Family Short Term Goal  Description: Patient's Short Term Goal:     Interventions:   -   - See additional Care Plan goals for specific interventions  Outcome: Progressing     Problem: SAFETY ADULT - FALL  Goal: Free from fall injury  Description: INTERVENTIONS:  - Assess pt frequently for physical needs  - Identify cognitive and physical deficits and behaviors that affect risk of falls.  - [place] fall precautions as indicated by assessment.  - Educate pt/family on patient safety including physical limitations  - Instruct pt to call for assistance with activity based on assessment  - Modify environment to reduce risk of injury  - Provide assistive devices as appropriate  - Consider OT/PT consult to assist with strengthening/mobility  - Encourage toileting schedule  Outcome: Progressing     Problem: PAIN - ADULT  Goal: Verbalizes/displays adequate comfort level or patient's stated pain goal  Description:  INTERVENTIONS:  - Encourage pt to monitor pain and request assistance  - Assess pain using appropriate pain scale  - Administer analgesics based on type and severity of pain and evaluate response  - Implement non-pharmacological measures as appropriate and evaluate response  - Consider cultural and social influences on pain and pain management  - Manage/alleviate anxiety  - Utilize distraction and/or relaxation techniques  - Monitor for opioid side effects  - Notify MD/LIP if interventions unsuccessful or patient reports new pain  - Anticipate increased pain with activity and pre-medicate as appropriate  Outcome: Progressing

## 2024-04-24 NOTE — PLAN OF CARE
Campbell is from home with his wife. Alert and oriented x 4. Pod 1  T7 kyphoplasty -cms wnl, alejandro diet, voiding, lbm today-continent and bowl and bladder, oob with one assist and rolling walker-pt/ot consulted-spinal and fall precautions in place,norco adjusted from 5 q 6 to 10mg q 4-cont to monitor, 3 liters at baseline-no change for oxygen needs today, plan home hhc vs self care tentatively tomorrow pending pain control  Problem: Patient Centered Care  Goal: Patient preferences are identified and integrated in the patient's plan of care  Description: Interventions:  - What would you like us to know as we care for you? My wife  works  - Provide timely, complete, and accurate information to patient/family  - Incorporate patient and family knowledge, values, beliefs, and cultural backgrounds into the planning and delivery of care  - Encourage patient/family to participate in care and decision-making at the level they choose  - Honor patient and family perspectives and choices  Outcome: Progressing     Problem: Patient/Family Goals  Goal: Patient/Family Long Term Goal  Description: Patient's Long Term Goal: return to baseline adls    Interventions:  - pt/ot  Pain control  mobilize  - See additional Care Plan goals for specific interventions  Outcome: Progressing  Goal: Patient/Family Short Term Goal  Description: Patient's Short Term Goal: home tomorrow    Interventions:   - vs/labs wnl  Alejandro diet  Pain well controlled  Cleared by therapy  - See additional Care Plan goals for specific interventions  Outcome: Progressing     Problem: SAFETY ADULT - FALL  Goal: Free from fall injury  Description: INTERVENTIONS:  - Assess pt frequently for physical needs  - Identify cognitive and physical deficits and behaviors that affect risk of falls.  - [place] fall precautions as indicated by assessment.  - Educate pt/family on patient safety including physical limitations  - Instruct pt to call for assistance with activity based on  assessment  - Modify environment to reduce risk of injury  - Provide assistive devices as appropriate  - Consider OT/PT consult to assist with strengthening/mobility  - Encourage toileting schedule  Outcome: Progressing     Problem: PAIN - ADULT  Goal: Verbalizes/displays adequate comfort level or patient's stated pain goal  Description: INTERVENTIONS:  - Encourage pt to monitor pain and request assistance  - Assess pain using appropriate pain scale  - Administer analgesics based on type and severity of pain and evaluate response  - Implement non-pharmacological measures as appropriate and evaluate response  - Consider cultural and social influences on pain and pain management  - Manage/alleviate anxiety  - Utilize distraction and/or relaxation techniques  - Monitor for opioid side effects  - Notify MD/LIP if interventions unsuccessful or patient reports new pain  - Anticipate increased pain with activity and pre-medicate as appropriate  Outcome: Progressing

## 2024-04-24 NOTE — PHYSICAL THERAPY NOTE
PHYSICAL THERAPY EVALUATION - INPATIENT     Room Number: 428/428-A  Evaluation Date: 4/24/2024  Type of Evaluation: Initial   Physician Order: PT Eval and Treat    Presenting Problem: Intractable back pain  Co-Morbidities : colon CA, cramps, obesisty, emphasema 3 lts NC at home, CHF  Reason for Therapy: Mobility Dysfunction and Discharge Planning    PHYSICAL THERAPY ASSESSMENT   Patient is a 74 year old male admitted 4/22/2024 for Intractable back pain.  Prior to admission, patient's baseline is mod indep with RW with 3 lts 02 support with all mobility and ADL's lives with spouse.  Patient is currently functioning near baseline with bed mobility, transfers, gait, stair negotiation, maintaining seated position, standing prolonged periods, and performing household tasks.  Patient is requiring stand-by assist as a result of the following impairments: decreased functional strength, decreased endurance/aerobic capacity, pain, decreased muscular endurance, and medical status.  Physical Therapy will continue to follow for duration of hospitalization.    Patient will benefit from continued skilled PT Services For duration of hospitalization, however, given the patient is functioning near baseline level do not anticipate skilled therapy needs at discharge .    PLAN  PT Treatment Plan: Bed mobility;Body mechanics;Endurance;Energy conservation;Patient education;Gait training;Range of motion;Strengthening;Transfer training;Balance training  Rehab Potential : Good  Frequency (Obs): BID    PHYSICAL THERAPY MEDICAL/SOCIAL HISTORY   History related to current admission: back pain     Problem List  Principal Problem:    Intractable back pain  Active Problems:    Azotemia      HOME SITUATION  Home Situation  Type of Home: House  Home Layout: One level  Stairs to Enter : 2  Railing: Yes  Lives With: Spouse  Drives: Yes  Patient Owned Equipment: Rolling walker  Patient Regularly Uses: Home O2;Reading glasses     Prior Level of  Wise: Lives with spouse mod indep with RW and 3 lts 02 support at baseline. Retired.     SUBJECTIVE  I feel ok walking I get a little winded and have to rest some times. I have back pain that's seems to be easing up.     PHYSICAL THERAPY EXAMINATION   OBJECTIVE  Precautions: Cardiac  Fall Risk: Standard fall risk    WEIGHT BEARING RESTRICTION  Weight Bearing Restriction: None                PAIN ASSESSMENT  Ratin  Location: back pain  Management Techniques: Activity promotion;Body mechanics;Breathing techniques;Relaxation;Repositioning    COGNITION  Overall Cognitive Status:  WFL - within functional limits    RANGE OF MOTION AND STRENGTH ASSESSMENT  Upper extremity ROM and strength are within functional limits   Lower extremity ROM is within functional limits   Lower extremity strength is within functional limits     BALANCE  Static Sitting: Good  Dynamic Sitting: Fair +  Static Standing: Fair  Dynamic Standing: Fair -    ACTIVITY TOLERANCE  Pulse: 97  Heart Rate Source: Monitor  Resp: 18  BP: 110/82  BP Location: Right arm  BP Method: Automatic  Patient Position: Sitting    O2 WALK       AM-PAC '6-Clicks' INPATIENT SHORT FORM - BASIC MOBILITY  How much difficulty does the patient currently have...  Patient Difficulty: Turning over in bed (including adjusting bedclothes, sheets and blankets)?: None   Patient Difficulty: Sitting down on and standing up from a chair with arms (e.g., wheelchair, bedside commode, etc.): None   Patient Difficulty: Moving from lying on back to sitting on the side of the bed?: None   How much help from another person does the patient currently need...   Help from Another: Moving to and from a bed to a chair (including a wheelchair)?: None   Help from Another: Need to walk in hospital room?: A Little   Help from Another: Climbing 3-5 steps with a railing?: A Little     AM-PAC Score:  Raw Score: 22   Approx Degree of Impairment: 20.91%   Standardized Score (AM-PAC Scale): 53.28    CMS Modifier (G-Code): CJ    FUNCTIONAL ABILITY STATUS  Functional Mobility/Gait Assessment  Gait Assistance: Supervision  Distance (ft): 150' x 2  Assistive Device: Rolling walker  Pattern:  (step through gait needed a rest at 150' , 02 94%)  Rolling: supervision  Supine to Sit: independent  Sit to Supine: independent  Sit to Stand: supervision    Exercise/Education Provided:  Bed mobility  Body mechanics  Energy conservation  Functional activity tolerated  Gait training  Posture  ROM  Strengthening  Lower therapeutic exercise:  Ankle pumps  Heel slides  LAQ    The patient's Approx Degree of Impairment: 20.91% has been calculated based on documentation in the [facility] '6 clicks' Inpatient Basic Mobility Short Form.  Research supports that patients with this level of impairment may benefit from Home spouse assist.  Final disposition will be made by interdisciplinary medical team.    Patient End of Session: Up in chair;With  staff;Needs met;Call light within reach;RN aware of session/findings;All patient questions and concerns addressed;Alarm set    CURRENT GOALS  Goals to be met by: 5/10/2024  Patient Goal Patient's self-stated goal is: Return home    Goal #1 Patient is able to demonstrate supine - sit EOB @ level: independent     Goal #1   Current Status    Goal #2 Patient is able to demonstrate transfers Sit to/from Stand at assistance level: modified independent with walker - rolling     Goal #2  Current Status    Goal #3 Patient is able to ambulate 300 feet with assist device: walker - rolling at assistance level: modified independent   Goal #3   Current Status    Goal #4 Patient will negotiate 3 stairs/one curb w/ assistive device and supervision   Goal #4   Current Status    Goal #5 Patient to demonstrate independence with home activity/exercise instructions provided to patient in preparation for discharge.   Goal #5   Current Status    Goal #6    Goal #6  Current Status      Patient Evaluation  Complexity Level:  History Low - no personal factors and/or co-morbidities   Examination of body systems Low -  addressing 1-2 elements   Clinical Presentation Low- Stable   Clinical Decision Making  Low Complexity     Gait Trainin minutes

## 2024-04-25 ENCOUNTER — APPOINTMENT (OUTPATIENT)
Dept: CV DIAGNOSTICS | Facility: HOSPITAL | Age: 74
End: 2024-04-25
Attending: INTERNAL MEDICINE
Payer: MEDICARE

## 2024-04-25 LAB
ATRIAL RATE: 83 BPM
MAGNESIUM SERPL-MCNC: 1.8 MG/DL (ref 1.6–2.6)
P AXIS: 35 DEGREES
P-R INTERVAL: 174 MS
POTASSIUM SERPL-SCNC: 3.8 MMOL/L (ref 3.5–5.1)
Q-T INTERVAL: 450 MS
QRS DURATION: 90 MS
QTC CALCULATION (BEZET): 528 MS
R AXIS: -13 DEGREES
T AXIS: -38 DEGREES
TROPONIN I SERPL HS-MCNC: 4 NG/L
VENTRICULAR RATE: 83 BPM

## 2024-04-25 PROCEDURE — 93306 TTE W/DOPPLER COMPLETE: CPT | Performed by: INTERNAL MEDICINE

## 2024-04-25 PROCEDURE — 99233 SBSQ HOSP IP/OBS HIGH 50: CPT | Performed by: HOSPITALIST

## 2024-04-25 RX ORDER — MAGNESIUM OXIDE 400 MG/1
400 TABLET ORAL ONCE
Status: COMPLETED | OUTPATIENT
Start: 2024-04-25 | End: 2024-04-25

## 2024-04-25 NOTE — PHYSICAL THERAPY NOTE
PHYSICAL THERAPY TREATMENT NOTE - INPATIENT     Room Number: 428/428-A       Presenting Problem: Intractable back pain  Co-Morbidities : Hx CAD, HTN, COPD, CHF    Problem List  Principal Problem:    Intractable back pain  Active Problems:    Azotemia      PHYSICAL THERAPY ASSESSMENT   Patient demonstrates good  progress this session, goals  remain in progress.    Patient continues to function near baseline with bed mobility, transfers, gait, stair negotiation, and standing prolonged periods.  Contributing factors to remaining limitations include decreased functional strength, decreased endurance/aerobic capacity, pain, and decreased muscular endurance.  Next session anticipate patient to progress bed mobility, transfers, gait, stair negotiation, and standing prolonged periods.  Physical Therapy will continue to follow patient for duration of hospitalization.    Patient continues to benefit from continued skilled PT services: For duration of hospitalization, however, given the patient is functioning near baseline level do not anticipate skilled therapy needs at discharge .    PLAN  PT Treatment Plan: Bed mobility;Body mechanics;Coordination;Endurance;Energy conservation;Patient education;Gait training;Strengthening;Stoop training;Stair training;Transfer training;Balance training  Frequency (Obs): Daily    SUBJECTIVE  Pt was agreeable to therapy session.         OBJECTIVE  Precautions: Spine    WEIGHT BEARING RESTRICTION                PAIN ASSESSMENT   Rating: Unable to rate  Location: back  Management Techniques: Activity promotion;Body mechanics;Relaxation;Repositioning    BALANCE  Static Sitting: Good  Dynamic Sitting: Fair +  Static Standing: Fair  Dynamic Standing: Fair    ACTIVITY TOLERANCE                          O2 WALK       AM-PAC '6-Clicks' INPATIENT SHORT FORM - BASIC MOBILITY  How much difficulty does the patient currently have...  Patient Difficulty: Turning over in bed (including adjusting bedclothes,  sheets and blankets)?: None   Patient Difficulty: Sitting down on and standing up from a chair with arms (e.g., wheelchair, bedside commode, etc.): A Little   Patient Difficulty: Moving from lying on back to sitting on the side of the bed?: None   How much help from another person does the patient currently need...   Help from Another: Moving to and from a bed to a chair (including a wheelchair)?: A Little   Help from Another: Need to walk in hospital room?: A Little   Help from Another: Climbing 3-5 steps with a railing?: A Little     AM-PAC Score:  Raw Score: 20   Approx Degree of Impairment: 35.83%   Standardized Score (AM-PAC Scale): 47.67   CMS Modifier (G-Code): CJ    FUNCTIONAL ABILITY STATUS  Functional Mobility/Gait Assessment  Gait Assistance: Supervision  Distance (ft): 250'  Assistive Device: Rolling walker  Pattern: Within Functional Limits  Rolling:  NT  Supine to Sit:  NT  Sit to Supine:  NT  Sit to Stand: stand-by assist    Additional information: Pt is received in the chair and was cleared for therapy session. Pt was able to maintain all spinal precautions throughout the session. Pt is on 3LO2 and is at his baseline. Pt is SBA with sit<>stand transfers with the RW. Pt was able to AMB about 250' with the RW SBA for balance and safety. Pt with good adam and very good balance. Pt educated on slowing pace. Pt required a sitting rest break for a few minutes due to fatigue. Returned pt back to the room and to the bathroom. Pt was IND with all bathroom needs. Returned pt back to sitting in the chair with all needs within reach. Educated and encouraged pt to AMB with nursing staff throughout the day. Pt with good understanding. Pt is on track to dc to home once medically cleared. Reported to the RN on the status of the pt.     The patient's Approx Degree of Impairment: 35.83% has been calculated based on documentation in the [facility] '6 clicks' Inpatient Daily Activity Short Form.  Research supports that  patients with this level of impairment may benefit from home with assist.  Final disposition will be made by interdisciplinary medical team.        Patient End of Session: Up in chair;Call light within reach;RN aware of session/findings;All patient questions and concerns addressed    CURRENT GOALS   Goals to be met by: 5/10/2024  Patient Goal Patient's self-stated goal is: Return home    Goal #1 Patient is able to demonstrate supine - sit EOB @ level: independent     Goal #1   Current Status NT received in the chair   Goal #2 Patient is able to demonstrate transfers Sit to/from Stand at assistance level: modified independent with walker - rolling     Goal #2  Current Status SBA with the RW   Goal #3 Patient is able to ambulate 300 feet with assist device: walker - rolling at assistance level: modified independent   Goal #3   Current Status 250' with the RW SBA    Goal #4 Patient will negotiate 3 stairs/one curb w/ assistive device and supervision   Goal #4   Current Status NT   Goal #5 Patient to demonstrate independence with home activity/exercise instructions provided to patient in preparation for discharge.   Goal #5   Current Status IN PROGRESS   Goal #6    Goal #6  Current Status        Therapeutic Activity: 25 minutes

## 2024-04-25 NOTE — PLAN OF CARE
Patient has safety precautions in place bed in the lowest position, bed alarm on, and call light within reach. Plan of care ongoing. No further concerns as of present.    Problem: Patient Centered Care  Goal: Patient preferences are identified and integrated in the patient's plan of care  Description: Interventions:  - Provide timely, complete, and accurate information to patient/family  - Incorporate patient and family knowledge, values, beliefs, and cultural backgrounds into the planning and delivery of care  - Encourage patient/family to participate in care and decision-making at the level they choose  - Honor patient and family perspectives and choices  Outcome: Progressing     Problem: SAFETY ADULT - FALL  Goal: Free from fall injury  Description: INTERVENTIONS:  - Assess pt frequently for physical needs  - Identify cognitive and physical deficits and behaviors that affect risk of falls.  - [place] fall precautions as indicated by assessment.  - Educate pt/family on patient safety including physical limitations  - Instruct pt to call for assistance with activity based on assessment  - Modify environment to reduce risk of injury  - Provide assistive devices as appropriate  - Consider OT/PT consult to assist with strengthening/mobility  - Encourage toileting schedule  Outcome: Progressing     Problem: PAIN - ADULT  Goal: Verbalizes/displays adequate comfort level or patient's stated pain goal  Description: INTERVENTIONS:  - Encourage pt to monitor pain and request assistance  - Assess pain using appropriate pain scale  - Administer analgesics based on type and severity of pain and evaluate response  - Implement non-pharmacological measures as appropriate and evaluate response  - Consider cultural and social influences on pain and pain management  - Manage/alleviate anxiety  - Utilize distraction and/or relaxation techniques  - Monitor for opioid side effects  - Notify MD/LIP if interventions unsuccessful or  patient reports new pain  - Anticipate increased pain with activity and pre-medicate as appropriate  Outcome: Progressing

## 2024-04-25 NOTE — PROGRESS NOTES
[facility]  part of [facility]    Progress Note    Campbell Rao Patient Status:  Observation    1950 MRN [id number]   Location [facility] 4W/SW/SE Attending Nicanor Ervin MD   Hosp Day # 0 PCP Johny Kang MD     Chief Complaint:   Chief Complaint   Patient presents with    Back Pain    Shortness Of Breath       Subjective:   Campbell Rao was seen and examined  Overnight had some beats of vtach  Currently no cp, sob, f,c,n,v, abd pain or HA       Objective:   Objective:    Blood pressure 107/76, pulse 97, temperature 97.4 °F (36.3 °C), temperature source Temporal, resp. rate 18, height 5' 8\" (1.727 m), weight (!) 307 lb (139.3 kg), SpO2 94%.    Physical Exam:    General: No acute distress.   Respiratory: Clear to auscultation bilaterally. No wheezes. No rhonchi.  Cardiovascular: S1, S2. Regular rate and rhythm. No murmurs, rubs or gallops.   Abdomen: Soft, nontender, nondistended.  Positive bowel sounds. No rebound or guarding.  Neurologic: No focal neurological deficits.   Musculoskeletal: Moves all extremities.  Extremities: No edema.      Results:   Results:    Labs:  Recent Labs   Lab 24  0600   WBC 7.5   HGB 13.2   MCV 97.3   .0       Recent Labs   Lab 24  1156 24  0600 24  0052   GLU  --  169*  --    BUN  --  22  --    CREATSERUM  --  0.86  --    CA  --  9.3  --    ALB 3.89  --   --    NA  --  143  --    K  --  4.6 3.8   CL  --  107  --    CO2  --  33.0*  --    TP 6.6  --   --        Estimated Creatinine Clearance: 74 mL/min (based on SCr of 0.86 mg/dL).    No results for input(s): \"PTP\", \"INR\" in the last 168 hours.         Culture:  No results found for this visit on 24.    Cardiac  No results for input(s): \"TROP\", \"PBNP\" in the last 168 hours.      Imaging: Imaging data reviewed in Epic.  No results found.    Medications:    atorvastatin  20 mg Oral Nightly    calcitonin (salmon)  1 spray Alternating Nares Daily     fluticasone propionate  2 spray Nasal Daily    fluticasone-umeclidin-vilant  1 puff Inhalation Daily    prednisoLONE  1 drop Right Eye 6 times per day    torsemide  20 mg Oral BID         Assessment and Plan:   Assessment & Plan:      T7 compression fx (acute vs. Subacute)  IR on consult.   S/p t7 kyphoplasty   Pain control with morphine and norco PRN     2.  Chronic hypoxic respiratory failure             On home o2 2-3L continuous   CPM  COPD, chronic without exacerbation  Continue home meds  HFpEF with grade 1 diastolic dyfuctions  Continue torsemide   With beats of vtach -> 2d echo reviewed showing pEF, if persists may benefit from BB  HLD  Continue atorvastatin       >55min spent, >50% spent counseling and coordinating care in the form of educating pt/family and d/w consultants and staff. Most of the time spent discussing the above plan.        Plan of care discussed with patient or family at bedside.    Supplementary Documentation:     Quality:  DVT Prophylaxis: SCD for now   CODE status: Full  Dispo: per clinical course           Estimated date of discharge: TBD  Discharge is dependent on: clinical stability  At this point Mr. Rao is expected to be discharge to: home

## 2024-04-26 VITALS
OXYGEN SATURATION: 94 % | BODY MASS INDEX: 46.53 KG/M2 | DIASTOLIC BLOOD PRESSURE: 73 MMHG | TEMPERATURE: 98 F | RESPIRATION RATE: 18 BRPM | WEIGHT: 307 LBS | HEART RATE: 100 BPM | SYSTOLIC BLOOD PRESSURE: 118 MMHG | HEIGHT: 68 IN

## 2024-04-26 LAB
ALBUMIN SERPL-MCNC: 4.3 G/DL (ref 3.2–4.8)
ANION GAP SERPL CALC-SCNC: 4 MMOL/L (ref 0–18)
BASOPHILS # BLD AUTO: 0.12 X10(3) UL (ref 0–0.2)
BASOPHILS NFR BLD AUTO: 1.4 %
BUN BLD-MCNC: 26 MG/DL (ref 9–23)
BUN/CREAT SERPL: 25.2 (ref 10–20)
CALCIUM BLD-MCNC: 9.5 MG/DL (ref 8.7–10.4)
CHLORIDE SERPL-SCNC: 100 MMOL/L (ref 98–112)
CO2 SERPL-SCNC: 36 MMOL/L (ref 21–32)
CREAT BLD-MCNC: 1.03 MG/DL
DEPRECATED RDW RBC AUTO: 49.5 FL (ref 35.1–46.3)
EGFRCR SERPLBLD CKD-EPI 2021: 77 ML/MIN/1.73M2 (ref 60–?)
EOSINOPHIL # BLD AUTO: 0.31 X10(3) UL (ref 0–0.7)
EOSINOPHIL NFR BLD AUTO: 3.6 %
ERYTHROCYTE [DISTWIDTH] IN BLOOD BY AUTOMATED COUNT: 14.2 % (ref 11–15)
GLUCOSE BLD-MCNC: 113 MG/DL (ref 70–99)
HCT VFR BLD AUTO: 43.3 %
HGB BLD-MCNC: 14.3 G/DL
IMM GRANULOCYTES # BLD AUTO: 0.11 X10(3) UL (ref 0–1)
IMM GRANULOCYTES NFR BLD: 1.3 %
LYMPHOCYTES # BLD AUTO: 2.66 X10(3) UL (ref 1–4)
LYMPHOCYTES NFR BLD AUTO: 30.5 %
MAGNESIUM SERPL-MCNC: 2 MG/DL (ref 1.6–2.6)
MCH RBC QN AUTO: 30.8 PG (ref 26–34)
MCHC RBC AUTO-ENTMCNC: 33 G/DL (ref 31–37)
MCV RBC AUTO: 93.3 FL
MONOCYTES # BLD AUTO: 1.09 X10(3) UL (ref 0.1–1)
MONOCYTES NFR BLD AUTO: 12.5 %
NEUTROPHILS # BLD AUTO: 4.43 X10 (3) UL (ref 1.5–7.7)
NEUTROPHILS # BLD AUTO: 4.43 X10(3) UL (ref 1.5–7.7)
NEUTROPHILS NFR BLD AUTO: 50.7 %
OSMOLALITY SERPL CALC.SUM OF ELEC: 296 MOSM/KG (ref 275–295)
PHOSPHATE SERPL-MCNC: 4.1 MG/DL (ref 2.4–5.1)
PLATELET # BLD AUTO: 198 10(3)UL (ref 150–450)
POTASSIUM SERPL-SCNC: 3.9 MMOL/L (ref 3.5–5.1)
RBC # BLD AUTO: 4.64 X10(6)UL
SODIUM SERPL-SCNC: 140 MMOL/L (ref 136–145)
WBC # BLD AUTO: 8.7 X10(3) UL (ref 4–11)

## 2024-04-26 PROCEDURE — 99239 HOSP IP/OBS DSCHRG MGMT >30: CPT | Performed by: HOSPITALIST

## 2024-04-26 RX ORDER — HYDROCODONE BITARTRATE AND ACETAMINOPHEN 10; 325 MG/1; MG/1
1-2 TABLET ORAL EVERY 4 HOURS PRN
Qty: 30 TABLET | Refills: 0 | Status: SHIPPED | OUTPATIENT
Start: 2024-04-26

## 2024-04-26 RX ORDER — HYDROCODONE BITARTRATE AND ACETAMINOPHEN 10; 325 MG/1; MG/1
2 TABLET ORAL EVERY 4 HOURS PRN
Status: DISCONTINUED | OUTPATIENT
Start: 2024-04-26 | End: 2024-04-26

## 2024-04-26 RX ORDER — HYDROCODONE BITARTRATE AND ACETAMINOPHEN 10; 325 MG/1; MG/1
1 TABLET ORAL EVERY 4 HOURS PRN
Qty: 30 TABLET | Refills: 0 | Status: SHIPPED | OUTPATIENT
Start: 2024-04-26 | End: 2024-04-26

## 2024-04-26 NOTE — CM/SW NOTE
Per nursing, patient inquired about coverage through Medicare for a rollator.  CM explained that this is not typically covered with Medicare.  Nursing stated she will pass this information along to patient.     / to remain available for support and/or discharge planning.     Sandy Beth RN, BSN  Nurse   [phone]

## 2024-04-26 NOTE — PLAN OF CARE
Patient has safety precautions in place bed in the lowest position, bed alarm on, and call light within reach. Plan of care ongoing. No further concerns as of present.    Problem: Patient Centered Care  Goal: Patient preferences are identified and integrated in the patient's plan of care  Description: Interventions:    - Provide timely, complete, and accurate information to patient/family  - Incorporate patient and family knowledge, values, beliefs, and cultural backgrounds into the planning and delivery of care  - Encourage patient/family to participate in care and decision-making at the level they choose  - Honor patient and family perspectives and choices  Outcome: Progressing     Problem: SAFETY ADULT - FALL  Goal: Free from fall injury  Description: INTERVENTIONS:  - Assess pt frequently for physical needs  - Identify cognitive and physical deficits and behaviors that affect risk of falls.  - [place] fall precautions as indicated by assessment.  - Educate pt/family on patient safety including physical limitations  - Instruct pt to call for assistance with activity based on assessment  - Modify environment to reduce risk of injury  - Provide assistive devices as appropriate  - Consider OT/PT consult to assist with strengthening/mobility  - Encourage toileting schedule  Outcome: Progressing     Problem: PAIN - ADULT  Goal: Verbalizes/displays adequate comfort level or patient's stated pain goal  Description: INTERVENTIONS:  - Encourage pt to monitor pain and request assistance  - Assess pain using appropriate pain scale  - Administer analgesics based on type and severity of pain and evaluate response  - Implement non-pharmacological measures as appropriate and evaluate response  - Consider cultural and social influences on pain and pain management  - Manage/alleviate anxiety  - Utilize distraction and/or relaxation techniques  - Monitor for opioid side effects  - Notify MD/LIP if interventions unsuccessful or  patient reports new pain  - Anticipate increased pain with activity and pre-medicate as appropriate  Outcome: Progressing

## 2024-04-26 NOTE — PLAN OF CARE
Campbell is from home with his wife. Alert and oriented x 4. Pod 3 T7 kyphoplasty -cms wnl, alejandro diet, voiding, lbm 4/24-colace/miralax given-continent of bowl and bladder, oob with one assist and rolling walker-pt/ot consulted-spinal and fall precautions in place,norco adjusted from 5 q 6 to 10mg 2 tabs q 4-cont to monitor, 3 liters at baseline-no change for oxygen needs today, plan home self care -declines dispensing of rolling walker-states has one at home  Problem: Patient Centered Care  Goal: Patient preferences are identified and integrated in the patient's plan of care  Description: Interventions:  - What would you like us to know as we care for you? Lives with wife and uses 3 liters home oxygen  - Provide timely, complete, and accurate information to patient/family  - Incorporate patient and family knowledge, values, beliefs, and cultural backgrounds into the planning and delivery of care  - Encourage patient/family to participate in care and decision-making at the level they choose  - Honor patient and family perspectives and choices  Outcome: Adequate for Discharge     Problem: Patient/Family Goals  Goal: Patient/Family Long Term Goal  Description: Patient's Long Term Goal: return to baseline adls    Interventions:  - pt/ot  Sw dc planning  Pain control  Mobilize  - See additional Care Plan goals for specific interventions  Outcome: Adequate for Discharge  Goal: Patient/Family Short Term Goal  Description: Patient's Short Term Goal: home today    Interventions:   - vs/labs wnl  Alejandro diet  Pain controlled  Clear pt/ot  - See additional Care Plan goals for specific interventions  Outcome: Adequate for Discharge     Problem: SAFETY ADULT - FALL  Goal: Free from fall injury  Description: INTERVENTIONS:  - Assess pt frequently for physical needs  - Identify cognitive and physical deficits and behaviors that affect risk of falls.  - [place] fall precautions as indicated by assessment.  - Educate pt/family on  patient safety including physical limitations  - Instruct pt to call for assistance with activity based on assessment  - Modify environment to reduce risk of injury  - Provide assistive devices as appropriate  - Consider OT/PT consult to assist with strengthening/mobility  - Encourage toileting schedule  Outcome: Adequate for Discharge     Problem: PAIN - ADULT  Goal: Verbalizes/displays adequate comfort level or patient's stated pain goal  Description: INTERVENTIONS:  - Encourage pt to monitor pain and request assistance  - Assess pain using appropriate pain scale  - Administer analgesics based on type and severity of pain and evaluate response  - Implement non-pharmacological measures as appropriate and evaluate response  - Consider cultural and social influences on pain and pain management  - Manage/alleviate anxiety  - Utilize distraction and/or relaxation techniques  - Monitor for opioid side effects  - Notify MD/LIP if interventions unsuccessful or patient reports new pain  - Anticipate increased pain with activity and pre-medicate as appropriate  Outcome: Adequate for Discharge

## 2024-04-26 NOTE — PHYSICAL THERAPY NOTE
PHYSICAL THERAPY TREATMENT NOTE - INPATIENT     Room Number: 428/428-A       Presenting Problem: Intractable back pain  Co-Morbidities : Hx CAD, HTN, COPD, CHF    Problem List  Principal Problem:    Intractable back pain  Active Problems:    Azotemia      PHYSICAL THERAPY ASSESSMENT   Patient demonstrates good  progress this session, goals  remain in progress.    Patient continues to function near baseline with bed mobility, transfers, gait, stair negotiation, and standing prolonged periods.  Contributing factors to remaining limitations include decreased functional strength, decreased endurance/aerobic capacity, and decreased muscular endurance.  Next session anticipate patient to progress bed mobility, transfers, gait, stair negotiation, and standing prolonged periods.  Physical Therapy will continue to follow patient for duration of hospitalization.    Patient continues to benefit from continued skilled PT services: For duration of hospitalization, however, given the patient is functioning near baseline level do not anticipate skilled therapy needs at discharge .    PLAN  PT Treatment Plan: Bed mobility;Body mechanics;Endurance;Energy conservation;Patient education;Gait training;Strengthening;Stoop training;Stair training;Transfer training;Balance training  Frequency (Obs): Daily    SUBJECTIVE  Pt was agreeable to therapy session.         OBJECTIVE  Precautions: Spine    WEIGHT BEARING RESTRICTION                PAIN ASSESSMENT   Ratin  Location: back  Management Techniques: Activity promotion;Body mechanics;Relaxation;Repositioning    BALANCE  Static Sitting: Good  Dynamic Sitting: Fair +  Static Standing: Fair  Dynamic Standing: Fair    ACTIVITY TOLERANCE                          O2 WALK  Oxygen Therapy  At rest oxygen flow (liters per minute): 3  SPO2% Ambulation on Oxygen: 91  Ambulation oxygen flow (liters per minute): 3    AM-PAC '6-Clicks' INPATIENT SHORT FORM - BASIC MOBILITY  How much difficulty does  the patient currently have...  Patient Difficulty: Turning over in bed (including adjusting bedclothes, sheets and blankets)?: None   Patient Difficulty: Sitting down on and standing up from a chair with arms (e.g., wheelchair, bedside commode, etc.): A Little   Patient Difficulty: Moving from lying on back to sitting on the side of the bed?: None   How much help from another person does the patient currently need...   Help from Another: Moving to and from a bed to a chair (including a wheelchair)?: A Little   Help from Another: Need to walk in hospital room?: A Little   Help from Another: Climbing 3-5 steps with a railing?: A Little     AM-PAC Score:  Raw Score: 20   Approx Degree of Impairment: 35.83%   Standardized Score (AM-PAC Scale): 47.67   CMS Modifier (G-Code): CJ    FUNCTIONAL ABILITY STATUS  Functional Mobility/Gait Assessment  Gait Assistance: Supervision  Distance (ft): 300'  Assistive Device: Rolling walker  Pattern: Within Functional Limits  Rolling:  NT  Supine to Sit:  NT  Sit to Supine:  NT  Sit to Stand: supervision    Additional information: Pt is received in the chair and was cleared for therapy session. Pt was able to maintain all spinal precautions throughout the session. Pt is on 3LO2 and is at his baseline. Pt is SBA with sit<>stand transfers with the RW. Pt was able to AMB about 300' with the RW SBA for balance and safety. Pt with good adam and very good balance. Pt educated on slowing pace. Returned pt back to sitting in the chair with all needs within reach. Educated and encouraged pt to AMB with nursing staff throughout the day. Pt with good understanding. Pt is on track to dc to home once medically cleared. Reported to the RN on the status of the pt.     The patient's Approx Degree of Impairment: 35.83% has been calculated based on documentation in the [facility] '6 clicks' Inpatient Daily Activity Short Form.  Research supports that patients with this level of impairment may benefit from  Home with assist.  Final disposition will be made by interdisciplinary medical team.      Patient End of Session: Up in chair;Needs met;Call light within reach;RN aware of session/findings;All patient questions and concerns addressed    CURRENT GOALS   Goals to be met by: 5/10/2024  Patient Goal Patient's self-stated goal is: Return home    Goal #1 Patient is able to demonstrate supine - sit EOB @ level: independent     Goal #1   Current Status NT received in the chair   Goal #2 Patient is able to demonstrate transfers Sit to/from Stand at assistance level: modified independent with walker - rolling     Goal #2  Current Status SBA with the RW   Goal #3 Patient is able to ambulate 300 feet with assist device: walker - rolling at assistance level: modified independent   Goal #3   Current Status 300' with the RW SBA    Goal #4 Patient will negotiate 3 stairs/one curb w/ assistive device and supervision   Goal #4   Current Status NT Pt reported he has 1 stoop step to enter home and no stairs inside. Pt declined to practice the stoop.    Goal #5 Patient to demonstrate independence with home activity/exercise instructions provided to patient in preparation for discharge.   Goal #5   Current Status IN PROGRESS   Goal #6    Goal #6  Current Status        Therapeutic Activity: 25 minutes

## 2024-04-26 NOTE — DISCHARGE INSTRUCTIONS
Keep follow up appointment -call sooner with any questions or concerns    See follow up instructions regarding discharge instructions after a kyphoplasty

## 2024-04-26 NOTE — DISCHARGE SUMMARY
[facility]  part of [facility]    Discharge Summary    Campbell Rao Patient Status:  Observation    1950 MRN [id number]   Location [facility] 4W/SW/SE Attending Nicanor Ervin MD   Hosp Day # 0 PCP Johny Kang MD     Date of Admission: 2024 Disposition: Home or Self Care     Date of Discharge: 24    Admitting Diagnosis: Intractable back pain [M54.9]    Hospital Discharge Diagnoses: T7 compression fracture s/p kyphoplasty    Lace+ Score: 83  59-90 High Risk  29-58 Medium Risk  0-28   Low Risk.    TCM Follow-Up Recommendation:  LACE > 58: High Risk of readmission after discharge from the hospital.    Problem List:   Patient Active Problem List   Diagnosis    Colon cancer screening    Medicare annual wellness visit, subsequent    Pulmonary emphysema (HCC)    Congestive heart failure (HCC)    Ascending aorta enlargement (HCC)    Morbid (severe) obesity due to excess calories (HCC)    Obesity hypoventilation syndrome (HCC)    MARIA M (obstructive sleep apnea)    Chronic respiratory failure with hypoxia and hypercapnia (HCC)    Dyslipidemia    At risk for falls    Azotemia    Intractable back pain       Reason for Admission: back pain    Physical Exam:   Vitals:    24 1523   BP: 118/73   Pulse:    Resp: 18   Temp:          History of Present Illness:  Per Dr. Gardiner  Campbell Rao is a 74 year old male with SPMHx chronic hypox resp failure, COPD, HFpEF G1DD, HLD p;w back pain.  Was in our ER 24 with same but c/o cp.  At that time CT showed T7 compression Fx.        Hospital Course:   T7 compression fx (acute vs. Subacute)  IR was on consult.   S/p t7 kyphoplasty   Pain control with norco PRN  Stable for dc     2.  Chronic hypoxic respiratory failure             On home o2 2-3L continuous   CPM  COPD, chronic without exacerbation  Continue home meds  HFpEF with grade 1 diastolic dyfuctions  Continue torsemide   With beats of vtach -> 2d echo  reviewed showing pEF  HLD  Continue atorvastatin    Consultations: IR    Procedures: see above    Complications: none    Discharge Condition: Good    Discharge Medications:      Discharge Medications        START taking these medications        Instructions Prescription details   HYDROcodone-acetaminophen  MG Tabs  Commonly known as: Norco  Replaces: HYDROcodone-acetaminophen 5-325 MG Tabs      Take 1-2 tablets by mouth every 4 (four) hours as needed.   Quantity: 30 tablet  Refills: 0            CONTINUE taking these medications        Instructions Prescription details   albuterol 108 (90 Base) MCG/ACT Aers  Commonly known as: Ventolin HFA      Inhale 2 puffs into the lungs every 4 to 6 hours as needed for Wheezing. inhale 2 puff by inhalation route  every 4 - 6 hours as needed   Quantity: 1 each  Refills: 1     atorvastatin 20 MG Tabs  Commonly known as: Lipitor      Take 1 tablet (20 mg total) by mouth nightly.   Quantity: 90 tablet  Refills: 0     calcitonin (salmon) 200 UNIT/ACT Soln  Commonly known as: Miacalcin      1 spray by Nasal route daily.   Quantity: 1 each  Refills: 0     fluticasone propionate 50 MCG/ACT Susp  Commonly known as: Flonase      2 sprays by Nasal route daily.   Quantity: 3 each  Refills: 3     fluticasone-umeclidin-vilant 100-62.5-25 MCG/ACT Aepb  Commonly known as: Trelegy Ellipta      Inhale 1 puff into the lungs daily.   Quantity: 3 each  Refills: 0     ipratropium-albuterol 0.5-2.5 (3) MG/3ML Soln  Commonly known as: Duoneb      Take 3 mL by nebulization in the morning and 3 mL at noon and 3 mL in the evening. And as needed.   Quantity: 100 each  Refills: 1     potassium chloride 10 MEQ Tbcr  Commonly known as: Klor-Con M10      Take one tab (10 meq) po/day  alternating with 2 tabs (20meq)/day   Quantity: 135 tablet  Refills: 1     prednisoLONE 1 % Susp  Commonly known as: Pred Forte      Place into the left eye.   Refills: 0     sodium chloride 0.65 % Soln  Commonly known as:  Saline Mist      1 spray by Nasal route every 3 (three) hours as needed for congestion.   Quantity: 1 Bottle  Refills: 0     torsemide 20 MG Tabs  Commonly known as: Demadex      Take 1 tablet (20 mg total) by mouth 2 (two) times daily.   Quantity: 180 tablet  Refills: 3     Urea 40 % Crea      Apply 1 Application topically daily.   Quantity: 227 g  Refills: 2            STOP taking these medications      HYDROcodone-acetaminophen 5-325 MG Tabs  Commonly known as: Norco  Replaced by: HYDROcodone-acetaminophen  MG Tabs        ofloxacin 0.3 % Soln  Commonly known as: Ocuflox                  Where to Get Your Medications        [id number] - [place], [place] - [address], [phone], [phone]  [address] [address]      Phone: [phone]   HYDROcodone-acetaminophen  MG Tabs       Greater than 35 minutes spent, >50% spent counseling re: treatment plan and workup     Nicanor Ervin MD  4/26/2024

## 2024-04-29 ENCOUNTER — PATIENT OUTREACH (OUTPATIENT)
Dept: CASE MANAGEMENT | Age: 74
End: 2024-04-29

## 2024-04-29 ENCOUNTER — TELEPHONE (OUTPATIENT)
Dept: INTERNAL MEDICINE CLINIC | Facility: CLINIC | Age: 74
End: 2024-04-29

## 2024-04-29 DIAGNOSIS — Z02.9 ENCOUNTERS FOR UNSPECIFIED ADMINISTRATIVE PURPOSE: ICD-10-CM

## 2024-04-29 DIAGNOSIS — M54.9 INTRACTABLE BACK PAIN: Primary | ICD-10-CM

## 2024-04-29 PROCEDURE — 1111F DSCHRG MED/CURRENT MED MERGE: CPT

## 2024-04-29 NOTE — TELEPHONE ENCOUNTER
Spoke with patient for TCM today. Pt confirms 5/08/2024 IR f/u appt, TCM appt made for 5/09/2024 with Dr. Kang--first available TCM appt, without PCP approval.    TCM appointment recommended by 5/03/2024, as patient is a High risk for readmission.  Please advise.    BOOK BY DATE (last date for TCM): 5/10/2024    Pt also requesting specific recommendations from Dr. Kang for OTC vs Rx for constipation--pt taking Hydrocodone as needed, s/p IR kyphoplasty 4/23/2024.     Pt denies redness, drainage, warmth or bleeding to kyphoplasty site. Pt denies fever, chills, headache, vision changes, dizziness, nausea, vomiting, diarrhea, LE numbness or tingling, loss of bowel or bladder control, difficulty urinating, chest pain or shortness of breath at this time.     Clinical staff:  Please discuss with PCP recommendations RE: constipation and if sooner appt needed or keep 5/09/2025 TCM appt as scheduled and follow-up with patient, accordingly. Thank you!             Follow up appointments:    Follow-up Information    Follow up With Specialties Details Why Contact Info   Johny Kang MD Internal Medicine Follow up  [address]  [address]  [facility] [id number] [phone]   Milagros Jackson APRN Nurse Practitioner, INTERVENTIONAL, RADIOLOGY Go on 5/8/2024 Wednesday at 1:30pm for your 2 week follow up s/p Kyphoplasty  Yellow parking lot, 3rd floor  Pls call  option 2 to contact our IR clinic [address]  [address]  [facility] [id number] [phone]

## 2024-04-29 NOTE — PROGRESS NOTES
Initial Post Discharge Follow Up   Discharge Date: 4/26/24  Contact Date: 4/29/2024    Consent Verification:  Assessment Completed With: Patient  HIPAA Verified?  Yes    Discharge Dx:   Intractable back pain   T7 compression fracture s/p kyphoplasty     General:   How have you been since your discharge from the hospital? Pt feeling better, since hospital discharge--intermittent back pain 6/10, managed well with Hydrocodone, as prescribed, ambulating with walker at home. Pt denies redness, drainage, warmth or bleeding to kyphoplasty site. Pt denies fever, chills, headache, vision changes, dizziness, nausea, vomiting, diarrhea, LE numbness or tingling, loss of bowel or bladder control, difficulty urinating, chest pain or shortness of breath at this time. Pt confirms chronic O2 3L NC--O2 sat 94%. Pt does report constipation--requesting PCP recommendations for OTC vs Rx.  Do you have any pain since discharge?  Yes  Where: back   Rating on pain scale 1-10, 10 being the worst pain you have ever experienced, what is current pain: 6  Alleviating factors: rest, ice, Hydrocodone  Aggravating factors: positional  Is the pain manageable at home? Yes  How well was your pain managed while in the hospital?   On a scale of 1-5   1- Very Poor and 5- Very well   Very Well  When you were leaving the hospital were your discharge instructions reviewed with you? Yes  How well were your discharge instructions explained to you?   On a scale of 1-5   1- Very Poor and 5- Very well   Very Well  Do you have any questions about your discharge instructions?  No  Before leaving the hospital was your diagnoses explained to you? Yes  Do you have any questions about your diagnoses? No  Are you able to perform normal daily activities of living as you have prior to your hospital stay (dressing, bathing, ambulating to the bathroom, etc)? yes, with walker  (NCM) Was patient given a different diet per AVS? no    Medications:   Current Outpatient  Medications   Medication Sig Dispense Refill    HYDROcodone-acetaminophen  MG Oral Tab Take 1-2 tablets by mouth every 4 (four) hours as needed. 30 tablet 0    calcitonin, salmon, 200 UNIT/ACT Nasal Solution 1 spray by Nasal route daily. 1 each 0    prednisoLONE 1 % Ophthalmic Suspension Place into the left eye.      torsemide 20 MG Oral Tab Take 1 tablet (20 mg total) by mouth 2 (two) times daily. 180 tablet 3    fluticasone-umeclidin-vilant (TRELEGY ELLIPTA) 100-62.5-25 MCG/ACT Inhalation Aerosol Powder, Breath Activated Inhale 1 puff into the lungs daily. 3 each 0    albuterol 108 (90 Base) MCG/ACT Inhalation Aero Soln Inhale 2 puffs into the lungs every 4 to 6 hours as needed for Wheezing. inhale 2 puff by inhalation route  every 4 - 6 hours as needed 1 each 1    atorvastatin 20 MG Oral Tab Take 1 tablet (20 mg total) by mouth nightly. 90 tablet 0    ipratropium-albuterol 0.5-2.5 (3) MG/3ML Inhalation Solution Take 3 mL by nebulization in the morning and 3 mL at noon and 3 mL in the evening. And as needed. 100 each 1    potassium chloride 10 MEQ Oral Tab CR Take one tab (10 meq) po/day  alternating with 2 tabs (20meq)/day 135 tablet 1    fluticasone propionate 50 MCG/ACT Nasal Suspension 2 sprays by Nasal route daily. 3 each 3    Urea 40 % External Cream Apply 1 Application topically daily. 227 g 2    Saline Nasal Spray 0.65 % Nasal Solution 1 spray by Nasal route every 3 (three) hours as needed for congestion. 1 Bottle 0     Were there any changes to your current medication(s) noted on the AVS? Yes  START taking:  HYDROcodone-acetaminophen (Norco)  This replaces a similar medication. See the full medication  list for instructions.  STOP taking:  HYDROcodone-acetaminophen 5-325 MG  Tabs (Norco)  Replaced by a similar medication.  ofloxacin 0.3 % Soln (Ocuflox)  If so, were these medication changes discussed with you prior to leaving the hospital? Yes  If a new medication was prescribed:    Was the new  medication's purpose & side effects reviewed? Yes  Do you have any questions about your new medication? No  Did you  your discharge medications when you left the hospital? Yes  Let's go over your medications together to make sure we are not missing anything. Medications Reviewed  Are there any reasons that keep you from taking your medication as prescribed? No  Are you having any concerns with constipation? Yes  Did patient receive their flu shot (Sept-March)? Yes--11/03/2023    Discharge medications reviewed/discussed/and reconciled against outpatient medications with patient.  Any changes or updates to medications sent to PCP.  Patient Acknowledged     Referrals/orders at D/C:  Referrals/orders placed at D/C? no  DME ordered at D/C? Yes  What? walker  From where? hospital  Have you received your (DME)? no--has walker at home    Discharge orders, AVS reviewed and discussed with patient. Any changes or updates to orders sent to PCP.  Patient Acknowledged      SDOH:   Transportation:    Transportation Needs: No Transportation Needs (4/29/2024)    Transportation Needs     Lack of Transportation: No     Financial Strain:   Financial Resource Strain: Low Risk  (4/29/2024)    Financial Resource Strain     Difficulty of Paying Living Expenses: Not very hard     Med Affordability: No     Follow up appointments:    Follow-up Information    Follow up With Specialties Details Why Contact Info   Johny Kang MD Internal Medicine Follow up  [address]  [address]  [facility] [id number] [phone]   Milagros Jackson APRN Nurse Practitioner, INTERVENTIONAL, RADIOLOGY Go on 5/8/2024 Wednesday at 1:30pm for your 2 week follow up s/p Kyphoplasty  Yellow parking lot, 3rd floor  Pls call  option 2 to contact our IR clinic [address]  [address]  [facility] [id number] [phone]       TCC  Was TCC ordered: No    PCP (If no TCC appointment)  Does patient already have a PCP appointment scheduled?  No  NCM Scheduled PCP office TCM appointment with patient 5/09/2024       Specialist    Does the patient have any other follow up appointment(s) needing to be scheduled? No    Is there any reason as to why you cannot make your appointment(s)?  No     Needs post D/C:   Now that you are home, are there any needs or concerns you need addressed before your next visit with your PCP?  (DME, meds, questions, etc.): Yes    Interventions by NCM:   Discussed diet, activity, medications and need for f/u visits. Pt confirms 5/08/2024 IR f/u appt, TCM appt made for 5/09/2024 with Dr. Kang--first available TCM appt, without PCP approval. Sent condition update to RN triage RE: constipation and appt recommendations.  Patient aware when to contact PCP/specialists and when to seek emergency care. No further questions/concerns at this time.    Overall Rating:   How would you rate the care you received while in the hospital? excellent    CCM referral placed:    No--currently enrolled    BOOK BY DATE: 5/10/2024

## 2024-04-30 ENCOUNTER — LAB ENCOUNTER (OUTPATIENT)
Dept: LAB | Age: 74
End: 2024-04-30
Attending: INTERNAL MEDICINE
Payer: MEDICARE

## 2024-04-30 ENCOUNTER — OFFICE VISIT (OUTPATIENT)
Dept: INTERNAL MEDICINE CLINIC | Facility: CLINIC | Age: 74
End: 2024-04-30

## 2024-04-30 VITALS
HEIGHT: 68 IN | SYSTOLIC BLOOD PRESSURE: 124 MMHG | HEART RATE: 96 BPM | BODY MASS INDEX: 45.3 KG/M2 | DIASTOLIC BLOOD PRESSURE: 84 MMHG | TEMPERATURE: 99 F | OXYGEN SATURATION: 93 % | WEIGHT: 298.88 LBS

## 2024-04-30 DIAGNOSIS — I25.10 CORONARY ARTERY CALCIFICATION SEEN ON CT SCAN: ICD-10-CM

## 2024-04-30 DIAGNOSIS — J44.9 OSA AND COPD OVERLAP SYNDROME (HCC): ICD-10-CM

## 2024-04-30 DIAGNOSIS — R79.89 HIGH SERUM PARATHYROID HORMONE (PTH): ICD-10-CM

## 2024-04-30 DIAGNOSIS — E55.9 VITAMIN D DEFICIENCY: ICD-10-CM

## 2024-04-30 DIAGNOSIS — G47.33 OSA AND COPD OVERLAP SYNDROME (HCC): ICD-10-CM

## 2024-04-30 DIAGNOSIS — E66.01 MORBID OBESITY WITH BMI OF 40.0-44.9, ADULT (HCC): ICD-10-CM

## 2024-04-30 DIAGNOSIS — I51.9 LEFT VENTRICULAR DIASTOLIC DYSFUNCTION: ICD-10-CM

## 2024-04-30 DIAGNOSIS — Z98.890 S/P KYPHOPLASTY: ICD-10-CM

## 2024-04-30 DIAGNOSIS — S22.060D CLOSED WEDGE COMPRESSION FRACTURE OF T7 VERTEBRA WITH ROUTINE HEALING, SUBSEQUENT ENCOUNTER: Primary | ICD-10-CM

## 2024-04-30 DIAGNOSIS — J43.9 PULMONARY EMPHYSEMA, UNSPECIFIED EMPHYSEMA TYPE (HCC): ICD-10-CM

## 2024-04-30 LAB
PTH-INTACT SERPL-MCNC: 179.7 PG/ML (ref 18.5–88)
VIT D+METAB SERPL-MCNC: 8.8 NG/ML (ref 30–100)

## 2024-04-30 PROCEDURE — 99495 TRANSJ CARE MGMT MOD F2F 14D: CPT | Performed by: INTERNAL MEDICINE

## 2024-04-30 PROCEDURE — 83970 ASSAY OF PARATHORMONE: CPT

## 2024-04-30 PROCEDURE — 36415 COLL VENOUS BLD VENIPUNCTURE: CPT

## 2024-04-30 PROCEDURE — 82306 VITAMIN D 25 HYDROXY: CPT

## 2024-04-30 RX ORDER — HYDROCODONE BITARTRATE AND ACETAMINOPHEN 10; 325 MG/1; MG/1
1 TABLET ORAL EVERY 6 HOURS PRN
Qty: 30 TABLET | Refills: 0 | Status: SHIPPED | OUTPATIENT
Start: 2024-04-30

## 2024-05-02 ENCOUNTER — TELEPHONE (OUTPATIENT)
Dept: INTERNAL MEDICINE CLINIC | Facility: CLINIC | Age: 74
End: 2024-05-02

## 2024-05-02 DIAGNOSIS — E55.9 VITAMIN D DEFICIENCY: Primary | ICD-10-CM

## 2024-05-02 DIAGNOSIS — R79.89 HIGH SERUM PARATHYROID HORMONE (PTH): ICD-10-CM

## 2024-05-02 RX ORDER — ERGOCALCIFEROL 1.25 MG/1
50000 CAPSULE ORAL WEEKLY
Qty: 12 CAPSULE | Refills: 0 | Status: SHIPPED | OUTPATIENT
Start: 2024-05-02 | End: 2024-07-19

## 2024-05-02 NOTE — PROGRESS NOTES
Subjective:   Campbell Rao is a 74 year old male who presents for hospital follow up.   He was discharged from [facility] to Home or Self Care  Admission Date: 4/22/24   Discharge Date: 4/26/24  Hospital Discharge Diagnosis: T7 compression fracture, s/p kyphoplasty, MARIA M and copd overlap, coronary calcification, left ventricular diastolic dysfunction, morbid obesity    Interactive contact within 2 business days post discharge first initiated on Date: 4/29/2024    During the visit, the following was completed:  Obtained and reviewed discharge summary, continuity of care documents, and Hospitalist notes  Reviewed Labs (CBC, CMP) and Labs (Cardiac markers)    HPI: pt admitted at [facility] due to severe back pain from compression fracture of T7, intractable and not controlled with oral narcotic pain med. Pt had kyphoplasty done by IR with improvement of his back pain.  Pt was discharge home with oral narcotic pain med, pain had been manageable per pt since discharge from hospital.     History/Other:   Current Medications:  Medication Reconciliation:  I am aware of an inpatient discharge within the last 30 days.  The discharge medication list has been reconciled with the patient's current medication list and reviewed by me.  See medication list for additions of new medication, and changes to current doses of medications and discontinued medications.  Outpatient Medications Marked as Taking for the 4/30/24 encounter (Office Visit) with Johny Kang MD   Medication Sig    HYDROcodone-acetaminophen (NORCO)  MG Oral Tab Take 1 tablet by mouth every 6 (six) hours as needed for Pain.    HYDROcodone-acetaminophen  MG Oral Tab Take 1-2 tablets by mouth every 4 (four) hours as needed.    calcitonin, salmon, 200 UNIT/ACT Nasal Solution 1 spray by Nasal route daily.    torsemide 20 MG Oral Tab Take 1 tablet (20 mg total) by mouth 2 (two) times daily.    fluticasone-umeclidin-vilant (TRELEGY ELLIPTA)  100-62.5-25 MCG/ACT Inhalation Aerosol Powder, Breath Activated Inhale 1 puff into the lungs daily.    albuterol 108 (90 Base) MCG/ACT Inhalation Aero Soln Inhale 2 puffs into the lungs every 4 to 6 hours as needed for Wheezing. inhale 2 puff by inhalation route  every 4 - 6 hours as needed    atorvastatin 20 MG Oral Tab Take 1 tablet (20 mg total) by mouth nightly.    ipratropium-albuterol 0.5-2.5 (3) MG/3ML Inhalation Solution Take 3 mL by nebulization in the morning and 3 mL at noon and 3 mL in the evening. And as needed.    potassium chloride 10 MEQ Oral Tab CR Take one tab (10 meq) po/day  alternating with 2 tabs (20meq)/day    fluticasone propionate 50 MCG/ACT Nasal Suspension 2 sprays by Nasal route daily.    Urea 40 % External Cream Apply 1 Application topically daily.    Saline Nasal Spray 0.65 % Nasal Solution 1 spray by Nasal route every 3 (three) hours as needed for congestion.       Review of Systems:  GENERAL: weight stable, energy stable, no sweating  SKIN: denies any unusual skin lesions  EYES: denies blurred vision or double vision  HEENT: denies nasal congestion, sinus pain or ST  LUNGS: denies shortness of breath with exertion  CARDIOVASCULAR: denies chest pain on exertion or palpitations  GI: denies abdominal pain, denies heartburn, denies diarrhea  MUSCULOSKELETAL: denies pain, normal range of motion of extremities  NEURO: denies headaches, denies dizziness, denies weakness  PSYCHE: denies depression or anxiety  HEMATOLOGIC: denies hx of anemia, or bruising, denies bleeding  ENDOCRINE: denies thyroid history  ALL/ASTHMA: denies hx of allergy or asthma    Objective:   No LMP for male patient.  Estimated body mass index is 45.45 kg/m² as calculated from the following:    Height as of this encounter: 5' 8\" (1.727 m).    Weight as of this encounter: 298 lb 14.4 oz (135.6 kg).   /84 (BP Location: Left arm, Patient Position: Sitting, Cuff Size: large)   Pulse 96   Temp 98.7 °F (37.1 °C)  (Tympanic)   Ht 5' 8\" (1.727 m)   Wt 298 lb 14.4 oz (135.6 kg)   SpO2 93%   BMI 45.45 kg/m²    GENERAL: well developed, well nourished, in no apparent distress  SKIN: no rashes, no suspicious lesions  HEENT: atraumatic, normocephalic, ears and throat are clear  EYES: PERRLA, EOMI, conjunctiva are clear  NECK: supple, no adenopathy, no bruits  CHEST: no chest tenderness  LUNGS: clear to auscultation  CARDIO: RRR without murmur  GI: good BS's, no masses, HSM or tenderness  MUSCULOSKELETAL: back is not tender, FROM of the extremities  EXTREMITIES: no cyanosis, clubbing or edema  NEURO: Oriented times three, cranial nerves are intact, motor and sensory are grossly intact    Assessment & Plan:   (S22.060D) Closed wedge compression fracture of T7 vertebra with routine healing, subsequent encounter  (primary encounter diagnosis)  Plan: HYDROcodone-acetaminophen (NORCO)  MG         Oral Tab        Pt had kyphoplasty done by IR with significant improvement of his back pain. Still need some pain med norco which I told pt to start cutting back.  We also discussed treatment of osteoporosis since clinically he has it, will plan for dexa scan also. Will do check vit D and repeat his PTH which was mildly elevated.     (Z98.890) S/P kyphoplasty  Plan: see above.     (G47.33,  J44.9) MARIA M and COPD overlap syndrome (HCC)  Plan: pt does use his cpap machine every night.     (I25.10) Coronary artery calcification seen on CT scan  Plan: Cardio Referral - Internal        As d/w pt and spouse before, will need to see cardio. He denies having any chest pains.     (E55.9) Vitamin D deficiency  Plan: Vitamin D [E]        Check vit D.     (R79.89) High serum parathyroid hormone (PTH)  Plan: PTH, Intact        Repeat PTH and also check vit D.     (J43.9) Pulmonary emphysema, unspecified emphysema type (HCC)  Plan: had ramy quit smoking; he is on O2 supplement; continue his current inhalers.     (I51.9) Left ventricular diastolic  dysfunction  Plan: has chronic diastolic dysfuncion and pt is currently compensated; he only takes his furosemide as needed.     (E66.01,  Z68.41) Morbid obesity with BMI of 40.0-44.9, adult (HCC)  Plan: pt referred already before to our weight clinic. Pt advised the need to lose weight , ff low calorie diet .         No follow-ups on file.

## 2024-05-08 ENCOUNTER — HOSPITAL ENCOUNTER (OUTPATIENT)
Dept: GENERAL RADIOLOGY | Facility: HOSPITAL | Age: 74
Discharge: HOME | End: 2024-05-08
Attending: NURSE PRACTITIONER
Payer: MEDICARE

## 2024-05-08 ENCOUNTER — TELEPHONE (OUTPATIENT)
Dept: INTERVENTIONAL RADIOLOGY/VASCULAR | Facility: HOSPITAL | Age: 74
End: 2024-05-08

## 2024-05-08 DIAGNOSIS — Z98.890 S/P KYPHOPLASTY: Primary | ICD-10-CM

## 2024-05-08 DIAGNOSIS — Z98.890 S/P KYPHOPLASTY: ICD-10-CM

## 2024-05-08 DIAGNOSIS — M54.6 ACUTE THORACIC BACK PAIN, UNSPECIFIED BACK PAIN LATERALITY: ICD-10-CM

## 2024-05-08 DIAGNOSIS — M54.9 INTRACTABLE BACK PAIN: Primary | ICD-10-CM

## 2024-05-08 PROCEDURE — 72072 X-RAY EXAM THORAC SPINE 3VWS: CPT | Performed by: NURSE PRACTITIONER

## 2024-05-08 RX ORDER — HYDROCODONE BITARTRATE AND ACETAMINOPHEN 10; 325 MG/1; MG/1
1 TABLET ORAL EVERY 6 HOURS PRN
Qty: 10 TABLET | Refills: 0 | Status: SHIPPED | OUTPATIENT
Start: 2024-05-08

## 2024-05-08 NOTE — PROGRESS NOTES
Thoracic spine Xray reviewed with Dr Alanis  No acute fracture, stable T7 kyphoplasty   Discussed with patient. Referred to physiatry and home health physical therapy.

## 2024-05-14 ENCOUNTER — PATIENT OUTREACH (OUTPATIENT)
Dept: CASE MANAGEMENT | Age: 74
End: 2024-05-14

## 2024-05-14 DIAGNOSIS — I77.89 ASCENDING AORTA ENLARGEMENT (HCC): Primary | ICD-10-CM

## 2024-05-14 DIAGNOSIS — E66.01 MORBID (SEVERE) OBESITY DUE TO EXCESS CALORIES (HCC): ICD-10-CM

## 2024-05-14 DIAGNOSIS — E78.5 DYSLIPIDEMIA: ICD-10-CM

## 2024-05-14 DIAGNOSIS — I50.32 CHRONIC DIASTOLIC CONGESTIVE HEART FAILURE (HCC): ICD-10-CM

## 2024-05-14 DIAGNOSIS — J43.9 PULMONARY EMPHYSEMA, UNSPECIFIED EMPHYSEMA TYPE (HCC): ICD-10-CM

## 2024-05-14 RX ORDER — ACETAMINOPHEN 325 MG/1
325 TABLET ORAL EVERY 6 HOURS PRN
COMMUNITY

## 2024-05-14 NOTE — PROGRESS NOTES
Spoke to Campbell for [facility].      Updates to patient care team/comments: UTD.   Patient reported changes in medications: together we reviewed and updated.  Pt says he is taking Tylenol as needed for break through pain.    Med Adherence  Comment: pt reports taking all medications as prescribed.      Health Maintenance:   Reviewed with pt    Health Maintenance   Topic Date Due    Colorectal Cancer Screening  Never done- reminded     Zoster Vaccines (1 of 2) Never done- unsure     COVID-19 Vaccine (6 - 2023-24 season) 09/01/2023- unsure     Annual Physical  03/28/2025    PSA  04/18/2026    Influenza Vaccine  Completed    Annual Depression Screening  Completed    Fall Risk Screening (Annual)  Completed    Pneumococcal Vaccine: 65+ Years  Completed    Lung Cancer Screening  Discontinued       Patient updates/concerns:     Pt admitted [facility] for sever back pain kyphoplasty done.  Pt says he had some relieve but continues to have break through pain.  Managing with Norco and Tylenol.  Completed follow up with Dr. Kang.    Physical Therapy and pain management referral per note.  Wife just took a new full time job and she is transportation so he has to hold of on PT.  Managing with over the counter Tylenol which has helped but only for a few hours 2-3.   Tries not taking Norco treats with the Tylenol first.    Referred to Dr. Woods for pain management.  Upon review of pt's chart referred to [facility] information given so he can call and set up.  Pt will check in with his daughter on days she is available for Dr. Woods then call and schedule.     Per Dr. Kang he was referred to weight loss clinic and advised to start low calorie diet.   Reiterated the above.     Using CPAP every night.     Diet  Could be better pt says.  Pt admits he can overeat.  Plans on watching his portions more.  Likes eating till he feels full but understands he is over weight and needs more discipline.       Discussed diet and nutrition  along with the importance of compliance for overall health and well being.   Pt says he will try to watch is portions twice a week for dinner meals.     Goals/Action Plan:    Active goal from previous outreach: weight management      Patient reported progress towards goals: ongoing                - What: diet control            - Where/When/How: pt plans on controlling his portions for dinner at least 3 days a week  Patient Reported Barriers and Concerns: pt says he usually likes to eat till he's full.  Pt says he's kind of a carnivore.                     - Plan for overcoming barriers: he plans on only serving himself one portion and not going back for seconds just  because his stomach isn't full.        Care Managers Interventions:   Encouraged patient:   Self care: Take the time to do the things you love.   Nutrition:  Good nutrition helps us to maintain our weight, fight off infection, and help reduce the risk of developing other chronic issues.   Physical activity:  Physical activity is important to help maintain independence and improve quality of life.     Future Appointments: No future appointments.      Next Care Manager Follow Up Date: in 1 month.     Reason For Follow Up: review progress and or barriers towards patient's goals.     Time Spent This Encounter Total: 20 min medical record review, telephone communication, care plan updates where needed, education, goals, and action plan recreation/update. Provided acknowledgment and validation to patient's concerns.   Monthly Minute Total including today: 20  Physical assessment, complete health history, and need for CCM established by Johny Kang MD.

## 2024-05-25 ENCOUNTER — TELEPHONE (OUTPATIENT)
Dept: INTERNAL MEDICINE CLINIC | Facility: CLINIC | Age: 74
End: 2024-05-25

## 2024-05-29 RX ORDER — CALCITONIN SALMON 200 [IU]/.09ML
1 SPRAY, METERED NASAL DAILY
Qty: 3.7 ML | Refills: 3 | OUTPATIENT
Start: 2024-05-29

## 2024-05-29 NOTE — TELEPHONE ENCOUNTER
Please review; protocol failed/ has no protocol    Requested Prescriptions   Pending Prescriptions Disp Refills    CALCITONIN, SALMON, 200 UNIT/ACT Nasal Solution [Pharmacy Med Name: CALCITONIN 200IU/NASAL SPR 30 DOSES] 3.7 mL 0     Sig: SPRAY ONCE IN 1 NOSTRIL ONCE DAILY, ALTERNATING NOSTRIL DAILY       Osteoporosis Medication Protocol Failed - 5/25/2024  4:06 PM        Failed - DEXA scan within past 2 years        Passed - In person appointment or virtual visit in the past 6 mos or appointment in next 3 mos     Recent Outpatient Visits              4 weeks ago Closed wedge compression [facility] of T7 vertebra with routine healing[facility]    1 month ago Closed wedge compression fracture of T7 vertebra with routine healing, [facility]    2 months ago Medicare annual wellness visit, [facility]    2 months ago Chronic right-sided low back pain [facility] sciatica    [facility]    3 months ago Primary osteoarthritis of both knees    [facility], Levy Olvera MD    Office Visit                      Passed - CMP within the past 12 months        Passed - Calcium level between 8.3 and 10.3     Lab Results   Component Value Date    CA 9.5 04/26/2024               Passed - GFR level greater than 35     GFR Evaluation  EGFRCR: 77 , resulted on 4/26/2024             Recent Outpatient Visits              4 weeks ago Closed wedge compression fracture of T7 vertebra with routine healing, subsequent [facility]    [facility]     1 month ago Closed wedge compression fracture of T7 vertebra with routine healing, subsequent encounter    [facility] [address], Johny Manning MD    Office Visit    2 months ago Medicare annual wellness visit, subsequent    [facility] [address], Johny Manning MD    Office Visit    2 months ago Chronic right-sided low back pain without sciatica    [facility], Johny Manning MD    Office Visit    3 months ago Primary osteoarthritis of both knees    [facility], Levy Olvera MD    Office Visit

## 2024-06-25 ENCOUNTER — PATIENT OUTREACH (OUTPATIENT)
Dept: CASE MANAGEMENT | Age: 74
End: 2024-06-25

## 2024-07-05 ENCOUNTER — MED REC SCAN ONLY (OUTPATIENT)
Dept: INTERNAL MEDICINE CLINIC | Facility: CLINIC | Age: 74
End: 2024-07-05

## 2024-07-05 ENCOUNTER — TELEPHONE (OUTPATIENT)
Dept: INTERNAL MEDICINE CLINIC | Facility: CLINIC | Age: 74
End: 2024-07-05

## 2024-07-05 NOTE — TELEPHONE ENCOUNTER
physician order received from [facility] faxed to [phone] confirmation received.

## 2024-07-14 ENCOUNTER — HOSPITAL ENCOUNTER (INPATIENT)
Facility: HOSPITAL | Age: 74
LOS: 7 days | Discharge: HOME HEALTH | End: 2024-07-21
Attending: EMERGENCY MEDICINE | Admitting: STUDENT IN AN ORGANIZED HEALTH CARE EDUCATION/TRAINING PROGRAM
Payer: MEDICARE

## 2024-07-14 ENCOUNTER — APPOINTMENT (OUTPATIENT)
Dept: GENERAL RADIOLOGY | Facility: HOSPITAL | Age: 74
End: 2024-07-14
Attending: EMERGENCY MEDICINE
Payer: MEDICARE

## 2024-07-14 ENCOUNTER — HOSPITAL ENCOUNTER (INPATIENT)
Facility: HOSPITAL | Age: 74
LOS: 7 days | Discharge: HOME | End: 2024-07-21
Attending: EMERGENCY MEDICINE | Admitting: STUDENT IN AN ORGANIZED HEALTH CARE EDUCATION/TRAINING PROGRAM
Payer: MEDICARE

## 2024-07-14 DIAGNOSIS — J96.21 ACUTE ON CHRONIC RESPIRATORY FAILURE WITH HYPOXIA AND HYPERCAPNIA (HCC): Primary | ICD-10-CM

## 2024-07-14 DIAGNOSIS — E87.0 HYPERNATREMIA: ICD-10-CM

## 2024-07-14 DIAGNOSIS — I50.33 ACUTE ON CHRONIC HEART FAILURE WITH PRESERVED EJECTION FRACTION (HCC): ICD-10-CM

## 2024-07-14 DIAGNOSIS — I50.31 ACUTE HEART FAILURE WITH PRESERVED EJECTION FRACTION (HFPEF) (HCC): ICD-10-CM

## 2024-07-14 DIAGNOSIS — J96.22 ACUTE ON CHRONIC RESPIRATORY FAILURE WITH HYPOXIA AND HYPERCAPNIA (HCC): Primary | ICD-10-CM

## 2024-07-14 LAB
ALBUMIN SERPL-MCNC: 4.4 G/DL (ref 3.2–4.8)
ALBUMIN/GLOB SERPL: 1.6 {RATIO} (ref 1–2)
ALP LIVER SERPL-CCNC: 122 U/L
ALT SERPL-CCNC: 107 U/L
ANION GAP SERPL CALC-SCNC: 2 MMOL/L (ref 0–18)
AST SERPL-CCNC: 43 U/L (ref ?–34)
BASE EXCESS BLD CALC-SCNC: 11.6 MMOL/L (ref ?–2)
BASOPHILS # BLD AUTO: 0.1 X10(3) UL (ref 0–0.2)
BASOPHILS NFR BLD AUTO: 1 %
BILIRUB SERPL-MCNC: 0.4 MG/DL (ref 0.2–1.1)
BILIRUB UR QL: NEGATIVE
BNP SERPL-MCNC: 24 PG/ML
BUN BLD-MCNC: 21 MG/DL (ref 9–23)
BUN/CREAT SERPL: 24.4 (ref 10–20)
CA-I BLD-SCNC: 1.2 MMOL/L (ref 0.95–1.32)
CALCIUM BLD-MCNC: 9.5 MG/DL (ref 8.7–10.4)
CHLORIDE SERPL-SCNC: 108 MMOL/L (ref 98–112)
CLARITY UR: CLEAR
CO2 SERPL-SCNC: 39 MMOL/L (ref 21–32)
COHGB MFR BLD: 2.3 % (ref 0–3)
CREAT BLD-MCNC: 0.86 MG/DL
DEPRECATED RDW RBC AUTO: 53.1 FL (ref 35.1–46.3)
EGFRCR SERPLBLD CKD-EPI 2021: 91 ML/MIN/1.73M2 (ref 60–?)
EOSINOPHIL # BLD AUTO: 0.19 X10(3) UL (ref 0–0.7)
EOSINOPHIL NFR BLD AUTO: 1.9 %
ERYTHROCYTE [DISTWIDTH] IN BLOOD BY AUTOMATED COUNT: 14 % (ref 11–15)
FLUAV + FLUBV RNA SPEC NAA+PROBE: NEGATIVE
FLUAV + FLUBV RNA SPEC NAA+PROBE: NEGATIVE
GLOBULIN PLAS-MCNC: 2.7 G/DL (ref 2–3.5)
GLUCOSE BLD-MCNC: 141 MG/DL (ref 70–99)
GLUCOSE UR-MCNC: NORMAL MG/DL
HCO3 BLDA-SCNC: 33.9 MEQ/L (ref 21–27)
HCT VFR BLD AUTO: 47.5 %
HGB BLD-MCNC: 13.6 G/DL
HGB BLD-MCNC: 13.7 G/DL
HGB UR QL STRIP.AUTO: NEGATIVE
IMM GRANULOCYTES # BLD AUTO: 0.08 X10(3) UL (ref 0–1)
IMM GRANULOCYTES NFR BLD: 0.8 %
KETONES UR-MCNC: NEGATIVE MG/DL
LACTATE BLD-SCNC: 0.6 MMOL/L (ref 0.5–2)
LEUKOCYTE ESTERASE UR QL STRIP.AUTO: NEGATIVE
LYMPHOCYTES # BLD AUTO: 1.78 X10(3) UL (ref 1–4)
LYMPHOCYTES NFR BLD AUTO: 17.4 %
MAGNESIUM SERPL-MCNC: 2.2 MG/DL (ref 1.6–2.6)
MCH RBC QN AUTO: 29.7 PG (ref 26–34)
MCHC RBC AUTO-ENTMCNC: 28.6 G/DL (ref 31–37)
MCV RBC AUTO: 103.7 FL
METHGB MFR BLD: 0.8 % SAT (ref 0.4–1.5)
MODIFIED ALLEN TEST: POSITIVE
MONOCYTES # BLD AUTO: 0.91 X10(3) UL (ref 0.1–1)
MONOCYTES NFR BLD AUTO: 8.9 %
NEUTROPHILS # BLD AUTO: 7.15 X10 (3) UL (ref 1.5–7.7)
NEUTROPHILS # BLD AUTO: 7.15 X10(3) UL (ref 1.5–7.7)
NEUTROPHILS NFR BLD AUTO: 70 %
NITRITE UR QL STRIP.AUTO: NEGATIVE
O2 CT BLD-SCNC: 18.4 VOL% (ref 15–23)
OSMOLALITY SERPL CALC.SUM OF ELEC: 313 MOSM/KG (ref 275–295)
OXYGEN LITERS/MINUTE: 7 L/MIN
PCO2 BLDA: 111 MM HG (ref 35–45)
PH BLDA: 7.21 [PH] (ref 7.35–7.45)
PH UR: 6.5 [PH] (ref 5–8)
PLATELET # BLD AUTO: 193 10(3)UL (ref 150–450)
PO2 BLDA: 94 MM HG (ref 80–100)
POTASSIUM BLD-SCNC: 4.3 MMOL/L (ref 3.6–5.1)
POTASSIUM SERPL-SCNC: 4.8 MMOL/L (ref 3.5–5.1)
PROT SERPL-MCNC: 7.1 G/DL (ref 5.7–8.2)
PROT UR-MCNC: NEGATIVE MG/DL
PUNCTURE CHARGE: YES
RBC # BLD AUTO: 4.58 X10(6)UL
RSV RNA SPEC NAA+PROBE: NEGATIVE
SAO2 % BLDA: 98.1 % (ref 94–100)
SARS-COV-2 RNA RESP QL NAA+PROBE: NOT DETECTED
SODIUM BLD-SCNC: 141 MMOL/L (ref 135–145)
SODIUM SERPL-SCNC: 149 MMOL/L (ref 136–145)
SP GR UR STRIP: 1.02 (ref 1–1.03)
TROPONIN I SERPL HS-MCNC: 6 NG/L
UROBILINOGEN UR STRIP-ACNC: NORMAL
WBC # BLD AUTO: 10.2 X10(3) UL (ref 4–11)

## 2024-07-14 PROCEDURE — 71045 X-RAY EXAM CHEST 1 VIEW: CPT | Performed by: EMERGENCY MEDICINE

## 2024-07-14 PROCEDURE — 99223 1ST HOSP IP/OBS HIGH 75: CPT | Performed by: HOSPITALIST

## 2024-07-14 RX ORDER — ACETAMINOPHEN 325 MG/1
650 TABLET ORAL EVERY 6 HOURS PRN
Status: DISCONTINUED | OUTPATIENT
Start: 2024-07-14 | End: 2024-07-21

## 2024-07-14 RX ORDER — FUROSEMIDE 10 MG/ML
40 INJECTION INTRAMUSCULAR; INTRAVENOUS DAILY
Status: DISCONTINUED | OUTPATIENT
Start: 2024-07-15 | End: 2024-07-15

## 2024-07-14 RX ORDER — HEPARIN SODIUM 5000 [USP'U]/ML
5000 INJECTION, SOLUTION INTRAVENOUS; SUBCUTANEOUS EVERY 12 HOURS SCHEDULED
Status: DISCONTINUED | OUTPATIENT
Start: 2024-07-15 | End: 2024-07-20

## 2024-07-14 RX ORDER — IPRATROPIUM BROMIDE AND ALBUTEROL SULFATE 2.5; .5 MG/3ML; MG/3ML
3 SOLUTION RESPIRATORY (INHALATION) EVERY 6 HOURS PRN
Status: DISCONTINUED | OUTPATIENT
Start: 2024-07-14 | End: 2024-07-21

## 2024-07-14 RX ORDER — MAGNESIUM HYDROXIDE/ALUMINUM HYDROXICE/SIMETHICONE 120; 1200; 1200 MG/30ML; MG/30ML; MG/30ML
30 SUSPENSION ORAL 4 TIMES DAILY PRN
Status: DISCONTINUED | OUTPATIENT
Start: 2024-07-14 | End: 2024-07-21

## 2024-07-14 RX ORDER — IPRATROPIUM BROMIDE AND ALBUTEROL SULFATE 2.5; .5 MG/3ML; MG/3ML
3 SOLUTION RESPIRATORY (INHALATION)
Status: DISCONTINUED | OUTPATIENT
Start: 2024-07-15 | End: 2024-07-17

## 2024-07-14 RX ORDER — METHYLPREDNISOLONE SODIUM SUCCINATE 40 MG/ML
40 INJECTION, POWDER, LYOPHILIZED, FOR SOLUTION INTRAMUSCULAR; INTRAVENOUS EVERY 8 HOURS
Status: DISCONTINUED | OUTPATIENT
Start: 2024-07-15 | End: 2024-07-15

## 2024-07-14 RX ORDER — ATORVASTATIN CALCIUM 20 MG/1
20 TABLET, FILM COATED ORAL NIGHTLY
Status: DISCONTINUED | OUTPATIENT
Start: 2024-07-14 | End: 2024-07-21

## 2024-07-14 RX ORDER — METHYLPREDNISOLONE SODIUM SUCCINATE 40 MG/ML
40 INJECTION, POWDER, LYOPHILIZED, FOR SOLUTION INTRAMUSCULAR; INTRAVENOUS ONCE
Status: COMPLETED | OUTPATIENT
Start: 2024-07-14 | End: 2024-07-14

## 2024-07-14 RX ORDER — PANTOPRAZOLE SODIUM 40 MG/1
40 TABLET, DELAYED RELEASE ORAL
Status: DISCONTINUED | OUTPATIENT
Start: 2024-07-15 | End: 2024-07-21

## 2024-07-14 RX ORDER — HYDRALAZINE HYDROCHLORIDE 20 MG/ML
10 INJECTION INTRAMUSCULAR; INTRAVENOUS EVERY 4 HOURS PRN
Status: DISCONTINUED | OUTPATIENT
Start: 2024-07-14 | End: 2024-07-21

## 2024-07-14 RX ORDER — FUROSEMIDE 10 MG/ML
40 INJECTION INTRAMUSCULAR; INTRAVENOUS ONCE
Status: COMPLETED | OUTPATIENT
Start: 2024-07-14 | End: 2024-07-14

## 2024-07-14 RX ORDER — ONDANSETRON 2 MG/ML
4 INJECTION INTRAMUSCULAR; INTRAVENOUS EVERY 6 HOURS PRN
Status: DISCONTINUED | OUTPATIENT
Start: 2024-07-14 | End: 2024-07-21

## 2024-07-14 RX ORDER — HYDROCODONE BITARTRATE AND ACETAMINOPHEN 10; 325 MG/1; MG/1
1 TABLET ORAL EVERY 6 HOURS PRN
Status: DISCONTINUED | OUTPATIENT
Start: 2024-07-14 | End: 2024-07-15

## 2024-07-15 LAB
ANION GAP SERPL CALC-SCNC: 3 MMOL/L (ref 0–18)
ATRIAL RATE: 96 BPM
BASE EXCESS BLD CALC-SCNC: 17.5 MMOL/L (ref ?–2)
BASOPHILS # BLD AUTO: 0.06 X10(3) UL (ref 0–0.2)
BASOPHILS NFR BLD AUTO: 0.7 %
BLOOD GAS EPAP: 5 CM H2O
BUN BLD-MCNC: 18 MG/DL (ref 9–23)
BUN/CREAT SERPL: 22.8 (ref 10–20)
CALCIUM BLD-MCNC: 8.9 MG/DL (ref 8.7–10.4)
CHLORIDE SERPL-SCNC: 105 MMOL/L (ref 98–112)
CO2 SERPL-SCNC: 39 MMOL/L (ref 21–32)
CREAT BLD-MCNC: 0.79 MG/DL
DEPRECATED RDW RBC AUTO: 52.1 FL (ref 35.1–46.3)
EGFRCR SERPLBLD CKD-EPI 2021: 94 ML/MIN/1.73M2 (ref 60–?)
EOSINOPHIL # BLD AUTO: 0 X10(3) UL (ref 0–0.7)
EOSINOPHIL NFR BLD AUTO: 0 %
ERYTHROCYTE [DISTWIDTH] IN BLOOD BY AUTOMATED COUNT: 13.7 % (ref 11–15)
GLUCOSE BLD-MCNC: 165 MG/DL (ref 70–99)
HCO3 BLDA-SCNC: 38.4 MEQ/L (ref 21–27)
HCT VFR BLD AUTO: 45.5 %
HGB BLD-MCNC: 13.4 G/DL
IMM GRANULOCYTES # BLD AUTO: 0.09 X10(3) UL (ref 0–1)
IMM GRANULOCYTES NFR BLD: 1 %
LYMPHOCYTES # BLD AUTO: 0.74 X10(3) UL (ref 1–4)
LYMPHOCYTES NFR BLD AUTO: 8.4 %
MAGNESIUM SERPL-MCNC: 2 MG/DL (ref 1.6–2.6)
MCH RBC QN AUTO: 30.1 PG (ref 26–34)
MCHC RBC AUTO-ENTMCNC: 29.5 G/DL (ref 31–37)
MCV RBC AUTO: 102.2 FL
MODIFIED ALLEN TEST: POSITIVE
MONOCYTES # BLD AUTO: 0.32 X10(3) UL (ref 0.1–1)
MONOCYTES NFR BLD AUTO: 3.6 %
NEUTROPHILS # BLD AUTO: 7.57 X10 (3) UL (ref 1.5–7.7)
NEUTROPHILS # BLD AUTO: 7.57 X10(3) UL (ref 1.5–7.7)
NEUTROPHILS NFR BLD AUTO: 86.3 %
O2 CT BLD-SCNC: 17.8 VOL% (ref 15–23)
O2/TOTAL GAS SETTING VFR VENT: 40 %
OSMOLALITY SERPL CALC.SUM OF ELEC: 310 MOSM/KG (ref 275–295)
P AXIS: 45 DEGREES
P-R INTERVAL: 142 MS
PCO2 BLDA: 82 MM HG (ref 35–45)
PH BLDA: 7.37 [PH] (ref 7.35–7.45)
PLATELET # BLD AUTO: 162 10(3)UL (ref 150–450)
PO2 BLDA: 59 MM HG (ref 80–100)
POTASSIUM SERPL-SCNC: 4.7 MMOL/L (ref 3.5–5.1)
PUNCTURE CHARGE: YES
Q-T INTERVAL: 360 MS
QRS DURATION: 98 MS
QTC CALCULATION (BEZET): 454 MS
R AXIS: 0 DEGREES
RBC # BLD AUTO: 4.45 X10(6)UL
RESP RATE: 14 BPM
SAO2 % BLDA: 93.5 % (ref 94–100)
SODIUM SERPL-SCNC: 147 MMOL/L (ref 136–145)
SPECIMEN VOL 24H UR: 430 ML
T AXIS: 49 DEGREES
VENTRICULAR RATE: 96 BPM
WBC # BLD AUTO: 8.8 X10(3) UL (ref 4–11)

## 2024-07-15 PROCEDURE — 99223 1ST HOSP IP/OBS HIGH 75: CPT | Performed by: INTERNAL MEDICINE

## 2024-07-15 PROCEDURE — 99233 SBSQ HOSP IP/OBS HIGH 50: CPT | Performed by: INTERNAL MEDICINE

## 2024-07-15 PROCEDURE — 5A09357 ASSISTANCE WITH RESPIRATORY VENTILATION, LESS THAN 24 CONSECUTIVE HOURS, CONTINUOUS POSITIVE AIRWAY PRESSURE: ICD-10-PCS | Performed by: HOSPITALIST

## 2024-07-15 RX ORDER — LORAZEPAM 2 MG/ML
1 INJECTION INTRAMUSCULAR EVERY 4 HOURS PRN
Status: DISCONTINUED | OUTPATIENT
Start: 2024-07-15 | End: 2024-07-15

## 2024-07-15 RX ORDER — SODIUM CHLORIDE 450 MG/100ML
INJECTION, SOLUTION INTRAVENOUS CONTINUOUS
Status: ACTIVE | OUTPATIENT
Start: 2024-07-15 | End: 2024-07-15

## 2024-07-15 RX ORDER — FUROSEMIDE 10 MG/ML
20 INJECTION INTRAMUSCULAR; INTRAVENOUS
Status: DISCONTINUED | OUTPATIENT
Start: 2024-07-15 | End: 2024-07-16

## 2024-07-15 RX ORDER — HALOPERIDOL 5 MG/ML
2 INJECTION INTRAMUSCULAR EVERY 4 HOURS PRN
Status: DISCONTINUED | OUTPATIENT
Start: 2024-07-15 | End: 2024-07-16

## 2024-07-15 RX ORDER — METHYLPREDNISOLONE SODIUM SUCCINATE 40 MG/ML
40 INJECTION, POWDER, LYOPHILIZED, FOR SOLUTION INTRAMUSCULAR; INTRAVENOUS EVERY 12 HOURS
Status: DISCONTINUED | OUTPATIENT
Start: 2024-07-15 | End: 2024-07-18

## 2024-07-15 NOTE — HOME CARE LIAISON
This pt is current with [facility] for PT services. Pt will need a F2F entered and Quality data delivered by [employer]. Notified [employer]

## 2024-07-15 NOTE — PLAN OF CARE
Pt weaned from bipap, worked with PT/oT and up in chair for meals. Pt expresses feeling like he is hallucinating. Per pulm MD to put pt back on bipap. Restraints discontinued this afternoon. IVF started, pt to have echo. Frequent rounding by staff, daughter at bedside this evening      Problem: Safety Risk - Non-Violent Restraints  Goal: Patient will remain free from self-harm  Description: INTERVENTIONS:  - Apply the least restrictive restraint to prevent harm  - Notify patient and family of reasons restraints applied  - Assess for any contributing factors to confusion (electrolyte disturbances, delirium, medications)  - Discontinue any unnecessary medical devices as soon as possible  - Assess the patient's physical comfort, circulation, skin condition, hydration, nutrition and elimination needs   - Reorient and redirection as needed  - Assess for the need to continue restraints  Outcome: Progressing     Problem: SAFETY ADULT - FALL  Goal: Free from fall injury  Description: INTERVENTIONS:  - Assess pt frequently for physical needs  - Identify cognitive and physical deficits and behaviors that affect risk of falls.  - [place] fall precautions as indicated by assessment.  - Educate pt/family on patient safety including physical limitations  - Instruct pt to call for assistance with activity based on assessment  - Modify environment to reduce risk of injury  - Provide assistive devices as appropriate  - Consider OT/PT consult to assist with strengthening/mobility  - Encourage toileting schedule  Outcome: Progressing     Problem: CARDIOVASCULAR - ADULT  Goal: Maintains optimal cardiac output and hemodynamic stability  Description: INTERVENTIONS:  - Monitor vital signs, rhythm, and trends  - Monitor for bleeding, hypotension and signs of decreased cardiac output  - Evaluate effectiveness of vasoactive medications to optimize hemodynamic stability  - Monitor arterial and/or venous puncture sites for bleeding and/or  hematoma  - Assess quality of pulses, skin color and temperature  - Assess for signs of decreased coronary artery perfusion - ex. Angina  - Evaluate fluid balance, assess for edema, trend weights  Outcome: Progressing  Goal: Absence of cardiac arrhythmias or at baseline  Description: INTERVENTIONS:  - Continuous cardiac monitoring, monitor vital signs, obtain 12 lead EKG if indicated  - Evaluate effectiveness of antiarrhythmic and heart rate control medications as ordered  - Initiate emergency measures for life threatening arrhythmias  - Monitor electrolytes and administer replacement therapy as ordered  Outcome: Progressing     Problem: RESPIRATORY - ADULT  Goal: Achieves optimal ventilation and oxygenation  Description: INTERVENTIONS:  - Assess for changes in respiratory status  - Assess for changes in mentation and behavior  - Position to facilitate oxygenation and minimize respiratory effort  - Oxygen supplementation based on oxygen saturation or ABGs  - Provide Smoking Cessation handout, if applicable  - Encourage broncho-pulmonary hygiene including cough, deep breathe, Incentive Spirometry  - Assess the need for suctioning and perform as needed  - Assess and instruct to report SOB or any respiratory difficulty  - Respiratory Therapy support as indicated  - Manage/alleviate anxiety  - Monitor for signs/symptoms of CO2 retention  Outcome: Progressing     Problem: GENITOURINARY - ADULT  Goal: Absence of urinary retention  Description: INTERVENTIONS:  - Assess patient’s ability to void and empty bladder  - Monitor intake/output and perform bladder scan as needed  - Follow urinary retention protocol/standard of care  - Consider collaborating with pharmacy to review patient's medication profile  - Implement strategies to promote bladder emptying  Outcome: Progressing     Problem: METABOLIC/FLUID AND ELECTROLYTES - ADULT  Goal: Electrolytes maintained within normal limits  Description: INTERVENTIONS:  - Monitor labs  and rhythm and assess patient for signs and symptoms of electrolyte imbalances  - Administer electrolyte replacement as ordered  - Monitor response to electrolyte replacements, including rhythm and repeat lab results as appropriate  - Fluid restriction as ordered  - Instruct patient on fluid and nutrition restrictions as appropriate  Outcome: Progressing  Goal: Hemodynamic stability and optimal renal function maintained  Description: INTERVENTIONS:  - Monitor labs and assess for signs and symptoms of volume excess or deficit  - Monitor intake, output and patient weight  - Monitor urine specific gravity, serum osmolarity and serum sodium as indicated or ordered  - Monitor response to interventions for patient's volume status, including labs, urine output, blood pressure (other measures as available)  - Encourage oral intake as appropriate  - Instruct patient on fluid and nutrition restrictions as appropriate  Outcome: Progressing     Problem: SKIN/TISSUE INTEGRITY - ADULT  Goal: Skin integrity remains intact  Description: INTERVENTIONS  - Assess and document risk factors for pressure ulcer development  - Assess and document skin integrity  - Monitor for areas of redness and/or skin breakdown  - Initiate interventions, skin care algorithm/standards of care as needed  Outcome: Progressing  Goal: Oral mucous membranes remain intact  Description: INTERVENTIONS  - Assess oral mucosa and hygiene practices  - Implement preventative oral hygiene regimen  - Implement oral medicated treatments as ordered  Outcome: Progressing     Problem: Impaired Swallowing  Goal: Minimize aspiration risk  Description: Interventions:  - Patient should be alert and upright for all feedings (90 degrees preferred)  - Offer food and liquids at a slow rate  - No straws  - Encourage small bites of food and small sips of liquid  - Offer pills one at a time, crush or deliver with applesauce as needed  - Discontinue feeding and notify MD (or speech  pathologist) if coughing or persistent throat clearing or wet/gurgly vocal quality is noted  Outcome: Progressing     Problem: Patient Centered Care  Goal: Patient preferences are identified and integrated in the patient's plan of care  Description: Interventions:  - What would you like us to know as we care for you? From home with wife. Likes to watch daughter play softball  - Provide timely, complete, and accurate information to patient/family  - Incorporate patient and family knowledge, values, beliefs, and cultural backgrounds into the planning and delivery of care  - Encourage patient/family to participate in care and decision-making at the level they choose  - Honor patient and family perspectives and choices  Outcome: Progressing     Problem: Patient/Family Goals  Goal: Patient/Family Long Term Goal  Description: Patient's Long Term Goal:     Interventions:  - See additional Care Plan goals for specific interventions  Outcome: Progressing  Goal: Patient/Family Short Term Goal  Description: Patient's Short Term Goal:     Interventions:   - See additional Care Plan goals for specific interventions  Outcome: Progressing     Problem: NEUROLOGICAL - ADULT  Goal: Achieves stable or improved neurological status  Description: INTERVENTIONS  - Assess for and report changes in neurological status  - Initiate measures to prevent increased intracranial pressure  - Maintain blood pressure and fluid volume within ordered parameters to optimize cerebral perfusion and minimize risk of hemorrhage  - Monitor temperature, glucose, and sodium. Initiate appropriate interventions as ordered  Outcome: Not Progressing     Problem: Impaired Cognition  Goal: Patient will exhibit improved attention, thought processing and/or memory  Description: Interventions:  - Minimize distractions in the room when full attention is required  Outcome: Not Progressing

## 2024-07-15 NOTE — PROGRESS NOTES
Progress Note     Campbell Rao Patient Status:  Inpatient    1950 MRN [id number]   Location [facility]5W Attending Nannette Paul MD   Hosp Day # 1 PCP Johny Kang MD     Chief Complaint: Acute on chronic respiratory failure with hypoxia and hypercapnia    Subjective:   S: Patient confused, lethargic, tolerating bipap      No other acute complaints or other nursing concerns or overnight events      Review of Systems:   rohan    Objective:   Vital signs:  Temp:  [97.7 °F (36.5 °C)-98.8 °F (37.1 °C)] 98.4 °F (36.9 °C)  Pulse:  [] 104  Resp:  [11-29] 29  BP: (114-148)/(65-90) 140/89  SpO2:  [93 %-100 %] 93 %  FiO2 (%):  [30 %-40 %] 40 %    Wt Readings from Last 6 Encounters:   07/15/24 (!) 315 lb (142.9 kg)   24 298 lb 14.4 oz (135.6 kg)   24 (!) 307 lb (139.3 kg)   24 (!) 309 lb 14.4 oz (140.6 kg)   24 (!) 307 lb 4.8 oz (139.4 kg)   24 (!) 304 lb (137.9 kg)         Physical Exam:    General: No acute distress. On bipap, lethargic     , morbidly obese  Respiratory: Clear to auscultation bilaterally. No wheezes. No rhonchi.  Cardiovascular: S1, S2. Regular rate and rhythm. No murmurs, rubs or gallops.   Abdomen: Soft, nontender, nondistended.  Positive bowel sounds. No rebound or guarding.  Neurologic: No focal neurological deficits.   Musculoskeletal: Moves all extremities.  Extremities: No edema.    Results:   Diagnostic Data:      Labs:    Labs Last 24 Hours:   BMP     CBC    Other     Na 147 Cl 105 BUN 18 Glu 165   Hb 13.4   PTT - Procal -   K 4.7 CO2 39.0 Cr 0.79   WBC 8.8 >< .0  INR - CRP -   Renal Lytes Endo    Hct 45.5   Trop - D dim -   eGFR - Ca 8.9 POC Gluc  -    LFT   pBNP - Lactic -   eGFR AA - PO4 - A1c -   AST 43 APk 122 Prot 7.1  LDL -     Mg 2.0 TSH -    T jake 0.4 Alb 4.4        COVID-19 Lab Results    COVID-19  Lab Results   Component Value Date    COVID19 Not Detected 2024    COVID19 Not Detected 10/25/2023     COVID19 Not Detected 01/07/2023       Pro-Calcitonin  No results for input(s): \"PCT\" in the last 168 hours.    Cardiac  No results for input(s): \"TROP\", \"PBNP\" in the last 168 hours.    Creatinine Kinase  No results for input(s): \"CK\" in the last 168 hours.    Inflammatory Markers  No results for input(s): \"CRP\", \"JAMES\", \"LDH\", \"DDIMER\" in the last 168 hours.    Imaging: Imaging data reviewed in Epic.    Medications:    albuterol  10 mg Nebulization Once    atorvastatin  20 mg Oral Nightly    fluticasone-umeclidin-vilant  1 puff Inhalation Daily    heparin  5,000 Units Subcutaneous 2 times per day    furosemide  40 mg Intravenous Daily    pantoprazole  40 mg Oral QAM AC    methylPREDNISolone  40 mg Intravenous Q8H    ipratropium-albuterol  3 mL Nebulization 6 times per day       Assessment & Plan:   ASSESSMENT / PLAN:     Acute on chronic respiratory failure with hypoxia and hypercapnia  Pulmonary consulted, patient currently on BiPAP we will continue the same for now.    Wean as tolerated  Cxr ? Edema vs multifocal pneumonia  IV steroids, IV Abx     COPD exacerbation/ ho MARIA M  Patient started on DuoNebs every 4 hours and as needed along with Solu-Medrol 40 mg IV every 8 hours.  As stated previously pulmonary on board, resume home inhalers.  Currently being supported with BiPAP.     Acute on chronic diastolic heart failure  Cardiology consulted, monitor I's and O's and daily weights.  Patient started on Lasix 40 mg IV daily will continue the same for now.     Acute encephalopathy  Likely secondary to CO2 retention, patient given Zyprexa earlier, will continue to monitor attempt to redirect.     Hyperlipidemia  Continue statin.     Severe obesity with obstructive sleep apnea  BMI 36.  Patient counseled regarding diet and exercise once current issues resolve, continue with BiPAP at night for now     Prophylaxis  Subcutaneous heparin     CODE STATUS  Full     Primary care physician  Johny Kang,  MD      Coordinated care with providers and counseling re: treatment plan and workup     Nannette Paul MD    Supplementary Documentation:         **Certification      PHYSICIAN Certification of Need for Inpatient Hospitalization - Initial Certification    Patient will require inpatient services that will reasonably be expected to span two midnight's based on the clinical documentation in H+P.   Based on patients current state of illness, I anticipate that, after discharge, patient will require TBD.

## 2024-07-15 NOTE — ED QUICK NOTES
Orders for admission, patient is aware of plan and ready to go upstairs. Any questions, please call ED KATHRYN Swain at extension [phone].     Patient Covid vaccination status: Fully vaccinated     COVID Test Ordered in ED: SARS-CoV-2/Flu A and B/RSV by PCR (GeneXpert)    COVID Suspicion at Admission: N/A    Running Infusions:  None    Mental Status/LOC at time of transport: a.ox4    Other pertinent information: 4L NC baseline   CIWA score: N/A   NIH score:  N/A

## 2024-07-15 NOTE — PLAN OF CARE
Problem: Safety Risk - Non-Violent Restraints  Goal: Patient will remain free from self-harm  Description: INTERVENTIONS:  - Apply the least restrictive restraint to prevent harm  - Notify patient and family of reasons restraints applied  - Assess for any contributing factors to confusion (electrolyte disturbances, delirium, medications)  - Discontinue any unnecessary medical devices as soon as possible  - Assess the patient's physical comfort, circulation, skin condition, hydration, nutrition and elimination needs   - Reorient and redirection as needed  - Assess for the need to continue restraints  Outcome: Progressing     Problem: SAFETY ADULT - FALL  Goal: Free from fall injury  Description: INTERVENTIONS:  - Assess pt frequently for physical needs  - Identify cognitive and physical deficits and behaviors that affect risk of falls.  - [place] fall precautions as indicated by assessment.  - Educate pt/family on patient safety including physical limitations  - Instruct pt to call for assistance with activity based on assessment  - Modify environment to reduce risk of injury  - Provide assistive devices as appropriate  - Consider OT/PT consult to assist with strengthening/mobility  - Encourage toileting schedule  Outcome: Progressing     Problem: CARDIOVASCULAR - ADULT  Goal: Maintains optimal cardiac output and hemodynamic stability  Description: INTERVENTIONS:  - Monitor vital signs, rhythm, and trends  - Monitor for bleeding, hypotension and signs of decreased cardiac output  - Evaluate effectiveness of vasoactive medications to optimize hemodynamic stability  - Monitor arterial and/or venous puncture sites for bleeding and/or hematoma  - Assess quality of pulses, skin color and temperature  - Assess for signs of decreased coronary artery perfusion - ex. Angina  - Evaluate fluid balance, assess for edema, trend weights  Outcome: Progressing  Goal: Absence of cardiac arrhythmias or at baseline  Description:  INTERVENTIONS:  - Continuous cardiac monitoring, monitor vital signs, obtain 12 lead EKG if indicated  - Evaluate effectiveness of antiarrhythmic and heart rate control medications as ordered  - Initiate emergency measures for life threatening arrhythmias  - Monitor electrolytes and administer replacement therapy as ordered  Outcome: Progressing     Problem: RESPIRATORY - ADULT  Goal: Achieves optimal ventilation and oxygenation  Description: INTERVENTIONS:  - Assess for changes in respiratory status  - Assess for changes in mentation and behavior  - Position to facilitate oxygenation and minimize respiratory effort  - Oxygen supplementation based on oxygen saturation or ABGs  - Provide Smoking Cessation handout, if applicable  - Encourage broncho-pulmonary hygiene including cough, deep breathe, Incentive Spirometry  - Assess the need for suctioning and perform as needed  - Assess and instruct to report SOB or any respiratory difficulty  - Respiratory Therapy support as indicated  - Manage/alleviate anxiety  - Monitor for signs/symptoms of CO2 retention  Outcome: Progressing     Problem: GENITOURINARY - ADULT  Goal: Absence of urinary retention  Description: INTERVENTIONS:  - Assess patient’s ability to void and empty bladder  - Monitor intake/output and perform bladder scan as needed  - Follow urinary retention protocol/standard of care  - Consider collaborating with pharmacy to review patient's medication profile  - Implement strategies to promote bladder emptying  Outcome: Progressing     Problem: METABOLIC/FLUID AND ELECTROLYTES - ADULT  Goal: Electrolytes maintained within normal limits  Description: INTERVENTIONS:  - Monitor labs and rhythm and assess patient for signs and symptoms of electrolyte imbalances  - Administer electrolyte replacement as ordered  - Monitor response to electrolyte replacements, including rhythm and repeat lab results as appropriate  - Fluid restriction as ordered  - Instruct patient  on fluid and nutrition restrictions as appropriate  Outcome: Progressing  Goal: Hemodynamic stability and optimal renal function maintained  Description: INTERVENTIONS:  - Monitor labs and assess for signs and symptoms of volume excess or deficit  - Monitor intake, output and patient weight  - Monitor urine specific gravity, serum osmolarity and serum sodium as indicated or ordered  - Monitor response to interventions for patient's volume status, including labs, urine output, blood pressure (other measures as available)  - Encourage oral intake as appropriate  - Instruct patient on fluid and nutrition restrictions as appropriate  Outcome: Progressing     Problem: SKIN/TISSUE INTEGRITY - ADULT  Goal: Skin integrity remains intact  Description: INTERVENTIONS  - Assess and document risk factors for pressure ulcer development  - Assess and document skin integrity  - Monitor for areas of redness and/or skin breakdown  - Initiate interventions, skin care algorithm/standards of care as needed  Outcome: Progressing  Goal: Oral mucous membranes remain intact  Description: INTERVENTIONS  - Assess oral mucosa and hygiene practices  - Implement preventative oral hygiene regimen  - Implement oral medicated treatments as ordered  Outcome: Progressing     Problem: NEUROLOGICAL - ADULT  Goal: Achieves stable or improved neurological status  Description: INTERVENTIONS  - Assess for and report changes in neurological status  - Initiate measures to prevent increased intracranial pressure  - Maintain blood pressure and fluid volume within ordered parameters to optimize cerebral perfusion and minimize risk of hemorrhage  - Monitor temperature, glucose, and sodium. Initiate appropriate interventions as ordered  Outcome: Progressing     Problem: Impaired Swallowing  Goal: Minimize aspiration risk  Description: Interventions:  - Patient should be alert and upright for all feedings (90 degrees preferred)  - Offer food and liquids at a slow  rate  - No straws  - Encourage small bites of food and small sips of liquid  - Offer pills one at a time, crush or deliver with applesauce as needed  - Discontinue feeding and notify MD (or speech pathologist) if coughing or persistent throat clearing or wet/gurgly vocal quality is noted  Outcome: Progressing     Problem: Impaired Cognition  Goal: Patient will exhibit improved attention, thought processing and/or memory  Description: Interventions:    Outcome: Progressing

## 2024-07-15 NOTE — CONSULTS
Cardiology Consult Note    Campbell Rao Patient Status:  Inpatient    1950 MRN [id number]   Location [facility]5W Attending Nannette Paul MD   Hosp Day # 1 PCP Johny Kang MD     HPI.  Campbell Rao is a 74 year old male with a history of CAD, COPD, HFpEF.  Hypertension, hyperlipidemia, tobacco abuse, sleep apnea who presented to the hospital yesterday evening with shortness of breath.  Patient was found to have severe hypoxemia with SpO2 60% on 4 L nasal cannula which improved with introduction of nonrebreather.  Triage vitals pertinent for respiratory rate 28, SpO2 100% on nonrebreather, blood pressure 133/87 mmHg, pulse 95.  Initial ABG 7.2 / 3.9.  CMP with sodium 149, creatinine  0.86, CO2 39, AST 43, .  Chest x-ray with pulmonary edema versus multifocal pneumonia.  Patient was admitted with diagnosis of acute hypoxic and hypercapnic respiratory failure to the telemetry/pulmonology  floor on BiPAP.  Patient states that he has not take his diuretics as often as he should-it is an inconvenience for him to take the p.m. diuretic due to frequent urination at nighttime and oftentimes will skip the morning dose if he needs to leave the house for any reason.  BiPAP compliance and salt/fluid restriction may also be an issue according to family.      Prior cardiac workup  Echocardiogram 2024  1. Left ventricle: The cavity size was normal. Wall thickness was at the      upper limits of normal. Systolic function was normal. The estimated      ejection fraction was 55-60%, by biplane method of disks. No diagnostic      evidence for regional wall motion abnormalities. Doppler parameters are      consistent with abnormal left ventricular relaxation - grade 1 diastolic      dysfunction.   2. Right ventricle: The cavity size was increased. Systolic function was      normal.   3. Ascending aorta: The ascending aorta was 3.7cm diameter          --------------------------------------------------------------------------------------------------------------------------------  ROS 10 systems reviewed, pertinent findings above.  ROS    History:  Past Medical History:    Atherosclerosis of coronary artery    coronary calcification on ct chest    Back problem    Cataract    Chronic respiratory failure (HCC)    Congestive heart disease (HCC)    COPD (HCC)    freq exacerbations. ? 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    Osteoarthritis    Pneumonia    Pulmonary nodules    Sleep apnea    Tobacco abuse     Past Surgical History:   Procedure Laterality Date    Appendectomy      Appendectomy      Cataract Bilateral     2/2024, 3/2024    Electrocardiogram, complete  01/30/2012    scanned to media tab     Family History   Problem Relation Age of Onset    Cancer Father         lung cancer    Cancer Mother         lung cancer    No Known Problems Daughter     No Known Problems Son     No Known Problems Maternal Grandmother     No Known Problems Maternal Grandfather     No Known Problems Paternal Grandmother     No Known Problems Paternal Grandfather     No Known Problems Sister     Diabetes Brother     Lipids Brother         hyperlipidemia    Hypertension Brother     No Known Problems Other       reports that he quit smoking about 2 years ago. His smoking use included cigarettes. He started smoking about 42 years ago. He has a 80 pack-year smoking history. He has been exposed to tobacco smoke. He has never used smokeless tobacco. He reports that he does not currently use alcohol. He reports that he does not currently use drugs.    Objective:   Temp: 97.8 °F (36.6 °C)  Pulse: 59  Resp: 27  BP: 134/78  FiO2 (%): 40  %    Intake/Output:     Intake/Output Summary (Last 24 hours) at 7/15/2024 1225  Last data filed at 7/15/2024 1100  Gross per 24 hour   Intake 0 ml   Output 4200 ml   Net -4200 ml       Physical Exam:     General: Alert and oriented x 3  HEENT: Normocephalic, anicteric sclera, neck supple.  Neck: No JVD, carotids 2+, no bruits.  Cardiac: Regular rate and rhythm. S1, S2 normal. No murmur, pericardial rub, S3.  Lungs: Clear without wheezes, rales, rhonchi or dullness.  Normal excursions and effort.  Abdomen: Soft, non-tender. BS-present.  Extremities: Without clubbing, cyanosis.  Peripheral pulses are 2+.  +2 lower extreme edema, stasis dermatitis  Neurologic: Non-focal  Skin: Warm and dry.       Assessment:    Hypercapnic and  hypoxic respiratory failure  HFpEF  COPD with exacerbation  Sleep apnea on BiPAP  Tobacco abuse history      Plan:  74-year-old male with above history presenting with worsening shortness of breath over 3 days.  Patient admits to noncompliance with home diuretic, also  may not be adhering to salt/fluid intake restrictions.  Patient has diuresed well with single dose of IV Lasix, recommend continuing at lower dose Lasix 20 mg IV twice daily  Updated echo ordered  Will need formal heart failure education with regards to daily weights, fluid and salt restriction and medication compliance.    Thank you for allowing me to take part in the care of Campbell Rao. Please call with any questions of concerns.      Level of care: C5    Dr. Pawan Florian,   July 15, 2024  12:25 PM

## 2024-07-15 NOTE — CM/SW NOTE
07/15/24 1400   CM/SW Referral Data   Referral Source    Reason for Referral Discharge planning   Informant Patient   Medical Hx   Does patient have an established PCP? Yes  (Johny Kang MD)   Significant Past Medical/Mental Health Hx See history and physical   Patient Info   Patient's Current Mental Status at Time of Assessment Alert;Oriented   Patient's Home Environment House   Patient lives with Spouse/Significant other   Patient Status Prior to Admission   Independent with ADLs and Mobility No   Pt. requires assistance with Ambulating   Services in place prior to admission DME/Supplies at home;Home Health Care   Home Health Provider Info [facility]   DME Provider/Supplier [facility]   Type of DME/Supplies Rollator Walker;Home Oxygen;CPAP   Discharge Needs   Anticipated D/C needs To be determined     CM met with patient at bedside to discuss dc planning.  Above assessment completed.  Address on file and PCP verified.    Patient lives at home with his wife Yasmin.  Per patient, he uses a rollator walker when traveling long distances but at baseline requires no assistive device for ambulation.  Patient is current with [facility] for home oxygen - baseline 4L nasal cannula.  Per patient, he also owns a CPAP but \"is bad about using it.\"     [facility]   P:[phone]  F:[phone]    Patient is current with [facility] for PT only.  CM entered new face to face including PT and Home RN.  Resumption of care order placed for [facility] - new face to face and JACKIE order uploaded to [facility] referral via [facility].    [facility]  P:[phone]  F:[phone]    PT/OT evaluations pending.    / to remain available for support and/or discharge planning.     Plan: Return home with [facility] and [facility] home oxygen, pending PT/OT evaluations, medical clearance    Sandy  Kirit RN, BSN  Nurse   [phone]

## 2024-07-15 NOTE — PHYSICAL THERAPY NOTE
Physical and Occupational Therapy Defer Note    Orders received via functional mobility screen and chart reviewed. Attempted to see patient for Physical and Occupational Therapy services. Patient is not appropriate for therapy at this time secondary to patient was confused and combative overnight, is now on restraints and remains on BiPAP. Decision made to defer therapy following a discussion with the RN.      07/15/24 0851   VISIT TYPE   PT Inpatient Visit Type (Documentation Required) Attempted Evaluation  (BiPAP, restrained)     Will re-schedule visit as patient is medically able to participate.    Krystal Fuentes, PT, DPT  [facility]  Rehab Services - Physical Therapy  [id number]    Rufina Wild, Occupational Therapist, MS, OTR/L

## 2024-07-15 NOTE — CONSULTS
[facility]  part of [facility]    Report of Consultation    Campbell Rao Patient Status:  Inpatient    1950 MRN [id number]   Location [facility]5W Attending Nannette Paul MD   Hosp Day # 1 PCP Johny Kang MD     Date of Admission:  2024  Date of Consult: 7/15/2024    Reason for Consultation:   Consults  Respiratory failure      HPI:     Chief Complaint   Patient presents with    Difficulty Breathing     HPI    74-year-old male with a chronic respiratory failure on 4 L of oxygen with copd and h/p MARIA M  and supposed to use CPAP .  Patient presented with increased confusion and lethargy and lower O2 sat and his ABGs showed pH 7.21 with pCO2 111 and he was placed on BiPAP and repeat ABGs pH 7.37 with pCO2 of 82  He is comfortable on BiPAP and is sleepy but arousable moving extremities and he is not providing good history with no reported fever or chills or vomiting otherwise difficult to obtain any further review of system  Chart and notes and labs and records all reviewed    History     Past Medical History:    Atherosclerosis of coronary artery    coronary calcification on ct chest    Back problem    Cataract    Chronic respiratory failure (HCC)    Congestive heart disease (HCC)    COPD (HCC)    freq exacerbations. ? 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    Osteoarthritis    Pneumonia    Pulmonary nodules    Sleep apnea    Tobacco abuse     Past Surgical History:   Procedure Laterality Date    Appendectomy      Appendectomy      Cataract Bilateral     2024, 3/2024    Electrocardiogram, complete  2012    scanned to media tab     Family History   Problem Relation Age of Onset    Cancer Father         lung  cancer    Cancer Mother         lung cancer    No Known Problems Daughter     No Known Problems Son     No Known Problems Maternal Grandmother     No Known Problems Maternal Grandfather     No Known Problems Paternal Grandmother     No Known Problems Paternal Grandfather     No Known Problems Sister     Diabetes Brother     Lipids Brother         hyperlipidemia    Hypertension Brother     No Known Problems Other      Social History:  Social History     Socioeconomic History    Marital status:    Tobacco Use    Smoking status: Former     Current packs/day: 0.00     Average packs/day: 2.0 packs/day for 40.0 years (80.0 ttl pk-yrs)     Types: Cigarettes     Start date: 1981     Quit date: 2021     Years since quittin.7     Passive exposure: Past    Smokeless tobacco: Never   Vaping Use    Vaping status: Never Used   Substance and Sexual Activity    Alcohol use: Not Currently     Alcohol/week: 0.0 standard drinks of alcohol     Comment: socially    Drug use: Not Currently   Other Topics Concern    Caffeine Concern Yes     Comment: coffee, >32 oz daily     Social Determinants of Health     Financial Resource Strain: Low Risk  (2024)    Financial Resource Strain     Difficulty of Paying Living Expenses: Not very hard     Med Affordability: No   Food Insecurity: No Food Insecurity (7/15/2024)    Food Insecurity     Food Insecurity: Never true   Transportation Needs: No Transportation Needs (7/15/2024)    Transportation Needs     Lack of Transportation: No   Physical Activity: Inactive (2024)    Exercise Vital Sign     Days of Exercise per Week: 0 days     Minutes of Exercise per Session: 0 min   Stress: No Stress Concern Present (2024)    Stress     Feeling of Stress : No   Social Connections: Somewhat Isolated (2024)    Social Connections     Frequency of Socialization with Friends and Family: 1   Housing Stability: Low Risk  (7/15/2024)    Housing Stability     Housing Instability: No      Allergies/Medications:   Allergies: No Known Allergies  Medications Prior to Admission   Medication Sig    torsemide 20 MG Oral Tab Take 1 tablet (20 mg total) by mouth 2 (two) times daily.    fluticasone-umeclidin-vilant (TRELEGY ELLIPTA) 100-62.5-25 MCG/ACT Inhalation Aerosol Powder, Breath Activated Inhale 1 puff into the lungs daily.    atorvastatin 20 MG Oral Tab Take 1 tablet (20 mg total) by mouth nightly.    ipratropium-albuterol 0.5-2.5 (3) MG/3ML Inhalation Solution Take 3 mL by nebulization in the morning and 3 mL at noon and 3 mL in the evening. And as needed.    potassium chloride 10 MEQ Oral Tab CR Take one tab (10 meq) po/day  alternating with 2 tabs (20meq)/day    acetaminophen (TYLENOL) 325 MG Oral Tab Take 1 tablet (325 mg total) by mouth every 6 (six) hours as needed for Pain.    ergocalciferol (DRISDOL) 1.25 MG (10797 UT) Oral Cap Take 1 capsule (50,000 Units total) by mouth once a week for 12 doses.    HYDROcodone-acetaminophen (NORCO)  MG Oral Tab Take 1 tablet by mouth every 6 (six) hours as needed for Pain.    prednisoLONE 1 % Ophthalmic Suspension Place into the left eye.    albuterol 108 (90 Base) MCG/ACT Inhalation Aero Soln Inhale 2 puffs into the lungs every 4 to 6 hours as needed for Wheezing. inhale 2 puff by inhalation route  every 4 - 6 hours as needed    fluticasone propionate 50 MCG/ACT Nasal Suspension 2 sprays by Nasal route daily.    Urea 40 % External Cream Apply 1 Application topically daily.    Saline Nasal Spray 0.65 % Nasal Solution 1 spray by Nasal route every 3 (three) hours as needed for congestion.       Review of Systems:     Unable to perform ROS      Physical Exam:   Vital Signs:   height is 5' 7\" (1.702 m) and weight is 315 lb (142.9 kg) (abnormal). His axillary temperature is 97.8 °F (36.6 °C). His blood pressure is 134/78 and his pulse is 59. His respiration is 27 (abnormal) and oxygen saturation is 96%.   Physical Exam  Constitutional:       General: He  is not in acute distress.     Appearance: He is obese. He is ill-appearing.   HENT:      Head: Atraumatic.      Nose: Nose normal.      Mouth/Throat:      Mouth: Mucous membranes are dry.   Eyes:      General: No scleral icterus.  Cardiovascular:      Rate and Rhythm: Normal rate.      Heart sounds:      No gallop.   Pulmonary:      Breath sounds: No rhonchi or rales.      Comments: Distant breath sounds bilaterally  Prolonged expiration  No wheezes or rales or rhonchi  Abdominal:      General: Abdomen is flat. Bowel sounds are normal.      Palpations: Abdomen is soft.      Tenderness: There is no guarding.   Musculoskeletal:      Cervical back: Neck supple. No rigidity.      Right lower leg: Edema present.      Left lower leg: Edema present.      Comments: Chronic skin changes on lower extremities   Skin:     General: Skin is dry.   Neurological:      Comments: Sleepy but arousable moving extremities and following some commands otherwise confused and currently on BiPAP         Results:     Lab Results   Component Value Date    WBC 8.8 07/15/2024    HGB 13.4 07/15/2024    HCT 45.5 07/15/2024    .0 07/15/2024    CREATSERUM 0.79 07/15/2024    BUN 18 07/15/2024     (H) 07/15/2024    K 4.7 07/15/2024     07/15/2024    CO2 39.0 (H) 07/15/2024     (H) 07/15/2024    CA 8.9 07/15/2024    ALB 4.4 07/14/2024    ALKPHO 122 (H) 07/14/2024    BILT 0.4 07/14/2024    TP 7.1 07/14/2024    AST 43 (H) 07/14/2024     (H) 07/14/2024    TSH 2.777 04/18/2024    GGT 38 04/18/2024    DDIMER 0.40 10/18/2016    MG 2.0 07/15/2024    PHOS 4.1 04/26/2024    TROP <0.045 04/10/2021    TROPHS 6 07/14/2024    B12 765 04/27/2021     XR CHEST AP PORTABLE  (CPT=71045)    Result Date: 7/15/2024  CONCLUSION:   Bilateral upper and lower lobe airspace opacities.  Findings may be secondary to pulmonary edema or multifocal pneumonia (either from bacterial or atypical viral etiology).  Continued follow-up imaging recommended  to ensure resolution.  Preliminary report was submitted by the [facility] teleradiologist and there are no significant discrepancies.     Dictated by (CST): Ranulfo Rollins MD on 7/15/2024 at 8:19 AM     Finalized by (CST): Ranulfo Rollins MD on 7/15/2024 at 8:22 AM         EKG 12 Lead    Result Date: 7/15/2024  Normal sinus rhythm Incomplete right bundle branch block Borderline ECG When compared with ECG of 25-APR-2024 01:10, Incomplete right bundle branch block is now Present Non-specific change in ST segment in Inferior leads T wave inversion no longer evident in Inferior leads T wave inversion no longer evident in Lateral leads QT has shortened     Impression:      1-acute on chronic hypoxemic and hypercapnic respiratory failure  Decompensated overlap syndrome with COPD and MARIA M    BiPAP during sleep and as needed during daytime  O2 therapy  Avoid sedative or narcotic    2-COPD with acute exacerbation  Trelegy inhaler and taper steroid  Chest x-ray with chronic changes with no acute findings    3-MARIA M and obesity with a BMI of 49  BiPAP during sleep  Avoid sedative and narcotic    4-history of diastolic CHF  Normal BNP and patient clinically looks dry I would avoid diuretics for now    5-hypernatremia  Replace free water deficit    6-DVT prophylaxis  Heparin subcu    D/w staff                 Yesi Salinas MD  7/15/2024

## 2024-07-15 NOTE — PROGRESS NOTES
Campbell aRo Patient Status:  Inpatient    1950 MRN [id number]   Location [facility]5W Attending Zeny Thomas MD   Hosp Day # 0 PCP Johny Kang MD     Cardiology Nocturnal APN Note    Briefly: (Documentation from chart review)     Campbell Rao is a 75 y/o male with PMH/PSH of coronary calcification on CT, COPD, chronic respiratory failure, HTN, HLD, obesity, MARIA M who presented with acute on chronic respiratory failure.    Primary Cardiologist None    Vital Signs:       2024    10:32 PM 2024    10:37 PM   Vitals History   /78    BP Location Right arm    Pulse 82    Resp 26    Temp 97.7 °F (36.5 °C)    SpO2 99 %    Weight  315 lbs   BMI  49.34 kg/m2        Labs:   Lab Results   Component Value Date    WBC 10.2 2024    HGB 13.6 2024    HCT 47.5 2024    .0 2024    CREATSERUM 0.86 2024    BUN 21 2024     2024    K 4.8 2024     2024    CO2 39.0 2024     2024    CA 9.5 2024    ALB 4.4 2024    ALKPHO 122 2024    BILT 0.4 2024    TP 7.1 2024    AST 43 2024     2024    MG 2.2 2024    TROPHS 6 2024       Diagnostics:         Allergies:  No Known Allergies    Medications:    albuterol    atorvastatin    [START ON 7/15/2024] fluticasone-umeclidin-vilant    HYDROcodone-acetaminophen    ipratropium-albuterol    ondansetron    acetaminophen    [START ON 7/15/2024] heparin    hydrALAzine    [START ON 7/15/2024] furosemide    alum-mag hydroxide-simethicone    [START ON 7/15/2024] pantoprazole    [START ON 7/15/2024] methylPREDNISolone    [START ON 7/15/2024] ipratropium-albuterol    Assessment:   Shortness of breath  - Known COPD on 4L chronically  - Chest x-ray with pulmonary edema versus infectious process  - LVEF  55-60% by echo 2024 with grade 1 diastolic dysfunction  - BNP 24  - No LE edema noted on exam by ED  MD      Plan:    - Continue to monitor overnight  - Formal Cardiology consult to follow in AM.       BRYNN Muniz  [facility]  7/14/2024  11:22 PM

## 2024-07-15 NOTE — PHYSICAL THERAPY NOTE
PHYSICAL THERAPY EVALUATION - INPATIENT     Room Number: 523/523-A  Evaluation Date: 7/15/2024  Type of Evaluation: Initial   Physician Order: PT Eval and Treat (functional mobility screen)    Presenting Problem: Acute on chronic respiratory failure with hypoxia  Co-Morbidities : COPD on 4L O2, HTN, CHF, chronic respiratory failure, CAD, OA, obesity  Reason for Therapy: Mobility Dysfunction and Discharge Planning    PHYSICAL THERAPY ASSESSMENT   Patient is a 74 year old male admitted 7/14/2024 for Acute on chronic respiratory failure with hypoxia.  Prior to admission, patient's baseline is independent no assistive device in home, rollator in community.  Patient is currently functioning near baseline with bed mobility, transfers, and gait.  Patient is requiring contact guard assist using a rolling walker as a result of the following impairments: decreased endurance/aerobic capacity, medical status, and increased O2 needs from baseline.  Physical Therapy will continue to follow for duration of hospitalization.    Patient will benefit from continued skilled PT Services at discharge to promote prior level of function and safety with additional support and return home with home health PT.    PLAN  PT Treatment Plan: Body mechanics;Endurance;Energy conservation;Patient education;Gait training;Stair training;Transfer training;Strengthening  Rehab Potential : Fair  Frequency (Obs): 5x/week    PHYSICAL THERAPY MEDICAL/SOCIAL HISTORY     Problem List  Principal Problem:    Acute on chronic respiratory failure with hypoxia and hypercapnia (HCC)  Active Problems:    Acute on chronic respiratory failure with hypoxia (HCC)    Hypernatremia      HOME SITUATION  Home Situation  Type of Home: House  Home Layout: One level  Stairs to Enter : 2  Railing: No  Lives With: Spouse  Drives: No  Patient Owned Equipment: Rollator  Patient Regularly Uses: Reading glasses     Prior Level of Elmer: independent no assistive device in  home, rollator in community, sleeps in recliner    SUBJECTIVE  \"I don't usually have to get out of a bed.\"    PHYSICAL THERAPY EXAMINATION   OBJECTIVE  Precautions: None  Fall Risk: Standard fall risk    WEIGHT BEARING RESTRICTION  Weight Bearing Restriction: None                PAIN ASSESSMENT  Ratin  Location: denies       COGNITION  Overall Cognitive Status:  WFL - within functional limits    RANGE OF MOTION AND STRENGTH ASSESSMENT  Upper extremity ROM and strength are within functional limits  BUEs  Lower extremity ROM is within functional limits  BLEs  Lower extremity strength is within functional limits  BLEs    BALANCE  Static Sitting: Normal  Dynamic Sitting: Good  Static Standing: Good  Dynamic Standing: Fair +    ACTIVITY TOLERANCE  Pulse: 100 (at rest, 125 with activity)                      O2 WALK  Oxygen Therapy  SPO2% on Oxygen at Rest: 83  At rest oxygen flow (liters per minute): 4  SPO2% Ambulation on Oxygen: 88  Ambulation oxygen flow (liters per minute): 6    AM-PAC '6-Clicks' INPATIENT SHORT FORM - BASIC MOBILITY  How much difficulty does the patient currently have...  Patient Difficulty: Turning over in bed (including adjusting bedclothes, sheets and blankets)?: A Little   Patient Difficulty: Sitting down on and standing up from a chair with arms (e.g., wheelchair, bedside commode, etc.): A Little   Patient Difficulty: Moving from lying on back to sitting on the side of the bed?: A Lot   How much help from another person does the patient currently need...   Help from Another: Moving to and from a bed to a chair (including a wheelchair)?: A Little   Help from Another: Need to walk in hospital room?: A Little   Help from Another: Climbing 3-5 steps with a railing?: A Little     AM-PAC Score:  Raw Score: 17   Approx Degree of Impairment: 50.57%   Standardized Score (AM-PAC Scale): 42.13   CMS Modifier (G-Code): CK    FUNCTIONAL ABILITY STATUS  Functional Mobility/Gait Assessment  Gait  Assistance: Other (Comment) (SBA)  Distance (ft): 5'  Assistive Device: Rolling walker  Pattern: Shuffle (wide CHRIS, distance limited 2/2 decreased endurance)  Supine to Sit: moderate assist - head of bed elevated, typically sleeps in recliner  Sit to Stand: contact guard assist - from edge of bed     Exercise/Education Provided:  Bed mobility  Body mechanics  Energy conservation  Functional activity tolerated  Gait training  Transfer training    Skilled Therapy Provided: Pt activity tolerance limited by increased O2 needs and dyspnea on exertion so ambulation distance limited at this time.     Patient seen for evaluation in coordination with occupational therapy to maximize patient safety and function given limited activity tolerance with recently taken off BiPAP. Patient received semi-fowlers in bed, agreeable to physical therapy evaluation. Vital signs monitored as noted above, patient experiencing dyspnea on exertion and desat to 83% on 4L, 88-90 on 6L . Education with patient provided verbally on physical therapy plan of care, physiological benefits of out of bed mobility, energy conservation/activity pacing, and breathing technique. Next session anticipate to progress transfers and gait.    Patient history and/or personal factors that may impact the plan of care include co-morbidities (COPD on 4L O2, HTN, CHF, chronic respiratory failure, CAD, OA, obesity) affecting medical status, endurance, O2 needs and has history of recent hospitalization. Based on the physical therapy examination of the noted systems and functional activity/participation limitations, the patient presentation is evolving given the patient demonstrates worsening of previously stable condition, demonstrates worsening of co-morbidities, and presents with unstable vital signs.      The patient's Approx Degree of Impairment: 50.57% has been calculated based on documentation in the [facility] '6 clicks' Inpatient Basic Mobility Short Form.  Research  supports that patients with this level of impairment may benefit from home-health PT.  Final disposition will be made by interdisciplinary medical team.    Patient End of Session: Up in chair;Needs met;Call light within reach;RN aware of session/findings;All patient questions and concerns addressed    CURRENT GOALS  Goals to be met by: 8/1/24  Patient Goal Patient's self-stated goal is: return home safely   Goal #1 Patient is able to demonstrate supine - sit EOB @ level: minimum assistance     Goal #1   Current Status    Goal #2 Patient is able to demonstrate transfers EOB to/from [facility] at assistance level: modified independent with walker - rolling     Goal #2  Current Status    Goal #3 Patient is able to ambulate 60 feet with assist device: walker - rolling at assistance level: modified independent   Goal #3   Current Status    Goal #4 Patient will negotiate 2 stairs/one curb w/ assistive device and supervision   Goal #4   Current Status    Goal #5 Patient to demonstrate independence with home activity/exercise instructions provided to patient in preparation for discharge.   Goal #5   Current Status    Goal #6    Goal #6  Current Status      Patient Evaluation Complexity Level:  History High - 3 or more personal factors and/or co-morbidities   Examination of body systems Moderate - addressing a total of 3 or more elements   Clinical Presentation  Moderate - Evolving   Clinical Decision Making  Moderate Complexity     Therapeutic Activity:  15 minutes      Krystal Fuentes, PT, DPT  [facility]  Rehab Services - Physical Therapy  [phone]

## 2024-07-15 NOTE — OCCUPATIONAL THERAPY NOTE
OCCUPATIONAL THERAPY EVALUATION - INPATIENT     Room Number: 523/523-A  Evaluation Date: 7/15/2024  Type of Evaluation: Initial       Physician Order: IP Consult to Occupational Therapy  Reason for Therapy: ADL/IADL Dysfunction and Discharge Planning    OCCUPATIONAL THERAPY ASSESSMENT     Patient is a 74 year old male admitted 7/14/2024 for acute on chronic respiratory failure with hypoxia and hypercapnia.  Prior to admission, pt was independent; on 3L of O2.  Patient is currently requiring up to max A for ADLs overall along with mod A for supine to sit, CGA for sitting at EOB, min A for STS, and min A for functional transfer at RW level. Pt tolerated about <1 minutes of standing in supported standing.  Pt has the following impairments: weakness, decreased aerobic capacity.    RN cleared pt for participation in OT session, which was completed in collaboration with PT. Upon arrival, pt was supine in bed and agreeable to activity. No visitors present during session. Gait belt used. Pt was left in chair. Call light and all needs left in reach. Handoff given to RN.    Education provided  Educated pt about role of OT and hospital therapy process as well as proper breathing techniques and energy conservation techniques as well as safety precautions. Pt verbalized/demonstrated proper carryover.    Patient will benefit from continued skilled OT Services with no additional skilled services but increased support at home    PLAN  OT Treatment Plan: Balance activities;Energy conservation/work simplification techniques;Continued evaluation;Compensatory technique education;Fine motor coordination activities;Neuromuscluar reeducation;Equipment eval/education;Patient/Family training;Patient/Family education;Cognitive reorientation;Endurance training;UE strengthening/ROM;Functional transfer training;Visual perceptual training;IADL training;ADL training      OCCUPATIONAL THERAPY MEDICAL/SOCIAL HISTORY     Problem List  Principal  Problem:    Acute on chronic respiratory failure with hypoxia and hypercapnia (HCC)  Active Problems:    Acute on chronic respiratory failure with hypoxia (HCC)    Hypernatremia      Past Medical History  Past Medical History:    Atherosclerosis of coronary artery    coronary calcification on ct chest    Back problem    Cataract    Chronic respiratory failure (HCC)    Congestive heart disease (HCC)    COPD (HCC)    freq exacerbations. ? 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    Osteoarthritis    Pneumonia    Pulmonary nodules    Sleep apnea    Tobacco abuse       Past Surgical History  Past Surgical History:   Procedure Laterality Date    Appendectomy      Appendectomy      Cataract Bilateral     2024, 3/2024    Electrocardiogram, complete  2012    scanned to media tab       HOME SITUATION  Type of Home: House  Home Layout: One level  Lives With: Spouse                      Drives: No  Patient Regularly Uses: Reading glasses    SUBJECTIVE  \"I sleep in a recliner at home.\"    OCCUPATIONAL THERAPY EXAMINATION      OBJECTIVE  Precautions: None  Fall Risk: Standard fall risk    PAIN ASSESSMENT  Ratin          O2 SATURATIONS  Oxygen Therapy  SPO2% on Oxygen at Rest: 83  At rest oxygen flow (liters per minute): 4  SPO2% Ambulation on Oxygen: 88  Ambulation oxygen flow (liters per minute): 6     RANGE OF MOTION   Upper extremity ROM is within functional limits     STRENGTH ASSESSMENT  Upper extremity strength is within functional limits     COORDINATION  Gross Motor: WFL   Fine Motor: WFL     ACTIVITIES OF DAILY LIVING ASSESSMENT  AM-PAC ‘6-Clicks’ Inpatient Daily Activity Short Form  How much help from another person does the patient currently need…  -   Putting on  and taking off regular lower body clothing?: A Lot  -   Bathing (including washing, rinsing, drying)?: A Lot  -   Toileting, which includes using toilet, bedpan or urinal? : A Little  -   Putting on and taking off regular upper body clothing?: A Little  -   Taking care of personal grooming such as brushing teeth?: A Little  -   Eating meals?: None    AM-PAC Score:  Score: 17  Approx Degree of Impairment: 50.11%  Standardized Score (AM-PAC Scale): 37.26  CMS Modifier (G-Code): CK      BED MOBILITY  Supine to Sit : Moderate Assist      FUNCTIONAL TRANSFER ASSESSMENT  Supine to Sit : Moderate Assist     Sit to stand: min A  Toilet Transfer: min A  Chair Transfer: min A    FUNCTIONAL ADL ASSESSMENT  Overall max A    The patient's Approx Degree of Impairment: 50.11% has been calculated based on documentation in the [facility] '6 clicks' Inpatient Daily Activity Short Form.  Research supports that patients with this level of impairment may benefit from home health. Final disposition will be made by interdisciplinary medical team.    OT Goals  Patients self stated goal is: to go home     Patient will complete functional transfer with mod I  Comment:     Patient will complete toileting with mod I  Comment:     Patient will tolerate standing for 3 minutes in prep for adls with mod I   Comment:               Goals  on: 8/15  Frequency: 3-5x/wk    Patient Evaluation Complexity Level:   Occupational Profile/Medical History MODERATE - Expanded review of history including review of medical or therapy record   Specific performance deficits impacting engagement in ADL/IADL MODERATE  3 - 5 performance deficits   Client Assessment/Performance Deficits MODERATE - Comorbidities and min to mod modifications of tasks    Clinical Decision Making MODERATE - Analysis of occupational profile, detailed assessments, several treatment options    Overall Complexity MODERATE     OT Session Time: 20 minutes  Self-Care Home Management: 10  elsa Steiner OT  [facility]  Inpatient Rehabilitation  Occupational Therapy  [phone]

## 2024-07-15 NOTE — PROGRESS NOTES
07/14/24 2046   BiPAP   Mask Size Medium   Control Settings   Set Rate 12 breaths/min   Set IPAP 15   Set EPAP 5   Oxygen Percent 40 %     Pt placed on bipap d/t to increased wob and resp acidosis

## 2024-07-15 NOTE — ED PROVIDER NOTES
Patient Seen in: [facility] Emergency Department    History     Chief Complaint   Patient presents with    Difficulty Breathing     Stated Complaint: STEFANI     HPI    74-year-old male with past medical history of CHF/COPD and chronic respiratory failure on 4 L nasal cannula, hypertension, dyslipidemia, obesity presenting for evaluation with several weeks of grossly worsening shortness of breath.  No pain.  No fevers or chills.  No new cough.  No reported leg swelling. Triage with 75% SpO2 on 4L, 58% RAl EMR reviewed, 4/24/2024 TTE with preserved EF and grade I diastolic dysfunction, 4/28/2024 CT chest without PE or aortopathy.    Past Medical History:    Atherosclerosis of coronary artery    coronary calcification on ct chest    Back problem    Cataract    Chronic respiratory failure (HCC)    Congestive heart disease (HCC)    COPD (HCC)    freq exacerbations. ? 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    Osteoarthritis    Pneumonia    Pulmonary nodules    Sleep apnea    Tobacco abuse       Past Surgical History:   Procedure Laterality Date    Appendectomy      Appendectomy      Cataract Bilateral     2/2024, 3/2024    Electrocardiogram, complete  01/30/2012    scanned to media tab            Family History   Problem Relation Age of Onset    Cancer Father         lung cancer    Cancer Mother         lung cancer    No Known Problems Daughter     No Known Problems Son     No Known Problems Maternal Grandmother     No Known Problems Maternal Grandfather     No Known Problems Paternal Grandmother     No Known Problems Paternal Grandfather     No Known Problems Sister     Diabetes Brother     Lipids Brother         hyperlipidemia    Hypertension Brother     No Known Problems  Other        Social History     Socioeconomic History    Marital status:    Tobacco Use    Smoking status: Former     Current packs/day: 0.00     Average packs/day: 2.0 packs/day for 40.0 years (80.0 ttl pk-yrs)     Types: Cigarettes     Start date: 1981     Quit date: 2021     Years since quittin.7     Passive exposure: Past    Smokeless tobacco: Never   Vaping Use    Vaping status: Never Used   Substance and Sexual Activity    Alcohol use: Not Currently     Alcohol/week: 0.0 standard drinks of alcohol     Comment: socially    Drug use: Not Currently   Other Topics Concern    Caffeine Concern Yes     Comment: coffee, >32 oz daily     Social Determinants of Health     Financial Resource Strain: Low Risk  (2024)    Financial Resource Strain     Difficulty of Paying Living Expenses: Not very hard     Med Affordability: No   Food Insecurity: No Food Insecurity (2024)    Food Insecurity     Food Insecurity: Never true   Transportation Needs: No Transportation Needs (2024)    Transportation Needs     Lack of Transportation: No   Physical Activity: Inactive (2024)    Exercise Vital Sign     Days of Exercise per Week: 0 days     Minutes of Exercise per Session: 0 min   Stress: No Stress Concern Present (2024)    Stress     Feeling of Stress : No   Social Connections: Somewhat Isolated (2024)    Social Connections     Frequency of Socialization with Friends and Family: 1   Housing Stability: Low Risk  (2024)    Housing Stability     Housing Instability: No       Review of Systems :  Constitutional: As per HPI  Respiratory: Negative for cough; (+) shortness of breath.    Cardiovascular: Negative for chest pain and palpitations.     Positive for stated complaint: STEFANI  Other systems are as noted in HPI.  Constitutional and vital signs reviewed.      All other systems reviewed and negative except as noted above.    PSFH elements reviewed from today and agreed except as  otherwise stated in HPI.    Physical Exam     ED Triage Vitals [07/14/24 2026]   /87   Pulse 95   Resp (!) 28   Temp 98.8 °F (37.1 °C)   Temp src Temporal   SpO2 100 %   O2 Device Non-rebreather mask       Current:/78   Pulse 75   Temp 98.8 °F (37.1 °C) (Temporal)   Resp 24   Ht 170.2 cm (5' 7\")   Wt 104.3 kg   SpO2 98%   BMI 36.02 kg/m²         Physical Exam   Constitutional: Obese, tachypneic and profoundly conversational dyspnea speaking in 1-2 word sentences.  HEENT: MMM.  Head: Normocephalic.   Eyes: No injection.   Cardiovascular: RRR.   Pulmonary/Chest: Tachypneic and conversationally dyspneic with minimal air exchange and scant expiratory wheezing with crackles.  Abdominal: Soft.  Nontender.  Musculoskeletal: No gross deformity.  Lower extremities with 3+ edema.  Neurological: Alert.   Skin: Skin is warm.   Psychiatric: Cooperative.  Nursing note and vitals reviewed.        ED Course     Labs Reviewed   EXPANDED BLOOD GAS, ARTERIAL - Abnormal; Notable for the following components:       Result Value    ABG pH 7.21 (*)     ABG pCO2 111 (*)     ABG HCO3 33.9 (*)     Blood Gas Base Excess 11.6 (*)     All other components within normal limits   COMP METABOLIC PANEL (14) - Abnormal; Notable for the following components:    Glucose 141 (*)     Sodium 149 (*)     CO2 39.0 (*)     BUN/CREA Ratio 24.4 (*)     Calculated Osmolality 313 (*)      (*)     AST 43 (*)     Alkaline Phosphatase 122 (*)     All other components within normal limits   CBC W/ DIFFERENTIAL - Abnormal; Notable for the following components:    .7 (*)     MCHC 28.6 (*)     RDW-SD 53.1 (*)     All other components within normal limits   BNP (B TYPE NATRIURETIC PEPTIDE) - Normal   TROPONIN I HIGH SENSITIVITY - Normal   MAGNESIUM - Normal   SARS-COV-2/FLU A AND B/RSV BY PCR (GENEXPERT) - Normal    Narrative:     This test is intended for the qualitative detection and differentiation of SARS-CoV-2, influenza A,  influenza B, and respiratory syncytial virus (RSV) viral RNA in nasopharyngeal or nares swabs from individuals suspected of respiratory viral infection consistent with COVID-19 by their healthcare provider. Signs and symptoms of respiratory viral infection due to SARS-CoV-2, influenza, and RSV can be similar.    Test performed using the Xpert Xpress SARS-CoV-2/FLU/RSV (real time RT-PCR)  assay on the GeneXpert instrument, [address], [place] [address].   This test is being used under the Food and Drug Administration's Emergency Use Authorization.    The authorized Fact Sheet for Healthcare Providers for this assay is available upon request from the laboratory.   CBC WITH DIFFERENTIAL WITH PLATELET    Narrative:     The following orders were created for panel order CBC With Differential With Platelet.  Procedure                               Abnormality         Status                     ---------                               -----------         ------                     CBC W/ DIFFERENTIAL[[id number]]          Abnormal            Final result                 Please view results for these tests on the individual orders.   URINALYSIS, ROUTINE     EKG    Rate, intervals and axes as noted on EKG Report.  Rate: 96  Rhythm: Sinus Rhythm  Reading: NSR without MOLLY as independently interpreted by myself         Preliminary Radiology Report  [facility], [facility]  [phone] - Phone    [facility]    NAME: NAOMI HOWARD    DATE OF EXAM: 07/14/2024  Patient No:  [id number]  Physician:  CLIFF ^2  YOB: 1950    Past Medical History (entered by Technologist):    Reason For Exam (entered by Technologist):  STEFANI  Other Notes (entered by Technologist): Room 27 Pod 2    Additional Information (per [facility]):      Chest, one view    Comparison: 4/8/24    IMPRESSION:  Limited by motion artifact despite repeat imaging.  Cardiac silhouette appears mildly enlarged.  Interstitial  markings are diffusely prominent bilaterally.  Consider pulmonary edema, atypical infection, volume overload.  Please correlate with the clinical presentation.  There is atelectasis or scarring in the lingula.        The report was faxed/finalized only at 10:37 PM Eastern time.  Please call [phone] extension [phone] if you would like to discuss the findings.  If you cannot reach me at this number, do not leave a message.  Please call [phone] ext [phone] and ask for the next available radiologist.    Katiuska Decker M.D.  This report has been electronically signed and verified by the Radiologist whose name is printed above.  ED Course as of 07/14/24 2253  ------------------------------------------------------------  Time: 07/14 2200  Comment: Resting comfortably with improving tachpnea/respiratory status though with persistently decreased aeration - plan for ongoing diuresis/nebs.       MDM   DIFFERENTIAL DIAGNOSIS: After history and physical exam differential diagnosis includes but is not limited to pneumothorax, pneumomediastinum, CHF, COPD exacerbation, bronchopneumonia, renal failure.    Pulse ox: 75% :Abnormal and Improved after treatment on NIPPV/BiPAP, as independently interpreted by myself    Cardiac Monitor Interpretation:   Pulse Readings from Last 1 Encounters:   04/30/24 96   , sinus,      Medical Decision Making  Evaluation for worsening dyspnea for past several weeks in patient with chronic respiratory failure without overt pain, noted to be with minimal air exchange/crackles/peripheral edema for which NIPPV/diuresis/nebs/steroids initiated with improving respiratory status and patient to be admitted for further management; case d/w hospitalist Dr. Thomas for admission and pulmonary Dr. Jimenez, ALICE Celestin in cardiology consultation.    Problems Addressed:  Acute on chronic respiratory failure with hypoxia and hypercapnia (HCC): chronic illness or injury with severe exacerbation, progression, or  side effects of treatment that poses a threat to life or bodily functions    Amount and/or Complexity of Data Reviewed  External Data Reviewed: labs, radiology, ECG and notes.     Details: 4/22/2024 ED labs/CXR/EKG/notes and 4/25/2024 TTE, 4/8/2024 CT chest reviewed  Labs: ordered. Decision-making details documented in ED Course.  Radiology: ordered and independent interpretation performed. Decision-making details documented in ED Course.     Details: CXR without obvious pneumothorax as independently interpreted by myself  ECG/medicine tests: ordered and independent interpretation performed. Decision-making details documented in ED Course.  Discussion of management or test interpretation with external provider(s): Case d/w hospitalist Dr. Thomas for admission and pulmonary Dr. Jimenez, ALICE Celestin in cardiology consultation.    Risk  Prescription drug management.  Decision regarding hospitalization.    Critical Care  Total time providing critical care: 43 minutes      A total of 43 minutes of critical care time (exclusive of billable procedures) was administered to manage the patient's unstable vital signs and respiratory instability due to his acute on chronic hypoxemic/hypercapneic respiratory failure.  This involved direct patient intervention, complex decision making, and/or extensive discussions with the patient, family, and clinical staff.      I was wearing at minimum a facemask and eye protection throughout this encounter with handwashing performed prior and after patient evaluation without personal hand/facial/oropharyngeal contact and gloves worn throughout encounter. See note and/or contact this provider for further PPE details.    Disposition and Plan     Clinical Impression:  1. Acute on chronic respiratory failure with hypoxia and hypercapnia (HCC)    2. Hypernatremia        Disposition:  Admit    Follow-up:  No follow-up provider specified.    Medications Prescribed:  Current Discharge Medication  List

## 2024-07-15 NOTE — CDS QUERY
How to answer this Query:    1.) DON'T CLICK COSIGN BUTTON FIRST  2.) Click \"3 dots...\" to the right of cosign button and click EDIT on the toolbar.  2.) Type an \"X\" in the bracket for the diagnosis that applies. (You may also add additional clinical details as you feel necessary to substantiate your response).   3.) Finally click \"Sign\" to complete response.  Thank You  CLINICAL DOCUMENTATION CLARIFICATION FORM  Dear Doctor:SHANNON  Clinical information (provided below) indicates encephalopathy    PLEASE (X)  DIAGNOSIS THAT APPLIES.  SELECTION BY PROVIDER ONLY      ( )  Metabolic Encephalopathy    (x )  Other (please specify)  CO2 narcosis        RISK FACTORS:HO CHF, COPD ON CHRONIC O2 4LNC, MO, MARIA M    CLINICAL INDICATORS:CONFUSED/LETHARGIC, ABGS-PH7.21,PCO2 111, HCO3 33.9,BASE EXCESS 11.6  7/14 DR MATAMOROS=Acute encephalopathy  Likely secondary to CO2 retention    TREATMENT:PLACED ON BIPAP, ICU, LABS, RESTRAINTS    If you have any questions, please contact Clinical :  Sherri HUANG RN at [phone]  . Thank You!     THIS FORM IS A PERMANENT PART OF THE MEDICAL RECORD

## 2024-07-15 NOTE — RESPIRATORY THERAPY NOTE
Received patient from ER on the following settings:    Mode: BIPAP  RR: 12  IPAP: 15   EPAP: 5  FIO2: 40%    Skin assessment:     Redness around nose    0001: Patient placed on AVAPS on the following settings:    Min IPAP: 10  Max IPAP: 25  EPAP: 5  VT :430  FIO2: 40%    Patient remained on the same settings overnight.

## 2024-07-15 NOTE — H&P
[facility]  part of [facility]    History & Physical    Campbell Rao Patient Status:  Inpatient    1950 MRN [id number]   Location [facility]5W Attending Zeny Thomas MD   Hosp Day # 0 PCP Johny Kang MD     Date:  2024  Date of Admission:  2024    Chief Complaint:  Chief Complaint   Patient presents with    Difficulty Breathing       History of Present Illness:  Campbell Rao is a(n) 74 year old male, with a past medical history significant for chronic respiratory failure on 4 L nasal cannula at home presented to the ER with increasing shortness of breath and confusion.  Was found to be saturating at 75% on 4 L nasal cannula.  He was placed on BiPAP after ABG indicating CO2 retention with hypoxia.  Patient currently confused unable to give a history.    History:  Past Medical History:    Atherosclerosis of coronary artery    coronary calcification on ct chest    Back problem    Cataract    Chronic respiratory failure (HCC)    Congestive heart disease (HCC)    COPD (HCC)    freq exacerbations. ? 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    Osteoarthritis    Pneumonia    Pulmonary nodules    Sleep apnea    Tobacco abuse     Past Surgical History:   Procedure Laterality Date    Appendectomy      Appendectomy      Cataract Bilateral     2024, 3/2024    Electrocardiogram, complete  2012    scanned to media tab     Family History   Problem Relation Age of Onset    Cancer Father         lung cancer    Cancer Mother         lung cancer    No Known Problems Daughter     No Known Problems Son     No Known Problems Maternal Grandmother     No Known Problems Maternal Grandfather     No Known Problems  Paternal Grandmother     No Known Problems Paternal Grandfather     No Known Problems Sister     Diabetes Brother     Lipids Brother         hyperlipidemia    Hypertension Brother     No Known Problems Other       reports that he quit smoking about 2 years ago. His smoking use included cigarettes. He started smoking about 42 years ago. He has a 80 pack-year smoking history. He has been exposed to tobacco smoke. He has never used smokeless tobacco. He reports that he does not currently use alcohol. He reports that he does not currently use drugs.    Allergies:  No Known Allergies    Home Medications:  Prior to Admission Medications   Prescriptions Last Dose Informant Patient Reported? Taking?   HYDROcodone-acetaminophen (NORCO)  MG Oral Tab   No No   Sig: Take 1 tablet by mouth every 6 (six) hours as needed for Pain.   Saline Nasal Spray 0.65 % Nasal Solution   No No   Si spray by Nasal route every 3 (three) hours as needed for congestion.   Urea 40 % External Cream   No No   Sig: Apply 1 Application topically daily.   acetaminophen (TYLENOL) 325 MG Oral Tab   Yes No   Sig: Take 1 tablet (325 mg total) by mouth every 6 (six) hours as needed for Pain.   albuterol 108 (90 Base) MCG/ACT Inhalation Aero Soln   No No   Sig: Inhale 2 puffs into the lungs every 4 to 6 hours as needed for Wheezing. inhale 2 puff by inhalation route  every 4 - 6 hours as needed   atorvastatin 20 MG Oral Tab   No No   Sig: Take 1 tablet (20 mg total) by mouth nightly.   ergocalciferol (DRISDOL) 1.25 MG (32218 UT) Oral Cap   No No   Sig: Take 1 capsule (50,000 Units total) by mouth once a week for 12 doses.   fluticasone propionate 50 MCG/ACT Nasal Suspension   No No   Si sprays by Nasal route daily.   fluticasone-umeclidin-vilant (TRELEGY ELLIPTA) 100-62.5-25 MCG/ACT Inhalation Aerosol Powder, Breath Activated   No No   Sig: Inhale 1 puff into the lungs daily.   ipratropium-albuterol 0.5-2.5 (3) MG/3ML Inhalation Solution   No No    Sig: Take 3 mL by nebulization in the morning and 3 mL at noon and 3 mL in the evening. And as needed.   potassium chloride 10 MEQ Oral Tab CR   No No   Sig: Take one tab (10 meq) po/day  alternating with 2 tabs (20meq)/day   prednisoLONE 1 % Ophthalmic Suspension   Yes No   Sig: Place into the left eye.   torsemide 20 MG Oral Tab   No No   Sig: Take 1 tablet (20 mg total) by mouth 2 (two) times daily.      Facility-Administered Medications: None       Review of Systems:  Unable to obtain secondary to patient's current mental status    Physical Exam:  Temp:  [98.8 °F (37.1 °C)] 98.8 °F (37.1 °C)  Pulse:  [75-99] 87  Resp:  [11-28] 24  BP: (120-136)/(67-87) 121/74  SpO2:  [97 %-100 %] 98 %    General: Confused agitated  Diffuse skin problem:  None.  Eye:  Pupils are equal, round and reactive to light, extraocular movements are intact, Normal conjunctiva.  HENT:  Normocephalic, oral mucosa is moist.  Head:  Normocephalic, atraumatic.  Neck:  Supple, non-tender, no carotid bruit, no jugular venous distention, no lymphadenopathy, no thyromegaly.  Respiratory: Bilateral wheeze appreciated with poor air entry, respirations are moderately to severely labored, breath sounds are equal, symmetrical chest wall expansion.  Cardiovascular:  Normal rate, regular rhythm, no murmur, no edema.  Gastrointestinal:  Soft, non-tender, non-distended, normal bowel sounds, no organomegaly.  Lymphatics:  No lymphadenopathy neck, axilla, groin.  Musculoskeletal: Normal range of motion.  normal strength.  Feet:  Normal pulses.  Neurologic: Confused and agitated, no focal deficits, cranial nerves II-XII are grossly intact.  Cognition and Speech: Tangential  Psychiatric: Agitated      Laboratory Data:   Lab Results   Component Value Date    WBC 10.2 07/14/2024    HGB 13.6 07/14/2024    HCT 47.5 07/14/2024    .0 07/14/2024    CREATSERUM 0.86 07/14/2024    BUN 21 07/14/2024     07/14/2024    K 4.8 07/14/2024     07/14/2024     CO2 39.0 07/14/2024     07/14/2024    CA 9.5 07/14/2024    ALB 4.4 07/14/2024    ALKPHO 122 07/14/2024    BILT 0.4 07/14/2024    TP 7.1 07/14/2024    AST 43 07/14/2024     07/14/2024    MG 2.2 07/14/2024       Imaging:  No results found.     Assessment and Plan:    Acute on chronic respiratory failure with hypoxia and hypercapnia  Pulmonary consulted, patient currently on BiPAP we will continue the same for now.  Wean as tolerated    COPD exacerbation  Patient started on DuoNebs every 4 hours and as needed along with Solu-Medrol 40 mg IV every 8 hours.  As stated previously pulmonary on board, resume home inhalers.  Currently being supported with BiPAP.    Acute on chronic diastolic heart failure  Cardiology consulted, monitor I's and O's and daily weights.  Patient started on Lasix 40 mg IV daily will continue the same for now.    Acute encephalopathy  Likely secondary to CO2 retention, patient given Zyprexa earlier, will continue to monitor attempt to redirect.    Hyperlipidemia  Continue statin.    Severe obesity with obstructive sleep apnea  BMI 36.  Patient counseled regarding diet and exercise once current issues resolve, continue with BiPAP at night for now    Prophylaxis  Subcutaneous heparin    CODE STATUS  Full    Primary care physician  Johny Kang MD    MDM: High, acute illness/severe exacerbation of chronic illness posing threat to life.  IV medications requiring close inpatient monitoring  60 minutes spent on this admission - examining patient, obtaining history, reviewing previous medical records, going over test results/imaging and discussing plan of care. All questions answered.     Disposition  Clinical course will dictate outcome      LUCHO MATAMOROS MD  7/14/2024  10:29 PM

## 2024-07-15 NOTE — ED INITIAL ASSESSMENT (HPI)
Pt. Arrives from home for difficulty breathing. Pt. On baseline 4 L. Pt. States increased work of breathing on the 4L over the past two weeks. Pt. 60% oxygen on 4L. Pt. Now on 15L non-rebreather at 99%. Pt. Denies cough. Pt. States COPD.

## 2024-07-16 ENCOUNTER — APPOINTMENT (OUTPATIENT)
Dept: GENERAL RADIOLOGY | Facility: HOSPITAL | Age: 74
End: 2024-07-16
Attending: NURSE PRACTITIONER
Payer: MEDICARE

## 2024-07-16 ENCOUNTER — APPOINTMENT (OUTPATIENT)
Dept: CV DIAGNOSTICS | Facility: HOSPITAL | Age: 74
End: 2024-07-16
Payer: MEDICARE

## 2024-07-16 LAB
ANION GAP SERPL CALC-SCNC: 3 MMOL/L (ref 0–18)
BASOPHILS # BLD AUTO: 0.04 X10(3) UL (ref 0–0.2)
BASOPHILS NFR BLD AUTO: 0.3 %
BNP SERPL-MCNC: 17 PG/ML
BUN BLD-MCNC: 20 MG/DL (ref 9–23)
BUN/CREAT SERPL: 21.7 (ref 10–20)
CALCIUM BLD-MCNC: 9.1 MG/DL (ref 8.7–10.4)
CHLORIDE SERPL-SCNC: 101 MMOL/L (ref 98–112)
CO2 SERPL-SCNC: 39 MMOL/L (ref 21–32)
CREAT BLD-MCNC: 0.92 MG/DL
DEPRECATED RDW RBC AUTO: 51.3 FL (ref 35.1–46.3)
EGFRCR SERPLBLD CKD-EPI 2021: 88 ML/MIN/1.73M2 (ref 60–?)
EOSINOPHIL # BLD AUTO: 0 X10(3) UL (ref 0–0.7)
EOSINOPHIL NFR BLD AUTO: 0 %
ERYTHROCYTE [DISTWIDTH] IN BLOOD BY AUTOMATED COUNT: 14 % (ref 11–15)
GLUCOSE BLD-MCNC: 201 MG/DL (ref 70–99)
HCT VFR BLD AUTO: 45 %
HGB BLD-MCNC: 13.6 G/DL
IMM GRANULOCYTES # BLD AUTO: 0.12 X10(3) UL (ref 0–1)
IMM GRANULOCYTES NFR BLD: 0.9 %
LYMPHOCYTES # BLD AUTO: 0.9 X10(3) UL (ref 1–4)
LYMPHOCYTES NFR BLD AUTO: 6.7 %
MCH RBC QN AUTO: 30.1 PG (ref 26–34)
MCHC RBC AUTO-ENTMCNC: 30.2 G/DL (ref 31–37)
MCV RBC AUTO: 99.6 FL
MONOCYTES # BLD AUTO: 0.68 X10(3) UL (ref 0.1–1)
MONOCYTES NFR BLD AUTO: 5.1 %
NEUTROPHILS # BLD AUTO: 11.66 X10 (3) UL (ref 1.5–7.7)
NEUTROPHILS # BLD AUTO: 11.66 X10(3) UL (ref 1.5–7.7)
NEUTROPHILS NFR BLD AUTO: 87 %
OSMOLALITY SERPL CALC.SUM OF ELEC: 304 MOSM/KG (ref 275–295)
PLATELET # BLD AUTO: 181 10(3)UL (ref 150–450)
POTASSIUM SERPL-SCNC: 4 MMOL/L (ref 3.5–5.1)
RBC # BLD AUTO: 4.52 X10(6)UL
SODIUM SERPL-SCNC: 143 MMOL/L (ref 136–145)
WBC # BLD AUTO: 13.4 X10(3) UL (ref 4–11)

## 2024-07-16 PROCEDURE — 99233 SBSQ HOSP IP/OBS HIGH 50: CPT | Performed by: INTERNAL MEDICINE

## 2024-07-16 PROCEDURE — 93306 TTE W/DOPPLER COMPLETE: CPT

## 2024-07-16 PROCEDURE — 71045 X-RAY EXAM CHEST 1 VIEW: CPT | Performed by: NURSE PRACTITIONER

## 2024-07-16 RX ORDER — FUROSEMIDE 10 MG/ML
20 INJECTION INTRAMUSCULAR; INTRAVENOUS EVERY 8 HOURS
Status: COMPLETED | OUTPATIENT
Start: 2024-07-17 | End: 2024-07-20

## 2024-07-16 RX ORDER — FUROSEMIDE 10 MG/ML
20 INJECTION INTRAMUSCULAR; INTRAVENOUS ONCE
Status: COMPLETED | OUTPATIENT
Start: 2024-07-16 | End: 2024-07-16

## 2024-07-16 RX ORDER — AZITHROMYCIN 250 MG/1
500 TABLET, FILM COATED ORAL
Status: DISCONTINUED | OUTPATIENT
Start: 2024-07-17 | End: 2024-07-17

## 2024-07-16 NOTE — PROGRESS NOTES
Progress Note  Campbell STEFFANY Rao Patient Status:  Inpatient    1950 MRN [id number]   Location [facility]5W Attending Nannette Paul MD   Hosp Day # 2 PCP Johny Kang MD     SUBJECTIVE:    Denies dyspnea but does have orthopnea, early satiety. No chest pain.     VITALS:  /76 (BP Location: Right arm)   Pulse 109   Temp 98 °F (36.7 °C) (Oral)   Resp 21   Ht 5' 7\" (1.702 m)   Wt (!) 306 lb (138.8 kg)   SpO2 95%   BMI 47.93 kg/m²     INTAKE/OUTPUT:    Intake/Output Summary (Last 24 hours) at 2024 1150  Last data filed at 2024 1103  Gross per 24 hour   Intake 1342.17 ml   Output 3075 ml   Net -1732.83 ml     Last 3 Weights   24 1018 (!) 306 lb (138.8 kg)   07/15/24 0631 (!) 315 lb (142.9 kg)   24 2237 (!) 315 lb (142.9 kg)   24 230 lb (104.3 kg)   24 1555 298 lb 14.4 oz (135.6 kg)   24 1010 (!) 307 lb (139.3 kg)   24 1004 (!) 307 lb (139.3 kg)   24 0506 (!) 307 lb (139.3 kg)     LABS:  Recent Labs   Lab 07/14/24  2027 07/15/24  0459 24  0512   * 165* 201*   BUN 21 18 20   CREATSERUM 0.86 0.79 0.92   EGFRCR 91 94 88   CA 9.5 8.9 9.1   * 147* 143   K 4.8 4.7 4.0    105 101   CO2 39.0* 39.0* 39.0*     Recent Labs   Lab 07/14/24  2027 07/15/24  0459 24  0512   RBC 4.58 4.45 4.52   HGB 13.6 13.4 13.6   HCT 47.5 45.5 45.0   .7* 102.2* 99.6   MCH 29.7 30.1 30.1   MCHC 28.6* 29.5* 30.2*   RDW 14.0 13.7 14.0   NEPRELIM 7.15 7.57 11.66*   WBC 10.2 8.8 13.4*   .0 162.0 181.0     No results for input(s): \"TROP\", \"CK\" in the last 168 hours.    DIAGNOSTICS:    TELEMETRY: SR, Intermittent runs of ST up to 120s    ROS: Negative unless noted above     PHYSICAL EXAM:  General: Alert and oriented x 3. No apparent distress.  HEENT: Normocephalic, sclera are nonicteric. Hearing appropriate bilaterally.  Neck: + JVD or Carotid bruits. Trachea midline.   Cardiac: Regular rate and rhythm. S1, S2  auscultated. No murmurs, rubs, or gallops appreciated.   Lungs: + Rhonchi and inspiratory wheezing. Chest expansion symmetrical. Increased effort. 5L NC (4L NC chronic)  Abdomen: Soft, non-tender, +BS. No hepatosplenomegaly or appreciable masses.   Extremities: Without clubbing, cyanosis. Non-pitting BLE edema.  Peripheral pulses are 2+.  Neurologic: Motor and sensory nerves grossly intact.   Psych: Appropriate affect   Skin: Warm and dry. BLE scaling     MEDICATIONS:   cefTRIAXone  2 g Intravenous Q24H    azithromycin  500 mg Intravenous Q24H    methylPREDNISolone  40 mg Intravenous Q12H    furosemide  20 mg Intravenous BID (Diuretic)    albuterol  10 mg Nebulization Once    atorvastatin  20 mg Oral Nightly    fluticasone-umeclidin-vilant  1 puff Inhalation Daily    heparin  5,000 Units Subcutaneous 2 times per day    pantoprazole  40 mg Oral QAM AC    ipratropium-albuterol  3 mL Nebulization 6 times per day     ASSESSMENT:    Acute on Chronic Hypoxic & Hypercapnic Respiratory Failure   - Multifactorial with underlying lung disease, suspected PNA, and acute heart failure   - Ara/ Steroids/ ABX  - Chronic 4L NC     Acute HFpEF  - Last ischemic evaluation was 2016, normal perfusion Lexiscan   - BNP 24 on admission, 17 today, Troponin negative x1  - IV Lasix 20 mg BID, Net-6L, Wt trend not accurate, will start with today standing weight of 306. He was here in April of this year, discharge at 307 pounds and was prescribed Torsemide 20 mg daily   - Volume status is difficult to assess with body habitus however, JVD present, increase O2 needs and reports responding well to Lasix today     Sinus Tachycardia  - Intermittent, not unexpected with acute illness however difficult to tell if contributing to worsening symptoms    Chronic Incomplete RBBB  COPD w/ MARIA M  Non-Compliant - Was not taking Torsemide at home due to lifestyle  Hx T7 Compression Fx, s/p T7 Kyphoplasty     PLAN:  - Has not seen a cardiologist in the past.  ECHO pending, further recommendations pending results. Will give an extra dose of IV Lasix now, watch Bicarb. CXR pending will review images once resulted.   - Start metoprolol   - Daily standing weights   - Will need close follow up with [facility] and heart failure clinic, reinforce education     Plan of care discussed with patient and RN.     Nanda Thacker, APRN  7/16/2024  11:50 AM  [phone] ([place])  [phone] ([place])      Cardiologist Addendum:  Campbell Friedmanrandamariela was seen and examined independently and I agree with the above documentation provided by NELL Gilliland.  Patient diuresing well on low-dose IV Lasix, -6 L since admission-creatinine and CO2 on metabolic panel are stable.  Increase Lasix to every 8 hours, likely will benefit from additional 5 to 10 L fluid removal prior to discharge.  Echo shows preserved EF.    Thank you for allowing me to take part in the care of Campbell Rao. Please call with any questions of concerns.    L3    Pawan Florian DO  [facility]   Interventional Cardiac and Vascular Services      July 16, 2024  5:31 PM

## 2024-07-16 NOTE — PLAN OF CARE
Restraints discontinued 7/15. Sodium levels improving. Azithromycin transitioned from IV to pill. Patient intermittently requiring more oxygen needs. Patient will need home health set up. Will continue to work with therapy every day.    Problem: SAFETY ADULT - FALL  Goal: Free from fall injury  Description: INTERVENTIONS:  - Assess pt frequently for physical needs  - Identify cognitive and physical deficits and behaviors that affect risk of falls.  - [place] fall precautions as indicated by assessment.  - Educate pt/family on patient safety including physical limitations  - Instruct pt to call for assistance with activity based on assessment  - Modify environment to reduce risk of injury  - Provide assistive devices as appropriate  - Consider OT/PT consult to assist with strengthening/mobility  - Encourage toileting schedule  Outcome: Progressing     Problem: CARDIOVASCULAR - ADULT  Goal: Maintains optimal cardiac output and hemodynamic stability  Description: INTERVENTIONS:  - Monitor vital signs, rhythm, and trends  - Monitor for bleeding, hypotension and signs of decreased cardiac output  - Evaluate effectiveness of vasoactive medications to optimize hemodynamic stability  - Monitor arterial and/or venous puncture sites for bleeding and/or hematoma  - Assess quality of pulses, skin color and temperature  - Assess for signs of decreased coronary artery perfusion - ex. Angina  - Evaluate fluid balance, assess for edema, trend weights  Outcome: Progressing  Goal: Absence of cardiac arrhythmias or at baseline  Description: INTERVENTIONS:  - Continuous cardiac monitoring, monitor vital signs, obtain 12 lead EKG if indicated  - Evaluate effectiveness of antiarrhythmic and heart rate control medications as ordered  - Initiate emergency measures for life threatening arrhythmias  - Monitor electrolytes and administer replacement therapy as ordered  Outcome: Progressing     Problem: RESPIRATORY - ADULT  Goal: Achieves  optimal ventilation and oxygenation  Description: INTERVENTIONS:  - Assess for changes in respiratory status  - Assess for changes in mentation and behavior  - Position to facilitate oxygenation and minimize respiratory effort  - Oxygen supplementation based on oxygen saturation or ABGs  - Provide Smoking Cessation handout, if applicable  - Encourage broncho-pulmonary hygiene including cough, deep breathe, Incentive Spirometry  - Assess the need for suctioning and perform as needed  - Assess and instruct to report SOB or any respiratory difficulty  - Respiratory Therapy support as indicated  - Manage/alleviate anxiety  - Monitor for signs/symptoms of CO2 retention  Outcome: Progressing     Problem: GENITOURINARY - ADULT  Goal: Absence of urinary retention  Description: INTERVENTIONS:  - Assess patient’s ability to void and empty bladder  - Monitor intake/output and perform bladder scan as needed  - Follow urinary retention protocol/standard of care  - Consider collaborating with pharmacy to review patient's medication profile  - Implement strategies to promote bladder emptying  Outcome: Progressing     Problem: METABOLIC/FLUID AND ELECTROLYTES - ADULT  Goal: Electrolytes maintained within normal limits  Description: INTERVENTIONS:  - Monitor labs and rhythm and assess patient for signs and symptoms of electrolyte imbalances  - Administer electrolyte replacement as ordered  - Monitor response to electrolyte replacements, including rhythm and repeat lab results as appropriate  - Fluid restriction as ordered  - Instruct patient on fluid and nutrition restrictions as appropriate  Outcome: Progressing  Goal: Hemodynamic stability and optimal renal function maintained  Description: INTERVENTIONS:  - Monitor labs and assess for signs and symptoms of volume excess or deficit  - Monitor intake, output and patient weight  - Monitor urine specific gravity, serum osmolarity and serum sodium as indicated or ordered  - Monitor  response to interventions for patient's volume status, including labs, urine output, blood pressure (other measures as available)  - Encourage oral intake as appropriate  - Instruct patient on fluid and nutrition restrictions as appropriate  Outcome: Progressing     Problem: SKIN/TISSUE INTEGRITY - ADULT  Goal: Skin integrity remains intact  Description: INTERVENTIONS  - Assess and document risk factors for pressure ulcer development  - Assess and document skin integrity  - Monitor for areas of redness and/or skin breakdown  - Initiate interventions, skin care algorithm/standards of care as needed  Outcome: Progressing  Goal: Oral mucous membranes remain intact  Description: INTERVENTIONS  - Assess oral mucosa and hygiene practices  - Implement preventative oral hygiene regimen  - Implement oral medicated treatments as ordered  Outcome: Progressing     Problem: NEUROLOGICAL - ADULT  Goal: Achieves stable or improved neurological status  Description: INTERVENTIONS  - Assess for and report changes in neurological status  - Initiate measures to prevent increased intracranial pressure  - Maintain blood pressure and fluid volume within ordered parameters to optimize cerebral perfusion and minimize risk of hemorrhage  - Monitor temperature, glucose, and sodium. Initiate appropriate interventions as ordered  Outcome: Progressing     Problem: Impaired Swallowing  Goal: Minimize aspiration risk  Description: Interventions:  - Patient should be alert and upright for all feedings (90 degrees preferred)  - Offer food and liquids at a slow rate  - No straws  - Encourage small bites of food and small sips of liquid  - Offer pills one at a time, crush or deliver with applesauce as needed  - Discontinue feeding and notify MD (or speech pathologist) if coughing or persistent throat clearing or wet/gurgly vocal quality is noted  Outcome: Progressing     Problem: Impaired Cognition  Goal: Patient will exhibit improved attention,  thought processing and/or memory  Description: Interventions:  - Minimize distractions in the room when full attention is required  - Allow additional time for processing after asking questions or providing instructions  - Encourage use of eye glasses  Outcome: Progressing     Problem: Patient Centered Care  Goal: Patient preferences are identified and integrated in the patient's plan of care  Description: Interventions:  - What would you like us to know as we care for you? I live at home with my wife. I have a hx of COPD. I wear 4 L O2 at home.  - Provide timely, complete, and accurate information to patient/family  - Incorporate patient and family knowledge, values, beliefs, and cultural backgrounds into the planning and delivery of care  - Encourage patient/family to participate in care and decision-making at the level they choose  - Honor patient and family perspectives and choices  Outcome: Progressing     Problem: Patient/Family Goals  Goal: Patient/Family Short Term Goal  Description: Patient's Short Term Goal: Be able to go to daughter's softball games comfortably.    Interventions:   - Be compliant with medications and oxygen to prevent hospital admissions.   - See additional Care Plan goals for specific interventions  Outcome: Progressing

## 2024-07-16 NOTE — CM/SW NOTE
CM notified by [facility] liaison Kaye that agency is not able to accept patient for return d/t scheduling noncompliance.    CM sent [facility] referrals in [place] - pending responses from providers regarding ability to accommodate.  CM will meet with patient to provide list/confirm choice agency prior to discharge.    / to remain available for support and/or discharge planning.     Plan: Home with Home Health (pending list/choice) and [facility] home oxygen once medically cleared    Sandy Beth RN, BSN  Nurse   [phone]

## 2024-07-16 NOTE — PLAN OF CARE
Received pt on continuous AVAPS, wife by his side. Pt did not get PO meds d/t to being on AVAPS. Pt not tolerating being weaned off AVAPS at this time, when attempted, pt became increasingly more confused, so this RN & RT placed pt back on AVAPS. Continuous telemetry & pulse oximetry monitoring in place for pt. Pt on IV Solu-medrol Q 12 hrs. PRN IV Haldol given to calm pt d/t pt becoming restless & impulsive. No pain voiced/observed from pt at this time. Frequent rounding on pt. Call light placed within pt.'s reach. Will continue to monitor for any new, acute changes.   Problem: SAFETY ADULT - FALL  Goal: Free from fall injury  Description: INTERVENTIONS:  - Assess pt frequently for physical needs  - Identify cognitive and physical deficits and behaviors that affect risk of falls.  - [place] fall precautions as indicated by assessment.  - Educate pt/family on patient safety including physical limitations  - Instruct pt to call for assistance with activity based on assessment  - Modify environment to reduce risk of injury  - Provide assistive devices as appropriate  - Consider OT/PT consult to assist with strengthening/mobility  - Encourage toileting schedule  7/15/2024 2330 by Tammie Henson, RN  Outcome: Progressing      Problem: CARDIOVASCULAR - ADULT  Goal: Maintains optimal cardiac output and hemodynamic stability  Description: INTERVENTIONS:  - Monitor vital signs, rhythm, and trends  - Monitor for bleeding, hypotension and signs of decreased cardiac output  - Evaluate effectiveness of vasoactive medications to optimize hemodynamic stability  - Monitor arterial and/or venous puncture sites for bleeding and/or hematoma  - Assess quality of pulses, skin color and temperature  - Assess for signs of decreased coronary artery perfusion - ex. Angina  - Evaluate fluid balance, assess for edema, trend weights  7/15/2024 2330 by Tammie Henson, RN  Outcome: Progressing  Goal: Absence of cardiac arrhythmias or at  baseline  Description: INTERVENTIONS:  - Continuous cardiac monitoring, monitor vital signs, obtain 12 lead EKG if indicated  - Evaluate effectiveness of antiarrhythmic and heart rate control medications as ordered  - Initiate emergency measures for life threatening arrhythmias  - Monitor electrolytes and administer replacement therapy as ordered  7/15/2024 2330 by Tammie Henson, RN  Outcome: Progressing    Problem: RESPIRATORY - ADULT  Goal: Achieves optimal ventilation and oxygenation  Description: INTERVENTIONS:  - Assess for changes in respiratory status  - Assess for changes in mentation and behavior  - Position to facilitate oxygenation and minimize respiratory effort  - Oxygen supplementation based on oxygen saturation or ABGs  - Provide Smoking Cessation handout, if applicable  - Encourage broncho-pulmonary hygiene including cough, deep breathe, Incentive Spirometry  - Assess the need for suctioning and perform as needed  - Assess and instruct to report SOB or any respiratory difficulty  - Respiratory Therapy support as indicated  - Manage/alleviate anxiety  - Monitor for signs/symptoms of CO2 retention  7/15/2024 2330 by Tammie Henson, RN  Outcome: Progressing    Problem: GENITOURINARY - ADULT  Goal: Absence of urinary retention  Description: INTERVENTIONS:  - Assess patient’s ability to void and empty bladder  - Monitor intake/output and perform bladder scan as needed  - Follow urinary retention protocol/standard of care  - Consider collaborating with pharmacy to review patient's medication profile  - Implement strategies to promote bladder emptying  7/15/2024 2330 by Tammie Henson, RN  Outcome: Progressing    Problem: METABOLIC/FLUID AND ELECTROLYTES - ADULT  Goal: Electrolytes maintained within normal limits  Description: INTERVENTIONS:  - Monitor labs and rhythm and assess patient for signs and symptoms of electrolyte imbalances  - Administer electrolyte replacement as ordered  - Monitor  response to electrolyte replacements, including rhythm and repeat lab results as appropriate  - Fluid restriction as ordered  - Instruct patient on fluid and nutrition restrictions as appropriate  7/15/2024 2330 by Tammie Henson, RN  Outcome: Progressing    Goal: Hemodynamic stability and optimal renal function maintained  Description: INTERVENTIONS:  - Monitor labs and assess for signs and symptoms of volume excess or deficit  - Monitor intake, output and patient weight  - Monitor urine specific gravity, serum osmolarity and serum sodium as indicated or ordered  - Monitor response to interventions for patient's volume status, including labs, urine output, blood pressure (other measures as available)  - Encourage oral intake as appropriate  - Instruct patient on fluid and nutrition restrictions as appropriate  7/15/2024 2330 by Tammie Henson, RN  Outcome: Progressing    Problem: SKIN/TISSUE INTEGRITY - ADULT  Goal: Skin integrity remains intact  Description: INTERVENTIONS  - Assess and document risk factors for pressure ulcer development  - Assess and document skin integrity  - Monitor for areas of redness and/or skin breakdown  - Initiate interventions, skin care algorithm/standards of care as needed  7/15/2024 2330 by Tammie Henson, RN  Outcome: Progressing  Goal: Oral mucous membranes remain intact  Description: INTERVENTIONS  - Assess oral mucosa and hygiene practices  - Implement preventative oral hygiene regimen  - Implement oral medicated treatments as ordered  7/15/2024 2330 by Tammie Henson, RN  Outcome: Progressing    Problem: NEUROLOGICAL - ADULT  Goal: Achieves stable or improved neurological status  Description: INTERVENTIONS  - Assess for and report changes in neurological status  - Initiate measures to prevent increased intracranial pressure  - Maintain blood pressure and fluid volume within ordered parameters to optimize cerebral perfusion and minimize risk of hemorrhage  - Monitor  temperature, glucose, and sodium. Initiate appropriate interventions as ordered  7/15/2024 2330 by Tammie Henson, RN  Outcome: Progressing     Problem: Impaired Swallowing  Goal: Minimize aspiration risk  Description: Interventions:  - Patient should be alert and upright for all feedings (90 degrees preferred)  - Offer food and liquids at a slow rate  - No straws  - Encourage small bites of food and small sips of liquid  - Offer pills one at a time, crush or deliver with applesauce as needed  - Discontinue feeding and notify MD (or speech pathologist) if coughing or persistent throat clearing or wet/gurgly vocal quality is noted  7/15/2024 2330 by Tammie Henson, RN  Outcome: Progressing     Problem: Patient Centered Care  Goal: Patient preferences are identified and integrated in the patient's plan of care  Description: Interventions:  - What would you like us to know as we care for you? I live at home with my wife. I have a hx of COPD. I wear 4 L O2 at home.  - Provide timely, complete, and accurate information to patient/family  - Incorporate patient and family knowledge, values, beliefs, and cultural backgrounds into the planning and delivery of care  - Encourage patient/family to participate in care and decision-making at the level they choose  - Honor patient and family perspectives and choices  7/15/2024 2330 by Tammie Henson, RN  Outcome: Progressing

## 2024-07-16 NOTE — PROGRESS NOTES
[facility]  part of [facility]    Progress Note    Campbell Rao Patient Status:  Inpatient    1950 MRN [id number]   Location [facility]5W Attending Nannette Paul MD   Hosp Day # 2 PCP Johny Kang MD       Subjective:     Constitutional:  Positive for fatigue. Negative for fever.   HENT:  Negative for congestion.    Respiratory:  Positive for shortness of breath. Negative for cough and wheezing.    Cardiovascular:  Negative for chest pain and leg swelling.   Gastrointestinal:  Negative for vomiting and abdominal distention.   Musculoskeletal:  Negative for back pain.   Skin:  Negative for rash.   Neurological:  Negative for seizures.   Psychiatric/Behavioral:  Negative for confusion and agitation.          Objective:   Blood pressure 116/75, pulse 92, temperature 98.3 °F (36.8 °C), temperature source Oral, resp. rate 20, height 5' 7\" (1.702 m), weight (!) 306 lb (138.8 kg), SpO2 90%.  Physical Exam    Results:   Lab Results   Component Value Date    WBC 13.4 (H) 2024    HGB 13.6 2024    HCT 45.0 2024    .0 2024    CREATSERUM 0.92 2024    BUN 20 2024     2024    K 4.0 2024     2024    CO2 39.0 (H) 2024     (H) 2024    CA 9.1 2024    ALB 4.4 2024    ALKPHO 122 (H) 2024    BILT 0.4 2024    TP 7.1 2024    AST 43 (H) 2024     (H) 2024    TSH 2.777 2024    GGT 38 2024    DDIMER 0.40 10/18/2016    MG 2.0 07/15/2024    PHOS 4.1 2024    TROP <0.045 04/10/2021    TROPHS 6 2024    B12 765 2021       XR CHEST AP PORTABLE  (CPT=71045)    Result Date: 7/15/2024  CONCLUSION:   Bilateral upper and lower lobe airspace opacities.  Findings may be secondary to pulmonary edema or multifocal pneumonia (either from bacterial or atypical viral etiology).  Continued follow-up imaging recommended to ensure resolution.   Preliminary report was submitted by the [facility] teleradiologist and there are no significant discrepancies.     Dictated by (CST): Ranulfo Rollins MD on 7/15/2024 at 8:19 AM     Finalized by (CST): Ranulfo Rollins MD on 7/15/2024 at 8:22 AM         EKG 12 Lead    Result Date: 7/15/2024  Normal sinus rhythm Incomplete right bundle branch block Borderline ECG When compared with ECG of 25-APR-2024 01:10, Incomplete right bundle branch block is now Present Non-specific change in ST segment in Inferior leads T wave inversion no longer evident in Inferior leads T wave inversion no longer evident in Lateral leads QT has shortened Confirmed by CHAYA WILDE, FABIOLA ([id number]) on 7/15/2024 3:51:51 PM     Assessment & Plan:      1-acute on chronic hypoxemic and hypercapnic respiratory failure  Decompensated overlap syndrome with COPD and MARIA M     BiPAP during sleep and as needed during daytime  O2 therapy  Avoid sedative or narcotic    Overall better today and more awake and alert and up to the chair     2-COPD with acute exacerbation  Trelegy inhaler and taper steroid  Empiric antibiotics     3-MARIA M and obesity with a BMI of 49  BiPAP during sleep  Avoid sedative and narcotic     4-history of diastolic CHF  Per cardiology         5-DVT prophylaxis  Heparin subcu    Better today   D/w pt and staff               Yesi Salinas MD  7/16/2024

## 2024-07-16 NOTE — PHYSICAL THERAPY NOTE
PHYSICAL THERAPY TREATMENT NOTE - INPATIENT     Room Number: 523/523-A       Presenting Problem: Acute on chronic respiratory failure with hypoxia  Co-Morbidities : COPD on 4L O2, HTN, CHF, chronic respiratory failure, CAD, OA, obesity    Problem List  Principal Problem:    Acute on chronic respiratory failure with hypoxia and hypercapnia (HCC)  Active Problems:    Acute on chronic respiratory failure with hypoxia (HCC)    Hypernatremia      PHYSICAL THERAPY ASSESSMENT   Patient demonstrates fair progress this session, goals  remain in progress.      Patient is requiring stand-by assist as a result of the following impairments: decreased endurance/aerobic capacity, decreased muscular endurance, and increased O2 needs from baseline.     Patient continues to function below baseline with transfers, gait, and standing prolonged periods.  Contributing factors to remaining limitations include decreased endurance/aerobic capacity, decreased muscular endurance, and increased O2 needs from baseline.  Next session anticipate patient to progress transfers, gait, and standing prolonged periods.  Physical Therapy will continue to follow patient for duration of hospitalization.    Patient continues to benefit from continued skilled PT services: at discharge to promote prior level of function.  Anticipate patient will return home with home health PT.    PLAN  PT Treatment Plan: Body mechanics;Endurance;Energy conservation;Patient education;Gait training;Strengthening;Transfer training;Balance training  Frequency (Obs): 5x/week    SUBJECTIVE  I'm pre- short of breath.    OBJECTIVE  Precautions: None    WEIGHT BEARING RESTRICTION                PAIN ASSESSMENT   Ratin  Location: denies, c/o SOB       BALANCE  Static Sitting: Good  Dynamic Sitting: Good  Static Standing: Fair -  Dynamic Standing: Fair -    ACTIVITY TOLERANCE  Pulse: 104  Heart Rate Source: Monitor                    O2 WALK  Oxygen Therapy  SPO2% on Oxygen at  Rest: 92  At rest oxygen flow (liters per minute): 4  SPO2% Ambulation on Oxygen: 83  Ambulation oxygen flow (liters per minute): 6    AM-PAC '6-Clicks' INPATIENT SHORT FORM - BASIC MOBILITY  How much difficulty does the patient currently have...  Patient Difficulty: Turning over in bed (including adjusting bedclothes, sheets and blankets)?: A Little   Patient Difficulty: Sitting down on and standing up from a chair with arms (e.g., wheelchair, bedside commode, etc.): A Little   Patient Difficulty: Moving from lying on back to sitting on the side of the bed?: A Lot   How much help from another person does the patient currently need...   Help from Another: Moving to and from a bed to a chair (including a wheelchair)?: A Little   Help from Another: Need to walk in hospital room?: A Little   Help from Another: Climbing 3-5 steps with a railing?: A Little     AM-PAC Score:  Raw Score: 17   Approx Degree of Impairment: 50.57%   Standardized Score (AM-PAC Scale): 42.13   CMS Modifier (G-Code): CK    FUNCTIONAL ABILITY STATUS  Functional Mobility/Gait Assessment  Gait Assistance:  (SBA)  Distance (ft): 2x40  Assistive Device: Rolling walker  Pattern: Shuffle    Sit to Stand: stand-by assist    Skilled Therapy Provided: Pt ok to be seen per KATHRYN Clinton. Pt willing to participate in session. Inc time for line management. Pt educ on breathing pattern. Pt demo'd safety with amb, with wide CHRIS and dec step length and adam. Pt cued for breathing pattern while amb. Pt demo'd safe turning with RW. Pt educ on pursed lip breathing and reasoning for it. Pt provided with rest break, performed amb again. Pt then participated in seated therex. Pt will benefit from continued rehab. Pt's O2 sats are noted above, dec to 83% with amb, with improved recovery time of 1-2 mins after second bout of amb.    The patient's Approx Degree of Impairment: 50.57% has been calculated based on documentation in the [facility] '6 clicks' Inpatient Daily Activity  Short Form.  Research supports that patients with this level of impairment may benefit from gradual rehab vs home with HHPT--pt will benefit from home with HHPT.  Final disposition will be made by interdisciplinary medical team.    THERAPEUTIC EXERCISES  Lower Extremity Alternating marching  Ankle pumps  Knee extension  LAQ     Position Sitting       Patient End of Session: Up in chair;Needs met;Call light within reach;RN aware of session/findings;All patient questions and concerns addressed    CURRENT GOALS   Goals to be met by: 24  Patient Goal Patient's self-stated goal is: return home safely   Goal #1 Patient is able to demonstrate supine - sit EOB @ level: minimum assistance      Goal #1   Current Status  NT pt received up in chair   Goal #2 Patient is able to demonstrate transfers EOB to/from BSC at assistance level: modified independent with walker - rolling      Goal #2  Current Status  SBA with RW   Goal #3 Patient is able to ambulate 60 feet with assist device: walker - rolling at assistance level: modified independent   Goal #3   Current Status  2x40 feet with RW and SBA   Goal #4 Patient will negotiate 2 stairs/one curb w/ assistive device and supervision   Goal #4   Current Status  NT   Goal #5 Patient to demonstrate independence with home activity/exercise instructions provided to patient in preparation for discharge.   Goal #5   Current Status  in progress   Goal #6     Goal #6  Current Status      Gait Trainin minutes  Therapeutic Exercise: 8 minutes

## 2024-07-16 NOTE — PROGRESS NOTES
Progress Note     Campbell Rao Patient Status:  Inpatient    1950 MRN [id number]   Location [facility]5W Attending Nannette Paul MD   Hosp Day # 2 PCP Johny Kang MD     Chief Complaint: Acute on chronic respiratory failure with hypoxia and hypercapnia    Subjective:   S: Patient sitting up in chair much improved today off BiPAP on supplemental O2 nasal cannula only.  No significant acute complaints at this time    No other acute complaints or other nursing concerns or overnight events      Review of Systems:   10 point review of system negative except for above-mentioned.    Objective:   Vital signs:  Temp:  [97.7 °F (36.5 °C)-98.9 °F (37.2 °C)] 98.3 °F (36.8 °C)  Pulse:  [] 92  Resp:  [19-33] 20  BP: ()/(58-76) 116/75  SpO2:  [86 %-98 %] 90 %  FiO2 (%):  [40 %] 40 %    Wt Readings from Last 6 Encounters:   24 (!) 306 lb (138.8 kg)   24 298 lb 14.4 oz (135.6 kg)   24 (!) 307 lb (139.3 kg)   24 (!) 309 lb 14.4 oz (140.6 kg)   24 (!) 307 lb 4.8 oz (139.4 kg)   24 (!) 304 lb (137.9 kg)         Physical Exam:    General: No acute distress.  Alert conversational with some intermittent dyspnea which patient states is his baseline, sitting up in chair comfortable no significant respiratory distress.     , morbidly obese  Respiratory: Clear to auscultation bilaterally. No wheezes. No rhonchi.  Cardiovascular: S1, S2. Regular rate and rhythm. No murmurs, rubs or gallops.   Abdomen: Soft, nontender, nondistended.  Positive bowel sounds. No rebound or guarding.  Neurologic: No focal neurological deficits.   Musculoskeletal: Moves all extremities.  Extremities: No edema.    Results:   Diagnostic Data:      Labs:    Labs Last 24 Hours:   BMP     CBC    Other     Na 143 Cl 101 BUN 20 Glu 201   Hb 13.6   PTT - Procal -   K 4.0 CO2 39.0 Cr 0.92   WBC 13.4 >< .0  INR - CRP -   Renal Lytes Endo    Hct 45.0   Trop - D dim -   eGFR - Ca 9.1 POC  Gluc  -    LFT   pBNP - Lactic -   eGFR AA - PO4 - A1c -   AST - APk - Prot -  LDL -     Mg - TSH -   ALT - T jake - Alb -        COVID-19 Lab Results    COVID-19  Lab Results   Component Value Date    COVID19 Not Detected 07/14/2024    COVID19 Not Detected 10/25/2023    COVID19 Not Detected 01/07/2023       Pro-Calcitonin  No results for input(s): \"PCT\" in the last 168 hours.    Cardiac  No results for input(s): \"TROP\", \"PBNP\" in the last 168 hours.    Creatinine Kinase  No results for input(s): \"CK\" in the last 168 hours.    Inflammatory Markers  No results for input(s): \"CRP\", \"JAMES\", \"LDH\", \"DDIMER\" in the last 168 hours.    Imaging: Imaging data reviewed in Epic.    Medications:    [START ON 7/17/2024] azithromycin  500 mg Oral Daily    metoprolol tartrate  12.5 mg Oral 2x Daily(Beta Blocker)    cefTRIAXone  2 g Intravenous Q24H    methylPREDNISolone  40 mg Intravenous Q12H    furosemide  20 mg Intravenous BID (Diuretic)    albuterol  10 mg Nebulization Once    atorvastatin  20 mg Oral Nightly    fluticasone-umeclidin-vilant  1 puff Inhalation Daily    heparin  5,000 Units Subcutaneous 2 times per day    pantoprazole  40 mg Oral QAM AC    ipratropium-albuterol  3 mL Nebulization 6 times per day       Assessment & Plan:   ASSESSMENT / PLAN:     Acute on chronic respiratory failure with hypoxia and hypercapnia  Pulmonary consulted, patient currently on BiPAP we will continue the same for now.    Wean as tolerated  Cxr ? Edema vs multifocal pneumonia  IV steroids, IV Abx  Management per pulmonary service, encourage use of BiPAP as much as possible.     COPD exacerbation/ ho MARIA M  Patient started on DuoNebs every 4 hours and as needed along with Solu-Medrol 40 mg IV every 8 hours.  As stated previously pulmonary on board, resume home inhalers.  Currently being supported with BiPAP.     Acute on chronic diastolic heart failure  Cardiology consulted, monitor I's and O's and daily weights.  Patient started on Lasix 40 mg  IV daily will continue the same for now.     Acute encephalopathy  Likely secondary to CO2 retention, patient given Zyprexa earlier, will continue to monitor attempt to redirect.     Hyperlipidemia  Continue statin.     Severe obesity with obstructive sleep apnea  BMI 36.  Patient counseled regarding diet and exercise once current issues resolve, continue with BiPAP at night for now     Prophylaxis  Subcutaneous heparin     CODE STATUS  Full     Primary care physician  Johny Kang MD      Coordinated care with providers and counseling re: treatment plan and workup     Nannette Paul MD    Supplementary Documentation:         **Certification      PHYSICIAN Certification of Need for Inpatient Hospitalization - Initial Certification    Patient will require inpatient services that will reasonably be expected to span two midnight's based on the clinical documentation in H+P.   Based on patients current state of illness, I anticipate that, after discharge, patient will require TBD.

## 2024-07-17 LAB
ANION GAP SERPL CALC-SCNC: 4 MMOL/L (ref 0–18)
BASOPHILS # BLD AUTO: 0.04 X10(3) UL (ref 0–0.2)
BASOPHILS NFR BLD AUTO: 0.3 %
BUN BLD-MCNC: 23 MG/DL (ref 9–23)
BUN/CREAT SERPL: 24 (ref 10–20)
CALCIUM BLD-MCNC: 9.6 MG/DL (ref 8.7–10.4)
CHLORIDE SERPL-SCNC: 99 MMOL/L (ref 98–112)
CO2 SERPL-SCNC: 38 MMOL/L (ref 21–32)
CREAT BLD-MCNC: 0.96 MG/DL
DEPRECATED RDW RBC AUTO: 50.9 FL (ref 35.1–46.3)
EGFRCR SERPLBLD CKD-EPI 2021: 83 ML/MIN/1.73M2 (ref 60–?)
EOSINOPHIL # BLD AUTO: 0 X10(3) UL (ref 0–0.7)
EOSINOPHIL NFR BLD AUTO: 0 %
ERYTHROCYTE [DISTWIDTH] IN BLOOD BY AUTOMATED COUNT: 14.2 % (ref 11–15)
GLUCOSE BLD-MCNC: 143 MG/DL (ref 70–99)
HCT VFR BLD AUTO: 48.5 %
HGB BLD-MCNC: 14.9 G/DL
IMM GRANULOCYTES # BLD AUTO: 0.1 X10(3) UL (ref 0–1)
IMM GRANULOCYTES NFR BLD: 0.8 %
LYMPHOCYTES # BLD AUTO: 0.86 X10(3) UL (ref 1–4)
LYMPHOCYTES NFR BLD AUTO: 6.9 %
MCH RBC QN AUTO: 29.9 PG (ref 26–34)
MCHC RBC AUTO-ENTMCNC: 30.7 G/DL (ref 31–37)
MCV RBC AUTO: 97.2 FL
MONOCYTES # BLD AUTO: 0.59 X10(3) UL (ref 0.1–1)
MONOCYTES NFR BLD AUTO: 4.7 %
NEUTROPHILS # BLD AUTO: 10.92 X10 (3) UL (ref 1.5–7.7)
NEUTROPHILS # BLD AUTO: 10.92 X10(3) UL (ref 1.5–7.7)
NEUTROPHILS NFR BLD AUTO: 87.3 %
OSMOLALITY SERPL CALC.SUM OF ELEC: 298 MOSM/KG (ref 275–295)
PLATELET # BLD AUTO: 197 10(3)UL (ref 150–450)
POTASSIUM SERPL-SCNC: 4.3 MMOL/L (ref 3.5–5.1)
PROCALCITONIN SERPL-MCNC: <0.04 NG/ML (ref ?–0.05)
RBC # BLD AUTO: 4.99 X10(6)UL
SODIUM SERPL-SCNC: 141 MMOL/L (ref 136–145)
WBC # BLD AUTO: 12.5 X10(3) UL (ref 4–11)

## 2024-07-17 PROCEDURE — 99232 SBSQ HOSP IP/OBS MODERATE 35: CPT | Performed by: PHYSICIAN ASSISTANT

## 2024-07-17 PROCEDURE — 99233 SBSQ HOSP IP/OBS HIGH 50: CPT | Performed by: HOSPITALIST

## 2024-07-17 RX ORDER — IPRATROPIUM BROMIDE AND ALBUTEROL SULFATE 2.5; .5 MG/3ML; MG/3ML
3 SOLUTION RESPIRATORY (INHALATION)
Status: DISCONTINUED | OUTPATIENT
Start: 2024-07-17 | End: 2024-07-21

## 2024-07-17 RX ORDER — ECHINACEA PURPUREA EXTRACT 125 MG
1 TABLET ORAL
Status: DISCONTINUED | OUTPATIENT
Start: 2024-07-17 | End: 2024-07-21

## 2024-07-17 NOTE — CM/SW NOTE
CM met with patient at bedside to discuss dc planning. CM explained to patient that he will unfortunately be unable to resume service with [facility].    CM provided list of HHA for patient to review.  Patient states he would like to discuss with his wife prior to choosing preferred HHA.    CM will confirm choice agency prior to patient discharge.    1505 CM met with patient at bedside to follow up regarding HHA.  Patient reports his preferred HHA is [facility].  CM reserved [facility] via [employer], and notified liaison A[facility] of patient choice. Patient discharge instructions updated with [facility] contact information.     / to remain available for support and/or discharge planning.     Plan: Home with [facility] and [facility]ME home oxygen, pending medical clearance    Sandy Beth RN, BSN  Nurse   [phone]

## 2024-07-17 NOTE — PROGRESS NOTES
[facility] - CARDIOLOGY PROGRESS NOTE      Campbell Rao Patient Status:  Inpatient    1950 MRN [id number]   Location [facility]5W Attending Rubia Powers MD   Hosp Day # 3 PCP Johny Kang MD         Assessment and Plan:   Acute on Chronic Hypoxic & Hypercapnic Respiratory Failure   - Multifactorial with underlying lung disease, suspected PNA, and acute heart failure   -Clinically improving, following with pulmonary    Acute HFpEF  -Recent echo normal.  Improving with diuretics.  Patient was not taking diuretic regularly at home which she now understands needs to be done to decrease risk of recurrent volume overload.  Transition to oral torsemide in the next 1 to 2 days if stable.  History does not suggest new ischemia.    Sinus Tachycardia  -Heart rate improved will monitor  Chronic Incomplete RBBB  COPD w/ MARIA M  Non-Compliant - Was not taking Torsemide at home due to lifestyle  Hx T7 Compression Fx, s/p T7 Kyphoplasty      PLAN:  -Continue diuretics today transition to oral torsemide in the next 1 to 2 days.  Patient resents for to taking meds.    Treatment of noncardiac shortness of breath per pulmonary   Blood pressures stable.    Patient asked about weight loss and will follow-up in the weight loss center at discharge - Daily standing weights   - Will need close follow up with [facility] and heart failure clinic, reinforce education       Subjective:   Campbell Rao is a(n) 74 year old male breathing improved and urinating well    Objective:   Blood pressure 114/78, pulse 85, temperature 98.9 °F (37.2 °C), temperature source Oral, resp. rate 26, height 170.2 cm (5' 7\"), weight (!) 304 lb 6.4 oz (138.1 kg), SpO2 92%.  Intake/Output:        Last 24 hours:   Intake/Output Summary (Last 24 hours) at 2024 1024  Last data filed at 2024 0837  Gross per 24 hour   Intake 1560 ml   Output 3725 ml   Net -2165 ml      This shift:  I/O this shift:  In: 240 [P.O.:240]  Out: -     Exam  Gen: Comfortable, in no acute distress,    Psych.alert and oriented x3  HEENT: atraumatic, normal conjunctiva, moist mucosa  Neck:supple,no JVD, no bruits  Lungs: Diminished at the base otherwise clear to ascultation, normal respiratory effort  Cardiac: regular rate and rhythm, nl S1,S2, no pathologic murmur  Abd: Abdomen soft, nontender, nondistended,  bowel sounds present  Ext:  no clubbing, no cyanosis, chronic stasis changes with 1+ edema  Neuro: no focal deficits  Skin: no rashes or lesions        Scheduled Meds:    ipratropium-albuterol  3 mL Nebulization QID    metoprolol tartrate  12.5 mg Oral 2x Daily(Beta Blocker)    furosemide  20 mg Intravenous Q8H    cefTRIAXone  2 g Intravenous Q24H    methylPREDNISolone  40 mg Intravenous Q12H    albuterol  10 mg Nebulization Once    atorvastatin  20 mg Oral Nightly    fluticasone-umeclidin-vilant  1 puff Inhalation Daily    heparin  5,000 Units Subcutaneous 2 times per day    pantoprazole  40 mg Oral QAM AC       Results:     Lab Results   Component Value Date    WBC 13.4 (H) 07/16/2024    HGB 13.6 07/16/2024    HCT 45.0 07/16/2024    .0 07/16/2024    CREATSERUM 0.92 07/16/2024    BUN 20 07/16/2024     07/16/2024    K 4.0 07/16/2024     07/16/2024    CO2 39.0 (H) 07/16/2024     (H) 07/16/2024    CA 9.1 07/16/2024    ALB 4.4 07/14/2024    ALKPHO 122 (H) 07/14/2024    BILT 0.4 07/14/2024    TP 7.1 07/14/2024    AST 43 (H) 07/14/2024     (H) 07/14/2024    TSH 2.777 04/18/2024    GGT 38 04/18/2024    DDIMER 0.40 10/18/2016    MG 2.0 07/15/2024    PHOS 4.1 04/26/2024    TROP <0.045 04/10/2021    B12 765 04/27/2021       XR CHEST AP PORTABLE  (CPT=71045)    Result Date: 7/16/2024  CONCLUSION:  1.  Interstitial prominence suggesting mild edema or fibrosis. 2.  Nonspecific subpleural thickening along the outer aspect of the right mid and upper lung zones similar to most recent exam.  This  could represent loculated effusion or other pleural based process. 3.  Bibasilar pulmonary opacities likely represent atelectasis however correlate for pneumonia/aspiration. 4.  Linear atelectasis or scar in the left lung base. 5.  Post kyphoplasty in the midthoracic spine.   Dictated by (CST): Sujit Quintana MD on 7/16/2024 at 4:21 PM     Finalized by (CST): Sujit Quintana MD on 7/16/2024 at 4:24 PM                 Joni Brenner MD  [facility]  7/17/2024

## 2024-07-17 NOTE — DISCHARGE INSTRUCTIONS
Sometimes managing your health at home requires assistance.  The [facility] team has recognized your preference to use [facility].  They can be reached at [phone].  Their fax number for your reference is [phone]. A representative from the home health agency will contact you or your family to schedule your first visit.      [facility] [employer] [facility] [place] [address] [address]  Phone: [phone]  Fax: [phone]    [facility]: [phone]    Going Home Instructions  In this section you will find the tools which will guide you through the first few days after you leave the hospital. Continued use of these tools will help you develop the skills necessary to keep your heart failure under control.     Home Care Instructions Following Heart Failure - the most important things to do every day include:   Weigh yourself and review the “Self-Check Plan” sheet every morning.   Call your cardiologist office if you are in the “Pay Attention-Use Caution” (yellow zone) or “Medical Alert-Warning!” (red zone) as outlined in the Self-Check Plan sheet.  Take your medicines as prescribed.  Limit your sodium (salt) intake.  Know when to call your cardiologist, primary doctor, or nurse.  Know when to seek emergency care.      Things for You to Remember:   1. See your doctor or healthcare provider as written on your discharge instructions.  It is important that you attend this appointment to make sure your symptoms are under control.     2. Your recommended sodium intake is 8795-5429 mg daily.    3.  Weigh yourself every day.    4. Some exercise and activity is important to help keep your heart functioning and strong. Unless instructed not to exercise, you may walk at a slow to moderate pace for 10-15 minutes 2-3 days per week to start. Pace your activity to prevent shortness of breath or fatigue. Stop exercising if you develop chest pain, lightheadedness, or  significant shortness of breath.       Call Your Cardiologist If:   You gain 2-3 pounds in one day or 5 pounds in one week.  You have more difficulty breathing.  You are getting more tired with normal activity.  You are more short of breath lying down, or awaken at night short of breath.  You have swelling of your feet or legs.  You urinate less often during the day and more often at night.  You have cramps in your legs.  You have blurred vision or see yellowish-green halos around objects of lights.    Go to the Emergency Room If:   You have pain or tightness in your chest  You are extremely short of breath  You are coughing up pink-frothy mucus  You are traveling and develop symptoms of worsening heart failure      ** Please follow up with your cardiologist or Advanced Practice Provider as written on your discharge instructions. If you are not provided with an appointment, let your nurse know so you can get an appointment**    In addition to seeing your pulmonary medicine doctor, it was requested that you also follow up at the [facility]. The speciality care clinic provides ongoing care and support to help you live more fully with chronic obstructive pulmonary disease, asthma, or pneumonia. During clinic visits, they'll monitor your health, determine if you need changes to medication or other therapies, and identify any additional services that may be of benefit.    The clinic is located at [address] [place] [address] (across from the lab in the [facility]) in  [facility]  Clinic Phone Number: [phone].       During you hospital stay, you had a oxygen walk test. This test demonstrated you need oxygen at home. You will need 4 Liters of oxygen at rest. When you are walking, you will need 4 liters. When walking make sure to take deep breathes and pauses when needed. Keep pulse oximetry near you to check oxygen saturations if needed.

## 2024-07-17 NOTE — PROGRESS NOTES
[facility]  part of [facility]    Progress Note    Campbell Rao Patient Status:  Inpatient    1950 MRN [id number]   Location [facility]5W Attending Rubia Powers MD   Hosp Day # 3 PCP Johny Kang MD     Subjective:   Seen and examined while sitting in chair. Admits to dyspnea which is slowly improving. Still unable to complete sentences. No cough or wheezing. No fever or chills. On 5 L O2 (4 L O2 at baseline). Used BiPAP overnight.    Objective:   Blood pressure 114/78, pulse 85, temperature 98.9 °F (37.2 °C), temperature source Oral, resp. rate 26, height 5' 7\" (1.702 m), weight (!) 304 lb 6.4 oz (138.1 kg), SpO2 92%.  Physical Exam  Vitals and nursing note reviewed.   Constitutional:       General: He is awake. He is not in acute distress.     Appearance: He is morbidly obese. He is ill-appearing.      Interventions: Nasal cannula in place.   HENT:      Head: Normocephalic and atraumatic.   Cardiovascular:      Rate and Rhythm: Normal rate and regular rhythm.   Pulmonary:      Effort: Tachypnea present. No respiratory distress.      Breath sounds: Rales (bibasilar) present. No wheezing or rhonchi.      Comments: Unable to complete full sentences 2/2 dyspnea  Musculoskeletal:      Cervical back: Normal range of motion and neck supple.      Right lower leg: No edema.      Left lower leg: No edema.   Skin:     General: Skin is warm and dry.      Comments: Chronic brawny discoloration of bilateral lower extremities   Neurological:      General: No focal deficit present.      Mental Status: He is alert.      Comments: A&Ox2 to person and place   Psychiatric:         Mood and Affect: Mood normal.         Behavior: Behavior is cooperative.       Results:   Lab Results   Component Value Date    WBC 13.4 (H) 2024    HGB 13.6 2024    HCT 45.0 2024    .0 2024    CREATSERUM 0.92 2024    BUN 20 2024     2024    K 4.0  07/16/2024     07/16/2024    CO2 39.0 (H) 07/16/2024     (H) 07/16/2024    CA 9.1 07/16/2024    ALB 4.4 07/14/2024    ALKPHO 122 (H) 07/14/2024    BILT 0.4 07/14/2024    TP 7.1 07/14/2024    AST 43 (H) 07/14/2024     (H) 07/14/2024    TSH 2.777 04/18/2024    GGT 38 04/18/2024    DDIMER 0.40 10/18/2016    MG 2.0 07/15/2024    PHOS 4.1 04/26/2024    TROP <0.045 04/10/2021    TROPHS 6 07/14/2024    B12 765 04/27/2021       XR CHEST AP PORTABLE  (CPT=71045)    Result Date: 7/16/2024  CONCLUSION:  1.  Interstitial prominence suggesting mild edema or fibrosis. 2.  Nonspecific subpleural thickening along the outer aspect of the right mid and upper lung zones similar to most recent exam.  This could represent loculated effusion or other pleural based process. 3.  Bibasilar pulmonary opacities likely represent atelectasis however correlate for pneumonia/aspiration. 4.  Linear atelectasis or scar in the left lung base. 5.  Post kyphoplasty in the midthoracic spine.   Dictated by (CST): Sujit Quintana MD on 7/16/2024 at 4:21 PM     Finalized by (CST): Sujit Quintana MD on 7/16/2024 at 4:24 PM               Assessment & Plan:   Acute on chronic hypoxemic and hypercapnic respiratory failure  Presented with confusion and worsening hypoxemia  Decompensated overlap syndrome with COPD and MARIA M as well as HFpEF  Chest x-ray with chronic changes, left basilar atelectasis  Initial ABG with respiratory acidosis - pH 7.21/pCO2 111/pO2 94  Repeat ABG after BiPAP compensated - pH 7.37/pCO2 82/pO2 59  S/p azithromycin x3 doses, on IV ceftriaxone  Oxygenation improving - on 5 L O2 (4 L O2 at baseline)  Plan:  -O2 and wean as tolerated  -BiPAP while sleeping  -Empiric antibiotics for now, consider stopping  -Check procalcitonin and follow up CBC    COPD with acute exacerbation  COVID-19/RSV/influenza negative  Plan:  -IV Solu-Medrol 40 mg Q12  -Nebs  -Trelegy    MARIA M  Partially compliant with CPAP at home  Plan:  -BiPAP while  sleeping    HFpEF  BNP low  Echo with EF 60-65%  I/Os net -6.9 L, excellent urine output  Plan:  -IV Lasix as per cardiology  -I/Os    DVT prophylaxis: Subcutaneous heparin    Code status: Full code    D/w RN.    JEFF ColemanC  Pulmonary Medicine  7/17/2024

## 2024-07-17 NOTE — PLAN OF CARE
Patient in chair overnight per patient request, normally sleeps in recliner at home per patient report. Wore AVAPS overnight per order. Safety precautions in place. Call light is within reach and patient is able to make needs known. Frequent rounding on patient overnight by nursing staff.       Problem: SAFETY ADULT - FALL  Goal: Free from fall injury  Description: INTERVENTIONS:  - Assess pt frequently for physical needs  - Identify cognitive and physical deficits and behaviors that affect risk of falls.  - [place] fall precautions as indicated by assessment.  - Educate pt/family on patient safety including physical limitations  - Instruct pt to call for assistance with activity based on assessment  - Modify environment to reduce risk of injury  - Provide assistive devices as appropriate  - Consider OT/PT consult to assist with strengthening/mobility  - Encourage toileting schedule  Outcome: Progressing     Problem: Patient/Family Goals  Goal: Patient/Family Long Term Goal  Description: Patient's Long Term Goal: To go home     Interventions:  - Monitor vitals  -Monitor labs  -Educate patient about medication/home oxygen device compliance importance   - See additional Care Plan goals for specific interventions  Outcome: Progressing  Goal: Patient/Family Short Term Goal  Description: Patient's Short Term Goal: To return to baseline respiratory status    Interventions:   - Titrate oxygen as tolerated to baseline  -Assess oxygen saturation  -Assess lung sounds  - See additional Care Plan goals for specific interventions  Outcome: Progressing     Problem: Patient Centered Care  Goal: Patient preferences are identified and integrated in the patient's plan of care  Description: Interventions:  - What would you like us to know as we care for you? I live at home with my wife. I have a hx of COPD. I wear 4 L O2 at home.  - Provide timely, complete, and accurate information to patient/family  - Incorporate patient and  family knowledge, values, beliefs, and cultural backgrounds into the planning and delivery of care  - Encourage patient/family to participate in care and decision-making at the level they choose  - Honor patient and family perspectives and choices  Outcome: Progressing     Problem: Impaired Cognition  Goal: Patient will exhibit improved attention, thought processing and/or memory  Description: Interventions:  - Minimize distractions in the room when full attention is required  - Allow additional time for processing after asking questions or providing instructions  - Encourage use of eye glasses  Outcome: Progressing     Problem: Impaired Swallowing  Goal: Minimize aspiration risk  Description: Interventions:  - Patient should be alert and upright for all feedings (90 degrees preferred)  - Offer food and liquids at a slow rate  - No straws  - Encourage small bites of food and small sips of liquid  - Offer pills one at a time, crush or deliver with applesauce as needed  - Discontinue feeding and notify MD (or speech pathologist) if coughing or persistent throat clearing or wet/gurgly vocal quality is noted  Outcome: Progressing     Problem: NEUROLOGICAL - ADULT  Goal: Achieves stable or improved neurological status  Description: INTERVENTIONS  - Assess for and report changes in neurological status  - Initiate measures to prevent increased intracranial pressure  - Maintain blood pressure and fluid volume within ordered parameters to optimize cerebral perfusion and minimize risk of hemorrhage  - Monitor temperature, glucose, and sodium. Initiate appropriate interventions as ordered  Outcome: Progressing     Problem: SKIN/TISSUE INTEGRITY - ADULT  Goal: Skin integrity remains intact  Description: INTERVENTIONS  - Assess and document risk factors for pressure ulcer development  - Assess and document skin integrity  - Monitor for areas of redness and/or skin breakdown  - Initiate interventions, skin care  algorithm/standards of care as needed  Outcome: Progressing  Goal: Oral mucous membranes remain intact  Description: INTERVENTIONS  - Assess oral mucosa and hygiene practices  - Implement preventative oral hygiene regimen  - Implement oral medicated treatments as ordered  Outcome: Progressing     Problem: METABOLIC/FLUID AND ELECTROLYTES - ADULT  Goal: Electrolytes maintained within normal limits  Description: INTERVENTIONS:  - Monitor labs and rhythm and assess patient for signs and symptoms of electrolyte imbalances  - Administer electrolyte replacement as ordered  - Monitor response to electrolyte replacements, including rhythm and repeat lab results as appropriate  - Fluid restriction as ordered  - Instruct patient on fluid and nutrition restrictions as appropriate  Outcome: Progressing     Problem: GENITOURINARY - ADULT  Goal: Absence of urinary retention  Description: INTERVENTIONS:  - Assess patient’s ability to void and empty bladder  - Monitor intake/output and perform bladder scan as needed  - Follow urinary retention protocol/standard of care  - Consider collaborating with pharmacy to review patient's medication profile  - Implement strategies to promote bladder emptying  Outcome: Progressing     Problem: RESPIRATORY - ADULT  Goal: Achieves optimal ventilation and oxygenation  Description: INTERVENTIONS:  - Assess for changes in respiratory status  - Assess for changes in mentation and behavior  - Position to facilitate oxygenation and minimize respiratory effort  - Oxygen supplementation based on oxygen saturation or ABGs  - Provide Smoking Cessation handout, if applicable  - Encourage broncho-pulmonary hygiene including cough, deep breathe, Incentive Spirometry  - Assess the need for suctioning and perform as needed  - Assess and instruct to report SOB or any respiratory difficulty  - Respiratory Therapy support as indicated  - Manage/alleviate anxiety  - Monitor for signs/symptoms of CO2  retention  Outcome: Progressing     Problem: CARDIOVASCULAR - ADULT  Goal: Maintains optimal cardiac output and hemodynamic stability  Description: INTERVENTIONS:  - Monitor vital signs, rhythm, and trends  - Monitor for bleeding, hypotension and signs of decreased cardiac output  - Evaluate effectiveness of vasoactive medications to optimize hemodynamic stability  - Monitor arterial and/or venous puncture sites for bleeding and/or hematoma  - Assess quality of pulses, skin color and temperature  - Assess for signs of decreased coronary artery perfusion - ex. Angina  - Evaluate fluid balance, assess for edema, trend weights  Outcome: Progressing  Goal: Absence of cardiac arrhythmias or at baseline  Description: INTERVENTIONS:  - Continuous cardiac monitoring, monitor vital signs, obtain 12 lead EKG if indicated  - Evaluate effectiveness of antiarrhythmic and heart rate control medications as ordered  - Initiate emergency measures for life threatening arrhythmias  - Monitor electrolytes and administer replacement therapy as ordered  Outcome: Progressing

## 2024-07-17 NOTE — PHYSICAL THERAPY NOTE
PHYSICAL THERAPY TREATMENT NOTE - INPATIENT     Room Number: 523/523-A       Presenting Problem: Acute on chronic respiratory failure with hypoxia  Co-Morbidities : COPD on 4L O2, HTN, CHF, chronic respiratory failure, CAD, OA, obesity    Problem List  Principal Problem:    Acute on chronic respiratory failure with hypoxia and hypercapnia (HCC)  Active Problems:    Acute on chronic respiratory failure with hypoxia (HCC)    Hypernatremia      PHYSICAL THERAPY ASSESSMENT   Patient demonstrates excellent progress this session, goals  progressing with 1/5 met this session. Pt SpO2 stable on 5L at rest, 6L with activity.    Patient is requiring supervision as a result of the following impairments: decreased endurance/aerobic capacity and increased O2 needs from baseline. Patient is cleared from a physical therapy standpoint for discharge as they have adequately achieved therapy goals to return home safely, however patient would benefit from continued inpatient therapy services if patient remains hospitalized to further progress towards prior level of function.     Patient continues to function near baseline with transfers, gait, and stair negotiation.  Next session anticipate patient to progress gait and stair negotiation.  Physical Therapy will continue to follow patient for duration of hospitalization.    Patient continues to benefit from continued skilled PT services: at discharge to promote prior level of function and safety with additional support and return home with home health PT.    PLAN  PT Treatment Plan: Body mechanics;Endurance;Energy conservation;Patient education;Gait training;Strengthening;Transfer training;Balance training  Frequency (Obs): 5x/week    SUBJECTIVE  \"That's about as far as I have to walk for my daughter's softball games from the parking lot.\"    OBJECTIVE  Precautions: None    WEIGHT BEARING RESTRICTION  none    PAIN ASSESSMENT   Ratin  Location: denies       BALANCE  Static Sitting:  Normal  Dynamic Sitting: Good  Static Standing: Good  Dynamic Standing: Fair +    ACTIVITY TOLERANCE  Pulse: 88 (at rest, 133 with activity)                       O2 WALK  Oxygen Therapy  SPO2% on Oxygen at Rest: 92  At rest oxygen flow (liters per minute): 5  SPO2% Ambulation on Oxygen: 90  Ambulation oxygen flow (liters per minute): 6    AM-PAC '6-Clicks' INPATIENT SHORT FORM - BASIC MOBILITY  How much difficulty does the patient currently have...  Patient Difficulty: Turning over in bed (including adjusting bedclothes, sheets and blankets)?: A Little   Patient Difficulty: Sitting down on and standing up from a chair with arms (e.g., wheelchair, bedside commode, etc.): A Little   Patient Difficulty: Moving from lying on back to sitting on the side of the bed?: A Little   How much help from another person does the patient currently need...   Help from Another: Moving to and from a bed to a chair (including a wheelchair)?: A Little   Help from Another: Need to walk in hospital room?: A Little   Help from Another: Climbing 3-5 steps with a railing?: A Little     AM-PAC Score:  Raw Score: 18   Approx Degree of Impairment: 46.58%   Standardized Score (AM-PAC Scale): 43.63   CMS Modifier (G-Code): CK    FUNCTIONAL ABILITY STATUS  Functional Mobility/Gait Assessment  Gait Assistance: Supervision  Distance (ft): 80'x2+130'+60'  Assistive Device: Rolling walker  Pattern: Within Functional Limits (wide CHRIS, minimal BLE foot clearance, no LOB, seated rest breaks between trials)  Stairs: Stairs  How Many Stairs: 2  Device: 1 Rail  Assist: Contact guard assist  Pattern: Ascend and Descend  Ascend and Descend : Side step  Sit to Stand: modified independent - from bedside chair    Skilled Therapy Provided: Since previous session patient has progressed, tolerating increased ambulation distance with more stable SpO2. Pt tolerating household and limited community distances, requiring intermittent seated rest breaks between trials  of gait. Patient received seated in bedside chair, agreeable to physical therapy. Vital signs monitored as noted above, patient experiencing dyspnea on exertion, SpO2 stable on 6L with activity .     Education with patient provided verbally on physical therapy plan of care, physiological benefits of out of bed mobility, and breathing technique. Patient with good carryover during session. Anticipated therapy needs remain appropriate based on the patient's performance, personal factors, and remaining functional impairments.      The patient's Approx Degree of Impairment: 46.58% has been calculated based on documentation in the [facility] '6 clicks' Inpatient Daily Activity Short Form.  Research supports that patients with this level of impairment may benefit from home-health PT.  Final disposition will be made by interdisciplinary medical team.      Patient End of Session: Up in chair;Needs met;Call light within reach;RN aware of session/findings;All patient questions and concerns addressed    CURRENT GOALS   Goals to be met by: 8/1/24  Patient Goal Patient's self-stated goal is: return home safely   Goal #1 Patient is able to demonstrate supine - sit EOB @ level: minimum assistance      Goal #1   Current Status  NOT MET/IN PROGRESS   Sleeps in recliner   Goal #2 Patient is able to demonstrate transfers EOB to/from [facility] [place] [place] at assistance level: modified independent with walker - rolling      Goal #2  Current Status MET 7/17/24   Goal #3 Patient is able to ambulate 60 feet with assist device: walker - rolling at assistance level: modified independent   Goal #3   Current Status  NOT MET/IN PROGRESS    Goal #4 Patient will negotiate 2 stairs/one curb w/ assistive device and supervision   Goal #4   Current Status  NOT MET/IN PROGRESS    Goal #5 Patient to demonstrate independence with home activity/exercise instructions provided to patient in preparation for discharge.   Goal #5   Current Status  IN PROGRESS/ONGOING    Goal #6      Goal #6  Current Status       Gait Trainin minutes      Krystal Fuentes, PT, DPT  [facility]  Rehab Services - Physical Therapy  [id number]

## 2024-07-18 ENCOUNTER — TELEPHONE (OUTPATIENT)
Dept: INTERNAL MEDICINE CLINIC | Facility: CLINIC | Age: 74
End: 2024-07-18

## 2024-07-18 LAB
ANION GAP SERPL CALC-SCNC: 4 MMOL/L (ref 0–18)
BASOPHILS # BLD AUTO: 0.04 X10(3) UL (ref 0–0.2)
BASOPHILS NFR BLD AUTO: 0.3 %
BUN BLD-MCNC: 31 MG/DL (ref 9–23)
BUN/CREAT SERPL: 32 (ref 10–20)
CALCIUM BLD-MCNC: 9.2 MG/DL (ref 8.7–10.4)
CHLORIDE SERPL-SCNC: 99 MMOL/L (ref 98–112)
CO2 SERPL-SCNC: 38 MMOL/L (ref 21–32)
CREAT BLD-MCNC: 0.97 MG/DL
DEPRECATED RDW RBC AUTO: 49.2 FL (ref 35.1–46.3)
EGFRCR SERPLBLD CKD-EPI 2021: 82 ML/MIN/1.73M2 (ref 60–?)
EOSINOPHIL # BLD AUTO: 0 X10(3) UL (ref 0–0.7)
EOSINOPHIL NFR BLD AUTO: 0 %
ERYTHROCYTE [DISTWIDTH] IN BLOOD BY AUTOMATED COUNT: 14.1 % (ref 11–15)
GLUCOSE BLD-MCNC: 167 MG/DL (ref 70–99)
HCT VFR BLD AUTO: 47.1 %
HGB BLD-MCNC: 14.9 G/DL
IMM GRANULOCYTES # BLD AUTO: 0.15 X10(3) UL (ref 0–1)
IMM GRANULOCYTES NFR BLD: 1.3 %
LYMPHOCYTES # BLD AUTO: 0.95 X10(3) UL (ref 1–4)
LYMPHOCYTES NFR BLD AUTO: 8.1 %
MAGNESIUM SERPL-MCNC: 2.5 MG/DL (ref 1.6–2.6)
MCH RBC QN AUTO: 30.3 PG (ref 26–34)
MCHC RBC AUTO-ENTMCNC: 31.6 G/DL (ref 31–37)
MCV RBC AUTO: 95.9 FL
MONOCYTES # BLD AUTO: 0.71 X10(3) UL (ref 0.1–1)
MONOCYTES NFR BLD AUTO: 6.1 %
NEUTROPHILS # BLD AUTO: 9.85 X10 (3) UL (ref 1.5–7.7)
NEUTROPHILS # BLD AUTO: 9.85 X10(3) UL (ref 1.5–7.7)
NEUTROPHILS NFR BLD AUTO: 84.2 %
OSMOLALITY SERPL CALC.SUM OF ELEC: 302 MOSM/KG (ref 275–295)
PLATELET # BLD AUTO: 188 10(3)UL (ref 150–450)
POTASSIUM SERPL-SCNC: 4.2 MMOL/L (ref 3.5–5.1)
RBC # BLD AUTO: 4.91 X10(6)UL
SODIUM SERPL-SCNC: 141 MMOL/L (ref 136–145)
WBC # BLD AUTO: 11.7 X10(3) UL (ref 4–11)

## 2024-07-18 PROCEDURE — 99232 SBSQ HOSP IP/OBS MODERATE 35: CPT | Performed by: PHYSICIAN ASSISTANT

## 2024-07-18 PROCEDURE — 99233 SBSQ HOSP IP/OBS HIGH 50: CPT | Performed by: HOSPITALIST

## 2024-07-18 RX ORDER — PREDNISONE 20 MG/1
40 TABLET ORAL
Status: DISCONTINUED | OUTPATIENT
Start: 2024-07-19 | End: 2024-07-19

## 2024-07-18 RX ORDER — SPIRONOLACTONE 25 MG/1
12.5 TABLET ORAL DAILY
Status: DISCONTINUED | OUTPATIENT
Start: 2024-07-19 | End: 2024-07-21

## 2024-07-18 NOTE — PROGRESS NOTES
[facility]  part of [facility]    Progress Note    Campbell Rao Patient Status:  Inpatient    1950 MRN [id number]   Location [facility]5W Attending Rubia Powers MD   Hosp Day # 4 PCP Johny Kang MD     Subjective:   Seen and examined while sitting in chair. Better today. Admits to dyspnea on exertion which is improving. Still has lower extremity swelling. No shortness of breath at rest. No cough or wheezing. No fever or chills. On 4 L O2 at rest and requiring 6 L O2 with exertion (4 L O2 at baseline). Used AVAPS overnight.    Objective:   Blood pressure 123/85, pulse 98, temperature 97.9 °F (36.6 °C), temperature source Oral, resp. rate 20, height 5' 7\" (1.702 m), weight (!) 303 lb 3.2 oz (137.5 kg), SpO2 92%.  Physical Exam  Vitals and nursing note reviewed.   Constitutional:       General: He is awake. He is not in acute distress.     Appearance: He is morbidly obese.      Interventions: Nasal cannula in place.   HENT:      Head: Normocephalic and atraumatic.   Cardiovascular:      Rate and Rhythm: Normal rate and regular rhythm.   Pulmonary:      Effort: No respiratory distress.      Breath sounds: Rales (bibasilar) present. No wheezing or rhonchi.   Musculoskeletal:      Cervical back: Normal range of motion and neck supple.      Right lower leg: Edema present.      Left lower leg: Edema present.   Skin:     General: Skin is warm and dry.      Comments: Chronic brawny discoloration of bilateral lower extremities   Neurological:      General: No focal deficit present.      Mental Status: He is alert and oriented to person, place, and time.   Psychiatric:         Mood and Affect: Mood normal.         Behavior: Behavior is cooperative.       Results:   Lab Results   Component Value Date    WBC 11.7 (H) 2024    HGB 14.9 2024    HCT 47.1 2024    .0 2024    CREATSERUM 0.97 2024    BUN 31 (H) 2024     2024    K  4.2 07/18/2024    CL 99 07/18/2024    CO2 38.0 (H) 07/18/2024     (H) 07/18/2024    CA 9.2 07/18/2024    ALB 4.4 07/14/2024    ALKPHO 122 (H) 07/14/2024    BILT 0.4 07/14/2024    TP 7.1 07/14/2024    AST 43 (H) 07/14/2024     (H) 07/14/2024    TSH 2.777 04/18/2024    GGT 38 04/18/2024    DDIMER 0.40 10/18/2016    MG 2.5 07/18/2024    PHOS 4.1 04/26/2024    TROP <0.045 04/10/2021    TROPHS 6 07/14/2024    B12 765 04/27/2021       Assessment & Plan:   Acute on chronic hypoxemic and hypercapnic respiratory failure  Presented with confusion and worsening hypoxemia  Decompensated overlap syndrome with COPD and MARIA M as well as HFpEF  Chest x-ray with chronic changes, left basilar atelectasis  Initial ABG with respiratory acidosis - pH 7.21/pCO2 111/pO2 94  Repeat ABG after AVAPS compensated - pH 7.37/pCO2 82/pO2 59  S/p azithromycin x3 doses, on IV ceftriaxone #4/5 doses  Oxygenation improving - on 4 L O2 at rest, 6 L O2 with exertion (4 L O2 at baseline)  Plan:  -O2 and wean as tolerated  -AVAPS while sleeping  -IV ceftriaxone dose #4/5  -Discharge planning    COPD with acute exacerbation  COVID-19/RSV/influenza negative  Plan:  -Stop IV Solu-Medrol; change to prednisone 40 mg tomorrow  -Nebs  -Trelegy    MARIA M  Noncompliant with CPAP at home  Plan:  -AVAPS while sleeping while here  -Resume CPAP nightly at discharge; consider outpatient ABG once stable on CPAP    HFpEF  BNP low  Echo with EF 60-65%  I/Os net -8.7 L, good urine output  Plan:  -IV Lasix as per cardiology  -I/Os    DVT prophylaxis: Subcutaneous heparin    Code status: Full code    D/w hospitalist and RN.    JEFF ColemanC  Pulmonary Medicine  7/18/2024

## 2024-07-18 NOTE — OCCUPATIONAL THERAPY NOTE
OCCUPATIONAL THERAPY TREATMENT NOTE - INPATIENT        Room Number: 523/523-A          Problem List  Principal Problem:    Acute on chronic respiratory failure with hypoxia and hypercapnia (HCC)  Active Problems:    Acute on chronic respiratory failure with hypoxia (HCC)    Hypernatremia      OCCUPATIONAL THERAPY ASSESSMENT   Patient demonstrates good  progress this session, goals remain in progress.    Patient is requiring minimal assist as a result of the following impairments: decreased endurance.    Patient continues to function near baseline with ADLs and functional mobility.  Contributing factors to remaining limitations include decreased endurance.  Occupational Therapy will continue to follow patient for duration of hospitalization.    Patient continues to benefit from continued skilled OT services: at discharge to promote prior level of function and safety with additional support and return home with home health OT.     SUBJECTIVE  \"I want to get out of here by 5pm\"    OBJECTIVE  Precautions: None    WEIGHT BEARING RESTRICTION     PAIN ASSESSMENT  Ratin    ACTIVITY TOLERANCE  LImited to bed side with O2 sats 88-85% on 4L   Requires rest breaks, cue to PLB, refrain from talking in an effort to increase I2 sats    O2 SATURATIONS  Oxygen Therapy  SPO2% on Oxygen at Rest: 88  At rest oxygen flow (liters per minute): 4  SPO2% Ambulation on Oxygen: 85  Ambulation oxygen flow (liters per minute): 4 (with standing at chair)    ACTIVITIES OF DAILY LIVING ASSESSMENT  AM-PAC ‘6-Clicks’ Inpatient Daily Activity Short Form  How much help from another person does the patient currently need…  -   Putting on and taking off regular lower body clothing?: A Little  -   Bathing (including washing, rinsing, drying)?: A Little  -   Toileting, which includes using toilet, bedpan or urinal? : A Little  -   Putting on and taking off regular upper body clothing?: A Little  -   Taking care of personal grooming such as brushing  teeth?: A Little  -   Eating meals?: None    AM-PAC Score:  Score: 19  Approx Degree of Impairment: 42.8%  Standardized Score (AM-PAC Scale): 40.22  CMS Modifier (G-Code): CK    FUNCTIONAL TRANSFER ASSESSMENT  Sit to Stand: Chair  Edge of Bed: Supervision  Toilet Transfer: Minimal Assist    BED MOBILITY  Rolling: Not Tested  Supine to Sit : Not tested  Sit to Supine (OT): Not Tested    BALANCE ASSESSMENT     FUNCTIONAL ADL ASSESSMENT  Pt was able to return demo of effective use of LHAE for LE self care as a means of increased independence and energy conservation    Skilled Therapy Provided: Pt educated on pacing, PLB, compensatory techniques with LE self care with pt able to return demo . Pt remains seated in chair at comletion of session with cardiology APN at bed side. Pt denies discharge needs or concerns.     EDUCATION PROVIDED  Patient: Functional Transfer Techniques; Compensatory ADL Techniques  Patient's Response to Education: Verbalized Understanding; Returned Demonstration    The patient's Approx Degree of Impairment: 42.8% has been calculated based on documentation in the [facility] '6 clicks' Inpatient Daily Activity Short Form.  Research supports that patients with this level of impairment may benefit from HHOT/PT.  Final disposition will be made by interdisciplinary medical team.      OT Goals:   Patient will complete functional transfer with mod I  Comment: progressing    Patient will complete toileting with mod I  Comment: NT this session    Patient will tolerate standing for 3 minutes in prep for adls with mod I   Comment:progressing               Goals  on: 8/15  Frequency: 3-5x/wk      Self-Care Home Management: 17 minutes

## 2024-07-18 NOTE — PROGRESS NOTES
[facility]  part of [facility]    Progress Note    Campbell Rao Patient Status:  Inpatient    1950 MRN [id number]   Location [facility]5W Attending Rubia Powers MD   Hosp Day # 4 PCP Johny Kang MD       Subjective:   Campbell Rao is feeling ok. Sitting up in chair.     Objective:   Blood pressure 123/85, pulse 98, temperature 97.9 °F (36.6 °C), temperature source Oral, resp. rate 20, height 5' 7\" (1.702 m), weight (!) 303 lb 3.2 oz (137.5 kg), SpO2 92%.    Physical Exam:    General: No acute distress.   Respiratory: Clear to auscultation bilaterally. No wheezes. No rhonchi.  Cardiovascular: S1, S2. Regular rate and rhythm. No murmurs, rubs or gallops.   Abdomen: Soft, nontender, nondistended.  Positive bowel sounds. No rebound or guarding.  Neurologic: No focal neurological deficits.   Musculoskeletal: Moves all extremities.  Extremities: No edema.    Results:     Lab Results   Component Value Date    WBC 11.7 (H) 2024    HGB 14.9 2024    HCT 47.1 2024    .0 2024    CREATSERUM 0.97 2024    BUN 31 (H) 2024     2024    K 4.2 2024    CL 99 2024    CO2 38.0 (H) 2024     (H) 2024    CA 9.2 2024    ALB 4.4 2024    ALKPHO 122 (H) 2024    BILT 0.4 2024    TP 7.1 2024    AST 43 (H) 2024     (H) 2024    TSH 2.777 2024    GGT 38 2024    DDIMER 0.40 10/18/2016    MG 2.5 2024    PHOS 4.1 2024    TROP <0.045 04/10/2021    B12 765 2021       XR CHEST AP PORTABLE  (CPT=71045)    Result Date: 2024  CONCLUSION:  1.  Interstitial prominence suggesting mild edema or fibrosis. 2.  Nonspecific subpleural thickening along the outer aspect of the right mid and upper lung zones similar to most recent exam.  This could represent loculated effusion or other pleural based process. 3.  Bibasilar pulmonary opacities  likely represent atelectasis however correlate for pneumonia/aspiration. 4.  Linear atelectasis or scar in the left lung base. 5.  Post kyphoplasty in the midthoracic spine.   Dictated by (CST): Sujit Quintana MD on 7/16/2024 at 4:21 PM     Finalized by (CST): Sujit Quintana MD on 7/16/2024 at 4:24 PM              metoprolol tartrate  25 mg Oral 2x Daily(Beta Blocker)    [START ON 7/19/2024] predniSONE  40 mg Oral Daily with breakfast    ipratropium-albuterol  3 mL Nebulization QID    furosemide  20 mg Intravenous Q8H    cefTRIAXone  2 g Intravenous Q24H    albuterol  10 mg Nebulization Once    atorvastatin  20 mg Oral Nightly    fluticasone-umeclidin-vilant  1 puff Inhalation Daily    heparin  5,000 Units Subcutaneous 2 times per day    pantoprazole  40 mg Oral QAM AC         Assessment and Plan:       Acute on chronic respiratory failure with hypoxia and hypercapnia  Pulmonary consulted, patient currently on BiPAP we will continue the same for now.    Wean as tolerated  Cxr ? Edema vs multifocal pneumonia  IV steroids, IV Abx  Management per pulmonary service, encourage use of BiPAP as much as possible.  Now currently on 4 liters  Ambulate      COPD exacerbation/ ho MARIA M  Patient started on DuoNebs every 4 hours and as needed.   Prednisone 40 mg daily  Resume home inhalers.   Currently being supported with BiPAP.     Acute on chronic diastolic heart failure  Cardiology consulted, monitor I's and O's and daily weights.  Patient started on Lasix 40 mg IV daily will continue the same for now.  Continue to diurese for now      Acute encephalopathy  Resolved  Likely secondary to CO2 retention, patient given Zyprexa earlier, will continue to monitor attempt to redirect.     Hyperlipidemia  Continue statin.     Severe obesity with obstructive sleep apnea  BMI 36.  Patient counseled regarding diet and exercise once current issues resolve, continue with BiPAP at night for now     Prophylaxis  Subcutaneous heparin     CODE  STATUS  Full     Primary care physician  Johny Kang MD        Coordinated care with providers and counseling re: treatment plan and workup           Quality:  DVT Prophylaxis: Heparin  CODE status: Full    MDM High. Time spent on chart/note review, review of labs/imaging, discussion with patient, physical exam, discussion with staff, consultants, coordinating care, writing progress note, and discussion of plan of care.       HUSSEIN GANNON MD  07/18/24

## 2024-07-18 NOTE — PLAN OF CARE
Received pt in stable condition. Tolerated all scheduled medications, no complications noted. Continuous telemetry & pulse oximetry monitoring in place. VSS, on 5 L O2. Pt will wear AVAPS for sleep at midnight per pt's report. Pt on IV Solu-medrol Q 12 hrs. No pain voiced from pt at this time. Frequent rounding on pt. Call light placed within pt.'s reach. Will continue to monitor for any new, acute changes.   Problem: SAFETY ADULT - FALL  Goal: Free from fall injury  Description: INTERVENTIONS:  - Assess pt frequently for physical needs  - Identify cognitive and physical deficits and behaviors that affect risk of falls.  - [place] fall precautions as indicated by assessment.  - Educate pt/family on patient safety including physical limitations  - Instruct pt to call for assistance with activity based on assessment  - Modify environment to reduce risk of injury  - Provide assistive devices as appropriate  - Consider OT/PT consult to assist with strengthening/mobility  - Encourage toileting schedule  Outcome: Progressing     Problem: CARDIOVASCULAR - ADULT  Goal: Maintains optimal cardiac output and hemodynamic stability  Description: INTERVENTIONS:  - Monitor vital signs, rhythm, and trends  - Monitor for bleeding, hypotension and signs of decreased cardiac output  - Evaluate effectiveness of vasoactive medications to optimize hemodynamic stability  - Monitor arterial and/or venous puncture sites for bleeding and/or hematoma  - Assess quality of pulses, skin color and temperature  - Assess for signs of decreased coronary artery perfusion - ex. Angina  - Evaluate fluid balance, assess for edema, trend weights  Outcome: Progressing  Goal: Absence of cardiac arrhythmias or at baseline  Description: INTERVENTIONS:  - Continuous cardiac monitoring, monitor vital signs, obtain 12 lead EKG if indicated  - Evaluate effectiveness of antiarrhythmic and heart rate control medications as ordered  - Initiate emergency measures  for life threatening arrhythmias  - Monitor electrolytes and administer replacement therapy as ordered  Outcome: Progressing     Problem: RESPIRATORY - ADULT  Goal: Achieves optimal ventilation and oxygenation  Description: INTERVENTIONS:  - Assess for changes in respiratory status  - Assess for changes in mentation and behavior  - Position to facilitate oxygenation and minimize respiratory effort  - Oxygen supplementation based on oxygen saturation or ABGs  - Provide Smoking Cessation handout, if applicable  - Encourage broncho-pulmonary hygiene including cough, deep breathe, Incentive Spirometry  - Assess the need for suctioning and perform as needed  - Assess and instruct to report SOB or any respiratory difficulty  - Respiratory Therapy support as indicated  - Manage/alleviate anxiety  - Monitor for signs/symptoms of CO2 retention  Outcome: Progressing     Problem: GENITOURINARY - ADULT  Goal: Absence of urinary retention  Description: INTERVENTIONS:  - Assess patient’s ability to void and empty bladder  - Monitor intake/output and perform bladder scan as needed  - Follow urinary retention protocol/standard of care  - Consider collaborating with pharmacy to review patient's medication profile  - Implement strategies to promote bladder emptying  Outcome: Progressing     Problem: METABOLIC/FLUID AND ELECTROLYTES - ADULT  Goal: Electrolytes maintained within normal limits  Description: INTERVENTIONS:  - Monitor labs and rhythm and assess patient for signs and symptoms of electrolyte imbalances  - Administer electrolyte replacement as ordered  - Monitor response to electrolyte replacements, including rhythm and repeat lab results as appropriate  - Fluid restriction as ordered  - Instruct patient on fluid and nutrition restrictions as appropriate  Outcome: Progressing  Goal: Hemodynamic stability and optimal renal function maintained  Description: INTERVENTIONS:  - Monitor labs and assess for signs and symptoms of  volume excess or deficit  - Monitor intake, output and patient weight  - Monitor urine specific gravity, serum osmolarity and serum sodium as indicated or ordered  - Monitor response to interventions for patient's volume status, including labs, urine output, blood pressure (other measures as available)  - Encourage oral intake as appropriate  - Instruct patient on fluid and nutrition restrictions as appropriate  Outcome: Progressing     Problem: SKIN/TISSUE INTEGRITY - ADULT  Goal: Skin integrity remains intact  Description: INTERVENTIONS  - Assess and document risk factors for pressure ulcer development  - Assess and document skin integrity  - Monitor for areas of redness and/or skin breakdown  - Initiate interventions, skin care algorithm/standards of care as needed  Outcome: Progressing  Goal: Oral mucous membranes remain intact  Description: INTERVENTIONS  - Assess oral mucosa and hygiene practices  - Implement preventative oral hygiene regimen  - Implement oral medicated treatments as ordered  Outcome: Progressing     Problem: NEUROLOGICAL - ADULT  Goal: Achieves stable or improved neurological status  Description: INTERVENTIONS  - Assess for and report changes in neurological status  - Initiate measures to prevent increased intracranial pressure  - Maintain blood pressure and fluid volume within ordered parameters to optimize cerebral perfusion and minimize risk of hemorrhage  - Monitor temperature, glucose, and sodium. Initiate appropriate interventions as ordered  Outcome: Progressing     Problem: Impaired Swallowing  Goal: Minimize aspiration risk  Description: Interventions:  - Patient should be alert and upright for all feedings (90 degrees preferred)  - Offer food and liquids at a slow rate  - No straws  - Encourage small bites of food and small sips of liquid  - Offer pills one at a time, crush or deliver with applesauce as needed  - Discontinue feeding and notify MD (or speech pathologist) if coughing  or persistent throat clearing or wet/gurgly vocal quality is noted  Outcome: Progressing     Problem: Impaired Cognition  Goal: Patient will exhibit improved attention, thought processing and/or memory  Description: Interventions:    Outcome: Progressing     Problem: Patient Centered Care  Goal: Patient preferences are identified and integrated in the patient's plan of care  Description: Interventions:  - What would you like us to know as we care for you? I live at home with my wife. I have a hx of COPD. I wear 4 L O2 at home.  - Provide timely, complete, and accurate information to patient/family  - Incorporate patient and family knowledge, values, beliefs, and cultural backgrounds into the planning and delivery of care  - Encourage patient/family to participate in care and decision-making at the level they choose  - Honor patient and family perspectives and choices  Outcome: Progressing

## 2024-07-18 NOTE — PROGRESS NOTES
Progress Note  Campbell STEFFANY Rao Patient Status:  Inpatient    1950 MRN [id number]   Location [facility]5W Attending Rubia Powers MD   Hosp Day # 4 PCP Johny Kang MD     Subjective:  Pt sitting up in chair; denies chest pain and states his breathing is improving. Currently on baseline 4L O2 per NC.     Objective:  /85 (BP Location: Right arm)   Pulse 98   Temp 97.9 °F (36.6 °C) (Oral)   Resp 20   Ht 5' 7\" (1.702 m)   Wt (!) 303 lb 3.2 oz (137.5 kg)   SpO2 92%   BMI 47.49 kg/m²     Telemetry: NSR     Intake/Output:    Intake/Output Summary (Last 24 hours) at 2024 1355  Last data filed at 2024 1105  Gross per 24 hour   Intake 778 ml   Output 2600 ml   Net -1822 ml       Last 3 Weights   24 0630 (!) 303 lb 3.2 oz (137.5 kg)   24 0637 (!) 304 lb 6.4 oz (138.1 kg)   24 1018 (!) 306 lb (138.8 kg)   07/15/24 0631 (!) 315 lb (142.9 kg)   24 2237 (!) 315 lb (142.9 kg)   24 230 lb (104.3 kg)   24 1555 298 lb 14.4 oz (135.6 kg)   24 1010 (!) 307 lb (139.3 kg)   24 1004 (!) 307 lb (139.3 kg)   24 0506 (!) 307 lb (139.3 kg)       Labs:  Recent Labs   Lab 24  0512 24  1123 24  0534   * 143* 167*   BUN 20 23 31*   CREATSERUM 0.92 0.96 0.97   EGFRCR 88 83 82   CA 9.1 9.6 9.2    141 141   K 4.0 4.3 4.2    99 99   CO2 39.0* 38.0* 38.0*     Recent Labs   Lab 24  0512 24  1123 24  0534   RBC 4.52 4.99 4.91   HGB 13.6 14.9 14.9   HCT 45.0 48.5 47.1   MCV 99.6 97.2 95.9   MCH 30.1 29.9 30.3   MCHC 30.2* 30.7* 31.6   RDW 14.0 14.2 14.1   NEPRELIM 11.66* 10.92* 9.85*   WBC 13.4* 12.5* 11.7*   .0 197.0 188.0         Recent Labs   Lab 247   TROPHS 6       Diagnostics:  No results found.   Review of Systems   Cardiovascular:  Negative for chest pain.       Physical Exam:    Gen: alert, oriented x 3, NAD  Heent: pupils equal, reactive. Mucous membranes moist.    Neck: no jvd  Cardiac: regular rate and rhythm, normal S1,S2, no murmur, clicks, rub or gallop  Lungs: CTA  Abd: soft, NT/ND +bs  Ext: no edema  Skin: Warm, dry  Neuro: No focal deficits      Medications:     ipratropium-albuterol  3 mL Nebulization QID    metoprolol tartrate  12.5 mg Oral 2x Daily(Beta Blocker)    furosemide  20 mg Intravenous Q8H    cefTRIAXone  2 g Intravenous Q24H    methylPREDNISolone  40 mg Intravenous Q12H    albuterol  10 mg Nebulization Once    atorvastatin  20 mg Oral Nightly    fluticasone-umeclidin-vilant  1 puff Inhalation Daily    heparin  5,000 Units Subcutaneous 2 times per day    pantoprazole  40 mg Oral QAM AC       Assessment:  Acute on Chronic Hypoxic, Hypercapneic Respiratory Failure (Multifactorial - COPD Exac, MARIA M, HF Exac)  Now on baseline 4L NC  IV antibx, nebs, inhaler, steroids  Bipap while sleeping  Pulm following   Acute HFpEF   LVEF 60-65%, no RWMA, RA dilated  Diuresing w/IV Lasix 20mg q 8 hrs   Net neg ~8L, wt downtrending  On BB  Historically on torsemide at home but was not taking  BUN 31, Cr stable  Serum bicarb elevated - suspect metabolic compensation for hypercapnia vs contraction alkalosis   Sinus Tachycardia, PSVT  Brief few seconds of PSVT on 7/17  On lopressor 12.5mg po BID  COPD w/MARIA M - BiPAP  Hx Medication Non-compliance  DMII - A1C 6.3     Plan:  Increase metoprolol tartrate to 25mg po BID with brief PSVT yesterday - eventual transition to toprol XL  Pt is diuresing well - continue IV Lasix 20 IV BID; hope to transition to oral torsemide soon  Add spironolactone 12.5mg po daily  Would benefit from SGLT2i on discharge    Plan of care discussed with patient, RN.    Krista Jain, APRN  7/18/2024  1:55 PM  [phone] [facility]  [phone] [facility]

## 2024-07-18 NOTE — PROGRESS NOTES
07/18/24 1300   Mobility   O2 walk? Yes   SPO2% on Oxygen at Rest 94   At rest oxygen flow (liters per minute) 4   SPO2% Ambulation on Oxygen 92   Ambulation oxygen flow (liters per minute) 6       02 dropped to 84% ambulating on 4L - increased to 6L

## 2024-07-18 NOTE — TELEPHONE ENCOUNTER
Patient discharged from hospital yesterday,  [facility] calling, they would like to see patient for nursing and PT services  They are asking if MD will follow patient for home care orders    OK to leave message is Nate does not  the line.   [phone]

## 2024-07-18 NOTE — PLAN OF CARE
Problem: SAFETY ADULT - FALL  Goal: Free from fall injury  Description: INTERVENTIONS:  - Assess pt frequently for physical needs  - Identify cognitive and physical deficits and behaviors that affect risk of falls.  - [place] fall precautions as indicated by assessment.  - Educate pt/family on patient safety including physical limitations  - Instruct pt to call for assistance with activity based on assessment  - Modify environment to reduce risk of injury  - Provide assistive devices as appropriate  - Consider OT/PT consult to assist with strengthening/mobility  - Encourage toileting schedule  Outcome: Progressing     Problem: CARDIOVASCULAR - ADULT  Goal: Maintains optimal cardiac output and hemodynamic stability  Description: INTERVENTIONS:  - Monitor vital signs, rhythm, and trends  - Monitor for bleeding, hypotension and signs of decreased cardiac output  - Evaluate effectiveness of vasoactive medications to optimize hemodynamic stability  - Monitor arterial and/or venous puncture sites for bleeding and/or hematoma  - Assess quality of pulses, skin color and temperature  - Assess for signs of decreased coronary artery perfusion - ex. Angina  - Evaluate fluid balance, assess for edema, trend weights  Outcome: Progressing  Goal: Absence of cardiac arrhythmias or at baseline  Description: INTERVENTIONS:  - Continuous cardiac monitoring, monitor vital signs, obtain 12 lead EKG if indicated  - Evaluate effectiveness of antiarrhythmic and heart rate control medications as ordered  - Initiate emergency measures for life threatening arrhythmias  - Monitor electrolytes and administer replacement therapy as ordered  Outcome: Progressing     Problem: RESPIRATORY - ADULT  Goal: Achieves optimal ventilation and oxygenation  Description: INTERVENTIONS:  - Assess for changes in respiratory status  - Assess for changes in mentation and behavior  - Position to facilitate oxygenation and minimize respiratory effort  - Oxygen  supplementation based on oxygen saturation or ABGs  - Provide Smoking Cessation handout, if applicable  - Encourage broncho-pulmonary hygiene including cough, deep breathe, Incentive Spirometry  - Assess the need for suctioning and perform as needed  - Assess and instruct to report SOB or any respiratory difficulty  - Respiratory Therapy support as indicated  - Manage/alleviate anxiety  - Monitor for signs/symptoms of CO2 retention  Outcome: Progressing     Problem: GENITOURINARY - ADULT  Goal: Absence of urinary retention  Description: INTERVENTIONS:  - Assess patient’s ability to void and empty bladder  - Monitor intake/output and perform bladder scan as needed  - Follow urinary retention protocol/standard of care  - Consider collaborating with pharmacy to review patient's medication profile  - Implement strategies to promote bladder emptying  Outcome: Progressing     Problem: METABOLIC/FLUID AND ELECTROLYTES - ADULT  Goal: Electrolytes maintained within normal limits  Description: INTERVENTIONS:  - Monitor labs and rhythm and assess patient for signs and symptoms of electrolyte imbalances  - Administer electrolyte replacement as ordered  - Monitor response to electrolyte replacements, including rhythm and repeat lab results as appropriate  - Fluid restriction as ordered  - Instruct patient on fluid and nutrition restrictions as appropriate  Outcome: Progressing  Goal: Hemodynamic stability and optimal renal function maintained  Description: INTERVENTIONS:  - Monitor labs and assess for signs and symptoms of volume excess or deficit  - Monitor intake, output and patient weight  - Monitor urine specific gravity, serum osmolarity and serum sodium as indicated or ordered  - Monitor response to interventions for patient's volume status, including labs, urine output, blood pressure (other measures as available)  - Encourage oral intake as appropriate  - Instruct patient on fluid and nutrition restrictions as  appropriate  Outcome: Progressing     Problem: SKIN/TISSUE INTEGRITY - ADULT  Goal: Skin integrity remains intact  Description: INTERVENTIONS  - Assess and document risk factors for pressure ulcer development  - Assess and document skin integrity  - Monitor for areas of redness and/or skin breakdown  - Initiate interventions, skin care algorithm/standards of care as needed  Outcome: Progressing  Goal: Oral mucous membranes remain intact  Description: INTERVENTIONS  - Assess oral mucosa and hygiene practices  - Implement preventative oral hygiene regimen  - Implement oral medicated treatments as ordered  Outcome: Progressing     Problem: NEUROLOGICAL - ADULT  Goal: Achieves stable or improved neurological status  Description: INTERVENTIONS  - Assess for and report changes in neurological status  - Initiate measures to prevent increased intracranial pressure  - Maintain blood pressure and fluid volume within ordered parameters to optimize cerebral perfusion and minimize risk of hemorrhage  - Monitor temperature, glucose, and sodium. Initiate appropriate interventions as ordered  Outcome: Progressing     Problem: Impaired Swallowing  Goal: Minimize aspiration risk  Description: Interventions:  - Patient should be alert and upright for all feedings (90 degrees preferred)  - Offer food and liquids at a slow rate  - No straws  - Encourage small bites of food and small sips of liquid  - Offer pills one at a time, crush or deliver with applesauce as needed  - Discontinue feeding and notify MD (or speech pathologist) if coughing or persistent throat clearing or wet/gurgly vocal quality is noted  Outcome: Progressing

## 2024-07-18 NOTE — PLAN OF CARE
Problem: SAFETY ADULT - FALL  Goal: Free from fall injury  Description: INTERVENTIONS:  - Assess pt frequently for physical needs  - Identify cognitive and physical deficits and behaviors that affect risk of falls.  - [place] fall precautions as indicated by assessment.  - Educate pt/family on patient safety including physical limitations  - Instruct pt to call for assistance with activity based on assessment  - Modify environment to reduce risk of injury  - Provide assistive devices as appropriate  - Consider OT/PT consult to assist with strengthening/mobility  - Encourage toileting schedule  Outcome: Progressing     Problem: CARDIOVASCULAR - ADULT  Goal: Maintains optimal cardiac output and hemodynamic stability  Description: INTERVENTIONS:  - Monitor vital signs, rhythm, and trends  - Monitor for bleeding, hypotension and signs of decreased cardiac output  - Evaluate effectiveness of vasoactive medications to optimize hemodynamic stability  - Monitor arterial and/or venous puncture sites for bleeding and/or hematoma  - Assess quality of pulses, skin color and temperature  - Assess for signs of decreased coronary artery perfusion - ex. Angina  - Evaluate fluid balance, assess for edema, trend weights  Outcome: Progressing  Goal: Absence of cardiac arrhythmias or at baseline  Description: INTERVENTIONS:  - Continuous cardiac monitoring, monitor vital signs, obtain 12 lead EKG if indicated  - Evaluate effectiveness of antiarrhythmic and heart rate control medications as ordered  - Initiate emergency measures for life threatening arrhythmias  - Monitor electrolytes and administer replacement therapy as ordered  Outcome: Progressing     Problem: RESPIRATORY - ADULT  Goal: Achieves optimal ventilation and oxygenation  Description: INTERVENTIONS:  - Assess for changes in respiratory status  - Assess for changes in mentation and behavior  - Position to facilitate oxygenation and minimize respiratory effort  - Oxygen  supplementation based on oxygen saturation or ABGs  - Provide Smoking Cessation handout, if applicable  - Encourage broncho-pulmonary hygiene including cough, deep breathe, Incentive Spirometry  - Assess the need for suctioning and perform as needed  - Assess and instruct to report SOB or any respiratory difficulty  - Respiratory Therapy support as indicated  - Manage/alleviate anxiety  - Monitor for signs/symptoms of CO2 retention  Outcome: Progressing     Problem: GENITOURINARY - ADULT  Goal: Absence of urinary retention  Description: INTERVENTIONS:  - Assess patient’s ability to void and empty bladder  - Monitor intake/output and perform bladder scan as needed  - Follow urinary retention protocol/standard of care  - Consider collaborating with pharmacy to review patient's medication profile  - Implement strategies to promote bladder emptying  Outcome: Progressing     Problem: METABOLIC/FLUID AND ELECTROLYTES - ADULT  Goal: Electrolytes maintained within normal limits  Description: INTERVENTIONS:  - Monitor labs and rhythm and assess patient for signs and symptoms of electrolyte imbalances  - Administer electrolyte replacement as ordered  - Monitor response to electrolyte replacements, including rhythm and repeat lab results as appropriate  - Fluid restriction as ordered  - Instruct patient on fluid and nutrition restrictions as appropriate  Outcome: Progressing  Goal: Hemodynamic stability and optimal renal function maintained  Description: INTERVENTIONS:  - Monitor labs and assess for signs and symptoms of volume excess or deficit  - Monitor intake, output and patient weight  - Monitor urine specific gravity, serum osmolarity and serum sodium as indicated or ordered  - Monitor response to interventions for patient's volume status, including labs, urine output, blood pressure (other measures as available)  - Encourage oral intake as appropriate  - Instruct patient on fluid and nutrition restrictions as  appropriate  Outcome: Progressing     Problem: SKIN/TISSUE INTEGRITY - ADULT  Goal: Skin integrity remains intact  Description: INTERVENTIONS  - Assess and document risk factors for pressure ulcer development  - Assess and document skin integrity  - Monitor for areas of redness and/or skin breakdown  - Initiate interventions, skin care algorithm/standards of care as needed  Outcome: Progressing  Goal: Oral mucous membranes remain intact  Description: INTERVENTIONS  - Assess oral mucosa and hygiene practices  - Implement preventative oral hygiene regimen  - Implement oral medicated treatments as ordered  Outcome: Progressing     Problem: NEUROLOGICAL - ADULT  Goal: Achieves stable or improved neurological status  Description: INTERVENTIONS  - Assess for and report changes in neurological status  - Initiate measures to prevent increased intracranial pressure  - Maintain blood pressure and fluid volume within ordered parameters to optimize cerebral perfusion and minimize risk of hemorrhage  - Monitor temperature, glucose, and sodium. Initiate appropriate interventions as ordered  Outcome: Progressing     Problem: Impaired Swallowing  Goal: Minimize aspiration risk  Description: Interventions:  - Patient should be alert and upright for all feedings (90 degrees preferred)  - Offer food and liquids at a slow rate  - No straws  - Encourage small bites of food and small sips of liquid  - Offer pills one at a time, crush or deliver with applesauce as needed  - Discontinue feeding and notify MD (or speech pathologist) if coughing or persistent throat clearing or wet/gurgly vocal quality is noted  Outcome: Progressing     Problem: Impaired Cognition  Goal: Patient will exhibit improved attention, thought processing and/or memory  Description: Interventions:  Outcome: Progressing     Problem: Patient Centered Care  Goal: Patient preferences are identified and integrated in the patient's plan of care  Description:  Interventions:  - What would you like us to know as we care for you? I live at home with my wife. I have a hx of COPD. I wear 4 L O2 at home.  - Provide timely, complete, and accurate information to patient/family  - Incorporate patient and family knowledge, values, beliefs, and cultural backgrounds into the planning and delivery of care  - Encourage patient/family to participate in care and decision-making at the level they choose  - Honor patient and family perspectives and choices  Outcome: Progressing

## 2024-07-18 NOTE — PROGRESS NOTES
[facility]  part of [facility]    Progress Note    Campbell Rao Patient Status:  Inpatient    1950 MRN [id number]   Location [facility]5W Attending Rubia Powers MD   Hosp Day # 4 PCP Johny Kang MD       Subjective:   Campbell Rao is feeling ok. Sitting up in chair.     Objective:   Blood pressure 123/85, pulse 98, temperature 97.9 °F (36.6 °C), temperature source Oral, resp. rate 20, height 5' 7\" (1.702 m), weight (!) 303 lb 3.2 oz (137.5 kg), SpO2 92%.    Physical Exam:    General: No acute distress.   Respiratory: Clear to auscultation bilaterally. No wheezes. No rhonchi.  Cardiovascular: S1, S2. Regular rate and rhythm. No murmurs, rubs or gallops.   Abdomen: Soft, nontender, nondistended.  Positive bowel sounds. No rebound or guarding.  Neurologic: No focal neurological deficits.   Musculoskeletal: Moves all extremities.  Extremities: No edema.    Results:     Lab Results   Component Value Date    WBC 11.7 (H) 2024    HGB 14.9 2024    HCT 47.1 2024    .0 2024    CREATSERUM 0.97 2024    BUN 31 (H) 2024     2024    K 4.2 2024    CL 99 2024    CO2 38.0 (H) 2024     (H) 2024    CA 9.2 2024    ALB 4.4 2024    ALKPHO 122 (H) 2024    BILT 0.4 2024    TP 7.1 2024    AST 43 (H) 2024     (H) 2024    TSH 2.777 2024    GGT 38 2024    DDIMER 0.40 10/18/2016    MG 2.5 2024    PHOS 4.1 2024    TROP <0.045 04/10/2021    B12 765 2021       XR CHEST AP PORTABLE  (CPT=71045)    Result Date: 2024  CONCLUSION:  1.  Interstitial prominence suggesting mild edema or fibrosis. 2.  Nonspecific subpleural thickening along the outer aspect of the right mid and upper lung zones similar to most recent exam.  This could represent loculated effusion or other pleural based process. 3.  Bibasilar pulmonary opacities  likely represent atelectasis however correlate for pneumonia/aspiration. 4.  Linear atelectasis or scar in the left lung base. 5.  Post kyphoplasty in the midthoracic spine.   Dictated by (CST): Sujit Quintana MD on 7/16/2024 at 4:21 PM     Finalized by (CST): Sujit Quintana MD on 7/16/2024 at 4:24 PM              metoprolol tartrate  25 mg Oral 2x Daily(Beta Blocker)    [START ON 7/19/2024] predniSONE  40 mg Oral Daily with breakfast    ipratropium-albuterol  3 mL Nebulization QID    furosemide  20 mg Intravenous Q8H    cefTRIAXone  2 g Intravenous Q24H    albuterol  10 mg Nebulization Once    atorvastatin  20 mg Oral Nightly    fluticasone-umeclidin-vilant  1 puff Inhalation Daily    heparin  5,000 Units Subcutaneous 2 times per day    pantoprazole  40 mg Oral QAM AC         Assessment and Plan:       Acute on chronic respiratory failure with hypoxia and hypercapnia  Pulmonary consulted, patient currently on BiPAP we will continue the same for now.    Wean as tolerated  Cxr ? Edema vs multifocal pneumonia  IV steroids, IV Abx- will have antibiotics for one more day then discontinue   Management per pulmonary service, encourage use of BiPAP as much as possible.  Now currently on 4 liters  Ambulate      COPD exacerbation/ ho MARIA M  Patient started on DuoNebs every 4 hours and as needed.   Prednisone 40 mg daily  Resume home inhalers.   Currently being supported with BiPAP.     Acute on chronic diastolic heart failure  Cardiology consulted, monitor I's and O's and daily weights.  Patient started on Lasix 20 mg IV q 8 hours per Cards     Acute encephalopathy  Resolved  Likely secondary to CO2 retention, patient given Zyprexa earlier, will continue to monitor attempt to redirect.     Hyperlipidemia  Continue statin.     Severe obesity with obstructive sleep apnea  BMI 36.  Patient counseled regarding diet and exercise once current issues resolve, continue with BiPAP at night for now     Prophylaxis  Subcutaneous heparin      CODE STATUS  Full     Primary care physician  Johny Kang MD        Coordinated care with providers and counseling re: treatment plan and workup           Quality:  DVT Prophylaxis: Heparin  CODE status: Full    MDM High. Time spent on chart/note review, review of labs/imaging, discussion with patient, physical exam, discussion with staff, consultants, coordinating care, writing progress note, and discussion of plan of care.       HUSSEIN GANNON MD  07/18/24

## 2024-07-19 LAB
ANION GAP SERPL CALC-SCNC: 7 MMOL/L (ref 0–18)
BASOPHILS # BLD AUTO: 0.06 X10(3) UL (ref 0–0.2)
BASOPHILS NFR BLD AUTO: 0.5 %
BUN BLD-MCNC: 32 MG/DL (ref 9–23)
BUN/CREAT SERPL: 26.9 (ref 10–20)
CALCIUM BLD-MCNC: 8.8 MG/DL (ref 8.7–10.4)
CHLORIDE SERPL-SCNC: 98 MMOL/L (ref 98–112)
CO2 SERPL-SCNC: 36 MMOL/L (ref 21–32)
CREAT BLD-MCNC: 1.19 MG/DL
DEPRECATED RDW RBC AUTO: 49.3 FL (ref 35.1–46.3)
EGFRCR SERPLBLD CKD-EPI 2021: 64 ML/MIN/1.73M2 (ref 60–?)
EOSINOPHIL # BLD AUTO: 0.04 X10(3) UL (ref 0–0.7)
EOSINOPHIL NFR BLD AUTO: 0.3 %
ERYTHROCYTE [DISTWIDTH] IN BLOOD BY AUTOMATED COUNT: 14.2 % (ref 11–15)
GLUCOSE BLD-MCNC: 167 MG/DL (ref 70–99)
HCT VFR BLD AUTO: 46.6 %
HGB BLD-MCNC: 15 G/DL
IMM GRANULOCYTES # BLD AUTO: 0.22 X10(3) UL (ref 0–1)
IMM GRANULOCYTES NFR BLD: 1.7 %
LYMPHOCYTES # BLD AUTO: 1.98 X10(3) UL (ref 1–4)
LYMPHOCYTES NFR BLD AUTO: 15.2 %
MAGNESIUM SERPL-MCNC: 2.2 MG/DL (ref 1.6–2.6)
MCH RBC QN AUTO: 30.4 PG (ref 26–34)
MCHC RBC AUTO-ENTMCNC: 32.2 G/DL (ref 31–37)
MCV RBC AUTO: 94.5 FL
MONOCYTES # BLD AUTO: 1.33 X10(3) UL (ref 0.1–1)
MONOCYTES NFR BLD AUTO: 10.2 %
NEUTROPHILS # BLD AUTO: 9.4 X10 (3) UL (ref 1.5–7.7)
NEUTROPHILS # BLD AUTO: 9.4 X10(3) UL (ref 1.5–7.7)
NEUTROPHILS NFR BLD AUTO: 72.1 %
OSMOLALITY SERPL CALC.SUM OF ELEC: 303 MOSM/KG (ref 275–295)
PLATELET # BLD AUTO: 183 10(3)UL (ref 150–450)
POTASSIUM SERPL-SCNC: 3.5 MMOL/L (ref 3.5–5.1)
RBC # BLD AUTO: 4.93 X10(6)UL
SODIUM SERPL-SCNC: 141 MMOL/L (ref 136–145)
WBC # BLD AUTO: 13 X10(3) UL (ref 4–11)

## 2024-07-19 PROCEDURE — 99233 SBSQ HOSP IP/OBS HIGH 50: CPT | Performed by: HOSPITALIST

## 2024-07-19 PROCEDURE — 99232 SBSQ HOSP IP/OBS MODERATE 35: CPT | Performed by: INTERNAL MEDICINE

## 2024-07-19 RX ORDER — METOPROLOL SUCCINATE 50 MG/1
50 TABLET, EXTENDED RELEASE ORAL
Status: DISCONTINUED | OUTPATIENT
Start: 2024-07-20 | End: 2024-07-21

## 2024-07-19 NOTE — PROGRESS NOTES
Progress Note  Campbell STEFFANY Elderrandamariela Patient Status:  Inpatient    1950 MRN [id number]   Location [facility]5W Attending Rubia Powers MD   Hosp Day # 5 PCP Johny Kang MD     Subjective:  Pt sitting up in chair; denies complaints. Tolerating AVAPS mask overnight.     Objective:  /85 (BP Location: Right arm)   Pulse 84   Temp 98.2 °F (36.8 °C) (Oral)   Resp 22   Ht 5' 7\" (1.702 m)   Wt (!) 303 lb (137.4 kg)   SpO2 92%   BMI 47.46 kg/m²     Telemetry: NSR    Intake/Output:    Intake/Output Summary (Last 24 hours) at 2024 0956  Last data filed at 2024 0146  Gross per 24 hour   Intake 944 ml   Output 1850 ml   Net -906 ml       Last 3 Weights   24 0521 (!) 303 lb (137.4 kg)   24 0630 (!) 303 lb 3.2 oz (137.5 kg)   24 0637 (!) 304 lb 6.4 oz (138.1 kg)   24 1018 (!) 306 lb (138.8 kg)   07/15/24 0631 (!) 315 lb (142.9 kg)   24 2237 (!) 315 lb (142.9 kg)   24 230 lb (104.3 kg)   24 1555 298 lb 14.4 oz (135.6 kg)   24 1010 (!) 307 lb (139.3 kg)   24 1004 (!) 307 lb (139.3 kg)   24 0506 (!) 307 lb (139.3 kg)       Labs:  Recent Labs   Lab 24  1123 24  0534 24  0416   * 167* 167*   BUN 23 31* 32*   CREATSERUM 0.96 0.97 1.19   EGFRCR 83 82 64   CA 9.6 9.2 8.8    141 141   K 4.3 4.2 3.5   CL 99 99 98   CO2 38.0* 38.0* 36.0*     Recent Labs   Lab 24  1123 24  0534 24  0416   RBC 4.99 4.91 4.93   HGB 14.9 14.9 15.0   HCT 48.5 47.1 46.6   MCV 97.2 95.9 94.5   MCH 29.9 30.3 30.4   MCHC 30.7* 31.6 32.2   RDW 14.2 14.1 14.2   NEPRELIM 10.92* 9.85* 9.40*   WBC 12.5* 11.7* 13.0*   .0 188.0 183.0         Recent Labs   Lab 24   TROPHS 6       Diagnostics:  No results found.   Review of Systems   Cardiovascular:  Positive for dyspnea on exertion. Negative for chest pain.   Respiratory:  Positive for shortness of breath. Negative for cough and wheezing.     Gastrointestinal:  Positive for bloating. Negative for abdominal pain.       Physical Exam:    Gen: alert, oriented x 3, NAD  Heent: pupils equal, reactive. Mucous membranes moist.   Neck: no jvd  Cardiac: regular rate and rhythm, normal S1,S2, no murmur, clicks, rub or gallop  Lungs: Diminished, fine bibasilar crackles  Abd: soft, distended, +bs  Ext: no edema  Skin: Warm, dry  Neuro: No focal deficits      Medications:     metoprolol tartrate  25 mg Oral 2x Daily(Beta Blocker)    predniSONE  40 mg Oral Daily with breakfast    spironolactone  12.5 mg Oral Daily    ipratropium-albuterol  3 mL Nebulization QID    furosemide  20 mg Intravenous Q8H    cefTRIAXone  2 g Intravenous Q24H    albuterol  10 mg Nebulization Once    atorvastatin  20 mg Oral Nightly    fluticasone-umeclidin-vilant  1 puff Inhalation Daily    heparin  5,000 Units Subcutaneous 2 times per day    pantoprazole  40 mg Oral QAM AC       Assessment:  Acute on Chronic Hypoxic, Hypercapneic Respiratory Failure (Multifactorial - COPD Exac, MARIA M, HF Exac)  Now on 4L NC (baseline at home), up to 6L with activity  IV antibx, nebs, inhaler, steroids  Bipap while sleeping  Pulm following   Acute HFpEF   LVEF 60-65%, no RWMA, RA dilated  Diuresing w/IV Lasix 20mg q 8 hrs   Net neg ~8.5L, wt down 12 lbs since admit  On BB, spironolactone  Historically on torsemide at home but was not taking  BUN 32, Cr stable  Serum bicarb elevated - suspect metabolic compensation for hypercapnia vs contraction alkalosis   Sinus Tachycardia, PSVT  Brief few seconds of PSVT on 7/17  On lopressor 25 po BID   COPD w/MARIA M - BiPAP; non-compliant with CPAP at home  Morbid Obesity - BMI 47  Hx Medication Non-compliance  DMII - A1C 6.3     Plan:  Continue lopressor 25mg po BID - will transition to toprol 50mg po daily tomorrow am  Continue diuresing with Lasix 20mg IV q 8 hrs - still appears volume overloaded on exam. Eventual plan to transition to po torsemide.  Spironolactone 12.5mg  po daily started this am   Strict I&O, daily standing weight, daily BMP; added HF order set  Will plan to add SGLT2i at discharge  Will need close follow up and continued HF education after discharge    Plan of care discussed with patient, RN.    BRYNN Carr  7/19/2024  9:56 AM  [phone] [facility]  [phone] [facility]        Cardiologist Addendum:  Campbell T Maira was seen and examined independently and I agree with the above documentation provided by NELL Carr.  Patient seen and examined, reviewed importance of salt and fluid restriction.  Continue low-dose IV Lasix, add spironolactone and SGLT2i at time of discharge.    Thank you for allowing me to take part in the care of Campbell Rao. Please call with any questions of concerns.    L3    Pawan Florian DO  [facility]   Interventional Cardiac and Vascular Services      July 19, 2024  6:58 PM

## 2024-07-19 NOTE — PHYSICAL THERAPY NOTE
PHYSICAL THERAPY TREATMENT NOTE - INPATIENT     Room Number: 523/523-A       Presenting Problem: Acute on chronic respiratory failure with hypoxia  Co-Morbidities : COPD on 4L O2, HTN, CHF, chronic respiratory failure, CAD, OA, obesity    Problem List  Principal Problem:    Acute on chronic respiratory failure with hypoxia and hypercapnia (HCC)  Active Problems:    Acute on chronic respiratory failure with hypoxia (HCC)    Hypernatremia      PHYSICAL THERAPY ASSESSMENT   Patient demonstrates excellent progress this session, goals  progressing with 1/5 met this session. Pt SpO2 stable on 5L at rest, 6L with activity.    Patient is requiring supervision as a result of the following impairments: decreased endurance/aerobic capacity and increased O2 needs from baseline. Patient is cleared from a physical therapy standpoint for discharge as they have adequately achieved therapy goals to return home safely, however patient would benefit from continued inpatient therapy services if patient remains hospitalized to further progress towards prior level of function.     Patient continues to function near baseline with transfers, gait, and stair negotiation.  Next session anticipate patient to progress gait and stair negotiation.  Physical Therapy will continue to follow patient for duration of hospitalization.    Patient continues to benefit from continued skilled PT services: at discharge to promote prior level of function and safety with additional support and return home with home health PT.    PLAN  PT Treatment Plan: Bed mobility;Body mechanics;Endurance  Frequency (Obs): 5x/week    SUBJECTIVE  \"That's about as far as I have to walk for my daughter's softball games from the parking lot.\"    OBJECTIVE  Precautions: None    WEIGHT BEARING RESTRICTION  none    PAIN ASSESSMENT   Ratin  Location: denies       BALANCE  Static Sitting: Normal  Dynamic Sitting: Good  Static Standing: Good  Dynamic Standing: Fair +    ACTIVITY  TOLERANCE                          O2 WALK       AM-PAC '6-Clicks' INPATIENT SHORT FORM - BASIC MOBILITY  How much difficulty does the patient currently have...  Patient Difficulty: Turning over in bed (including adjusting bedclothes, sheets and blankets)?: A Little   Patient Difficulty: Sitting down on and standing up from a chair with arms (e.g., wheelchair, bedside commode, etc.): A Little   Patient Difficulty: Moving from lying on back to sitting on the side of the bed?: A Little   How much help from another person does the patient currently need...   Help from Another: Moving to and from a bed to a chair (including a wheelchair)?: A Little   Help from Another: Need to walk in hospital room?: A Little   Help from Another: Climbing 3-5 steps with a railing?: A Little     AM-PAC Score:  Raw Score: 18   Approx Degree of Impairment: 46.58%   Standardized Score (AM-PAC Scale): 43.63   CMS Modifier (G-Code): CK    FUNCTIONAL ABILITY STATUS    Sit to Stand: modified independent - from bedside chair    Skilled Therapy Provided:Pt seen daily.Min a for bed mobility and transfer;extra time provided to complete task. Since previous session patient has progressed, tolerating increased ambulation distance with more stable SpO2. Pt amb 2 x 100 ft with RW and CGA;one rest break provided.There ex.                   Education with patient provided verbally on physical therapy plan of care, physiological benefits of out of bed mobility, and breathing technique. Patient with good carryover during session. Anticipated therapy needs remain appropriate based on the patient's performance, personal factors, and remaining functional impairments.      The patient's Approx Degree of Impairment: 46.58% has been calculated based on documentation in the [facility] '6 clicks' Inpatient Daily Activity Short Form.  Research supports that patients with this level of impairment may benefit from home-health PT.  Final disposition will be made by  interdisciplinary medical team.      Patient End of Session: Up in chair;Call light within reach;RN aware of session/findings;All patient questions and concerns addressed    CURRENT GOALS   Goals to be met by: 8/1/24  Patient Goal Patient's self-stated goal is: return home safely   Goal #1 Patient is able to demonstrate supine - sit EOB @ level: minimum assistance      Goal #1   Current Status  Min a   Goal #2 Patient is able to demonstrate transfers EOB to/from C at assistance level: modified independent with walker - rolling      Goal #2  Current Status Min a   Goal #3 Patient is able to ambulate 60 feet with assist device: walker - rolling at assistance level: modified independent   Goal #3   Current Status  Pt amb 2   x 100 ft wit RW and CGA   Goal #4 Patient will negotiate 2 stairs/one curb w/ assistive device and supervision   Goal #4   Current Status  NOT MET/IN PROGRESS    Goal #5 Patient to demonstrate independence with home activity/exercise instructions provided to patient in preparation for discharge.   Goal #5   Current Status  IN PROGRESS/ONGOING    Goal #6     Goal #6  Current Status       Gait Training: 15 minutes  Activity-15 minutes

## 2024-07-19 NOTE — PROGRESS NOTES
[facility]  part of [facility]    Progress Note    Campbell Rao Patient Status:  Inpatient    1950 MRN [id number]   Location [facility]5W Attending Rubia Powers MD   Hosp Day # 5 PCP Johny Kang MD       Subjective:   Campbell Rao is sitting up in the chair. Still feels sob but better. On 4 liters baseline.     Objective:   Blood pressure 110/71, pulse 87, temperature 98.3 °F (36.8 °C), temperature source Oral, resp. rate 24, height 5' 7\" (1.702 m), weight (!) 303 lb (137.4 kg), SpO2 92%.    Physical Exam:    General: No acute distress.   Respiratory: Clear to auscultation bilaterally. No wheezes. No rhonchi.  Cardiovascular: S1, S2. Regular rate and rhythm. No murmurs, rubs or gallops.   Abdomen: Soft, nontender, nondistended.  Positive bowel sounds. No rebound or guarding.  Neurologic: No focal neurological deficits.   Musculoskeletal: Moves all extremities.  Extremities: No edema.    Results:     Lab Results   Component Value Date    WBC 13.0 (H) 2024    HGB 15.0 2024    HCT 46.6 2024    .0 2024    CREATSERUM 1.19 2024    BUN 32 (H) 2024     2024    K 3.5 2024    CL 98 2024    CO2 36.0 (H) 2024     (H) 2024    CA 8.8 2024    ALB 4.4 2024    ALKPHO 122 (H) 2024    BILT 0.4 2024    TP 7.1 2024    AST 43 (H) 2024     (H) 2024    TSH 2.777 2024    GGT 38 2024    DDIMER 0.40 10/18/2016    MG 2.2 2024    PHOS 4.1 2024    TROP <0.045 04/10/2021    B12 765 2021       No results found.       [START ON 2024] predniSONE  30 mg Oral Daily with breakfast    [START ON 2024] metoprolol succinate  50 mg Oral Daily Beta Blocker    metoprolol tartrate  25 mg Oral 2x Daily(Beta Blocker)    spironolactone  12.5 mg Oral Daily    ipratropium-albuterol  3 mL Nebulization QID    furosemide  20 mg  Intravenous Q8H    albuterol  10 mg Nebulization Once    atorvastatin  20 mg Oral Nightly    fluticasone-umeclidin-vilant  1 puff Inhalation Daily    heparin  5,000 Units Subcutaneous 2 times per day    pantoprazole  40 mg Oral QAM AC         Assessment and Plan:       Acute on chronic respiratory failure with hypoxia and hypercapnia  Pulmonary consulted, patient currently on BiPAP we will continue the same for now.    Wean as tolerated  IV steroids, IV Abx- will have antibiotics for one more day then discontinue   Management per pulmonary service, encourage use of BiPAP as much as possible.  Now currently on 4 liters  Taper steroid  Inhalers   Ambulate      COPD exacerbation/ ho MARIA M  Patient started on DuoNebs every 4 hours and as needed.   Prednisone 40 mg daily  Resume home inhalers.   Currently being supported with BiPAP.     Acute on chronic diastolic heart failure  Cardiology consulted, monitor I's and O's and daily weights.  Patient started on Lasix 20 mg IV q 8 hours per Cards  Switch to Po when ok with cards  Spironolactone added   Lopressor 25 mg PO BID     Acute encephalopathy  Resolved  Likely secondary to CO2 retention, patient given Zyprexa earlier, will continue to monitor attempt to redirect.     Hyperlipidemia  Continue statin.     Severe obesity with obstructive sleep apnea  BMI 36.  Patient counseled regarding diet and exercise once current issues resolve, continue with BiPAP at night for now     Prophylaxis  Subcutaneous heparin     CODE STATUS  Full     Primary care physician  Johny Kang MD        Coordinated care with providers and counseling re: treatment plan and workup           Quality:  DVT Prophylaxis: Heparin  CODE status: Full    [facility]. Time spent on chart/note review, review of labs/imaging, discussion with patient, physical exam, discussion with staff, consultants, coordinating care, writing progress note, and discussion of plan of care.       HUSSEIN GANNON  MD  07/19/24

## 2024-07-19 NOTE — PLAN OF CARE
Pt is A&Ox4. Pt vitals were stable with no acute changes. Pt was able to tolerate sleeping with the BiPAP while in the chair. Pt's plan is to continue IV lasix until medically cleared. Safety precautions are in place and call light is within the pt's reach.     Goal: Free from fall injury  Description: INTERVENTIONS:  - Assess pt frequently for physical needs  - Identify cognitive and physical deficits and behaviors that affect risk of falls.  - [place] fall precautions as indicated by assessment.  - Educate pt/family on patient safety including physical limitations  - Instruct pt to call for assistance with activity based on assessment  - Modify environment to reduce risk of injury  - Provide assistive devices as appropriate  - Consider OT/PT consult to assist with strengthening/mobility  - Encourage toileting schedule  Outcome: Progressing     Problem: CARDIOVASCULAR - ADULT  Goal: Maintains optimal cardiac output and hemodynamic stability  Description: INTERVENTIONS:  - Monitor vital signs, rhythm, and trends  - Monitor for bleeding, hypotension and signs of decreased cardiac output  - Evaluate effectiveness of vasoactive medications to optimize hemodynamic stability  - Monitor arterial and/or venous puncture sites for bleeding and/or hematoma  - Assess quality of pulses, skin color and temperature  - Assess for signs of decreased coronary artery perfusion - ex. Angina  - Evaluate fluid balance, assess for edema, trend weights  Outcome: Progressing  Goal: Absence of cardiac arrhythmias or at baseline  Description: INTERVENTIONS:  - Continuous cardiac monitoring, monitor vital signs, obtain 12 lead EKG if indicated  - Evaluate effectiveness of antiarrhythmic and heart rate control medications as ordered  - Initiate emergency measures for life threatening arrhythmias  - Monitor electrolytes and administer replacement therapy as ordered  Outcome: Progressing     Problem: RESPIRATORY - ADULT  Goal: Achieves optimal  ventilation and oxygenation  Description: INTERVENTIONS:  - Assess for changes in respiratory status  - Assess for changes in mentation and behavior  - Position to facilitate oxygenation and minimize respiratory effort  - Oxygen supplementation based on oxygen saturation or ABGs  - Provide Smoking Cessation handout, if applicable  - Encourage broncho-pulmonary hygiene including cough, deep breathe, Incentive Spirometry  - Assess the need for suctioning and perform as needed  - Assess and instruct to report SOB or any respiratory difficulty  - Respiratory Therapy support as indicated  - Manage/alleviate anxiety  - Monitor for signs/symptoms of CO2 retention  Outcome: Progressing     Problem: GENITOURINARY - ADULT  Goal: Absence of urinary retention  Description: INTERVENTIONS:  - Assess patient’s ability to void and empty bladder  - Monitor intake/output and perform bladder scan as needed  - Follow urinary retention protocol/standard of care  - Consider collaborating with pharmacy to review patient's medication profile  - Implement strategies to promote bladder emptying  Outcome: Progressing     Problem: METABOLIC/FLUID AND ELECTROLYTES - ADULT  Goal: Electrolytes maintained within normal limits  Description: INTERVENTIONS:  - Monitor labs and rhythm and assess patient for signs and symptoms of electrolyte imbalances  - Administer electrolyte replacement as ordered  - Monitor response to electrolyte replacements, including rhythm and repeat lab results as appropriate  - Fluid restriction as ordered  - Instruct patient on fluid and nutrition restrictions as appropriate  Outcome: Progressing  Goal: Hemodynamic stability and optimal renal function maintained  Description: INTERVENTIONS:  - Monitor labs and assess for signs and symptoms of volume excess or deficit  - Monitor intake, output and patient weight  - Monitor urine specific gravity, serum osmolarity and serum sodium as indicated or ordered  - Monitor response  to interventions for patient's volume status, including labs, urine output, blood pressure (other measures as available)  - Encourage oral intake as appropriate  - Instruct patient on fluid and nutrition restrictions as appropriate  Outcome: Progressing     Problem: SKIN/TISSUE INTEGRITY - ADULT  Goal: Skin integrity remains intact  Description: INTERVENTIONS  - Assess and document risk factors for pressure ulcer development  - Assess and document skin integrity  - Monitor for areas of redness and/or skin breakdown  - Initiate interventions, skin care algorithm/standards of care as needed  Outcome: Progressing  Goal: Oral mucous membranes remain intact  Description: INTERVENTIONS  - Assess oral mucosa and hygiene practices  - Implement preventative oral hygiene regimen  - Implement oral medicated treatments as ordered  Outcome: Progressing     Problem: NEUROLOGICAL - ADULT  Goal: Achieves stable or improved neurological status  Description: INTERVENTIONS  - Assess for and report changes in neurological status  - Initiate measures to prevent increased intracranial pressure  - Maintain blood pressure and fluid volume within ordered parameters to optimize cerebral perfusion and minimize risk of hemorrhage  - Monitor temperature, glucose, and sodium. Initiate appropriate interventions as ordered  Outcome: Progressing     Problem: Impaired Swallowing  Goal: Minimize aspiration risk  Description: Interventions:  - Patient should be alert and upright for all feedings (90 degrees preferred)  - Offer food and liquids at a slow rate  - No straws  - Encourage small bites of food and small sips of liquid  - Offer pills one at a time, crush or deliver with applesauce as needed  - Discontinue feeding and notify MD (or speech pathologist) if coughing or persistent throat clearing or wet/gurgly vocal quality is noted  Outcome: Progressing     Problem: Patient Centered Care  Goal: Patient preferences are identified and integrated in  the patient's plan of care  Description: Interventions:  - What would you like us to know as we care for you? I live at home with my wife. I have a hx of COPD. I wear 4 L O2 at home.  - Provide timely, complete, and accurate information to patient/family  - Incorporate patient and family knowledge, values, beliefs, and cultural backgrounds into the planning and delivery of care  - Encourage patient/family to participate in care and decision-making at the level they choose  - Honor patient and family perspectives and choices  Outcome: Progressing

## 2024-07-19 NOTE — PROGRESS NOTES
[facility]  part of [facility]    Progress Note    Campbell Rao Patient Status:  Inpatient    1950 MRN [id number]   Location [facility]5W Attending Rubia Powers MD   Hosp Day # 5 PCP Johny Kang MD       Subjective:     Constitutional:  Negative for fever.   Respiratory:  Negative for cough and shortness of breath.    Cardiovascular:  Negative for chest pain.     Up to chair on 4 L NC / about baseline   No active sputum or cough   More awake and alert    Objective:   Blood pressure 125/85, pulse 84, temperature 98.2 °F (36.8 °C), temperature source Oral, resp. rate 22, height 5' 7\" (1.702 m), weight (!) 303 lb (137.4 kg), SpO2 92%.  Physical Exam  Constitutional:       General: He is not in acute distress.     Appearance: He is obese.   HENT:      Head: Atraumatic.   Eyes:      General: No scleral icterus.  Cardiovascular:      Rate and Rhythm: Normal rate.      Heart sounds:      No gallop.   Pulmonary:      Effort: No respiratory distress.      Breath sounds: No stridor. No wheezing, rhonchi or rales.   Abdominal:      General: Abdomen is flat. Bowel sounds are normal.      Palpations: Abdomen is soft.   Musculoskeletal:      Cervical back: Normal range of motion.   Skin:     General: Skin is dry.   Neurological:      Mental Status: He is oriented to person, place, and time.         Results:   Lab Results   Component Value Date    WBC 13.0 (H) 2024    HGB 15.0 2024    HCT 46.6 2024    .0 2024    CREATSERUM 1.19 2024    BUN 32 (H) 2024     2024    K 3.5 2024    CL 98 2024    CO2 36.0 (H) 2024     (H) 2024    CA 8.8 2024    ALB 4.4 2024    ALKPHO 122 (H) 2024    BILT 0.4 2024    TP 7.1 2024    AST 43 (H) 2024     (H) 2024    TSH 2.777 2024    GGT 38 2024    DDIMER 0.40 10/18/2016    MG 2.2 2024    PHOS 4.1  04/26/2024    TROP <0.045 04/10/2021    TROPHS 6 07/14/2024    B12 765 04/27/2021         Assessment & Plan:      1-acute on chronic hypoxemic and hypercapnic respiratory failure  Decompensated overlap syndrome with COPD and MARIA M     BiPAP during sleep and as needed during daytime  O2 therapy  Avoid sedative or narcotic     Overall better today and more awake and alert and up to the chair     2-COPD with acute exacerbation  Trelegy inhaler and taper steroid  Empiric antibiotics     3-MARIA M and obesity with a BMI of 49  Pap during sleep  Poor compliance with his cpap at home / counseling provided regarding importance of using his cpap all night during sleep / voiced understanding .    Avoid sedative and narcotic     4-history of diastolic CHF  Per cardiology         5-DVT prophylaxis  Heparin subcu     Better today / about baseline   Ok to discharge                 Yesi Salinas MD  7/19/2024

## 2024-07-20 LAB
ANION GAP SERPL CALC-SCNC: 2 MMOL/L (ref 0–18)
BASE EXCESS BLD CALC-SCNC: 15 MMOL/L (ref ?–2)
BASOPHILS # BLD AUTO: 0.07 X10(3) UL (ref 0–0.2)
BASOPHILS NFR BLD AUTO: 0.6 %
BUN BLD-MCNC: 30 MG/DL (ref 9–23)
BUN/CREAT SERPL: 29.1 (ref 10–20)
CALCIUM BLD-MCNC: 8.9 MG/DL (ref 8.7–10.4)
CHLORIDE SERPL-SCNC: 98 MMOL/L (ref 98–112)
CO2 SERPL-SCNC: 40 MMOL/L (ref 21–32)
CREAT BLD-MCNC: 1.03 MG/DL
DEPRECATED RDW RBC AUTO: 49.8 FL (ref 35.1–46.3)
EGFRCR SERPLBLD CKD-EPI 2021: 77 ML/MIN/1.73M2 (ref 60–?)
EOSINOPHIL # BLD AUTO: 0.06 X10(3) UL (ref 0–0.7)
EOSINOPHIL NFR BLD AUTO: 0.5 %
ERYTHROCYTE [DISTWIDTH] IN BLOOD BY AUTOMATED COUNT: 14.1 % (ref 11–15)
GLUCOSE BLD-MCNC: 169 MG/DL (ref 70–99)
HCO3 BLDA-SCNC: 36.5 MEQ/L (ref 21–27)
HCT VFR BLD AUTO: 45.7 %
HGB BLD-MCNC: 14.6 G/DL
IMM GRANULOCYTES # BLD AUTO: 0.24 X10(3) UL (ref 0–1)
IMM GRANULOCYTES NFR BLD: 2.1 %
LYMPHOCYTES # BLD AUTO: 1.82 X10(3) UL (ref 1–4)
LYMPHOCYTES NFR BLD AUTO: 15.8 %
MAGNESIUM SERPL-MCNC: 2.4 MG/DL (ref 1.6–2.6)
MCH RBC QN AUTO: 30.6 PG (ref 26–34)
MCHC RBC AUTO-ENTMCNC: 31.9 G/DL (ref 31–37)
MCV RBC AUTO: 95.8 FL
MODIFIED ALLEN TEST: POSITIVE
MONOCYTES # BLD AUTO: 1.32 X10(3) UL (ref 0.1–1)
MONOCYTES NFR BLD AUTO: 11.5 %
NEUTROPHILS # BLD AUTO: 7.99 X10 (3) UL (ref 1.5–7.7)
NEUTROPHILS # BLD AUTO: 7.99 X10(3) UL (ref 1.5–7.7)
NEUTROPHILS NFR BLD AUTO: 69.5 %
OSMOLALITY SERPL CALC.SUM OF ELEC: 300 MOSM/KG (ref 275–295)
OXYGEN LITERS/MINUTE: 6 L/MIN
PCO2 BLDA: 63 MM HG (ref 35–45)
PH BLDA: 7.44 [PH] (ref 7.35–7.45)
PLATELET # BLD AUTO: 162 10(3)UL (ref 150–450)
POTASSIUM SERPL-SCNC: 3.3 MMOL/L (ref 3.5–5.1)
POTASSIUM SERPL-SCNC: 4.8 MMOL/L (ref 3.5–5.1)
PUNCTURE CHARGE: YES
RBC # BLD AUTO: 4.77 X10(6)UL
SAO2 % BLDA: 96 % (ref 94–100)
SODIUM SERPL-SCNC: 140 MMOL/L (ref 136–145)
WBC # BLD AUTO: 11.5 X10(3) UL (ref 4–11)

## 2024-07-20 PROCEDURE — 99233 SBSQ HOSP IP/OBS HIGH 50: CPT | Performed by: HOSPITALIST

## 2024-07-20 PROCEDURE — 99232 SBSQ HOSP IP/OBS MODERATE 35: CPT | Performed by: INTERNAL MEDICINE

## 2024-07-20 RX ORDER — TORSEMIDE 20 MG/1
20 TABLET ORAL
Status: DISCONTINUED | OUTPATIENT
Start: 2024-07-21 | End: 2024-07-21

## 2024-07-20 RX ORDER — POTASSIUM CHLORIDE 20 MEQ/1
40 TABLET, EXTENDED RELEASE ORAL ONCE
Status: COMPLETED | OUTPATIENT
Start: 2024-07-20 | End: 2024-07-20

## 2024-07-20 RX ORDER — HEPARIN SODIUM 5000 [USP'U]/ML
7500 INJECTION, SOLUTION INTRAVENOUS; SUBCUTANEOUS EVERY 12 HOURS SCHEDULED
Status: DISCONTINUED | OUTPATIENT
Start: 2024-07-20 | End: 2024-07-21

## 2024-07-20 NOTE — PLAN OF CARE
Pt is A&Ox4. Pt vitals are stable and on acute changes during the shift. Pt was able to wear BiPAP during the night. Pt  had complaints of a leg cramp that resolved on its on. Safety precautions are in place and call light is within the pt's reach.   ABG Labs order after CO2 labs resulted. RN to call MD when ABG lab is resulted.     Problem: SAFETY ADULT - FALL  Goal: Free from fall injury  Description: INTERVENTIONS:  - Assess pt frequently for physical needs  - Identify cognitive and physical deficits and behaviors that affect risk of falls.  - [place] fall precautions as indicated by assessment.  - Educate pt/family on patient safety including physical limitations  - Instruct pt to call for assistance with activity based on assessment  - Modify environment to reduce risk of injury  - Provide assistive devices as appropriate  - Consider OT/PT consult to assist with strengthening/mobility  - Encourage toileting schedule  Outcome: Progressing     Problem: CARDIOVASCULAR - ADULT  Goal: Maintains optimal cardiac output and hemodynamic stability  Description: INTERVENTIONS:  - Monitor vital signs, rhythm, and trends  - Monitor for bleeding, hypotension and signs of decreased cardiac output  - Evaluate effectiveness of vasoactive medications to optimize hemodynamic stability  - Monitor arterial and/or venous puncture sites for bleeding and/or hematoma  - Assess quality of pulses, skin color and temperature  - Assess for signs of decreased coronary artery perfusion - ex. Angina  - Evaluate fluid balance, assess for edema, trend weights  Outcome: Progressing  Goal: Absence of cardiac arrhythmias or at baseline  Description: INTERVENTIONS:  - Continuous cardiac monitoring, monitor vital signs, obtain 12 lead EKG if indicated  - Evaluate effectiveness of antiarrhythmic and heart rate control medications as ordered  - Initiate emergency measures for life threatening arrhythmias  - Monitor electrolytes and administer  replacement therapy as ordered  Outcome: Progressing     Problem: RESPIRATORY - ADULT  Goal: Achieves optimal ventilation and oxygenation  Description: INTERVENTIONS:  - Assess for changes in respiratory status  - Assess for changes in mentation and behavior  - Position to facilitate oxygenation and minimize respiratory effort  - Oxygen supplementation based on oxygen saturation or ABGs  - Provide Smoking Cessation handout, if applicable  - Encourage broncho-pulmonary hygiene including cough, deep breathe, Incentive Spirometry  - Assess the need for suctioning and perform as needed  - Assess and instruct to report SOB or any respiratory difficulty  - Respiratory Therapy support as indicated  - Manage/alleviate anxiety  - Monitor for signs/symptoms of CO2 retention  Outcome: Progressing     Problem: GENITOURINARY - ADULT  Goal: Absence of urinary retention  Description: INTERVENTIONS:  - Assess patient’s ability to void and empty bladder  - Monitor intake/output and perform bladder scan as needed  - Follow urinary retention protocol/standard of care  - Consider collaborating with pharmacy to review patient's medication profile  - Implement strategies to promote bladder emptying  Outcome: Progressing     Problem: METABOLIC/FLUID AND ELECTROLYTES - ADULT  Goal: Electrolytes maintained within normal limits  Description: INTERVENTIONS:  - Monitor labs and rhythm and assess patient for signs and symptoms of electrolyte imbalances  - Administer electrolyte replacement as ordered  - Monitor response to electrolyte replacements, including rhythm and repeat lab results as appropriate  - Fluid restriction as ordered  - Instruct patient on fluid and nutrition restrictions as appropriate  Outcome: Progressing  Goal: Hemodynamic stability and optimal renal function maintained  Description: INTERVENTIONS:  - Monitor labs and assess for signs and symptoms of volume excess or deficit  - Monitor intake, output and patient weight  -  Monitor urine specific gravity, serum osmolarity and serum sodium as indicated or ordered  - Monitor response to interventions for patient's volume status, including labs, urine output, blood pressure (other measures as available)  - Encourage oral intake as appropriate  - Instruct patient on fluid and nutrition restrictions as appropriate  Outcome: Progressing     Problem: SKIN/TISSUE INTEGRITY - ADULT  Goal: Skin integrity remains intact  Description: INTERVENTIONS  - Assess and document risk factors for pressure ulcer development  - Assess and document skin integrity  - Monitor for areas of redness and/or skin breakdown  - Initiate interventions, skin care algorithm/standards of care as needed  Outcome: Progressing  Goal: Oral mucous membranes remain intact  Description: INTERVENTIONS  - Assess oral mucosa and hygiene practices  - Implement preventative oral hygiene regimen  - Implement oral medicated treatments as ordered  Outcome: Progressing     Problem: NEUROLOGICAL - ADULT  Goal: Achieves stable or improved neurological status  Description: INTERVENTIONS  - Assess for and report changes in neurological status  - Initiate measures to prevent increased intracranial pressure  - Maintain blood pressure and fluid volume within ordered parameters to optimize cerebral perfusion and minimize risk of hemorrhage  - Monitor temperature, glucose, and sodium. Initiate appropriate interventions as ordered  Outcome: Progressing     Problem: Impaired Swallowing  Goal: Minimize aspiration risk  Description: Interventions:  - Patient should be alert and upright for all feedings (90 degrees preferred)  - Offer food and liquids at a slow rate  - No straws  - Encourage small bites of food and small sips of liquid  - Offer pills one at a time, crush or deliver with applesauce as needed  - Discontinue feeding and notify MD (or speech pathologist) if coughing or persistent throat clearing or wet/gurgly vocal quality is  noted  Outcome: Progressing

## 2024-07-20 NOTE — PLAN OF CARE
Patient alert and oriented on 4 liters of oxygen at rest, with ambulation require 6 liters of oxygen. Will need walk test at discharge. Ambulated in the hallway multiple times with staff, overall stated he was feeling better. IV diuretics given. Frequent rounding by staff.     Problem: SAFETY ADULT - FALL  Goal: Free from fall injury  Description: INTERVENTIONS:  - Assess pt frequently for physical needs  - Identify cognitive and physical deficits and behaviors that affect risk of falls.  - [place] fall precautions as indicated by assessment.  - Educate pt/family on patient safety including physical limitations  - Instruct pt to call for assistance with activity based on assessment  - Modify environment to reduce risk of injury  - Provide assistive devices as appropriate  - Consider OT/PT consult to assist with strengthening/mobility  - Encourage toileting schedule  Outcome: Progressing     Problem: CARDIOVASCULAR - ADULT  Goal: Maintains optimal cardiac output and hemodynamic stability  Description: INTERVENTIONS:  - Monitor vital signs, rhythm, and trends  - Monitor for bleeding, hypotension and signs of decreased cardiac output  - Evaluate effectiveness of vasoactive medications to optimize hemodynamic stability  - Monitor arterial and/or venous puncture sites for bleeding and/or hematoma  - Assess quality of pulses, skin color and temperature  - Assess for signs of decreased coronary artery perfusion - ex. Angina  - Evaluate fluid balance, assess for edema, trend weights  Outcome: Progressing  Goal: Absence of cardiac arrhythmias or at baseline  Description: INTERVENTIONS:  - Continuous cardiac monitoring, monitor vital signs, obtain 12 lead EKG if indicated  - Evaluate effectiveness of antiarrhythmic and heart rate control medications as ordered  - Initiate emergency measures for life threatening arrhythmias  - Monitor electrolytes and administer replacement therapy as ordered  Outcome: Progressing      Problem: RESPIRATORY - ADULT  Goal: Achieves optimal ventilation and oxygenation  Description: INTERVENTIONS:  - Assess for changes in respiratory status  - Assess for changes in mentation and behavior  - Position to facilitate oxygenation and minimize respiratory effort  - Oxygen supplementation based on oxygen saturation or ABGs  - Provide Smoking Cessation handout, if applicable  - Encourage broncho-pulmonary hygiene including cough, deep breathe, Incentive Spirometry  - Assess the need for suctioning and perform as needed  - Assess and instruct to report SOB or any respiratory difficulty  - Respiratory Therapy support as indicated  - Manage/alleviate anxiety  - Monitor for signs/symptoms of CO2 retention  Outcome: Progressing     Problem: GENITOURINARY - ADULT  Goal: Absence of urinary retention  Description: INTERVENTIONS:  - Assess patient’s ability to void and empty bladder  - Monitor intake/output and perform bladder scan as needed  - Follow urinary retention protocol/standard of care  - Consider collaborating with pharmacy to review patient's medication profile  - Implement strategies to promote bladder emptying  Outcome: Progressing     Problem: METABOLIC/FLUID AND ELECTROLYTES - ADULT  Goal: Electrolytes maintained within normal limits  Description: INTERVENTIONS:  - Monitor labs and rhythm and assess patient for signs and symptoms of electrolyte imbalances  - Administer electrolyte replacement as ordered  - Monitor response to electrolyte replacements, including rhythm and repeat lab results as appropriate  - Fluid restriction as ordered  - Instruct patient on fluid and nutrition restrictions as appropriate  Outcome: Progressing  Goal: Hemodynamic stability and optimal renal function maintained  Description: INTERVENTIONS:  - Monitor labs and assess for signs and symptoms of volume excess or deficit  - Monitor intake, output and patient weight  - Monitor urine specific gravity, serum osmolarity and  serum sodium as indicated or ordered  - Monitor response to interventions for patient's volume status, including labs, urine output, blood pressure (other measures as available)  - Encourage oral intake as appropriate  - Instruct patient on fluid and nutrition restrictions as appropriate  Outcome: Progressing     Problem: SKIN/TISSUE INTEGRITY - ADULT  Goal: Skin integrity remains intact  Description: INTERVENTIONS  - Assess and document risk factors for pressure ulcer development  - Assess and document skin integrity  - Monitor for areas of redness and/or skin breakdown  - Initiate interventions, skin care algorithm/standards of care as needed  Outcome: Progressing  Goal: Oral mucous membranes remain intact  Description: INTERVENTIONS  - Assess oral mucosa and hygiene practices  - Implement preventative oral hygiene regimen  - Implement oral medicated treatments as ordered  Outcome: Progressing     Problem: NEUROLOGICAL - ADULT  Goal: Achieves stable or improved neurological status  Description: INTERVENTIONS  - Assess for and report changes in neurological status  - Initiate measures to prevent increased intracranial pressure  - Maintain blood pressure and fluid volume within ordered parameters to optimize cerebral perfusion and minimize risk of hemorrhage  - Monitor temperature, glucose, and sodium. Initiate appropriate interventions as ordered  Outcome: Progressing     Problem: Impaired Swallowing  Goal: Minimize aspiration risk  Description: Interventions:  - Patient should be alert and upright for all feedings (90 degrees preferred)  - Offer food and liquids at a slow rate  - No straws  - Encourage small bites of food and small sips of liquid  - Offer pills one at a time, crush or deliver with applesauce as needed  - Discontinue feeding and notify MD (or speech pathologist) if coughing or persistent throat clearing or wet/gurgly vocal quality is noted  Outcome: Progressing     Problem: Impaired  Cognition  Goal: Patient will exhibit improved attention, thought processing and/or memory  Description: Interventions:  - Minimize distractions in the room when full attention is required  Outcome: Progressing     Problem: Patient Centered Care  Goal: Patient preferences are identified and integrated in the patient's plan of care  Description: Interventions:  - What would you like us to know as we care for you? I live at home with my wife. I have a hx of COPD. I wear 4 L O2 at home.  - Provide timely, complete, and accurate information to patient/family  - Incorporate patient and family knowledge, values, beliefs, and cultural backgrounds into the planning and delivery of care  - Encourage patient/family to participate in care and decision-making at the level they choose  - Honor patient and family perspectives and choices  Outcome: Progressing

## 2024-07-20 NOTE — PROGRESS NOTES
[facility]  part of [facility]     Progress Note    Campbell Rao Patient Status:  Inpatient    1950 MRN [id number]   Location [facility]5W Attending Rubia Powers MD   Hosp Day # 6 PCP Johny Kang MD       Subjective:   Patient seen and examined.  Dyspnea gradually improving.  Some occasional cough.    Objective:   Blood pressure 101/64, pulse 83, temperature 97.9 °F (36.6 °C), temperature source Oral, resp. rate 20, height 5' 7\" (1.702 m), weight (!) 300 lb 3.2 oz (136.2 kg), SpO2 92%.  Intake/Output:   Last 3 shifts: I/O last 3 completed shifts:  In: 622 [P.O.:622]  Out: 3750 [Urine:3750]   This shift: I/O this shift:  In: 655 [P.O.:655]  Out: 600 [Urine:600]     Vent Settings: FiO2 (%):  [40 %] 40 %    Hemodynamic parameters (last 24 hours):      Scheduled Meds:   Current Facility-Administered Medications   Medication Dose Route Frequency    potassium chloride (Klor-Con M20) tab 40 mEq  40 mEq Oral Once    predniSONE (Deltasone) tab 30 mg  30 mg Oral Daily with breakfast    metoprolol succinate ER (Toprol XL) 24 hr tab 50 mg  50 mg Oral Daily Beta Blocker    spironolactone (Aldactone) partial tab 12.5 mg  12.5 mg Oral Daily    ipratropium-albuterol (Duoneb) 0.5-2.5 (3) MG/3ML inhalation solution 3 mL  3 mL Nebulization QID    sodium chloride (Saline Mist) 0.65 % nasal solution 1 spray  1 spray Each Nare Q3H PRN    furosemide (Lasix) 10 mg/mL injection 20 mg  20 mg Intravenous Q8H    albuterol (Ventolin) (5 MG/ML) 0.5% nebulizer solution 10 mg  10 mg Nebulization Once    atorvastatin (Lipitor) tab 20 mg  20 mg Oral Nightly    fluticasone-umeclidin-vilant (Trelegy Ellipta) 100-62.5-25 MCG/ACT inhaler 1 puff  1 puff Inhalation Daily    ipratropium-albuterol (Duoneb) 0.5-2.5 (3) MG/3ML inhalation solution 3 mL  3 mL Nebulization Q6H PRN    ondansetron (Zofran) 4 MG/2ML injection 4 mg  4 mg Intravenous Q6H PRN    acetaminophen (Tylenol) tab 650 mg  650 mg Oral Q6H  PRN    heparin (Porcine) 5000 UNIT/ML injection 5,000 Units  5,000 Units Subcutaneous 2 times per day    hydrALAzine (Apresoline) 20 mg/mL injection 10 mg  10 mg Intravenous Q4H PRN    alum-mag hydroxide-simethicone (Maalox) 200-200-20 MG/5ML oral suspension 30 mL  30 mL Oral QID PRN    pantoprazole (Protonix) DR tab 40 mg  40 mg Oral QAM AC       Continuous Infusions:     Physical Exam  Constitutional: no acute distress  Eyes: PERRL  ENT: nares pateint  Neck: supple, no JVD  Cardio: RRR, S1 S2  Respiratory: clear to auscultation bilaterally, no wheezing, rales, rhonchi, crackles  GI: abdomen soft, non tender, active bowel sounds, no organomegaly  Extremities: no clubbing, cyanosis, edema  Neurologic: no gross motor deficits  Skin: warm, dry      Results:     Lab Results   Component Value Date    WBC 11.5 07/20/2024    HGB 14.6 07/20/2024    HCT 45.7 07/20/2024    .0 07/20/2024    CREATSERUM 1.03 07/20/2024    BUN 30 07/20/2024     07/20/2024    K 3.3 07/20/2024    CL 98 07/20/2024    CO2 40.0 07/20/2024     07/20/2024    CA 8.9 07/20/2024    MG 2.4 07/20/2024       No results found.          Assessment   1.  Acute on chronic hypoxemic hypercapnic respiratory failure  2.  COPD with acute exacerbation  3.  MARIA M  4.  Diastolic CHF     Plan   -Patient with evidence of acute on chronic hypoxemic hypercapnic respiratory failure  -Evidence of COPD exacerbation during hospital course  -Gradually taper steroid therapy  -ICS/LABA/LAMA  -Antibiotic therapy course to be completed  -Nightly NIV  -Diuresis per cardiology recommendations  -DVT prophylaxis: Heparin  -Okay for discharge from pulmonary perspective    Corey Del Toro DO  Pulmonary Critical Care Medicine  [facility]

## 2024-07-20 NOTE — PROGRESS NOTES
Progress Note  Campbell STEFFANY Maira Patient Status:  Inpatient    1950 MRN [id number]   Location [facility]5W Attending Rubia Powers MD   Hosp Day # 6 PCP Johny Kang MD     Subjective:  Pt states his breathing is continuing to get better. No longer with conversational dypnea. Wore Bipap for reported 7 hrs last night    Objective:  /78 (BP Location: Right arm)   Pulse 96   Temp 98 °F (36.7 °C) (Oral)   Resp 20   Ht 5' 7\" (1.702 m)   Wt (!) 300 lb 3.2 oz (136.2 kg)   SpO2 92%   BMI 47.02 kg/m²     Telemetry: NSR    Intake/Output:    Intake/Output Summary (Last 24 hours) at 2024 0634  Last data filed at 2024 0606  Gross per 24 hour   Intake --   Output 3300 ml   Net -3300 ml       Last 3 Weights   24 0606 (!) 300 lb 3.2 oz (136.2 kg)   24 0521 (!) 303 lb (137.4 kg)   24 0630 (!) 303 lb 3.2 oz (137.5 kg)   24 0637 (!) 304 lb 6.4 oz (138.1 kg)   24 1018 (!) 306 lb (138.8 kg)   07/15/24 0631 (!) 315 lb (142.9 kg)   24 (!) 315 lb (142.9 kg)   24 230 lb (104.3 kg)   24 1555 298 lb 14.4 oz (135.6 kg)   24 1010 (!) 307 lb (139.3 kg)   24 1004 (!) 307 lb (139.3 kg)   24 0506 (!) 307 lb (139.3 kg)       Labs:  Recent Labs   Lab 24  0534 24  0416 24  0432   * 167* 169*   BUN 31* 32* 30*   CREATSERUM 0.97 1.19 1.03   EGFRCR 82 64 77   CA 9.2 8.8 8.9    141 140   K 4.2 3.5 3.3*   CL 99 98 98   CO2 38.0* 36.0* 40.0*     Recent Labs   Lab 24  0534 24  0416 24  0432   RBC 4.91 4.93 4.77   HGB 14.9 15.0 14.6   HCT 47.1 46.6 45.7   MCV 95.9 94.5 95.8   MCH 30.3 30.4 30.6   MCHC 31.6 32.2 31.9   RDW 14.1 14.2 14.1   NEPRELIM 9.85* 9.40* 7.99*   WBC 11.7* 13.0* 11.5*   .0 183.0 162.0         Recent Labs   Lab 24   TROPHS 6       Diagnostics:  No results found.   Review of Systems   Cardiovascular:  Positive for dyspnea on exertion and leg  swelling. Negative for chest pain, orthopnea and paroxysmal nocturnal dyspnea.   Respiratory:  Positive for cough, shortness of breath and wheezing.        Physical Exam:    Gen: alert, oriented x 3, NAD  Heent: pupils equal, reactive. Mucous membranes moist.   Neck: no jvd  Cardiac: regular rate and rhythm, normal S1,S2, no murmur, clicks, rub or gallop  Lungs: coarse, bibasilar crackles  Abd: soft, distended, +bs  Ext: BLE non-pitting edema  Skin: Warm, dry, chronic venous discoloration/thickening  Neuro: No focal deficits      Medications:     predniSONE  30 mg Oral Daily with breakfast    metoprolol succinate  50 mg Oral Daily Beta Blocker    spironolactone  12.5 mg Oral Daily    ipratropium-albuterol  3 mL Nebulization QID    furosemide  20 mg Intravenous Q8H    albuterol  10 mg Nebulization Once    atorvastatin  20 mg Oral Nightly    fluticasone-umeclidin-vilant  1 puff Inhalation Daily    heparin  5,000 Units Subcutaneous 2 times per day    pantoprazole  40 mg Oral QAM AC       Assessment:  Acute on Chronic Hypoxic, Hypercapneic Respiratory Failure (Multifactorial - COPD Exac, MARIA M, HF Exac)  Now on 4L NC (baseline at home), up to 6L with activity  IV antibx, nebs, inhaler, steroids  Bipap while sleeping  Pulm following   Acute HFpEF   LVEF 60-65%, no RWMA, RA dilated  Diuresing w/IV Lasix 20mg q 8 hrs   Net neg ~11.7L, wt down 15 lbs since admit  On BB, spironolactone  Historically on torsemide at home but was not taking  BUN/Cr stable; serum bicarb uptrending  Sinus Tachycardia, PSVT  Brief few seconds of PSVT on 7/17 - no further episodes  Changed lopressor to toprol  COPD w/MARIA M - BiPAP; non-compliant with CPAP at home  Morbid Obesity - BMI 47  Hx Medication Non-compliance  DMII - A1C 6.3     Plan:  Continue IV Lasix 20mg IV 8 hrs - plan to transition to oral torsemide tomorrow  Add diamox IV 250mg x1 dose today  Continue toprol, spironolactone  Strict I&O, daily standing weight, daily BMP  Replace K per  cardiac electrolyte protocol  Will plan to add SGLT2i at discharge - Jardiance 10mg po daily  Will need close follow up and continued HF education after discharge  Reinforced importance of CPAP compliance at home   Discharge planning - possibly tomorrow if continues to improve    Plan of care discussed with patient, RN.    Krista Jain, APRN  7/20/2024  6:34 AM  [phone] [facility]  [phone] [facility]

## 2024-07-20 NOTE — PROGRESS NOTES
[facility]  part of [facility]    Progress Note    Campbell Rao Patient Status:  Inpatient    1950 MRN [id number]   Location [facility]5W Attending Rubia Powers MD   Hosp Day # 6 PCP Johny Kang MD       Subjective:   Campbell Rao is feeling ok. Afebrile. SOB is better.     Objective:   Blood pressure 94/70, pulse 82, temperature 97.8 °F (36.6 °C), temperature source Oral, resp. rate 19, height 5' 7\" (1.702 m), weight (!) 300 lb 3.2 oz (136.2 kg), SpO2 90%.    Physical Exam:    General: No acute distress.   Respiratory: Clear to auscultation bilaterally. No wheezes. No rhonchi.  Cardiovascular: S1, S2. Regular rate and rhythm. No murmurs, rubs or gallops.   Abdomen: Soft, nontender, nondistended.  Positive bowel sounds. No rebound or guarding.  Neurologic: No focal neurological deficits.   Musculoskeletal: Moves all extremities.  Extremities: No edema.    Results:     Lab Results   Component Value Date    WBC 11.5 (H) 2024    HGB 14.6 2024    HCT 45.7 2024    .0 2024    CREATSERUM 1.03 2024    BUN 30 (H) 2024     2024    K 3.3 (L) 2024    CL 98 2024    CO2 40.0 (HH) 2024     (H) 2024    CA 8.9 2024    ALB 4.4 2024    ALKPHO 122 (H) 2024    BILT 0.4 2024    TP 7.1 2024    AST 43 (H) 2024     (H) 2024    TSH 2.777 2024    GGT 38 2024    DDIMER 0.40 10/18/2016    MG 2.4 2024    PHOS 4.1 2024    TROP <0.045 04/10/2021    B12 765 2021       No results found.       potassium chloride  40 mEq Oral Once    predniSONE  30 mg Oral Daily with breakfast    metoprolol succinate  50 mg Oral Daily Beta Blocker    spironolactone  12.5 mg Oral Daily    ipratropium-albuterol  3 mL Nebulization QID    furosemide  20 mg Intravenous Q8H    albuterol  10 mg Nebulization Once    atorvastatin  20 mg Oral Nightly     fluticasone-umeclidin-vilant  1 puff Inhalation Daily    heparin  5,000 Units Subcutaneous 2 times per day    pantoprazole  40 mg Oral QAM AC         Assessment and Plan:       Acute on chronic respiratory failure with hypoxia and hypercapnia  Pulmonary consulted, patient currently on BiPAP we will continue the same for now.    Wean as tolerated  IV steroids, IV Abx- will have antibiotics for one more day then discontinue   Management per pulmonary service, encourage use of BiPAP as much as possible.  Now currently on 4 liters  Taper steroid  Inhalers   Ambulate      COPD exacerbation/ ho MARIA M  Patient started on DuoNebs every 4 hours and as needed.   Prednisone 40 mg daily  Resume home inhalers.   Currently being supported with BiPAP.     Acute on chronic diastolic heart failure  Cardiology consulted, monitor I's and O's and daily weights.  Patient started on Lasix 20 mg IV q 8 hours per Cards- transition to oral torsemide tomorrow   Diamox added 250 mg X1now   Jardiance at discharge   Switch to Po when ok with cards  Spironolactone added   Lopressor 25 mg PO BID     Acute encephalopathy  Resolved  Likely secondary to CO2 retention, patient given Zyprexa earlier, will continue to monitor attempt to redirect.     Hyperlipidemia  Continue statin.     Severe obesity with obstructive sleep apnea  BMI 36.  Patient counseled regarding diet and exercise once current issues resolve, continue with BiPAP at night for now     Prophylaxis  Subcutaneous heparin     CODE STATUS  Full     Primary care physician  Johny Kang MD        Coordinated care with providers and counseling re: treatment plan and workup           Quality:  DVT Prophylaxis: Heparin  CODE status: Full    [facility]. Time spent on chart/note review, review of labs/imaging, discussion with patient, physical exam, discussion with staff, consultants, coordinating care, writing progress note, and discussion of plan of care.       HUSSEIN GANNON,  MD  07/20/24

## 2024-07-21 VITALS
WEIGHT: 299.38 LBS | TEMPERATURE: 98 F | BODY MASS INDEX: 46.99 KG/M2 | HEIGHT: 67 IN | SYSTOLIC BLOOD PRESSURE: 115 MMHG | HEART RATE: 80 BPM | DIASTOLIC BLOOD PRESSURE: 81 MMHG | RESPIRATION RATE: 16 BRPM | OXYGEN SATURATION: 93 %

## 2024-07-21 LAB
ANION GAP SERPL CALC-SCNC: 4 MMOL/L (ref 0–18)
BUN BLD-MCNC: 29 MG/DL (ref 9–23)
BUN/CREAT SERPL: 29 (ref 10–20)
CALCIUM BLD-MCNC: 9.4 MG/DL (ref 8.7–10.4)
CHLORIDE SERPL-SCNC: 103 MMOL/L (ref 98–112)
CO2 SERPL-SCNC: 34 MMOL/L (ref 21–32)
CREAT BLD-MCNC: 1 MG/DL
DEPRECATED RDW RBC AUTO: 50.5 FL (ref 35.1–46.3)
EGFRCR SERPLBLD CKD-EPI 2021: 79 ML/MIN/1.73M2 (ref 60–?)
ERYTHROCYTE [DISTWIDTH] IN BLOOD BY AUTOMATED COUNT: 14.3 % (ref 11–15)
GLUCOSE BLD-MCNC: 107 MG/DL (ref 70–99)
HCT VFR BLD AUTO: 47.6 %
HGB BLD-MCNC: 15.2 G/DL
MCH RBC QN AUTO: 30.6 PG (ref 26–34)
MCHC RBC AUTO-ENTMCNC: 31.9 G/DL (ref 31–37)
MCV RBC AUTO: 96 FL
OSMOLALITY SERPL CALC.SUM OF ELEC: 298 MOSM/KG (ref 275–295)
PLATELET # BLD AUTO: 186 10(3)UL (ref 150–450)
POTASSIUM SERPL-SCNC: 4 MMOL/L (ref 3.5–5.1)
POTASSIUM SERPL-SCNC: 4 MMOL/L (ref 3.5–5.1)
RBC # BLD AUTO: 4.96 X10(6)UL
SODIUM SERPL-SCNC: 141 MMOL/L (ref 136–145)
WBC # BLD AUTO: 13.7 X10(3) UL (ref 4–11)

## 2024-07-21 PROCEDURE — 99232 SBSQ HOSP IP/OBS MODERATE 35: CPT | Performed by: INTERNAL MEDICINE

## 2024-07-21 PROCEDURE — 99239 HOSP IP/OBS DSCHRG MGMT >30: CPT | Performed by: HOSPITALIST

## 2024-07-21 RX ORDER — PREDNISONE 10 MG/1
TABLET ORAL
Qty: 12 TABLET | Refills: 0 | Status: SHIPPED | OUTPATIENT
Start: 2024-07-21

## 2024-07-21 RX ORDER — SPIRONOLACTONE 25 MG/1
12.5 TABLET ORAL DAILY
Qty: 30 TABLET | Refills: 1 | Status: SHIPPED | OUTPATIENT
Start: 2024-07-21

## 2024-07-21 RX ORDER — METOPROLOL SUCCINATE 50 MG/1
50 TABLET, EXTENDED RELEASE ORAL
Qty: 30 TABLET | Refills: 1 | Status: SHIPPED | OUTPATIENT
Start: 2024-07-22

## 2024-07-21 RX ORDER — ALBUTEROL SULFATE 2.5 MG/3ML
2.5 SOLUTION RESPIRATORY (INHALATION) 3 TIMES DAILY
Status: DISCONTINUED | OUTPATIENT
Start: 2024-07-21 | End: 2024-07-21

## 2024-07-21 NOTE — DISCHARGE SUMMARY
[facility]  part of [facility]    Discharge Summary    Campbell Rao Patient Status:  Inpatient    1950 MRN [id number]   Location [facility]5W Attending Rubia Powers MD   Hosp Day # 7 PCP Johny Kang MD     Date of Admission: 2024   Date of Discharge: 2024    Hospital Discharge Diagnoses: Acute Hypernatremia    Lace+ Score: 85  59-90 High Risk  29-58 Medium Risk  0-28   Low Risk.    TCM Follow-Up Recommendation:  LACE > 58: High Risk of readmission after discharge from the hospital.        Admitting Diagnosis: Hypernatremia [E87.0]  Acute on chronic respiratory failure with hypoxia and hypercapnia (HCC) [J96.21, J96.22]  Acute on chronic respiratory failure with hypoxia (HCC) [J96.21]    Disposition: Home    Discharge Diagnosis: .Principal Problem:    Acute on chronic respiratory failure with hypoxia and hypercapnia (HCC)  Active Problems:    Acute on chronic respiratory failure with hypoxia (HCC)    Hypernatremia      Hospital Course:   Reason for Admission:   Per Dr. Ochoa  Campbell Rao is a(n) 74 year old male, with a past medical history significant for chronic respiratory failure on 4 L nasal cannula at home presented to the ER with increasing shortness of breath and confusion.  Was found to be saturating at 75% on 4 L nasal cannula.  He was placed on BiPAP after ABG indicating CO2 retention with hypoxia.  Patient currently confused unable to give a history.    Discharge Physical Exam:   Physical Exam:    General: No acute distress.   Respiratory: Clear to auscultation bilaterally. No wheezes. No rhonchi.  Cardiovascular: S1, S2. Regular rate and rhythm. No murmurs, rubs or gallops.   Abdomen: Soft, nontender, nondistended.  Positive bowel sounds. No rebound or guarding.  Neurologic: No focal neurological deficits.   Musculoskeletal: Moves all extremities.    Hospital Course:     Acute on chronic respiratory failure with hypoxia and  hypercapnia  Pulmonary consulted, patient currently on BiPAP we will continue the same for now.    Wean as tolerated  IV steroids, IV Abx- discontinue at discharge   Management per pulmonary service, encourage use of BiPAP as much as possible.  Now currently on 4 liters  Taper steroid- script sent to pharmacy   Inhalers   Ambulate      COPD exacerbation/ ho MARIA M  Patient started on DuoNebs every 4 hours and as needed.   Prednisone 40 mg daily- taper sent to pharmacy   Resume home inhalers.   Currently being supported with BiPAP.     Acute on chronic diastolic heart failure  Cardiology consulted, monitor I's and O's and daily weights.  Patient started on Lasix 20 mg IV q 8 hours per Cards- transition to oral torsemide tomorrow   Diamox added 250 mg X1now   Jardiance at discharge   Spironolactone added   Lopressor 25 mg PO BID     Acute encephalopathy  Resolved  Likely secondary to CO2 retention, patient given Zyprexa earlier, will continue to monitor attempt to redirect.     Hyperlipidemia  Continue statin.     Severe obesity with obstructive sleep apnea  BMI 36.  Patient counseled regarding diet and exercise once current issues resolve, continue with BiPAP at night for now     Prophylaxis  Subcutaneous heparin     CODE STATUS  Full     Primary care physician  Johny Kang MD        Coordinated care with providers and counseling re: treatment plan and workup              Quality:  DVT Prophylaxis: Heparin  CODE status: Full    Complications: none    Consultants         Provider   Role Specialty     Kris Jimenez MD      Consulting Physician PULMONARY DISEASES     Goldie Celestin APRN      Consulting Physician Nurse Practitioner     Zeny Thomas MD      Consulting Physician Internal Medicine                Discharge Plan:   Discharge Condition: Stable    Current Discharge Medication List        New Orders    Details   spironolactone 25 MG Oral Tab Take 0.5 tablets (12.5 mg total) by mouth daily.       metoprolol succinate ER 50 MG Oral Tablet 24 Hr Take 1 tablet (50 mg total) by mouth Daily Beta Blocker.      predniSONE 10 MG Oral Tab Take 30 mg daily for 2 days then 20 mg daily for 2 days then 10 mg daily or 2 days      empagliflozin (JARDIANCE) 10 MG Oral Tab Take 1 tablet (10 mg total) by mouth daily.           Home Meds - Unchanged    Details   torsemide 20 MG Oral Tab Take 1 tablet (20 mg total) by mouth 2 (two) times daily.      fluticasone-umeclidin-vilant (TRELEGY ELLIPTA) 100-62.5-25 MCG/ACT Inhalation Aerosol Powder, Breath Activated Inhale 1 puff into the lungs daily.      atorvastatin 20 MG Oral Tab Take 1 tablet (20 mg total) by mouth nightly.      ipratropium-albuterol 0.5-2.5 (3) MG/3ML Inhalation Solution Take 3 mL by nebulization in the morning and 3 mL at noon and 3 mL in the evening. And as needed.      acetaminophen (TYLENOL) 325 MG Oral Tab Take 1 tablet (325 mg total) by mouth every 6 (six) hours as needed for Pain.      prednisoLONE 1 % Ophthalmic Suspension Place into the left eye.      albuterol 108 (90 Base) MCG/ACT Inhalation Aero Soln Inhale 2 puffs into the lungs every 4 to 6 hours as needed for Wheezing. inhale 2 puff by inhalation route  every 4 - 6 hours as needed      fluticasone propionate 50 MCG/ACT Nasal Suspension 2 sprays by Nasal route daily.      Urea 40 % External Cream Apply 1 Application topically daily.      Saline Nasal Spray 0.65 % Nasal Solution 1 spray by Nasal route every 3 (three) hours as needed for congestion.                 Discharge Diet: Cardiac diet     Discharge Activity: As tolerated       Discharge Medications        START taking these medications        Instructions Prescription details   empagliflozin 10 MG Tabs  Commonly known as: Jardiance      Take 1 tablet (10 mg total) by mouth daily.   Quantity: 30 tablet  Refills: 3     metoprolol succinate ER 50 MG Tb24  Commonly known as: Toprol XL  Start taking on: July 22, 2024      Take 1 tablet (50 mg  total) by mouth Daily Beta Blocker.   Quantity: 30 tablet  Refills: 1     predniSONE 10 MG Tabs  Commonly known as: Deltasone      Take 30 mg daily for 2 days then 20 mg daily for 2 days then 10 mg daily or 2 days   Quantity: 12 tablet  Refills: 0     spironolactone 25 MG Tabs  Commonly known as: Aldactone      Take 0.5 tablets (12.5 mg total) by mouth daily.   Quantity: 30 tablet  Refills: 1            CONTINUE taking these medications        Instructions Prescription details   albuterol 108 (90 Base) MCG/ACT Aers  Commonly known as: Ventolin HFA      Inhale 2 puffs into the lungs every 4 to 6 hours as needed for Wheezing. inhale 2 puff by inhalation route  every 4 - 6 hours as needed   Quantity: 1 each  Refills: 1     atorvastatin 20 MG Tabs  Commonly known as: Lipitor      Take 1 tablet (20 mg total) by mouth nightly.   Quantity: 90 tablet  Refills: 0     fluticasone propionate 50 MCG/ACT Susp  Commonly known as: Flonase      2 sprays by Nasal route daily.   Quantity: 3 each  Refills: 3     fluticasone-umeclidin-vilant 100-62.5-25 MCG/ACT Aepb  Commonly known as: Trelegy Ellipta      Inhale 1 puff into the lungs daily.   Quantity: 3 each  Refills: 0     ipratropium-albuterol 0.5-2.5 (3) MG/3ML Soln  Commonly known as: Duoneb      Take 3 mL by nebulization in the morning and 3 mL at noon and 3 mL in the evening. And as needed.   Quantity: 100 each  Refills: 1     prednisoLONE 1 % Susp  Commonly known as: Pred Forte      Place into the left eye.   Refills: 0     sodium chloride 0.65 % Soln  Commonly known as: Saline Mist      1 spray by Nasal route every 3 (three) hours as needed for congestion.   Quantity: 1 Bottle  Refills: 0     torsemide 20 MG Tabs  Commonly known as: Demadex      Take 1 tablet (20 mg total) by mouth 2 (two) times daily.   Quantity: 180 tablet  Refills: 3     Tylenol 325 MG Tabs  Generic drug: acetaminophen      Take 1 tablet (325 mg total) by mouth every 6 (six) hours as needed for Pain.    Refills: 0     Urea 40 % Crea      Apply 1 Application topically daily.   Quantity: 227 g  Refills: 2            STOP taking these medications      ergocalciferol 1.25 MG (70942 UT) Caps  Commonly known as: Drisdol        HYDROcodone-acetaminophen  MG Tabs  Commonly known as: Norco        potassium chloride 10 MEQ Tbcr  Commonly known as: Klor-Con M10                  Where to Get Your Medications        These medications were sent to [id number] - [place], [place] - [address] AT SEC OF [facility], [phone], [phone]  [address] [address]      Phone: [phone]   empagliflozin 10 MG Tabs  metoprolol succinate ER 50 MG Tb24  predniSONE 10 MG Tabs  spironolactone 25 MG Tabs         Follow up:      Follow-up Information       Pawan Florian, DO Follow up.    Specialty: Interventional, Cardiology  Why: Office will call you to schedule follow up  Contact information:  [address] [address]  [facility] [id number] [phone]                             Follow up Labs and imaging:         Other Discharge Instructions:         Sometimes managing your health at home requires assistance.  The [facility] team has recognized your preference to use [facility].  They can be reached at [phone].  Their fax number for your reference is [phone]. A representative from the home health agency will contact you or your family to schedule your first visit.      [place] [id number] [address]  Phone: [phone]  Fax: [phone]    [facility]: [phone]    Going Home Instructions  In this section you will find the tools which will guide you through the first few days after you leave the hospital. Continued use of these tools will help you develop the skills necessary to keep your heart failure under control.     Home Care Instructions Following Heart Failure - the  most important things to do every day include:   Weigh yourself and review the “Self-Check Plan” sheet every morning.   Call your cardiologist office if you are in the “Pay Attention-Use Caution” (yellow zone) or “Medical Alert-Warning!” (red zone) as outlined in the Self-Check Plan sheet.  Take your medicines as prescribed.  Limit your sodium (salt) intake.  Know when to call your cardiologist, primary doctor, or nurse.  Know when to seek emergency care.      Things for You to Remember:   1. See your doctor or healthcare provider as written on your discharge instructions.  It is important that you attend this appointment to make sure your symptoms are under control.     2. Your recommended sodium intake is 2903-1364 mg daily.    3.  Weigh yourself every day.    4. Some exercise and activity is important to help keep your heart functioning and strong. Unless instructed not to exercise, you may walk at a slow to moderate pace for 10-15 minutes 2-3 days per week to start. Pace your activity to prevent shortness of breath or fatigue. Stop exercising if you develop chest pain, lightheadedness, or significant shortness of breath.       Call Your Cardiologist If:   You gain 2-3 pounds in one day or 5 pounds in one week.  You have more difficulty breathing.  You are getting more tired with normal activity.  You are more short of breath lying down, or awaken at night short of breath.  You have swelling of your feet or legs.  You urinate less often during the day and more often at night.  You have cramps in your legs.  You have blurred vision or see yellowish-green halos around objects of lights.    Go to the Emergency Room If:   You have pain or tightness in your chest  You are extremely short of breath  You are coughing up pink-frothy mucus  You are traveling and develop symptoms of worsening heart failure      ** Please follow up with your cardiologist or Advanced Practice Provider as written on your discharge  instructions. If you are not provided with an appointment, let your nurse know so you can get an appointment**          Time spent:  > 30 minutes    HUSSEIN GANNON MD  7/21/2024

## 2024-07-21 NOTE — PROGRESS NOTES
07/21/24 0845   Mobility   O2 walk? Yes   SPO2% on Oxygen at Rest 94   At rest oxygen flow (liters per minute) 4   SPO2% Ambulation on Oxygen 89   Ambulation oxygen flow (liters per minute) 4     Per Patient he normally wear 4 Liters oxygen at home. Patient portable concentrator does go up to \"setting 5\". Patient had pulse ox monitor at home, educated to keep pulse oximeter with him to check frequently.

## 2024-07-21 NOTE — PROGRESS NOTES
Progress Note  Campbell STEFFANY Elderrandamariela Patient Status:  Inpatient    1950 MRN [id number]   Location [facility]5W Attending Rubia Powers MD   Hosp Day # 7 PCP Johny Kang MD     Subjective:  Pt sitting in chair this am; denies orthopnea. States his breathing continues to improve. Currently on baseline 4L O2    Objective:  /67 (BP Location: Right arm)   Pulse 80   Temp 97.5 °F (36.4 °C) (Oral)   Resp 18   Ht 5' 7\" (1.702 m)   Wt 299 lb 6.4 oz (135.8 kg)   SpO2 93%   BMI 46.89 kg/m²     Telemetry: NSR, occ PVC's    Intake/Output:    Intake/Output Summary (Last 24 hours) at 2024 0609  Last data filed at 2024 0500  Gross per 24 hour   Intake 1195 ml   Output 3025 ml   Net -1830 ml       Last 3 Weights   24 0500 299 lb 6.4 oz (135.8 kg)   24 0606 (!) 300 lb 3.2 oz (136.2 kg)   24 0521 (!) 303 lb (137.4 kg)   24 0630 (!) 303 lb 3.2 oz (137.5 kg)   24 0637 (!) 304 lb 6.4 oz (138.1 kg)   24 1018 (!) 306 lb (138.8 kg)   07/15/24 0631 (!) 315 lb (142.9 kg)   24 2237 (!) 315 lb (142.9 kg)   24 230 lb (104.3 kg)   24 1555 298 lb 14.4 oz (135.6 kg)   24 1010 (!) 307 lb (139.3 kg)   24 1004 (!) 307 lb (139.3 kg)   24 0506 (!) 307 lb (139.3 kg)       Labs:  Recent Labs   Lab 24  0534 24  0416 24  0432 24  1644   * 167* 169*  --    BUN 31* 32* 30*  --    CREATSERUM 0.97 1.19 1.03  --    EGFRCR 82 64 77  --    CA 9.2 8.8 8.9  --     141 140  --    K 4.2 3.5 3.3* 4.8   CL 99 98 98  --    CO2 38.0* 36.0* 40.0*  --      Recent Labs   Lab 24  0534 24  0416 24  0432   RBC 4.91 4.93 4.77   HGB 14.9 15.0 14.6   HCT 47.1 46.6 45.7   MCV 95.9 94.5 95.8   MCH 30.3 30.4 30.6   MCHC 31.6 32.2 31.9   RDW 14.1 14.2 14.1   NEPRELIM 9.85* 9.40* 7.99*   WBC 11.7* 13.0* 11.5*   .0 183.0 162.0         Recent Labs   Lab 24   TROPHS 6       Diagnostics:  No  results found.   Review of Systems   Cardiovascular:  Positive for dyspnea on exertion. Negative for chest pain, orthopnea and paroxysmal nocturnal dyspnea.   Respiratory:  Positive for shortness of breath. Negative for cough.        Physical Exam:    Gen: alert, oriented x 3, NAD  Heent: pupils equal, reactive. Mucous membranes moist.   Neck: no jvd  Cardiac: regular rate and rhythm, normal S1,S2, no murmur, clicks, rub or gallop  Lungs: CTA  Abd: soft, NT/ND +bs  Ext: no edema  Skin: Warm, dry  Neuro: No focal deficits      Medications:     heparin  7,500 Units Subcutaneous 2 times per day    torsemide  20 mg Oral BID (Diuretic)    predniSONE  30 mg Oral Daily with breakfast    metoprolol succinate  50 mg Oral Daily Beta Blocker    spironolactone  12.5 mg Oral Daily    ipratropium-albuterol  3 mL Nebulization QID    albuterol  10 mg Nebulization Once    atorvastatin  20 mg Oral Nightly    fluticasone-umeclidin-vilant  1 puff Inhalation Daily    pantoprazole  40 mg Oral QAM AC         Assessment:  Acute on Chronic Hypoxic, Hypercapneic Respiratory Failure (Multifactorial - COPD Exac, MARIA M, HF Exac)  On 4L NC (baseline at home), up to 6L with activity  S/P IV antibx  Nebs, inhaler, steroids  Bipap while sleeping  Pulm following   Acute HFpEF   LVEF 60-65%, no RWMA, RA dilated  Diuresing w/IV Lasix 20mg q 8 hrs; now po torsemide this am  Received Diamox x1 yest   Net neg ~13.2L, wt down 16 lbs since admit  On toprol, spironolactone  Historically on torsemide at home but was not taking PTA  BUN/Cr stable  Sinus Tachycardia, PSVT  Brief few seconds of PSVT on 7/17 - no further episodes  Changed lopressor to toprol  COPD w/MARIA M - BiPAP; non-compliant with CPAP at home  Morbid Obesity - BMI 47  Hx Medication Non-compliance  DMII - A1C 6.3     Plan:  Continue toprol, spironolactone  Pt has had excellent diuresis - transition to po torsemide 20mg po BID. Reinforced importance of taking his medication as ordered.   Will add  SGLT2i at discharge - Jardiance 10mg po daily (Rx sent)  Will need close follow up and continued HF education after discharge, including reinforcement of dietary modifications, reduced sodium intake.   Reinforced importance of CPAP compliance at home   Pt is stable for discharge from cardiology standpoint. Will need 1 week F/U appt in [facility] clinic. Would benefit from referral to [facility] as well.     Plan of care discussed with patient, RN.    BRYNN Carr  7/21/2024  6:09 AM  [phone] [facility]  [phone] [facility]

## 2024-07-21 NOTE — PLAN OF CARE
.Discharge RN Summary: Patient has discharge order in. Patient to discharge home with wife. IV removed by Rafael PERALTA.  Understands to follow up with PCP in 1 week, cardiology, and pulmonary clinic. Patient understands to  new prescriptions from the pharmacy. Wife at bedside during discharge. All questions answered. Patient instructed to take dieretics and wear BIPAP when sleeping (napping and sleeping overnight). Patient wore his portable oxygen home.       Problem: SAFETY ADULT - FALL  Goal: Free from fall injury  Description: INTERVENTIONS:  - Assess pt frequently for physical needs  - Identify cognitive and physical deficits and behaviors that affect risk of falls.  - Bradford fall precautions as indicated by assessment.  - Educate pt/family on patient safety including physical limitations  - Instruct pt to call for assistance with activity based on assessment  - Modify environment to reduce risk of injury  - Provide assistive devices as appropriate  - Consider OT/PT consult to assist with strengthening/mobility  - Encourage toileting schedule  Outcome: Adequate for Discharge     Problem: CARDIOVASCULAR - ADULT  Goal: Maintains optimal cardiac output and hemodynamic stability  Description: INTERVENTIONS:  - Monitor vital signs, rhythm, and trends  - Monitor for bleeding, hypotension and signs of decreased cardiac output  - Evaluate effectiveness of vasoactive medications to optimize hemodynamic stability  - Monitor arterial and/or venous puncture sites for bleeding and/or hematoma  - Assess quality of pulses, skin color and temperature  - Assess for signs of decreased coronary artery perfusion - ex. Angina  - Evaluate fluid balance, assess for edema, trend weights  Outcome: Adequate for Discharge  Goal: Absence of cardiac arrhythmias or at baseline  Description: INTERVENTIONS:  - Continuous cardiac monitoring, monitor vital signs, obtain 12 lead EKG if indicated  - Evaluate effectiveness of antiarrhythmic and  heart rate control medications as ordered  - Initiate emergency measures for life threatening arrhythmias  - Monitor electrolytes and administer replacement therapy as ordered  Outcome: Adequate for Discharge     Problem: RESPIRATORY - ADULT  Goal: Achieves optimal ventilation and oxygenation  Description: INTERVENTIONS:  - Assess for changes in respiratory status  - Assess for changes in mentation and behavior  - Position to facilitate oxygenation and minimize respiratory effort  - Oxygen supplementation based on oxygen saturation or ABGs  - Provide Smoking Cessation handout, if applicable  - Encourage broncho-pulmonary hygiene including cough, deep breathe, Incentive Spirometry  - Assess the need for suctioning and perform as needed  - Assess and instruct to report SOB or any respiratory difficulty  - Respiratory Therapy support as indicated  - Manage/alleviate anxiety  - Monitor for signs/symptoms of CO2 retention  Outcome: Adequate for Discharge     Problem: GENITOURINARY - ADULT  Goal: Absence of urinary retention  Description: INTERVENTIONS:  - Assess patient’s ability to void and empty bladder  - Monitor intake/output and perform bladder scan as needed  - Follow urinary retention protocol/standard of care  - Consider collaborating with pharmacy to review patient's medication profile  - Implement strategies to promote bladder emptying  Outcome: Adequate for Discharge     Problem: METABOLIC/FLUID AND ELECTROLYTES - ADULT  Goal: Electrolytes maintained within normal limits  Description: INTERVENTIONS:  - Monitor labs and rhythm and assess patient for signs and symptoms of electrolyte imbalances  - Administer electrolyte replacement as ordered  - Monitor response to electrolyte replacements, including rhythm and repeat lab results as appropriate  - Fluid restriction as ordered  - Instruct patient on fluid and nutrition restrictions as appropriate  Outcome: Adequate for Discharge  Goal: Hemodynamic stability and  optimal renal function maintained  Description: INTERVENTIONS:  - Monitor labs and assess for signs and symptoms of volume excess or deficit  - Monitor intake, output and patient weight  - Monitor urine specific gravity, serum osmolarity and serum sodium as indicated or ordered  - Monitor response to interventions for patient's volume status, including labs, urine output, blood pressure (other measures as available)  - Encourage oral intake as appropriate  - Instruct patient on fluid and nutrition restrictions as appropriate  Outcome: Adequate for Discharge     Problem: SKIN/TISSUE INTEGRITY - ADULT  Goal: Skin integrity remains intact  Description: INTERVENTIONS  - Assess and document risk factors for pressure ulcer development  - Assess and document skin integrity  - Monitor for areas of redness and/or skin breakdown  - Initiate interventions, skin care algorithm/standards of care as needed  Outcome: Adequate for Discharge  Goal: Oral mucous membranes remain intact  Description: INTERVENTIONS  - Assess oral mucosa and hygiene practices  - Implement preventative oral hygiene regimen  - Implement oral medicated treatments as ordered  Outcome: Adequate for Discharge     Problem: NEUROLOGICAL - ADULT  Goal: Achieves stable or improved neurological status  Description: INTERVENTIONS  - Assess for and report changes in neurological status  - Initiate measures to prevent increased intracranial pressure  - Maintain blood pressure and fluid volume within ordered parameters to optimize cerebral perfusion and minimize risk of hemorrhage  - Monitor temperature, glucose, and sodium. Initiate appropriate interventions as ordered  Outcome: Adequate for Discharge     Problem: Impaired Swallowing  Goal: Minimize aspiration risk  Description: Interventions:  - Patient should be alert and upright for all feedings (90 degrees preferred)  - Offer food and liquids at a slow rate  - No straws  - Encourage small bites of food and small  sips of liquid  - Offer pills one at a time, crush or deliver with applesauce as needed  - Discontinue feeding and notify MD (or speech pathologist) if coughing or persistent throat clearing or wet/gurgly vocal quality is noted  Outcome: Adequate for Discharge     Problem: Impaired Cognition  Goal: Patient will exhibit improved attention, thought processing and/or memory  Description: Interventions:  - Minimize distractions in the room when full attention is required  Outcome: Adequate for Discharge     Problem: Patient Centered Care  Goal: Patient preferences are identified and integrated in the patient's plan of care  Description: Interventions:  - What would you like us to know as we care for you? I live at home with my wife. I have a hx of COPD. I wear 4 L O2 at home.  - Provide timely, complete, and accurate information to patient/family  - Incorporate patient and family knowledge, values, beliefs, and cultural backgrounds into the planning and delivery of care  - Encourage patient/family to participate in care and decision-making at the level they choose  - Honor patient and family perspectives and choices  Outcome: Adequate for Discharge

## 2024-07-21 NOTE — PLAN OF CARE
Vitals are stable with no acute changes during the shift. Safety precaution in place and call light within the pt's reach.     Problem: SAFETY ADULT - FALL  Goal: Free from fall injury  Description: INTERVENTIONS:  - Assess pt frequently for physical needs  - Identify cognitive and physical deficits and behaviors that affect risk of falls.  - [place] fall precautions as indicated by assessment.  - Educate pt/family on patient safety including physical limitations  - Instruct pt to call for assistance with activity based on assessment  - Modify environment to reduce risk of injury  - Provide assistive devices as appropriate  - Consider OT/PT consult to assist with strengthening/mobility  - Encourage toileting schedule  Outcome: Progressing     Problem: CARDIOVASCULAR - ADULT  Goal: Maintains optimal cardiac output and hemodynamic stability  Description: INTERVENTIONS:  - Monitor vital signs, rhythm, and trends  - Monitor for bleeding, hypotension and signs of decreased cardiac output  - Evaluate effectiveness of vasoactive medications to optimize hemodynamic stability  - Monitor arterial and/or venous puncture sites for bleeding and/or hematoma  - Assess quality of pulses, skin color and temperature  - Assess for signs of decreased coronary artery perfusion - ex. Angina  - Evaluate fluid balance, assess for edema, trend weights  Outcome: Progressing  Goal: Absence of cardiac arrhythmias or at baseline  Description: INTERVENTIONS:  - Continuous cardiac monitoring, monitor vital signs, obtain 12 lead EKG if indicated  - Evaluate effectiveness of antiarrhythmic and heart rate control medications as ordered  - Initiate emergency measures for life threatening arrhythmias  - Monitor electrolytes and administer replacement therapy as ordered  Outcome: Progressing     Problem: RESPIRATORY - ADULT  Goal: Achieves optimal ventilation and oxygenation  Description: INTERVENTIONS:  - Assess for changes in respiratory status  -  Assess for changes in mentation and behavior  - Position to facilitate oxygenation and minimize respiratory effort  - Oxygen supplementation based on oxygen saturation or ABGs  - Provide Smoking Cessation handout, if applicable  - Encourage broncho-pulmonary hygiene including cough, deep breathe, Incentive Spirometry  - Assess the need for suctioning and perform as needed  - Assess and instruct to report SOB or any respiratory difficulty  - Respiratory Therapy support as indicated  - Manage/alleviate anxiety  - Monitor for signs/symptoms of CO2 retention  Outcome: Progressing     Problem: GENITOURINARY - ADULT  Goal: Absence of urinary retention  Description: INTERVENTIONS:  - Assess patient’s ability to void and empty bladder  - Monitor intake/output and perform bladder scan as needed  - Follow urinary retention protocol/standard of care  - Consider collaborating with pharmacy to review patient's medication profile  - Implement strategies to promote bladder emptying  Outcome: Progressing     Problem: METABOLIC/FLUID AND ELECTROLYTES - ADULT  Goal: Electrolytes maintained within normal limits  Description: INTERVENTIONS:  - Monitor labs and rhythm and assess patient for signs and symptoms of electrolyte imbalances  - Administer electrolyte replacement as ordered  - Monitor response to electrolyte replacements, including rhythm and repeat lab results as appropriate  - Fluid restriction as ordered  - Instruct patient on fluid and nutrition restrictions as appropriate  Outcome: Progressing  Goal: Hemodynamic stability and optimal renal function maintained  Description: INTERVENTIONS:  - Monitor labs and assess for signs and symptoms of volume excess or deficit  - Monitor intake, output and patient weight  - Monitor urine specific gravity, serum osmolarity and serum sodium as indicated or ordered  - Monitor response to interventions for patient's volume status, including labs, urine output, blood pressure (other  measures as available)  - Encourage oral intake as appropriate  - Instruct patient on fluid and nutrition restrictions as appropriate  Outcome: Progressing     Problem: SKIN/TISSUE INTEGRITY - ADULT  Goal: Skin integrity remains intact  Description: INTERVENTIONS  - Assess and document risk factors for pressure ulcer development  - Assess and document skin integrity  - Monitor for areas of redness and/or skin breakdown  - Initiate interventions, skin care algorithm/standards of care as needed  Outcome: Progressing  Goal: Oral mucous membranes remain intact  Description: INTERVENTIONS  - Assess oral mucosa and hygiene practices  - Implement preventative oral hygiene regimen  - Implement oral medicated treatments as ordered  Outcome: Progressing     Problem: NEUROLOGICAL - ADULT  Goal: Achieves stable or improved neurological status  Description: INTERVENTIONS  - Assess for and report changes in neurological status  - Initiate measures to prevent increased intracranial pressure  - Maintain blood pressure and fluid volume within ordered parameters to optimize cerebral perfusion and minimize risk of hemorrhage  - Monitor temperature, glucose, and sodium. Initiate appropriate interventions as ordered  Outcome: Progressing     Problem: Impaired Swallowing  Goal: Minimize aspiration risk  Description: Interventions:  - Patient should be alert and upright for all feedings (90 degrees preferred)  - Offer food and liquids at a slow rate  - No straws  - Encourage small bites of food and small sips of liquid  - Offer pills one at a time, crush or deliver with applesauce as needed  - Discontinue feeding and notify MD (or speech pathologist) if coughing or persistent throat clearing or wet/gurgly vocal quality is noted  Outcome: Progressing     Problem: Impaired Cognition  Goal: Patient will exhibit improved attention, thought processing and/or memory  Description: Interventions:  - Minimize distractions in the room when full  attention is required  - Consider use of a daily journal to improve memory and reduce confusion related to daily events and orientation  - Allow additional time for processing after asking questions or providing instructions  - Encourage use of eye glasses  Outcome: Progressing     Problem: Patient Centered Care  Goal: Patient preferences are identified and integrated in the patient's plan of care  Description: Interventions:  - What would you like us to know as we care for you? I live at home with my wife. I have a hx of COPD. I wear 4 L O2 at home.  - Provide timely, complete, and accurate information to patient/family  - Incorporate patient and family knowledge, values, beliefs, and cultural backgrounds into the planning and delivery of care  - Encourage patient/family to participate in care and decision-making at the level they choose  - Honor patient and family perspectives and choices  Outcome: Progressing     Problem: Patient/Family Goals  Goal: Patient/Family Long Term Goal  Description: Patient's Long Term Goal: To be discharge home and attend his daughter's wedding.     Interventions:  - Diuretics, IV steroids, Pul and RT consult.   - See additional Care Plan goals for specific interventions  Outcome: Progressing  Goal: Patient/Family Short Term Goal  Description: Patient's Short Term Goal: to breath better when he walks to the bathroom.     Interventions:   - Bipap, Vpap, supplemental O2.   - See additional Care Plan goals for specific interventions  Outcome: Progressing

## 2024-07-21 NOTE — PROGRESS NOTES
[facility]  part of [facility]    Progress Note    Campbell Rao Patient Status:  Inpatient    1950 MRN [id number]   Location [facility]5W Attending Rubia Powers MD   Hosp Day # 7 PCP Johny Kang MD         Subjective:     Constitutional:  Negative for fever.   Respiratory:  Negative for cough and shortness of breath.    Cardiovascular:  Negative for chest pain.   Psychiatric/Behavioral:  Negative for agitation.      Up to the chair and awake and alert and feels about baseline and currently on 4 L of oxygen about baseline with no fever or chills  Objective:   Blood pressure 115/81, pulse 80, temperature 97.7 °F (36.5 °C), temperature source Oral, resp. rate 16, height 5' 7\" (1.702 m), weight 299 lb 6.4 oz (135.8 kg), SpO2 93%.  Physical Exam  Constitutional:       General: He is not in acute distress.     Appearance: He is obese. He is ill-appearing.   HENT:      Head: Atraumatic.      Mouth/Throat:      Mouth: Mucous membranes are moist.   Eyes:      General: No scleral icterus.  Cardiovascular:      Rate and Rhythm: Normal rate.      Pulses: Normal pulses.   Pulmonary:      Effort: Pulmonary effort is normal. No respiratory distress.      Breath sounds: No stridor. No wheezing, rhonchi or rales.   Abdominal:      General: Abdomen is flat. Bowel sounds are normal.      Palpations: Abdomen is soft.   Musculoskeletal:      Cervical back: Normal range of motion.   Neurological:      General: No focal deficit present.      Mental Status: He is oriented to person, place, and time.         Results:   Lab Results   Component Value Date    WBC 13.7 (H) 2024    HGB 15.2 2024    HCT 47.6 2024    .0 2024    CREATSERUM 1.00 2024    BUN 29 (H) 2024     2024    K 4.0 2024    K 4.0 2024     2024    CO2 34.0 (H) 2024     (H) 2024    CA 9.4 2024    ALB 4.4 2024    ALKPHO  122 (H) 07/14/2024    BILT 0.4 07/14/2024    TP 7.1 07/14/2024    AST 43 (H) 07/14/2024     (H) 07/14/2024    TSH 2.777 04/18/2024    GGT 38 04/18/2024    DDIMER 0.40 10/18/2016    MG 2.4 07/20/2024    PHOS 4.1 04/26/2024    TROP <0.045 04/10/2021    TROPHS 6 07/14/2024    B12 765 04/27/2021       Assessment & Plan:       1-acute on chronic hypoxemic and hypercapnic respiratory failure  Decompensated overlap syndrome with COPD and MARIA M     BiPAP during sleep and as needed during daytime  O2 therapy  Avoid sedative or narcotic     Overall better today and more awake and alert and up to the chair     2-COPD with acute exacerbation  Trelegy inhaler and taper steroid  Empiric antibiotics     3-MARIA M and obesity with a BMI of 49  Pap during sleep  Poor compliance with his cpap at home / counseling provided regarding importance of using his cpap all night during sleep / voiced understanding .     Avoid sedative and narcotic     4-history of diastolic CHF  Per cardiology         5-DVT prophylaxis  Heparin subcu     Better overall / up to chair and about baseline   Ok to discharge             Yesi Salinas MD  7/21/2024

## 2024-07-21 NOTE — CM/SW NOTE
SW notified by RN that patient has issue with Nebulizer. SW called and spoke with patient, patient states that he lost nebulizer. SW notified patient that [facility] can [facility]. Notified patient that he may have to pay out of pocket, patient expressed understanding.     ELDER notified Yvette  of [place] today.     325pm  RN notified that wife transported patient home.       Sandy Jack, MSW, LSW    [phone]

## 2024-07-22 ENCOUNTER — PATIENT OUTREACH (OUTPATIENT)
Dept: CASE MANAGEMENT | Age: 74
End: 2024-07-22

## 2024-07-22 ENCOUNTER — TELEPHONE (OUTPATIENT)
Dept: CARDIOLOGY CLINIC | Facility: HOSPITAL | Age: 74
End: 2024-07-22

## 2024-07-23 NOTE — PROGRESS NOTES
LM for pt to call [facility] for TCM since discharge. [facility] phone number was provided for pt to call back.

## 2024-07-23 NOTE — PROGRESS NOTES
Reviewed pt's chart including recent visits.  Attempted to contact pt for monthly outreach no answer left detailed message for pt to call back.     Total time spent with patient including chart review: 5  Time spent with patient this month: 5    Total time spent with communication and chart review this month to date: 5

## 2024-07-24 ENCOUNTER — TELEPHONE (OUTPATIENT)
Dept: INTERNAL MEDICINE CLINIC | Facility: CLINIC | Age: 74
End: 2024-07-24

## 2024-07-24 NOTE — TELEPHONE ENCOUNTER
Patient called stating he was recently released from the hospital and his medication list was changed. Per patient the following medication is not working:    spironolactone 25 MG Oral Tab     Per patient he would like to be prescribed something else. If patient needs an er follow-up there are no openings until 08/19/2024.

## 2024-07-25 ENCOUNTER — TELEPHONE (OUTPATIENT)
Dept: ORTHOPEDICS CLINIC | Facility: CLINIC | Age: 74
End: 2024-07-25

## 2024-07-25 ENCOUNTER — OFFICE VISIT (OUTPATIENT)
Dept: ORTHOPEDICS CLINIC | Facility: CLINIC | Age: 74
End: 2024-07-25

## 2024-07-25 VITALS
DIASTOLIC BLOOD PRESSURE: 83 MMHG | WEIGHT: 303 LBS | BODY MASS INDEX: 47 KG/M2 | SYSTOLIC BLOOD PRESSURE: 129 MMHG | HEART RATE: 98 BPM

## 2024-07-25 DIAGNOSIS — M17.0 PRIMARY OSTEOARTHRITIS OF BOTH KNEES: Primary | ICD-10-CM

## 2024-07-25 PROCEDURE — 20610 DRAIN/INJ JOINT/BURSA W/O US: CPT

## 2024-07-25 RX ORDER — TRIAMCINOLONE ACETONIDE 40 MG/ML
40 INJECTION, SUSPENSION INTRA-ARTICULAR; INTRAMUSCULAR ONCE
Status: COMPLETED | OUTPATIENT
Start: 2024-07-25 | End: 2024-07-25

## 2024-07-25 RX ADMIN — TRIAMCINOLONE ACETONIDE 40 MG: 40 INJECTION, SUSPENSION INTRA-ARTICULAR; INTRAMUSCULAR at 15:35:00

## 2024-07-25 RX ADMIN — TRIAMCINOLONE ACETONIDE 40 MG: 40 INJECTION, SUSPENSION INTRA-ARTICULAR; INTRAMUSCULAR at 15:40:00

## 2024-07-25 NOTE — TELEPHONE ENCOUNTER
Spoke with patient and conformed he has appointment this afternoon for cortisone injection with PA. No further questions or concerns at this time.

## 2024-07-25 NOTE — PROGRESS NOTES
NURSING INTAKE COMMENTS:   Chief Complaint   Patient presents with    Knee Pain     B/l knee f/u -  Pt states pain when walking. Had cortisone injection 02/14/24  Rates pain 8/10        HPI: This 74 year old male presents today with complaints of bilateral knee pain follow up. Patient is here for bilateral knee cortisone injections. His last injections were back in February. He has been in and out of the hospital over the last few months due to CHF issues. He would like to proceed with bilateral knee cortisone injections today. His daughter is getting  this October.     Past Medical History:    Atherosclerosis of coronary artery    coronary calcification on ct chest    Back problem    Cataract    Chronic respiratory failure (HCC)    Congestive heart disease (HCC)    COPD (HCC)    freq exacerbations. ? 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 30 tablet 1    metoprolol succinate ER 50 MG Oral Tablet 24 Hr Take 1 tablet (50 mg total) by mouth Daily Beta Blocker. 30 tablet 1    predniSONE 10 MG Oral Tab Take 30 mg daily for 2 days then 20 mg daily for 2 days then 10 mg daily or 2 days 12 tablet 0    empagliflozin (JARDIANCE) 10 MG Oral Tab Take 1 tablet (10 mg total) by mouth daily. 30 tablet 3    acetaminophen (TYLENOL) 325 MG Oral Tab Take 1  tablet (325 mg total) by mouth every 6 (six) hours as needed for Pain.      prednisoLONE 1 % Ophthalmic Suspension Place into the left eye.      torsemide 20 MG Oral Tab Take 1 tablet (20 mg total) by mouth 2 (two) times daily. 180 tablet 3    fluticasone-umeclidin-vilant (TRELEGY ELLIPTA) 100-62.5-25 MCG/ACT Inhalation Aerosol Powder, Breath Activated Inhale 1 puff into the lungs daily. 3 each 0    albuterol 108 (90 Base) MCG/ACT Inhalation Aero Soln Inhale 2 puffs into the lungs every 4 to 6 hours as needed for Wheezing. inhale 2 puff by inhalation route  every 4 - 6 hours as needed 1 each 1    atorvastatin 20 MG Oral Tab Take 1 tablet (20 mg total) by mouth nightly. 90 tablet 0    ipratropium-albuterol 0.5-2.5 (3) MG/3ML Inhalation Solution Take 3 mL by nebulization in the morning and 3 mL at noon and 3 mL in the evening. And as needed. 100 each 1    fluticasone propionate 50 MCG/ACT Nasal Suspension 2 sprays by Nasal route daily. 3 each 3    Urea 40 % External Cream Apply 1 Application topically daily. 227 g 2    Saline Nasal Spray 0.65 % Nasal Solution 1 spray by Nasal route every 3 (three) hours as needed for congestion. 1 Bottle 0     No Known Allergies  Family History   Problem Relation Age of Onset    Cancer Father         lung cancer    Cancer Mother         lung cancer    No Known Problems Daughter     No Known Problems Son     No Known Problems Maternal Grandmother     No Known Problems Maternal Grandfather     No Known Problems Paternal Grandmother     No Known Problems Paternal Grandfather     No Known Problems Sister     Diabetes Brother     Lipids Brother         hyperlipidemia    Hypertension Brother     No Known Problems Other        Social History     Occupational History    Not on file   Tobacco Use    Smoking status: Former     Current packs/day: 0.00     Average packs/day: 2.0 packs/day for 40.0 years (80.0 ttl pk-yrs)     Types: Cigarettes     Start date: 11/1981     Quit date: 11/2021      Years since quittin.7     Passive exposure: Past    Smokeless tobacco: Never   Vaping Use    Vaping status: Never Used   Substance and Sexual Activity    Alcohol use: Not Currently     Alcohol/week: 0.0 standard drinks of alcohol     Comment: socially    Drug use: Not Currently    Sexual activity: Not on file        Review of Systems:  GENERAL: denies fevers, chills, night sweats, fatigue, unintentional weight loss/gain  SKIN: denies skin lesions, open sores, rash  HEENT:denies recent vision change, new nasal congestion,hearing loss, tinnitus, sore throat, headaches  RESPIRATORY: denies new shortness of breath, cough, asthma, wheezing  CARDIOVASCULAR: denies chest pain, leg cramps with exertion, palpitations, leg swelling  GI: denies abdominal pain, nausea, vomiting, diarrhea, constipation, hematochezia, worsening heartburn or stomach ulcers  : denies dysuria, hematuria, incontinence, increased frequency, urgency, difficulty urinating  MUSCULOSKELETAL: denies musculoskeletal complaints other than in HPI  NEURO: denies numbness, tingling, weakness, balance issues, dizziness, memory loss  PSYCHIATRIC: denies Hx of depression, anxiety, other psychiatric disorders  HEMATOLOGIC: denies blood clots, anemia, blood clotting disorders, blood transfusion  ENDOCRINE: denies autoimmune disease, thyroid issues, or diabetes  ALLERGY: denies asthma, seasonal allergies    Physical Examination:    /83   Pulse 98   Wt (!) 303 lb (137.4 kg)   BMI 47.46 kg/m²   Constitutional: appears well hydrated, alert and responsive, no acute distress noted  Extremities: Bilateral knee exam is unchanged. No effusion. No erythema.   Neurological: Unchanged     Imaging:   XR CHEST AP PORTABLE  (CPT=71045)    Result Date: 2024  PROCEDURE: XR CHEST AP PORTABLE  (CPT=71045) TIME: 1:07 p.m.   COMPARISON: [facility], XR THORACIC SPINE (3 VIEWS) (CPT=72072), 2024, 2:04 PM.  [facility], CT  CHEST PE AORTA (IV ONLY) (CPT=71260), 2024, 8:02 PM.  [facility], XR CHEST AP PORTABLE (CPT=71045), 2024, 8:46 PM.  INDICATIONS: dyspnea  TECHNIQUE:   Single view.   FINDINGS:  CARDIAC/VASC: No cardiac silhouette abnormality or cardiomegaly.  Unremarkable pulmonary vasculature.  MEDIAST/JUSTO:   Atherosclerotic aorta with no visible aneurysm.  LUNGS/PLEURA: Linear left basal pulmonary opacity.  Interstitial prominence.  Wedge-shaped bibasilar pulmonary opacities.  Chronic subpleural thickening along the lateral aspect of the right mid and upper lung zones. BONES: Scattered mild degenerative endplate changes in the visualized thoracolumbar spine., plasty change in the midthoracic region (electronic record notes T7 kyphoplasty). OTHER: Negative.          CONCLUSION:  1.  Interstitial prominence suggesting mild edema or fibrosis. 2.  Nonspecific subpleural thickening along the outer aspect of the right mid and upper lung zones similar to most recent exam.  This could represent loculated effusion or other pleural based process. 3.  Bibasilar pulmonary opacities likely represent atelectasis however correlate for pneumonia/aspiration. 4.  Linear atelectasis or scar in the left lung base. 5.  Post kyphoplasty in the midthoracic spine.   Dictated by (CST): Sujit Quintana MD on 2024 at 4:21 PM     Finalized by (CST): Sujit Quintana MD on 2024 at 4:24 PM          CARD ECHO 2D DOPPLER CONTRAST (CPT=93306)    Result Date: 2024  Transthoracic Echocardiogram Name:Campbell Rao Date: 2024 :  1950 Ht:  (67in)  BP: 108 / 62 MRN:  [id number]    Age:  74years    Wt:  (315lb) HR: 80bpm Loc:  [facility]       Gndr: M          BSA: 2.45m^2 Sonographer: David KEYES RVT Ordering:    Nanda Thacker Consulting:  Zeny Thomas ---------------------------------------------------------------------------- History/Indications:  Heart failure.  ---------------------------------------------------------------------------- Procedure information:  A transthoracic complete 2D study was performed. Additional evaluation included M-mode, complete spectral Doppler, and color Doppler.  Patient status:  Inpatient.  Location:  Bedside.    Comparison was made to the study of 04/25/2024.    This was a routine study. Transthoracic echocardiography for diagnosis, ventricular function evaluation, and assessment of valvular function. Image quality was suboptimal. The study was technically limited due to poor acoustic window availability, patient supine, and patient sitting up. Intravenous contrast (Definity) was administered to evaluate left ventricular function. ECG rhythm:   Normal sinus ---------------------------------------------------------------------------- Conclusions: 1. Left ventricle: The cavity size was normal. Wall thickness was at the    upper limits of normal. Systolic function was normal. The estimated    ejection fraction was 60-65%, by visual assessment. Wall motion is    normal; there are no regional wall motion abnormalities. Left ventricular    diastolic function parameters were normal. 2. Aortic root: The aortic root was dilated and 4.0cm diameter. 3. Right ventricle: The cavity size was mildly increased. Systolic function    was normal. 4. Right atrium: The atrium was dilated. Impressions:  No significant change since prior study. * ---------------------------------------------------------------------------- * Findings: Left ventricle:  The cavity size was normal. Wall thickness was at the upper limits of normal. Systolic function was normal. The estimated ejection fraction was 60-65%, by visual assessment. Wall motion is normal; there are no regional wall motion abnormalities. Left ventricular diastolic function parameters were normal. Left atrium:  The atrium was normal in size. Right ventricle:  The cavity size was mildly increased. Systolic  function was normal. Right atrium:  The atrium was dilated. Mitral valve:  The valve was structurally normal. Leaflet separation was normal.  Doppler:  Transvalvular velocity was within the normal range. There was no evidence for stenosis. There was trivial regurgitation. Aortic valve:   The valve was trileaflet. The leaflets were mildly thickened and mildly calcified. Cusp separation was normal.  Doppler:  Transvalvular velocity was within the normal range. There was no evidence for stenosis. There was no significant regurgitation. Tricuspid valve:  The valve is structurally normal. Leaflet separation was normal.  Doppler:  Transvalvular velocity was within the normal range. There was no evidence for stenosis. There was trivial regurgitation. Pulmonic valve:   The valve is structurally normal. Cusp separation was normal.  Doppler:  Transvalvular velocity was within the normal range. There was no evidence for stenosis. There was trivial regurgitation. Pericardium:  A prominent pericardial fat pad was present.  There was no pericardial effusion. Aorta: Aortic root: The aortic root was dilated and 4.0cm diameter. Ascending aorta: The ascending aorta was dilated and 3.7cm diameter. Systemic veins:  Not visualized. ---------------------------------------------------------------------------- Measurements  Left ventricle         Value        Ref       04/25/2024  IVS thickness, ED,     1.0   cm     0.6 - 1.0 1.1  PLAX  LV ID, ED, PLAX        4.9   cm     4.2 - 5.8 4.7  LV ID, ES, PLAX        3.2   cm     2.5 - 4.0 3.1  LV PW thickness,       1.0   cm     0.6 - 1.0 1.1  ED, PLAX  IVS/LV PW ratio,       1.00         --------- 1.00  ED, PLAX  LV PW/LV ID ratio,     0.2          --------- 0.23  ED, PLAX  LV ejection            64    %      52 - 72   63  fraction  Stroke volume/bsa,     43    ml/m^2 --------- 31  2D  LV e', lateral     (L) 9.6   cm/sec >=10.0    7.9  LV E/e', lateral       8            <=13      8  LV e',  medial          7.6   cm/sec >=7.0     8.4  LV E/e', medial        10           --------- 7  LV e', average         8.6   cm/sec --------- 8.2  LV E/e', average       9            <=14      7  LVOT                   Value        Ref       04/25/2024  LVOT ID                2.3   cm     --------- 2.2  LVOT peak              1.38  m/sec  --------- 1.04  velocity, S  LVOT VTI, S            25.3  cm     --------- 19.8  LVOT peak              8     mm Hg  --------- 4  gradient, S  LVOT mean              4     mm Hg  --------- 2  gradient, S  Stroke volume          105   ml     --------- 75  (SV), LVOT DP  Stroke index           43    ml/m^2 --------- 31  (SV/bsa), LVOT DP  Aortic root            Value        Ref       04/25/2024  Aortic root ID         4.0   cm     3.0 - 4.5 3.8  Ascending aorta        Value        Ref       04/25/2024  Ascending aorta ID     3.7   cm     2.2 - 3.8 3.7  Left atrium            Value        Ref       04/25/2024  LA ID, A-P, ES     (H) 4.3   cm     3.0 - 4.0 ----------  LA volume, S           57    ml     18 - 58   43  LA volume/bsa, S       23    ml/m^2 16 - 34   18  LA volume, ES, 1-p     52    ml     18 - 58   37  A4C  LA volume, ES, 1-p     51    ml     18 - 58   47  A2C  LA volume, ES, A/L     62    ml     --------- 45  LA volume/bsa, ES,     25    ml/m^2 16 - 34   19  A/L  LA/aortic root         1.08         --------- ----------  ratio  Mitral valve           Value        Ref       04/25/2024  Mitral E-wave peak     0.78  m/sec  --------- 0.6  velocity  Mitral A-wave peak     0.88  m/sec  --------- 0.85  velocity  Mitral                 179   ms     --------- 241  deceleration time  Mitral peak            2     mm Hg  --------- ----------  gradient, D  Mitral E/A ratio,      0.9          --------- 0.7  peak  Right ventricle        Value        Ref       04/25/2024  TAPSE, MM              2.14  cm     >=1.70    2.34  RV s', lateral         23.5  cm/sec >=9.5     14.9 Legend: (L)  and   (H)  arielle values outside specified reference range. ---------------------------------------------------------------------------- Prepared and electronically signed by Joni Moran 07/16/2024 12:25     XR CHEST AP PORTABLE  (CPT=71045)    Result Date: 7/15/2024  PROCEDURE: XR CHEST AP PORTABLE  (CPT=71045) TIME: 2046.   COMPARISON: [facility], CT CHEST PE AORTA (IV ONLY) (CPT=71260), 4/08/2024, 8:02 PM.  [facility], XR CHEST AP PORTABLE (CPT=71045), 4/08/2024, 6:29 PM.  [facility], XR CHEST PA + LAT CHEST (TAJ=14764), 10/29/2023, 7:56 AM.  [facility], XR CHEST AP PORTABLE (CPT=71045), 10/25/2023, 11:57 AM.  INDICATIONS: STEFANI  TECHNIQUE:   Single view.   FINDINGS:     CARDIAC/MEDIASTINUM:  The cardiac silhouette is enlarged and unchanged. There is atherosclerotic calcification of the aortic arch and thoracic aorta.  Superior tracheal narrowing is unchanged since the prior exams and may be secondary to tracheomalacia  LUNGS:   There is mild perihilar fullness and bilateral upper and lower lobe reticulonodular opacities.  Airspace/alveolar opacities are also seen within the bilateral upper and lower lobes.   BONES:   No acute destructive process. There is degenerative disease of the thoracic spine.         CONCLUSION:   Bilateral upper and lower lobe airspace opacities.  Findings may be secondary to pulmonary edema or multifocal pneumonia (either from bacterial or atypical viral etiology).  Continued follow-up imaging recommended to ensure resolution.  Preliminary report was submitted by the [employer] teleradiologist and there are no significant discrepancies.     Dictated by (CST): Ranulfo Rollins MD on 7/15/2024 at 8:19 AM     Finalized by (CST): Ranulfo Rollins MD on 7/15/2024 at 8:22 AM             Labs:  Lab Results   Component Value Date    WBC 13.7 (H) 07/21/2024    HGB 15.2 07/21/2024    .0 07/21/2024      Lab Results   Component Value Date    GLU  107 (H) 07/21/2024    BUN 29 (H) 07/21/2024    CREATSERUM 1.00 07/21/2024    GFRNAA 91 09/23/2021    GFRAA 105 09/23/2021        Assessment and Plan:  Diagnoses and all orders for this visit:    Primary osteoarthritis of both knees  -     Drain/Inject Large Joint/Bursa  -     triamcinolone acetonide (Kenalog-40) 40 MG/ML injection 40 mg  -     Drain/Inject Large Joint/Bursa  -     triamcinolone acetonide (Kenalog-40) 40 MG/ML injection 40 mg        Assessment: Bilateral knee osteoarthritis     Plan: Using sterile technique and inferior lateral parapatellar approach, the bilateral knees were injected with cortisone. Patient tolerated procedure well. Advised icing and oral anti-inflammatories as needed for pain. Accompanied by wife. Follow up as needed for knee injection.     Follow Up: Return if symptoms worsen or fail to improve.    Aurea Arvizu PA-C

## 2024-07-25 NOTE — TELEPHONE ENCOUNTER
Patient calling stating he had spoke to a nurse 10 minutes ago from Dr Barrientos stating that they will get him in to see Dr Barrientos? Please advise

## 2024-07-25 NOTE — PROGRESS NOTES
Per verbal order from Dr. Goldsmith draw up 3ml of 0.5% Marcaine & 2ml 1% lidocaine and 1ml of Kenalog 40 for cortisone injection to bilateral knee Pam IVERSON MA    Patient provided education handout for cortisone injection.

## 2024-07-25 NOTE — TELEPHONE ENCOUNTER
Spoke to patient (name and  of patient verified). He is returning a call. Patient states he discussed his medication question with cardiology. He was supposed to be taking Torsemide with Spironolactone. Now he is taking both medications and they are more effective.   No other questions or concerns at this time. Verified upcoming appointment:  2024  2:00 PM Johny Kang MD [facility]

## 2024-07-29 ENCOUNTER — NURSE TRIAGE (OUTPATIENT)
Dept: INTERNAL MEDICINE CLINIC | Facility: CLINIC | Age: 74
End: 2024-07-29

## 2024-07-29 NOTE — TELEPHONE ENCOUNTER
Action Requested: Summary for Provider     []  Critical Lab, Recommendations Needed  [x] Need Additional Advice  []   FYI    []   Need Orders  [] Need Medications Sent to Pharmacy  []  Other     SUMMARY: Dr. Kang: patient prefers to see you and asking if you can see him tomorrow late evening. He has no one to take him earlier in the day.    Reason for call: Edema  Onset:   Patient complains of Top of foot swollen, left. Feels tight.     No short of breathe. No swelling in face or hands.     Discharged from hospital 7/21/24.      Patient called to cancel appointment today 7/29/24 (TCM with Dr. Rosenbaum).   He has no means of getting to appointment. He also said he doesn't know why he was scheduled with DR Rosenbaum. He prefers to see you.   Reason for Disposition   MODERATE swelling of both ankles (e.g., swelling extends up to the knees) AND new-onset or worsening    Protocols used: Leg Swelling and Edema-A-OH

## 2024-07-29 NOTE — TELEPHONE ENCOUNTER
Called patient,verified name and date of birth.  Advised patient that Dr Del Real can see him Tuesday July 30 at 5:15 pm, patient agees to appointment:  Future Appointments   Date Time Provider Department Center   7/30/2024  5:15 PM Johny Kang MD [facility]

## 2024-07-30 ENCOUNTER — OFFICE VISIT (OUTPATIENT)
Dept: INTERNAL MEDICINE CLINIC | Facility: CLINIC | Age: 74
End: 2024-07-30

## 2024-07-30 VITALS
HEART RATE: 89 BPM | BODY MASS INDEX: 47.21 KG/M2 | WEIGHT: 300.81 LBS | HEIGHT: 67 IN | OXYGEN SATURATION: 93 % | DIASTOLIC BLOOD PRESSURE: 70 MMHG | TEMPERATURE: 98 F | SYSTOLIC BLOOD PRESSURE: 120 MMHG

## 2024-07-30 DIAGNOSIS — Z91.81 AT RISK FOR FALLS: ICD-10-CM

## 2024-07-30 DIAGNOSIS — J96.21 ACUTE ON CHRONIC RESPIRATORY FAILURE WITH HYPOXIA AND HYPERCAPNIA (HCC): Primary | ICD-10-CM

## 2024-07-30 DIAGNOSIS — E66.01 MORBID (SEVERE) OBESITY DUE TO EXCESS CALORIES (HCC): ICD-10-CM

## 2024-07-30 DIAGNOSIS — J44.1 COPD EXACERBATION (HCC): ICD-10-CM

## 2024-07-30 DIAGNOSIS — G47.33 OSA AND COPD OVERLAP SYNDROME (HCC): ICD-10-CM

## 2024-07-30 DIAGNOSIS — I50.33 ACUTE ON CHRONIC DIASTOLIC HEART FAILURE (HCC): ICD-10-CM

## 2024-07-30 DIAGNOSIS — J44.9 OSA AND COPD OVERLAP SYNDROME (HCC): ICD-10-CM

## 2024-07-30 DIAGNOSIS — Z98.890 S/P KYPHOPLASTY: ICD-10-CM

## 2024-07-30 DIAGNOSIS — J96.22 ACUTE ON CHRONIC RESPIRATORY FAILURE WITH HYPOXIA AND HYPERCAPNIA (HCC): Primary | ICD-10-CM

## 2024-07-30 PROCEDURE — 99495 TRANSJ CARE MGMT MOD F2F 14D: CPT | Performed by: INTERNAL MEDICINE

## 2024-07-30 NOTE — PROGRESS NOTES
Subjective:     Patient ID: Campbell Rao is a 74 year old male.    HPI    History/Other:   Review of Systems  Current Outpatient Medications   Medication Sig Dispense Refill    metoprolol succinate ER 50 MG Oral Tablet 24 Hr Take 1 tablet (50 mg total) by mouth Daily Beta Blocker. 30 tablet 1    prednisoLONE 1 % Ophthalmic Suspension Place into the left eye.      torsemide 20 MG Oral Tab Take 1 tablet (20 mg total) by mouth 2 (two) times daily. 180 tablet 3    fluticasone-umeclidin-vilant (TRELEGY ELLIPTA) 100-62.5-25 MCG/ACT Inhalation Aerosol Powder, Breath Activated Inhale 1 puff into the lungs daily. 3 each 0    albuterol 108 (90 Base) MCG/ACT Inhalation Aero Soln Inhale 2 puffs into the lungs every 4 to 6 hours as needed for Wheezing. inhale 2 puff by inhalation route  every 4 - 6 hours as needed 1 each 1    atorvastatin 20 MG Oral Tab Take 1 tablet (20 mg total) by mouth nightly. 90 tablet 0    ipratropium-albuterol 0.5-2.5 (3) MG/3ML Inhalation Solution Take 3 mL by nebulization in the morning and 3 mL at noon and 3 mL in the evening. And as needed. 100 each 1    fluticasone propionate 50 MCG/ACT Nasal Suspension 2 sprays by Nasal route daily. 3 each 3    Urea 40 % External Cream Apply 1 Application topically daily. 227 g 2    spironolactone 25 MG Oral Tab Take 0.5 tablets (12.5 mg total) by mouth daily. (Patient not taking: Reported on 7/30/2024) 30 tablet 1    predniSONE 10 MG Oral Tab Take 30 mg daily for 2 days then 20 mg daily for 2 days then 10 mg daily or 2 days (Patient not taking: Reported on 7/30/2024) 12 tablet 0    empagliflozin (JARDIANCE) 10 MG Oral Tab Take 1 tablet (10 mg total) by mouth daily. (Patient not taking: Reported on 7/30/2024) 30 tablet 3    acetaminophen (TYLENOL) 325 MG Oral Tab Take 1 tablet (325 mg total) by mouth every 6 (six) hours as needed for Pain. (Patient not taking: Reported on 7/30/2024)      Saline Nasal Spray 0.65 % Nasal Solution 1 spray by Nasal route every 3  (three) hours as needed for congestion. 1 Bottle 0     Allergies:No Known Allergies    Past Medical History:    Atherosclerosis of coronary artery    coronary calcification on ct chest    Back problem    Cataract    Chronic respiratory failure (HCC)    Congestive heart disease (HCC)    COPD (HCC)    freq exacerbations. ? 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    Osteoarthritis    Pneumonia    Pulmonary nodules    Sleep apnea    Tobacco abuse      Past Surgical History:   Procedure Laterality Date    Appendectomy      Appendectomy      Cataract Bilateral     2024, 3/2024    Electrocardiogram, complete  2012    scanned to media tab      Family History   Problem Relation Age of Onset    Cancer Father         lung cancer    Cancer Mother         lung cancer    No Known Problems Daughter     No Known Problems Son     No Known Problems Maternal Grandmother     No Known Problems Maternal Grandfather     No Known Problems Paternal Grandmother     No Known Problems Paternal Grandfather     No Known Problems Sister     Diabetes Brother     Lipids Brother         hyperlipidemia    Hypertension Brother     No Known Problems Other       Social History:   Social History     Socioeconomic History    Marital status:    Tobacco Use    Smoking status: Former     Current packs/day: 0.00     Average packs/day: 2.0 packs/day for 40.0 years (80.0 ttl pk-yrs)     Types: Cigarettes     Start date: 1981     Quit date: 2021     Years since quittin.7     Passive exposure: Past    Smokeless tobacco: Never   Vaping Use    Vaping status: Never Used   Substance and Sexual Activity    Alcohol use: Not Currently     Alcohol/week: 0.0 standard drinks of alcohol     Comment:  socially    Drug use: Not Currently   Other Topics Concern    Caffeine Concern Yes     Comment: coffee, >32 oz daily     Social Determinants of Health     Financial Resource Strain: Low Risk  (4/29/2024)    Financial Resource Strain     Difficulty of Paying Living Expenses: Not very hard     Med Affordability: No   Food Insecurity: No Food Insecurity (7/15/2024)    Food Insecurity     Food Insecurity: Never true   Transportation Needs: No Transportation Needs (7/15/2024)    Transportation Needs     Lack of Transportation: No   Physical Activity: Inactive (1/16/2024)    Exercise Vital Sign     Days of Exercise per Week: 0 days     Minutes of Exercise per Session: 0 min   Stress: No Stress Concern Present (1/16/2024)    Stress     Feeling of Stress : No   Social Connections: Somewhat Isolated (1/16/2024)    Social Connections     Frequency of Socialization with Friends and Family: 1   Housing Stability: Low Risk  (7/15/2024)    Housing Stability     Housing Instability: No        Objective:   Physical Exam    Assessment & Plan:   No diagnosis found.    No orders of the defined types were placed in this encounter.      Meds This Visit:  Requested Prescriptions      No prescriptions requested or ordered in this encounter       Imaging & Referrals:  None

## 2024-08-02 NOTE — PROGRESS NOTES
Subjective:   Campbell Rao is a 74 year old male who presents for hospital follow up.   He was discharged from [facility] to Home Health Care Services  Admission Date: 7/14/24   Discharge Date: 7/21/24  Hospital Discharge Diagnosis: acute on chronic resp failutre, copd exacerabation, acute on chronic diastolic heart failure, morbid obesity, MARIA M.     Interactive contact within 2 business days post discharge first initiated on Date: 7/22/2024    During the visit, the following was completed:  Obtained and reviewed discharge summary, continuity of care documents, Hospitalist notes, Cardiology notes, and Pulmonology notes  Reviewed Labs (CBC, CMP), Labs (Cardiac markers), X-Ray radiology results, EKG, and Echocardiogram    HPI: pt admitted in [facility] due to confusion and noted to have acute on chronic resp failure due to copd exacerbation and acute on chronic diastolic heart failure. Pt was hypoxemic and hypercarbic on admission. Pt was put on bipap, iv steroids, and diurese. Pt was seen by pulmo and cardiologist.  Pt was not taking his lasix at home prior to admission. Pt slowly improved, weant off iv steroids then switch to po prednisone,  his iv lasix was switched to torsemide. Pt was also started on aldactone, and lopressor. Pt was also given script for jardiance upon discharge as part of his med regimen for his diastolic heart failure but pt unable to afford.     History/Other:   Current Medications:  Medication Reconciliation:  I am aware of an inpatient discharge within the last 30 days.  The discharge medication list has been reconciled with the patient's current medication list and reviewed by me.  See medication list for additions of new medication, and changes to current doses of medications and discontinued medications.  Outpatient Medications Marked as Taking for the 7/30/24 encounter (Office Visit) with Johny Kang MD   Medication Sig    metoprolol succinate ER 50 MG Oral Tablet 24 Hr Take 1  tablet (50 mg total) by mouth Daily Beta Blocker.    prednisoLONE 1 % Ophthalmic Suspension Place into the left eye.    torsemide 20 MG Oral Tab Take 1 tablet (20 mg total) by mouth 2 (two) times daily.    fluticasone-umeclidin-vilant (TRELEGY ELLIPTA) 100-62.5-25 MCG/ACT Inhalation Aerosol Powder, Breath Activated Inhale 1 puff into the lungs daily.    albuterol 108 (90 Base) MCG/ACT Inhalation Aero Soln Inhale 2 puffs into the lungs every 4 to 6 hours as needed for Wheezing. inhale 2 puff by inhalation route  every 4 - 6 hours as needed    atorvastatin 20 MG Oral Tab Take 1 tablet (20 mg total) by mouth nightly.    ipratropium-albuterol 0.5-2.5 (3) MG/3ML Inhalation Solution Take 3 mL by nebulization in the morning and 3 mL at noon and 3 mL in the evening. And as needed.    fluticasone propionate 50 MCG/ACT Nasal Suspension 2 sprays by Nasal route daily.    Urea 40 % External Cream Apply 1 Application topically daily.       Review of Systems:  GENERAL: weight stable, energy stable, no sweating  SKIN: denies any unusual skin lesions  EYES: denies blurred vision or double vision  HEENT: denies nasal congestion, sinus pain or ST  LUNGS:  shortness of breath with exertion  CARDIOVASCULAR: denies chest pain on exertion or palpitations  GI: denies abdominal pain, denies heartburn, denies diarrhea  MUSCULOSKELETAL: denies pain, normal range of motion of extremities  NEURO: denies headaches, denies dizziness, denies weakness  PSYCHE: denies depression or anxiety  HEMATOLOGIC: denies hx of anemia, or bruising, denies bleeding  ENDOCRINE: denies thyroid history  ALL/ASTHMA: denies hx of allergy or asthma    Objective:   No LMP for male patient.  Estimated body mass index is 47.11 kg/m² as calculated from the following:    Height as of this encounter: 5' 7\" (1.702 m).    Weight as of this encounter: 300 lb 12.8 oz (136.4 kg).   /70   Pulse 89   Temp 98.1 °F (36.7 °C) (Tympanic)   Ht 5' 7\" (1.702 m)   Wt (!) 300 lb  12.8 oz (136.4 kg)   SpO2 93%   BMI 47.11 kg/m²    GENERAL: well developed, well nourished, in no apparent distress  SKIN: no rashes, no suspicious lesions  HEENT: atraumatic, normocephalic, ears and throat are clear  EYES: PERRLA, EOMI, conjunctiva are clear  NECK: supple, no adenopathy, no bruits  CHEST: no chest tenderness  LUNGS: clear to auscultation  CARDIO: RRR without murmur  GI: good BS's, no masses, HSM or tenderness  MUSCULOSKELETAL: back is not tender, FROM of the extremities  EXTREMITIES: no cyanosis, clubbing ; +2 pitting edema on ankles/feet; chronic stasis changes on his legs  NEURO: Oriented times three, cranial nerves are intact, motor and sensory are grossly intact    Assessment & Plan:   (J96.21,  J96.22) Acute on chronic respiratory failure with hypoxia and hypercapnia (HCC)  (primary encounter diagnosis)  Plan: Patient much improved now, here with a remains on O2 supplements of 4 L/min.  He will continue his inhalers for his COPD.  Emphasized to the patient compliance with his other medications particularly for his diastolic heart failure.    (J44.1) COPD exacerbation (HCC)  Plan: Patient ready had finish his oral steroids.  He will continue use of inhalers.    (I50.33) Acute on chronic diastolic heart failure (HCC)  Plan: He continues to diurese and actively since his discharge from the hospital lost another 3 pounds.  He will continue his torsemide, Lopressor as well as his Aldactone.  I explained to spouse the reason why the patient was not given potassium pills because the patient already been started on Aldactone which does retain potassium and is also being used for his heart failure.    (G47.33,  J44.9) MARIA M and COPD overlap syndrome (HCC)  Plan: Patient does use his CPAP machine but only can tolerate about 4 hours every night I told him he needs to use it more than 4 hours to give him additional benefit.      (E66.01) Morbid (severe) obesity due to excess calories (HCC)  Plan: Per  spouse, the patient had been actually going on a diet and had lost some weight compared to previous visits.    (Z98.890) S/P kyphoplasty  Plan: Patient had acute compression fractures a few months ago and underwent kyphoplasty which has dramatically improved his back pain is currently not even complaining of any back pain anymore.    (Z91.81) At risk for falls  Plan: Fall precautions.         No follow-ups on file.

## 2024-08-05 ENCOUNTER — OFFICE VISIT (OUTPATIENT)
Dept: CARDIOLOGY CLINIC | Facility: HOSPITAL | Age: 74
End: 2024-08-05
Attending: NURSE PRACTITIONER
Payer: MEDICARE

## 2024-08-05 VITALS
OXYGEN SATURATION: 93 % | SYSTOLIC BLOOD PRESSURE: 131 MMHG | WEIGHT: 304 LBS | HEART RATE: 92 BPM | BODY MASS INDEX: 48 KG/M2 | DIASTOLIC BLOOD PRESSURE: 81 MMHG

## 2024-08-05 DIAGNOSIS — J96.22 ACUTE ON CHRONIC RESPIRATORY FAILURE WITH HYPOXIA AND HYPERCAPNIA (HCC): Primary | ICD-10-CM

## 2024-08-05 DIAGNOSIS — I50.32 CHRONIC DIASTOLIC HEART FAILURE (HCC): ICD-10-CM

## 2024-08-05 DIAGNOSIS — J44.1 COPD EXACERBATION (HCC): ICD-10-CM

## 2024-08-05 DIAGNOSIS — J96.21 ACUTE ON CHRONIC RESPIRATORY FAILURE WITH HYPOXIA AND HYPERCAPNIA (HCC): Primary | ICD-10-CM

## 2024-08-05 DIAGNOSIS — I50.33 ACUTE ON CHRONIC HEART FAILURE WITH PRESERVED EJECTION FRACTION (HFPEF) (HCC): ICD-10-CM

## 2024-08-05 DIAGNOSIS — E66.2 OBESITY HYPOVENTILATION SYNDROME (HCC): ICD-10-CM

## 2024-08-05 DIAGNOSIS — E66.01 MORBID (SEVERE) OBESITY DUE TO EXCESS CALORIES (HCC): ICD-10-CM

## 2024-08-05 DIAGNOSIS — G47.33 OSA (OBSTRUCTIVE SLEEP APNEA): ICD-10-CM

## 2024-08-05 LAB
ANION GAP SERPL CALC-SCNC: 5 MMOL/L (ref 0–18)
BNP SERPL-MCNC: 9 PG/ML
BUN BLD-MCNC: 22 MG/DL (ref 9–23)
BUN/CREAT SERPL: 26.2 (ref 10–20)
CALCIUM BLD-MCNC: 9.4 MG/DL (ref 8.7–10.4)
CHLORIDE SERPL-SCNC: 108 MMOL/L (ref 98–112)
CO2 SERPL-SCNC: 31 MMOL/L (ref 21–32)
CREAT BLD-MCNC: 0.84 MG/DL
EGFRCR SERPLBLD CKD-EPI 2021: 92 ML/MIN/1.73M2 (ref 60–?)
FASTING STATUS PATIENT QL REPORTED: NO
GLUCOSE BLD-MCNC: 102 MG/DL (ref 70–99)
OSMOLALITY SERPL CALC.SUM OF ELEC: 302 MOSM/KG (ref 275–295)
POTASSIUM SERPL-SCNC: 4.2 MMOL/L (ref 3.5–5.1)
SODIUM SERPL-SCNC: 144 MMOL/L (ref 136–145)

## 2024-08-05 PROCEDURE — 80048 BASIC METABOLIC PNL TOTAL CA: CPT | Performed by: NURSE PRACTITIONER

## 2024-08-05 PROCEDURE — 83880 ASSAY OF NATRIURETIC PEPTIDE: CPT | Performed by: NURSE PRACTITIONER

## 2024-08-05 RX ORDER — DAPAGLIFLOZIN 10 MG/1
10 TABLET, FILM COATED ORAL DAILY
Qty: 30 TABLET | Refills: 0 | Status: SHIPPED | OUTPATIENT
Start: 2024-08-05 | End: 2024-09-04

## 2024-08-05 RX ORDER — POTASSIUM CHLORIDE 1.5 G/1.58G
20 POWDER, FOR SOLUTION ORAL 2 TIMES DAILY
COMMUNITY
End: 2024-08-05 | Stop reason: CLARIF

## 2024-08-05 RX ORDER — POTASSIUM CHLORIDE 20 MEQ/1
20 TABLET, EXTENDED RELEASE ORAL DAILY
COMMUNITY

## 2024-08-05 NOTE — PATIENT INSTRUCTIONS
Stop Jardiance and begin Farxiga 10 mg tablet daily, check with Dr. Montague tomorrow. You can get a 30 day free trial of Farxiga then apply for the [place] assistance to possibly get the medication covered by the company    Talk with Dr. Montague about taking spironolactone 12.5 mg tablet daily and if you should continue taking potassium chloride tablet     Continue all your other medications as prescribed     Call if you are having shortness of breath, cough, wheezing, chest pain, dizziness, lightheadedness, heart racing, palpitations, swelling, rapid weight gain, fatigue, weakness, fever or chills.  Call 911 with severe shortness of breath, chest pain or chest pressure not improved after 15 minutes of rest or if feeling faint,  passing out or having a fall     Weigh yourself daily in the morning before breakfast, call if gaining 3 lbs or more overnight or more than 5 lbs in one week.    Follow 2000 mg sodium restricted, heart healthy diet.     Keep daily fluids at 48-64 ounces per day (1.5-2 liters of liquid or 6-8 , 8 oz glasses of liquid)    Follow up with Dr. Montague tomorrow and Dr. Florian as recommended    Follow up with Dr. Salinas in the next 2 weeks, call to make an appointment at [phone]    Follow up with the specialty care clinic in 1 month or as directed by cardiology    Use incentive spirometer- 4 sets of 10 slow inhaled breaths daily and use acapella, 5 blowing breaths with pause and cough then another 5 blowing breaths and cough, 4 times a day    Wear your cpap all night with sleep and when napping, and continue wearing oxygen at 4 liters per nasal cannula at all times.     Consider doing pulmonary rehab at [facility], call to schedule an orientation appointment at [phone], and you can call the healthy driven van to see if you do qualify for transportation.  [phone]    Always carry a copy of your current medication list with you    Limit sodium to  2000 mg daily. Common high sodium  foods include frozen dinners, soups (not homemade), some cereal, vegetable juice, canned vegetables, lunch meats, processed meats like hotdogs, sausage, green, pepperoni, soy sauce, pre-packaged rice or potatoes. Please remember to read nutrition labels for sodium content.   www.healthyeating.nhlbi.nih.gov    Exercise daily as tolerated, with goal of doing moderate aerobic exercise like walking for about 30 minutes 5 days per week. Start by walking at a slow to moderate pace for 1-2 minutes 2-3 times a day. Pace your activity to prevent shortness of breath or fatigue. Stop exercise if you develop chest pain, lightheadedness, or significant shortness of breath

## 2024-08-05 NOTE — PROGRESS NOTES
Here with his wife who feels this visit is a waste of time.    Subjective: reports some improved breathing since discharge. Denies chest pain.  Wasn't taking medications and that lead to readmission. Reports taking medications as directed now. Possible change to A-pap soon.  Pain today is knees and thumb, arthritis.    Home equipment:  Cpap @ night with oxygen, sleeps for 4 hour intervals:  oxygen yes IS yes , doesn't use  acupella  unknown inhalers yes  Intervention:  6MW no  Nebulizer treatment no Labs: BMP/BNP    Patient and medication assessed. Discussed with APN. Treatments completed venipuncture. Medication given NONE.  Extensive education on disease management including IS and acapella use.  Reviewed AVS instructions. Patient verbalized understanding.

## 2024-08-05 NOTE — PROGRESS NOTES
[facility]ty [facility]    Campbell Rao Patient Status:  Outpatient    1950 MRN [id number]   Location [facility] MD Dr. Pawan Gross       Campbell Rao is a 74 year old male who presents to clinic for assessment and management for hospitalization for acute on chronic heart failure and COPD exacerbation. Last seen 22    Admitted -24 with worsening dyspnea and confusion.  In ARV failure with hypoxemia, O2 sat 79 5% on 4 L N/C.  BNP 17. CXR noted pulmonary congestion. Was not taking medications regularly. Improved with BiPAP, IV antibiotic, IV steroids, nebs.  Given IV diuretics for hypervolemia, IV furosemide, Diamox to 50 mg x 1.  Confusion secondary to acute encephalopathy, resolved. Diuresed 16 lbs.       Admitted  820-22 with worsening sob and cough. Symptoms complicated with overlap hypoventilation syndrome with copd and obesity with untreated MARIA M. Required intubation.  Improved with IV/oral steroids, nebs, cpap. Chronic LE edema, echo noted normal EF, mild LVH, grade 1 DD.     Problem List:  Acute on chronic respiratory failure with hypoxia and hypercapnia, on home O2.  COPD exacerbation  Acute on chronic HFpEF, NYHA class C, Stage III, EF 60-65  Hypernatremia  HTN  HLD  CKD  Morbid obesity  MARIA M      Subjective:  Here with wife. On 4 L n/c  Not taking jardiance, too costly  Not taking spironolactone , was having leg cramping on gilberto, improved off gilberto and with addition of potassium with torsemide, taking kdur 20 meq daily  Breathing better, no curtis walking 100 feet or more slowly, walking with walker/holding on wheelchair.   Occasional non  productive cough, denies cp or wheezing     Review of Systems:  Constitutional: negative for chills or fever  Respiratory:  negative for hemoptysis and wheezing  Cardiovascular: negative for chest pain, exertional chest pressure/discomfort, near-syncope, orthopnea and  palpitations  Gastrointestinal: negative for abdominal pain, diarrhea, melena, nausea and vomiting  Hematologic/lymphatic: negative  Musculoskeletal: negative for muscle weakness and myalgias    Objective:  Lab Results   Component Value Date/Time    WBC 13.7 (H) 07/21/2024 05:56 AM    HGB 15.2 07/21/2024 05:56 AM    HCT 47.6 07/21/2024 05:56 AM    .0 07/21/2024 05:56 AM    CREATSERUM 1.00 07/21/2024 05:56 AM    BUN 29 (H) 07/21/2024 05:56 AM     07/21/2024 05:56 AM    K 4.0 07/21/2024 05:56 AM    K 4.0 07/21/2024 05:56 AM     07/21/2024 05:56 AM    CO2 34.0 (H) 07/21/2024 05:56 AM     (H) 07/21/2024 05:56 AM    CA 9.4 07/21/2024 05:56 AM    ALB 4.4 07/14/2024 08:27 PM    ALKPHO 122 (H) 07/14/2024 08:27 PM    BILT 0.4 07/14/2024 08:27 PM    TP 7.1 07/14/2024 08:27 PM    AST 43 (H) 07/14/2024 08:27 PM     (H) 07/14/2024 08:27 PM    TSH 2.777 04/18/2024 11:56 AM    GGT 38 04/18/2024 11:56 AM    DDIMER 0.40 10/18/2016 09:07 AM    MG 2.4 07/20/2024 04:32 AM    PHOS 4.1 04/26/2024 06:03 AM    TROP <0.045 04/10/2021 11:31 AM    B12 765 04/27/2021 10:46 AM    PGLU 136 (H) 01/09/2023 12:40 PM       Labs drawn: NA: BMP, BNP, results reviewed with patient, independently interpreted results    /81 (BP Location: Right arm, Patient Position: Sitting, Cuff Size: large)   Pulse 92   Wt (!) 304 lb (137.9 kg)   SpO2 93%   BMI 47.61 kg/m²       Date  Clinic wt Home wt MCI wt DC wt IV med  PO med   8/27/22    287     7/21/24    299     8/5/24 304 296                    General appearance: alert, appears stated age and cooperative  Neck: no JVD , difficult to assess large neck   Lungs: clear to auscultation bilaterally anteriorly, few coarse crackles jake bases, diminished jake bases   Heart: S1, S2 normal, no murmur, click, rub or gallop, regular rate and rhythm  Abdomen: soft, non-tender; bowel sounds normal; no masses, mod abdominal distension, obese  Extremities: +1 jake LE edema, skin  discolored, dark pink jake mid tibial to feet, skin dry, flaky, tiny dried scabs, no open wounds or drainage, skin soft.   Pulses: 2+ and symmetric  Neurologic: Grossly normal    Diagnostics:  EC24 NSR incomplete RBBB 96 bpm.     Echocardiogram: 24, EF 60-65%, no wma, dilated aortic root 4 cm, mild RVH, R atrial dilation    Echo: 22 mild LVH, EF 60%, no WMA, grade 1 DD.    PFT's: 21  FEV1 60%  FEV1/FVC 63 %  DLCO79 %     Assessment:  Acute exacerbation of COPD   -last FEV1 60%,   -cough and curtis improved  -on trelegy   - minimal cough  -Use I-S and Acapella 4 sets of 10 breaths daily  -Consider cardiac rehab, family has information, has been difficult to get patient to rehab, may check out healthy [facility] van transportation.    Acute on chronic respiratory failure with hypoxemia and hypercapnia, and overlap syndrome with MARIA M  -On home O2 at 4L per N/C  -no hypoxemia on  4 L n/c 02 sat 93% after activity   -On CPAP nightly 4 + hours /night, then takes off    Acute on chronic HF 60-65% EF, NYHA class III, stage C  -R sided HF  -EF 60-65 %, mild RVH  -diuretics torsemide 20 mg twice daily  -GDMT : Jardiance ( not taking), spironolactone, toprol  -weight: diuresed 16 lbs during hospitalization, wt up 5 lbs since discharge, improved curtis, no curtis using 02 and cpap at night. continued bloating, jake LE edema decreased  - BP normal   -Renal function stable, bun/cr: 22.084<- 29/1.0, K 4.2, CO2 31  -BNP  <-17 ( 24)   -hypervolemia, improved with IV diuretics, continued bloating  - stop jardiance and begin farxiga 10 mg daily ( 30 day free voucher given) can discuss with cardiology tomorrow  - restart spironolactone per cardiology  -Reinforced follow low-sodium and fluid restricted diet and increased activity as tolerated      Plan:     Patient Instructions   Stop Jardiance and begin Farxiga 10 mg tablet daily, check with Dr. Montague tomorrow. You can get a 30 day free trial of Farxiga then apply for  the [employer] assistance to possibly get the medication covered by the company    Talk with Dr. Montague about taking spironolactone 12.5 mg tablet daily and if you should continue taking potassium chloride tablet     Continue all your other medications as prescribed     Call if you are having shortness of breath, cough, wheezing, chest pain, dizziness, lightheadedness, heart racing, palpitations, swelling, rapid weight gain, fatigue, weakness, fever or chills.  Call 911 with severe shortness of breath, chest pain or chest pressure not improved after 15 minutes of rest or if feeling faint,  passing out or having a fall     Weigh yourself daily in the morning before breakfast, call if gaining 3 lbs or more overnight or more than 5 lbs in one week.    Follow 2000 mg sodium restricted, heart healthy diet.     Keep daily fluids at 48-64 ounces per day (1.5-2 liters of liquid or 6-8 , 8 oz glasses of liquid)    Follow up with Dr. Montague tomorrow and Dr. Florian as recommended    Follow up with Dr. Salinas in the next 2 weeks, call to make an appointment at [phone]    Follow up with the specialty care clinic in 1 month or as directed by cardiology    Use incentive spirometer- 4 sets of 10 slow inhaled breaths daily and use acapella, 5 blowing breaths with pause and cough then another 5 blowing breaths and cough, 4 times a day    Wear your cpap all night with sleep and when napping, and continue wearing oxygen at 4 liters per nasal cannula at all times.     Consider doing pulmonary rehab at [facility], call to schedule an orientation appointment at [phone], and you can call the healthy driven van to see if you do qualify for transportation.  [phone]    Always carry a copy of your current medication list with you    Limit sodium to  2000 mg daily. Common high sodium foods include frozen dinners, soups (not homemade), some cereal, vegetable juice, canned vegetables, lunch meats, processed meats like hotdogs,  sausage, green, pepperoni, soy sauce, pre-packaged rice or potatoes. Please remember to read nutrition labels for sodium content.   www.healthyeating.nhlbi.nih.gov    Exercise daily as tolerated, with goal of doing moderate aerobic exercise like walking for about 30 minutes 5 days per week. Start by walking at a slow to moderate pace for 1-2 minutes 2-3 times a day. Pace your activity to prevent shortness of breath or fatigue. Stop exercise if you develop chest pain, lightheadedness, or significant shortness of breath          Current Outpatient Medications:     potassium chloride 20 MEQ Oral Tab CR, Take 1 tablet (20 mEq total) by mouth daily., Disp: , Rfl:     dapagliflozin (FARXIGA) 10 MG Oral Tab, Take 1 tablet (10 mg total) by mouth daily., Disp: 30 tablet, Rfl: 0    torsemide 20 MG Oral Tab, Take 1 tablet (20 mg total) by mouth 2 (two) times daily., Disp: 180 tablet, Rfl: 3    fluticasone-umeclidin-vilant (TRELEGY ELLIPTA) 100-62.5-25 MCG/ACT Inhalation Aerosol Powder, Breath Activated, Inhale 1 puff into the lungs daily., Disp: 3 each, Rfl: 0    albuterol 108 (90 Base) MCG/ACT Inhalation Aero Soln, Inhale 2 puffs into the lungs every 4 to 6 hours as needed for Wheezing. inhale 2 puff by inhalation route  every 4 - 6 hours as needed, Disp: 1 each, Rfl: 1    atorvastatin 20 MG Oral Tab, Take 1 tablet (20 mg total) by mouth nightly., Disp: 90 tablet, Rfl: 0    ipratropium-albuterol 0.5-2.5 (3) MG/3ML Inhalation Solution, Take 3 mL by nebulization in the morning and 3 mL at noon and 3 mL in the evening. And as needed., Disp: 100 each, Rfl: 1    fluticasone propionate 50 MCG/ACT Nasal Suspension, 2 sprays by Nasal route daily., Disp: 3 each, Rfl: 3    Urea 40 % External Cream, Apply 1 Application topically daily., Disp: 227 g, Rfl: 2    Saline Nasal Spray 0.65 % Nasal Solution, 1 spray by Nasal route every 3 (three) hours as needed for congestion., Disp: 1 Bottle, Rfl: 0    spironolactone 25 MG Oral Tab, Take 0.5  tablets (12.5 mg total) by mouth daily. (Patient not taking: Reported on 7/30/2024), Disp: 30 tablet, Rfl: 1    metoprolol succinate ER 50 MG Oral Tablet 24 Hr, Take 1 tablet (50 mg total) by mouth Daily Beta Blocker. (Patient not taking: Reported on 8/5/2024), Disp: 30 tablet, Rfl: 1    acetaminophen (TYLENOL) 325 MG Oral Tab, Take 1 tablet (325 mg total) by mouth every 6 (six) hours as needed for Pain. (Patient not taking: Reported on 7/30/2024), Disp: , Rfl:         KAYCE REYES NP           60 minutes spent on patient education, chart review and documentation:  Patient instructed regarding clinic procedures, hours, purpose of clinic visits, sodium restricted diet, low sodium foods, fluid restrictions, daily weights, medication regimen s/s of heart failure and copd exacerbation and when to call APN/clinic. Provided patient with  counseling, coordination of care and education given. Patient receptive.

## 2024-08-06 NOTE — PROGRESS NOTES
Multiple attempts to reach patient and messages left with no return call.  Patient went in for hospital follow-up appointment with primary care provider on 7/30/24.  Encounter closing.

## 2024-08-07 ENCOUNTER — OFFICE VISIT (OUTPATIENT)
Dept: PULMONOLOGY | Facility: CLINIC | Age: 74
End: 2024-08-07

## 2024-08-07 VITALS
BODY MASS INDEX: 45.77 KG/M2 | OXYGEN SATURATION: 95 % | HEIGHT: 68 IN | HEART RATE: 90 BPM | SYSTOLIC BLOOD PRESSURE: 120 MMHG | WEIGHT: 302 LBS | RESPIRATION RATE: 18 BRPM | DIASTOLIC BLOOD PRESSURE: 78 MMHG

## 2024-08-07 DIAGNOSIS — J44.9 CHRONIC OBSTRUCTIVE PULMONARY DISEASE, UNSPECIFIED COPD TYPE (HCC): Primary | ICD-10-CM

## 2024-08-07 PROCEDURE — 99214 OFFICE O/P EST MOD 30 MIN: CPT | Performed by: INTERNAL MEDICINE

## 2024-08-07 NOTE — PROGRESS NOTES
Patient reports using CPAP nightly however HME shows no usage/data since last year. E advised that patient needs to bring CPAP machine in to facility to be assessed. Patient and spouse were advised to contact [facility] regarding CPAP download.

## 2024-08-07 NOTE — PROGRESS NOTES
Subjective:   Patient ID: Campbell Rao is a 74 year old male.    HPI  Doing well on 4 L of oxygen  Able to perform ADL  Denied active cough or sputum  No hemoptysis or colored sputum or wheezes  No fever or chills  Using CPAP every night and all night  No lower extremity edema  No fever or chills  History/Other:   Review of Systems   Constitutional:  Positive for fatigue. Negative for fever.   HENT:  Negative for congestion.    Respiratory:  Negative for cough and wheezing.    Cardiovascular:  Negative for chest pain.   Gastrointestinal:  Negative for abdominal distention.   Neurological:  Negative for seizures.   Psychiatric/Behavioral:  Negative for agitation.      Current Outpatient Medications   Medication Sig Dispense Refill    fluticasone-umeclidin-vilant (TRELEGY ELLIPTA) 100-62.5-25 MCG/ACT Inhalation Aerosol Powder, Breath Activated Inhale 1 puff into the lungs daily. 3 each 0    albuterol 108 (90 Base) MCG/ACT Inhalation Aero Soln Inhale 2 puffs into the lungs every 4 to 6 hours as needed for Wheezing. inhale 2 puff by inhalation route  every 4 - 6 hours as needed 1 each 1    potassium chloride 20 MEQ Oral Tab CR Take 1 tablet (20 mEq total) by mouth daily.      dapagliflozin (FARXIGA) 10 MG Oral Tab Take 1 tablet (10 mg total) by mouth daily. 30 tablet 0    spironolactone 25 MG Oral Tab Take 0.5 tablets (12.5 mg total) by mouth daily. (Patient not taking: Reported on 7/30/2024) 30 tablet 1    metoprolol succinate ER 50 MG Oral Tablet 24 Hr Take 1 tablet (50 mg total) by mouth Daily Beta Blocker. 30 tablet 1    acetaminophen (TYLENOL) 325 MG Oral Tab Take 1 tablet (325 mg total) by mouth every 6 (six) hours as needed for Pain. (Patient not taking: Reported on 7/30/2024)      torsemide 20 MG Oral Tab Take 1 tablet (20 mg total) by mouth 2 (two) times daily. 180 tablet 3    atorvastatin 20 MG Oral Tab Take 1 tablet (20 mg total) by mouth nightly. 90 tablet 0    ipratropium-albuterol 0.5-2.5 (3) MG/3ML  Inhalation Solution Take 3 mL by nebulization in the morning and 3 mL at noon and 3 mL in the evening. And as needed. (Patient not taking: Reported on 8/7/2024) 100 each 1    fluticasone propionate 50 MCG/ACT Nasal Suspension 2 sprays by Nasal route daily. 3 each 3    Urea 40 % External Cream Apply 1 Application topically daily. 227 g 2    Saline Nasal Spray 0.65 % Nasal Solution 1 spray by Nasal route every 3 (three) hours as needed for congestion. 1 Bottle 0     Allergies:No Known Allergies    Objective:   Physical Exam  Constitutional:       General: He is not in acute distress.     Appearance: He is obese. He is ill-appearing.   HENT:      Head: Normocephalic and atraumatic.      Nose: Nose normal.      Mouth/Throat:      Mouth: Mucous membranes are moist.   Eyes:      General: No scleral icterus.  Cardiovascular:      Rate and Rhythm: Normal rate.      Heart sounds:      No gallop.   Pulmonary:      Comments: Diminished breath sounds with overall good air exchange to both lung field with no wheezes or rales or rhonchi  Abdominal:      General: Abdomen is flat. Bowel sounds are normal.      Palpations: Abdomen is soft.      Tenderness: There is no abdominal tenderness. There is no rebound.   Musculoskeletal:      Right lower leg: No edema.      Left lower leg: No edema.   Skin:     General: Skin is dry.   Neurological:      Mental Status: He is oriented to person, place, and time.         Assessment & Plan:   1. Chronic obstructive pulmonary disease, unspecified COPD type (HCC)         1- moderate to severe COPD  Recent  acute exacerbation required hospitalization  Much better now and back to baseline  80-pack-year history of smoking quit 2022    Trelegy inhaler   Albuterol inhaler and nebulizers  At home O2 4 L nasal cannula    I have reviewed the oxygen testing performed on this date 8/7/24 and the patient will need home oxygen at 4 LPM continuously via nasal cannula due to hypoxia. This condition is expected  to improve with oxygen. The patient will need portable oxygen because they are mobile at home.        2- MARIA M and morbid obesity obesity   on Auto CPAP 5-15 CWP since 2022   Excellent subjective compliance and clinical improvement  Awaiting download    Continue with CPAP nightly  Diet and exercise and weight reduction  Avoid sedative and narcotic     3-chronic hypoxemic and hypercapnic respiratory failure    4-screening for lung cancer  80-pack-year history of smoking quit 2022  Chest ct April/2024 with no suspicious findings  Next low-dose chest CT in April 2025      F/u in 4 months     Meds This Visit:  Requested Prescriptions      No prescriptions requested or ordered in this encounter       Imaging & Referrals:  None

## 2024-08-20 ENCOUNTER — PATIENT OUTREACH (OUTPATIENT)
Dept: CASE MANAGEMENT | Age: 74
End: 2024-08-20

## 2024-09-18 ENCOUNTER — PATIENT OUTREACH (OUTPATIENT)
Dept: CASE MANAGEMENT | Age: 74
End: 2024-09-18

## 2024-09-18 ENCOUNTER — LAB ENCOUNTER (OUTPATIENT)
Dept: LAB | Age: 74
End: 2024-09-18
Attending: INTERNAL MEDICINE
Payer: MEDICARE

## 2024-09-18 DIAGNOSIS — R79.89 ELEVATED LFTS: ICD-10-CM

## 2024-09-18 DIAGNOSIS — I50.9 CONGESTIVE HEART FAILURE (CHF) (HCC): Primary | ICD-10-CM

## 2024-09-18 DIAGNOSIS — E78.5 DYSLIPIDEMIA: ICD-10-CM

## 2024-09-18 DIAGNOSIS — E55.9 VITAMIN D DEFICIENCY: ICD-10-CM

## 2024-09-18 DIAGNOSIS — J43.9 PULMONARY EMPHYSEMA, UNSPECIFIED EMPHYSEMA TYPE (HCC): ICD-10-CM

## 2024-09-18 DIAGNOSIS — R79.89 HIGH SERUM PARATHYROID HORMONE (PTH): ICD-10-CM

## 2024-09-18 DIAGNOSIS — I50.32 CHRONIC DIASTOLIC CONGESTIVE HEART FAILURE (HCC): Primary | ICD-10-CM

## 2024-09-18 DIAGNOSIS — Z00.00 MEDICARE ANNUAL WELLNESS VISIT, SUBSEQUENT: ICD-10-CM

## 2024-09-18 DIAGNOSIS — J44.1 COPD EXACERBATION (HCC): ICD-10-CM

## 2024-09-18 LAB
ANION GAP SERPL CALC-SCNC: 5 MMOL/L (ref 0–18)
BUN BLD-MCNC: 26 MG/DL (ref 9–23)
BUN/CREAT SERPL: 25.5 (ref 10–20)
CALCIUM BLD-MCNC: 9.8 MG/DL (ref 8.7–10.4)
CHLORIDE SERPL-SCNC: 110 MMOL/L (ref 98–112)
CHOLEST SERPL-MCNC: 233 MG/DL (ref ?–200)
CO2 SERPL-SCNC: 31 MMOL/L (ref 21–32)
CREAT BLD-MCNC: 1.02 MG/DL
EGFRCR SERPLBLD CKD-EPI 2021: 78 ML/MIN/1.73M2 (ref 60–?)
FASTING PATIENT LIPID ANSWER: NO
FASTING STATUS PATIENT QL REPORTED: NO
GLUCOSE BLD-MCNC: 116 MG/DL (ref 70–99)
HDLC SERPL-MCNC: 47 MG/DL (ref 40–59)
LDLC SERPL CALC-MCNC: 159 MG/DL (ref ?–100)
NONHDLC SERPL-MCNC: 186 MG/DL (ref ?–130)
OSMOLALITY SERPL CALC.SUM OF ELEC: 308 MOSM/KG (ref 275–295)
POTASSIUM SERPL-SCNC: 4.1 MMOL/L (ref 3.5–5.1)
PTH-INTACT SERPL-MCNC: 90.1 PG/ML (ref 18.5–88)
SODIUM SERPL-SCNC: 146 MMOL/L (ref 136–145)
TRIGL SERPL-MCNC: 148 MG/DL (ref 30–149)
VIT D+METAB SERPL-MCNC: 6.1 NG/ML (ref 30–100)
VLDLC SERPL CALC-MCNC: 29 MG/DL (ref 0–30)

## 2024-09-18 PROCEDURE — 80061 LIPID PANEL: CPT

## 2024-09-18 PROCEDURE — 36415 COLL VENOUS BLD VENIPUNCTURE: CPT

## 2024-09-18 PROCEDURE — 82306 VITAMIN D 25 HYDROXY: CPT

## 2024-09-18 PROCEDURE — 83516 IMMUNOASSAY NONANTIBODY: CPT

## 2024-09-18 PROCEDURE — 80048 BASIC METABOLIC PNL TOTAL CA: CPT

## 2024-09-18 PROCEDURE — 83970 ASSAY OF PARATHORMONE: CPT

## 2024-09-18 NOTE — PROGRESS NOTES
Spoke to Campbell for Chronic Care Management.      Updates to patient care team/comments: NATALIIA.   Patient reported changes in medications: together we reviewed and updated.  See below.   Med Adherence  Comment: pt reports taking all medications as prescribed.       Health Maintenance:   Reviewed with pt     Health Maintenance   Topic Date Due    Colorectal Cancer Screening  Never done- reminded     Zoster Vaccines (1 of 2) Never done- unsure     COVID-19 Vaccine (6 - 2023-24 season) 09/01/2024- unsure     Influenza Vaccine (1) 10/01/2024    Annual Physical  03/28/2025    PSA  04/18/2026    Annual Depression Screening  Completed    Fall Risk Screening (Annual)  Completed    Pneumococcal Vaccine: 65+ Years  Completed    Lung Cancer Screening  Discontinued     Discussed the importance of Health Maintenance.   Cancer is something no one wants to think about but it's important to stay on top of preventive screenings, like colon screenings.  The good thing about colorectal cancer is that it's one of the easiest cancers to prevent with regular screening.  Early detection is key. Preventive screenings, like colon screenings, are used to look for a disease when a person does not have symptoms and can help find abnormal growths before they turn into cancer.   When performed regularly, cancer may be caught at an early stage, when it is smaller and easier to treat.  All adults should be aware of the signs and symptoms of colorectal cancer, so we can assist you in getting the care you need as soon as possible. It's important to let us know right away if you observe:  Blood in your stool, dark or black colored stool, and experience unintentional weight loss  You are important to us. Don't take your colon health lightly. We want you to stay healthy and the best way to do so is to get your colon screening.    Patient updates/concerns:   Listened to pt and provided support.     Reminded pt due for 3 month follow up in Oct with   [facility].  Pt will let his wife know and call to schedule.      [facility]   Recent visit with Jojo Galarza NP 8/5/24.  Per note:   Assessment:  Acute exacerbation of COPD   -last FEV1 60%, 2021  -cough and curtis improved  -on trelegy   - minimal cough  -Use I-S and Acapella 4 sets of 10 breaths daily  -Consider cardiac rehab, family has information, has been difficult to get patient to rehab, may check out healthy [facility].     Acute on chronic respiratory failure with hypoxemia and hypercapnia, and overlap syndrome with MARIA M  -On home O2 at 4L per N/C  -no hypoxemia on  4 L n/c 02 sat 93% after activity   -On CPAP nightly 4 + hours /night, then takes off     Acute on chronic HF 60-65% EF, NYHA class III, stage C  -R sided HF  -EF 60-65 %, mild RVH  -diuretics torsemide 20 mg twice daily  -GDMT : Jardiance ( not taking), spironolactone, toprol  -weight: diuresed 16 lbs during hospitalization, wt up 5 lbs since discharge, improved curtis, no curtis using 02 and cpap at night. continued bloating, jake LE edema decreased  - BP normal   -Renal function stable, bun/cr: 22.084<- 29/1.0, K 4.2, CO2 31  -BNP  <-17 ( 7/14/24)   -hypervolemia, improved with IV diuretics, continued bloating  - stop jardiance and begin farxiga 10 mg daily ( 30 day free voucher given) can discuss with cardiology tomorrow  - restart spironolactone per cardiology  -Reinforced follow low-sodium and fluid restricted diet and increased activity as tolerated        Plan:      Patient Instructions   Stop Jardiance and begin Farxiga 10 mg tablet daily, check with Dr. Montague tomorrow. You can get a 30 day free trial of Farxiga then apply for the [employer] assistance to possibly get the medication covered by the company     Talk with Dr. Montague about taking spironolactone 12.5 mg tablet daily and if you should continue taking potassium chloride tablet      Continue all your other medications as prescribed      Call if you are having  shortness of breath, cough, wheezing, chest pain, dizziness, lightheadedness, heart racing, palpitations, swelling, rapid weight gain, fatigue, weakness, fever or chills.  Call 911 with severe shortness of breath, chest pain or chest pressure not improved after 15 minutes of rest or if feeling faint,  passing out or having a fall      Weigh yourself daily in the morning before breakfast, call if gaining 3 lbs or more overnight or more than 5 lbs in one week.     Follow 2000 mg sodium restricted, heart healthy diet.      Keep daily fluids at 48-64 ounces per day (1.5-2 liters of liquid or 6-8 , 8 oz glasses of liquid)     Follow up with Dr. Montague tomorrow and Dr. Florian as recommended     Follow up with Dr. Salinas in the next 2 weeks, call to make an appointment at [phone]     Follow up with the specialty care clinic in 1 month or as directed by cardiology     Use incentive spirometer- 4 sets of 10 slow inhaled breaths daily and use acapella, 5 blowing breaths with pause and cough then another 5 blowing breaths and cough, 4 times a day     Wear your cpap all night with sleep and when napping, and continue wearing oxygen at 4 liters per nasal cannula at all times.      Consider doing pulmonary rehab at [facility], call to schedule an orientation appointment at [phone], and you can call the healthy driven van to see if you do qualify for transportation.  [phone]     Always carry a copy of your current medication list with you     Limit sodium to  2000 mg daily. Common high sodium foods include frozen dinners, soups (not homemade), some cereal, vegetable juice, canned vegetables, lunch meats, processed meats like hotdogs, sausage, green, pepperoni, soy sauce, pre-packaged rice or potatoes. Please remember to read nutrition labels for sodium content.   www.healthyeating.nhlbi.nih.gov     Exercise daily as tolerated, with goal of doing moderate aerobic exercise like walking for about 30 minutes 5  days per week. Start by walking at a slow to moderate pace for 1-2 minutes 2-3 times a day. Pace your activity to prevent shortness of breath or fatigue. Stop exercise if you develop chest pain, lightheadedness, or significant shortness of breath    Reiterated the above together.    Pt says he is not on Farxiga.  He is also not taking Atorvastatin.  Suggested he discuss with his prescribing providers.  Information given to pt.      Cardiology  Recent visit with Dr. Florian   Per note:  ASSESSMENT    HFpEF  - LVEF 60%  -Continue Aldactone, torsemide  -Unable to afford Jardiance  - consider PET stress next visit (severe diffuse dense coronary calcifications)Coronary artery calcifications  - Dense diffuse calcification seen on CT chest performed earlier this year  - Currently on statin  - Monitor lipids  - Ischemic evaluation will be discussed next visitChronic hypoxic respiratory failure  - followed by PulmRecommendations  - PCM for Heart Failure  - RTC 2 months  -BMP    Diet  Could be better pt says.  Pt admits he can overeat.  Plans on watching his portions more.  Likes eating till he feels full but understands he is over weight and needs more discipline.       Discussed diet and nutrition along with the importance of compliance for overall health and well being.   Pt says he will try to watch is portions twice a week for dinner meals.     Per Dr. Kang he was referred to weight loss clinic and advised to start low calorie diet.   Reiterated the above.     Goals/Action Plan:    Active goal from previous outreach: weight management      Patient reported progress towards goals: ongoing                - What: diet control            - Where/When/How: pt plans on controlling his portions for dinner at least 3 days a week  Patient Reported Barriers and Concerns: pt says he usually likes to eat till he's full.  Pt says he's kind of a carnivore.                     - Plan for overcoming barriers: he plans on only  serving himself one portion and not going back for seconds just  because his stomach isn't full.      Care Managers Interventions:   Encouraged patient:   Self care: Take the time to do the things you love.   Nutrition:  Good nutrition helps us to maintain our weight, fight off infection, and help reduce the risk of developing other chronic issues.   Physical activity:  Physical activity is important to help maintain independence and improve quality of life.     Future Appointments:   Future Appointments   Date Time Provider Department Center   12/4/2024  4:00 PM Raslan, Ahmad O, MD [facility]         Next Care Manager Follow Up Date: in 1 month.     Reason For Follow Up: review progress and or barriers towards patient's goals.     Time Spent This Encounter Total: 21 min medical record review, telephone communication, care plan updates where needed, education, goals, and action plan recreation/update. Provided acknowledgment and validation to patient's concerns.   Monthly Minute Total including today: 21  Physical assessment, complete health history, and need for CCM established by Johny Kang MD.

## 2024-09-20 LAB — M2 MITOCHONDRIAL AB: <20 UNITS

## 2024-10-14 ENCOUNTER — PATIENT OUTREACH (OUTPATIENT)
Dept: CASE MANAGEMENT | Age: 74
End: 2024-10-14

## 2024-10-24 ENCOUNTER — OFFICE VISIT (OUTPATIENT)
Dept: ORTHOPEDICS CLINIC | Facility: CLINIC | Age: 74
End: 2024-10-24

## 2024-10-24 VITALS — SYSTOLIC BLOOD PRESSURE: 130 MMHG | DIASTOLIC BLOOD PRESSURE: 78 MMHG | HEART RATE: 82 BPM

## 2024-10-24 DIAGNOSIS — M17.0 PRIMARY OSTEOARTHRITIS OF BOTH KNEES: Primary | ICD-10-CM

## 2024-10-24 PROCEDURE — 20610 DRAIN/INJ JOINT/BURSA W/O US: CPT

## 2024-10-24 RX ORDER — TRIAMCINOLONE ACETONIDE 40 MG/ML
40 INJECTION, SUSPENSION INTRA-ARTICULAR; INTRAMUSCULAR ONCE
Status: COMPLETED | OUTPATIENT
Start: 2024-10-24 | End: 2024-10-24

## 2024-10-24 RX ADMIN — TRIAMCINOLONE ACETONIDE 40 MG: 40 INJECTION, SUSPENSION INTRA-ARTICULAR; INTRAMUSCULAR at 11:57:00

## 2024-10-24 RX ADMIN — TRIAMCINOLONE ACETONIDE 40 MG: 40 INJECTION, SUSPENSION INTRA-ARTICULAR; INTRAMUSCULAR at 11:56:00

## 2024-10-24 NOTE — PROGRESS NOTES
NURSING INTAKE COMMENTS:   Chief Complaint   Patient presents with    Knee Pain     F/u Bilateral knee pain 9/10 right is worse than left. Got cortisone on 07/25/2024.  Pain returned  about 3 weeks ago. Here for possible cortisone injection.       HPI: This 74 year old male presents today with complaints of bilateral knee follow up. Patient is here for bilateral knee cortisone injections. His last injections were three months ago. States knee pain started a couple of weeks ago. He would like to proceed with knee injections today. His granddaughter is getting  this weekend.     Past Medical History:    Atherosclerosis of coronary artery    coronary calcification on ct chest    Back problem    Cataract    Chronic respiratory failure (HCC)    Congestive heart disease (HCC)    COPD (HCC)    freq exacerbations. ? 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 90 tablet 3    potassium chloride 20 MEQ Oral Tab CR Take 1 tablet (20 mEq total) by mouth daily.      spironolactone 25 MG Oral Tab Take 0.5 tablets (12.5 mg total) by mouth daily. 30 tablet 1    metoprolol succinate ER 50 MG Oral Tablet 24 Hr Take 1 tablet (50 mg total) by mouth Daily Beta Blocker. 30 tablet 1    acetaminophen (TYLENOL) 325 MG Oral Tab Take 1 tablet (325 mg total) by mouth every 6  (six) hours as needed for Pain.      torsemide 20 MG Oral Tab Take 1 tablet (20 mg total) by mouth 2 (two) times daily. 180 tablet 3    fluticasone-umeclidin-vilant (TRELEGY ELLIPTA) 100-62.5-25 MCG/ACT Inhalation Aerosol Powder, Breath Activated Inhale 1 puff into the lungs daily. 3 each 0    albuterol 108 (90 Base) MCG/ACT Inhalation Aero Soln Inhale 2 puffs into the lungs every 4 to 6 hours as needed for Wheezing. inhale 2 puff by inhalation route  every 4 - 6 hours as needed 1 each 1    atorvastatin 20 MG Oral Tab Take 1 tablet (20 mg total) by mouth nightly. 90 tablet 0    ipratropium-albuterol 0.5-2.5 (3) MG/3ML Inhalation Solution Take 3 mL by nebulization in the morning and 3 mL at noon and 3 mL in the evening. And as needed. 100 each 1    fluticasone propionate 50 MCG/ACT Nasal Suspension 2 sprays by Nasal route daily. 3 each 3    Urea 40 % External Cream Apply 1 Application topically daily. 227 g 2    Saline Nasal Spray 0.65 % Nasal Solution 1 spray by Nasal route every 3 (three) hours as needed for congestion. 1 Bottle 0    dapagliflozin (FARXIGA) 10 MG Oral Tab Take 1 tablet (10 mg total) by mouth daily. 30 tablet 0     Allergies[1]  Family History   Problem Relation Age of Onset    Cancer Father         lung cancer    Cancer Mother         lung cancer    No Known Problems Daughter     No Known Problems Son     No Known Problems Maternal Grandmother     No Known Problems Maternal Grandfather     No Known Problems Paternal Grandmother     No Known Problems Paternal Grandfather     No Known Problems Sister     Diabetes Brother     Lipids Brother         hyperlipidemia    Hypertension Brother     No Known Problems Other        Social History     Occupational History    Not on file   Tobacco Use    Smoking status: Former     Current packs/day: 0.00     Average packs/day: 2.0 packs/day for 40.0 years (80.0 ttl pk-yrs)     Types: Cigarettes     Start date: 11/1981     Quit date: 11/2021     Years since  quittin.9     Passive exposure: Past    Smokeless tobacco: Never   Vaping Use    Vaping status: Never Used   Substance and Sexual Activity    Alcohol use: Not Currently     Alcohol/week: 0.0 standard drinks of alcohol     Comment: socially    Drug use: Not Currently    Sexual activity: Not on file        Review of Systems:  GENERAL: denies fevers, chills, night sweats, fatigue, unintentional weight loss/gain  SKIN: denies skin lesions, open sores, rash  HEENT:denies recent vision change, new nasal congestion,hearing loss, tinnitus, sore throat, headaches  RESPIRATORY: denies new shortness of breath, cough, asthma, wheezing  CARDIOVASCULAR: denies chest pain, leg cramps with exertion, palpitations, leg swelling  GI: denies abdominal pain, nausea, vomiting, diarrhea, constipation, hematochezia, worsening heartburn or stomach ulcers  : denies dysuria, hematuria, incontinence, increased frequency, urgency, difficulty urinating  MUSCULOSKELETAL: denies musculoskeletal complaints other than in HPI  NEURO: denies numbness, tingling, weakness, balance issues, dizziness, memory loss  PSYCHIATRIC: denies Hx of depression, anxiety, other psychiatric disorders  HEMATOLOGIC: denies blood clots, anemia, blood clotting disorders, blood transfusion  ENDOCRINE: denies autoimmune disease, thyroid issues, or diabetes  ALLERGY: denies asthma, seasonal allergies    Physical Examination:    /78 (BP Location: Left arm, Patient Position: Sitting, Cuff Size: large)   Pulse 82   Constitutional: appears well hydrated, alert and responsive, no acute distress noted  Extremities: Bilateral knee exam is unchanged. No effusion. No erythema  Neurological: Unchanged     Imaging:   No results found.     Labs:  Lab Results   Component Value Date    WBC 13.7 (H) 2024    HGB 15.2 2024    .0 2024      Lab Results   Component Value Date     (H) 2024    BUN 26 (H) 2024    CREATSERUM 1.02 2024     GFRNAA 91 09/23/2021    GFRAA 105 09/23/2021        Assessment and Plan:  Diagnoses and all orders for this visit:    Primary osteoarthritis of both knees  -     Drain/Inject Large Joint/Bursa  -     triamcinolone acetonide (Kenalog-40) 40 MG/ML injection 40 mg  -     Drain/Inject Large Joint/Bursa  -     triamcinolone acetonide (Kenalog-40) 40 MG/ML injection 40 mg        Assessment: Bilateral knee osteoarthritis     Plan: Using sterile technique and inferior lateral parapatellar approach, the bilateral knees were injected with cortisone. Patient tolerated the procedure well. Advised icing and oral anti-inflammatories as needed for pain. Accompanied by wife. Follow up in three months for cortisone injections.     Follow Up: Return in about 3 months (around 1/24/2025).    Aurea Arvizu PA-C       [1] No Known Allergies

## 2024-10-24 NOTE — PROGRESS NOTES
Per verbal order from Dr. Goldsmith draw up 2 syringes of 3ml of 0.5% Marcaine & 2ml 1% lidocaine and 1ml of Kenalog 40 for cortisone injection to bilateral knee Odalis ZARAGOZA MA    Patient provided education handout for cortisone injection.

## 2024-10-31 ENCOUNTER — PATIENT OUTREACH (OUTPATIENT)
Dept: CASE MANAGEMENT | Age: 74
End: 2024-10-31

## 2024-10-31 DIAGNOSIS — E78.5 DYSLIPIDEMIA: Primary | ICD-10-CM

## 2024-10-31 DIAGNOSIS — I50.32 CHRONIC DIASTOLIC CONGESTIVE HEART FAILURE (HCC): ICD-10-CM

## 2024-10-31 DIAGNOSIS — J43.9 PULMONARY EMPHYSEMA, UNSPECIFIED EMPHYSEMA TYPE (HCC): ICD-10-CM

## 2024-10-31 PROCEDURE — 99490 CHRNC CARE MGMT STAFF 1ST 20: CPT

## 2024-10-31 RX ORDER — ERGOCALCIFEROL 1.25 MG/1
50000 CAPSULE, LIQUID FILLED ORAL WEEKLY
COMMUNITY
Start: 2024-09-23 | End: 2024-11-03

## 2024-10-31 NOTE — PROGRESS NOTES
Spoke to [facility].      Updates to patient care team/comments: UTD.   Patient reported changes in medications: together we reviewed and updated.  Pt says Farxiga was to expensive and did not start.    Med Adherence  Comment: pt reports taking all medications as prescribed.       Health Maintenance:   Reviewed with pt     Health Maintenance   Topic Date Due    Colorectal Cancer Screening  Never done- Ordered on 3/28/2024 reminded    Zoster Vaccines (1 of 2) Never done- unsure     COVID-19 Vaccine (6 - 2023-24 season) 09/01/2024- unsure    Influenza Vaccine (1) 10/01/2024- plans on getting     Annual Physical  03/28/2025    PSA  04/18/2026    Annual Depression Screening  Completed    Fall Risk Screening (Annual)  Completed    Pneumococcal Vaccine: 65+ Years  Completed    Lung Cancer Screening  Discontinued       Patient updates/concerns:   Listened to pt and provided support.     Reminded needs follow up with Dr. Kang.  Pt says he will let his wife know and get scheduled as she is his transportation.      Back pain  Much better since his Kyphoplasty.      Weight management   Reminded Dr. Kang referred to weight loss clinic.    Gave information to both [place] luis and [place].      Pt was not taking his Vitamin D therapy (forgot) says he will resume until gone.     Diet  Could be better pt says.  Pt admits he can overeat.  Plans on watching his portions more.  Likes eating till he feels full but understands he is over weight and needs more discipline.      Goals/Action Plan:    Active goal from previous outreach: weight management      Patient reported progress towards goals: ongoing                - What: diet control            - Where/When/How: pt plans on controlling his portions for dinner at least 3 days a week  Patient Reported Barriers and Concerns: pt says he usually likes to eat till he's full.  Pt says he's kind of a carnivore.                     - Plan for  overcoming barriers: he plans on only serving himself one portion and not going back for seconds just  because his stomach isn't full.      Care Managers Interventions:   Encouraged patient:   Self care: Take the time to do the things you love.   Nutrition:  Good nutrition helps us to maintain our weight, fight off infection, and help reduce the risk of developing other chronic issues.   Physical activity:  Physical activity is important to help maintain independence and improve quality of life.     Future Appointments:   Future Appointments   Date Time Provider Department Center   12/4/2024  4:00 PM Yesi Salinas MD [id number]         Next Care Manager Follow Up Date: in 1 month.     Reason For Follow Up: review progress and or barriers towards patient's goals.     Time Spent This Encounter Total: 22 min medical record review, telephone communication, care plan updates where needed, education, goals, and action plan recreation/update. Provided acknowledgment and validation to patient's concerns.   Monthly Minute Total including today: 22  Physical assessment, complete health history, and need for CCM established by Johny Kang MD.    Friendly reminder - 2025 Benefits Open Enrollment is here!   We encourage you to review your current Medicare coverage and explore your options during this period. We have developed a Medicare Information Page on our website to serve as a resource for guidance and answers to any questions you might have.   You can access it by visiting, [email or web address]

## 2024-11-03 ENCOUNTER — APPOINTMENT (OUTPATIENT)
Dept: GENERAL RADIOLOGY | Facility: HOSPITAL | Age: 74
End: 2024-11-03
Payer: MEDICARE

## 2024-11-03 ENCOUNTER — HOSPITAL ENCOUNTER (INPATIENT)
Facility: HOSPITAL | Age: 74
LOS: 4 days | Discharge: HOME | End: 2024-11-07
Attending: EMERGENCY MEDICINE | Admitting: EMERGENCY MEDICINE
Payer: MEDICARE

## 2024-11-03 ENCOUNTER — HOSPITAL ENCOUNTER (INPATIENT)
Facility: HOSPITAL | Age: 74
LOS: 4 days | Discharge: HOME | End: 2024-11-07
Attending: EMERGENCY MEDICINE
Payer: MEDICARE

## 2024-11-03 DIAGNOSIS — J44.1 ACUTE EXACERBATION OF CHRONIC OBSTRUCTIVE PULMONARY DISEASE (COPD) (HCC): Primary | ICD-10-CM

## 2024-11-03 DIAGNOSIS — J18.9 COMMUNITY ACQUIRED PNEUMONIA, UNSPECIFIED LATERALITY: ICD-10-CM

## 2024-11-03 LAB
ALBUMIN SERPL-MCNC: 4.3 G/DL (ref 3.2–4.8)
ALBUMIN/GLOB SERPL: 1.5 {RATIO} (ref 1–2)
ALP LIVER SERPL-CCNC: 103 U/L
ALT SERPL-CCNC: 63 U/L
ANION GAP SERPL CALC-SCNC: 4 MMOL/L (ref 0–18)
AST SERPL-CCNC: 29 U/L (ref ?–34)
ATRIAL RATE: 106 BPM
BILIRUB SERPL-MCNC: 1.3 MG/DL (ref 0.2–1.1)
BNP SERPL-MCNC: 13 PG/ML (ref ?–100)
BUN BLD-MCNC: 18 MG/DL (ref 9–23)
BUN/CREAT SERPL: 19.4 (ref 10–20)
CALCIUM BLD-MCNC: 10 MG/DL (ref 8.7–10.4)
CHLORIDE SERPL-SCNC: 104 MMOL/L (ref 98–112)
CO2 SERPL-SCNC: 31 MMOL/L (ref 21–32)
CREAT BLD-MCNC: 0.93 MG/DL
EGFRCR SERPLBLD CKD-EPI 2021: 87 ML/MIN/1.73M2 (ref 60–?)
FLUAV + FLUBV RNA SPEC NAA+PROBE: NEGATIVE
FLUAV + FLUBV RNA SPEC NAA+PROBE: NEGATIVE
GLOBULIN PLAS-MCNC: 2.8 G/DL (ref 2–3.5)
GLUCOSE BLD-MCNC: 132 MG/DL (ref 70–99)
LACTATE SERPL-SCNC: 0.6 MMOL/L (ref 0.5–2)
MAGNESIUM SERPL-MCNC: 2.1 MG/DL (ref 1.6–2.6)
OSMOLALITY SERPL CALC.SUM OF ELEC: 292 MOSM/KG (ref 275–295)
P AXIS: 40 DEGREES
P-R INTERVAL: 146 MS
POTASSIUM SERPL-SCNC: 4.4 MMOL/L (ref 3.5–5.1)
PROCALCITONIN SERPL-MCNC: 0.11 NG/ML (ref ?–0.05)
PROT SERPL-MCNC: 7.1 G/DL (ref 5.7–8.2)
Q-T INTERVAL: 332 MS
QRS DURATION: 92 MS
QTC CALCULATION (BEZET): 441 MS
R AXIS: 7 DEGREES
RSV RNA SPEC NAA+PROBE: NEGATIVE
SARS-COV-2 RNA RESP QL NAA+PROBE: NOT DETECTED
SODIUM SERPL-SCNC: 139 MMOL/L (ref 136–145)
T AXIS: 35 DEGREES
TROPONIN I SERPL HS-MCNC: 4 NG/L
VENTRICULAR RATE: 106 BPM

## 2024-11-03 PROCEDURE — 99223 1ST HOSP IP/OBS HIGH 75: CPT | Performed by: HOSPITALIST

## 2024-11-03 PROCEDURE — 5A09357 ASSISTANCE WITH RESPIRATORY VENTILATION, LESS THAN 24 CONSECUTIVE HOURS, CONTINUOUS POSITIVE AIRWAY PRESSURE: ICD-10-PCS | Performed by: HOSPITALIST

## 2024-11-03 PROCEDURE — 71045 X-RAY EXAM CHEST 1 VIEW: CPT | Performed by: EMERGENCY MEDICINE

## 2024-11-03 PROCEDURE — 99223 1ST HOSP IP/OBS HIGH 75: CPT | Performed by: INTERNAL MEDICINE

## 2024-11-03 RX ORDER — SODIUM PHOSPHATE, DIBASIC AND SODIUM PHOSPHATE, MONOBASIC 7; 19 G/230ML; G/230ML
1 ENEMA RECTAL ONCE AS NEEDED
Status: DISCONTINUED | OUTPATIENT
Start: 2024-11-03 | End: 2024-11-07

## 2024-11-03 RX ORDER — ACETAMINOPHEN 325 MG/1
650 TABLET ORAL EVERY 4 HOURS PRN
Status: DISCONTINUED | OUTPATIENT
Start: 2024-11-03 | End: 2024-11-07

## 2024-11-03 RX ORDER — GUAIFENESIN 600 MG/1
600 TABLET, EXTENDED RELEASE ORAL 2 TIMES DAILY
Status: DISCONTINUED | OUTPATIENT
Start: 2024-11-03 | End: 2024-11-07

## 2024-11-03 RX ORDER — FLUTICASONE PROPIONATE AND SALMETEROL 250; 50 UG/1; UG/1
1 POWDER RESPIRATORY (INHALATION) 2 TIMES DAILY
Status: DISCONTINUED | OUTPATIENT
Start: 2024-11-03 | End: 2024-11-07

## 2024-11-03 RX ORDER — ALBUTEROL SULFATE 5 MG/ML
10 SOLUTION RESPIRATORY (INHALATION) ONCE
Status: COMPLETED | OUTPATIENT
Start: 2024-11-03 | End: 2024-11-03

## 2024-11-03 RX ORDER — POLYETHYLENE GLYCOL 3350 17 G/17G
17 POWDER, FOR SOLUTION ORAL DAILY PRN
Status: DISCONTINUED | OUTPATIENT
Start: 2024-11-03 | End: 2024-11-07

## 2024-11-03 RX ORDER — ECHINACEA PURPUREA EXTRACT 125 MG
1 TABLET ORAL
Status: DISCONTINUED | OUTPATIENT
Start: 2024-11-03 | End: 2024-11-07

## 2024-11-03 RX ORDER — ENOXAPARIN SODIUM 100 MG/ML
0.5 INJECTION SUBCUTANEOUS EVERY 12 HOURS
Status: DISCONTINUED | OUTPATIENT
Start: 2024-11-03 | End: 2024-11-05

## 2024-11-03 RX ORDER — METHYLPREDNISOLONE SODIUM SUCCINATE 125 MG/2ML
125 INJECTION INTRAMUSCULAR; INTRAVENOUS ONCE
Status: COMPLETED | OUTPATIENT
Start: 2024-11-03 | End: 2024-11-03

## 2024-11-03 RX ORDER — HYDROCODONE BITARTRATE AND ACETAMINOPHEN 5; 325 MG/1; MG/1
1 TABLET ORAL EVERY 4 HOURS PRN
Status: DISCONTINUED | OUTPATIENT
Start: 2024-11-03 | End: 2024-11-07

## 2024-11-03 RX ORDER — BISACODYL 10 MG
10 SUPPOSITORY, RECTAL RECTAL
Status: DISCONTINUED | OUTPATIENT
Start: 2024-11-03 | End: 2024-11-07

## 2024-11-03 RX ORDER — TORSEMIDE 20 MG/1
20 TABLET ORAL
Status: DISCONTINUED | OUTPATIENT
Start: 2024-11-03 | End: 2024-11-07

## 2024-11-03 RX ORDER — AZITHROMYCIN 250 MG/1
500 TABLET, FILM COATED ORAL DAILY
Status: COMPLETED | OUTPATIENT
Start: 2024-11-04 | End: 2024-11-07

## 2024-11-03 RX ORDER — BENZONATATE 200 MG/1
200 CAPSULE ORAL 3 TIMES DAILY PRN
Status: DISCONTINUED | OUTPATIENT
Start: 2024-11-03 | End: 2024-11-07

## 2024-11-03 RX ORDER — METHYLPREDNISOLONE SODIUM SUCCINATE 40 MG/ML
40 INJECTION INTRAMUSCULAR; INTRAVENOUS EVERY 12 HOURS
Status: DISCONTINUED | OUTPATIENT
Start: 2024-11-03 | End: 2024-11-05

## 2024-11-03 RX ORDER — FLUTICASONE PROPIONATE AND SALMETEROL 500; 50 UG/1; UG/1
1 POWDER RESPIRATORY (INHALATION) 2 TIMES DAILY
Status: DISCONTINUED | OUTPATIENT
Start: 2024-11-03 | End: 2024-11-03

## 2024-11-03 RX ORDER — IPRATROPIUM BROMIDE AND ALBUTEROL SULFATE 2.5; .5 MG/3ML; MG/3ML
3 SOLUTION RESPIRATORY (INHALATION)
Status: COMPLETED | OUTPATIENT
Start: 2024-11-03 | End: 2024-11-04

## 2024-11-03 RX ORDER — HYDROCODONE BITARTRATE AND ACETAMINOPHEN 5; 325 MG/1; MG/1
2 TABLET ORAL EVERY 4 HOURS PRN
Status: DISCONTINUED | OUTPATIENT
Start: 2024-11-03 | End: 2024-11-07

## 2024-11-03 RX ORDER — ACETAMINOPHEN 500 MG
500 TABLET ORAL EVERY 4 HOURS PRN
Status: DISCONTINUED | OUTPATIENT
Start: 2024-11-03 | End: 2024-11-07

## 2024-11-03 RX ORDER — SPIRONOLACTONE 25 MG/1
12.5 TABLET ORAL DAILY
Status: DISCONTINUED | OUTPATIENT
Start: 2024-11-03 | End: 2024-11-07

## 2024-11-03 RX ORDER — ATORVASTATIN CALCIUM 20 MG/1
20 TABLET, FILM COATED ORAL NIGHTLY
Status: DISCONTINUED | OUTPATIENT
Start: 2024-11-03 | End: 2024-11-07

## 2024-11-03 RX ORDER — IPRATROPIUM BROMIDE AND ALBUTEROL SULFATE 2.5; .5 MG/3ML; MG/3ML
3 SOLUTION RESPIRATORY (INHALATION) EVERY 4 HOURS PRN
Status: DISCONTINUED | OUTPATIENT
Start: 2024-11-03 | End: 2024-11-07

## 2024-11-03 RX ORDER — POTASSIUM CHLORIDE 1500 MG/1
20 TABLET, EXTENDED RELEASE ORAL DAILY
Status: DISCONTINUED | OUTPATIENT
Start: 2024-11-03 | End: 2024-11-04

## 2024-11-03 RX ORDER — AZITHROMYCIN 250 MG/1
500 TABLET, FILM COATED ORAL ONCE
Status: COMPLETED | OUTPATIENT
Start: 2024-11-03 | End: 2024-11-03

## 2024-11-03 RX ORDER — SENNOSIDES 8.6 MG
17.2 TABLET ORAL NIGHTLY PRN
Status: DISCONTINUED | OUTPATIENT
Start: 2024-11-03 | End: 2024-11-07

## 2024-11-03 NOTE — ED QUICK NOTES
Orders for admission, patient is aware of plan and ready to go upstairs. Any questions, please call ED RN colette at extension [phone].     Patient Covid vaccination status: Fully vaccinated     COVID Test Ordered in ED: SARS-CoV-2/Flu A and B/RSV by PCR (GeneXpert)    COVID Suspicion at Admission: N/A    Running Infusions:  None    Mental Status/LOC at time of transport: x3    Other pertinent information: 4l nc baseline  CIWA score: N/A   NIH score:  N/A

## 2024-11-03 NOTE — ED QUICK NOTES
Pt received in room for eval of terry worseningx 1 week. Wife relates noticed feeling unwell post daughters wedding 1 week ago. Ls diminished to right ride. On 4l nc as per baseline. + 1+ edema to bilat extremitites- wife states legs are \" good for him\" pt denies fevers, + productive cough with green sputum. Ivsl placed blood labs and cxr entered. EKG done from triage. Pt placed in tele. Poc reviewed. Call light in reach

## 2024-11-03 NOTE — ED PROVIDER NOTES
Patient Seen in: [facility] Emergency Department      History     Chief Complaint   Patient presents with    Difficulty Breathing     Stated Complaint: STEFANI    Subjective:   HPI      74-year-old male presents for evaluation for shortness of breath.  Patient reports that he was at a wedding 1 week ago, was around a lot of people, and shortly thereafter started to feel sick.  He said increasing cough productive of green sputum.  He reports increasing shortness of breath, generalized weakness and fatigue.  He is on 4 L nasal cannula oxygen at baseline and reports that he put himself on his CPAP machine during the day today because he was so short of breath.  He denies any fevers or chest pain.    Objective:     Past Medical History:    Atherosclerosis of coronary artery    coronary calcification on ct chest    Back problem    Cataract    Chronic respiratory failure (HCC)    Congestive heart disease (HCC)    COPD (HCC)    freq exacerbations. ? 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    Osteoarthritis    Pneumonia    Pulmonary nodules    Sleep apnea    Tobacco abuse              Past Surgical History:   Procedure Laterality Date    Appendectomy      Appendectomy      Cataract Bilateral     2/2024, 3/2024    Electrocardiogram, complete  01/30/2012    scanned to media tab                Social History     Socioeconomic History    Marital status:    Tobacco Use    Smoking status: Former     Current packs/day: 0.00     Average packs/day: 2.0 packs/day for 40.0 years (80.0 ttl pk-yrs)     Types: Cigarettes     Start date: 11/1981     Quit date: 11/2021     Years since quitting: 3.0     Passive exposure: Past    Smokeless tobacco: Never   Vaping Use    Vaping status:  Never Used   Substance and Sexual Activity    Alcohol use: Not Currently     Alcohol/week: 0.0 standard drinks of alcohol     Comment: socially    Drug use: Not Currently   Other Topics Concern    Caffeine Concern Yes     Comment: coffee, >32 oz daily     Social Drivers of Health     Financial Resource Strain: Low Risk  (4/29/2024)    Financial Resource Strain     Difficulty of Paying Living Expenses: Not very hard     Med Affordability: No   Food Insecurity: No Food Insecurity (7/15/2024)    Food Insecurity     Food Insecurity: Never true   Transportation Needs: No Transportation Needs (7/15/2024)    Transportation Needs     Lack of Transportation: No   Physical Activity: Inactive (1/16/2024)    Exercise Vital Sign     Days of Exercise per Week: 0 days     Minutes of Exercise per Session: 0 min   Stress: No Stress Concern Present (1/16/2024)    Stress     Feeling of Stress : No   Social Connections: Somewhat Isolated (1/16/2024)    Social Connections     Frequency of Socialization with Friends and Family: 1   Housing Stability: Low Risk  (7/15/2024)    Housing Stability     Housing Instability: No                  Physical Exam     ED Triage Vitals [11/03/24 0930]   /87   Pulse 98   Resp 26   Temp 98.6 °F (37 °C)   Temp src Temporal   SpO2 93 %   O2 Device Nasal cannula       Current Vitals:   Vital Signs  BP: 147/87  Pulse: 98  Resp: 26  Temp: 98.6 °F (37 °C)  Temp src: Temporal    Oxygen Therapy  SpO2: 93 %  O2 Device: Nasal cannula  O2 Flow Rate (L/min): 4 L/min (baseline)        Physical Exam  Vitals and nursing note reviewed.   Constitutional:       Appearance: Normal appearance.   HENT:      Head: Normocephalic and atraumatic.   Cardiovascular:      Rate and Rhythm: Normal rate and regular rhythm.      Pulses: Normal pulses.           Radial pulses are 2+ on the right side and 2+ on the left side.   Pulmonary:      Effort: Pulmonary effort is normal. Tachypnea and prolonged expiration present.       Breath sounds: Wheezing and rhonchi present.   Musculoskeletal:         General: Normal range of motion.      Cervical back: Normal range of motion.   Skin:     General: Skin is warm and dry.   Neurological:      General: No focal deficit present.      Mental Status: He is alert.             ED Course     Labs Reviewed   CBC WITH DIFFERENTIAL WITH PLATELET - Abnormal; Notable for the following components:       Result Value    WBC 14.5 (*)     MCHC 30.5 (*)     RDW-SD 50.7 (*)     Neutrophil Absolute Prelim 10.47 (*)     All other components within normal limits   COMP METABOLIC PANEL (14) - Abnormal; Notable for the following components:    Glucose 132 (*)     ALT 63 (*)     Bilirubin, Total 1.3 (*)     All other components within normal limits   PROCALCITONIN - Abnormal; Notable for the following components:    Procalcitonin 0.11 (*)     All other components within normal limits   BNP (B TYPE NATRIURETIC PEPTIDE) - Normal   MAGNESIUM - Normal   TROPONIN I HIGH SENSITIVITY - Normal   SCAN SLIDE   MD BLOOD SMEAR CONSULT   LACTIC ACID, PLASMA   RAINBOW DRAW LAVENDER   RAINBOW DRAW LIGHT GREEN   RAINBOW DRAW GOLD   RAINBOW DRAW BLUE   SARS-COV-2/FLU A AND B/RSV BY PCR (GENEXPERT)   BLOOD CULTURE   BLOOD CULTURE     EKG    Rate, intervals and axes as noted on EKG Report.  Rate: 106  Rhythm: Sinus Rhythm  Reading: no stemi, interpreted by myself.            ED Course as of 11/03/24 1212  ------------------------------------------------------------  Time: 11/03 1202  Value: XR CHEST AP PORTABLE  (CPT=71045)  Comment: Per my independent interpretation, patient's CXR demonstrates no pneumothorax.                [facility]          Admission disposition: 11/3/2024 11:59 AM           Medical Decision Making  Differential diagnosis includes but is not limited to PNA, viral syndrome, COPD exacerbation, CHF, etc    CBC shows a leukocytosis.  Patient's chemistry was unremarkable.  EKG without concerning findings, troponin normal, do  not suspect ACS.  Patient does not appear to be volume overloaded, BNP was normal.  Chest x-ray concerning for pneumonia.  Blood cultures obtained.  Patient is started on Unasyn and azithromycin.  He is also given Solu-Medrol, albuterol and ipratropium.  Pulmonology on consult.  Lactic acid is pending. At this time, patient does not meet severe sepsis or septic shock criteria.    Rhythm Strip: Rate 98 sinus rhythm as interpreted by myself. The cardiac monitor was ordered secondary to the patient's dyspnea    Complicating factors: The patient  has a past medical history of Atherosclerosis of coronary artery, Back problem, Cataract, Chronic respiratory failure (HCC), Congestive heart disease (HCC), COPD (HCC), Coronary atherosclerosis, Diastolic dysfunction, Essential hypertension, Hearing impairment, High blood pressure, High cholesterol, Hyperlipidemia, Obesity, On home O2, Osteoarthritis, Pneumonia, Pulmonary nodules (10/2016), Sleep apnea, and Tobacco abuse. and  has a past surgical history that includes electrocardiogram, complete (01/30/2012); appendectomy; appendectomy; and cataract (Bilateral). that contribute to the medical complexity of this ED evaluation.     Medical Record Review: I personally reviewed available prior medical records for any recent pertinent discharge summaries, testing, and procedures, and reviewed those reports.        Problems Addressed:  Acute exacerbation of chronic obstructive pulmonary disease (COPD) (HCC): chronic illness or injury with severe exacerbation, progression, or side effects of treatment  Community acquired pneumonia, unspecified laterality: acute illness or injury with systemic symptoms    Amount and/or Complexity of Data Reviewed  External Data Reviewed: notes.     Details: Echo from 7/16/24 reviewed, EF 60-65%  Labs: ordered. Decision-making details documented in ED Course.  Radiology: ordered and independent interpretation performed. Decision-making details  documented in ED Course.  ECG/medicine tests: ordered and independent interpretation performed. Decision-making details documented in ED Course.        Disposition and Plan     Clinical Impression:  1. Acute exacerbation of chronic obstructive pulmonary disease (COPD) (HCC)    2. Community acquired pneumonia, unspecified laterality         Disposition:  Admit  11/3/2024 11:59 am    Follow-up:  No follow-up provider specified.  We recommend that you schedule follow up care with a primary care provider within the next three months to obtain basic health screening including reassessment of your blood pressure.      Medications Prescribed:  Current Discharge Medication List              Supplementary Documentation:         Hospital Problems       Present on Admission  Date Reviewed: 10/24/2024            ICD-10-CM Noted POA    * (Principal) Acute exacerbation of chronic obstructive pulmonary disease (COPD) (HCC) J44.1 11/3/2024 Unknown

## 2024-11-03 NOTE — RESPIRATORY THERAPY NOTE
CPAP/BIPAP EVALUATION: Done     CPAP/BIPAP INITIATION: Patient agreed to use CPAP during this admission.     Settings: Patient to be placed on lower limit of 5 cm H2O and upper limit of 20  cm H2O    Interface: Full Face Mask    NOTES: Patient was evaluated for MARIA M and stated that he has been diagnosed with MARIA M, patient is compliant with CPAP at home. He agreed to use CPAP during this admission. RT will be available for further assistance and provide necessary equipments and CPAP machine.

## 2024-11-03 NOTE — ED INITIAL ASSESSMENT (HPI)
Patient arrives via triage accompanied by spouse for difficulty breathing that has been worsening over the last 3 days.   On home 02- 4L via nasal cannula.

## 2024-11-03 NOTE — ED QUICK NOTES
Rt paged for tretment- pt given menu for breakfast from edmd, states ok for pt to eat. Lactic acid and blood culture first set ordered and obtained. Pt updated on poc. Now sitting in chair, states preferes to sit rather than lie on cart.

## 2024-11-03 NOTE — H&P
[facility]  part of [facility]    History & Physical    Campbell Rao Patient Status:  Emergency    1950 MRN [id number]   Location [facility] EMERGENCY DEPARTMENT Attending Fuad Jackson*   Hosp Day # 0 PCP Johny Kang MD     Date:  11/3/2024  Date of Admission:  11/3/2024    History provided by:patient  Chief Complaint:     Chief Complaint   Patient presents with    Difficulty Breathing       HPI:   Campbell Rao is 74-year-old male with hx of COPD on home 4L O2, MARIA M on CPAP, morbid obesity, HFpEF, HTN and HLD, who presents with shortness of breath. Patient follows Dr. Salinas for moderate to severe COPD. Patient was recently hospitalized -24 for COPD exacerbation. Patient went to a crowded wedding a week ago, and started feeling sick shortly after, with progressive cough and dyspnea. He denied fever, chills, hemoptysis or chest pain. He uses 4L O2 and CPAP at home. In ED, patient has stable vitals. WBC 14.5, BNP 13, PCT 0.11. . CXR reported increased interstitial and alveolar opacities. Unasyn, azithromycin and SoluMedrol given in ED. Pulm consulted.    History     Past Medical History:    Atherosclerosis of coronary artery    coronary calcification on ct chest    Back problem    Cataract    Chronic respiratory failure (HCC)    Congestive heart disease (HCC)    COPD (HCC)    freq exacerbations. ? 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    Osteoarthritis    Pneumonia    Pulmonary nodules    Sleep apnea    Tobacco abuse     Past Surgical History:   Procedure Laterality Date    Appendectomy      Appendectomy      Cataract Bilateral     2024, 3/2024    Electrocardiogram, complete  2012     scanned to media tab     Family History   Problem Relation Age of Onset    Cancer Father         lung cancer    Cancer Mother         lung cancer    No Known Problems Daughter     No Known Problems Son     No Known Problems Maternal Grandmother     No Known Problems Maternal Grandfather     No Known Problems Paternal Grandmother     No Known Problems Paternal Grandfather     No Known Problems Sister     Diabetes Brother     Lipids Brother         hyperlipidemia    Hypertension Brother     No Known Problems Other      Social History:  Social History     Socioeconomic History    Marital status:    Tobacco Use    Smoking status: Former     Current packs/day: 0.00     Average packs/day: 2.0 packs/day for 40.0 years (80.0 ttl pk-yrs)     Types: Cigarettes     Start date: 11/1981     Quit date: 11/2021     Years since quitting: 3.0     Passive exposure: Past    Smokeless tobacco: Never   Vaping Use    Vaping status: Never Used   Substance and Sexual Activity    Alcohol use: Not Currently     Alcohol/week: 0.0 standard drinks of alcohol     Comment: socially    Drug use: Not Currently   Other Topics Concern    Caffeine Concern Yes     Comment: coffee, >32 oz daily     Social Drivers of Health     Financial Resource Strain: Low Risk  (4/29/2024)    Financial Resource Strain     Difficulty of Paying Living Expenses: Not very hard     Med Affordability: No   Food Insecurity: No Food Insecurity (7/15/2024)    Food Insecurity     Food Insecurity: Never true   Transportation Needs: No Transportation Needs (7/15/2024)    Transportation Needs     Lack of Transportation: No   Physical Activity: Inactive (1/16/2024)    Exercise Vital Sign     Days of Exercise per Week: 0 days     Minutes of Exercise per Session: 0 min   Stress: No Stress Concern Present (1/16/2024)    Stress     Feeling of Stress : No   Social Connections: Somewhat Isolated (1/16/2024)    Social Connections     Frequency of Socialization with Friends and  Family: 1   Housing Stability: Low Risk  (7/15/2024)    Housing Stability     Housing Instability: No     Allergies/Medications:   Allergies: Allergies[1]  (Not in a hospital admission)      Review of Systems:   Negative except depicted in HPI.    A comprehensive 12 point review of systems was completed.  Pertinent positives and negatives noted in the the HPI.    Physical Exam:   Vital Signs:  Blood pressure 147/87, pulse 98, temperature 98.6 °F (37 °C), temperature source Temporal, resp. rate 26, height 5' 8\" (1.727 m), weight (!) 305 lb (138.3 kg), SpO2 93%.     GENERAL:  The patient appeared to be in no distress.   SKIN:  Warm and hydrated  HEENT:  Head was atraumatic and normocephalic.  Eyes:  Extraocular muscles were intact.  Sclera was anicteric.  Pupils were equally reactive to light.  Ears:  There were no lesions.  Nose:  No lesions were noted.   NECK:  Supple.  There was no JVD.   CHEST:  Symmetrical movement on inspiration  HEART:  S1 and S2 heard.  RRR   LUNGS:  Air entry was good.  No crackles or wheezes   ABDOMEN: Soft and non-tender.  Bowel sounds were present.  MUSCULOSKELETAL:  There was no deformity.  There was full range of motion in all the extremities.   EXTREMITIES: There was no edema, clubbing or cyanosis  NEUROLOGICAL:  There was no focal deficit.  Cranial nerves II through XII were intact.  PSYCHIATRIC: Calm and cooperative     Results:     Lab Results   Component Value Date    WBC 14.5 (H) 11/03/2024    HGB 14.0 11/03/2024    HCT 45.9 11/03/2024    .0 11/03/2024    CREATSERUM 0.93 11/03/2024    BUN 18 11/03/2024     11/03/2024    K 4.4 11/03/2024     11/03/2024    CO2 31.0 11/03/2024     (H) 11/03/2024    CA 10.0 11/03/2024    ALB 4.3 11/03/2024    ALKPHO 103 11/03/2024    BILT 1.3 (H) 11/03/2024    TP 7.1 11/03/2024    AST 29 11/03/2024    ALT 63 (H) 11/03/2024    TSH 2.777 04/18/2024    GGT 38 04/18/2024    DDIMER 0.40 10/18/2016    MG 2.1 11/03/2024    PHOS 4.1  04/26/2024    TROP <0.045 04/10/2021    B12 765 04/27/2021       XR CHEST AP PORTABLE  (CPT=71045)    Result Date: 11/3/2024  CONCLUSION:   Unchanged mild cardiomegaly.  Increased interstitial and alveolar opacities, thought to represent interstitial and alveolar pulmonary edema.  Superimposed subsegmental atelectasis or other infiltrate are within the differential.  Recommend radiographic follow-up to resolution.  Mild chronic obscuration of the left costophrenic angle.  Chronic trace underlying peripheral scarring or effusion are differential considerations.       Dictated by (CST): Ariela Oviedo MD on 11/03/2024 at 11:15 AM     Finalized by (CST): Ariela Oviedo MD on 11/03/2024 at 11:17 AM         EKG 12 Lead    Result Date: 11/3/2024  Sinus tachycardia with Fusion complexes Low voltage QRS, consider pulmonary disease, pericardial effusion, or normal variant Incomplete right bundle branch block Inferior infarct , age undetermined Abnormal ECG When compared with ECG of 14-JUL-2024 20:35, Fusion complexes are now Present Inferior infarct is now Present     Assessment/Plan:   Campbell Rao is 74-year-old male with hx of COPD on home 4L O2, MARIA M on CPAP, morbid obesity, HFpEF, HTN and HLD, who presents with shortness of breath.     # COPD exacerbation  # Chronic hypoxemic and hypercapnic respiratory failure   - Patient follows Dr. Salinas for moderate to severe COPD.   - 80-pack-year history of smoking quit 2022, currently uses 4L O2 at home.  - CXR reported increased interstitial and alveolar opacities.   - Unasyn, azithromycin and SoluMedrol given in ED.  - continue with ceftriaxone, azithromycin, steroid and bronchodilators  - Pulm consulted    # MARIA M on CPAP  # Morbid obesity w/ BMI 46  - continue home CPAP    # HFpEF  # HTN   # HLD  - continue home meds    Diet: regular  PT/OT: deferred  DVT ppx: Lovenox  Line: none  Code status: Full   Dispo: expect greater than 2 midnights    MDM: high Jim Lane MD,  PhD  Message via Secure Chat  [facility]         [1] No Known Allergies

## 2024-11-04 LAB
ANION GAP SERPL CALC-SCNC: 3 MMOL/L (ref 0–18)
BASOPHILS # BLD AUTO: 0.09 X10(3) UL (ref 0–0.2)
BASOPHILS NFR BLD AUTO: 0.6 %
BUN BLD-MCNC: 21 MG/DL (ref 9–23)
BUN/CREAT SERPL: 21.4 (ref 10–20)
CALCIUM BLD-MCNC: 10.4 MG/DL (ref 8.7–10.4)
CHLORIDE SERPL-SCNC: 102 MMOL/L (ref 98–112)
CO2 SERPL-SCNC: 33 MMOL/L (ref 21–32)
CREAT BLD-MCNC: 0.98 MG/DL
DEPRECATED RDW RBC AUTO: 49.5 FL (ref 35.1–46.3)
DEPRECATED RDW RBC AUTO: 50.7 FL (ref 35.1–46.3)
EGFRCR SERPLBLD CKD-EPI 2021: 81 ML/MIN/1.73M2 (ref 60–?)
EOSINOPHIL # BLD AUTO: 0.07 X10(3) UL (ref 0–0.7)
EOSINOPHIL NFR BLD AUTO: 0.5 %
ERYTHROCYTE [DISTWIDTH] IN BLOOD BY AUTOMATED COUNT: 13.5 % (ref 11–15)
ERYTHROCYTE [DISTWIDTH] IN BLOOD BY AUTOMATED COUNT: 13.8 % (ref 11–15)
GLUCOSE BLD-MCNC: 235 MG/DL (ref 70–99)
GLUCOSE BLDC GLUCOMTR-MCNC: 175 MG/DL (ref 70–99)
GLUCOSE BLDC GLUCOMTR-MCNC: 181 MG/DL (ref 70–99)
GLUCOSE BLDC GLUCOMTR-MCNC: 254 MG/DL (ref 70–99)
GLUCOSE BLDC GLUCOMTR-MCNC: 265 MG/DL (ref 70–99)
HCT VFR BLD AUTO: 44.6 %
HCT VFR BLD AUTO: 45.9 %
HGB BLD-MCNC: 13.6 G/DL
HGB BLD-MCNC: 14 G/DL
IMM GRANULOCYTES # BLD AUTO: 0.14 X10(3) UL (ref 0–1)
IMM GRANULOCYTES NFR BLD: 1 %
LYMPHOCYTES # BLD AUTO: 1.57 X10(3) UL (ref 1–4)
LYMPHOCYTES NFR BLD AUTO: 10.9 %
MCH RBC QN AUTO: 30 PG (ref 26–34)
MCH RBC QN AUTO: 30.2 PG (ref 26–34)
MCHC RBC AUTO-ENTMCNC: 30.5 G/DL (ref 31–37)
MCHC RBC AUTO-ENTMCNC: 30.5 G/DL (ref 31–37)
MCV RBC AUTO: 98.5 FL
MCV RBC AUTO: 99.1 FL
MONOCYTES # BLD AUTO: 2.11 X10(3) UL (ref 0.1–1)
MONOCYTES NFR BLD AUTO: 14.6 %
NEUTROPHILS # BLD AUTO: 10.47 X10 (3) UL (ref 1.5–7.7)
NEUTROPHILS # BLD AUTO: 10.47 X10(3) UL (ref 1.5–7.7)
NEUTROPHILS NFR BLD AUTO: 72.4 %
OSMOLALITY SERPL CALC.SUM OF ELEC: 297 MOSM/KG (ref 275–295)
PLATELET # BLD AUTO: 200 10(3)UL (ref 150–450)
PLATELET # BLD AUTO: 202 10(3)UL (ref 150–450)
POTASSIUM SERPL-SCNC: 4.6 MMOL/L (ref 3.5–5.1)
RBC # BLD AUTO: 4.53 X10(6)UL
RBC # BLD AUTO: 4.63 X10(6)UL
SODIUM SERPL-SCNC: 138 MMOL/L (ref 136–145)
VIT D+METAB SERPL-MCNC: 11.3 NG/ML (ref 30–100)
WBC # BLD AUTO: 14.5 X10(3) UL (ref 4–11)
WBC # BLD AUTO: 14.8 X10(3) UL (ref 4–11)

## 2024-11-04 PROCEDURE — 99233 SBSQ HOSP IP/OBS HIGH 50: CPT | Performed by: HOSPITALIST

## 2024-11-04 PROCEDURE — 99233 SBSQ HOSP IP/OBS HIGH 50: CPT | Performed by: INTERNAL MEDICINE

## 2024-11-04 RX ORDER — ERGOCALCIFEROL 1.25 MG/1
50000 CAPSULE, LIQUID FILLED ORAL ONCE
Status: COMPLETED | OUTPATIENT
Start: 2024-11-04 | End: 2024-11-04

## 2024-11-04 RX ORDER — NICOTINE POLACRILEX 4 MG
15 LOZENGE BUCCAL
Status: DISCONTINUED | OUTPATIENT
Start: 2024-11-04 | End: 2024-11-07

## 2024-11-04 RX ORDER — NICOTINE POLACRILEX 4 MG
30 LOZENGE BUCCAL
Status: DISCONTINUED | OUTPATIENT
Start: 2024-11-04 | End: 2024-11-07

## 2024-11-04 RX ORDER — DEXTROSE MONOHYDRATE 25 G/50ML
50 INJECTION, SOLUTION INTRAVENOUS
Status: DISCONTINUED | OUTPATIENT
Start: 2024-11-04 | End: 2024-11-07

## 2024-11-04 RX ORDER — INSULIN DEGLUDEC 100 U/ML
20 INJECTION, SOLUTION SUBCUTANEOUS DAILY
Status: DISCONTINUED | OUTPATIENT
Start: 2024-11-04 | End: 2024-11-05

## 2024-11-04 NOTE — PLAN OF CARE
Problem: Patient Centered Care  Goal: Patient preferences are identified and integrated in the patient's plan of care  Description: Interventions:  - What would you like us to know as we care for you? From home with spouse. I use 4L/min oxygen at baseline.   - Provide timely, complete, and accurate information to patient/family  - Incorporate patient and family knowledge, values, beliefs, and cultural backgrounds into the planning and delivery of care  - Encourage patient/family to participate in care and decision-making at the level they choose  - Honor patient and family perspectives and choices  Outcome: Progressing     Problem: PAIN - ADULT  Goal: Verbalizes/displays adequate comfort level or patient's stated pain goal  Description: INTERVENTIONS:  - Encourage pt to monitor pain and request assistance  - Assess pain using appropriate pain scale  - Administer analgesics based on type and severity of pain and evaluate response  - Implement non-pharmacological measures as appropriate and evaluate response  - Consider cultural and social influences on pain and pain management  - Manage/alleviate anxiety  - Utilize distraction and/or relaxation techniques  - Monitor for opioid side effects  - Notify MD/LIP if interventions unsuccessful or patient reports new pain  - Anticipate increased pain with activity and pre-medicate as appropriate  Outcome: Progressing     Problem: SAFETY ADULT - FALL  Goal: Free from fall injury  Description: INTERVENTIONS:  - Assess pt frequently for physical needs  - Identify cognitive and physical deficits and behaviors that affect risk of falls.  - [place] fall precautions as indicated by assessment.  - Educate pt/family on patient safety including physical limitations  - Instruct pt to call for assistance with activity based on assessment  - Modify environment to reduce risk of injury  - Provide assistive devices as appropriate  - Consider OT/PT consult to assist with  strengthening/mobility  - Encourage toileting schedule  Outcome: Progressing     Problem: RESPIRATORY - ADULT  Goal: Achieves optimal ventilation and oxygenation  Description: INTERVENTIONS:  - Assess for changes in respiratory status  - Assess for changes in mentation and behavior  - Position to facilitate oxygenation and minimize respiratory effort  - Oxygen supplementation based on oxygen saturation or ABGs  - Provide Smoking Cessation handout, if applicable  - Encourage broncho-pulmonary hygiene including cough, deep breathe, Incentive Spirometry  - Assess the need for suctioning and perform as needed  - Assess and instruct to report SOB or any respiratory difficulty  - Respiratory Therapy support as indicated  - Manage/alleviate anxiety  - Monitor for signs/symptoms of CO2 retention  Outcome: Progressing     Admitted to unit from ED. Vital assessed. No complaints of pain. A&O x 4. On 4L/min saturating 90%. Admission navigator completed. Plan of care discussed with patient. Call light and personal items within reach. Safety precautions maintained.

## 2024-11-04 NOTE — PLAN OF CARE
Problem: Patient Centered Care  Goal: Patient preferences are identified and integrated in the patient's plan of care  Description: Interventions:  - What would you like us to know as we care for you?   - Provide timely, complete, and accurate information to patient/family  - Incorporate patient and family knowledge, values, beliefs, and cultural backgrounds into the planning and delivery of care  - Encourage patient/family to participate in care and decision-making at the level they choose  - Honor patient and family perspectives and choices  Outcome: Progressing     Problem: PAIN - ADULT  Goal: Verbalizes/displays adequate comfort level or patient's stated pain goal  Description: INTERVENTIONS:  - Encourage pt to monitor pain and request assistance  - Assess pain using appropriate pain scale  - Administer analgesics based on type and severity of pain and evaluate response  - Implement non-pharmacological measures as appropriate and evaluate response  - Consider cultural and social influences on pain and pain management  - Manage/alleviate anxiety  - Utilize distraction and/or relaxation techniques  - Monitor for opioid side effects  - Notify MD/LIP if interventions unsuccessful or patient reports new pain  - Anticipate increased pain with activity and pre-medicate as appropriate  Outcome: Progressing     Problem: SAFETY ADULT - FALL  Goal: Free from fall injury  Description: INTERVENTIONS:  - Assess pt frequently for physical needs  - Identify cognitive and physical deficits and behaviors that affect risk of falls.  - [place] fall precautions as indicated by assessment.  - Educate pt/family on patient safety including physical limitations  - Instruct pt to call for assistance with activity based on assessment  - Modify environment to reduce risk of injury  - Provide assistive devices as appropriate  - Consider OT/PT consult to assist with strengthening/mobility  - Encourage toileting schedule  Outcome:  Progressing     Problem: RESPIRATORY - ADULT  Goal: Achieves optimal ventilation and oxygenation  Description: INTERVENTIONS:  - Assess for changes in respiratory status  - Assess for changes in mentation and behavior  - Position to facilitate oxygenation and minimize respiratory effort  - Oxygen supplementation based on oxygen saturation or ABGs  - Provide Smoking Cessation handout, if applicable  - Encourage broncho-pulmonary hygiene including cough, deep breathe, Incentive Spirometry  - Assess the need for suctioning and perform as needed  - Assess and instruct to report SOB or any respiratory difficulty  - Respiratory Therapy support as indicated  - Manage/alleviate anxiety  - Monitor for signs/symptoms of CO2 retention  Outcome: Progressing

## 2024-11-04 NOTE — PLAN OF CARE
Patient alert and oriented. Tele. Call light within reach. Frequent rounding by staff.  Problem: Patient Centered Care  Goal: Patient preferences are identified and integrated in the patient's plan of care  Description: Interventions:  - What would you like us to know as we care for you?   - Provide timely, complete, and accurate information to patient/family  - Incorporate patient and family knowledge, values, beliefs, and cultural backgrounds into the planning and delivery of care  - Encourage patient/family to participate in care and decision-making at the level they choose  - Honor patient and family perspectives and choices  Outcome: Progressing     Problem: Patient/Family Goals  Goal: Patient/Family Long Term Goal  Description: Patient's Long Term Goal: discharge    Interventions:  - - Monitor vitals  - Monitor appropriate labs  - Pain management  - Follow MD order  - Diagnostics per order  - Update/Informing patient and family on plan of care  - Discharge planning  - See additional Care Plan goals for specific interventions  Outcome: Progressing  Goal: Patient/Family Short Term Goal  Description: Patient's Short Term Goal: feel better    Interventions:   - - Monitor vitals  - Monitor appropriate labs  - Pain management  - Follow MD order  - Diagnostics per order  - Update/Informing patient and family on plan of care  - Discharge planning  - See additional Care Plan goals for specific interventions  Outcome: Progressing     Problem: PAIN - ADULT  Goal: Verbalizes/displays adequate comfort level or patient's stated pain goal  Description: INTERVENTIONS:  - Encourage pt to monitor pain and request assistance  - Assess pain using appropriate pain scale  - Administer analgesics based on type and severity of pain and evaluate response  - Implement non-pharmacological measures as appropriate and evaluate response  - Consider cultural and social influences on pain and pain management  - Manage/alleviate anxiety  -  Utilize distraction and/or relaxation techniques  - Monitor for opioid side effects  - Notify MD/LIP if interventions unsuccessful or patient reports new pain  - Anticipate increased pain with activity and pre-medicate as appropriate  Outcome: Progressing     Problem: SAFETY ADULT - FALL  Goal: Free from fall injury  Description: INTERVENTIONS:  - Assess pt frequently for physical needs  - Identify cognitive and physical deficits and behaviors that affect risk of falls.  - [place] fall precautions as indicated by assessment.  - Educate pt/family on patient safety including physical limitations  - Instruct pt to call for assistance with activity based on assessment  - Modify environment to reduce risk of injury  - Provide assistive devices as appropriate  - Consider OT/PT consult to assist with strengthening/mobility  - Encourage toileting schedule  Outcome: Progressing     Problem: RESPIRATORY - ADULT  Goal: Achieves optimal ventilation and oxygenation  Description: INTERVENTIONS:  - Assess for changes in respiratory status  - Assess for changes in mentation and behavior  - Position to facilitate oxygenation and minimize respiratory effort  - Oxygen supplementation based on oxygen saturation or ABGs  - Provide Smoking Cessation handout, if applicable  - Encourage broncho-pulmonary hygiene including cough, deep breathe, Incentive Spirometry  - Assess the need for suctioning and perform as needed  - Assess and instruct to report SOB or any respiratory difficulty  - Respiratory Therapy support as indicated  - Manage/alleviate anxiety  - Monitor for signs/symptoms of CO2 retention  Outcome: Progressing

## 2024-11-04 NOTE — CONSULTS
[facility]  part of [facility]    Report of Consultation    Campbell Rao Patient Status:  Inpatient    1950 MRN [id number]   Location [facility]5W Attending Jim Lane MD   Hosp Day # 0 PCP Johny Kang MD     Date of Admission:  11/3/2024    Reason for Consultation:   Dyspnea    History of Present Illness:   Patient is a 74-year-old male with history of severe COPD on 4 L home oxygen, MARIA M, CHF, hypertension who presents with chief complaint of dyspnea ongoing throughout course of last week.  Denies fevers or chills.  Denies significant wheezing.  Admits to productive cough.  States he is mostly compliant with maintenance inhaler therapy which was Trelegy.  No significant fevers noted since presentation.  Chest x-ray performed with concern for possible infiltrates.  Currently on 4 L oxygen    Past Medical History  Past Medical History:    Atherosclerosis of coronary artery    coronary calcification on ct chest    Back problem    Cataract    Chronic respiratory failure (HCC)    Congestive heart disease (HCC)    COPD (HCC)    freq exacerbations. ? 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    Osteoarthritis    Pneumonia    Pulmonary nodules    Sleep apnea    Tobacco abuse       Past Surgical History  Past Surgical History:   Procedure Laterality Date    Appendectomy      Appendectomy      Cataract Bilateral     2024, 3/2024    Electrocardiogram, complete  2012    scanned to media tab       Family History  Family History   Problem Relation Age of Onset    Cancer Father         lung cancer    Cancer Mother         lung cancer    No Known Problems Daughter     No Known Problems Son     No Known Problems Maternal  Grandmother     No Known Problems Maternal Grandfather     No Known Problems Paternal Grandmother     No Known Problems Paternal Grandfather     No Known Problems Sister     Diabetes Brother     Lipids Brother         hyperlipidemia    Hypertension Brother     No Known Problems Other        Social History  Social History     Socioeconomic History    Marital status:    Tobacco Use    Smoking status: Former     Current packs/day: 0.00     Average packs/day: 2.0 packs/day for 40.0 years (80.0 ttl pk-yrs)     Types: Cigarettes     Start date: 11/1981     Quit date: 11/2021     Years since quitting: 3.0     Passive exposure: Past    Smokeless tobacco: Never   Vaping Use    Vaping status: Never Used   Substance and Sexual Activity    Alcohol use: Not Currently     Alcohol/week: 0.0 standard drinks of alcohol     Comment: socially    Drug use: Not Currently   Other Topics Concern    Caffeine Concern Yes     Comment: coffee, >32 oz daily           Current Medications:  Current Facility-Administered Medications   Medication Dose Route Frequency    enoxaparin (Lovenox) 80 MG/0.8ML SUBQ injection 70 mg  0.5 mg/kg Subcutaneous q12h    acetaminophen (Tylenol Extra Strength) tab 500 mg  500 mg Oral Q4H PRN    acetaminophen (Tylenol) tab 650 mg  650 mg Oral Q4H PRN    Or    HYDROcodone-acetaminophen (Norco) 5-325 MG per tab 1 tablet  1 tablet Oral Q4H PRN    Or    HYDROcodone-acetaminophen (Norco) 5-325 MG per tab 2 tablet  2 tablet Oral Q4H PRN    melatonin tab 3 mg  3 mg Oral Nightly PRN    polyethylene glycol (PEG 3350) (Miralax) 17 g oral packet 17 g  17 g Oral Daily PRN    sennosides (Senokot) tab 17.2 mg  17.2 mg Oral Nightly PRN    bisacodyl (Dulcolax) 10 MG rectal suppository 10 mg  10 mg Rectal Daily PRN    fleet enema (Fleet) rectal enema 133 mL  1 enema Rectal Once PRN    guaiFENesin ER (Mucinex) 12 hr tab 600 mg  600 mg Oral BID    benzonatate (Tessalon) cap 200 mg  200 mg Oral TID PRN    atorvastatin (Lipitor)  tab 20 mg  20 mg Oral Nightly    umeclidinium bromide (Incruse Ellipta) 62.5 MCG/ACT inhaler 1 puff  1 puff Inhalation Daily    potassium chloride (Klor-Con M20) tab 20 mEq  20 mEq Oral Daily    sodium chloride (Saline Mist) 0.65 % nasal solution 1 spray  1 spray Nasal Q3H PRN    spironolactone (Aldactone) partial tab 12.5 mg  12.5 mg Oral Daily    torsemide (Demadex) tab 20 mg  20 mg Oral BID (Diuretic)    ipratropium-albuterol (Duoneb) 0.5-2.5 (3) MG/3ML inhalation solution 3 mL  3 mL Nebulization Q4H PRN    ipratropium-albuterol (Duoneb) 0.5-2.5 (3) MG/3ML inhalation solution 3 mL  3 mL Nebulization Q6H WA    fluticasone-salmeterol (Advair Diskus) 250-50 MCG/ACT inhaler 1 puff  1 puff Inhalation BID    cefTRIAXone (Rocephin) 2 g in sodium chloride 0.9% 100 mL IVPB-ADDV  2 g Intravenous Q24H    [START ON 11/4/2024] azithromycin (Zithromax) tab 500 mg  500 mg Oral Daily    methylPREDNISolone sodium succinate (Solu-MEDROL) injection 40 mg  40 mg Intravenous Q12H     Medications Prior to Admission   Medication Sig    ergocalciferol 1.25 MG (47281 UT) Oral Cap Take 1 capsule (50,000 Units total) by mouth once a week. (Patient taking differently: Take 1 capsule (50,000 Units total) by mouth once a week. Every Sunday)    potassium chloride 20 MEQ Oral Tab CR Take 1 tablet (20 mEq total) by mouth daily.    spironolactone 25 MG Oral Tab Take 0.5 tablets (12.5 mg total) by mouth daily.    acetaminophen (TYLENOL) 325 MG Oral Tab Take 1 tablet (325 mg total) by mouth every 6 (six) hours as needed for Pain.    torsemide 20 MG Oral Tab Take 1 tablet (20 mg total) by mouth 2 (two) times daily.    fluticasone-umeclidin-vilant (TRELEGY ELLIPTA) 100-62.5-25 MCG/ACT Inhalation Aerosol Powder, Breath Activated Inhale 1 puff into the lungs daily.    albuterol 108 (90 Base) MCG/ACT Inhalation Aero Soln Inhale 2 puffs into the lungs every 4 to 6 hours as needed for Wheezing. inhale 2 puff by inhalation route  every 4 - 6 hours as  needed    atorvastatin 20 MG Oral Tab Take 1 tablet (20 mg total) by mouth nightly.    ipratropium-albuterol 0.5-2.5 (3) MG/3ML Inhalation Solution Take 3 mL by nebulization in the morning and 3 mL at noon and 3 mL in the evening. And as needed.    fluticasone propionate 50 MCG/ACT Nasal Suspension 2 sprays by Nasal route daily.    Urea 40 % External Cream Apply 1 Application topically daily.    Saline Nasal Spray 0.65 % Nasal Solution 1 spray by Nasal route every 3 (three) hours as needed for congestion.    metoprolol succinate ER 50 MG Oral Tablet 24 Hr Take 1 tablet (50 mg total) by mouth Daily Beta Blocker. (Patient not taking: Reported on 10/31/2024)       Allergies  Allergies[1]    Review of Systems:   Constitutional: Fatigue, malaise  HEENT: denies headache, sore throat, vision loss  Cardio: denies chest pain, chest pressure, palpitations  Respiratory: dyspnea, cough, denies wheezing, hemoptysis   GI: denies nausea, vomiting, abdominal pain  : denies dysuria, hematuria  Musculoskeletal: denies arthralgia, myalgia  Integumentary: denies rash, itching  Neurological: denies syncope, weakness, dizziness,   Psychiatric: denies depression, anxiety  Hematologic: denies bruising        Physical Exam:   Blood pressure 129/76, pulse 105, temperature 98.4 °F (36.9 °C), temperature source Oral, resp. rate 22, height 5' 8\" (1.727 m), weight (!) 305 lb 12.8 oz (138.7 kg), SpO2 90%.    Constitutional: no acute distress  Eyes: PERRL  ENT: nares patent  Neck: neck supple, no JVD  Cardio: RRR, S1 S2  Respiratory: Diminished expiratory breath sounds no significant wheezing  GI: abdomen soft, non tender, active bowel souds, no organomegaly  Extremities: no clubbing, cyanosis, edema  Neurologic: no gross motor deficits  Skin: warm, dry    Results:   Laboratory Data  Lab Results   Component Value Date    WBC 14.5 (H) 11/03/2024    HGB 14.0 11/03/2024    HCT 45.9 11/03/2024    .0 11/03/2024    CREATSERUM 0.93 11/03/2024     BUN 18 11/03/2024     11/03/2024    K 4.4 11/03/2024     11/03/2024    CO2 31.0 11/03/2024     (H) 11/03/2024    CA 10.0 11/03/2024    ALB 4.3 11/03/2024    ALKPHO 103 11/03/2024    TP 7.1 11/03/2024    AST 29 11/03/2024    ALT 63 (H) 11/03/2024    TSH 2.777 04/18/2024    GGT 38 04/18/2024    DDIMER 0.40 10/18/2016    MG 2.1 11/03/2024    PHOS 4.1 04/26/2024    TROP <0.045 04/10/2021    B12 765 04/27/2021         Imaging  XR CHEST AP PORTABLE  (CPT=71045)    Result Date: 11/3/2024  CONCLUSION:   Unchanged mild cardiomegaly.  Increased interstitial and alveolar opacities, thought to represent interstitial and alveolar pulmonary edema.  Superimposed subsegmental atelectasis or other infiltrate are within the differential.  Recommend radiographic follow-up to resolution.  Mild chronic obscuration of the left costophrenic angle.  Chronic trace underlying peripheral scarring or effusion are differential considerations.       Dictated by (CST): Ariela Oviedo MD on 11/03/2024 at 11:15 AM     Finalized by (CST): Ariela Oviedo MD on 11/03/2024 at 11:17 AM           Assessment   1.  Severe COPD with acute exacerbation  2.  Chronic hypoxemic hypercapnic respiratory failure  3.  MARIA M  4   HFpeF  5.  Prior nicotine dependence  6.  Leukocytosis  7.  Hypertension    Plan   -Patient presents with evidence of worsening dyspnea with productive cough.  Return for COPD exacerbation with possible pneumonia.  -Chest x-ray on presentation 1/3/2024 with some residual and alveolar opacities cannot rule out pneumonia.  -Continue empiric antibiotic therapy  -IV steroids and gradually wean  -ICS/LABA/LAMA  -Nebulizer treatments  -Nightly CPAP therapy  -Recommend annual outpatient lung screening CT  -Encouraged tobacco cessation.  -Reviewed vitals, labs imaging    Corey Del Toro DO  Pulmonary Critical Care Medicine  [facility]  11/3/2024  8:30 PM        [1] No Known Allergies

## 2024-11-04 NOTE — PROGRESS NOTES
[facility]  part of [facility]    Progress Note    Campbell Rao Patient Status:  Inpatient    1950 MRN [id number]   Location [facility]5W Attending Jim Lane MD   Hosp Day # 1 PCP Johny Kang MD       Subjective:   Campbell Rao is slightly better, no new complaints.    Review of Systems:   10 point ROS completed and was negative, except for pertinent positive and negatives stated in subjective.    Objective:     Vitals:    24 1922 24 2046 24 0046 24 0449   BP:  134/75 127/79 137/87   BP Location:  Right arm Right arm Right arm   Pulse: 110 112 111 106   Resp:     Temp:  98.1 °F (36.7 °C) 97.5 °F (36.4 °C) 97.9 °F (36.6 °C)   TempSrc:  Oral Oral Oral   SpO2:  90% 95% 90%   Weight:       Height:         Patient Weight for the past 72 hrs:   Weight   24 0930 (!) 305 lb (138.3 kg)   24 1455 (!) 305 lb 12.8 oz (138.7 kg)       Intake/Output Summary (Last 24 hours) at 2024 0759  Last data filed at 11/3/2024 2200  Gross per 24 hour   Intake 1140 ml   Output 175 ml   Net 965 ml       GENERAL:  The patient appeared to be in no distress   SKIN:  Warm and hydrated  HEENT:  Head was atraumatic and normocephalic.    CHEST:  Symmetrical movement on inspiration  LUNGS:  No audible wheezing  ABDOMEN: Non-distended  MUSCULOSKELETAL:  There was no deformity.    EXTREMITIES: There was no edema  NEUROLOGICAL:  There was no focal deficit.    PSYCHIATRIC: Calm and cooperative     Current Scheduled Inpatient Meds:      enoxaparin  0.5 mg/kg Subcutaneous q12h    guaiFENesin ER  600 mg Oral BID    atorvastatin  20 mg Oral Nightly    umeclidinium bromide  1 puff Inhalation Daily    potassium chloride  20 mEq Oral Daily    spironolactone  12.5 mg Oral Daily    torsemide  20 mg Oral BID (Diuretic)    ipratropium-albuterol  3 mL Nebulization Q6H WA    fluticasone-salmeterol  1 puff Inhalation BID    cefTRIAXone  2 g Intravenous Q24H     azithromycin  500 mg Oral Daily    methylPREDNISolone  40 mg Intravenous Q12H       Current PRN Inpatient Meds:        acetaminophen    acetaminophen **OR** HYDROcodone-acetaminophen **OR** HYDROcodone-acetaminophen    melatonin    polyethylene glycol (PEG 3350)    sennosides    bisacodyl    fleet enema    benzonatate    sodium chloride    ipratropium-albuterol    Results:     Lab Results   Component Value Date    WBC 14.8 (H) 11/04/2024    HGB 13.6 11/04/2024    HCT 44.6 11/04/2024    .0 11/04/2024    CREATSERUM 0.98 11/04/2024    BUN 21 11/04/2024     11/04/2024    K 4.6 11/04/2024     11/04/2024    CO2 33.0 (H) 11/04/2024     (H) 11/04/2024    CA 10.4 11/04/2024    ALB 4.3 11/03/2024    ALKPHO 103 11/03/2024    BILT 1.3 (H) 11/03/2024    TP 7.1 11/03/2024    AST 29 11/03/2024    ALT 63 (H) 11/03/2024    TSH 2.777 04/18/2024    GGT 38 04/18/2024    DDIMER 0.40 10/18/2016    MG 2.1 11/03/2024    PHOS 4.1 04/26/2024    TROP <0.045 04/10/2021    B12 765 04/27/2021       Recent Labs   Lab 11/03/24  1031 11/04/24  0445   RBC 4.63 4.53   HGB 14.0 13.6   HCT 45.9 44.6   MCV 99.1 98.5   MCH 30.2 30.0   MCHC 30.5* 30.5*   RDW 13.8 13.5   NEPRELIM 10.47*  --    WBC 14.5* 14.8*   .0 200.0     Recent Labs   Lab 11/03/24  1031 11/04/24 0445   * 235*   BUN 18 21   CREATSERUM 0.93 0.98   CA 10.0 10.4   ALB 4.3  --     138   K 4.4 4.6    102   CO2 31.0 33.0*   ALKPHO 103  --    AST 29  --    ALT 63*  --    BILT 1.3*  --    TP 7.1  --      No results found for: \"PT\", \"INR\"    Culture:  No results found for this visit on 11/03/24.    Imaging/EKG:   XR CHEST AP PORTABLE  (CPT=71045)    Result Date: 11/3/2024  CONCLUSION:   Unchanged mild cardiomegaly.  Increased interstitial and alveolar opacities, thought to represent interstitial and alveolar pulmonary edema.  Superimposed subsegmental atelectasis or other infiltrate are within the differential.  Recommend radiographic follow-up  to resolution.  Mild chronic obscuration of the left costophrenic angle.  Chronic trace underlying peripheral scarring or effusion are differential considerations.       Dictated by (CST): Ariela Oviedo MD on 11/03/2024 at 11:15 AM     Finalized by (CST): Ariela Oviedo MD on 11/03/2024 at 11:17 AM         EKG 12 Lead    Result Date: 11/3/2024  Sinus tachycardia Low voltage QRS, consider pulmonary disease, pericardial effusion, or normal variant Incomplete right bundle branch block Abnormal ECG When compared with ECG of 14-JUL-2024 20:35, No significant change was found Although rate has increased Confirmed by CHAYA DAS STANLEY ([id number]) on 11/3/2024 12:58:20 PM   Assessment and Plan:   Campbell Rao is 74-year-old male with hx of COPD on home 4L O2, MARIA M on CPAP, morbid obesity, HFpEF, HTN and HLD, who presents with shortness of breath.      # COPD exacerbation  # Chronic hypoxemic and hypercapnic respiratory failure   - Patient follows Dr. Salinas for moderate to severe COPD.   - 80-pack-year history of smoking quit 2022, currently uses 4L O2 at home.  - CXR reported increased interstitial and alveolar opacities.   - ceftriaxone, azithromycin, steroid and bronchodilators  - Pulm on consult     # MARIA M on CPAP  # Morbid obesity w/ BMI 46  - continue home CPAP     # HFpEF  # HTN   # HLD  - continue home meds    # Steroid-induced hyperglycemia  - Degludec 20U daily, SSI AC/HS    # VitD deficiency  - VitD-25OH 6.1 in Sep, 11.3 on admission  - give ergocalciferol     Diet: regular  PT/OT: deferred  DVT ppx: Lovenox  Line: none  Code status: Full   Dispo: per clinical course     MDM: high Jim Lane MD, PhD  Message via Secure Chat  [facility] Healthcare[facility]

## 2024-11-04 NOTE — CM/SW NOTE
11/04/24 1200   CM/ Referral Data   Referral Source    Reason for Referral Discharge planning   Informant EMR;Clinical Staff Member   Medical Hx   Does patient have an established PCP? Yes  (Johny Kang MD)   Significant Past Medical/Mental Health Hx See history and physical   Patient Info   Patient's Home Environment House   Patient lives with Spouse/Significant other   Patient Status Prior to Admission   Independent with ADLs and Mobility No   Pt. requires assistance with Ambulating   Services in place prior to admission DME/Supplies at home   DME Provider/Supplier [facility]   Type of DME/Supplies Rollator Walker;Home Oxygen;CPAP   Discharge Needs   Anticipated D/C needs To be determined     CM self-referred for discharge planning.  CM familiar with patient from previous admission 07/2024.  Above assessment completed.    Patient has home oxygen, [employer], and CPAP arranged through [facility].  CM contacted E liacristiana Millan and verified patient's current order for home oxygen is 2L continuous via nasal cannula.    At baseline, patient ambulates without assistive device but does own rollator walker when travelling longer distances.  Patient discussed in nursing rounds - patient ambulating well in room.    Patient has history of non-compliance with home healthcare services and scheduling visits.  Patient previously arranged with [facility] with last visit being in June of this year - [facility] declined resuming care with patient d/t noncompliance.    Patient was arranged with [facility] during previous [facility] admission (07/2024).  CM called [facility] [phone] and confirmed with liacristiana Mo - patient was never admitted to this agency and never began HH services despite arrangement prior to discharge.    / to remain available for support and/or discharge planning.      Plan: Home with spouse, [facility] home oxygen & CPAP - pending course of stay, medical clearance    Sandy Beth RN, BSN  Nurse   [phone]

## 2024-11-04 NOTE — PROGRESS NOTES
[facility]  part of [facility]    Progress Note    Campbell Rao Patient Status:  Inpatient    1950 MRN [id number]   Location [facility]5W Attending Jim Lane MD   Hosp Day # 1 PCP Johny Kang MD         Subjective:   Subjective:  Patient was seen and examined  Feels better today and up to the chair  Still on 7 L of oxygen  Dry cough with no sputum still with some wheezes but no chest pain  No abdominal pain no vomiting or diarrhea  No high fever or chills  Objective:   Blood pressure 137/87, pulse 106, temperature 97.9 °F (36.6 °C), temperature source Oral, resp. rate 22, height 5' 8\" (1.727 m), weight (!) 305 lb 12.8 oz (138.7 kg), SpO2 90%.  Physical Exam  Constitutional:       General: He is not in acute distress.     Appearance: He is obese. He is ill-appearing.   HENT:      Head: Atraumatic.      Nose: Nose normal.      Mouth/Throat:      Mouth: Mucous membranes are moist.   Eyes:      General: No scleral icterus.  Cardiovascular:      Rate and Rhythm: Normal rate.      Heart sounds:      No gallop.   Pulmonary:      Comments: Distant breath sounds  Diminished in the bases  Prolonged expiration with few expiratory wheezes  No rales or rhonchi  Abdominal:      General: Abdomen is flat. Bowel sounds are normal. There is distension.      Palpations: Abdomen is soft.      Tenderness: There is no abdominal tenderness. There is no guarding or rebound.   Musculoskeletal:      Cervical back: Normal range of motion.      Right lower leg: No edema.      Left lower leg: No edema.   Skin:     General: Skin is dry.   Neurological:      General: No focal deficit present.      Mental Status: He is oriented to person, place, and time.         Results:   Lab Results   Component Value Date    WBC 14.8 (H) 2024    HGB 13.6 2024    HCT 44.6 2024    .0 2024    CREATSERUM 0.98 2024    BUN 21 2024     2024    K 4.6 2024      11/04/2024    CO2 33.0 (H) 11/04/2024     (H) 11/04/2024    CA 10.4 11/04/2024    ALB 4.3 11/03/2024    ALKPHO 103 11/03/2024    BILT 1.3 (H) 11/03/2024    TP 7.1 11/03/2024    AST 29 11/03/2024    ALT 63 (H) 11/03/2024    TSH 2.777 04/18/2024    GGT 38 04/18/2024    DDIMER 0.40 10/18/2016    MG 2.1 11/03/2024    PHOS 4.1 04/26/2024    TROP <0.045 04/10/2021    TROPHS 4 11/03/2024    B12 765 04/27/2021       XR CHEST AP PORTABLE  (CPT=71045)    Result Date: 11/3/2024  CONCLUSION:   Unchanged mild cardiomegaly.  Increased interstitial and alveolar opacities, thought to represent interstitial and alveolar pulmonary edema.  Superimposed subsegmental atelectasis or other infiltrate are within the differential.  Recommend radiographic follow-up to resolution.  Mild chronic obscuration of the left costophrenic angle.  Chronic trace underlying peripheral scarring or effusion are differential considerations.       Dictated by (CST): Ariela Oviedo MD on 11/03/2024 at 11:15 AM     Finalized by (CST): Ariela Oviedo MD on 11/03/2024 at 11:17 AM         EKG 12 Lead    Result Date: 11/3/2024  Sinus tachycardia Low voltage QRS, consider pulmonary disease, pericardial effusion, or normal variant Incomplete right bundle branch block Abnormal ECG When compared with ECG of 14-JUL-2024 20:35, No significant change was found Although rate has increased Confirmed by CHAYA DAS, TELLO ([id number]) on 11/3/2024 12:58:20 PM     Assessment & Plan:      1-  severe COPD with acute exacerbation   80-pack-year history of smoking quit 2022     ICS/LABA/LAMA inhaler   Scheduled nebulizers  Taper steroid  Empiric antibiotics      2-Acute on chronic respiratory failure with hypoxia and hypercapnia  Baseline home O2 at 4 L /patient on 7 L nasal cannula     3- MARIA M and morbid obesity obesity    Auto CPAP 5-15 CWP     4-screening for lung cancer  80-pack-year history of smoking quit 2022  Chest ct April/2024 with no suspicious  findings  Next low-dose chest CT in April 2025    5-DVT prophylaxis  Lovenox subcu            Yesi Salinas MD  11/4/2024

## 2024-11-05 LAB
ANION GAP SERPL CALC-SCNC: 5 MMOL/L (ref 0–18)
BUN BLD-MCNC: 30 MG/DL (ref 9–23)
BUN/CREAT SERPL: 25.2 (ref 10–20)
CALCIUM BLD-MCNC: 10.1 MG/DL (ref 8.7–10.4)
CHLORIDE SERPL-SCNC: 100 MMOL/L (ref 98–112)
CO2 SERPL-SCNC: 35 MMOL/L (ref 21–32)
CREAT BLD-MCNC: 1.19 MG/DL
DEPRECATED RDW RBC AUTO: 48.3 FL (ref 35.1–46.3)
EGFRCR SERPLBLD CKD-EPI 2021: 64 ML/MIN/1.73M2 (ref 60–?)
ERYTHROCYTE [DISTWIDTH] IN BLOOD BY AUTOMATED COUNT: 13.4 % (ref 11–15)
GLUCOSE BLD-MCNC: 239 MG/DL (ref 70–99)
GLUCOSE BLDC GLUCOMTR-MCNC: 165 MG/DL (ref 70–99)
GLUCOSE BLDC GLUCOMTR-MCNC: 182 MG/DL (ref 70–99)
GLUCOSE BLDC GLUCOMTR-MCNC: 253 MG/DL (ref 70–99)
GLUCOSE BLDC GLUCOMTR-MCNC: 292 MG/DL (ref 70–99)
HCT VFR BLD AUTO: 43.7 %
HGB BLD-MCNC: 14.1 G/DL
MCH RBC QN AUTO: 31.3 PG (ref 26–34)
MCHC RBC AUTO-ENTMCNC: 32.3 G/DL (ref 31–37)
MCV RBC AUTO: 96.9 FL
OSMOLALITY SERPL CALC.SUM OF ELEC: 304 MOSM/KG (ref 275–295)
PLATELET # BLD AUTO: 210 10(3)UL (ref 150–450)
POTASSIUM SERPL-SCNC: 4.7 MMOL/L (ref 3.5–5.1)
RBC # BLD AUTO: 4.51 X10(6)UL
SODIUM SERPL-SCNC: 140 MMOL/L (ref 136–145)
UFH PPP CHRO-ACNC: 0.49 IU/ML
WBC # BLD AUTO: 18.4 X10(3) UL (ref 4–11)

## 2024-11-05 PROCEDURE — 99232 SBSQ HOSP IP/OBS MODERATE 35: CPT | Performed by: INTERNAL MEDICINE

## 2024-11-05 PROCEDURE — 99233 SBSQ HOSP IP/OBS HIGH 50: CPT | Performed by: HOSPITALIST

## 2024-11-05 RX ORDER — INSULIN DEGLUDEC 100 U/ML
30 INJECTION, SOLUTION SUBCUTANEOUS DAILY
Status: DISCONTINUED | OUTPATIENT
Start: 2024-11-06 | End: 2024-11-07

## 2024-11-05 RX ORDER — ENOXAPARIN SODIUM 100 MG/ML
50 INJECTION SUBCUTANEOUS EVERY 12 HOURS
Status: DISCONTINUED | OUTPATIENT
Start: 2024-11-05 | End: 2024-11-07

## 2024-11-05 RX ORDER — ERGOCALCIFEROL 1.25 MG/1
50000 CAPSULE, LIQUID FILLED ORAL ONCE
Status: COMPLETED | OUTPATIENT
Start: 2024-11-05 | End: 2024-11-05

## 2024-11-05 NOTE — PROGRESS NOTES
[facility]  part of [facility]    Progress Note    Campbell Rao Patient Status:  Inpatient    1950 MRN [id number]   Location [facility]5W Attending Jim Lane MD   Hosp Day # 2 PCP Johny Kang MD       Subjective:   Subjective:  Feels better  Up to the chair on 4 L of oxygen about baseline  Unable to use the CPAP in the hospital and usually he is compliant with his on CPAP at home  No fever or chills  No chest pain  Objective:   Blood pressure 136/84, pulse 100, temperature 97 °F (36.1 °C), temperature source Oral, resp. rate 22, height 5' 8\" (1.727 m), weight (!) 305 lb 12.8 oz (138.7 kg), SpO2 92%.  Physical Exam  Constitutional:       General: He is not in acute distress.     Appearance: He is obese.   HENT:      Head: Atraumatic.      Nose: Nose normal.      Mouth/Throat:      Mouth: Mucous membranes are moist.   Eyes:      General: No scleral icterus.  Cardiovascular:      Rate and Rhythm: Normal rate.      Heart sounds:      No gallop.   Pulmonary:      Comments: Distant breath sounds  No wheezes or rales or rhonchi  Abdominal:      General: Abdomen is flat. Bowel sounds are normal.      Palpations: Abdomen is soft.   Musculoskeletal:      Cervical back: Normal range of motion.   Lymphadenopathy:      Cervical: No cervical adenopathy.   Neurological:      Mental Status: He is oriented to person, place, and time. Mental status is at baseline.         Results:   Lab Results   Component Value Date    WBC 18.4 (H) 2024    HGB 14.1 2024    HCT 43.7 2024    .0 2024    CREATSERUM 1.19 2024    BUN 30 (H) 2024     2024    K 4.7 2024     2024    CO2 35.0 (H) 2024     (H) 2024    CA 10.1 2024    ALB 4.3 2024    ALKPHO 103 2024    BILT 1.3 (H) 2024    TP 7.1 2024    AST 29 2024    ALT 63 (H) 2024    TSH 2.777 2024    GGT 38  04/18/2024    DDIMER 0.40 10/18/2016    MG 2.1 11/03/2024    PHOS 4.1 04/26/2024    TROP <0.045 04/10/2021    TROPHS 4 11/03/2024    B12 765 04/27/2021         Assessment & Plan:     1-  severe COPD with acute exacerbation   80-pack-year history of smoking quit 2022     ICS/LABA/LAMA inhaler   Scheduled nebulizers  Taper steroid /changed to p.o. 30 mg for 3 days then 20 for 3 days then 10 for 3 days then stop  Empiric antibiotics         2-Acute on chronic respiratory failure with hypoxia and hypercapnia  Baseline home O2 at 4 L /patient on 7 L nasal cannula     3- MARIA M and morbid obesity obesity    Auto CPAP 5-15 CWP      4-screening for lung cancer  80-pack-year history of smoking quit 2022  Chest ct April/2024 with no suspicious findings  Next low-dose chest CT in April 2025     5-DVT prophylaxis  Lovenox subcu      Overall better , discharge planning on taper steroid and to complete course of azithromycin  Patient already on home O2 and nebulizers he is already has CPAP at home  Follow-up with COPD clinic  Follow-up with me in 2 to 3 weeks            Yesi Salinas MD  11/5/2024

## 2024-11-05 NOTE — PLAN OF CARE
Patient alert and oriented. Tele. Call light within reach. Frequent rounding by staff.  Problem: Patient Centered Care  Goal: Patient preferences are identified and integrated in the patient's plan of care  Description: Interventions:  - What would you like us to know as we care for you?   - Provide timely, complete, and accurate information to patient/family  - Incorporate patient and family knowledge, values, beliefs, and cultural backgrounds into the planning and delivery of care  - Encourage patient/family to participate in care and decision-making at the level they choose  - Honor patient and family perspectives and choices  Outcome: Progressing     Problem: Patient/Family Goals  Goal: Patient/Family Long Term Goal  Description: Patient's Long Term Goal: discharge    Interventions:  - - Monitor vitals  - Monitor appropriate labs  - Pain management  - Follow MD order  - Diagnostics per order  - Update/Informing patient and family on plan of care  - Discharge planning  - See additional Care Plan goals for specific interventions  Outcome: Progressing  Goal: Patient/Family Short Term Goal  Description: Patient's Short Term Goal: feel better    Interventions:   - - Monitor vitals  - Monitor appropriate labs  - Pain management  - Follow MD order  - Diagnostics per order  - Update/Informing patient and family on plan of care  - Discharge planning  - See additional Care Plan goals for specific interventions  Outcome: Progressing     Problem: PAIN - ADULT  Goal: Verbalizes/displays adequate comfort level or patient's stated pain goal  Description: INTERVENTIONS:  - Encourage pt to monitor pain and request assistance  - Assess pain using appropriate pain scale  - Administer analgesics based on type and severity of pain and evaluate response  - Implement non-pharmacological measures as appropriate and evaluate response  - Consider cultural and social influences on pain and pain management  - Manage/alleviate anxiety  -  Utilize distraction and/or relaxation techniques  - Monitor for opioid side effects  - Notify MD/LIP if interventions unsuccessful or patient reports new pain  - Anticipate increased pain with activity and pre-medicate as appropriate  Outcome: Progressing     Problem: SAFETY ADULT - FALL  Goal: Free from fall injury  Description: INTERVENTIONS:  - Assess pt frequently for physical needs  - Identify cognitive and physical deficits and behaviors that affect risk of falls.  - [place] fall precautions as indicated by assessment.  - Educate pt/family on patient safety including physical limitations  - Instruct pt to call for assistance with activity based on assessment  - Modify environment to reduce risk of injury  - Provide assistive devices as appropriate  - Consider OT/PT consult to assist with strengthening/mobility  - Encourage toileting schedule  Outcome: Progressing     Problem: RESPIRATORY - ADULT  Goal: Achieves optimal ventilation and oxygenation  Description: INTERVENTIONS:  - Assess for changes in respiratory status  - Assess for changes in mentation and behavior  - Position to facilitate oxygenation and minimize respiratory effort  - Oxygen supplementation based on oxygen saturation or ABGs  - Provide Smoking Cessation handout, if applicable  - Encourage broncho-pulmonary hygiene including cough, deep breathe, Incentive Spirometry  - Assess the need for suctioning and perform as needed  - Assess and instruct to report SOB or any respiratory difficulty  - Respiratory Therapy support as indicated  - Manage/alleviate anxiety  - Monitor for signs/symptoms of CO2 retention  Outcome: Progressing

## 2024-11-05 NOTE — PROGRESS NOTES
[facility]  part of [facility]    Progress Note    Campbell Rao Patient Status:  Inpatient    1950 MRN [id number]   Location [facility]5W Attending Jim Lane MD   Hosp Day # 2 PCP Johny Kang MD       Subjective:   Campbell Rao feels better, and requests to go home. However, he has to pause during conversation due to resting respiratory distress. I advise him to remain in hospital.    Review of Systems:   10 point ROS completed and was negative, except for pertinent positive and negatives stated in subjective.    Objective:     Vitals:    24 0739 24 0740 24 0900 24 0912   BP: 136/84      BP Location: Right arm      Pulse: 100      Resp: 22      Temp: 97 °F (36.1 °C)      TempSrc: Oral      SpO2: 96% 93% 92% 92%   Weight:       Height:         Patient Weight for the past 72 hrs:   Weight   24 0930 (!) 305 lb (138.3 kg)   24 1455 (!) 305 lb 12.8 oz (138.7 kg)       Intake/Output Summary (Last 24 hours) at 2024 0926  Last data filed at 2024 0814  Gross per 24 hour   Intake 802 ml   Output --   Net 802 ml     GENERAL:  The patient appeared to be in mild distress, morbidly obese.  SKIN:  Warm and hydrated  HEENT:  Head was atraumatic and normocephalic.    CHEST:  Symmetrical movement on inspiration  LUNGS:  No audible wheezing  ABDOMEN: Non-distended  MUSCULOSKELETAL:  There was no deformity.    EXTREMITIES: There was no edema  NEUROLOGICAL:  There was no focal deficit.    PSYCHIATRIC: Calm and cooperative     Current Scheduled Inpatient Meds:      insulin aspart  1-11 Units Subcutaneous TID CC    insulin degludec  20 Units Subcutaneous Daily    enoxaparin  0.5 mg/kg Subcutaneous q12h    guaiFENesin ER  600 mg Oral BID    atorvastatin  20 mg Oral Nightly    umeclidinium bromide  1 puff Inhalation Daily    spironolactone  12.5 mg Oral Daily    torsemide  20 mg Oral BID (Diuretic)    fluticasone-salmeterol  1 puff  Inhalation BID    cefTRIAXone  2 g Intravenous Q24H    azithromycin  500 mg Oral Daily    methylPREDNISolone  40 mg Intravenous Q12H       Current PRN Inpatient Meds:        glucose **OR** glucose **OR** glucose-vitamin C **OR** dextrose **OR** glucose **OR** glucose **OR** glucose-vitamin C    acetaminophen    acetaminophen **OR** HYDROcodone-acetaminophen **OR** HYDROcodone-acetaminophen    melatonin    polyethylene glycol (PEG 3350)    sennosides    bisacodyl    fleet enema    benzonatate    sodium chloride    ipratropium-albuterol    Results:     Lab Results   Component Value Date    WBC 18.4 (H) 11/05/2024    HGB 14.1 11/05/2024    HCT 43.7 11/05/2024    .0 11/05/2024    CREATSERUM 1.19 11/05/2024    BUN 30 (H) 11/05/2024     11/05/2024    K 4.7 11/05/2024     11/05/2024    CO2 35.0 (H) 11/05/2024     (H) 11/05/2024    CA 10.1 11/05/2024    ALB 4.3 11/03/2024    ALKPHO 103 11/03/2024    BILT 1.3 (H) 11/03/2024    TP 7.1 11/03/2024    AST 29 11/03/2024    ALT 63 (H) 11/03/2024    TSH 2.777 04/18/2024    GGT 38 04/18/2024    DDIMER 0.40 10/18/2016    MG 2.1 11/03/2024    PHOS 4.1 04/26/2024    TROP <0.045 04/10/2021    B12 765 04/27/2021       Recent Labs   Lab 11/03/24  1031 11/04/24  0445 11/05/24  0516   RBC 4.63 4.53 4.51   HGB 14.0 13.6 14.1   HCT 45.9 44.6 43.7   MCV 99.1 98.5 96.9   MCH 30.2 30.0 31.3   MCHC 30.5* 30.5* 32.3   RDW 13.8 13.5 13.4   NEPRELIM 10.47*  --   --    WBC 14.5* 14.8* 18.4*   .0 200.0 210.0     Recent Labs   Lab 11/03/24  1031 11/04/24  0445 11/05/24  0516   * 235* 239*   BUN 18 21 30*   CREATSERUM 0.93 0.98 1.19   CA 10.0 10.4 10.1   ALB 4.3  --   --     138 140   K 4.4 4.6 4.7    102 100   CO2 31.0 33.0* 35.0*   ALKPHO 103  --   --    AST 29  --   --    ALT 63*  --   --    BILT 1.3*  --   --    TP 7.1  --   --      No results found for: \"PT\", \"INR\"    Culture:  Hospital Encounter on 11/03/24   1. Blood Culture     Status: None  (Preliminary result)    Collection Time: 11/03/24 12:09 PM    Specimen: Blood,peripheral   Result Value Ref Range    Blood Culture Result No Growth 1 Day N/A       Imaging/EKG:   XR CHEST AP PORTABLE  (CPT=71045)    Result Date: 11/3/2024  CONCLUSION:   Unchanged mild cardiomegaly.  Increased interstitial and alveolar opacities, thought to represent interstitial and alveolar pulmonary edema.  Superimposed subsegmental atelectasis or other infiltrate are within the differential.  Recommend radiographic follow-up to resolution.  Mild chronic obscuration of the left costophrenic angle.  Chronic trace underlying peripheral scarring or effusion are differential considerations.       Dictated by (CST): Ariela Oviedo MD on 11/03/2024 at 11:15 AM     Finalized by (CST): Ariela Oviedo MD on 11/03/2024 at 11:17 AM         EKG 12 Lead    Result Date: 11/3/2024  Sinus tachycardia Low voltage QRS, consider pulmonary disease, pericardial effusion, or normal variant Incomplete right bundle branch block Abnormal ECG When compared with ECG of 14-JUL-2024 20:35, No significant change was found Although rate has increased Confirmed by CHAYA DAS STANLEY ([id number]) on 11/3/2024 12:58:20 PM   Assessment and Plan:   Campbell Rao is 74-year-old male with hx of COPD on home 4L O2, MARIA M on CPAP, morbid obesity, HFpEF, HTN and HLD, who presents with shortness of breath.      # COPD exacerbation  # Chronic hypoxemic and hypercapnic respiratory failure   - Patient follows Dr. Salinas for moderate to severe COPD.   - 80-pack-year history of smoking quit 2022, currently uses 4L O2 at home.  - CXR reported increased interstitial and alveolar opacities.   - ceftriaxone, azithromycin, steroid and bronchodilators  - Pulm on consult     # MARIA M on CPAP  # Morbid obesity w/ BMI 46  - continue home CPAP     # HFpEF  # HTN   # HLD  - continue home meds    # Steroid-induced hyperglycemia  - Adjusting with steroids: Degludec 30U daily, Aspart 5U AC, SSI  AC/HS    # VitD deficiency  - VitD-25OH 6.1 in Sep, 11.3 on admission  - give ergocalciferol     Diet: regular  PT/OT: deferred  DVT ppx: Lovenox  Line: none  Code status: Full   Dispo: per clinical course     MDM: high Jim Lane MD, PhD  Message via Secure Chat  [facility]

## 2024-11-05 NOTE — PLAN OF CARE
Problem: Patient Centered Care  Goal: Patient preferences are identified and integrated in the patient's plan of care  Description: Interventions:  - What would you like us to know as we care for you?   - Provide timely, complete, and accurate information to patient/family  - Incorporate patient and family knowledge, values, beliefs, and cultural backgrounds into the planning and delivery of care  - Encourage patient/family to participate in care and decision-making at the level they choose  - Honor patient and family perspectives and choices  Outcome: Progressing     Problem: Patient/Family Goals  Goal: Patient/Family Long Term Goal  Description: Patient's Long Term Goal:     Interventions:  -   - See additional Care Plan goals for specific interventions  Outcome: Progressing  Goal: Patient/Family Short Term Goal  Description: Patient's Short Term Goal:     Interventions:   -   - See additional Care Plan goals for specific interventions  Outcome: Progressing     Problem: PAIN - ADULT  Goal: Verbalizes/displays adequate comfort level or patient's stated pain goal  Description: INTERVENTIONS:  - Encourage pt to monitor pain and request assistance  - Assess pain using appropriate pain scale  - Administer analgesics based on type and severity of pain and evaluate response  - Implement non-pharmacological measures as appropriate and evaluate response  - Consider cultural and social influences on pain and pain management  - Manage/alleviate anxiety  - Utilize distraction and/or relaxation techniques  - Monitor for opioid side effects  - Notify MD/LIP if interventions unsuccessful or patient reports new pain  - Anticipate increased pain with activity and pre-medicate as appropriate  Outcome: Progressing     Problem: RESPIRATORY - ADULT  Goal: Achieves optimal ventilation and oxygenation  Description: INTERVENTIONS:  - Assess for changes in respiratory status  - Assess for changes in mentation and behavior  - Position to  facilitate oxygenation and minimize respiratory effort  - Oxygen supplementation based on oxygen saturation or ABGs  - Provide Smoking Cessation handout, if applicable  - Encourage broncho-pulmonary hygiene including cough, deep breathe, Incentive Spirometry  - Assess the need for suctioning and perform as needed  - Assess and instruct to report SOB or any respiratory difficulty  - Respiratory Therapy support as indicated  - Manage/alleviate anxiety  - Monitor for signs/symptoms of CO2 retention  Outcome: Progressing

## 2024-11-06 LAB
ANION GAP SERPL CALC-SCNC: 4 MMOL/L (ref 0–18)
BUN BLD-MCNC: 34 MG/DL (ref 9–23)
BUN/CREAT SERPL: 26.8 (ref 10–20)
CALCIUM BLD-MCNC: 10.2 MG/DL (ref 8.7–10.4)
CHLORIDE SERPL-SCNC: 98 MMOL/L (ref 98–112)
CO2 SERPL-SCNC: 36 MMOL/L (ref 21–32)
CREAT BLD-MCNC: 1.27 MG/DL
DEPRECATED RDW RBC AUTO: 47.8 FL (ref 35.1–46.3)
EGFRCR SERPLBLD CKD-EPI 2021: 60 ML/MIN/1.73M2 (ref 60–?)
ERYTHROCYTE [DISTWIDTH] IN BLOOD BY AUTOMATED COUNT: 13.4 % (ref 11–15)
EST. AVERAGE GLUCOSE BLD GHB EST-MCNC: 148 MG/DL (ref 68–126)
GLUCOSE BLD-MCNC: 286 MG/DL (ref 70–99)
GLUCOSE BLDC GLUCOMTR-MCNC: 159 MG/DL (ref 70–99)
GLUCOSE BLDC GLUCOMTR-MCNC: 171 MG/DL (ref 70–99)
GLUCOSE BLDC GLUCOMTR-MCNC: 208 MG/DL (ref 70–99)
GLUCOSE BLDC GLUCOMTR-MCNC: 338 MG/DL (ref 70–99)
HBA1C MFR BLD: 6.8 % (ref ?–5.7)
HCT VFR BLD AUTO: 46.9 %
HGB BLD-MCNC: 15.1 G/DL
MCH RBC QN AUTO: 30.9 PG (ref 26–34)
MCHC RBC AUTO-ENTMCNC: 32.2 G/DL (ref 31–37)
MCV RBC AUTO: 96.1 FL
OSMOLALITY SERPL CALC.SUM OF ELEC: 304 MOSM/KG (ref 275–295)
PLATELET # BLD AUTO: 279 10(3)UL (ref 150–450)
POTASSIUM SERPL-SCNC: 4.3 MMOL/L (ref 3.5–5.1)
RBC # BLD AUTO: 4.88 X10(6)UL
SODIUM SERPL-SCNC: 138 MMOL/L (ref 136–145)
WBC # BLD AUTO: 17.3 X10(3) UL (ref 4–11)

## 2024-11-06 PROCEDURE — 99232 SBSQ HOSP IP/OBS MODERATE 35: CPT | Performed by: INTERNAL MEDICINE

## 2024-11-06 PROCEDURE — 99233 SBSQ HOSP IP/OBS HIGH 50: CPT | Performed by: HOSPITALIST

## 2024-11-06 RX ORDER — DIPHENHYDRAMINE HYDROCHLORIDE 25 MG/1
CAPSULE, LIQUID FILLED ORAL
Qty: 1 KIT | Refills: 0 | Status: SHIPPED | OUTPATIENT
Start: 2024-11-06

## 2024-11-06 RX ORDER — ERGOCALCIFEROL 1.25 MG/1
50000 CAPSULE, LIQUID FILLED ORAL WEEKLY
Status: SHIPPED | COMMUNITY
Start: 2024-11-06

## 2024-11-06 RX ORDER — DIPHENHYDRAMINE HYDROCHLORIDE 25 MG/1
CAPSULE, LIQUID FILLED ORAL
Qty: 1 KIT | Refills: 0 | Status: SHIPPED | OUTPATIENT
Start: 2024-11-06 | End: 2024-11-06

## 2024-11-06 RX ORDER — PREDNISONE 10 MG/1
TABLET ORAL
Qty: 15 TABLET | Refills: 0 | Status: SHIPPED | OUTPATIENT
Start: 2024-11-07 | End: 2024-11-12

## 2024-11-06 NOTE — PLAN OF CARE
Patient remains on 5 L nasal cannula. New nebulizer needed and will be delivered tomorrow. VSS. Plan for discharge tomorrow. Rounded on frequently.    Problem: Patient Centered Care  Goal: Patient preferences are identified and integrated in the patient's plan of care  Description: Interventions:  - What would you like us to know as we care for you? I live at home with my wife. I have chronic COPD. I wear 4 L Oxygen at home.   - Provide timely, complete, and accurate information to patient/family  - Incorporate patient and family knowledge, values, beliefs, and cultural backgrounds into the planning and delivery of care  - Encourage patient/family to participate in care and decision-making at the level they choose  - Honor patient and family perspectives and choices  Outcome: Progressing     Problem: Patient/Family Goals  Goal: Patient/Family Long Term Goal  Description: Patient's Long Term Goal: Stay out of hospital    Interventions:  - Wear CPAP and use nebulizer  - See additional Care Plan goals for specific interventions  Outcome: Progressing  Goal: Patient/Family Short Term Goal  Description: Patient's Short Term Goal: Decrease oxygen needs    Interventions:   - Use CPAP and nebulizer   - See additional Care Plan goals for specific interventions  Outcome: Progressing     Problem: PAIN - ADULT  Goal: Verbalizes/displays adequate comfort level or patient's stated pain goal  Description: INTERVENTIONS:  - Encourage pt to monitor pain and request assistance  - Assess pain using appropriate pain scale  - Administer analgesics based on type and severity of pain and evaluate response  - Implement non-pharmacological measures as appropriate and evaluate response  - Consider cultural and social influences on pain and pain management  - Manage/alleviate anxiety  - Utilize distraction and/or relaxation techniques  - Monitor for opioid side effects  - Notify MD/LIP if interventions unsuccessful or patient reports new  pain  - Anticipate increased pain with activity and pre-medicate as appropriate  Outcome: Progressing     Problem: SAFETY ADULT - FALL  Goal: Free from fall injury  Description: INTERVENTIONS:  - Assess pt frequently for physical needs  - Identify cognitive and physical deficits and behaviors that affect risk of falls.  - Kell fall precautions as indicated by assessment.  - Educate pt/family on patient safety including physical limitations  - Instruct pt to call for assistance with activity based on assessment  - Modify environment to reduce risk of injury  - Provide assistive devices as appropriate  - Consider OT/PT consult to assist with strengthening/mobility  - Encourage toileting schedule  Outcome: Progressing     Problem: RESPIRATORY - ADULT  Goal: Achieves optimal ventilation and oxygenation  Description: INTERVENTIONS:  - Assess for changes in respiratory status  - Assess for changes in mentation and behavior  - Position to facilitate oxygenation and minimize respiratory effort  - Oxygen supplementation based on oxygen saturation or ABGs  - Provide Smoking Cessation handout, if applicable  - Encourage broncho-pulmonary hygiene including cough, deep breathe, Incentive Spirometry  - Assess the need for suctioning and perform as needed  - Assess and instruct to report SOB or any respiratory difficulty  - Respiratory Therapy support as indicated  - Manage/alleviate anxiety  - Monitor for signs/symptoms of CO2 retention  Outcome: Progressing

## 2024-11-06 NOTE — DIABETES ED
[facility]    Diabetes Education  Note    Campbell Rao Patient Status:  Inpatient   1950 MRN [id number]  Location [facility]5W Attending Nichol Bates MD  Hosp Day # 3 PCP Johny Kang MD      Labs:    HEMOGLOBIN A1C (%)   Date Value   2024 6.3 (A)         Reason for Visit:Instruct of glucose monitor.    Met with patient in room. He indicates he follow up with PCp on regular basis. He reports family history of diabetes.    Instructed on use of glucose monitor with demonstration.  He was able to verbalize how to obtain a drop of blood and how to place on test strip. Prescription on chart indicate patient will be requested to monitor blood sugar twice per day.    Patient states he does not eat meals on a regular basis, does not consume sugary drinks, juice or candy.  Reinforced the importance of avoiding these type of food choices while on steroid medications at home.     Instructed on signs and symptoms of hyperglycemia. Recommend he contact PCP if he experiences hyperglycemia symptoms.    Education Provided:  How to test blood glucose using glucometer  Benefits of testing BG as directed - record results and report to MD  Importance of close follow up with PCP and medical team    Patient verbalized understanding and was receptive to information provided.      Recommendations:  PRovide glucose records to MD Natty Pinto, RN  Diabetes Educator  2024  4:19 PM    
4 = No assist / stand by assistance

## 2024-11-06 NOTE — PROGRESS NOTES
[facility]  part of [facility]    Progress Note    Campbell Rao Patient Status:  Inpatient    1950 MRN [id number]   Location [facility]5W Attending Nichol Bates MD   Hosp Day # 3 PCP Johny Kang MD         Subjective:     Constitutional:  Negative for fever.   Respiratory:  Negative for cough.    Cardiovascular:  Negative for chest pain.   Gastrointestinal: Negative.      Up to the chair on 4-5 L of oxygen and feels about baseline  No active cough or sputum or wheezes  No fever  Objective:   Blood pressure 134/79, pulse 98, temperature 98.1 °F (36.7 °C), temperature source Oral, resp. rate 24, height 5' 8\" (1.727 m), weight (!) 305 lb 12.8 oz (138.7 kg), SpO2 91%.  Physical Exam  Constitutional:       General: He is not in acute distress.     Appearance: He is ill-appearing.   HENT:      Head: Atraumatic.      Mouth/Throat:      Mouth: Mucous membranes are moist.   Cardiovascular:      Rate and Rhythm: Normal rate.      Heart sounds:      No gallop.   Pulmonary:      Comments: Distant breath sounds bilaterally  Minimal basilar rales  No wheezes or rhonchi  Abdominal:      Palpations: Abdomen is soft.   Neurological:      Mental Status: He is oriented to person, place, and time.         Results:   Lab Results   Component Value Date    WBC 17.3 (H) 2024    HGB 15.1 2024    HCT 46.9 2024    .0 2024    CREATSERUM 1.27 2024    BUN 34 (H) 2024     2024    K 4.3 2024    CL 98 2024    CO2 36.0 (H) 2024     (H) 2024    CA 10.2 2024    ALB 4.3 2024    ALKPHO 103 2024    BILT 1.3 (H) 2024    TP 7.1 2024    AST 29 2024    ALT 63 (H) 2024    TSH 2.777 2024    GGT 38 2024    DDIMER 0.40 10/18/2016    MG 2.1 2024    PHOS 4.1 2024    TROP <0.045 04/10/2021    TROPHS 4 2024    B12 765 2021           Assessment &  Plan:       1-  severe COPD with acute exacerbation   80-pack-year history of smoking quit 2022     ICS/LABA/LAMA inhaler   Scheduled nebulizers  Taper steroid /changed to p.o. 30 mg for 3 days then 20 for 3 days then 10 for 3 days then stop  Empiric antibiotics         2-Acute on chronic respiratory failure with hypoxia and hypercapnia  Baseline home O2 at 4 L / now on 4-5 L NC / about baseline      3- MARIA M and morbid obesity obesity    Auto CPAP 5-15 CWP      4-screening for lung cancer  80-pack-year history of smoking quit 2022  Chest ct April/2024 with no suspicious findings  Next low-dose chest CT in April 2025     5-DVT prophylaxis  Lovenox subcu        Overall better , discharge planning on taper steroid / off antibiotics   Patient already on home O2 and nebulizers he is already has CPAP at home  Follow-up with COPD clinic                Yesi Salinas MD  11/6/2024

## 2024-11-06 NOTE — DISCHARGE INSTRUCTIONS
In addition to seeing your pulmonary medicine doctor, it was requested that you also follow up at the [facility]. The speciality care clinic provides ongoing care and support to help you live more fully with chronic obstructive pulmonary disease, asthma, or pneumonia. During clinic visits, they'll monitor your health, determine if you need changes to medication or other therapies, and identify any additional services that may be of benefit.    The clinic is located at [address] [place] [address] (across from the lab in the [facility]) in  [facility]  Clinic Phone Number: [phone].

## 2024-11-06 NOTE — PROGRESS NOTES
[facility]  part of [facility]    Progress Note    Campbell Rao Patient Status:  Inpatient    1950 MRN [id number]   Location [facility]5W Attending Jim Lane MD   Hosp Day # 3 PCP Johny Kang MD       Subjective:   Campbell Rao feels better, and would like to go home. Cleared by pulmonary. However, he states that his nebulizer doesn't work and needs a new machine.  Also, he still quite dyspneic, has to pause during conversation due to resting respiratory distress.   He says he is not familiar with sugars checks.    Review of Systems:   10 point ROS completed and was negative, except for pertinent positive and negatives stated in subjective.    Objective:     Vitals:    24 0521 24 0522 24 0756 24 1145   BP:  138/86 134/79 123/87   BP Location:  Right arm Right arm Right arm   Pulse:  109 98 104   Resp:     Temp:  98 °F (36.7 °C) 98.1 °F (36.7 °C) 98.3 °F (36.8 °C)   TempSrc:  Oral Oral Oral   SpO2: (!) 85% 90% 91% 95%   Weight:       Height:         Patient Weight for the past 72 hrs:   Weight   24 0930 (!) 305 lb (138.3 kg)   24 1455 (!) 305 lb 12.8 oz (138.7 kg)       Intake/Output Summary (Last 24 hours) at 2024 1518  Last data filed at 2024 1430  Gross per 24 hour   Intake 1465 ml   Output 250 ml   Net 1215 ml     GENERAL:  The patient appeared to be in mild distress, morbidly obese.  SKIN:  Warm and hydrated  HEENT:  Head was atraumatic and normocephalic.    CHEST:  Symmetrical movement on inspiration  LUNGS:  diminished, course, occ wheeze  ABDOMEN: Non-distended  MUSCULOSKELETAL:  There was no deformity.    EXTREMITIES: There was no edema  NEUROLOGICAL:  There was no focal deficit.    PSYCHIATRIC: Calm and cooperative     Current Scheduled Inpatient Meds:      insulin degludec  30 Units Subcutaneous Daily    insulin aspart  5 Units Subcutaneous TID CC    predniSONE  30 mg Oral Daily with breakfast     enoxaparin  50 mg Subcutaneous q12h    insulin aspart  1-11 Units Subcutaneous TID CC    guaiFENesin ER  600 mg Oral BID    atorvastatin  20 mg Oral Nightly    umeclidinium bromide  1 puff Inhalation Daily    spironolactone  12.5 mg Oral Daily    torsemide  20 mg Oral BID (Diuretic)    fluticasone-salmeterol  1 puff Inhalation BID    cefTRIAXone  2 g Intravenous Q24H    azithromycin  500 mg Oral Daily       Current PRN Inpatient Meds:        glucose **OR** glucose **OR** glucose-vitamin C **OR** dextrose **OR** glucose **OR** glucose **OR** glucose-vitamin C    acetaminophen    acetaminophen **OR** HYDROcodone-acetaminophen **OR** HYDROcodone-acetaminophen    melatonin    polyethylene glycol (PEG 3350)    sennosides    bisacodyl    fleet enema    benzonatate    sodium chloride    ipratropium-albuterol    Results:     Lab Results   Component Value Date    WBC 17.3 (H) 11/06/2024    HGB 15.1 11/06/2024    HCT 46.9 11/06/2024    .0 11/06/2024    CREATSERUM 1.27 11/06/2024    BUN 34 (H) 11/06/2024     11/06/2024    K 4.3 11/06/2024    CL 98 11/06/2024    CO2 36.0 (H) 11/06/2024     (H) 11/06/2024    CA 10.2 11/06/2024    ALB 4.3 11/03/2024    ALKPHO 103 11/03/2024    BILT 1.3 (H) 11/03/2024    TP 7.1 11/03/2024    AST 29 11/03/2024    ALT 63 (H) 11/03/2024    TSH 2.777 04/18/2024    GGT 38 04/18/2024    DDIMER 0.40 10/18/2016    MG 2.1 11/03/2024    PHOS 4.1 04/26/2024    TROP <0.045 04/10/2021    B12 765 04/27/2021       Recent Labs   Lab 11/03/24  1031 11/04/24  0445 11/05/24  0516 11/06/24  0536   RBC 4.63 4.53 4.51 4.88   HGB 14.0 13.6 14.1 15.1   HCT 45.9 44.6 43.7 46.9   MCV 99.1 98.5 96.9 96.1   MCH 30.2 30.0 31.3 30.9   MCHC 30.5* 30.5* 32.3 32.2   RDW 13.8 13.5 13.4 13.4   NEPRELIM 10.47*  --   --   --    WBC 14.5* 14.8* 18.4* 17.3*   .0 200.0 210.0 279.0     Recent Labs   Lab 11/03/24  1031 11/04/24  0445 11/05/24  0516 11/06/24  0536   * 235* 239* 286*   BUN 18 21 30* 34*    CREATSERUM 0.93 0.98 1.19 1.27   CA 10.0 10.4 10.1 10.2   ALB 4.3  --   --   --     138 140 138   K 4.4 4.6 4.7 4.3    102 100 98   CO2 31.0 33.0* 35.0* 36.0*   ALKPHO 103  --   --   --    AST 29  --   --   --    ALT 63*  --   --   --    BILT 1.3*  --   --   --    TP 7.1  --   --   --      No results found for: \"PT\", \"INR\"    Culture:  Hospital Encounter on 11/03/24   1. Blood Culture     Status: None (Preliminary result)    Collection Time: 11/03/24 12:09 PM    Specimen: Blood,peripheral   Result Value Ref Range    Blood Culture Result No Growth 3 Days N/A       Imaging/EKG:   No results found.      Assessment and Plan:   Campbell Rao is 74-year-old male with hx of COPD on home 4L O2, MARIA M on CPAP, morbid obesity, HFpEF, HTN and HLD, who presents with shortness of breath.      # COPD exacerbation  # Acute on Chronic hypoxemic and hypercapnic respiratory failure   - Patient follows Dr. Salinas for moderate to severe COPD.   - 80-pack-year history of smoking quit 2022, currently uses 4L O2 at home.  - CXR reported increased interstitial and alveolar opacities.   - ceftriaxone, azithromycin, steroid and bronchodilators  - Pulm on consult     # MARIA M on CPAP  # Morbid obesity w/ BMI 46  - continue home CPAP     # HFpEF  # HTN   # HLD  - continue home meds    # Steroid-induced hyperglycemia  - Adjusting with steroids: Degludec 30U daily, Aspart 5U AC, SSI AC/HS  - check A1c, previous few months ago was borderline >6  - He says he is not familiar with sugars checks-->diabetic education, glucometer on discharge for monitoring    # VitD deficiency  - VitD-25OH 6.1 in Sep, 11.3 on admission  - give ergocalciferol     Diet: regular  PT/OT: deferred  DVT ppx: Lovenox  Line: none  Code status: Full     Dispo: home with wife,  he states that his nebulizer doesn't work and needs a new machine.  Pt would benefit from a new nebulizer set up. D/w SW.     MDM: high Nichol Bates MD   [facility]  Hospitalist

## 2024-11-06 NOTE — CM/SW NOTE
MDO placed for discharge.    Dr. Bates requesting home nebulizer.  CM confirmed with HME liaison Yana - patient does not have nebulizer billed through Medicare and is eligible for arrangement.    CM sent HME referral via [employer].  Dr. Bates completed required verbiage and signed order for nebulizer - documentation uploaded to referral.    CM called HME liaison Yana and confirmed receipt of documents.  Per Yana, HME is unable to deliver home nebulizer until tomorrow, 11/7.  Dr. Bates aware.      / to remain available for support and/or discharge planning.     Plan: Home with spouse, HME home oxygen, CPAP, and nebulizer - pending delivery confirmation of nebulizer    Sandy Beth RN, BSN  Nurse   [phone]

## 2024-11-06 NOTE — PLAN OF CARE
Problem: Patient Centered Care  Goal: Patient preferences are identified and integrated in the patient's plan of care  Description: Interventions:  - What would you like us to know as we care for you? I live at home with my wife. I have chronic COPD. I wear 4 L Oxygen at home.   - Provide timely, complete, and accurate information to patient/family  - Incorporate patient and family knowledge, values, beliefs, and cultural backgrounds into the planning and delivery of care  - Encourage patient/family to participate in care and decision-making at the level they choose  - Honor patient and family perspectives and choices  Outcome: Progressing     Problem: PAIN - ADULT  Goal: Verbalizes/displays adequate comfort level or patient's stated pain goal  Description: INTERVENTIONS:  - Encourage pt to monitor pain and request assistance  - Assess pain using appropriate pain scale  - Administer analgesics based on type and severity of pain and evaluate response  - Implement non-pharmacological measures as appropriate and evaluate response  - Consider cultural and social influences on pain and pain management  - Manage/alleviate anxiety  - Utilize distraction and/or relaxation techniques  - Monitor for opioid side effects  - Notify MD/LIP if interventions unsuccessful or patient reports new pain  - Anticipate increased pain with activity and pre-medicate as appropriate  Outcome: Progressing     Problem: SAFETY ADULT - FALL  Goal: Free from fall injury  Description: INTERVENTIONS:  - Assess pt frequently for physical needs  - Identify cognitive and physical deficits and behaviors that affect risk of falls.  - [place] fall precautions as indicated by assessment.  - Educate pt/family on patient safety including physical limitations  - Instruct pt to call for assistance with activity based on assessment  - Modify environment to reduce risk of injury  - Provide assistive devices as appropriate  - Consider OT/PT consult to assist  with strengthening/mobility  - Encourage toileting schedule  Outcome: Progressing     Problem: RESPIRATORY - ADULT  Goal: Achieves optimal ventilation and oxygenation  Description: INTERVENTIONS:  - Assess for changes in respiratory status  - Assess for changes in mentation and behavior  - Position to facilitate oxygenation and minimize respiratory effort  - Oxygen supplementation based on oxygen saturation or ABGs  - Provide Smoking Cessation handout, if applicable  - Encourage broncho-pulmonary hygiene including cough, deep breathe, Incentive Spirometry  - Assess the need for suctioning and perform as needed  - Assess and instruct to report SOB or any respiratory difficulty  - Respiratory Therapy support as indicated  - Manage/alleviate anxiety  - Monitor for signs/symptoms of CO2 retention  Outcome: Progressing

## 2024-11-07 VITALS
TEMPERATURE: 98 F | HEART RATE: 113 BPM | BODY MASS INDEX: 46.35 KG/M2 | WEIGHT: 305.81 LBS | RESPIRATION RATE: 20 BRPM | OXYGEN SATURATION: 90 % | DIASTOLIC BLOOD PRESSURE: 73 MMHG | HEIGHT: 68 IN | SYSTOLIC BLOOD PRESSURE: 113 MMHG

## 2024-11-07 LAB
ANION GAP SERPL CALC-SCNC: 5 MMOL/L (ref 0–18)
BUN BLD-MCNC: 35 MG/DL (ref 9–23)
BUN/CREAT SERPL: 28.2 (ref 10–20)
CALCIUM BLD-MCNC: 9.4 MG/DL (ref 8.7–10.4)
CHLORIDE SERPL-SCNC: 98 MMOL/L (ref 98–112)
CO2 SERPL-SCNC: 38 MMOL/L (ref 21–32)
CREAT BLD-MCNC: 1.24 MG/DL
DEPRECATED RDW RBC AUTO: 48.4 FL (ref 35.1–46.3)
EGFRCR SERPLBLD CKD-EPI 2021: 61 ML/MIN/1.73M2 (ref 60–?)
ERYTHROCYTE [DISTWIDTH] IN BLOOD BY AUTOMATED COUNT: 13.6 % (ref 11–15)
GLUCOSE BLD-MCNC: 94 MG/DL (ref 70–99)
GLUCOSE BLDC GLUCOMTR-MCNC: 136 MG/DL (ref 70–99)
GLUCOSE BLDC GLUCOMTR-MCNC: 97 MG/DL (ref 70–99)
HCT VFR BLD AUTO: 47.2 %
HGB BLD-MCNC: 14.8 G/DL
MCH RBC QN AUTO: 29.9 PG (ref 26–34)
MCHC RBC AUTO-ENTMCNC: 31.4 G/DL (ref 31–37)
MCV RBC AUTO: 95.4 FL
OSMOLALITY SERPL CALC.SUM OF ELEC: 300 MOSM/KG (ref 275–295)
PLATELET # BLD AUTO: 255 10(3)UL (ref 150–450)
POTASSIUM SERPL-SCNC: 3.9 MMOL/L (ref 3.5–5.1)
RBC # BLD AUTO: 4.95 X10(6)UL
SODIUM SERPL-SCNC: 141 MMOL/L (ref 136–145)
UFH PPP CHRO-ACNC: 0.38 IU/ML
WBC # BLD AUTO: 13.3 X10(3) UL (ref 4–11)

## 2024-11-07 PROCEDURE — 99232 SBSQ HOSP IP/OBS MODERATE 35: CPT | Performed by: PHYSICIAN ASSISTANT

## 2024-11-07 NOTE — CM/SW NOTE
11/07/24 1300   Discharge disposition   Expected discharge disposition Home or Self   DME/Infusion Providers [facility]  (Home O2 and new nebulizer machine)   Discharge transportation Private car     Pt discussed during nursing rounds. Pt is stable for dc today. MD dc order entered. [facility] will resume home O 2 services at [place], pt currently on 4L O2 which is his baseline. CM confirmed that [facility] delivered new nebulizer machine to patient's room earlier today. Pt's family will provide transport at [place].    Plan: Home w/spouse with [facility] for home O2 and new nebulizer machine today.    / to remain available for support and/or discharge planning.     RAZA Wilder    [phone]

## 2024-11-07 NOTE — PROGRESS NOTES
[facility]  part of [facility]    Progress Note    Campbell Rao Patient Status:  Inpatient    1950 MRN [id number]   Location [facility]5W Attending Nichol Bates MD   Hosp Day # 4 PCP Johny Kang MD     Subjective:   Seen and examined while sitting in chair. Feels better. Dyspnea improved and back to baseline. Ongoing cough with clear phlegm. No wheezing. No fever or chills. Has appetite. On 5 L O2 (4 L O2 at baseline).    Objective:   Blood pressure 120/76, pulse 102, temperature 99.3 °F (37.4 °C), temperature source Oral, resp. rate 18, height 5' 8\" (1.727 m), weight (!) 305 lb 12.8 oz (138.7 kg), SpO2 92%.  Physical Exam  Vitals and nursing note reviewed.   Constitutional:       General: He is awake. He is not in acute distress.     Appearance: He is obese.      Interventions: Nasal cannula in place.   HENT:      Head: Normocephalic and atraumatic.   Cardiovascular:      Rate and Rhythm: Normal rate and regular rhythm.   Pulmonary:      Effort: No respiratory distress.      Breath sounds: Rales (few bibasilar crackles) present. No wheezing or rhonchi.      Comments: Diminished breath sounds bilaterally  Abdominal:      Palpations: Abdomen is soft.      Tenderness: There is no abdominal tenderness.   Musculoskeletal:      Cervical back: Normal range of motion and neck supple.      Right lower leg: Edema present.      Left lower leg: Edema present.   Skin:     General: Skin is warm and dry.   Neurological:      General: No focal deficit present.      Mental Status: He is alert and oriented to person, place, and time.   Psychiatric:         Mood and Affect: Mood normal.         Behavior: Behavior is cooperative.       Results:   Lab Results   Component Value Date    WBC 13.3 (H) 2024    HGB 14.8 2024    HCT 47.2 2024    .0 2024    CREATSERUM 1.24 2024    BUN 35 (H) 2024     2024    K 3.9 2024    CL  98 11/07/2024    CO2 38.0 (H) 11/07/2024    GLU 94 11/07/2024    CA 9.4 11/07/2024    ALB 4.3 11/03/2024    ALKPHO 103 11/03/2024    BILT 1.3 (H) 11/03/2024    TP 7.1 11/03/2024    AST 29 11/03/2024    ALT 63 (H) 11/03/2024    TSH 2.777 04/18/2024    GGT 38 04/18/2024    DDIMER 0.40 10/18/2016    MG 2.1 11/03/2024    PHOS 4.1 04/26/2024    TROP <0.045 04/10/2021    TROPHS 4 11/03/2024    B12 765 04/27/2021       Assessment & Plan:   Severe COPD with acute exacerbation  Extensive tobacco history  CXR with mild edema  COVID/flu/RSV negative  Leukocytosis downtrending  Improving - on 5 L O2 (baseline 4 L)  Plan:  -Prednisone 20 mg x3 days then 10 mg x3 days then stop  -DuoNebs as needed and home with nebulizer machine  -Continue ICS/LABA/LAMA (Advair and Incruse while here; resume Trelegy at discharge)  -Empiric antibiotics; stop at discharge  -Okay to discharge from pulmonary perspective  -Follow up with COPD clinic next week  -Follow up with Dr. Salinas in 3 weeks    Acute on chronic hypoxemic and hypercapnic respiratory failure  Due to severe COPD  Plan:  -O2 and wean to baseline 4 L O2 as tolerated    MARIA M  Plan:  -CPAP nightly    History of tobacco use  CT chest 4/2024 no concerning findings  Plan:  -LDCT screening due 4/2025    HFpEF  Plan:  -Torsemide    DVT prophylaxis: Lovenox    D/w RN.    Daryn Mondragon PA-C  Pulmonary Medicine  11/7/2024

## 2024-11-07 NOTE — PLAN OF CARE
Problem: Patient Centered Care  Goal: Patient preferences are identified and integrated in the patient's plan of care  Description: Interventions:  - What would you like us to know as we care for you? I live at home with my wife. I have chronic COPD. I wear 4 L Oxygen at home.   - Provide timely, complete, and accurate information to patient/family  - Incorporate patient and family knowledge, values, beliefs, and cultural backgrounds into the planning and delivery of care  - Encourage patient/family to participate in care and decision-making at the level they choose  - Honor patient and family perspectives and choices  Outcome: Adequate for Discharge     Problem: Patient/Family Goals  Goal: Patient/Family Long Term Goal  Description: Patient's Long Term Goal: discharge from hospital    Interventions:  - monitor vital signs   - monitor blood glucose levels  - monitor appropriate labs  - pain management   - administer medications per order  - follow MD orders  - diagnostics per order  - update and inform patient and family on plan of care  - discharge planning  - See additional Care Plan goals for specific interventions  Outcome: Adequate for Discharge  Goal: Patient/Family Short Term Goal  Description: Patient's Short Term Goal: improve breathing     Interventions:   - monitor vital signs   - monitor blood glucose levels  - monitor appropriate labs  - pain management   - administer medications per order  - follow MD orders  - diagnostics per order  - update and inform patient and family on plan of care  - See additional Care Plan goals for specific interventions  Outcome: Adequate for Discharge     Problem: PAIN - ADULT  Goal: Verbalizes/displays adequate comfort level or patient's stated pain goal  Description: INTERVENTIONS:  - Encourage pt to monitor pain and request assistance  - Assess pain using appropriate pain scale  - Administer analgesics based on type and severity of pain and evaluate response  -  Implement non-pharmacological measures as appropriate and evaluate response  - Consider cultural and social influences on pain and pain management  - Manage/alleviate anxiety  - Utilize distraction and/or relaxation techniques  - Monitor for opioid side effects  - Notify MD/LIP if interventions unsuccessful or patient reports new pain  - Anticipate increased pain with activity and pre-medicate as appropriate  Outcome: Adequate for Discharge     Problem: SAFETY ADULT - FALL  Goal: Free from fall injury  Description: INTERVENTIONS:  - Assess pt frequently for physical needs  - Identify cognitive and physical deficits and behaviors that affect risk of falls.  - [place] fall precautions as indicated by assessment.  - Educate pt/family on patient safety including physical limitations  - Instruct pt to call for assistance with activity based on assessment  - Modify environment to reduce risk of injury  - Provide assistive devices as appropriate  - Consider OT/PT consult to assist with strengthening/mobility  - Encourage toileting schedule  Outcome: Adequate for Discharge     Problem: RESPIRATORY - ADULT  Goal: Achieves optimal ventilation and oxygenation  Description: INTERVENTIONS:  - Assess for changes in respiratory status  - Assess for changes in mentation and behavior  - Position to facilitate oxygenation and minimize respiratory effort  - Oxygen supplementation based on oxygen saturation or ABGs  - Provide Smoking Cessation handout, if applicable  - Encourage broncho-pulmonary hygiene including cough, deep breathe, Incentive Spirometry  - Assess the need for suctioning and perform as needed  - Assess and instruct to report SOB or any respiratory difficulty  - Respiratory Therapy support as indicated  - Manage/alleviate anxiety  - Monitor for signs/symptoms of CO2 retention  Outcome: Adequate for Discharge     Problem: CARDIOVASCULAR - ADULT  Goal: Maintains optimal cardiac output and hemodynamic  stability  Description: INTERVENTIONS:  - Monitor vital signs, rhythm, and trends  - Monitor for bleeding, hypotension and signs of decreased cardiac output  - Evaluate effectiveness of vasoactive medications to optimize hemodynamic stability  - Monitor arterial and/or venous puncture sites for bleeding and/or hematoma  - Assess quality of pulses, skin color and temperature  - Assess for signs of decreased coronary artery perfusion - ex. Angina  - Evaluate fluid balance, assess for edema, trend weights  Outcome: Adequate for Discharge  Goal: Absence of cardiac arrhythmias or at baseline  Description: INTERVENTIONS:  - Continuous cardiac monitoring, monitor vital signs, obtain 12 lead EKG if indicated  - Evaluate effectiveness of antiarrhythmic and heart rate control medications as ordered  - Initiate emergency measures for life threatening arrhythmias  - Monitor electrolytes and administer replacement therapy as ordered  Outcome: Adequate for Discharge     Problem: METABOLIC/FLUID AND ELECTROLYTES - ADULT  Goal: Glucose maintained within prescribed range  Description: INTERVENTIONS:  - Monitor Blood Glucose as ordered  - Assess for signs and symptoms of hyperglycemia and hypoglycemia  - Administer ordered medications to maintain glucose within target range  - Assess barriers to adequate nutritional intake and initiate nutrition consult as needed  - Instruct patient on self management of diabetes  Outcome: Adequate for Discharge     Problem: SKIN/TISSUE INTEGRITY - ADULT  Goal: Skin integrity remains intact  Description: INTERVENTIONS  - Assess and document risk factors for pressure ulcer development  - Assess and document skin integrity  - Monitor for areas of redness and/or skin breakdown  - Initiate interventions, skin care algorithm/standards of care as needed  Outcome: Adequate for Discharge     Problem: HEMATOLOGIC - ADULT  Goal: Free from bleeding injury  Description: (Example usage: patient with low  platelets)  INTERVENTIONS:  - Avoid intramuscular injections, enemas and rectal medication administration  - Ensure safe mobilization of patient  - Hold pressure on venipuncture sites to achieve adequate hemostasis  - Assess for signs and symptoms of internal bleeding  - Monitor lab trends  - Patient is to report abnormal signs of bleeding to staff  - Avoid use of toothpicks and dental floss  - Use electric shaver for shaving  - Use soft bristle tooth brush  - Limit straining and forceful nose blowing  Outcome: Adequate for Discharge     Patient medically cleared for discharge. Completed O2 walking test on 4L of O2. Currently on 4L of O2 at rest. Safety and fall precautions maintained. This RN completed discharge education with patient. Patient and wife verbalized understanding. No questions at this time.

## 2024-11-07 NOTE — PLAN OF CARE
Problem: Patient Centered Care  Goal: Patient preferences are identified and integrated in the patient's plan of care  Description: Interventions:  - What would you like us to know as we care for you? I live at home with my wife. I have chronic COPD. I wear 4 L Oxygen at home.   - Provide timely, complete, and accurate information to patient/family  - Incorporate patient and family knowledge, values, beliefs, and cultural backgrounds into the planning and delivery of care  - Encourage patient/family to participate in care and decision-making at the level they choose  - Honor patient and family perspectives and choices  11/6/2024 2232 by Tammie Henson, RN  Outcome: Progressing  11/6/2024 2232 by Tammie Henson RN  Outcome: Progressing     Problem: PAIN - ADULT  Goal: Verbalizes/displays adequate comfort level or patient's stated pain goal  Description: INTERVENTIONS:  - Encourage pt to monitor pain and request assistance  - Assess pain using appropriate pain scale  - Administer analgesics based on type and severity of pain and evaluate response  - Implement non-pharmacological measures as appropriate and evaluate response  - Consider cultural and social influences on pain and pain management  - Manage/alleviate anxiety  - Utilize distraction and/or relaxation techniques  - Monitor for opioid side effects  - Notify MD/LIP if interventions unsuccessful or patient reports new pain  - Anticipate increased pain with activity and pre-medicate as appropriate  11/6/2024 2232 by Tammie Henson RN  Outcome: Progressing  11/6/2024 2232 by Tammie Henson, RN  Outcome: Progressing     Problem: SAFETY ADULT - FALL  Goal: Free from fall injury  Description: INTERVENTIONS:  - Assess pt frequently for physical needs  - Identify cognitive and physical deficits and behaviors that affect risk of falls.  - Nixon fall precautions as indicated by assessment.  - Educate pt/family on patient safety including physical  limitations  - Instruct pt to call for assistance with activity based on assessment  - Modify environment to reduce risk of injury  - Provide assistive devices as appropriate  - Consider OT/PT consult to assist with strengthening/mobility  - Encourage toileting schedule  11/6/2024 2232 by Tammie Henson RN  Outcome: Progressing  11/6/2024 2232 by Tammie Henson RN  Outcome: Progressing     Problem: RESPIRATORY - ADULT  Goal: Achieves optimal ventilation and oxygenation  Description: INTERVENTIONS:  - Assess for changes in respiratory status  - Assess for changes in mentation and behavior  - Position to facilitate oxygenation and minimize respiratory effort  - Oxygen supplementation based on oxygen saturation or ABGs  - Provide Smoking Cessation handout, if applicable  - Encourage broncho-pulmonary hygiene including cough, deep breathe, Incentive Spirometry  - Assess the need for suctioning and perform as needed  - Assess and instruct to report SOB or any respiratory difficulty  - Respiratory Therapy support as indicated  - Manage/alleviate anxiety  - Monitor for signs/symptoms of CO2 retention  11/6/2024 2232 by Tammie Henson RN  Outcome: Progressing  11/6/2024 2232 by Tammie Henson RN  Outcome: Progressing     Problem: CARDIOVASCULAR - ADULT  Goal: Maintains optimal cardiac output and hemodynamic stability  Description: INTERVENTIONS:  - Monitor vital signs, rhythm, and trends  - Monitor for bleeding, hypotension and signs of decreased cardiac output  - Evaluate effectiveness of vasoactive medications to optimize hemodynamic stability  - Monitor arterial and/or venous puncture sites for bleeding and/or hematoma  - Assess quality of pulses, skin color and temperature  - Assess for signs of decreased coronary artery perfusion - ex. Angina  - Evaluate fluid balance, assess for edema, trend weights  Outcome: Progressing  Goal: Absence of cardiac arrhythmias or at baseline  Description:  INTERVENTIONS:  - Continuous cardiac monitoring, monitor vital signs, obtain 12 lead EKG if indicated  - Evaluate effectiveness of antiarrhythmic and heart rate control medications as ordered  - Initiate emergency measures for life threatening arrhythmias  - Monitor electrolytes and administer replacement therapy as ordered  Outcome: Progressing

## 2024-11-07 NOTE — PROGRESS NOTES
11/07/24 1252   Mobility   O2 walk? Yes   SPO2% on Room Air at Rest 87   SPO2% on Oxygen at Rest 91   At rest oxygen flow (liters per minute) 4   SPO2% Ambulation on Oxygen 92   Ambulation oxygen flow (liters per minute) 4

## 2024-11-08 ENCOUNTER — PATIENT OUTREACH (OUTPATIENT)
Dept: CASE MANAGEMENT | Age: 74
End: 2024-11-08

## 2024-11-08 ENCOUNTER — TELEPHONE (OUTPATIENT)
Dept: INTERNAL MEDICINE CLINIC | Facility: CLINIC | Age: 74
End: 2024-11-08

## 2024-11-08 NOTE — TELEPHONE ENCOUNTER
Attempted to contact patient for TCM today however no answer.  Patient does not have an appointment scheduled at this time.  TCM appointment recommended by 11/14/24 as patient is a High risk for readmission.  Please advise.    BOOK BY DATE (last date for TCM): 11/21/24    Clinical staff:  Please follow-up with patient and try to get them to schedule as patient would greatly benefit from TCM appointment.  Thank you!

## 2024-11-08 NOTE — PROGRESS NOTES
Pt called back    Attempted to contact pt for TCM however no answer. Call continued to ring and did not go to a . NCM to try again at a later time.

## 2024-11-08 NOTE — PROGRESS NOTES
Attempted to contact pt for TCM however no answer. Call continued to ring and did not go to a . [facility] to try again at a later time.

## 2024-11-09 NOTE — TELEPHONE ENCOUNTER
[facility] follow up has been schedule for 11/14/24 at 2:00 pm with Dr. Kang. Patient states he will call back to cancel appointment if he can't make it.

## 2024-11-11 NOTE — TELEPHONE ENCOUNTER
Patients spouse called and she is requesting hospital follow up appointment on 11/15 patient can't make it to 11/14 but is needing after 4.

## 2024-11-21 ENCOUNTER — OFFICE VISIT (OUTPATIENT)
Dept: INTERNAL MEDICINE CLINIC | Facility: CLINIC | Age: 74
End: 2024-11-21

## 2024-11-21 VITALS
HEIGHT: 68 IN | DIASTOLIC BLOOD PRESSURE: 76 MMHG | HEART RATE: 94 BPM | WEIGHT: 306.31 LBS | BODY MASS INDEX: 46.42 KG/M2 | TEMPERATURE: 99 F | SYSTOLIC BLOOD PRESSURE: 118 MMHG | OXYGEN SATURATION: 91 %

## 2024-11-21 DIAGNOSIS — J96.21 ACUTE ON CHRONIC RESPIRATORY FAILURE WITH HYPOXIA AND HYPERCAPNIA (HCC): ICD-10-CM

## 2024-11-21 DIAGNOSIS — M81.0 AGE-RELATED OSTEOPOROSIS WITHOUT CURRENT PATHOLOGICAL FRACTURE: ICD-10-CM

## 2024-11-21 DIAGNOSIS — Z98.890 S/P KYPHOPLASTY: ICD-10-CM

## 2024-11-21 DIAGNOSIS — E66.01 MORBID OBESITY WITH BMI OF 40.0-44.9, ADULT (HCC): ICD-10-CM

## 2024-11-21 DIAGNOSIS — J44.1 COPD EXACERBATION (HCC): Primary | ICD-10-CM

## 2024-11-21 DIAGNOSIS — E11.8 CONTROLLED TYPE 2 DIABETES MELLITUS WITH COMPLICATION, WITHOUT LONG-TERM CURRENT USE OF INSULIN (HCC): ICD-10-CM

## 2024-11-21 DIAGNOSIS — E66.2 OBESITY HYPOVENTILATION SYNDROME (HCC): ICD-10-CM

## 2024-11-21 DIAGNOSIS — G47.33 OSA (OBSTRUCTIVE SLEEP APNEA): ICD-10-CM

## 2024-11-21 DIAGNOSIS — J96.22 ACUTE ON CHRONIC RESPIRATORY FAILURE WITH HYPOXIA AND HYPERCAPNIA (HCC): ICD-10-CM

## 2024-11-21 DIAGNOSIS — I50.32 CHRONIC DIASTOLIC CONGESTIVE HEART FAILURE (HCC): ICD-10-CM

## 2024-11-21 DIAGNOSIS — I25.10 CORONARY ARTERY CALCIFICATION SEEN ON CT SCAN: ICD-10-CM

## 2024-11-21 RX ORDER — METOPROLOL SUCCINATE 50 MG/1
TABLET, EXTENDED RELEASE ORAL
COMMUNITY
Start: 2024-10-10

## 2024-11-21 RX ORDER — POTASSIUM CHLORIDE 750 MG/1
CAPSULE, EXTENDED RELEASE ORAL
COMMUNITY
Start: 2024-11-19

## 2024-11-22 NOTE — PROGRESS NOTES
Multiple attempts to reach the pt and messages left with no returned phone call. Pt went to HFU with PCP on 11/21/24, closing encounter.

## 2024-11-24 NOTE — PROGRESS NOTES
Subjective:   Campbell Rao is a 74 year old male who presents for hospital follow up.   He was discharged from [facility] to Home or Self Care  Admission Date: 11/3/24   Discharge Date: 11/7/24  Hospital Discharge Diagnosis: copd exacerbation, chronic diastolic heart failure, type 2 dm controlled, morbid obesity,     Interactive contact within 2 business days post discharge first initiated on Date: 11/8/2024    I accessed [employer] and/or Brav[employer] Everywhere and personally reviewed the following for the recent hospitalization: provider notes, consults, summaries, labs and other test results and the pertinent findings are documented below.     HPI: Patient admitted for COPD exacerbation, treated with IV steroids, nebulizer treatment as well as antibiotics.  Patient also found to have elevated sugars with A1c now at 6.8 treated only with SS insulin.    History/Other:   Current Medications:  Medication Reconciliation:  I am aware of an inpatient discharge within the last 30 days.  The discharge medication list has been reconciled with the patient's current medication list and reviewed by me. See medication list for additions of new medication, and changes to current doses of medications and discontinued medications.  Outpatient Medications Marked as Taking for the 11/21/24 encounter (Office Visit) with Johny Kang MD   Medication Sig    metoprolol succinate ER 50 MG Oral Tablet 24 Hr metoprolol succinate ER 50 mg tablet,extended release 24 hr, [RxNorm: 553391]    Potassium Chloride ER 10 MEQ Oral Cap CR     ergocalciferol 1.25 MG (87861 UT) Oral Cap Take 1 capsule (50,000 Units total) by mouth once a week. Every Sunday    Blood Glucose Monitoring Suppl (BLOOD GLUCOSE MONITOR SYSTEM) w/Device Does not apply Kit One glucometer of pt's choice with lancets, glucose strips and alcohol pads, #100 each, to check sugars twice daily and as needed Dx: steroids induced hyperglycemia    acetaminophen (TYLENOL) 325 MG Oral  Tab Take 1 tablet (325 mg total) by mouth every 6 (six) hours as needed for Pain.    torsemide 20 MG Oral Tab Take 1 tablet (20 mg total) by mouth 2 (two) times daily.    fluticasone-umeclidin-vilant (TRELEGY ELLIPTA) 100-62.5-25 MCG/ACT Inhalation Aerosol Powder, Breath Activated Inhale 1 puff into the lungs daily.    albuterol 108 (90 Base) MCG/ACT Inhalation Aero Soln Inhale 2 puffs into the lungs every 4 to 6 hours as needed for Wheezing. inhale 2 puff by inhalation route  every 4 - 6 hours as needed    atorvastatin 20 MG Oral Tab Take 1 tablet (20 mg total) by mouth nightly.    ipratropium-albuterol 0.5-2.5 (3) MG/3ML Inhalation Solution Take 3 mL by nebulization in the morning and 3 mL at noon and 3 mL in the evening. And as needed.    fluticasone propionate 50 MCG/ACT Nasal Suspension 2 sprays by Nasal route daily.    Urea 40 % External Cream Apply 1 Application topically daily.    Saline Nasal Spray 0.65 % Nasal Solution 1 spray by Nasal route every 3 (three) hours as needed for congestion.       Review of Systems:  GENERAL: weight stable, energy stable, no sweating  SKIN: denies any unusual skin lesions  EYES: denies blurred vision or double vision  HEENT: denies nasal congestion, sinus pain or ST  LUNGS: chronic  shortness of breath with exertion  CARDIOVASCULAR: denies chest pain on exertion or palpitations  GI: denies abdominal pain, denies heartburn, denies diarrhea  MUSCULOSKELETAL: denies pain, normal range of motion of extremities  NEURO: denies headaches, denies dizziness, denies weakness  PSYCHE: denies depression or anxiety  HEMATOLOGIC: denies hx of anemia, or bruising, denies bleeding  ENDOCRINE: denies thyroid history  ALL/ASTHMA: denies hx of allergy or asthma    Objective:   No LMP for male patient.  Estimated body mass index is 46.57 kg/m² as calculated from the following:    Height as of this encounter: 5' 8\" (1.727 m).    Weight as of this encounter: 306 lb 4.8 oz (138.9 kg).   /76    Pulse 94   Temp 98.8 °F (37.1 °C) (Tympanic)   Ht 5' 8\" (1.727 m)   Wt (!) 306 lb 4.8 oz (138.9 kg)   SpO2 91%   BMI 46.57 kg/m²    GENERAL: well developed, well nourished, in no apparent distress  SKIN: no rashes, no suspicious lesions  HEENT: atraumatic, normocephalic, ears and throat are clear  EYES: PERRLA, EOMI, conjunctiva are clear  NECK: supple, no adenopathy, no bruits  CHEST: no chest tenderness  LUNGS: clear to auscultation  CARDIO: RRR without murmur  GI: good BS's, no masses, HSM or tenderness  MUSCULOSKELETAL: back is not tender, FROM of the extremities  EXTREMITIES: no cyanosis, clubbing; chronic stasis dermatitis both legs; +1 pitting edema both legs chronic   NEURO: Oriented times three, cranial nerves are intact, motor and sensory are grossly intact    Assessment & Plan:   (J44.1) COPD exacerbation (HCC)  (primary encounter diagnosis)  Plan: Patient ready finished his oral steroids.  He will continue with his inhalers, O2 supplements and also told that he can continue using his nebulizer treatment.  Flu shot given today.  Advised to get his RSV vaccine from the pharmacy.    (J96.21,  J96.22) Acute on chronic respiratory failure with hypoxia and hypercapnia (HCC)  Plan: This is improved since treatment for his acute exacerbation of COPD.  He remains on O2 supplement.    (I50.32) Chronic diastolic congestive heart failure (HCC)  Plan: Stable on torsemide.    (E11.8) Controlled type 2 diabetes mellitus with complication, without long-term current use of insulin (HCC)  Plan: A1c at 6.8 so he is now diabetic as discussed with the patient and spouse.  He will benefit with the use of GLP-1 agonist.  We had discussed regarding contraindications which he does not have.  Potential side effects also discussed.  He is acceptable and we will start him on Mounjaro or Ozempic.  We will see him back in 3 months to recheck his A1c.    (I25.10) Coronary artery calcification seen on CT scan  Plan: The patient  has been followed by cardiologist he is currently on statin therapy.    (G47.33) MARIA M (obstructive sleep apnea)  Plan: Patient is been compliant with the use of CPAP machine.    (M81.0) Age-related osteoporosis without current pathological fracture  Plan: XR DEXA BONE DENSITOMETRY (CPT=77080)        Discussed with patient and spouse, will do the DEXA scan.  Clinically though the patient has osteoporosis given his compression fractures of this thoracic spine lumbar spine before treated with kyphoplasty.  He will benefit from taking bisphosphonate therapy.  He will also need dental clearance before we can start him.    (Z98.890) S/P kyphoplasty  Plan: Previous history of kyphoplasty because of compression fracture thoracic spine.  This has resolved his back pains before secondary to this compression fractures.    (E66.01,  Z68.41) Morbid obesity with BMI of 40.0-44.9, adult (HCC)  Plan: See below.    (E66.2) Obesity hypoventilation syndrome (HCC)  Plan: Discussed with the patient again the need for him to lose weight.  He will benefit from GLP-1 agonist given note that he is also diabetic.  Will plan to start patient on Mounjaro or Ozempic.         No follow-ups on file.

## 2024-11-27 ENCOUNTER — PATIENT OUTREACH (OUTPATIENT)
Dept: CASE MANAGEMENT | Age: 74
End: 2024-11-27

## 2024-11-27 DIAGNOSIS — E78.5 DYSLIPIDEMIA: Primary | ICD-10-CM

## 2024-11-27 DIAGNOSIS — J43.9 PULMONARY EMPHYSEMA, UNSPECIFIED EMPHYSEMA TYPE (HCC): ICD-10-CM

## 2024-11-27 DIAGNOSIS — J44.1 COPD EXACERBATION (HCC): ICD-10-CM

## 2024-11-27 DIAGNOSIS — I50.32 CHRONIC DIASTOLIC CONGESTIVE HEART FAILURE (HCC): ICD-10-CM

## 2024-11-27 NOTE — PROGRESS NOTES
Spoke to Campbell for Chronic Care Management.      Updates to patient care team/comments: UTD.   Patient reported changes in medications: together we reviewed with no updates.   Med Adherence  Comment: pt reports taking all medications as prescribed.       Health Maintenance:   Reviewed with pt     Health Maintenance   Topic Date Due    Diabetes Care Foot Exam  Never done- reminded     Diabetes Care Dilated Eye Exam  Never done- reminded     Colorectal Cancer Screening  Never done- Ordered on 3/28/2024 reminded    Diabetes Care: Microalb/Creat Ratio  Never done- reminded     Zoster Vaccines (1 of 2) Never done- unsure     COVID-19 Vaccine (6 - 2024-25 season) 09/01/2024- plans on getting     Annual Physical  03/28/2025    Diabetes Care A1C  05/06/2025    Diabetes Care: GFR  11/07/2025    PSA  04/18/2026    Influenza Vaccine  Completed    Annual Depression Screening  Completed    Fall Risk Screening (Annual)  Completed    Pneumococcal Vaccine: 65+ Years  Completed    Lung Cancer Screening  Discontinued     Discussed the importance of compliance.    Diabetes can harm your eyes. It can damage the small blood vessels in your retina, the back wall of your eyeball. This condition is called diabetic retinopathy.    Diabetes also increases your risk of glaucoma and other eye problems.    You may not know your eyes are harmed until the problem is very bad. Your doctor can catch problems early if you get regular eye exams. This is very important. The early stages of diabetic retinopathy don't cause changes in vision and you won't have symptoms. Only an eye exam can detect the problem, so that steps can be taken to prevent the retinopathy from getting worse.    Patient updates/concerns:   Listened to pt and provided support.     Pt feeling better since ER visit.  He says he had a small hole in his tubing for the oxygen it has been replaced.  Completed follow up with Dr. Kang.  At visit weight management was discussed and  the plan was to start Wegovy or Mounjaro but both are to expensive for pt.      Back pain  Much better since his Kyphoplasty.      Weight management   Reminded Dr. Kang referred to weight loss clinic.    Gave information to both Goehner luis and Helen.      Diet  Could be better pt says.  Pt admits he can overeat.  Plans on watching his portions more.  Likes eating till he feels full but understands he is over weight and needs more discipline.      Goals/Action Plan:    Active goal from previous outreach: weight management      Patient reported progress towards goals: ongoing                - What: diet control            - Where/When/How: pt plans on controlling his portions for dinner at least 3 days a week  Patient Reported Barriers and Concerns: pt says he usually likes to eat till he's full.  Pt says he's kind of a carnivore.                     - Plan for overcoming barriers: he plans on only serving himself one portion and not going back for seconds just  because his stomach isn't full.      Care Managers Interventions:   Encouraged patient:   Self care: Take the time to do the things you love.   Nutrition:  Good nutrition helps us to maintain our weight, fight off infection, and help reduce the risk of developing other chronic issues.   Physical activity:  Physical activity is important to help maintain independence and improve quality of life.     Future Appointments:   Future Appointments   Date Time Provider Department Center   12/4/2024  4:00 PM Yesi Salinas MD [facility]         Next Care Manager Follow Up Date: in 1 month.     Reason For Follow Up: review progress and or barriers towards patient's goals.     Time Spent This Encounter Total: 20 min medical record review, telephone communication, care plan updates where needed, education, goals, and action plan recreation/update. Provided acknowledgment and validation to patient's concerns.   Monthly Minute Total including  today: 20  Physical assessment, complete health history, and need for CCM established by Johny Kang MD.    Friendly reminder - 2025 Benefits Open Enrollment is here!   We encourage you to review your current Medicare coverage and explore your options during this period. We have developed a Medicare Information Page on our website to serve as a resource for guidance and answers to any questions you might have.   You can access it by visiting, [email or web address]

## 2024-12-30 ENCOUNTER — PATIENT OUTREACH (OUTPATIENT)
Dept: CASE MANAGEMENT | Age: 74
End: 2024-12-30

## 2025-01-11 ENCOUNTER — APPOINTMENT (OUTPATIENT)
Dept: GENERAL RADIOLOGY | Facility: HOSPITAL | Age: 75
End: 2025-01-11
Attending: EMERGENCY MEDICINE
Payer: MEDICARE

## 2025-01-11 ENCOUNTER — HOSPITAL ENCOUNTER (INPATIENT)
Facility: HOSPITAL | Age: 75
LOS: 3 days | Discharge: HOME | End: 2025-01-14
Attending: EMERGENCY MEDICINE | Admitting: INTERNAL MEDICINE
Payer: MEDICARE

## 2025-01-11 DIAGNOSIS — J96.21 ACUTE ON CHRONIC RESPIRATORY FAILURE WITH HYPOXIA (HCC): Primary | ICD-10-CM

## 2025-01-11 PROBLEM — J96.91 HYPOXIC RESPIRATORY FAILURE (HCC): Status: ACTIVE | Noted: 2025-01-11

## 2025-01-11 LAB
ATRIAL RATE: 88 BPM
BASE EXCESS BLD CALC-SCNC: 16.9 MMOL/L (ref ?–2)
BASE EXCESS BLD CALC-SCNC: 20.2 MMOL/L (ref ?–2)
BASE EXCESS BLD CALC-SCNC: 20.4 MMOL/L (ref ?–2)
BASOPHILS # BLD AUTO: 0.08 X10(3) UL (ref 0–0.2)
BASOPHILS NFR BLD AUTO: 0.9 %
BLOOD GAS EPAP: 5 CM H2O
BLOOD GAS IPAP: 10 CM H2O
BLOOD GAS IPAP: 12 CM H2O
BLOOD GAS IPAP: 12 CM H2O
BUN BLD-MCNC: 16 MG/DL (ref 9–23)
BUN/CREAT SERPL: 18.8 (ref 10–20)
CALCIUM BLD-MCNC: 9.7 MG/DL (ref 8.7–10.4)
CHLORIDE SERPL-SCNC: 101 MMOL/L (ref 98–112)
CO2 SERPL-SCNC: >40 MMOL/L (ref 21–32)
CREAT BLD-MCNC: 0.85 MG/DL
CRP SERPL-MCNC: 0.4 MG/DL (ref ?–1)
DEPRECATED RDW RBC AUTO: 52 FL (ref 35.1–46.3)
EGFRCR SERPLBLD CKD-EPI 2021: 92 ML/MIN/1.73M2 (ref 60–?)
EOSINOPHIL # BLD AUTO: 0.3 X10(3) UL (ref 0–0.7)
EOSINOPHIL NFR BLD AUTO: 3.3 %
ERYTHROCYTE [DISTWIDTH] IN BLOOD BY AUTOMATED COUNT: 13.9 % (ref 11–15)
FLUAV + FLUBV RNA SPEC NAA+PROBE: NEGATIVE
FLUAV + FLUBV RNA SPEC NAA+PROBE: NEGATIVE
GLUCOSE BLD-MCNC: 155 MG/DL (ref 70–99)
HCO3 BLDV-SCNC: 37.3 MEQ/L (ref 22–26)
HCO3 BLDV-SCNC: 39.7 MEQ/L (ref 22–26)
HCO3 BLDV-SCNC: 40 MEQ/L (ref 22–26)
HCT VFR BLD AUTO: 45.9 %
HGB BLD-MCNC: 13.5 G/DL
IMM GRANULOCYTES # BLD AUTO: 0.14 X10(3) UL (ref 0–1)
IMM GRANULOCYTES NFR BLD: 1.6 %
LACTATE SERPL-SCNC: 0.5 MMOL/L (ref 0.5–2)
LYMPHOCYTES # BLD AUTO: 1.82 X10(3) UL (ref 1–4)
LYMPHOCYTES NFR BLD AUTO: 20.3 %
MCH RBC QN AUTO: 29.7 PG (ref 26–34)
MCHC RBC AUTO-ENTMCNC: 29.4 G/DL (ref 31–37)
MCV RBC AUTO: 101.1 FL
MONOCYTES # BLD AUTO: 0.93 X10(3) UL (ref 0.1–1)
MONOCYTES NFR BLD AUTO: 10.4 %
NEUTROPHILS # BLD AUTO: 5.71 X10 (3) UL (ref 1.5–7.7)
NEUTROPHILS # BLD AUTO: 5.71 X10(3) UL (ref 1.5–7.7)
NEUTROPHILS NFR BLD AUTO: 63.5 %
NT-PROBNP SERPL-MCNC: 59 PG/ML (ref ?–125)
OSMOLALITY SERPL CALC.SUM OF ELEC: 300 MOSM/KG (ref 275–295)
P AXIS: 34 DEGREES
P-R INTERVAL: 154 MS
PCO2 BLDV: 104 MM HG (ref 38–50)
PCO2 BLDV: 93 MM HG (ref 38–50)
PCO2 BLDV: >107 MM HG (ref 38–50)
PH BLDV: 7.27 [PH] (ref 7.32–7.43)
PH BLDV: 7.31 [PH] (ref 7.32–7.43)
PH BLDV: 7.32 [PH] (ref 7.32–7.43)
PLATELET # BLD AUTO: 198 10(3)UL (ref 150–450)
PO2 BLDV: 36 MM HG (ref 35–40)
PO2 BLDV: 38 MM HG (ref 35–40)
PO2 BLDV: 38 MM HG (ref 35–40)
POTASSIUM SERPL-SCNC: 4.7 MMOL/L (ref 3.5–5.1)
PROCALCITONIN SERPL-MCNC: <0.04 NG/ML (ref ?–0.05)
PUNCTURE CHARGE: NO
Q-T INTERVAL: 362 MS
QRS DURATION: 86 MS
QTC CALCULATION (BEZET): 438 MS
R AXIS: -14 DEGREES
RBC # BLD AUTO: 4.54 X10(6)UL
RESP RATE: 12 BPM
RESP RATE: 14 BPM
RESP RATE: 14 BPM
RSV RNA SPEC NAA+PROBE: NEGATIVE
SAO2 % BLDV: 65.1 % (ref 60–85)
SAO2 % BLDV: 69.7 % (ref 60–85)
SAO2 % BLDV: 73.4 % (ref 60–85)
SARS-COV-2 RNA RESP QL NAA+PROBE: NOT DETECTED
SODIUM SERPL-SCNC: 143 MMOL/L (ref 136–145)
T AXIS: 45 DEGREES
TROPONIN I SERPL HS-MCNC: 4 NG/L
VENTRICULAR RATE: 88 BPM
WBC # BLD AUTO: 9 X10(3) UL (ref 4–11)

## 2025-01-11 PROCEDURE — 71045 X-RAY EXAM CHEST 1 VIEW: CPT | Performed by: EMERGENCY MEDICINE

## 2025-01-11 PROCEDURE — 5A09357 ASSISTANCE WITH RESPIRATORY VENTILATION, LESS THAN 24 CONSECUTIVE HOURS, CONTINUOUS POSITIVE AIRWAY PRESSURE: ICD-10-PCS | Performed by: INTERNAL MEDICINE

## 2025-01-11 PROCEDURE — 99291 CRITICAL CARE FIRST HOUR: CPT | Performed by: INTERNAL MEDICINE

## 2025-01-11 PROCEDURE — 99223 1ST HOSP IP/OBS HIGH 75: CPT | Performed by: INTERNAL MEDICINE

## 2025-01-11 RX ORDER — ALBUTEROL SULFATE 90 UG/1
2 INHALANT RESPIRATORY (INHALATION)
COMMUNITY

## 2025-01-11 RX ORDER — HALOPERIDOL 5 MG/ML
2 INJECTION INTRAMUSCULAR ONCE
Status: COMPLETED | OUTPATIENT
Start: 2025-01-11 | End: 2025-01-11

## 2025-01-11 RX ORDER — ENOXAPARIN SODIUM 100 MG/ML
0.5 INJECTION SUBCUTANEOUS 2 TIMES DAILY
Status: DISCONTINUED | OUTPATIENT
Start: 2025-01-11 | End: 2025-01-14

## 2025-01-11 RX ORDER — ACETAMINOPHEN 325 MG/1
650 TABLET ORAL EVERY 4 HOURS PRN
Status: DISCONTINUED | OUTPATIENT
Start: 2025-01-11 | End: 2025-01-14

## 2025-01-11 RX ORDER — METHYLPREDNISOLONE SODIUM SUCCINATE 125 MG/2ML
125 INJECTION INTRAMUSCULAR; INTRAVENOUS ONCE
Status: COMPLETED | OUTPATIENT
Start: 2025-01-11 | End: 2025-01-11

## 2025-01-11 RX ORDER — METHYLPREDNISOLONE SODIUM SUCCINATE 40 MG/ML
40 INJECTION INTRAMUSCULAR; INTRAVENOUS EVERY 12 HOURS
Status: DISCONTINUED | OUTPATIENT
Start: 2025-01-11 | End: 2025-01-14

## 2025-01-11 RX ORDER — ONDANSETRON 2 MG/ML
4 INJECTION INTRAMUSCULAR; INTRAVENOUS EVERY 6 HOURS PRN
Status: DISCONTINUED | OUTPATIENT
Start: 2025-01-11 | End: 2025-01-14

## 2025-01-11 RX ORDER — HALOPERIDOL 5 MG/ML
2 INJECTION INTRAMUSCULAR EVERY 2 HOUR PRN
Status: DISCONTINUED | OUTPATIENT
Start: 2025-01-11 | End: 2025-01-12

## 2025-01-11 RX ORDER — HYDROCODONE BITARTRATE AND ACETAMINOPHEN 5; 325 MG/1; MG/1
2 TABLET ORAL EVERY 4 HOURS PRN
Status: DISCONTINUED | OUTPATIENT
Start: 2025-01-11 | End: 2025-01-14

## 2025-01-11 RX ORDER — SENNOSIDES 8.6 MG
17.2 TABLET ORAL NIGHTLY PRN
Status: DISCONTINUED | OUTPATIENT
Start: 2025-01-11 | End: 2025-01-14

## 2025-01-11 RX ORDER — ACETAMINOPHEN 500 MG
500 TABLET ORAL EVERY 4 HOURS PRN
Status: DISCONTINUED | OUTPATIENT
Start: 2025-01-11 | End: 2025-01-14

## 2025-01-11 RX ORDER — BISACODYL 10 MG
10 SUPPOSITORY, RECTAL RECTAL
Status: DISCONTINUED | OUTPATIENT
Start: 2025-01-11 | End: 2025-01-14

## 2025-01-11 RX ORDER — POLYETHYLENE GLYCOL 3350 17 G/17G
17 POWDER, FOR SOLUTION ORAL DAILY PRN
Status: DISCONTINUED | OUTPATIENT
Start: 2025-01-11 | End: 2025-01-14

## 2025-01-11 RX ORDER — IPRATROPIUM BROMIDE AND ALBUTEROL SULFATE 2.5; .5 MG/3ML; MG/3ML
3 SOLUTION RESPIRATORY (INHALATION)
Status: DISCONTINUED | OUTPATIENT
Start: 2025-01-11 | End: 2025-01-14

## 2025-01-11 RX ORDER — FUROSEMIDE 10 MG/ML
40 INJECTION INTRAMUSCULAR; INTRAVENOUS ONCE
Status: COMPLETED | OUTPATIENT
Start: 2025-01-11 | End: 2025-01-11

## 2025-01-11 RX ORDER — ATORVASTATIN CALCIUM 20 MG/1
20 TABLET, FILM COATED ORAL NIGHTLY
COMMUNITY

## 2025-01-11 RX ORDER — ATORVASTATIN CALCIUM 20 MG/1
20 TABLET, FILM COATED ORAL NIGHTLY
Status: DISCONTINUED | OUTPATIENT
Start: 2025-01-11 | End: 2025-01-14

## 2025-01-11 RX ORDER — FLUTICASONE PROPIONATE AND SALMETEROL 250; 50 UG/1; UG/1
1 POWDER RESPIRATORY (INHALATION) 2 TIMES DAILY
Status: DISCONTINUED | OUTPATIENT
Start: 2025-01-11 | End: 2025-01-14

## 2025-01-11 RX ORDER — POTASSIUM CHLORIDE 750 MG/1
20 TABLET, EXTENDED RELEASE ORAL EVERY OTHER DAY
COMMUNITY
End: 2025-01-21

## 2025-01-11 RX ORDER — SODIUM PHOSPHATE, DIBASIC AND SODIUM PHOSPHATE, MONOBASIC 7; 19 G/230ML; G/230ML
1 ENEMA RECTAL ONCE AS NEEDED
Status: DISCONTINUED | OUTPATIENT
Start: 2025-01-11 | End: 2025-01-14

## 2025-01-11 RX ORDER — HYDROCODONE BITARTRATE AND ACETAMINOPHEN 5; 325 MG/1; MG/1
1 TABLET ORAL EVERY 4 HOURS PRN
Status: DISCONTINUED | OUTPATIENT
Start: 2025-01-11 | End: 2025-01-14

## 2025-01-11 NOTE — H&P
[facility]  part of [facility]  HISTORY AND PHYSICAL       Campbell Rao Patient Status:  Emergency    1950  74 year old CSN [id number]   Location 48/48 Attending Vance Still MD     PCP Johny Kang MD         DATE OF ADMISSION: 2025     CHIEF COMPLAINT: Shortness of breath, hallucinations    HISTORY OF PRESENT ILLNESS  This is a 74 year oldmale who was brought in by wife after developing shortness of breath and progressive altered mental status and hallucinations.  Patient lethargic and unable to provide subjective.  Further history from wife at bedside.  Wife states patient has been unwell since around the first of the year.  Initially with what she believes to be an upper respiratory infection.  Reported COVID-negative at that time.  Shortness of breath that seems to be progressively worsening over that time.  Around 2 to 3 days prior to admission patient began having difficulties tolerating his CPAP at home.  Wife noted development of worsening confusion with eventual development of hallucinations prompting visit to ED for further evaluation.  Wife did note patient has had similar episodes in the past.  She was familiar with his issues of elevated CO2 levels.    PAST MEDICAL HISTORY  Past Medical History:    Atherosclerosis of coronary artery    coronary calcification on ct chest    Back problem    Cataract    Chronic respiratory failure (HCC)    Congestive heart disease (HCC)    COPD (HCC)    freq exacerbations. ? 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    MEDICATIONS  Patient's Medications   New Prescriptions    No medications on file   Previous Medications    ACETAMINOPHEN (TYLENOL) 325 MG ORAL TAB    Take 1 tablet (325 mg total) by mouth every 6 (six) hours as needed for Pain.    ALBUTEROL 108 (90 BASE) MCG/ACT INHALATION AERO SOLN    Inhale 2 puffs into the lungs every 4 to 6 hours as needed for Wheezing. inhale 2 puff by inhalation route  every 4 - 6 hours as needed    ATORVASTATIN 20 MG ORAL TAB    Take 1 tablet (20 mg total) by mouth nightly.    BLOOD GLUCOSE MONITORING SUPPL (BLOOD GLUCOSE MONITOR SYSTEM) W/DEVICE DOES NOT APPLY KIT    One glucometer of pt's choice with lancets, glucose strips and alcohol pads, #100 each, to check sugars twice daily and as needed Dx: steroids induced hyperglycemia    ERGOCALCIFEROL 1.25 MG (74236 UT) ORAL CAP    Take 1 capsule (50,000 Units total) by mouth once a week. Every Sunday    FLUTICASONE PROPIONATE 50 MCG/ACT NASAL SUSPENSION    2 sprays by Nasal route daily.    FLUTICASONE-UMECLIDIN-VILANT (TRELEGY ELLIPTA) 100-62.5-25 MCG/ACT INHALATION AEROSOL POWDER, BREATH ACTIVATED    Inhale 1 puff into the lungs daily.    IPRATROPIUM-ALBUTEROL 0.5-2.5 (3) MG/3ML INHALATION SOLUTION    Take 3 mL by nebulization in the morning and 3 mL at noon and 3 mL in the evening. And as needed.    METOPROLOL SUCCINATE ER 50 MG ORAL TABLET 24 HR    metoprolol succinate ER 50 mg tablet,extended release 24 hr, [RxNorm: 831711]    POTASSIUM CHLORIDE ER 10 MEQ ORAL CAP CR        SALINE NASAL SPRAY 0.65 % NASAL SOLUTION    1 spray by Nasal route every 3 (three) hours as needed for congestion.    SPIRONOLACTONE 25 MG ORAL TAB    Take 0.5 tablets (12.5 mg total) by mouth daily.    TORSEMIDE 20 MG ORAL TAB    Take 1 tablet (20 mg total) by mouth 2 (two) times  daily.    UREA 40 % EXTERNAL CREAM    Apply 1 Application topically daily.   Modified Medications    No medications on file   Discontinued Medications    No medications on file       SOCIAL HISTORY  Social History     Socioeconomic History    Marital status:    Tobacco Use    Smoking status: Former     Current packs/day: 0.00     Average packs/day: 2.0 packs/day for 40.0 years (80.0 ttl pk-yrs)     Types: Cigarettes     Start date: 11/1981     Quit date: 11/2021     Years since quitting: 3.1     Passive exposure: Past    Smokeless tobacco: Never   Vaping Use    Vaping status: Never Used   Substance and Sexual Activity    Alcohol use: Not Currently     Alcohol/week: 0.0 standard drinks of alcohol     Comment: socially    Drug use: Not Currently   Other Topics Concern    Caffeine Concern Yes     Comment: coffee, >32 oz daily       FAMILY HISTORY  Family History   Problem Relation Age of Onset    Cancer Father         lung cancer    Cancer Mother         lung cancer    No Known Problems Daughter     No Known Problems Son     No Known Problems Maternal Grandmother     No Known Problems Maternal Grandfather     No Known Problems Paternal Grandmother     No Known Problems Paternal Grandfather     No Known Problems Sister     Diabetes Brother     Lipids Brother         hyperlipidemia    Hypertension Brother     No Known Problems Other        PHYSICAL EXAM  Vital signs:  BP (!) 108/92   Pulse 95   Temp 98.4 °F (36.9 °C)   Resp 21   Ht 5' 8\" (1.727 m)   Wt (!) 310 lb (140.6 kg)   SpO2 98%   BMI 47.14 kg/m²      GENERAL: Lethargic, in no acute distress.  HEART:  Regular rhythm.  Tachycardia  LUNGS:  Air entry was decreased.  Mild increased work of breathing or wheezes   ABDOMEN: Soft and non-tender.    EXTREMITIES: + edema appreciated  NEUROLOGICAL: Lethargic, moving extremities spontaneously  SKIN:  Warm and well perfused, bilateral chronic venous stasis changes      IMAGING    XR CHEST AP PORTABLE   (CPT=71045)    Result Date: 1/11/2025  CONCLUSION:  Cardiomegaly with mild vascular congestion.  Patchy bibasilar opacity right greater than left which could reflect atelectasis with or without superimposed pneumonia.       Dictated by (CST): Eddie Winchester MD on 1/11/2025 at 11:05 AM     Finalized by (CST): Eddie Winchester MD on 1/11/2025 at 11:06 AM            Data:  Recent Labs   Lab 01/11/25  1022   RBC 4.54   HGB 13.5   HCT 45.9   .1*   MCH 29.7   MCHC 29.4*   RDW 13.9   NEPRELIM 5.71   WBC 9.0   .0     Recent Labs   Lab 01/11/25  1022   *   BUN 16   CREATSERUM 0.85   CA 9.7      K 4.7      CO2 >40.0*       ASSESSMENT/PLAN    Acute on chronic hypercapnic respiratory failure   Chronic hypoxemic respiratory failure  -Pt on baseline 4L O2 at home  -Patient presenting with worsening shortness of breath and altered mental status  -Patient with history of COPD, obesity hypoventilation and MARIA M  -Questionable compliance with CPAP at nighttime.  -ABG on admission with acidosis and hypercapnia.  -Started on noninvasive ventilation in ED.  -Continue NIV with admission to higher level of care.  -Treating underlying conditions as able  -Continue to monitor    Acute COPD exacerbation  -Wife reporting recent signs of upper respiratory infection.  -Starting course of steroids, DuoNebs, and empiric antibiotics  -Respiratory support as above  -Pulmonology on consult, appreciate further recommendations    Acute on chronic heart failure with preserved ejection fraction   -Chest x-ray reviewed.  Noted mild vascular congestion  -Starting diuresis with Lasix 40mg IV   - Strict I&Os, Daily weights, Fluid restriction    Altered mental status  -Likely secondary to acute metabolic encephalopathy from hypercapnia as above  -Treating underlying conditions  -Continue to monitor  -Swallow evaluation prior to initiation of diet.      Plan of care discussed with patient and wife at bedside.  Discussed with  ED physician and RN. Decision made that pt needs hospitalization for further management/monitoring.    Pt critically ill 2/2 respiratory failure requiring continuous NIV. CC time, greater than 35 minutes spent evaluating pt, reviewing labs, discussing pt/pt's wife with other providers. Time does not include procedures.      Balwinder Yoon MD    This note was prepared using Dragon Medical voice recognition dictation software. As a result errors may occur. When identified these errors have been corrected. While every attempt is made to correct errors during dictation discrepancies may still exist

## 2025-01-11 NOTE — CONSULTS
[facility]  part of [facility]    Consult Note    Date:  1/11/2025  Date of Admission:  1/11/2025    Chief Complaint:   Campbell Rao is a(n) 74 year old male with dyspnea.    HPI:   The patient has a history of tobacco use having smoked 1 pack/day for 40 years and then he cut back until he stopped completely about a year and a half ago. He carries a diagnosis of COPD, CHF, obesity-hypoventilation and obstructive sleep apnea. He uses a CPAP machine at home but the wife tells me that there was a hole in the tubing and they are uncertain as to its efficacy. There was extensive discussion regarding limits setting half year ago when he was admitted with decompensated COPD. He ultimately wanted full code and he was on ventilator for few days and then tolerated extubation.  Over the last couple days, the patient had developed a head cold which then settled in his chest associated with cough and mild hallucination which is how he had presented in the past with his COPD exacerbations.  There is no history of hemoptysis, cough, phlegm, fever, chills, shakes, chest pain, personal nor family history of deep venous thrombosis.    History     Past Medical History:    Atherosclerosis of coronary artery    coronary calcification on ct chest    Back problem    Cataract    Chronic respiratory failure (HCC)    Congestive heart disease (HCC)    COPD (HCC)    freq exacerbations. ? 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    Osteoarthritis    Pneumonia    Pulmonary nodules    Sleep apnea    Tobacco abuse     Past Surgical History:   Procedure Laterality Date    Appendectomy      Appendectomy      Cataract Bilateral     2/2024, 3/2024    Electrocardiogram, complete   01/30/2012    scanned to media tab     Family History   Problem Relation Age of Onset    Cancer Father         lung cancer    Cancer Mother         lung cancer    No Known Problems Daughter     No Known Problems Son     No Known Problems Maternal Grandmother     No Known Problems Maternal Grandfather     No Known Problems Paternal Grandmother     No Known Problems Paternal Grandfather     No Known Problems Sister     Diabetes Brother     Lipids Brother         hyperlipidemia    Hypertension Brother     No Known Problems Other      Social History:  Social History     Socioeconomic History    Marital status:    Tobacco Use    Smoking status: Former     Current packs/day: 0.00     Average packs/day: 2.0 packs/day for 40.0 years (80.0 ttl pk-yrs)     Types: Cigarettes     Start date: 11/1981     Quit date: 11/2021     Years since quitting: 3.1     Passive exposure: Past    Smokeless tobacco: Never   Vaping Use    Vaping status: Never Used   Substance and Sexual Activity    Alcohol use: Not Currently     Alcohol/week: 0.0 standard drinks of alcohol     Comment: socially    Drug use: Not Currently   Other Topics Concern    Caffeine Concern Yes     Comment: coffee, >32 oz daily     Social Drivers of Health     Financial Resource Strain: Low Risk  (4/29/2024)    Financial Resource Strain     Difficulty of Paying Living Expenses: Not very hard     Med Affordability: No   Food Insecurity: No Food Insecurity (11/3/2024)    Food Insecurity     Food Insecurity: Never true   Transportation Needs: No Transportation Needs (11/3/2024)    Transportation Needs     Lack of Transportation: No   Physical Activity: Inactive (1/16/2024)    Exercise Vital Sign     Days of Exercise per Week: 0 days     Minutes of Exercise per Session: 0 min   Stress: No Stress Concern Present (1/16/2024)    Stress     Feeling of Stress : No   Social Connections: Somewhat Isolated (1/16/2024)    Social Connections     Frequency of Socialization with  Friends and Family: 1   Housing Stability: Low Risk  (11/3/2024)    Housing Stability     Housing Instability: No     Allergies/Medications:   Allergies: Allergies[1]  (Not in a hospital admission)      Review of Systems:   Review of Systems:  Vision normal. Ear nose and throat normal. Bowel normal. Bladder function normal. No depression. No thyroid disease. No lymphatic system concerns.  Chronic brawny changes to shins with venous insufficiency. Muscles and joints unremarkable. No weight loss no weight gain.    Physical Exam:   Vital Signs:  Blood pressure (!) 108/92, pulse 95, temperature 98.4 °F (36.9 °C), resp. rate 21, height 5' 8\" (1.727 m), weight (!) 310 lb (140.6 kg), SpO2 98%.     Lethargic white male on BiPAP  HEENT examination is unremarkable with pupils equal round and reactive to light and accommodation.   Neck without adenopathy, thyromegaly, JVD nor bruit.   Lungs diminished bilaterally to auscultation and percussion.  Cardiac regular rate and rhythm no murmur.   Abdomen nontender, without hepatosplenomegaly and no mass appreciable.   Extremities without clubbing cyanosis 2+ lower extremity edema.   Neurologic grossly intact with symmetric tone and strength and reflex.  Skin with chronic venous stasis changes at the legs    Results:     Lab Results   Component Value Date    WBC 9.0 01/11/2025    HGB 13.5 01/11/2025    HCT 45.9 01/11/2025    .0 01/11/2025    CREATSERUM 0.85 01/11/2025    BUN 16 01/11/2025     01/11/2025    K 4.7 01/11/2025     01/11/2025    CO2 >40.0 01/11/2025     01/11/2025    CA 9.7 01/11/2025    CRP 0.40 01/11/2025     Chest x-ray-Cardiomegaly with mild vascular congestion.      Patchy bibasilar opacity right greater than left which could reflect atelectasis with or without superimposed pneumonia.     Assessment/Plan:   1.  Acute on chronic respiratory failure with the overlap syndrome-the patient has decompensated obstructive sleep apnea superimposed on  obesity-hypoventilation now with COPD exacerbation with chronic hypercapnia.  The patient has an 80-pack-year history of tobacco.  Doubt pneumonia.  The chest x-ray looks very much like prior images.  Has chronic left basilar atelectasis.    Recommendations:  1.  Steroid  2.  Nebulizer  3.  BiPAP  4.  Will follow clinically  5.  Weight loss and the patient would benefit from bariatric clinic evaluation.    2.  DVT prophylaxis-subcutaneous heparin    3.  CODE STATUS-I had previously asked the patient directly regarding his wishes and he wanted to be full code.    4.  Low-dose CT screening program-patient qualifies and would be a candidate in the outpatient setting.    I am delighted to assist with Campbell's care.    Kris Jimenez MD  Medical Director, Critical Care, [facility]  Medical Director, [facility]  Pager: [phone]               [1] No Known Allergies

## 2025-01-11 NOTE — ED QUICK NOTES
Patient appears confused, ripping off bi-pap mask, blood pressure cuff and pulse ox. MD notified. New orders for repeat VBG and Haloperidol IV. RT at bedside.

## 2025-01-11 NOTE — ED INITIAL ASSESSMENT (HPI)
Pt has hx of COPD. Pt c/o sob. Pt chronically on 4L of O2 via NC.   Pt reports recently had a cold that has gotten worse.   Pt had a fall 2 days ago. Denies hitting his head.

## 2025-01-11 NOTE — ED PROVIDER NOTES
Patient Seen in: [facility]      History     Chief Complaint   Patient presents with    Shortness Of Breath     Stated Complaint: SOB    Subjective:   HPI          Objective:     Past Medical History:    Atherosclerosis of coronary artery    coronary calcification on ct chest    Back problem    Cataract    Chronic respiratory failure (HCC)    Congestive heart disease (HCC)    COPD (HCC)    freq exacerbations. ? 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    Osteoarthritis    Pneumonia    Pulmonary nodules    Sleep apnea    Tobacco abuse              Past Surgical History:   Procedure Laterality Date    Appendectomy      Appendectomy      Cataract Bilateral     2/2024, 3/2024    Electrocardiogram, complete  01/30/2012    scanned to media tab                Social History     Socioeconomic History    Marital status:    Tobacco Use    Smoking status: Former     Current packs/day: 0.00     Average packs/day: 2.0 packs/day for 40.0 years (80.0 ttl pk-yrs)     Types: Cigarettes     Start date: 11/1981     Quit date: 11/2021     Years since quitting: 3.1     Passive exposure: Past    Smokeless tobacco: Never   Vaping Use    Vaping status: Never Used   Substance and Sexual Activity    Alcohol use: Not Currently     Alcohol/week: 0.0 standard drinks of alcohol     Comment: socially    Drug use: Not Currently   Other Topics Concern    Caffeine Concern Yes     Comment: coffee, >32 oz daily     Social Drivers of Health     Financial Resource Strain: Low Risk  (4/29/2024)    Financial Resource Strain     Difficulty of Paying Living Expenses: Not very hard     Med Affordability: No   Food Insecurity: No Food Insecurity (1/11/2025)    Food Insecurity     Food Insecurity:  Never true   Transportation Needs: No Transportation Needs (1/11/2025)    Transportation Needs     Lack of Transportation: No   Physical Activity: Inactive (1/16/2024)    Exercise Vital Sign     Days of Exercise per Week: 0 days     Minutes of Exercise per Session: 0 min   Stress: No Stress Concern Present (1/16/2024)    Stress     Feeling of Stress : No   Social Connections: Somewhat Isolated (1/16/2024)    Social Connections     Frequency of Socialization with Friends and Family: 1   Housing Stability: Low Risk  (1/11/2025)    Housing Stability     Housing Instability: No                  Physical Exam     ED Triage Vitals   BP 01/11/25 1004 134/72   Pulse 01/11/25 1004 94   Resp 01/11/25 1004 (!) 40   Temp 01/11/25 1004 98.4 °F (36.9 °C)   Temp src 01/11/25 1607 Axillary   SpO2 01/11/25 1004 (!) 85 %   O2 Device 01/11/25 1004 Nasal cannula       Current Vitals:   Vital Signs  BP: 147/85  Pulse: 96  Resp: 24  Temp: 97.8 °F (36.6 °C)  Temp src: Axillary  MAP (mmHg): (!) 103    Oxygen Therapy  SpO2: 93 %  O2 Device: Bi-PAP  Mode: Spontaneous/Timed  O2 Flow Rate (L/min): 4 L/min        Physical Exam        ED Course     Labs Reviewed   BASIC METABOLIC PANEL (8) - Abnormal; Notable for the following components:       Result Value    Glucose 155 (*)     CO2 >40.0 (*)     Calculated Osmolality 300 (*)     All other components within normal limits   CBC WITH DIFFERENTIAL WITH PLATELET - Abnormal; Notable for the following components:    .1 (*)     MCHC 29.4 (*)     RDW-SD 52.0 (*)     All other components within normal limits   VENOUS BLOOD GAS - Abnormal; Notable for the following components:    Venous pH 7.31 (*)     Venous pCO2 104 (*)     Venous Bicarbonate 40.0 (*)     Blood Gas Base Excess 20.4 (*)     All other components within normal limits   VENOUS BLOOD GAS - Abnormal; Notable for the following components:    Venous pH 7.27 (*)     Venous pCO2 >107 (*)     Venous Bicarbonate 39.7 (*)     Blood Gas Base  Excess 20.2 (*)     All other components within normal limits   VENOUS BLOOD GAS - Abnormal; Notable for the following components:    Venous pCO2 93 (*)     Venous Bicarbonate 37.3 (*)     Blood Gas Base Excess 16.9 (*)     All other components within normal limits   TROPONIN I HIGH SENSITIVITY - Normal   PRO BETA NATRIURETIC PEPTIDE - Normal   PROCALCITONIN - Normal   C-REACTIVE PROTEIN - Normal   LACTIC ACID, PLASMA - Normal   SARS-COV-2/FLU A AND B/RSV BY PCR (GENEXPERT) - Normal    Narrative:     This test is intended for the qualitative detection and differentiation of SARS-CoV-2, influenza A, influenza B, and respiratory syncytial virus (RSV) viral RNA in nasopharyngeal or nares swabs from individuals suspected of respiratory viral infection consistent with COVID-19 by their healthcare provider. Signs and symptoms of respiratory viral infection due to SARS-CoV-2, influenza, and RSV can be similar.    Test performed using the Xpert Xpress SARS-CoV-2/FLU/RSV (real time RT-PCR)  assay on the [employer] instrument, [employer], [place] [address].   This test is being used under the Food and Drug Administration's Emergency Use Authorization.    The authorized Fact Sheet for Healthcare Providers for this assay is available upon request from the laboratory.   BLOOD CULTURE   BLOOD CULTURE     EKG    Rate, intervals and axes as noted on EKG Report.  Rate: 88  Rhythm: Sinus Rhythm  Reading: Normal sinus rhythm without signs of acute ischemia or abnormal intervals.             ED Course as of 01/11/25 1702  ------------------------------------------------------------  Time: 01/11 1113  Value: Venous Blood Gas(!!)  Comment: Likely acute on chronic respiratory acidosis with secondary metabolic alkalosis.               MDM      74-year-old male history of COPD on 4 L nasal cannula baseline presents today with worsening shortness of breath and altered mental status.  Per the patient and his wife who provides supplemental  history he has had about 1 week of some increasing shortness of breath and cough.  He has not used his CPAP in the last 2 days and has a history of CO2 retention.  This morning, his wife reports that he has been hallucinating.  They deny fevers, chest pain, increased lower extremity swelling, or other new symptoms.    On exam, hypoxic to the mid to low 80s on his 4 L baseline, respiratory distress with increased work of breathing, distant breath sounds bilaterally, pitting edema in the shins bilaterally baseline per patient.    Differential: COPD exacerbation, hypercarbic encephalopathy, volume overload    Patient treated with Solu-Medrol, DuoNebs, and BiPAP.    Labs showed significant hypercarbia with some metabolic compensation.    Chest x-ray with some edema and patchy opacities.  Patient given Unasyn and azithromycin for pneumonia coverage.    While in the ED, the patient was somewhat agitated and pulling at the BiPAP mask.  He was given low-dose IV Haldol with improvement in agitation.    VBG was trended and improving prior to transfer.    I spent a total of 35 minutes of critical care time in obtaining history, performing a physical exam, bedside monitoring of interventions, collecting and interpreting tests and discussion with consultants but not including time spent performing procedures.      Admission disposition: 1/11/2025 11:51 AM           MDM    Disposition and Plan     Clinical Impression:  1. Acute on chronic respiratory failure with hypoxia (HCC)         Disposition:  Admit  1/11/2025 11:51 am    Follow-up:  No follow-up provider specified.  We recommend that you schedule follow up care with a primary care provider within the next three months to obtain basic health screening including reassessment of your blood pressure.      Medications Prescribed:  Current Discharge Medication List              Supplementary Documentation:         Hospital Problems       Present on Admission  Date Reviewed:  11/24/2024            ICD-10-CM Noted POA    * (Principal) Acute on chronic respiratory failure with hypoxia (HCC) J96.21 7/14/2024 Unknown    Hypoxic respiratory failure (HCC) J96.91 1/11/2025 Unknown

## 2025-01-11 NOTE — ED QUICK NOTES
Orders for admission, patient is aware of plan and ready to go upstairs. Any questions, please call ED RN Mirlande at extension [phone].     Patient Covid vaccination status: Fully vaccinated     COVID Test Ordered in ED: SARS-CoV-2/Flu A and B/RSV by PCR (GeneXpert)    COVID Suspicion at Admission: N/A    Running Infusions:  None    Mental Status/LOC at time of transport: x4    Other pertinent information:   CIWA score: N/A   NIH score:  N/A

## 2025-01-12 LAB
ALBUMIN SERPL-MCNC: 4.3 G/DL (ref 3.2–4.8)
ALBUMIN/GLOB SERPL: 1.9 {RATIO} (ref 1–2)
ALP LIVER SERPL-CCNC: 92 U/L
ALT SERPL-CCNC: 45 U/L
AST SERPL-CCNC: 24 U/L (ref ?–34)
BASE EXCESS BLD CALC-SCNC: 25.1 MMOL/L (ref ?–2)
BASOPHILS # BLD AUTO: 0.02 X10(3) UL (ref 0–0.2)
BASOPHILS NFR BLD AUTO: 0.2 %
BILIRUB SERPL-MCNC: 0.6 MG/DL (ref 0.2–1.1)
BLOOD GAS EPAP: 5 CM H2O
BLOOD GAS IPAP: 12 CM H2O
BUN BLD-MCNC: 15 MG/DL (ref 9–23)
BUN/CREAT SERPL: 17.6 (ref 10–20)
CALCIUM BLD-MCNC: 9.7 MG/DL (ref 8.7–10.4)
CHLORIDE SERPL-SCNC: 97 MMOL/L (ref 98–112)
CO2 SERPL-SCNC: >40 MMOL/L (ref 21–32)
CREAT BLD-MCNC: 0.85 MG/DL
DEPRECATED RDW RBC AUTO: 50.6 FL (ref 35.1–46.3)
EGFRCR SERPLBLD CKD-EPI 2021: 92 ML/MIN/1.73M2 (ref 60–?)
EOSINOPHIL # BLD AUTO: 0 X10(3) UL (ref 0–0.7)
EOSINOPHIL NFR BLD AUTO: 0 %
ERYTHROCYTE [DISTWIDTH] IN BLOOD BY AUTOMATED COUNT: 13.8 % (ref 11–15)
GLOBULIN PLAS-MCNC: 2.3 G/DL (ref 2–3.5)
GLUCOSE BLD-MCNC: 142 MG/DL (ref 70–99)
HCO3 BLDA-SCNC: 44.3 MEQ/L (ref 21–27)
HCT VFR BLD AUTO: 44.4 %
HGB BLD-MCNC: 14.1 G/DL
IMM GRANULOCYTES # BLD AUTO: 0.07 X10(3) UL (ref 0–1)
IMM GRANULOCYTES NFR BLD: 0.6 %
LYMPHOCYTES # BLD AUTO: 0.93 X10(3) UL (ref 1–4)
LYMPHOCYTES NFR BLD AUTO: 7.9 %
MAGNESIUM SERPL-MCNC: 2.1 MG/DL (ref 1.6–2.6)
MCH RBC QN AUTO: 30.9 PG (ref 26–34)
MCHC RBC AUTO-ENTMCNC: 31.8 G/DL (ref 31–37)
MCV RBC AUTO: 97.4 FL
MODIFIED ALLEN TEST: POSITIVE
MONOCYTES # BLD AUTO: 0.43 X10(3) UL (ref 0.1–1)
MONOCYTES NFR BLD AUTO: 3.6 %
NEUTROPHILS # BLD AUTO: 10.39 X10 (3) UL (ref 1.5–7.7)
NEUTROPHILS # BLD AUTO: 10.39 X10(3) UL (ref 1.5–7.7)
NEUTROPHILS NFR BLD AUTO: 87.7 %
O2 CT BLD-SCNC: 18.2 VOL% (ref 15–23)
O2/TOTAL GAS SETTING VFR VENT: 40 %
OSMOLALITY SERPL CALC.SUM OF ELEC: 305 MOSM/KG (ref 275–295)
PCO2 BLDA: 81 MM HG (ref 35–45)
PH BLDA: 7.44 [PH] (ref 7.35–7.45)
PLATELET # BLD AUTO: 199 10(3)UL (ref 150–450)
PO2 BLDA: 70 MM HG (ref 80–100)
POTASSIUM SERPL-SCNC: 4 MMOL/L (ref 3.5–5.1)
PROT SERPL-MCNC: 6.6 G/DL (ref 5.7–8.2)
PUNCTURE CHARGE: YES
RBC # BLD AUTO: 4.56 X10(6)UL
SAO2 % BLDA: 94.8 % (ref 94–100)
SODIUM SERPL-SCNC: 146 MMOL/L (ref 136–145)
WBC # BLD AUTO: 11.8 X10(3) UL (ref 4–11)

## 2025-01-12 PROCEDURE — 99232 SBSQ HOSP IP/OBS MODERATE 35: CPT | Performed by: INTERNAL MEDICINE

## 2025-01-12 PROCEDURE — 99233 SBSQ HOSP IP/OBS HIGH 50: CPT | Performed by: INTERNAL MEDICINE

## 2025-01-12 NOTE — PROGRESS NOTES
[facility]  part of [facility]    Progress Note      Assessment and Plan:   1.  Acute on chronic respiratory failure with the overlap syndrome-the patient has decompensated obstructive sleep apnea superimposed on obesity-hypoventilation now with COPD exacerbation with chronic hypercapnia.  The patient has an 80-pack-year history of tobacco.  Doubt pneumonia.  The chest x-ray looks very much like prior images.  Has chronic left basilar atelectasis.  The patient received Haldol overnight and now is unresponsive.     Recommendations:  1.  Steroid  2.  Nebulizer  3.  BiPAP  4.  Will follow clinically  5.  Weight loss and the patient would benefit from bariatric clinic evaluation.  6.  Stat ABG     2.  DVT prophylaxis-subcutaneous heparin     3.  CODE STATUS-I had previously asked the patient directly regarding his wishes and he wanted to be full code.     4.  Low-dose CT screening program-patient qualifies and would be a candidate in the outpatient setting.    Subjective:   Campbell Rao is a(n) 74 year old male who is unresponsive and having received Haldol overnight.    Objective:   Blood pressure 109/61, pulse 75, temperature 97.9 °F (36.6 °C), temperature source Axillary, resp. rate 21, height 5' 8\" (1.727 m), weight (!) 302 lb 4 oz (137.1 kg), SpO2 93%.    Physical Exam sedate white male  HEENT examination is unremarkable with pupils equal round and reactive to light and accommodation.   Neck without adenopathy, thyromegaly, JVD nor bruit.   Lungs diminished to auscultation and percussion.  Cardiac regular rate and rhythm no murmur.   Abdomen nontender, without hepatosplenomegaly and no mass appreciable.   Extremities without clubbing cyanosis nor edema.   Neurologic grossly intact with symmetric tone and strength and reflex.  Skin without gross abnormality     Results:     Lab Results   Component Value Date    WBC 11.8 01/12/2025    HGB 14.1 01/12/2025    HCT 44.4 01/12/2025    .0  01/12/2025    CREATSERUM 0.85 01/12/2025    BUN 15 01/12/2025     01/12/2025    K 4.0 01/12/2025    CL 97 01/12/2025    CO2 >40.0 01/12/2025     01/12/2025    CA 9.7 01/12/2025    ALB 4.3 01/12/2025    ALKPHO 92 01/12/2025    BILT 0.6 01/12/2025    TP 6.6 01/12/2025    AST 24 01/12/2025    ALT 45 01/12/2025    MG 2.1 01/12/2025       Kris Jimenez MD  Medical Director, Critical Care, [facility]  Medical Director, [facility]  Pager: [phone]

## 2025-01-12 NOTE — PLAN OF CARE
Problem: Patient Centered Care  Goal: Patient preferences are identified and integrated in the patient's plan of care  Description: Interventions:  - What would you like us to know as we care for you?   - Provide timely, complete, and accurate information to patient/family  - Incorporate patient and family knowledge, values, beliefs, and cultural backgrounds into the planning and delivery of care  - Encourage patient/family to participate in care and decision-making at the level they choose  - Honor patient and family perspectives and choices  Outcome: Progressing     Problem: Patient/Family Goals  Goal: Patient/Family Long Term Goal  Description: Patient's Long Term Goal:     Interventions:  -   - See additional Care Plan goals for specific interventions  Outcome: Progressing  Goal: Patient/Family Short Term Goal  Description: Patient's Short Term Goal:     Interventions:   -   - See additional Care Plan goals for specific interventions  Outcome: Progressing     Problem: Safety Risk - Non-Violent Restraints  Goal: Patient will remain free from self-harm  Description: INTERVENTIONS:  - Apply the least restrictive restraint to prevent harm  - Notify patient and family of reasons restraints applied  - Assess for any contributing factors to confusion (electrolyte disturbances, delirium, medications)  - Discontinue any unnecessary medical devices as soon as possible  - Assess the patient's physical comfort, circulation, skin condition, hydration, nutrition and elimination needs   - Reorient and redirection as needed  - Assess for the need to continue restraints  Outcome: Progressing

## 2025-01-12 NOTE — OCCUPATIONAL THERAPY NOTE
Pt on continuous bi-pap. Will defer and re-attempt tomorrow.      Abbi Steiner, OT  [facility]  Inpatient Rehabilitation  Occupational Therapy  [phone]

## 2025-01-12 NOTE — PLAN OF CARE
Problem: Patient Centered Care  Goal: Patient preferences are identified and integrated in the patient's plan of care  Description: Interventions:  - What would you like us to know as we care for you?   - Provide timely, complete, and accurate information to patient/family  - Incorporate patient and family knowledge, values, beliefs, and cultural backgrounds into the planning and delivery of care  - Encourage patient/family to participate in care and decision-making at the level they choose  - Honor patient and family perspectives and choices  Outcome: Progressing     Problem: Patient/Family Goals  Goal: Patient/Family Long Term Goal  Description: Patient's Long Term Goal: FEEL BETTER      Interventions:  - FOLLOW MD ORDERS  -ADMINISTER MEDICATION  - MONITOR LABS & VITAL SIGNS  - See additional Care Plan goals for specific interventions  Outcome: Progressing  Goal: Patient/Family Short Term Goal  Description: Patient's Short Term Goal: GO HOME  Interventions:   -ADMINISTER MEDICATION  -MONITOR LABS & VITAL SIGNS  -FOLLOW MD ORDERS    - See additional Care Plan goals for specific interventions  Outcome: Progressing     Problem: Safety Risk - Non-Violent Restraints  Goal: Patient will remain free from self-harm  Description: INTERVENTIONS:  - Apply the least restrictive restraint to prevent harm  - Notify patient and family of reasons restraints applied  - Assess for any contributing factors to confusion (electrolyte disturbances, delirium, medications)  - Discontinue any unnecessary medical devices as soon as possible  - Assess the patient's physical comfort, circulation, skin condition, hydration, nutrition and elimination needs   - Reorient and redirection as needed  - Assess for the need to continue restraints  1/11/2025 2131 by Nimco Bates, RN  Outcome: Not Progressing  1/11/2025 2129 by Nimco Bates, RN  Outcome: Not Progressing     Problem: RESPIRATORY - ADULT  Goal: Achieves optimal ventilation and  oxygenation  Description: INTERVENTIONS:  - Assess for changes in respiratory status  - Assess for changes in mentation and behavior  - Position to facilitate oxygenation and minimize respiratory effort  - Oxygen supplementation based on oxygen saturation or ABGs  - Provide Smoking Cessation handout, if applicable  - Encourage broncho-pulmonary hygiene including cough, deep breathe, Incentive Spirometry  - Assess the need for suctioning and perform as needed  - Assess and instruct to report SOB or any respiratory difficulty  - Respiratory Therapy support as indicated  - Manage/alleviate anxiety  - Monitor for signs/symptoms of CO2 retention  Outcome: Progressing     Problem: METABOLIC/FLUID AND ELECTROLYTES - ADULT  Goal: Electrolytes maintained within normal limits  Description: INTERVENTIONS:  - Monitor labs and rhythm and assess patient for signs and symptoms of electrolyte imbalances  - Administer electrolyte replacement as ordered  - Monitor response to electrolyte replacements, including rhythm and repeat lab results as appropriate  - Fluid restriction as ordered  - Instruct patient on fluid and nutrition restrictions as appropriate  Outcome: Progressing

## 2025-01-12 NOTE — PROGRESS NOTES
[facility]  part of [facility]     Hospitalist Progress Note     Capmbell Rao Patient Status:  Inpatient    1950 MRN [id number]   Location [facility]5W Attending Balwinder Yoon MD   Hosp Day # 1 PCP Johny Kang MD     Chief Complaint:   Chief Complaint   Patient presents with    Shortness Of Breath        Subjective:     Patient seen lying in bed.  No family at bedside.  Patient remains lethargic.  Mumbling and groaning to questions.  Unable provide further subjective.    Per RN, patient with increased restlessness and agitation overnight.  Received Haldol.    Objective:      Vital signs:  Vitals:    25 0231 25 0300 25 0545 25 0845   BP:   125/75    BP Location:   Right arm    Pulse:  102 102    Resp:   22    Temp:   98 °F (36.7 °C)    TempSrc:   Axillary    SpO2: 98%  94%    Weight:   (!) 302 lb 4 oz (137.1 kg) (!) 302 lb 4 oz (137.1 kg)   Height:           Intake/Output Summary (Last 24 hours) at 2025 0934  Last data filed at 2025 0648  Gross per 24 hour   Intake 350 ml   Output 1800 ml   Net -1450 ml           Physical Exam:    GENERAL: Lethargic, in no acute distress.  HEART:  Regular rhythm.  Regular rate  LUNGS:  Air entry was decreased.  No increased work of breathing or wheezes   ABDOMEN: Soft and non-tender.    EXTREMITIES:  edema improved  NEUROLOGICAL: Lethargic, moving extremities spontaneously  SKIN:  Warm and well perfused, bilateral chronic venous stasis changes    Diagnostic Data:    Labs:    Recent Labs   Lab 25  1022 25  0523   WBC 9.0 11.8*   HGB 13.5 14.1   .1* 97.4   .0 199.0       Recent Labs   Lab 25  1022 25  0523   * 142*   BUN 16 15   CREATSERUM 0.85 0.85   CA 9.7 9.7   ALB  --  4.3    146*   K 4.7 4.0    97*   CO2 >40.0* >40.0*   ALKPHO  --  92   AST  --  24   ALT  --  45   BILT  --  0.6   TP  --  6.6           Estimated Creatinine Clearance:  74.9 mL/min (based on SCr of 0.85 mg/dL).    No results for input(s): \"PTP\", \"INR\" in the last 168 hours.         COVID-19  Lab Results   Component Value Date    COVID19 Not Detected 01/11/2025    COVID19 Not Detected 11/03/2024    COVID19 Not Detected 07/14/2024       Pro-Calcitonin  Recent Labs   Lab 01/11/25  1022   PCT <0.04       Cardiac  Recent Labs   Lab 01/11/25  1022   PBNP 59       Inflammatory Markers  Recent Labs   Lab 01/11/25  1022   CRP 0.40       Culture:  No results found for this visit on 01/11/25.    XR CHEST AP PORTABLE  (CPT=71045)    Result Date: 1/11/2025  CONCLUSION:  Cardiomegaly with mild vascular congestion.  Patchy bibasilar opacity right greater than left which could reflect atelectasis with or without superimposed pneumonia.       Dictated by (CST): Eddie Winchester MD on 1/11/2025 at 11:05 AM     Finalized by (CST): Eddie Winchester MD on 1/11/2025 at 11:06 AM           EKG 12 Lead    Result Date: 1/11/2025  Normal sinus rhythm Cannot rule out Anterior infarct , age undetermined Abnormal ECG When compared with ECG of 03-NOV-2024 09:26, No significant change was found Confirmed by ESTUARDO SORIANO ([id number]) on 1/11/2025 1:54:35 PM     Medications:    methylPREDNISolone  40 mg Intravenous Q12H    ipratropium-albuterol  3 mL Nebulization 4 times per day    atorvastatin  20 mg Oral Nightly    fluticasone-salmeterol  1 puff Inhalation BID    umeclidinium bromide  1 puff Inhalation Daily    ampicillin-sulbactam  3 g Intravenous q6h    azithromycin  500 mg Intravenous Q24H    enoxaparin  0.5 mg/kg Subcutaneous BID       Assessment & Plan:      Acute on chronic hypercapnic respiratory failure   Chronic hypoxemic respiratory failure  -Pt on baseline 4L O2 at home  -Patient presenting with worsening shortness of breath and altered mental status  -Patient with history of COPD, obesity hypoventilation and MARIA M  -Questionable compliance with CPAP at nighttime.  -ABG on admission with acidosis and  hypercapnia.  -Repeat ABG today, no further acidosis, pCO2 decreasing.  -Continue on noninvasive ventilation .  -Treating underlying conditions as able  -Continue to monitor     Acute COPD exacerbation  -Wife reporting recent signs of upper respiratory infection.  -Continue steroids, DuoNebs, and empiric antibiotics  -Respiratory support as above  -Pulmonology on consult, appreciate further recommendations     Acute on chronic heart failure with preserved ejection fraction   -Improving  -Chest x-ray reviewed.  Noted mild vascular congestion  -Completed trial of diuresis, monitoring at this time.  - Strict I&Os, Daily weights, Fluid restriction     Altered mental status  -Likely secondary to acute metabolic encephalopathy from hypercapnia as above  -Having increased agitation requiring sedation with Haldol.  -Attempting to avoid further sedating medications at this time.  -Treating underlying conditions  -Continue to monitor  -Swallow evaluation prior to initiation of diet.      Discussed management/test result(s) with Rn   Quality:  DVT Prophylaxis: Lovenox  CODE status: Full  Estimated date of discharge: TBD  Discharge is dependent on: clinical stability    Balwinder Yoon MD          This note was prepared using Dragon Medical voice recognition dictation software. As a result errors may occur. When identified these errors have been corrected. While every attempt is made to correct errors during dictation discrepancies may still exist

## 2025-01-13 ENCOUNTER — APPOINTMENT (OUTPATIENT)
Dept: GENERAL RADIOLOGY | Facility: HOSPITAL | Age: 75
End: 2025-01-13
Attending: INTERNAL MEDICINE
Payer: MEDICARE

## 2025-01-13 LAB
BASOPHILS # BLD AUTO: 0.04 X10(3) UL (ref 0–0.2)
BASOPHILS NFR BLD AUTO: 0.3 %
BUN BLD-MCNC: 16 MG/DL (ref 9–23)
BUN/CREAT SERPL: 18.6 (ref 10–20)
CALCIUM BLD-MCNC: 9.8 MG/DL (ref 8.7–10.4)
CHLORIDE SERPL-SCNC: 99 MMOL/L (ref 98–112)
CO2 SERPL-SCNC: >40 MMOL/L (ref 21–32)
CREAT BLD-MCNC: 0.86 MG/DL
DEPRECATED RDW RBC AUTO: 52.8 FL (ref 35.1–46.3)
EGFRCR SERPLBLD CKD-EPI 2021: 91 ML/MIN/1.73M2 (ref 60–?)
EOSINOPHIL # BLD AUTO: 0 X10(3) UL (ref 0–0.7)
EOSINOPHIL NFR BLD AUTO: 0 %
ERYTHROCYTE [DISTWIDTH] IN BLOOD BY AUTOMATED COUNT: 14.3 % (ref 11–15)
GLUCOSE BLD-MCNC: 155 MG/DL (ref 70–99)
HCT VFR BLD AUTO: 46.6 %
HGB BLD-MCNC: 13.8 G/DL
IMM GRANULOCYTES # BLD AUTO: 0.08 X10(3) UL (ref 0–1)
IMM GRANULOCYTES NFR BLD: 0.7 %
LYMPHOCYTES # BLD AUTO: 0.85 X10(3) UL (ref 1–4)
LYMPHOCYTES NFR BLD AUTO: 6.9 %
MCH RBC QN AUTO: 29.6 PG (ref 26–34)
MCHC RBC AUTO-ENTMCNC: 29.6 G/DL (ref 31–37)
MCV RBC AUTO: 99.8 FL
MONOCYTES # BLD AUTO: 0.41 X10(3) UL (ref 0.1–1)
MONOCYTES NFR BLD AUTO: 3.3 %
NEUTROPHILS # BLD AUTO: 10.87 X10 (3) UL (ref 1.5–7.7)
NEUTROPHILS # BLD AUTO: 10.87 X10(3) UL (ref 1.5–7.7)
NEUTROPHILS NFR BLD AUTO: 88.8 %
OSMOLALITY SERPL CALC.SUM OF ELEC: 304 MOSM/KG (ref 275–295)
PLATELET # BLD AUTO: 214 10(3)UL (ref 150–450)
POTASSIUM SERPL-SCNC: 4.1 MMOL/L (ref 3.5–5.1)
RBC # BLD AUTO: 4.67 X10(6)UL
SODIUM SERPL-SCNC: 145 MMOL/L (ref 136–145)
WBC # BLD AUTO: 12.3 X10(3) UL (ref 4–11)

## 2025-01-13 PROCEDURE — 71045 X-RAY EXAM CHEST 1 VIEW: CPT | Performed by: INTERNAL MEDICINE

## 2025-01-13 PROCEDURE — 5A0935A ASSISTANCE WITH RESPIRATORY VENTILATION, LESS THAN 24 CONSECUTIVE HOURS, HIGH NASAL FLOW/VELOCITY: ICD-10-PCS | Performed by: INTERNAL MEDICINE

## 2025-01-13 PROCEDURE — 99233 SBSQ HOSP IP/OBS HIGH 50: CPT | Performed by: INTERNAL MEDICINE

## 2025-01-13 RX ORDER — FLUTICASONE PROPIONATE 50 MCG
1 SPRAY, SUSPENSION (ML) NASAL DAILY
Status: DISCONTINUED | OUTPATIENT
Start: 2025-01-13 | End: 2025-01-14

## 2025-01-13 NOTE — PLAN OF CARE
Problem: Patient/Family Goals  Goal: Patient/Family Long Term Goal  Description: Patient's Long Term Goal: FEEL BETTER      Interventions:  - FOLLOW MD ORDERS  -ADMINISTER MEDICATION  - MONITOR LABS & VITAL SIGNS  - See additional Care Plan goals for specific interventions  Outcome: Progressing  Goal: Patient/Family Short Term Goal  Description: Patient's Short Term Goal: GO HOME  Interventions:   -ADMINISTER MEDICATION  -MONITOR LABS & VITAL SIGNS  -FOLLOW MD ORDERS    - See additional Care Plan goals for specific interventions  Outcome: Progressing     Problem: RESPIRATORY - ADULT  Goal: Achieves optimal ventilation and oxygenation  Description: INTERVENTIONS:  - Assess for changes in respiratory status  - Assess for changes in mentation and behavior  - Position to facilitate oxygenation and minimize respiratory effort  - Oxygen supplementation based on oxygen saturation or ABGs  - Provide Smoking Cessation handout, if applicable  - Encourage broncho-pulmonary hygiene including cough, deep breathe, Incentive Spirometry  - Assess the need for suctioning and perform as needed  - Assess and instruct to report SOB or any respiratory difficulty  - Respiratory Therapy support as indicated  - Manage/alleviate anxiety  - Monitor for signs/symptoms of CO2 retention  Outcome: Progressing     Problem: METABOLIC/FLUID AND ELECTROLYTES - ADULT  Goal: Electrolytes maintained within normal limits  Description: INTERVENTIONS:  - Monitor labs and rhythm and assess patient for signs and symptoms of electrolyte imbalances  - Administer electrolyte replacement as ordered  - Monitor response to electrolyte replacements, including rhythm and repeat lab results as appropriate  - Fluid restriction as ordered  - Instruct patient on fluid and nutrition restrictions as appropriate  Outcome: Progressing     Problem: SKIN/TISSUE INTEGRITY - ADULT  Goal: Skin integrity remains intact  Description: INTERVENTIONS  - Assess and document  risk factors for pressure ulcer development  - Assess and document skin integrity  - Monitor for areas of redness and/or skin breakdown  - Initiate interventions, skin care algorithm/standards of care as needed  Outcome: Progressing     Problem: Safety Risk - Non-Violent Restraints  Goal: Patient will remain free from self-harm  Description: INTERVENTIONS:  - Apply the least restrictive restraint to prevent harm  - Notify patient and family of reasons restraints applied  - Assess for any contributing factors to confusion (electrolyte disturbances, delirium, medications)  - Discontinue any unnecessary medical devices as soon as possible  - Assess the patient's physical comfort, circulation, skin condition, hydration, nutrition and elimination needs   - Reorient and redirection as needed  - Assess for the need to continue restraints  Outcome: Progressing

## 2025-01-13 NOTE — PLAN OF CARE
A&Ox3. Pt ambulates STP with no device, voiding via primofit, tolerating diet, on 4L via nasal cannula. Continuous BiPAP discontinued. Restraints discontinued. Frequent rounding by nursing staff. Safety precautions maintained/call light within reach.    Problem: Patient Centered Care  Goal: Patient preferences are identified and integrated in the patient's plan of care  Description: Interventions:  - What would you like us to know as we care for you?   - Provide timely, complete, and accurate information to patient/family  - Incorporate patient and family knowledge, values, beliefs, and cultural backgrounds into the planning and delivery of care  - Encourage patient/family to participate in care and decision-making at the level they choose  - Honor patient and family perspectives and choices  1/12/2025 1824 by Bing Jones RN  Outcome: Progressing  1/12/2025 1547 by Bing Jones RN  Outcome: Progressing     Problem: Patient/Family Goals  Goal: Patient/Family Long Term Goal  Description: Patient's Long Term Goal: FEEL BETTER      Interventions:  - FOLLOW MD ORDERS  -ADMINISTER MEDICATION  - MONITOR LABS & VITAL SIGNS  - See additional Care Plan goals for specific interventions  1/12/2025 1824 by Bing Jones RN  Outcome: Progressing  1/12/2025 1547 by Bing Jones RN  Outcome: Progressing  Goal: Patient/Family Short Term Goal  Description: Patient's Short Term Goal: GO HOME  Interventions:   -ADMINISTER MEDICATION  -MONITOR LABS & VITAL SIGNS  -FOLLOW MD ORDERS    - See additional Care Plan goals for specific interventions  1/12/2025 1824 by Bing Jones RN  Outcome: Progressing  1/12/2025 1547 by Bing Jones RN  Outcome: Progressing     Problem: RESPIRATORY - ADULT  Goal: Achieves optimal ventilation and oxygenation  Description: INTERVENTIONS:  - Assess for changes in respiratory status  - Assess for changes in mentation and behavior  - Position to facilitate oxygenation and minimize respiratory effort  -  Oxygen supplementation based on oxygen saturation or ABGs  - Provide Smoking Cessation handout, if applicable  - Encourage broncho-pulmonary hygiene including cough, deep breathe, Incentive Spirometry  - Assess the need for suctioning and perform as needed  - Assess and instruct to report SOB or any respiratory difficulty  - Respiratory Therapy support as indicated  - Manage/alleviate anxiety  - Monitor for signs/symptoms of CO2 retention  1/12/2025 1824 by Bing Jones RN  Outcome: Progressing  1/12/2025 1547 by Bing Jones RN  Outcome: Progressing     Problem: METABOLIC/FLUID AND ELECTROLYTES - ADULT  Goal: Electrolytes maintained within normal limits  Description: INTERVENTIONS:  - Monitor labs and rhythm and assess patient for signs and symptoms of electrolyte imbalances  - Administer electrolyte replacement as ordered  - Monitor response to electrolyte replacements, including rhythm and repeat lab results as appropriate  - Fluid restriction as ordered  - Instruct patient on fluid and nutrition restrictions as appropriate  1/12/2025 1824 by Bing Jones RN  Outcome: Progressing  1/12/2025 1547 by Bing Jones RN  Outcome: Progressing     Problem: SKIN/TISSUE INTEGRITY - ADULT  Goal: Skin integrity remains intact  Description: INTERVENTIONS  - Assess and document risk factors for pressure ulcer development  - Assess and document skin integrity  - Monitor for areas of redness and/or skin breakdown  - Initiate interventions, skin care algorithm/standards of care as needed  1/12/2025 1824 by Bing Jones RN  Outcome: Progressing  1/12/2025 1547 by Bing Jones RN  Outcome: Progressing

## 2025-01-13 NOTE — PROGRESS NOTES
[facility]     Campbell Rao Patient Status:  Inpatient    1950 MRN [id number]   Location [facility]5W Attending Balwinder Yoon MD   Hosp Day # 2 PCP Johny Kang MD     Chief Complaint:   Chief Complaint   Patient presents with    Shortness Of Breath        Subjective:     Patient seen sitting in chair.  No family at bedside.  Patient much more alert today.  Reports he is feeling better.  Denies current subjective shortness of breath.  Asking about going home.     Per RN, patient without further agitation overnight.  No further Haldol needed.  Weaned to nasal cannula today.      Objective:      Vital signs:  Vitals:    25 0300 25 0319 25 0410 25 0812   BP:   127/77 94/81   BP Location:   Right arm Right arm   Pulse: 88  87 96   Resp:   22 20   Temp:   98.2 °F (36.8 °C) 98.9 °F (37.2 °C)   TempSrc:   Oral Oral   SpO2:  94% 90% 91%   Weight:       Height:           Intake/Output Summary (Last 24 hours) at 2025 0927  Last data filed at 2025 0300  Gross per 24 hour   Intake 670 ml   Output 1050 ml   Net -380 ml           Physical Exam:    GENERAL: Awake and alert, in no acute distress.  HEART:  Regular rhythm.  Regular rate  LUNGS:  Air entry was decreased.  No increased work of breathing or wheezes   ABDOMEN: Soft and non-tender.    EXTREMITIES:  edema improved  NEUROLOGICAL: Awake and alert, mild confusion, but improving.  SKIN:  Warm and well perfused, bilateral chronic venous stasis changes    Diagnostic Data:    Labs:    Recent Labs   Lab 25  1022 25  0523 25  0540   WBC 9.0 11.8* 12.3*   HGB 13.5 14.1 13.8   .1* 97.4 99.8   .0 199.0 214.0       Recent Labs   Lab 25  1022 25  0523 25  0540   * 142* 155*   BUN 16 15 16   CREATSERUM 0.85 0.85 0.86   CA 9.7 9.7 9.8   ALB  --  4.3  --     146* 145   K 4.7 4.0 4.1     97* 99   CO2 >40.0* >40.0* >40.0*   ALKPHO  --  92  --    AST  --  24  --    ALT  --  45  --    BILT  --  0.6  --    TP  --  6.6  --            Estimated Creatinine Clearance: 74 mL/min (based on SCr of 0.86 mg/dL).    No results for input(s): \"PTP\", \"INR\" in the last 168 hours.         COVID-19  Lab Results   Component Value Date    COVID19 Not Detected 01/11/2025    COVID19 Not Detected 11/03/2024    COVID19 Not Detected 07/14/2024       Pro-Calcitonin  Recent Labs   Lab 01/11/25  1022   PCT <0.04       Cardiac  Recent Labs   Lab 01/11/25  1022   PBNP 59       Inflammatory Markers  Recent Labs   Lab 01/11/25  1022   CRP 0.40       Culture:  Hospital Encounter on 01/11/25   1. Blood Culture     Status: None (Preliminary result)    Collection Time: 01/11/25 12:10 PM    Specimen: Blood,peripheral   Result Value Ref Range    Blood Culture Result No Growth 1 Day N/A       XR CHEST AP PORTABLE  (CPT=71045)    Result Date: 1/11/2025  CONCLUSION:  Cardiomegaly with mild vascular congestion.  Patchy bibasilar opacity right greater than left which could reflect atelectasis with or without superimposed pneumonia.       Dictated by (CST): Eddie Winchester MD on 1/11/2025 at 11:05 AM     Finalized by (CST): Eddie Winchester MD on 1/11/2025 at 11:06 AM           EKG 12 Lead    Result Date: 1/11/2025  Normal sinus rhythm Cannot rule out Anterior infarct , age undetermined Abnormal ECG When compared with ECG of 03-NOV-2024 09:26, No significant change was found Confirmed by ESTUARDO SORIANO ([id number]) on 1/11/2025 1:54:35 PM     Medications:    methylPREDNISolone  40 mg Intravenous Q12H    ipratropium-albuterol  3 mL Nebulization 4 times per day    atorvastatin  20 mg Oral Nightly    fluticasone-salmeterol  1 puff Inhalation BID    umeclidinium bromide  1 puff Inhalation Daily    ampicillin-sulbactam  3 g Intravenous q6h    azithromycin  500 mg Intravenous Q24H    enoxaparin  0.5 mg/kg Subcutaneous BID       Assessment & Plan:       Acute on chronic hypercapnic respiratory failure   Chronic hypoxemic respiratory failure  -Pt on baseline 4L O2 at home  -Patient presenting with worsening shortness of breath and altered mental status  -Patient with history of COPD, obesity hypoventilation and MARIA M  -Questionable compliance with CPAP at nighttime.  -ABG on admission with acidosis and hypercapnia.  -Repeat ABG , no further acidosis, pCO2 decreasing.  -Weaning off continuous noninvasive ventilation continue with sleeping.  -Treating underlying conditions as able  -Continue to monitor     Acute COPD exacerbation  -Wife reporting recent signs of upper respiratory infection.  -Continue steroids, DuoNebs, and empiric antibiotics  -Respiratory support as above  -Pulmonology on consult, appreciate further recommendations     Acute on chronic heart failure with preserved ejection fraction   -Improving  -Chest x-ray reviewed.  Noted mild vascular congestion  -Completed trial of diuresis, monitoring at this time.  - Strict I&Os, Daily weights, Fluid restriction     Altered mental status  -Likely secondary to acute metabolic encephalopathy from hypercapnia as above  -Having increased agitation requiring sedation with Haldol.  -Attempting to avoid further sedating medications at this time.  -Treating underlying conditions  -Continue to monitor      Discussed management/test result(s) with Rn     Quality:  DVT Prophylaxis: Lovenox  CODE status: Full  Estimated date of discharge: TBD  Discharge is dependent on: clinical stability    53 minutes spent discussing with other providers, examining patient, obtaining history, reviewing medical records, interpreting and communicating test results/imaging, ordering tests/medications, discussing plan of care and documenting information.      Balwinder Yoon MD          This note was prepared using Dragon Medical voice recognition dictation software. As a result errors may occur. When identified these errors have been  corrected. While every attempt is made to correct errors during dictation discrepancies may still exist

## 2025-01-13 NOTE — CM/SW NOTE
01/13/25 1600   CM/SW Referral Data   Referral Source Social Work (self-referral)   Reason for Referral Discharge planning   Informant Patient   Medical Hx   Does patient have an established PCP? Yes   Significant Past Medical/Mental Health Hx COPD, MARIA M   Patient Info   Patient's Current Mental Status at Time of Assessment Alert;Oriented   Patient's Home Environment House   Number of Levels in Home 1   Number of Stair in Home 0   Patient lives with Spouse/Significant other   Patient Status Prior to Admission   Independent with ADLs and Mobility Yes   Services in place prior to admission DME/Supplies at home   DME Provider/Supplier HME   Type of DME/Supplies Wheeled Walker;CPAP;Home Oxygen;Nebulizer   Discharge Needs   Anticipated D/C needs Home health care   Choice of Post-Acute Provider   Informed patient of right to choose their preferred provider Yes   List of appropriate post-acute services provided to patient/family with quality data Yes   Information given to Patient     Social work was able to meet with the patient at bedside to discuss discharge planning.    The patient lives in a 1 level home with his spouse.  The patient is independent with all ADLs at baseline.  The patient uses a walker for long distances.    The patient is current with HME for Home O2 and nebulizer.  The patient owns a CPAP as well.    Social work notified the patient that the Anticipated therapy need: Home with Home Healthcare.  The patient is in agreement.  Social work provided the HH list to the patient at bedside.    Social work will follow up for choice.    ELDER/EDUARDO to remain available for support and/or discharge planning.     Kami Faust MSW, LSW  Discharge Planner [id number]

## 2025-01-13 NOTE — PLAN OF CARE
Problem: Safety Risk - Non-Violent Restraints  Goal: Patient will remain free from self-harm  Description: INTERVENTIONS:  - Apply the least restrictive restraint to prevent harm  - Notify patient and family of reasons restraints applied  - Assess for any contributing factors to confusion (electrolyte disturbances, delirium, medications)  - Discontinue any unnecessary medical devices as soon as possible  - Assess the patient's physical comfort, circulation, skin condition, hydration, nutrition and elimination needs   - Reorient and redirection as needed  - Assess for the need to continue restraints  Outcome: Progressing

## 2025-01-13 NOTE — PROGRESS NOTES
[facility]  part of [facility]    Progress Note    Campbell Rao Patient Status:  Inpatient    1950 MRN [id number]   Location [facility]5W Attending Balwinder Yoon MD   Hosp Day # 2 PCP Johny Kang MD       Subjective:     Constitutional:  Positive for fatigue. Negative for fever.   HENT:  Positive for congestion.    Respiratory:  Positive for cough, shortness of breath and wheezing.    Cardiovascular:  Negative for chest pain.   Gastrointestinal:  Negative for abdominal distention.   Neurological: Negative.    Hematological: Negative.    Psychiatric/Behavioral: Negative.     On 4 L of oxygen  Comfortable at rest  Dyspnea with minimal activity  Cough and wheezes with occasional sputum  No hemoptysis  Denies chest pain  Chronic lower extremity edema  No fever    Objective:   Blood pressure 106/66, pulse 105, temperature 99.3 °F (37.4 °C), resp. rate 20, height 5' 8\" (1.727 m), weight (!) 302 lb 4 oz (137.1 kg), SpO2 92%.  Physical Exam  Constitutional:       Appearance: He is ill-appearing.   HENT:      Head: Atraumatic.      Nose: Nose normal.      Mouth/Throat:      Mouth: Mucous membranes are moist.   Eyes:      Pupils: Pupils are equal, round, and reactive to light.   Cardiovascular:      Rate and Rhythm: Normal rate.      Heart sounds:      No gallop.   Pulmonary:      Effort: No respiratory distress.      Breath sounds: Wheezing and rales present. No rhonchi.   Abdominal:      General: Abdomen is flat. Bowel sounds are normal. There is no distension.      Palpations: Abdomen is soft.   Musculoskeletal:      Cervical back: Normal range of motion.      Right lower leg: Edema present.      Left lower leg: Edema present.   Skin:     General: Skin is dry.   Neurological:      General: No focal deficit present.      Mental Status: He is oriented to person, place, and time. Mental status is at baseline.         Results:   Lab Results   Component Value Date    WBC  12.3 (H) 01/13/2025    HGB 13.8 01/13/2025    HCT 46.6 01/13/2025    .0 01/13/2025    CREATSERUM 0.86 01/13/2025    BUN 16 01/13/2025     01/13/2025    K 4.1 01/13/2025    CL 99 01/13/2025    CO2 >40.0 (HH) 01/13/2025     (H) 01/13/2025    CA 9.8 01/13/2025    ALB 4.3 01/12/2025    ALKPHO 92 01/12/2025    BILT 0.6 01/12/2025    TP 6.6 01/12/2025    AST 24 01/12/2025    ALT 45 01/12/2025    TSH 2.777 04/18/2024    GGT 38 04/18/2024    DDIMER 0.40 10/18/2016    CRP 0.40 01/11/2025    MG 2.1 01/12/2025    PHOS 4.1 04/26/2024    TROP <0.045 04/10/2021    TROPHS 4 01/11/2025    B12 765 04/27/2021       XR CHEST AP PORTABLE  (CPT=71045)    Result Date: 1/13/2025  CONCLUSION:  1. Cardiomegaly.  Prominent central pulmonary vasculature. 2. Bibasilar atelectatic changes and/or minimal airspace opacification has improved.    Dictated by (CST): Rosalio Powell MD on 1/13/2025 at 9:28 AM     Finalized by (CST): Rosalio Powell MD on 1/13/2025 at 9:32 AM               Assessment & Plan:       1-  severe COPD with acute exacerbation   80-pack-year history of smoking quit 2022     ICS/LABA/LAMA inhaler   Scheduled nebulizers  Taper steroid   Empiric antibiotics      2-Acute on chronic respiratory failure with hypoxia and hypercapnia  Baseline home O2 at 4 L / now on 4-5 L NC   ABGs with compensated respiratory acidosis  Decompensated overlap syndrome with COPD/MARIA M  COPD therapy and PAP during sleep     3- MARIA M and morbid obesity obesity   PAP during sleep  Avoid sedative or narcotic    4-DVT prophylaxis  Lovenox subcu            Yesi Salinas MD  1/13/2025

## 2025-01-13 NOTE — OCCUPATIONAL THERAPY NOTE
OCCUPATIONAL THERAPY EVALUATION - INPATIENT     Room Number: 501/501-A  Evaluation Date: 1/13/2025  Type of Evaluation: Initial       Physician Order: IP Consult to Occupational Therapy  Reason for Therapy: ADL/IADL Dysfunction and Discharge Planning    OCCUPATIONAL THERAPY ASSESSMENT   RN cleared pt for participation in OT session, which was completed in collaboration with PT. Upon arrival, pt was sitting on couch and agreeable to activity. No visitors present during session. Pt was left sitting on couch. Call light and all needs left in reach. Handoff given to RN.    Patient is a 74 year old male admitted 1/11/2025 for acute on chronic respiratory failure with hypoxia; on chronic 4L of O2.  Prior to admission, pt was independent; uses rollator.  Patient is currently requiring up to min A for ADLs overall along with ind for STS, and SBA for functional transfer at  level. Pt tolerated about 1 minutes of supported standing.  Pt has the following impairments: decrease functional activity tolerance.    ACTIVITY TOLERANCE  Pulse: (!) 124                    Oxygen Therapy  SPO2% Ambulation on Oxygen: 91  Ambulation oxygen flow (liters per minute): 4    Education provided  Educated pt about role of OT and hospital therapy process as well as proper safety techniques including proper hand placement, body mechanics, safety techniques, pacing and energy conservation techniques. Pt verbalized/demonstrated fair carryover.    Patient will benefit from continued skilled OT Services for duration of hospitalization, however, given the patient is functioning near baseline level do not anticipate skilled therapy needs at discharge     PLAN  OT Treatment Plan: Balance activities;Energy conservation/work simplification techniques;Continued evaluation;Compensatory technique education;Fine motor coordination activities;Neuromuscluar reeducation;Equipment eval/education;Patient/Family training;Patient/Family education;Cognitive  reorientation;Endurance training;UE strengthening/ROM;Functional transfer training;Visual perceptual training;IADL training;ADL training      OCCUPATIONAL THERAPY MEDICAL/SOCIAL HISTORY     Problem List  Principal Problem:    Acute on chronic respiratory failure with hypoxia (HCC)  Active Problems:    Hypoxic respiratory failure (HCC)      Past Medical History  Past Medical History:    Atherosclerosis of coronary artery    coronary calcification on ct chest    Back problem    Cataract    Chronic respiratory failure (HCC)    Congestive heart disease (HCC)    COPD (HCC)    freq exacerbations. ? 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    Osteoarthritis    Pneumonia    Pulmonary nodules    Sleep apnea    Tobacco abuse       Past Surgical History  Past Surgical History:   Procedure Laterality Date    Appendectomy      Appendectomy      Cataract Bilateral     2024, 3/2024    Electrocardiogram, complete  2012    scanned to media tab       HOME SITUATION  Type of Home: House  Home Layout: One level  Lives With: Spouse                         Patient Regularly Uses: Rollator;Home O2    SUBJECTIVE  \"I feel so much better.\"    OCCUPATIONAL THERAPY EXAMINATION      OBJECTIVE  Precautions: None  Fall Risk: Standard fall risk    PAIN ASSESSMENT  Ratin         RANGE OF MOTION   Upper extremity ROM is within functional limits     STRENGTH ASSESSMENT  Upper extremity strength is within functional limits     COORDINATION  Gross Motor: WFL   Fine Motor: WFL     ACTIVITIES OF DAILY LIVING ASSESSMENT  AM-PAC ‘6-Clicks’ Inpatient Daily Activity Short Form  How much help from another person does the patient currently need…  -   Putting on and taking off regular lower body clothing?:  A Little  -   Bathing (including washing, rinsing, drying)?: A Little  -   Toileting, which includes using toilet, bedpan or urinal? : None  -   Putting on and taking off regular upper body clothing?: None  -   Taking care of personal grooming such as brushing teeth?: None  -   Eating meals?: None    AM-PAC Score:  Score: 22  Approx Degree of Impairment: 25.8%  Standardized Score (AM-PAC Scale): 47.1  CMS Modifier (G-Code): CJ      FUNCTIONAL TRANSFER ASSESSMENT     Sit to stand: ind  Toilet Transfer: SBA  Chair Transfer: SBA    FUNCTIONAL ADL ASSESSMENT  Overall min     The patient's Approx Degree of Impairment: 25.8% has been calculated based on documentation in the [facility] '6 clicks' Inpatient Daily Activity Short Form.  Research supports that patients with this level of impairment may benefit from home. Final disposition will be made by interdisciplinary medical team.    OT Goals  Patients self stated goal is: to go home     Patient will complete functional transfer with mod I  Comment:     Patient will complete toileting with mod I  Comment:     Patient will tolerate standing for 3 minutes in prep for adls with mod I   Comment:    Patient will complete item retrieval with mod I  Comment:          Goals  on:   Frequency: 3-5x/wk    Patient Evaluation Complexity Level:   Occupational Profile/Medical History MODERATE - Expanded review of history including review of medical or therapy record   Specific performance deficits impacting engagement in ADL/IADL MODERATE  3 - 5 performance deficits   Client Assessment/Performance Deficits MODERATE - Comorbidities and min to mod modifications of tasks    Clinical Decision Making MODERATE - Analysis of occupational profile, detailed assessments, several treatment options    Overall Complexity MODERATE     OT Session Time: 20 minutes  Self-Care Home Management: 10 minutes           Abbi Steiner OT  [facility]  Inpatient Rehabilitation  Occupational  Therapy  [phone]

## 2025-01-13 NOTE — PLAN OF CARE
Problem: Patient Centered Care  Goal: Patient preferences are identified and integrated in the patient's plan of care  Description: Interventions:  - What would you like us to know as we care for you? From home with wife  - Provide timely, complete, and accurate information to patient/family  - Incorporate patient and family knowledge, values, beliefs, and cultural backgrounds into the planning and delivery of care  - Encourage patient/family to participate in care and decision-making at the level they choose  - Honor patient and family perspectives and choices  Outcome: Progressing     Problem: Patient/Family Goals  Goal: Patient/Family Long Term Goal  Description: Patient's Long Term Goal: FEEL BETTER      Interventions:  - FOLLOW MD ORDERS  -ADMINISTER MEDICATION  - MONITOR LABS & VITAL SIGNS  - See additional Care Plan goals for specific interventions  Outcome: Progressing  Goal: Patient/Family Short Term Goal  Description: Patient's Short Term Goal: GO HOME  Interventions:   -ADMINISTER MEDICATION  -MONITOR LABS & VITAL SIGNS  -FOLLOW MD ORDERS    - See additional Care Plan goals for specific interventions  Outcome: Progressing     Problem: RESPIRATORY - ADULT  Goal: Achieves optimal ventilation and oxygenation  Description: INTERVENTIONS:  - Assess for changes in respiratory status  - Assess for changes in mentation and behavior  - Position to facilitate oxygenation and minimize respiratory effort  - Oxygen supplementation based on oxygen saturation or ABGs  - Provide Smoking Cessation handout, if applicable  - Encourage broncho-pulmonary hygiene including cough, deep breathe, Incentive Spirometry  - Assess the need for suctioning and perform as needed  - Assess and instruct to report SOB or any respiratory difficulty  - Respiratory Therapy support as indicated  - Manage/alleviate anxiety  - Monitor for signs/symptoms of CO2 retention  Outcome: Progressing     Problem: METABOLIC/FLUID AND ELECTROLYTES -  ADULT  Goal: Electrolytes maintained within normal limits  Description: INTERVENTIONS:  - Monitor labs and rhythm and assess patient for signs and symptoms of electrolyte imbalances  - Administer electrolyte replacement as ordered  - Monitor response to electrolyte replacements, including rhythm and repeat lab results as appropriate  - Fluid restriction as ordered  - Instruct patient on fluid and nutrition restrictions as appropriate  Outcome: Progressing     Problem: SKIN/TISSUE INTEGRITY - ADULT  Goal: Skin integrity remains intact  Description: INTERVENTIONS  - Assess and document risk factors for pressure ulcer development  - Assess and document skin integrity  - Monitor for areas of redness and/or skin breakdown  - Initiate interventions, skin care algorithm/standards of care as needed  Outcome: Progressing     Problem: Safety Risk - Non-Violent Restraints  Goal: Patient will remain free from self-harm  Description: INTERVENTIONS:  - Apply the least restrictive restraint to prevent harm  - Notify patient and family of reasons restraints applied  - Assess for any contributing factors to confusion (electrolyte disturbances, delirium, medications)  - Discontinue any unnecessary medical devices as soon as possible  - Assess the patient's physical comfort, circulation, skin condition, hydration, nutrition and elimination needs   - Reorient and redirection as needed  - Assess for the need to continue restraints  Outcome: Progressing

## 2025-01-14 VITALS
BODY MASS INDEX: 45.98 KG/M2 | WEIGHT: 303.38 LBS | OXYGEN SATURATION: 95 % | SYSTOLIC BLOOD PRESSURE: 104 MMHG | TEMPERATURE: 99 F | DIASTOLIC BLOOD PRESSURE: 52 MMHG | HEART RATE: 107 BPM | RESPIRATION RATE: 20 BRPM | HEIGHT: 68 IN

## 2025-01-14 LAB
ANION GAP SERPL CALC-SCNC: 4 MMOL/L (ref 0–18)
BASOPHILS # BLD AUTO: 0.02 X10(3) UL (ref 0–0.2)
BASOPHILS NFR BLD AUTO: 0.2 %
BUN BLD-MCNC: 17 MG/DL (ref 9–23)
BUN/CREAT SERPL: 20 (ref 10–20)
CALCIUM BLD-MCNC: 9.4 MG/DL (ref 8.7–10.4)
CHLORIDE SERPL-SCNC: 101 MMOL/L (ref 98–112)
CO2 SERPL-SCNC: 39 MMOL/L (ref 21–32)
CREAT BLD-MCNC: 0.85 MG/DL
DEPRECATED RDW RBC AUTO: 51.8 FL (ref 35.1–46.3)
EGFRCR SERPLBLD CKD-EPI 2021: 92 ML/MIN/1.73M2 (ref 60–?)
EOSINOPHIL # BLD AUTO: 0 X10(3) UL (ref 0–0.7)
EOSINOPHIL NFR BLD AUTO: 0 %
ERYTHROCYTE [DISTWIDTH] IN BLOOD BY AUTOMATED COUNT: 14.2 % (ref 11–15)
GLUCOSE BLD-MCNC: 182 MG/DL (ref 70–99)
HCT VFR BLD AUTO: 43.3 %
HGB BLD-MCNC: 13.7 G/DL
IMM GRANULOCYTES # BLD AUTO: 0.11 X10(3) UL (ref 0–1)
IMM GRANULOCYTES NFR BLD: 1 %
LYMPHOCYTES # BLD AUTO: 0.75 X10(3) UL (ref 1–4)
LYMPHOCYTES NFR BLD AUTO: 6.9 %
MCH RBC QN AUTO: 31 PG (ref 26–34)
MCHC RBC AUTO-ENTMCNC: 31.6 G/DL (ref 31–37)
MCV RBC AUTO: 98 FL
MONOCYTES # BLD AUTO: 0.43 X10(3) UL (ref 0.1–1)
MONOCYTES NFR BLD AUTO: 3.9 %
NEUTROPHILS # BLD AUTO: 9.58 X10 (3) UL (ref 1.5–7.7)
NEUTROPHILS # BLD AUTO: 9.58 X10(3) UL (ref 1.5–7.7)
NEUTROPHILS NFR BLD AUTO: 88 %
OSMOLALITY SERPL CALC.SUM OF ELEC: 304 MOSM/KG (ref 275–295)
PLATELET # BLD AUTO: 201 10(3)UL (ref 150–450)
POTASSIUM SERPL-SCNC: 4.3 MMOL/L (ref 3.5–5.1)
RBC # BLD AUTO: 4.42 X10(6)UL
SODIUM SERPL-SCNC: 144 MMOL/L (ref 136–145)
WBC # BLD AUTO: 10.9 X10(3) UL (ref 4–11)

## 2025-01-14 PROCEDURE — 99239 HOSP IP/OBS DSCHRG MGMT >30: CPT | Performed by: INTERNAL MEDICINE

## 2025-01-14 PROCEDURE — 99232 SBSQ HOSP IP/OBS MODERATE 35: CPT | Performed by: INTERNAL MEDICINE

## 2025-01-14 RX ORDER — PREDNISONE 10 MG/1
TABLET ORAL
Qty: 18 TABLET | Refills: 0 | Status: SHIPPED | OUTPATIENT
Start: 2025-01-14 | End: 2025-01-21 | Stop reason: ALTCHOICE

## 2025-01-14 RX ORDER — FLUTICASONE PROPIONATE AND SALMETEROL 250; 50 UG/1; UG/1
1 POWDER RESPIRATORY (INHALATION) 2 TIMES DAILY
Qty: 60 EACH | Refills: 0 | Status: SHIPPED | OUTPATIENT
Start: 2025-01-14 | End: 2025-02-13

## 2025-01-14 NOTE — PROGRESS NOTES
[facility]  part of [facility]    Progress Note    Campbell Rao Patient Status:  Inpatient    1950 MRN [id number]   Location [facility]5W Attending Balwinder Yoon MD   Hosp Day # 3 PCP Johny Kang MD       Subjective:   Subjective:  Feels better , almost baseline with no active cough or sputum with no f/c and compliant with pap at night / up to chair   Objective:   Blood pressure 104/52, pulse 107, temperature 98.7 °F (37.1 °C), temperature source Oral, resp. rate 20, height 5' 8\" (1.727 m), weight (!) 303 lb 6.4 oz (137.6 kg), SpO2 95%.  Physical Exam  Constitutional:       General: He is not in acute distress.     Appearance: He is obese. He is ill-appearing.   HENT:      Head: Atraumatic.   Eyes:      General: No scleral icterus.  Cardiovascular:      Rate and Rhythm: Normal rate.      Heart sounds:      No gallop.   Pulmonary:      Effort: No respiratory distress.      Breath sounds: No stridor. No wheezing, rhonchi or rales.   Abdominal:      General: Abdomen is flat. Bowel sounds are normal.      Palpations: Abdomen is soft.   Musculoskeletal:      Cervical back: Normal range of motion.      Right lower leg: Edema present.      Left lower leg: Edema present.   Skin:     General: Skin is dry.   Neurological:      Mental Status: He is oriented to person, place, and time.         Results:   Lab Results   Component Value Date    WBC 10.9 2025    HGB 13.7 2025    HCT 43.3 2025    .0 2025    CREATSERUM 0.85 2025    BUN 17 2025     2025    K 4.3 2025     2025    CO2 39.0 (H) 2025     (H) 2025    CA 9.4 2025    ALB 4.3 2025    ALKPHO 92 2025    BILT 0.6 2025    TP 6.6 2025    AST 24 2025    ALT 45 2025    TSH 2.777 2024    GGT 38 2024    DDIMER 0.40 10/18/2016    CRP 0.40 2025    MG 2.1 2025    PHOS 4.1  04/26/2024    TROP <0.045 04/10/2021    TROPHS 4 01/11/2025    B12 765 04/27/2021       XR CHEST AP PORTABLE  (CPT=71045)    Result Date: 1/13/2025  CONCLUSION:  1. Cardiomegaly.  Prominent central pulmonary vasculature. 2. Bibasilar atelectatic changes and/or minimal airspace opacification has improved.    Dictated by (CST): Rosalio Powell MD on 1/13/2025 at 9:28 AM     Finalized by (CST): Rosalio Powell MD on 1/13/2025 at 9:32 AM               Assessment & Plan:      1-  severe COPD with acute exacerbation   80-pack-year history of smoking quit 2022     ICS/LABA/LAMA inhaler with advair 250/50 bid and spiriva or incruse   nebulizers  Taper steroid   Prednisone 30 mg x 3 days then 20 x 3 days then 10 x 3 days then stop  Empiric antibiotics   Pt already on home O2 at 4 L NC      2-Acute on chronic respiratory failure with hypoxia and hypercapnia  Baseline home O2 at 4 L   Better and back to baseline   Decompensated overlap syndrome with COPD/MARIA M  COPD therapy and PAP during sleep     3- MARIA M and morbid obesity obesity   PAP during sleep  Avoid sedative or narcotic     4-DVT prophylaxis  Lovenox subcu      Discharge planing                Yesi Salinas MD  1/14/2025

## 2025-01-14 NOTE — PLAN OF CARE
Patient alert and oriented. Tele. Call light within reach. Frequent rounding by staff.  Problem: Patient Centered Care  Goal: Patient preferences are identified and integrated in the patient's plan of care  Description: Interventions:  - What would you like us to know as we care for you?   - Provide timely, complete, and accurate information to patient/family  - Incorporate patient and family knowledge, values, beliefs, and cultural backgrounds into the planning and delivery of care  - Encourage patient/family to participate in care and decision-making at the level they choose  - Honor patient and family perspectives and choices  Outcome: Progressing     Problem: Patient/Family Goals  Goal: Patient/Family Long Term Goal  Description: Patient's Long Term Goal: FEEL BETTER      Interventions:  - FOLLOW MD ORDERS  -ADMINISTER MEDICATION  - MONITOR LABS & VITAL SIGNS  - See additional Care Plan goals for specific interventions  Outcome: Progressing  Goal: Patient/Family Short Term Goal  Description: Patient's Short Term Goal: GO HOME  Interventions:   -ADMINISTER MEDICATION  -MONITOR LABS & VITAL SIGNS  -FOLLOW MD ORDERS    - See additional Care Plan goals for specific interventions  Outcome: Progressing     Problem: RESPIRATORY - ADULT  Goal: Achieves optimal ventilation and oxygenation  Description: INTERVENTIONS:  - Assess for changes in respiratory status  - Assess for changes in mentation and behavior  - Position to facilitate oxygenation and minimize respiratory effort  - Oxygen supplementation based on oxygen saturation or ABGs  - Provide Smoking Cessation handout, if applicable  - Encourage broncho-pulmonary hygiene including cough, deep breathe, Incentive Spirometry  - Assess the need for suctioning and perform as needed  - Assess and instruct to report SOB or any respiratory difficulty  - Respiratory Therapy support as indicated  - Manage/alleviate anxiety  - Monitor for signs/symptoms of CO2  retention  Outcome: Progressing     Problem: METABOLIC/FLUID AND ELECTROLYTES - ADULT  Goal: Electrolytes maintained within normal limits  Description: INTERVENTIONS:  - Monitor labs and rhythm and assess patient for signs and symptoms of electrolyte imbalances  - Administer electrolyte replacement as ordered  - Monitor response to electrolyte replacements, including rhythm and repeat lab results as appropriate  - Fluid restriction as ordered  - Instruct patient on fluid and nutrition restrictions as appropriate  Outcome: Progressing     Problem: SKIN/TISSUE INTEGRITY - ADULT  Goal: Skin integrity remains intact  Description: INTERVENTIONS  - Assess and document risk factors for pressure ulcer development  - Assess and document skin integrity  - Monitor for areas of redness and/or skin breakdown  - Initiate interventions, skin care algorithm/standards of care as needed  Outcome: Progressing     Problem: Safety Risk - Non-Violent Restraints  Goal: Patient will remain free from self-harm  Description: INTERVENTIONS:  - Apply the least restrictive restraint to prevent harm  - Notify patient and family of reasons restraints applied  - Assess for any contributing factors to confusion (electrolyte disturbances, delirium, medications)  - Discontinue any unnecessary medical devices as soon as possible  - Assess the patient's physical comfort, circulation, skin condition, hydration, nutrition and elimination needs   - Reorient and redirection as needed  - Assess for the need to continue restraints  Outcome: Progressing

## 2025-01-14 NOTE — DISCHARGE SUMMARY
[facility]  part of [facility]    Discharge Summary    Campbell Rao Patient Status:  Inpatient    1950 MRN [id number]   Location [facility] Attending Balwinder Yoon MD   Hosp Day # 3 PCP Johny Kang MD     Date of Admission: 2025     Date of Discharge: 25      Lace+ Score: 82  59-90 High Risk  29-58 Medium Risk  0-28   Low Risk.    TCM Follow-Up Recommendation:  LACE > 58: High Risk of readmission after discharge from the hospital.    DISCHARGE DX: Principal Problem:    Acute on chronic respiratory failure with hypoxia (HCC)  Active Problems:    Hypoxic respiratory failure (HCC)       The patient was seen and examined on day of discharge and this discharge summary is in conjunction with any daily progress note from day of discharge.    HPI per admitting physician: \"This is a 74 year oldmale who was brought in by wife after developing shortness of breath and progressive altered mental status and hallucinations.  Patient lethargic and unable to provide subjective.  Further history from wife at bedside.  Wife states patient has been unwell since around the first of the year.  Initially with what she believes to be an upper respiratory infection.  Reported COVID-negative at that time.  Shortness of breath that seems to be progressively worsening over that time.  Around 2 to 3 days prior to admission patient began having difficulties tolerating his CPAP at home.  Wife noted development of worsening confusion with eventual development of hallucinations prompting visit to ED for further evaluation.  Wife did note patient has had similar episodes in the past.  She was familiar with his issues of elevated CO2 levels. \"    Hospital Course:      Acute on chronic hypercapnic respiratory failure   Chronic hypoxemic respiratory failure  -Pt on baseline 4L O2 at home  -Patient presenting with worsening shortness of breath and altered mental status  -Patient with  history of COPD, obesity hypoventilation and MARIA M  -Questionable compliance with CPAP at nighttime.  -ABG on admission with acidosis and hypercapnia.  -Repeat ABG , no further acidosis, pCO2 decreasing.  -Weaned off continuous noninvasive ventilation, continue with sleeping.     Acute COPD exacerbation  -Wife reporting recent signs of upper respiratory infection.  -Complete tapering course of steroids  -Completed empiric antibiotics  -Pulmonology on consult f/u as outpt     Acute on chronic heart failure with preserved ejection fraction   -Improving  -Chest x-ray reviewed.  Noted mild vascular congestion  -Completed trial of diuresis,  -Fluid restriction     Altered mental status  -Resolved  -Likely secondary to acute metabolic encephalopathy from hypercapnia as above           Physical Exam:    Vitals:    01/14/25 0138 01/14/25 0405 01/14/25 0418 01/14/25 0820   BP:   132/74 104/52   BP Location:   Right arm Right arm   Pulse: 82  97 107   Resp:   20 20   Temp:   98.5 °F (36.9 °C) 98.7 °F (37.1 °C)   TempSrc:   Oral Oral   SpO2: 96% 92% 90% 95%   Weight:   (!) 303 lb 6.4 oz (137.6 kg)    Height:         Patient Weight for the past 72 hrs:   Weight   01/12/25 0545 (!) 302 lb 4 oz (137.1 kg)   01/12/25 0845 (!) 302 lb 4 oz (137.1 kg)   01/14/25 0418 (!) 303 lb 6.4 oz (137.6 kg)       Intake/Output Summary (Last 24 hours) at 1/14/2025 1012  Last data filed at 1/14/2025 0900  Gross per 24 hour   Intake 1080 ml   Output 2300 ml   Net -1220 ml       GENERAL: Awake and alert, in no acute distress.  HEART:  Regular rhythm.  Regular rate  LUNGS:  Air entry was decreased.  No increased work of breathing or wheezes   ABDOMEN: Soft and non-tender.    EXTREMITIES:  edema improved  NEUROLOGICAL: Awake and alert  SKIN:  Warm and well perfused, bilateral chronic venous stasis changes    CULTURE:   Hospital Encounter on 01/11/25   1. Blood Culture     Status: None (Preliminary result)    Collection Time: 01/11/25 12:10 PM     Specimen: Blood,peripheral   Result Value Ref Range    Blood Culture Result No Growth 2 Days N/A       IMAGING STUDIES: SOME MAY NEED FOLLOW UP WITH PCP   XR CHEST AP PORTABLE  (CPT=71045)    Result Date: 1/13/2025  CONCLUSION:  1. Cardiomegaly.  Prominent central pulmonary vasculature. 2. Bibasilar atelectatic changes and/or minimal airspace opacification has improved.    Dictated by (CST): Rosalio Powell MD on 1/13/2025 at 9:28 AM     Finalized by (CST): Rosalio Powell MD on 1/13/2025 at 9:32 AM          XR CHEST AP PORTABLE  (CPT=71045)    Result Date: 1/11/2025  CONCLUSION:  Cardiomegaly with mild vascular congestion.  Patchy bibasilar opacity right greater than left which could reflect atelectasis with or without superimposed pneumonia.       Dictated by (CST): Eddie Winchester MD on 1/11/2025 at 11:05 AM     Finalized by (CST): Eddie Winchester MD on 1/11/2025 at 11:06 AM           LABS :     Lab Results   Component Value Date    WBC 10.9 01/14/2025    HGB 13.7 01/14/2025    HCT 43.3 01/14/2025    .0 01/14/2025    CREATSERUM 0.85 01/14/2025    BUN 17 01/14/2025     01/14/2025    K 4.3 01/14/2025     01/14/2025    CO2 39.0 (H) 01/14/2025     (H) 01/14/2025    CA 9.4 01/14/2025    ALB 4.3 01/12/2025    ALKPHO 92 01/12/2025    BILT 0.6 01/12/2025    TP 6.6 01/12/2025    AST 24 01/12/2025    ALT 45 01/12/2025    TSH 2.777 04/18/2024    GGT 38 04/18/2024    DDIMER 0.40 10/18/2016    CRP 0.40 01/11/2025    MG 2.1 01/12/2025    PHOS 4.1 04/26/2024    TROP <0.045 04/10/2021    B12 765 04/27/2021       Recent Labs   Lab 01/12/25  0523 01/13/25  0540 01/14/25  0503   RBC 4.56 4.67 4.42   HGB 14.1 13.8 13.7   HCT 44.4 46.6 43.3   MCV 97.4 99.8 98.0   MCH 30.9 29.6 31.0   MCHC 31.8 29.6* 31.6   RDW 13.8 14.3 14.2   NEPRELIM 10.39* 10.87* 9.58*   WBC 11.8* 12.3* 10.9   .0 214.0 201.0     Recent Labs   Lab 01/12/25  0523 01/13/25  0540 01/14/25  0503   * 155* 182*   BUN 15  16 17   CREATSERUM 0.85 0.86 0.85   CA 9.7 9.8 9.4   ALB 4.3  --   --    * 145 144   K 4.0 4.1 4.3   CL 97* 99 101   CO2 >40.0* >40.0* 39.0*   ALKPHO 92  --   --    AST 24  --   --    ALT 45  --   --    BILT 0.6  --   --    TP 6.6  --   --      No results found for: \"PT\", \"INR\"    Disposition: Discharge to Home    Condition at Discharge: Stable     Discharge Medications:      Discharge Medications        START taking these medications        Instructions Prescription details   fluticasone-salmeterol 250-50 MCG/ACT Aepb  Commonly known as: Advair Diskus      Inhale 1 puff into the lungs 2 (two) times daily.   Stop taking on: February 13, 2025  Quantity: 60 each  Refills: 0     predniSONE 10 MG Tabs  Commonly known as: Deltasone  Start taking on: January 14, 2025      Take 3 tablets (30 mg total) by mouth daily with breakfast for 3 days, THEN 2 tablets (20 mg total) daily with breakfast for 3 days, THEN 1 tablet (10 mg total) daily with breakfast for 3 days.   Stop taking on: January 23, 2025  Quantity: 18 tablet  Refills: 0     umeclidinium bromide 62.5 MCG/ACT Aepb  Commonly known as: Incruse Ellipta  Start taking on: January 15, 2025      Inhale 1 puff into the lungs daily.   Quantity: 30 each  Refills: 0            CONTINUE taking these medications        Instructions Prescription details   albuterol 108 (90 Base) MCG/ACT Aers  Commonly known as: Ventolin HFA      Inhale 2 puffs into the lungs every 4 to 6 hours as needed for Wheezing.   Refills: 0     atorvastatin 20 MG Tabs  Commonly known as: Lipitor      Take 1 tablet (20 mg total) by mouth nightly.   Refills: 0     ergocalciferol 1.25 MG (30224 UT) Caps  Commonly known as: Vitamin D2      Take 1 capsule (50,000 Units total) by mouth once a week.   Refills: 0     ipratropium-albuterol 0.5-2.5 (3) MG/3ML Soln  Commonly known as: Duoneb      Take 3 mL by nebulization in the morning and 3 mL at noon and 3 mL in the evening. And as needed.   Quantity: 100  each  Refills: 1     Potassium Chloride ER 10 MEQ Cpcr  Commonly known as: MICRO-K      Take 1 capsule (10 mEq total) by mouth every other day.   Refills: 0     Potassium Chloride ER 10 MEQ Tbcr      Take 2 tablets (20 mEq total) by mouth every other day.   Refills: 0     spironolactone 25 MG Tabs  Commonly known as: Aldactone      Take 0.5 tablets (12.5 mg total) by mouth daily.   Quantity: 30 tablet  Refills: 1     torsemide 20 MG Tabs  Commonly known as: Demadex      Take 1 tablet (20 mg total) by mouth 2 (two) times daily.   Quantity: 180 tablet  Refills: 3     Tylenol 325 MG Tabs  Generic drug: acetaminophen      Take 1 tablet (325 mg total) by mouth every 6 (six) hours as needed for Pain.   Refills: 0            STOP taking these medications      fluticasone-umeclidin-vilant 100-62.5-25 MCG/ACT Aepb  Commonly known as: Trelegy Ellipta        metoprolol succinate ER 50 MG Tb24  Commonly known as: Toprol XL                  Where to Get Your Medications        These medications were sent to [facility] - [place], [place] - [address] [phone], [phone]  [address], [facility] [address]      Phone: [phone]   fluticasone-salmeterol 250-50 MCG/ACT Aepb  predniSONE 10 MG Tabs  umeclidinium bromide 62.5 MCG/ACT Aepb         Follow up Visits  No follow-up provider specified.  Johny Kang MD    Consultants         Provider   Role Specialty     Kris Jimenez MD      Consulting Physician PULMONARY DISEASES              Other Discharge Instructions:       ----------------------------------------------------  35 MIN SPENT ON THIS DC   Balwinder Yoon MD    1/14/2025

## 2025-01-14 NOTE — CM/SW NOTE
01/14/25 1100   Discharge disposition   Expected discharge disposition Home or Self   Post Acute Care Provider Home   Patient Declines Recommended Services Yes   Discharge transportation Private car     The patient received a MDO for discharge.    The patient is declining ALL home health services.    The patient will be transported home by his spouse via private car.    SW/CM to remain available for support and/or discharge planning.     Kami Faust MSW, LSW  Discharge Planner [id number]

## 2025-01-14 NOTE — PLAN OF CARE
Patient cleared for discharge. Monitor and IV removed. AVS reviewed with patient and spouse.   Problem: Patient Centered Care  Goal: Patient preferences are identified and integrated in the patient's plan of care  Description: Interventions:  - What would you like us to know as we care for you?     - Provide timely, complete, and accurate information to patient/family  - Incorporate patient and family knowledge, values, beliefs, and cultural backgrounds into the planning and delivery of care  - Encourage patient/family to participate in care and decision-making at the level they choose  - Honor patient and family perspectives and choices  Outcome: Adequate for Discharge     Problem: Safety Risk - Non-Violent Restraints  Goal: Patient will remain free from self-harm  Description: INTERVENTIONS:  - Apply the least restrictive restraint to prevent harm  - Notify patient and family of reasons restraints applied  - Assess for any contributing factors to confusion (electrolyte disturbances, delirium, medications)  - Discontinue any unnecessary medical devices as soon as possible  - Assess the patient's physical comfort, circulation, skin condition, hydration, nutrition and elimination needs   - Reorient and redirection as needed  - Assess for the need to continue restraints  Outcome: Adequate for Discharge     Problem: RESPIRATORY - ADULT  Goal: Achieves optimal ventilation and oxygenation  Description: INTERVENTIONS:  - Assess for changes in respiratory status  - Assess for changes in mentation and behavior  - Position to facilitate oxygenation and minimize respiratory effort  - Oxygen supplementation based on oxygen saturation or ABGs  - Provide Smoking Cessation handout, if applicable  - Encourage broncho-pulmonary hygiene including cough, deep breathe, Incentive Spirometry  - Assess the need for suctioning and perform as needed  - Assess and instruct to report SOB or any respiratory difficulty  - Respiratory Therapy  support as indicated  - Manage/alleviate anxiety  - Monitor for signs/symptoms of CO2 retention  Outcome: Adequate for Discharge     Problem: METABOLIC/FLUID AND ELECTROLYTES - ADULT  Goal: Electrolytes maintained within normal limits  Description: INTERVENTIONS:  - Monitor labs and rhythm and assess patient for signs and symptoms of electrolyte imbalances  - Administer electrolyte replacement as ordered  - Monitor response to electrolyte replacements, including rhythm and repeat lab results as appropriate  - Fluid restriction as ordered  - Instruct patient on fluid and nutrition restrictions as appropriate  Outcome: Adequate for Discharge

## 2025-01-15 ENCOUNTER — PATIENT OUTREACH (OUTPATIENT)
Dept: CASE MANAGEMENT | Age: 75
End: 2025-01-15

## 2025-01-16 ENCOUNTER — TELEPHONE (OUTPATIENT)
Dept: INTERNAL MEDICINE CLINIC | Facility: CLINIC | Age: 75
End: 2025-01-16

## 2025-01-16 RX ORDER — NAPROXEN SODIUM 220 MG/1
1-2 TABLET, FILM COATED ORAL
COMMUNITY

## 2025-01-16 NOTE — TELEPHONE ENCOUNTER
Routed to Dr Kang for advise on inhalers meds, thanks.  To reception staff, pls call patient for hospital follow up appt pls.   Thanks         No future appointments.

## 2025-01-16 NOTE — TELEPHONE ENCOUNTER
Patient's spouse called to schedule a hospital follow up for Tuesday 1/21 after 1pm, if possible. Next available is in February.

## 2025-01-16 NOTE — PROGRESS NOTES
Transitions of Care Navigation  Discharge Date: 25  Contact Date: 2025    Transitions of Care Assessment:  GIANLUCA Initial Assessment    General:  Assessment completed with: Patient  Patient Subjective: Pt stated he is doing better then when he went in. Pt feels his breathing has improved. Pt is on 4 L oxygen at home. Pt reports his pulse ox is around 94%. Pt denies dizziness, lightheadedness, chest pain/pressure, shortness of breath, weakness, fevers, chills, confusion, BLE edema, palpitations, wheezing and n/v/d. Pt is ambulating around his home, resting when needed. Pt has a walker he uses outside the home.  Pt is on the fluid restriction. Pt does usually check his daily weight at home, yesterday was 304. Pt has not checked his weight today. Pt still declining a need for HHC. Pt did stated he did not  the new inhalers, Advair or Incruse due to cost therefore he is using Trelegy for now. Pt is willing to do one of the new ones based on cost but not both as both will cost over $700. Currently pt pays $481 for three months supply for Trelegy. Pt has knee pain but this is not new, no new pain. Pt is taking OTC Tylenol or Aleve for pain.  Pt is voiding often. Pt denies burning when voiding and hematuria. Pt has noticed an increase in voiding therefore he plans to talk to PCP about this. Pt is wearing the CPAP at bedtime as advised. Pt plans to have his spouse call for appointments as she take him to appointments. Pt is able to have bowel movement, denies constipation. Pt denies hematochezia. Pt has no other concerns at this time.  Chief Complaint: Acute on chronic hypercapnic respiratory failure  Acute COPD exacerbation   Acute on chronic heart failure with preserved ejection fraction  Verify patient name and  with patient/ caregiver: Yes    Hospital Stay/Discharge:  Tell me what you understand of why you were in the hospital or emergency department: Shortness of breath  Prior to leaving the hospital  were your Discharge Instructions reviewed with you?: Yes  Did you receive a copy of your written Discharge Instructions?: Yes  What questions do you have about your Discharge Instructions?: None at this time  Do you feel better or worse since you left the hospital or emergency department?: Better    Follow - Up Appointment:  Do you have a follow-up appointment?: No  Are there any barriers to getting to your follow-up appointment?: No    Home Health/DME:  Prior to leaving the hospital was Home Health (HH) arranged for you?: No (Pt declined HHC)     Prior to leaving the hospital or emergency department was Durable Medical Equipment (DME), medical supplies, or infusions arranged for you?: No  Are DME/medical supply/infusions needs identified by staff during this assessment?: No     Medications/Diet:  Did any of your medications change, during or after your hospital stay or ED visit?: Yes  Do you have your new or updated medications?: Yes (Pt did not  Incruse or Advair)  Do you understand what your medications are for and possible side effects?: Yes  Are there any reasons that keep you from taking your medication as prescribed?: No  Any concerns about medication refills?: No    Were you given a different diet per your Discharge Instructions?: Yes  Diet Type: CHF guidelines  Are there any barriers to following that diet?: No     Questions/Concerns:  Do you have any questions or concerns that have not been discussed?: No         Nursing Interventions: All d/c instructions reviewed with the pt. Reviewed when to call MD vs when to call 911 or go the ED. Discussed s/s of COPD and reviewed CHF guidelines. Reviewed fall precautions. Encouraged pt to continue to check daily weights and to keep a log of all readings. Reviewed side effects of NSAIDS, risjkfor bleeding, GI upset and stomach lining thinning. Pt plans to try OTC Tylenol and then talk to PCP if needed for better pain management. Pt is aware of max dose per day  for Aleve/Tylenol. Educated pt on the importance of taking all meds as prescribed as well as close f/u with PCP/specialists. Pt verbalized understanding and will contact the office with any further questions or concerns.     Medications:  Medication Reconciliation:  I am aware of an inpatient discharge within the last 30 days.  The discharge medication list has been reconciled with the patient's current medication list and reviewed by me. See medication list for additions of new medication, and changes to current doses of medications and discontinued medications.  Current Outpatient Medications   Medication Sig Dispense Refill    fluticasone-salmeterol 250-50 MCG/ACT Inhalation Aerosol Powder, Breath Activated Inhale 1 puff into the lungs 2 (two) times daily. 60 each 0    umeclidinium bromide 62.5 MCG/ACT Inhalation Aerosol Powder, Breath Activated Inhale 1 puff into the lungs daily. 30 each 0    predniSONE 10 MG Oral Tab Take 3 tablets (30 mg total) by mouth daily with breakfast for 3 days, THEN 2 tablets (20 mg total) daily with breakfast for 3 days, THEN 1 tablet (10 mg total) daily with breakfast for 3 days. 18 tablet 0    albuterol 108 (90 Base) MCG/ACT Inhalation Aero Soln Inhale 2 puffs into the lungs every 4 to 6 hours as needed for Wheezing.      atorvastatin 20 MG Oral Tab Take 1 tablet (20 mg total) by mouth nightly.      Potassium Chloride ER 10 MEQ Oral Tab CR Take 2 tablets (20 mEq total) by mouth every other day.      Potassium Chloride ER 10 MEQ Oral Cap CR Take 1 capsule (10 mEq total) by mouth every other day.      ergocalciferol 1.25 MG (34423 UT) Oral Cap Take 1 capsule (50,000 Units total) by mouth once a week.      spironolactone 25 MG Oral Tab Take 0.5 tablets (12.5 mg total) by mouth daily. 30 tablet 1    acetaminophen (TYLENOL) 325 MG Oral Tab Take 1 tablet (325 mg total) by mouth every 6 (six) hours as needed for Pain.      torsemide 20 MG Oral Tab Take 1 tablet (20 mg total) by mouth 2  (two) times daily. 180 tablet 3    ipratropium-albuterol 0.5-2.5 (3) MG/3ML Inhalation Solution Take 3 mL by nebulization in the morning and 3 mL at noon and 3 mL in the evening. And as needed. 100 each 1     Have you participated in a pulmonary rehab program? no   Would you like more information? no  We reviewed your medications/inhalers, how often are you using your inhalers? As prescribed and as needed.   Are you currently on oxygen? yes   How many liters? 4 L    Any questions about oxygen use? no  Are you familiar with the signs and symptoms or worsening COPD? Yes  Do you know when to call with COPD symptoms? Yes  Do you have any of the following potential risk factors for COPD in your home environment?   Primary or secondary tobacco smoke? no   Occupational dusts and chemicals organic or inorganic? no   Indoor air pollution from heating and cooking with poor ventilation? no   Mold? no   Extreme heat? no    With your CHF diagnosis weighing yourself is very important.  How often are you weighing yourself? Usually daily   Is there any reason you are unable to weigh yourself daily? No   What was your weight yesterday? 304   Today? N/A - pt did not weight himself today. Stressed the importance of checking daily weights.   Were you told about any fluid restrictions? Yes  Have you noticed any shortness of breath or waking up short of breath? no  Since discharge do you feel you are urinating more or less? More  Are you urinating more at night? yes- pt stated this is not new.   Do you notice any pain or swelling in your abdomen? no   Ankles or legs? no  Questions/Concerns:       Follow-up Appointments: Reviewed recommended follow up appointments. Pt denies any barriers to keeping/getting to U appts.       [facility]  Was TCC Ordered: No    Primary Care Provider (If no TCC appointment)  Does patient already have a PCP appointment scheduled? No  Nurse Care Manager Attempted to schedule PCP office TCM/GIANLUCA  appointment with patient   -If no appointment scheduled: Explain : pt declined as he stated his spouse will call for an appt. TE to PCP re: update on GIANLUCA appt     Specialist  Does the patient have any other follow-up appointment(s) need to be scheduled? Yes   -If yes: Nurse Care Manager reviewed upcoming specialist appointments with patient: Yes   -Does the patient need assistance scheduling appointment(s): No- Pt declined assistance. Spouse will call for appt.   Schedule an appointment with [facility]   Pt plans to FU with Pulmonology if advised per PCP after GIANLUCA appt.       Book By Date: 1/21/25

## 2025-01-16 NOTE — TELEPHONE ENCOUNTER
Spoke to patient for Transitions of Care call today.  Patient does not have an appointment scheduled at this time. An appt was offered with PCP however pt declined stating his spouse will call and schedule as she handles all appointments.     During the call, pt stated he was started on two new inhalers at discharge, Advair and Incruse, however he did not pick them up due to cost. Pt is currently still taking Trelegy inhaler as that is what he has at home. Pt would like to see if he can stay on the Trelegy or if can only take one of the new inhalers instead of two. Pt stated both of the new inhalers will cost over $700. Pt is currently paying $481 for three months for Trelegy inhaler.    Per Pulmonology note:     ICS/LABA/LAMA inhaler with advair 250/50 bid and spiriva or incruse   Trelegy was stopped at discharge    Pt plans to FU with Dr. Kang for now and then see Pulmonology if advised per PCP.  Please advise.       TCM/GIANLUCA appointment needed by 1/21/25.  Please advise.    BOOK BY DATE: 1/21/25    Clinical staff:  Please follow-up with patient and try to get them to schedule as patient would greatly benefit from TCM/GIANLUCA.  Thank you!

## 2025-01-16 NOTE — TELEPHONE ENCOUNTER
Spoke with patient's spouse Yasmin, verified patient's , TCM appointment scheduled on  at 1:30pm, spouse verbalized understanding.

## 2025-01-17 NOTE — TELEPHONE ENCOUNTER
Refill requested.     Current Outpatient Medications   Medication Sig Dispense Refill    albuterol 108 (90 Base) MCG/ACT Inhalation Aero Soln Inhale 2 puffs into the lungs every 4 to 6 hours as needed for Wheezing.

## 2025-01-21 ENCOUNTER — OFFICE VISIT (OUTPATIENT)
Dept: INTERNAL MEDICINE CLINIC | Facility: CLINIC | Age: 75
End: 2025-01-21

## 2025-01-21 VITALS
DIASTOLIC BLOOD PRESSURE: 82 MMHG | HEART RATE: 91 BPM | BODY MASS INDEX: 47 KG/M2 | WEIGHT: 310 LBS | SYSTOLIC BLOOD PRESSURE: 121 MMHG | OXYGEN SATURATION: 91 %

## 2025-01-21 DIAGNOSIS — G47.33 OSA AND COPD OVERLAP SYNDROME (HCC): ICD-10-CM

## 2025-01-21 DIAGNOSIS — I77.89 ASCENDING AORTA ENLARGEMENT: ICD-10-CM

## 2025-01-21 DIAGNOSIS — J96.21 ACUTE ON CHRONIC RESPIRATORY FAILURE WITH HYPOXIA AND HYPERCAPNIA (HCC): ICD-10-CM

## 2025-01-21 DIAGNOSIS — E66.01 MORBID OBESITY WITH BMI OF 40.0-44.9, ADULT (HCC): ICD-10-CM

## 2025-01-21 DIAGNOSIS — E11.8 CONTROLLED TYPE 2 DIABETES MELLITUS WITH COMPLICATION, WITHOUT LONG-TERM CURRENT USE OF INSULIN (HCC): ICD-10-CM

## 2025-01-21 DIAGNOSIS — I50.32 CHRONIC DIASTOLIC CONGESTIVE HEART FAILURE (HCC): ICD-10-CM

## 2025-01-21 DIAGNOSIS — J44.9 OSA AND COPD OVERLAP SYNDROME (HCC): ICD-10-CM

## 2025-01-21 DIAGNOSIS — Z12.11 COLON CANCER SCREENING: ICD-10-CM

## 2025-01-21 DIAGNOSIS — J44.1 COPD EXACERBATION (HCC): Primary | ICD-10-CM

## 2025-01-21 DIAGNOSIS — E78.5 DYSLIPIDEMIA: ICD-10-CM

## 2025-01-21 DIAGNOSIS — E66.2 OBESITY HYPOVENTILATION SYNDROME (HCC): ICD-10-CM

## 2025-01-21 DIAGNOSIS — J96.22 ACUTE ON CHRONIC RESPIRATORY FAILURE WITH HYPOXIA AND HYPERCAPNIA (HCC): ICD-10-CM

## 2025-01-21 PROCEDURE — 99495 TRANSJ CARE MGMT MOD F2F 14D: CPT | Performed by: INTERNAL MEDICINE

## 2025-01-21 RX ORDER — TIRZEPATIDE 2.5 MG/.5ML
2.5 INJECTION, SOLUTION SUBCUTANEOUS
Qty: 2 ML | Refills: 0 | Status: SHIPPED | OUTPATIENT
Start: 2025-01-21

## 2025-01-21 RX ORDER — ALBUTEROL SULFATE 90 UG/1
2 INHALANT RESPIRATORY (INHALATION)
Qty: 1 EACH | Refills: 3 | Status: SHIPPED | OUTPATIENT
Start: 2025-01-21 | End: 2025-01-21

## 2025-01-21 NOTE — TELEPHONE ENCOUNTER
Pharmacist  calling to check on status of albuterol inhaler refill request,verified Patient name and date of birth.  Last refill from hospitalist but was Previously prescribed by Dr Kang.  Patient is out of medication.  Medication  pended to run through protocol.

## 2025-01-21 NOTE — TELEPHONE ENCOUNTER
Please review.  Marked High Priority, patient states out of medication    Last written by Dr. Kang 2016     Requested Prescriptions   Pending Prescriptions Disp Refills    albuterol 108 (90 Base) MCG/ACT Inhalation Aero Soln 1 each 3     Sig: Inhale 2 puffs into the lungs every 4 to 6 hours as needed for Wheezing. inhale 2 puff by inhalation route  every 4 - 6 hours as needed       Asthma & COPD Medication Protocol Passed - 1/21/2025  9:57 AM        Passed - Appointment in past 6 or next 3 months      Recent Outpatient Visits              2 months ago COPD exacerbation (HCC)    [facility] Johny Kang MD    Office Visit    2 months ago Primary osteoarthritis of both knees    [facility], [place] Aurea Arvizu PA-C    Office Visit    5 months ago Chronic obstructive pulmonary disease, unspecified COPD type (HCC)    [facility] Yesi Salinas MD    Office Visit    5 months ago Acute on chronic respiratory failure with hypoxia and hypercapnia ([facility])    [facility] Jojo Galarza NP    Office Visit    5 months ago Acute on chronic respiratory failure with hypoxia and hypercapnia (HCC)    [facility] Johny Kang MD    Office Visit          Future Appointments         Provider Department Appt Notes    Today Johny Kang MD [facility] TCM, ok per Dr PEREZ                    Passed - Medication is active on med list           Future Appointments         Provider Department Appt Notes    Today Johny Kang MD [facility] LAYA ok per Dr PEREZ          Recent Outpatient Visits              2 months ago COPD exacerbation (HCC)    [facility] Johny Kang MD    Office Visit    2  months ago Primary osteoarthritis of both knees    [facility], [facility], [place] Aurea Arvizu PA-C    Office Visit    5 months ago Chronic obstructive pulmonary disease, unspecified COPD type (HCC)    [facility], [facility], [place] Yesi Salinas MD    Office Visit    5 months ago Acute on chronic respiratory failure with hypoxia and hypercapnia ([facility])    [facility] Jojo Galarza NP    Office Visit    5 months ago Acute on chronic respiratory failure with hypoxia and hypercapnia ([facility])    [facility], [facility] Johny Kang MD    Office Visit

## 2025-01-23 ENCOUNTER — TELEPHONE (OUTPATIENT)
Dept: INTERNAL MEDICINE CLINIC | Facility: CLINIC | Age: 75
End: 2025-01-23

## 2025-01-23 NOTE — TELEPHONE ENCOUNTER
Plan does not cover this medication. Please call plan at [phone] to initiate PA      Tirzepatide (MOUNJARO) 2.5 MG/0.5ML Subcutaneous Solution Auto-injector, Inject 2.5 mg into the skin every 7 days., Disp: 2 mL, Rfl: 0

## 2025-01-24 ENCOUNTER — TELEPHONE (OUTPATIENT)
Dept: ORTHOPEDICS CLINIC | Facility: CLINIC | Age: 75
End: 2025-01-24

## 2025-01-24 ENCOUNTER — PATIENT OUTREACH (OUTPATIENT)
Dept: CASE MANAGEMENT | Age: 75
End: 2025-01-24

## 2025-01-24 DIAGNOSIS — J44.1 COPD EXACERBATION (HCC): ICD-10-CM

## 2025-01-24 DIAGNOSIS — I50.32 CHRONIC DIASTOLIC CONGESTIVE HEART FAILURE (HCC): ICD-10-CM

## 2025-01-24 DIAGNOSIS — E78.5 DYSLIPIDEMIA: Primary | ICD-10-CM

## 2025-01-24 NOTE — TELEPHONE ENCOUNTER
Approved    Prior authorization approved  Payer: [facility] [facility] Case ID: [id number]    [phone]    [phone]  Note from payer: The case has been Approved from  [id number] to [id number]  Approval Details    Authorized from January 1, 2025 to January 23, 2026

## 2025-01-24 NOTE — PROGRESS NOTES
Subjective:   Campbell Rao is a 74 year old male who presents for hospital follow up.   He was discharged from [facility] to Home or Self Care  Admission Date: 1/11/25   Discharge Date: 1/14/25  Hospital Discharge Diagnosis: copd exacerbation; acute on chronic resp failure, acute on chronic diastolic heart failure, DM 2, dyslipidemia; morbid obesity, MARIA M/copd overlap    Interactive contact within 2 business days post discharge first initiated on Date: 1/15/2025    I accessed [facility] and/or [facility] Everywhere and personally reviewed the following for the recent hospitalization: provider notes, consults, summaries, labs and other test results and the pertinent findings are documented below.     HPI: pt admitted with increasing SOB and mental status change;  diagnosed toa hve acute copd exacerbation and acute on chronic diastolic heart failure, pt treated with IVsteroids, nebs, O2 with bipap, and diuresis.  Pt negative for covid/rsv/flu; cxr negative for pneumonia.       History/Other:   Current Medications:  Medication Reconciliation:  I am aware of an inpatient discharge within the last 30 days.  The discharge medication list has been reconciled with the patient's current medication list and reviewed by me. See medication list for additions of new medication, and changes to current doses of medications and discontinued medications.  Outpatient Medications Marked as Taking for the 1/21/25 encounter (Office Visit) with Johny Kang MD   Medication Sig    Tirzepatide (MOUNJARO) 2.5 MG/0.5ML Subcutaneous Solution Auto-injector Inject 2.5 mg into the skin every 7 days.    naproxen (ALEVE) 220 MG Oral Tab Take 1-2 tablets (220-440 mg total) by mouth daily as needed.    fluticasone-salmeterol 250-50 MCG/ACT Inhalation Aerosol Powder, Breath Activated Inhale 1 puff into the lungs 2 (two) times daily.    umeclidinium bromide 62.5 MCG/ACT Inhalation Aerosol Powder, Breath Activated Inhale 1 puff into the lungs  daily.    albuterol 108 (90 Base) MCG/ACT Inhalation Aero Soln Inhale 2 puffs into the lungs every 4 to 6 hours as needed for Wheezing.    atorvastatin 20 MG Oral Tab Take 1 tablet (20 mg total) by mouth nightly.    Potassium Chloride ER 10 MEQ Oral Cap CR Take 1 capsule (10 mEq total) by mouth every other day.    ergocalciferol 1.25 MG (20684 UT) Oral Cap Take 1 capsule (50,000 Units total) by mouth once a week.    spironolactone 25 MG Oral Tab Take 0.5 tablets (12.5 mg total) by mouth daily.    acetaminophen (TYLENOL) 325 MG Oral Tab Take 1 tablet (325 mg total) by mouth every 6 (six) hours as needed for Pain.    torsemide 20 MG Oral Tab Take 1 tablet (20 mg total) by mouth 2 (two) times daily.    ipratropium-albuterol 0.5-2.5 (3) MG/3ML Inhalation Solution Take 3 mL by nebulization in the morning and 3 mL at noon and 3 mL in the evening. And as needed.       Review of Systems:  GENERAL: weight stable, energy stable, no sweating  SKIN: denies any unusual skin lesions  EYES: denies blurred vision or double vision  HEENT: denies nasal congestion, sinus pain or ST  LUNGS: denies shortness of breath with exertion  CARDIOVASCULAR: denies chest pain on exertion or palpitations  GI: denies abdominal pain, denies heartburn, denies diarrhea  MUSCULOSKELETAL: denies pain, normal range of motion of extremities  NEURO: denies headaches, denies dizziness, denies weakness  PSYCHE: denies depression or anxiety  HEMATOLOGIC: denies hx of anemia, or bruising, denies bleeding  ENDOCRINE: denies thyroid history  ALL/ASTHMA: denies hx of allergy or asthma    Objective:   No LMP for male patient.  Estimated body mass index is 47.14 kg/m² as calculated from the following:    Height as of 1/11/25: 5' 8\" (1.727 m).    Weight as of this encounter: 310 lb (140.6 kg).   /82 (BP Location: Left arm, Patient Position: Sitting, Cuff Size: adult)   Pulse 91   Wt (!) 310 lb (140.6 kg)   SpO2 91%   BMI 47.14 kg/m²    GENERAL: well  developed, well nourished, in no apparent distress  SKIN: no rashes, no suspicious lesions  HEENT: atraumatic, normocephalic, ears and throat are clear  EYES: PERRLA, EOMI, conjunctiva are clear  NECK: supple, no adenopathy, no bruits  CHEST: no chest tenderness  LUNGS: clear to auscultation  CARDIO: RRR without murmur  GI: good BS's, no masses, HSM or tenderness  MUSCULOSKELETAL: back is not tender, FROM of the extremities  EXTREMITIES: no cyanosis, clubbing ;  +1 bilateral leg/ankle edema  NEURO: Oriented times three, cranial nerves are intact, motor and sensory are grossly intact    Assessment & Plan:   (J44.1) COPD exacerbation (HCC)  (primary encounter diagnosis)  Plan: already improved and pt finishing his course of prednisone, continue with inhalers and O2.     (J96.21,  J96.22) Acute on chronic respiratory failure with hypoxia and hypercapnia (HCC)  Plan: improved already; conitnue on Home O2, nebs and inhalers.     (I50.32) Chronic diastolic congestive heart failure (HCC)  Plan: had been on lasix as well bp meds.     (E11.8) Controlled type 2 diabetes mellitus with complication, without long-term current use of insulin (HCC)  Plan: Microalb/Creat Ratio, Random Urine        Pt not candidate for metformin given pt's chronic hypercarbia already;   Would benefit from GLP 1 agonist whch which help control dm and also may help losing weight .    (E66.01,  Z68.41) Morbid obesity with BMI of 40.0-44.9, adult (HCC)  Plan: should benefit from staring GLP 1 agonist for his dm.     (G47.33,  J44.9) MARIA M and COPD overlap syndrome (HCC)  Plan: pt uses his cpap for 4 hrs daily and advised to use longer and every time he sleeps.     (E66.2) Obesity hypoventilation syndrome (HCC)  Plan: should benefit from GLP 1 agonist which I prescribed for his DM.    (Z12.11) Colon cancer screening  Plan: pt overdue for colonscopy but would be high risk for anesthesia and  other comorbidities.     (I77.89) Ascending aorta enlargement  (HCC)  Plan: pt had been ff by cardio and had been stable.     (E78.5) Dyslipidemia  Plan: continue with chol statin med.          No follow-ups on file.

## 2025-01-24 NOTE — PROGRESS NOTES
Spoke to Campbell for Chronic Care Management.      Updates to patient care team/comments: UTD.   Patient reported changes in medications: together we reviewed with no updated.   Med Adherence  Comment: pt reports taking all medications as prescribed.       Health Maintenance:   Reviewed with pt     Health Maintenance   Topic Date Due    Diabetes Care Dilated Eye Exam  Never done- reminded     Colorectal Cancer Screening  Never done- reminded Ordered on 3/28/2024     Zoster Vaccines (1 of 2) Never done- unsure     COVID-19 Vaccine (6 - 2024-25 season) 09/01/2024- unsure    Diabetes Care: Foot Exam (Annual)  Never done- reminded note added to AWV    Diabetes Care: Microalb/Creat Ratio (Annual)  Never done-  reminded Ordered on 1/21/2025     Annual Physical  03/28/2025    Diabetes Care A1C  05/06/2025    Diabetes Care: GFR  01/14/2026    PSA  04/18/2026    Influenza Vaccine  Completed    Annual Depression Screening  Completed    Fall Risk Screening (Annual)  Completed    Pneumococcal Vaccine: 50+ Years  Completed    Meningococcal B Vaccine  Aged Out    Lung Cancer Screening  Discontinued       Patient updates/concerns:   Listened to pt and provided support.     On 4 liters of oxygen.    Using CPAP machine routinely.  Gets about five hours of sleep.  Wakes a few times to release bladder.     Weight management   Pt says he was prescribed Mounjaro however he's been waiting on approval.  Upon review of chart looks like it was approved.  To be sure I called [facility] on file it has been approved and they will process within an hour.  Pt notified and will have his wife .      Pt says he was taken off Trelegy and put on two other inhalers at the hospital.  He is asking for confirmation on names and directions.  Upon review of chart see below.    fluticasone-salmeterol 250-50 MCG/ACT Aepb Sig:   Inhale 1 puff into the lungs 2 (two) times daily.   umeclidinium bromide 62.5 MCG/ACT Aepb  Inhale 1 puff into the lungs  daily.     Gave resource below  [employer] [phone]  Diet  Could be better pt says.  Pt admits he can overeat.  Plans on watching his portions more.  Likes eating till he feels full but understands he is over weight and needs more discipline.      Goals/Action Plan:    Active goal from previous outreach: weight management      Patient reported progress towards goals: ongoing                - What: diet control            - Where/When/How: pt plans on controlling his portions for dinner at least 3 days a week  Patient Reported Barriers and Concerns: pt says he usually likes to eat till he's full.  Pt says he's kind of a carnivore.                     - Plan for overcoming barriers: he plans on only serving himself one portion and not going back for seconds just  because his stomach isn't full.        Care Managers Interventions:   Encouraged patient:   Self care: Take the time to do the things you love.   Nutrition:  Good nutrition helps us to maintain our weight, fight off infection, and help reduce the risk of developing other chronic issues.   Physical activity:  Physical activity is important to help maintain independence and improve quality of life.     Future Appointments:   Future Appointments   Date Time Provider Department Center   3/31/2025  3:00 PM [facility] MD [facility]         Next Care Manager Follow Up Date: in 1 month.     Reason For Follow Up: review progress and or barriers towards patient's goals.     Time Spent This Encounter Total: 24 min medical record review, telephone communication, care plan updates where needed, education, goals, and action plan recreation/update. Provided acknowledgment and validation to patient's concerns.   Monthly Minute Total including today: 24  Physical assessment, complete health history, and need for CCM established by Johny Kang MD.

## 2025-01-24 NOTE — TELEPHONE ENCOUNTER
Called patient and he states knee pain returned and would like to make appointment for cortisone injections again. Last injections given 10/24/24. Scheduled appointment on 1/29 at 4pm with Aurea Arvizu for cortisone injections.   He had no further concerns.

## 2025-01-27 RX ORDER — ERGOCALCIFEROL 1.25 MG/1
50000 CAPSULE, LIQUID FILLED ORAL WEEKLY
Qty: 12 CAPSULE | Refills: 0 | OUTPATIENT
Start: 2025-01-27

## 2025-01-27 NOTE — TELEPHONE ENCOUNTER
Pharmacist Kaelyn from [facility] calling, verified patient name and date of birth.  Requesting refill of of cholecalciferol 50,000 units.  As per Dr Kang's 9/22/24 lab result note:Recheck vitamin D  /PTH  in 3 months-12/23/24.  Refill request denied pending recheck of vitamin D level.  Called patient, verified name and date of birth.  Reviewed above information and patient  verbalized understanding agrees to recheck labs as ordered by Dr Kang.

## 2025-01-29 ENCOUNTER — LAB ENCOUNTER (OUTPATIENT)
Dept: LAB | Facility: HOSPITAL | Age: 75
End: 2025-01-29
Attending: INTERNAL MEDICINE
Payer: MEDICARE

## 2025-01-29 ENCOUNTER — OFFICE VISIT (OUTPATIENT)
Dept: ORTHOPEDICS CLINIC | Facility: CLINIC | Age: 75
End: 2025-01-29

## 2025-01-29 VITALS — DIASTOLIC BLOOD PRESSURE: 80 MMHG | HEART RATE: 117 BPM | SYSTOLIC BLOOD PRESSURE: 138 MMHG

## 2025-01-29 DIAGNOSIS — E21.5 PARATHYROID ABNORMALITY (HCC): ICD-10-CM

## 2025-01-29 DIAGNOSIS — E55.9 VITAMIN D DEFICIENCY: ICD-10-CM

## 2025-01-29 DIAGNOSIS — M17.0 PRIMARY OSTEOARTHRITIS OF BOTH KNEES: Primary | ICD-10-CM

## 2025-01-29 DIAGNOSIS — E11.8 CONTROLLED TYPE 2 DIABETES MELLITUS WITH COMPLICATION, WITHOUT LONG-TERM CURRENT USE OF INSULIN (HCC): ICD-10-CM

## 2025-01-29 DIAGNOSIS — Z12.5 PROSTATE CANCER SCREENING: ICD-10-CM

## 2025-01-29 LAB
COMPLEXED PSA SERPL-MCNC: 0.45 NG/ML (ref ?–4)
PTH-INTACT SERPL-MCNC: 158.4 PG/ML (ref 18.5–88)
VIT D+METAB SERPL-MCNC: 28.9 NG/ML (ref 30–100)

## 2025-01-29 PROCEDURE — 36415 COLL VENOUS BLD VENIPUNCTURE: CPT

## 2025-01-29 PROCEDURE — 83970 ASSAY OF PARATHORMONE: CPT

## 2025-01-29 PROCEDURE — 20610 DRAIN/INJ JOINT/BURSA W/O US: CPT

## 2025-01-29 PROCEDURE — 82306 VITAMIN D 25 HYDROXY: CPT

## 2025-01-29 RX ORDER — TRIAMCINOLONE ACETONIDE 40 MG/ML
40 INJECTION, SUSPENSION INTRA-ARTICULAR; INTRAMUSCULAR ONCE
Status: COMPLETED | OUTPATIENT
Start: 2025-01-29 | End: 2025-01-29

## 2025-01-29 RX ADMIN — TRIAMCINOLONE ACETONIDE 40 MG: 40 INJECTION, SUSPENSION INTRA-ARTICULAR; INTRAMUSCULAR at 16:19:00

## 2025-01-29 NOTE — PROGRESS NOTES
Per verbal order from Dr. Goldsmith draw up 3ml of 0.5% Marcaine & 2ml 1% lidocaine and 1ml of Kenalog 40 for cortisone injection to bilateral knee. Cristal YEE MA    Patient provided education handout for cortisone injection.

## 2025-01-29 NOTE — PROGRESS NOTES
NURSING INTAKE COMMENTS:   Chief Complaint   Patient presents with    Knee Pain     Bilateral knee f/u- had injection on 10/24/2024 that helped for 3 mos- rates pain 8/10 w/ WB- pt requesting another injection today       HPI: This 74 year old male presents today with complaints of bilateral knee pain follow up. He is here for cortisone injections in both of the knees. States his knee pain returned a few weeks ago. He walked his daughter down the aisle at her wedding a couple of months ago. He would like to proceed with bilateral knee cortisone injections today.     Past Medical History:    Atherosclerosis of coronary artery    coronary calcification on ct chest    Back problem    Cataract    Chronic respiratory failure (HCC)    Congestive heart disease (HCC)    COPD (HCC)    freq exacerbations. ? 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 2 mL 0    naproxen (ALEVE) 220 MG Oral Tab Take 1-2 tablets (220-440 mg total) by mouth daily as needed.      fluticasone-salmeterol 250-50 MCG/ACT Inhalation Aerosol Powder, Breath Activated Inhale 1 puff into the lungs 2 (two) times daily. 60 each 0    umeclidinium bromide 62.5 MCG/ACT Inhalation Aerosol Powder, Breath Activated Inhale 1 puff into the lungs daily. 30 each 0     albuterol 108 (90 Base) MCG/ACT Inhalation Aero Soln Inhale 2 puffs into the lungs every 4 to 6 hours as needed for Wheezing.      atorvastatin 20 MG Oral Tab Take 1 tablet (20 mg total) by mouth nightly.      Potassium Chloride ER 10 MEQ Oral Cap CR Take 1 capsule (10 mEq total) by mouth every other day.      ergocalciferol 1.25 MG (26360 UT) Oral Cap Take 1 capsule (50,000 Units total) by mouth once a week.      spironolactone 25 MG Oral Tab Take 0.5 tablets (12.5 mg total) by mouth daily. 30 tablet 1    acetaminophen (TYLENOL) 325 MG Oral Tab Take 1 tablet (325 mg total) by mouth every 6 (six) hours as needed for Pain.      torsemide 20 MG Oral Tab Take 1 tablet (20 mg total) by mouth 2 (two) times daily. 180 tablet 3    ipratropium-albuterol 0.5-2.5 (3) MG/3ML Inhalation Solution Take 3 mL by nebulization in the morning and 3 mL at noon and 3 mL in the evening. And as needed. 100 each 1     Allergies[1]  Family History   Problem Relation Age of Onset    Cancer Father         lung cancer    Cancer Mother         lung cancer    No Known Problems Daughter     No Known Problems Son     No Known Problems Maternal Grandmother     No Known Problems Maternal Grandfather     No Known Problems Paternal Grandmother     No Known Problems Paternal Grandfather     No Known Problems Sister     Diabetes Brother     Lipids Brother         hyperlipidemia    Hypertension Brother     No Known Problems Other        Social History     Occupational History    Not on file   Tobacco Use    Smoking status: Former     Current packs/day: 0.00     Average packs/day: 2.0 packs/day for 40.0 years (80.0 ttl pk-yrs)     Types: Cigarettes     Start date: 11/1981     Quit date: 11/2021     Years since quitting: 3.2     Passive exposure: Past    Smokeless tobacco: Never   Vaping Use    Vaping status: Never Used   Substance and Sexual Activity    Alcohol use: Not Currently     Alcohol/week: 0.0 standard drinks of alcohol     Comment: socially     Drug use: Not Currently    Sexual activity: Not on file        Review of Systems:  GENERAL: denies fevers, chills, night sweats, fatigue, unintentional weight loss/gain  SKIN: denies skin lesions, open sores, rash  HEENT:denies recent vision change, new nasal congestion,hearing loss, tinnitus, sore throat, headaches  RESPIRATORY: denies new shortness of breath, cough, asthma, wheezing  CARDIOVASCULAR: denies chest pain, leg cramps with exertion, palpitations, leg swelling  GI: denies abdominal pain, nausea, vomiting, diarrhea, constipation, hematochezia, worsening heartburn or stomach ulcers  : denies dysuria, hematuria, incontinence, increased frequency, urgency, difficulty urinating  MUSCULOSKELETAL: denies musculoskeletal complaints other than in HPI  NEURO: denies numbness, tingling, weakness, balance issues, dizziness, memory loss  PSYCHIATRIC: denies Hx of depression, anxiety, other psychiatric disorders  HEMATOLOGIC: denies blood clots, anemia, blood clotting disorders, blood transfusion  ENDOCRINE: denies autoimmune disease, thyroid issues, or diabetes  ALLERGY: denies asthma, seasonal allergies    Physical Examination:    /80   Pulse 117   Constitutional: appears well hydrated, alert and responsive, no acute distress noted  Extremities: Bilateral knee exam is unchanged. There is no effusion. No erythema or palpable warmth.   Neurological: Unchanged    Imaging:   XR CHEST AP PORTABLE  (CPT=71045)    Result Date: 1/13/2025  PROCEDURE: XR CHEST AP PORTABLE  (CPT=71045) TIME: 0625 hours  COMPARISON: [facility], CT CHEST PE AORTA (IV ONLY) (CPT=71260), 4/08/2024, 8:02 PM.  [facility], XR CHEST AP PORTABLE (CPT=71045), 1/11/2025,  INDICATIONS: Shortness of breath.  Pulmonary arterial enlargement  TECHNIQUE:   Single view.   FINDINGS:  CARDIAC/VASC: Cardiomegaly..  Prominent central pulmonary vasculature  MEDIAST/JUSTO:   Atherosclerotic aorta with no visible aneurysm.   LUNGS/PLEURA: Bibasilar atelectatic changes and/or minimal airspace opacification has improved. BONES: Moderate scoliosis with degenerative disc disease and spondylosis.  OTHER: Kyphoplasty changes mid dorsal spine         CONCLUSION:  1. Cardiomegaly.  Prominent central pulmonary vasculature. 2. Bibasilar atelectatic changes and/or minimal airspace opacification has improved.    Dictated by (CST): Rosalio Powell MD on 1/13/2025 at 9:28 AM     Finalized by (CST): Rosalio Powell MD on 1/13/2025 at 9:32 AM          XR CHEST AP PORTABLE  (CPT=71045)    Result Date: 1/11/2025  PROCEDURE: XR CHEST AP PORTABLE  (CPT=71045) TIME: 1045 hours.   COMPARISON: [facility], XR CHEST AP PORTABLE (CPT=71045), 11/03/2024, 10:47 AM.  [facility], XR CHEST AP PORTABLE (CPT=71045), 7/16/2024, 1:06 PM.  [facility], XR CHEST AP PORTABLE (CPT=71045), 7/14/2024, 8:46 PM.  INDICATIONS: SOB  TECHNIQUE:   Single view.   FINDINGS:  CARDIAC/MEDIAST: Cardiomegaly.  Tortuous thoracic aorta.  Mild central vascular congestion.  LUNGS/PLEURA:  Mild patchy opacity in the right lung base.  Mild opacity also seen in the left lower lung.  No pleural effusion.  No pneumothorax. OTHER:  Degenerative change osseous structures with scoliosis.  Thoracic kyphoplasty.         CONCLUSION:  Cardiomegaly with mild vascular congestion.  Patchy bibasilar opacity right greater than left which could reflect atelectasis with or without superimposed pneumonia.       Dictated by (CST): Eddie Winchester MD on 1/11/2025 at 11:05 AM     Finalized by (CST): Eddie Winchester MD on 1/11/2025 at 11:06 AM             Labs:  Lab Results   Component Value Date    WBC 10.9 01/14/2025    HGB 13.7 01/14/2025    .0 01/14/2025      Lab Results   Component Value Date     (H) 01/14/2025    BUN 17 01/14/2025    CREATSERUM 0.85 01/14/2025    GFRNAA 91 09/23/2021    GFRAA 105 09/23/2021        Assessment and  Plan:  Diagnoses and all orders for this visit:    Primary osteoarthritis of both knees  -     Drain/Inject Large Joint/Bursa  -     triamcinolone acetonide (Kenalog-40) 40 MG/ML injection 40 mg  -     Drain/Inject Large Joint/Bursa  -     triamcinolone acetonide (Kenalog-40) 40 MG/ML injection 40 mg        Assessment: Bilateral knee osteoarthritis     Plan: Using sterile technique and inferior lateral parapatellar approach, both knees were injected with cortisone. Patient tolerated the procedure well. Advised icing and oral anti-inflammatories as needed for pain. Follow up in three months or more for bilateral knee injections.     Follow Up: Return if symptoms worsen or fail to improve.    Aurea Arvizu PA-C         [1] No Known Allergies

## 2025-01-31 ENCOUNTER — TELEPHONE (OUTPATIENT)
Dept: INTERNAL MEDICINE CLINIC | Facility: CLINIC | Age: 75
End: 2025-01-31

## 2025-01-31 DIAGNOSIS — E55.9 VITAMIN D DEFICIENCY: Primary | ICD-10-CM

## 2025-01-31 RX ORDER — ERGOCALCIFEROL 1.25 MG/1
50000 CAPSULE ORAL WEEKLY
Qty: 12 CAPSULE | Refills: 0 | Status: SHIPPED | OUTPATIENT
Start: 2025-01-31 | End: 2025-04-19

## 2025-02-10 ENCOUNTER — APPOINTMENT (OUTPATIENT)
Dept: GENERAL RADIOLOGY | Facility: HOSPITAL | Age: 75
End: 2025-02-10
Attending: STUDENT IN AN ORGANIZED HEALTH CARE EDUCATION/TRAINING PROGRAM
Payer: MEDICARE

## 2025-02-10 ENCOUNTER — HOSPITAL ENCOUNTER (INPATIENT)
Facility: HOSPITAL | Age: 75
LOS: 4 days | Discharge: HOME HEALTH | End: 2025-02-14
Attending: STUDENT IN AN ORGANIZED HEALTH CARE EDUCATION/TRAINING PROGRAM | Admitting: HOSPITALIST
Payer: MEDICARE

## 2025-02-10 DIAGNOSIS — J96.12 ACUTE HYPOXIC ON CHRONIC HYPERCAPNIC RESPIRATORY FAILURE (HCC): Primary | ICD-10-CM

## 2025-02-10 DIAGNOSIS — J96.01 ACUTE HYPOXIC ON CHRONIC HYPERCAPNIC RESPIRATORY FAILURE (HCC): Primary | ICD-10-CM

## 2025-02-10 PROBLEM — J96.20 ACUTE ON CHRONIC RESPIRATORY FAILURE (HCC): Status: ACTIVE | Noted: 2025-02-10

## 2025-02-10 LAB
ANION GAP SERPL CALC-SCNC: 3 MMOL/L (ref 0–18)
ATRIAL RATE: 89 BPM
BASE EXCESS BLD CALC-SCNC: 12.1 MMOL/L (ref ?–2)
BASOPHILS # BLD AUTO: 0.08 X10(3) UL (ref 0–0.2)
BASOPHILS NFR BLD AUTO: 0.9 %
BUN BLD-MCNC: 17 MG/DL (ref 9–23)
BUN/CREAT SERPL: 18.3 (ref 10–20)
CALCIUM BLD-MCNC: 9.3 MG/DL (ref 8.7–10.4)
CHLORIDE SERPL-SCNC: 103 MMOL/L (ref 98–112)
CO2 SERPL-SCNC: 39 MMOL/L (ref 21–32)
CREAT BLD-MCNC: 0.93 MG/DL
DEPRECATED RDW RBC AUTO: 53.9 FL (ref 35.1–46.3)
EGFRCR SERPLBLD CKD-EPI 2021: 87 ML/MIN/1.73M2 (ref 60–?)
EOSINOPHIL # BLD AUTO: 0.16 X10(3) UL (ref 0–0.7)
EOSINOPHIL NFR BLD AUTO: 1.7 %
ERYTHROCYTE [DISTWIDTH] IN BLOOD BY AUTOMATED COUNT: 14.3 % (ref 11–15)
EST. AVERAGE GLUCOSE BLD GHB EST-MCNC: 131 MG/DL (ref 68–126)
FLUAV + FLUBV RNA SPEC NAA+PROBE: NEGATIVE
FLUAV + FLUBV RNA SPEC NAA+PROBE: NEGATIVE
GLUCOSE BLD-MCNC: 161 MG/DL (ref 70–99)
GLUCOSE BLDC GLUCOMTR-MCNC: 136 MG/DL (ref 70–99)
GLUCOSE BLDC GLUCOMTR-MCNC: 196 MG/DL (ref 70–99)
HBA1C MFR BLD: 6.2 % (ref ?–5.7)
HCO3 BLDA-SCNC: 34.3 MEQ/L (ref 21–27)
HCT VFR BLD AUTO: 48.3 %
HGB BLD-MCNC: 14.3 G/DL
IMM GRANULOCYTES # BLD AUTO: 0.07 X10(3) UL (ref 0–1)
IMM GRANULOCYTES NFR BLD: 0.8 %
LYMPHOCYTES # BLD AUTO: 1.61 X10(3) UL (ref 1–4)
LYMPHOCYTES NFR BLD AUTO: 17.6 %
MCH RBC QN AUTO: 30.6 PG (ref 26–34)
MCHC RBC AUTO-ENTMCNC: 29.6 G/DL (ref 31–37)
MCV RBC AUTO: 103.2 FL
MODIFIED ALLEN TEST: POSITIVE
MONOCYTES # BLD AUTO: 0.88 X10(3) UL (ref 0.1–1)
MONOCYTES NFR BLD AUTO: 9.6 %
NEUTROPHILS # BLD AUTO: 6.37 X10 (3) UL (ref 1.5–7.7)
NEUTROPHILS # BLD AUTO: 6.37 X10(3) UL (ref 1.5–7.7)
NEUTROPHILS NFR BLD AUTO: 69.4 %
NT-PROBNP SERPL-MCNC: 87 PG/ML (ref ?–125)
O2 CT BLD-SCNC: 19.4 VOL% (ref 15–23)
OSMOLALITY SERPL CALC.SUM OF ELEC: 305 MOSM/KG (ref 275–295)
OXYGEN LITERS/MINUTE: 8 L/MIN
P AXIS: 66 DEGREES
P-R INTERVAL: 144 MS
PCO2 BLDA: 95 MM HG (ref 35–45)
PH BLDA: 7.27 [PH] (ref 7.35–7.45)
PLATELET # BLD AUTO: 221 10(3)UL (ref 150–450)
PO2 BLDA: 109 MM HG (ref 80–100)
POTASSIUM SERPL-SCNC: 4.8 MMOL/L (ref 3.5–5.1)
PROCALCITONIN SERPL-MCNC: <0.04 NG/ML (ref ?–0.05)
PUNCTURE CHARGE: YES
Q-T INTERVAL: 366 MS
QRS DURATION: 90 MS
QTC CALCULATION (BEZET): 445 MS
R AXIS: -17 DEGREES
RBC # BLD AUTO: 4.68 X10(6)UL
RSV RNA SPEC NAA+PROBE: NEGATIVE
SAO2 % BLDA: 99 % (ref 94–100)
SARS-COV-2 RNA RESP QL NAA+PROBE: NOT DETECTED
SODIUM SERPL-SCNC: 145 MMOL/L (ref 136–145)
T AXIS: 62 DEGREES
TROPONIN I SERPL HS-MCNC: 5 NG/L
VENTRICULAR RATE: 89 BPM
WBC # BLD AUTO: 9.2 X10(3) UL (ref 4–11)

## 2025-02-10 PROCEDURE — 99223 1ST HOSP IP/OBS HIGH 75: CPT | Performed by: INTERNAL MEDICINE

## 2025-02-10 PROCEDURE — 71045 X-RAY EXAM CHEST 1 VIEW: CPT | Performed by: STUDENT IN AN ORGANIZED HEALTH CARE EDUCATION/TRAINING PROGRAM

## 2025-02-10 PROCEDURE — 5A09457 ASSISTANCE WITH RESPIRATORY VENTILATION, 24-96 CONSECUTIVE HOURS, CONTINUOUS POSITIVE AIRWAY PRESSURE: ICD-10-PCS | Performed by: HOSPITALIST

## 2025-02-10 PROCEDURE — 99223 1ST HOSP IP/OBS HIGH 75: CPT | Performed by: HOSPITALIST

## 2025-02-10 PROCEDURE — 5A0945A ASSISTANCE WITH RESPIRATORY VENTILATION, 24-96 CONSECUTIVE HOURS, HIGH NASAL FLOW/VELOCITY: ICD-10-PCS | Performed by: HOSPITALIST

## 2025-02-10 RX ORDER — DEXTROSE MONOHYDRATE 25 G/50ML
50 INJECTION, SOLUTION INTRAVENOUS
Status: DISCONTINUED | OUTPATIENT
Start: 2025-02-10 | End: 2025-02-14

## 2025-02-10 RX ORDER — IPRATROPIUM BROMIDE AND ALBUTEROL SULFATE 2.5; .5 MG/3ML; MG/3ML
3 SOLUTION RESPIRATORY (INHALATION) EVERY 6 HOURS PRN
Status: DISCONTINUED | OUTPATIENT
Start: 2025-02-10 | End: 2025-02-14

## 2025-02-10 RX ORDER — ENOXAPARIN SODIUM 100 MG/ML
0.5 INJECTION SUBCUTANEOUS EVERY 12 HOURS
Status: DISCONTINUED | OUTPATIENT
Start: 2025-02-10 | End: 2025-02-13

## 2025-02-10 RX ORDER — ALBUTEROL SULFATE 5 MG/ML
10 SOLUTION RESPIRATORY (INHALATION) ONCE
Status: COMPLETED | OUTPATIENT
Start: 2025-02-10 | End: 2025-02-10

## 2025-02-10 RX ORDER — ACETAMINOPHEN 500 MG
500 TABLET ORAL EVERY 4 HOURS PRN
Status: DISCONTINUED | OUTPATIENT
Start: 2025-02-10 | End: 2025-02-14

## 2025-02-10 RX ORDER — NICOTINE POLACRILEX 4 MG
30 LOZENGE BUCCAL
Status: DISCONTINUED | OUTPATIENT
Start: 2025-02-10 | End: 2025-02-14

## 2025-02-10 RX ORDER — METHYLPREDNISOLONE SODIUM SUCCINATE 125 MG/2ML
60 INJECTION INTRAMUSCULAR; INTRAVENOUS ONCE
Status: COMPLETED | OUTPATIENT
Start: 2025-02-10 | End: 2025-02-10

## 2025-02-10 RX ORDER — NICOTINE POLACRILEX 4 MG
15 LOZENGE BUCCAL
Status: DISCONTINUED | OUTPATIENT
Start: 2025-02-10 | End: 2025-02-14

## 2025-02-10 RX ORDER — SPIRONOLACTONE 25 MG/1
12.5 TABLET ORAL DAILY
Status: DISCONTINUED | OUTPATIENT
Start: 2025-02-10 | End: 2025-02-14

## 2025-02-10 RX ORDER — METHYLPREDNISOLONE SODIUM SUCCINATE 40 MG/ML
40 INJECTION INTRAMUSCULAR; INTRAVENOUS EVERY 8 HOURS
Status: DISCONTINUED | OUTPATIENT
Start: 2025-02-10 | End: 2025-02-11

## 2025-02-10 RX ORDER — ATORVASTATIN CALCIUM 20 MG/1
20 TABLET, FILM COATED ORAL NIGHTLY
Status: DISCONTINUED | OUTPATIENT
Start: 2025-02-10 | End: 2025-02-14

## 2025-02-10 RX ORDER — ALBUTEROL SULFATE 0.83 MG/ML
2.5 SOLUTION RESPIRATORY (INHALATION)
Status: DISCONTINUED | OUTPATIENT
Start: 2025-02-10 | End: 2025-02-14

## 2025-02-10 RX ORDER — BENZONATATE 200 MG/1
200 CAPSULE ORAL 3 TIMES DAILY PRN
Status: DISCONTINUED | OUTPATIENT
Start: 2025-02-10 | End: 2025-02-14

## 2025-02-10 RX ORDER — TORSEMIDE 20 MG/1
20 TABLET ORAL 2 TIMES DAILY
Status: DISCONTINUED | OUTPATIENT
Start: 2025-02-10 | End: 2025-02-14

## 2025-02-10 RX ORDER — FLUTICASONE PROPIONATE AND SALMETEROL 250; 50 UG/1; UG/1
1 POWDER RESPIRATORY (INHALATION) 2 TIMES DAILY
Status: DISCONTINUED | OUTPATIENT
Start: 2025-02-10 | End: 2025-02-14

## 2025-02-10 NOTE — ED QUICK NOTES
ERMD at bedside to view cxr. Per ERMD, no pneumothorax visible- pt ok to be placed on BiPAP. RT made aware.

## 2025-02-10 NOTE — H&P
[facility]    PATIENT'S NAME: NAOMI HOWARD   ATTENDING PHYSICIAN: Balwinder Jose MD   PATIENT ACCOUNT#:   [id number]    LOCATION:  [address]  MEDICAL RECORD #:   [id number]       YOB: 1950  ADMISSION DATE:       02/10/2025    HISTORY AND PHYSICAL EXAMINATION    CHIEF COMPLAINT:  Acute on chronic respiratory failure.    HISTORY OF PRESENT ILLNESS:  The patient is a 74-year-old  male with underlying severe chronic obstructive pulmonary disease and obstructive sleep apnea.  He accidentally disconnected his oxygen concentrator and humidifier cord a few days ago, and his wife placed him on straight up oxygen at home.  He has been using his CPAP machine at night but he continued to become more confused, drowsy, with multiple falls.  Brought him in today for evaluation.  Upon arrival to the emergency room noted to have pulse ox of 70% on room air.  He was started on BiPAP machine with high-flow nasal cannula oxygen.  Arterial blood gas showed pCO2 of 95, pH 7.27, and pO2 of 109.  CBC and chemistry otherwise were unremarkable.  The patient will be admitted to the hospital for further management.  Chest x-ray today showed no acute findings.    PAST MEDICAL HISTORY:  Chronic obstructive pulmonary disease with overlap syndrome and obstructive sleep apnea with chronic hypoxic hypercarbic respiratory failure on home oxygen and CPAP machine at night, left ventricular diastolic dysfunction, hypertension, hyperlipidemia, osteoarthritis, degenerative joint disease of lumbar spine.    PAST SURGICAL HISTORY:  Cataract procedure, appendectomy, adenoidectomy, and T7 kyphoplasty.    MEDICATIONS:  Please see medication reconciliation list.     ALLERGIES:  No known drug allergies.     FAMILY HISTORY:  Both parents had lung cancer.    SOCIAL HISTORY:  Chronic on and off tobacco use.  No alcohol or drug use.  Sedentary lifestyle.  Lives with his family.    REVIEW OF SYSTEMS:  Wife  reported that patient had increased drowsiness, fatigue, confusion at times, multiple falls.  Other 12-point review of systems negative.  No sick contacts.      PHYSICAL EXAMINATION:    GENERAL:  Drowsy but able to respond to simple commands and questions.   VITAL SIGNS:  Temperature 98.2; pulse 84; respiratory rate 22; blood pressure 122/70; pulse ox 70% on room air, 96% on BiPAP support.  HEENT:  Atraumatic.  Oropharynx clear.  Dry mucous membranes.  NECK:  Supple.  No lymphadenopathy.  Trachea midline.  LUNGS:  Clear.  Diminished breathing sounds.  Currently on BiPAP support.  ABDOMEN:  Soft, nondistended.  No tenderness.  Positive bowel sounds.  Obese.  EXTREMITIES:  Chronic stasis dermatitis with +1 to 2 edema.  NEUROLOGIC:  No clear focal defect.    ASSESSMENT:    1.   Acute on chronic hypercarbic hypoxic respiratory failure with metabolic acidosis.  2.   Morbid obesity.  3.   Chronic obstructive pulmonary disease exacerbation.    PLAN:  The patient will be admitted to general medical floor.  Continuous BiPAP support.  Oxygen support.  Solu-Medrol, nebulizer treatments.  Monitor Accu-Cheks.  He does have borderline diabetes mellitus type 2.  Obtain Pulmonary consult.  The patient will probably need an upgrade to BiPAP machine at home instead of CPAP machine.  Further recommendations to follow.    Dictated By Natali Diaz MD  d: 02/10/2025 13:24:01  t: 02/10/2025 13:30:28  Job [id number]  FB/    cc: Balwinder Jose MD

## 2025-02-10 NOTE — ED INITIAL ASSESSMENT (HPI)
Patient arrives to ER for evaluation of difficulty breathing. Patient was 70% on 4L his baseline upon arrival to ER.     Patient roomed immediately and placed on High flow 8L O2     Hx of COPD

## 2025-02-10 NOTE — ED PROVIDER NOTES
Patient Seen in: [facility] Emergency Department      History     Chief Complaint   Patient presents with    Difficulty Breathing     Stated Complaint: STEFANI    Subjective:   HPI      74-year-old male with history of CHF, COPD on 4 L nasal cannula with frequent exacerbations, CAD, CHF, hypertension hyperlipidemia, MARIA M intermittently compliant with  CPAP presents with difficulty breathing.  Brought in from home.  2-day history of progressive dyspnea on exertion, no cough no fevers or chills.  No chest pain.  Lower extremity swelling is about at baseline.  He notes he did fall yesterday and suffered a contusion to right posterior chest.  Upon triage found to be 70% on 4 L and brought straight back to the ER room 13 for evaluation.     Objective:     Past Medical History:    Atherosclerosis of coronary artery    coronary calcification on ct chest    Back problem    Cataract    Chronic respiratory failure (HCC)    Congestive heart disease (HCC)    COPD (HCC)    freq exacerbations. ? 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    Osteoarthritis    Pneumonia    Pulmonary nodules    Sleep apnea    Tobacco abuse              Past Surgical History:   Procedure Laterality Date    Appendectomy      Appendectomy      Cataract Bilateral     2/2024, 3/2024    Electrocardiogram, complete  01/30/2012    scanned to media tab                Social History     Socioeconomic History    Marital status:    Tobacco Use    Smoking status: Former     Current packs/day: 0.00     Average packs/day: 2.0 packs/day for 40.0 years (80.0 ttl pk-yrs)     Types: Cigarettes     Start date: 11/1981     Quit date: 11/2021     Years since quitting: 3.2     Passive exposure: Past    Smokeless tobacco:  Never   Vaping Use    Vaping status: Never Used   Substance and Sexual Activity    Alcohol use: Not Currently     Alcohol/week: 0.0 standard drinks of alcohol     Comment: socially    Drug use: Not Currently   Other Topics Concern    Caffeine Concern Yes     Comment: coffee, >32 oz daily     Social Drivers of Health     Food Insecurity: No Food Insecurity (1/11/2025)    Food Insecurity     Food Insecurity: Never true   Transportation Needs: No Transportation Needs (1/16/2025)    Transportation Needs     Lack of Transportation: No   Housing Stability: Low Risk  (1/11/2025)    Housing Stability     Housing Instability: No                  Physical Exam     ED Triage Vitals [02/10/25 1234]   /82   Pulse 98   Resp 20   Temp 98.2 °F (36.8 °C)   Temp src Oral   SpO2 (!) 70 %   O2 Device Nasal cannula       Current Vitals:   Vital Signs  BP: 130/73  Pulse: 88  Resp: 23  Temp: 98.2 °F (36.8 °C)  Temp src: Oral  MAP (mmHg): 89    Oxygen Therapy  SpO2: 95 %  O2 Device: Bi-PAP  Mode: Spontaneous/Timed  O2 Flow Rate (L/min): 8 L/min        Physical Exam  Constitutional: awake, alert, no sig distress  HENT: mmm, no lesions,  Neck: normal range of motion, no tenderness, supple.  Eyes: PERRL, EOMI, conjunctiva normal, no discharge. Sclera anicteric.  Cardiovascular: rr no murmur  Respiratory: Tachypneic, diminished, w/ exp wheeze.  GI: Bowel sounds normal, Soft, no tenderness, no masses, no pulsatile masses.  : No CVA tenderness.  Skin: Warm, dry, contusion and abrasion to right posterior chest  Musculoskeletal: Intact distal pulses, chronic LE edema b/l, no tenderness, no cyanosis, no clubbing. Good range of motion in all major joints. No tenderness to palpation or major deformities noted. Back- No tenderness.  Neurologic: Alert & oriented x 3, normal motor function, normal sensory function, no focal deficits noted.  Psych: Calm, cooperative, nl affect        ED Course     Labs Reviewed   ARTERIAL BLOOD GAS - Abnormal;  Notable for the following components:       Result Value    ABG pH 7.27 (*)     ABG pCO2 95 (*)     ABG PO2 109 (*)     ABG HCO3 34.3 (*)     Blood Gas Base Excess 12.1 (*)     All other components within normal limits   CBC WITH DIFFERENTIAL WITH PLATELET - Abnormal; Notable for the following components:    .2 (*)     MCHC 29.6 (*)     RDW-SD 53.9 (*)     All other components within normal limits   BASIC METABOLIC PANEL (8) - Abnormal; Notable for the following components:    Glucose 161 (*)     CO2 39.0 (*)     Calculated Osmolality 305 (*)     All other components within normal limits   TROPONIN I HIGH SENSITIVITY - Normal   PRO BETA NATRIURETIC PEPTIDE - Normal   PROCALCITONIN - Normal   SARS-COV-2/FLU A AND B/RSV BY PCR (GENEXPERT) - Normal    Narrative:     This test is intended for the qualitative detection and differentiation of SARS-CoV-2, influenza A, influenza B, and respiratory syncytial virus (RSV) viral RNA in nasopharyngeal or nares swabs from individuals suspected of respiratory viral infection consistent with COVID-19 by their healthcare provider. Signs and symptoms of respiratory viral infection due to SARS-CoV-2, influenza, and RSV can be similar.    Test performed using the Xpert Xpress SARS-CoV-2/FLU/RSV (real time RT-PCR)  assay on the [employer] instrument, [employer], [place] [address].   This test is being used under the Food and Drug Administration's Emergency Use Authorization.    The authorized Fact Sheet for Healthcare Providers for this assay is available upon request from the laboratory.   RAINBOW DRAW LAVENDER   RAINBOW DRAW LIGHT GREEN   RAINBOW DRAW BLUE     EKG    Rate, intervals and axes as noted on EKG Report.  Rate: 89  Rhythm: Sinus Rhythm  Reading: NSR, left axis, normal intervals, no st or t wave changes, no stemi              ED Course as of 02/10/25 1753  ------------------------------------------------------------  Time: 02/10 1345  Comment: I have independently  reviewed patient's chest x-ray do not appreciate any acute cardiopulmonary findings.              MDM      74M with history as documented above presenting with acute shortness of breath.  On arrival he is tachypneic hypoxic   -On exam he is diminished with expiratory wheeze  He was transitioned to BiPAP  X-ray obtained at bedside on my review shows no pneumothorax  Ddx: COPD exacerbation, CHF exacerbation bronchopneumonia viral syndrome    Given IV steroids and neb.  Labs with decompensated respiratory acidosis with hypercapnia.  Pro-Jesse negative.  No focal infiltrates on chest x-ray.  Labs otherwise reassuring.  Discussed with hospitalist and pulmonary.    I spent a total of 40 minutes of critical care time in obtaining history, performing a physical exam, bedside monitoring of interventions, collecting and interpreting tests and discussion with consultants but not including time spent performing procedures.    Admission disposition: 2/10/2025 12:54 PM           [facility]    Disposition and Plan     Clinical Impression:  1. Acute hypoxic on chronic hypercapnic respiratory failure (HCC)         Disposition:  Admit  2/10/2025 12:54 pm    Follow-up:  No follow-up provider specified.  We recommend that you schedule follow up care with a primary care provider within the next three months to obtain basic health screening including reassessment of your blood pressure.      Medications Prescribed:  Current Discharge Medication List              Supplementary Documentation:         Hospital Problems       Present on Admission  Date Reviewed: 1/29/2025            ICD-10-CM Noted POA    * (Principal) Acute hypoxic on chronic hypercapnic respiratory failure (HCC) J96.01, J96.12 2/10/2025 Unknown

## 2025-02-10 NOTE — CONSULTS
[facility]  part of [facility]    Report of Consultation    Campbell Rao Patient Status:  Emergency    1950 MRN [id number]   Location [facility] EMERGENCY DEPARTMENT Attending Balwinder Jose*   Hosp Day # 0 PCP Johny Kang MD     Date of Admission:  2/10/2025  Date of Consult: 2/10/2025    Reason for Consultation:   Consults  COPD exacerbation  Acute on chronic respiratory failure    History provided by:patient and spouse  HPI:     Chief Complaint   Patient presents with    Difficulty Breathing     HPI    74-year-old male with history of COPD, MARIA M, morbid obesity, chronic respiratory failure on home O2 at 4 L nasal cannula  Patient presented from home with increased dyspnea and increased lethargy with mild dry cough with no fever or chills and frequent falls  Chest x-ray in ER with chronic changes with no acute findings  No leukocytosis or fever  pH 7.27 with pCO2 of 95 and pO2 of 109  Chronic lower extremity skin changes  Denied abdominal pain with no vomiting or diarrhea  Generalized fatigue with no focal weakness    History     Past Medical History:    Atherosclerosis of coronary artery    coronary calcification on ct chest    Back problem    Cataract    Chronic respiratory failure (HCC)    Congestive heart disease (HCC)    COPD (HCC)    freq exacerbations. ? 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    Osteoarthritis    Pneumonia    Pulmonary nodules    Sleep apnea    Tobacco abuse     Past Surgical History:   Procedure Laterality Date    Appendectomy      Appendectomy      Cataract Bilateral     2024, 3/2024    Electrocardiogram, complete  2012    scanned to media tab     Family History   Problem  Relation Age of Onset    Cancer Father         lung cancer    Cancer Mother         lung cancer    No Known Problems Daughter     No Known Problems Son     No Known Problems Maternal Grandmother     No Known Problems Maternal Grandfather     No Known Problems Paternal Grandmother     No Known Problems Paternal Grandfather     No Known Problems Sister     Diabetes Brother     Lipids Brother         hyperlipidemia    Hypertension Brother     No Known Problems Other      Social History:  Social History     Socioeconomic History    Marital status:    Tobacco Use    Smoking status: Former     Current packs/day: 0.00     Average packs/day: 2.0 packs/day for 40.0 years (80.0 ttl pk-yrs)     Types: Cigarettes     Start date: 11/1981     Quit date: 11/2021     Years since quitting: 3.2     Passive exposure: Past    Smokeless tobacco: Never   Vaping Use    Vaping status: Never Used   Substance and Sexual Activity    Alcohol use: Not Currently     Alcohol/week: 0.0 standard drinks of alcohol     Comment: socially    Drug use: Not Currently   Other Topics Concern    Caffeine Concern Yes     Comment: coffee, >32 oz daily     Social Drivers of Health     Food Insecurity: No Food Insecurity (1/11/2025)    Food Insecurity     Food Insecurity: Never true   Transportation Needs: No Transportation Needs (1/16/2025)    Transportation Needs     Lack of Transportation: No   Stress: No Stress Concern Present (1/16/2024)    Stress     Feeling of Stress : No   Housing Stability: Low Risk  (1/11/2025)    Housing Stability     Housing Instability: No     Allergies/Medications:   Allergies: Allergies[1]  (Not in a hospital admission)      Review of Systems:     Constitutional:  Positive for fatigue. Negative for fever.   HENT:  Negative for congestion.    Respiratory:  Positive for shortness of breath. Negative for wheezing.    Cardiovascular:  Positive for leg swelling. Negative for chest pain.   Gastrointestinal:  Negative for  abdominal pain.   Neurological:  Negative for seizures.   Psychiatric/Behavioral:  Positive for decreased concentration.        Physical Exam:   Vital Signs:   weight is 310 lb (140.6 kg) (abnormal). His oral temperature is 98.2 °F (36.8 °C). His blood pressure is 121/82 and his pulse is 88. His respiration is 22 and oxygen saturation is 95%.   Physical Exam  Constitutional:       Appearance: He is obese. He is ill-appearing.   HENT:      Head: Atraumatic.      Mouth/Throat:      Mouth: Mucous membranes are moist.   Eyes:      General: No scleral icterus.  Cardiovascular:      Rate and Rhythm: Normal rate.      Heart sounds:      No gallop.   Pulmonary:      Comments: Diminished breath sounds bilaterally  Few expiratory wheezes  Prolonged expiratory phase  No rales or rhonchi  Abdominal:      General: Bowel sounds are normal. There is distension.      Palpations: Abdomen is soft.      Tenderness: There is no abdominal tenderness. There is no guarding or rebound.   Musculoskeletal:      Cervical back: No rigidity.      Right lower leg: Edema present.      Left lower leg: Edema present.   Skin:     General: Skin is dry.   Neurological:      General: No focal deficit present.      Mental Status: He is oriented to person, place, and time.         Results:     Lab Results   Component Value Date    WBC 9.2 02/10/2025    HGB 14.3 02/10/2025    HCT 48.3 02/10/2025    .0 02/10/2025    CREATSERUM 0.93 02/10/2025    BUN 17 02/10/2025     02/10/2025    K 4.8 02/10/2025     02/10/2025    CO2 39.0 (H) 02/10/2025     (H) 02/10/2025    CA 9.3 02/10/2025    ALB 4.3 01/12/2025    ALKPHO 92 01/12/2025    BILT 0.6 01/12/2025    TP 6.6 01/12/2025    AST 24 01/12/2025    ALT 45 01/12/2025    TSH 2.777 04/18/2024    GGT 38 04/18/2024    DDIMER 0.40 10/18/2016    CRP 0.40 01/11/2025    MG 2.1 01/12/2025    PHOS 4.1 04/26/2024    TROP <0.045 04/10/2021    TROPHS 5 02/10/2025    B12 765 04/27/2021     XR CHEST AP  PORTABLE  (CPT=71045)    Result Date: 2/10/2025  CONCLUSION:   Stable linear atelectasis or scarring in the left lung base.  No other new or worsening focal airspace disease.  Stable cardiomegaly.   elm-remote  Dictated by (CST): Ross Cole MD on 2/10/2025 at 1:05 PM     Finalized by (CST): Ross Cole MD on 2/10/2025 at 1:07 PM         EKG 12 Lead    Result Date: 2/10/2025  Normal sinus rhythm Anterior infarct (cited on or before 11-JAN-2025) Abnormal ECG When compared with ECG of 11-JAN-2025 10:07, No significant change was found Confirmed by ESTUARDO SORIANO ([id number]) on 2/10/2025 1:15:36 PM     Impression:      1-acute on chronic hypoxemic and hypercapnic respiratory failure  pH 7.27 pCO2 95 and pO2 109  Chest x-ray chronic changes with no acute findings  No leukocytosis or fever    -Decompensated overlap syndrome with COPD/MARIA M/OHD with morbid obesity    Plan ;  BiPAP during sleep and daytime as needed  Taper steroid and bronchial hygiene and nebulizers  ICS/LABA/LAMA inhaler  PT and OT  Avoid sedative or narcotic     2-DVT prophylaxis  Heparin subcu    3-full code      D/w pt and wife at bedside         Yesi Salinas MD  2/10/2025         [1] No Known Allergies

## 2025-02-10 NOTE — ED QUICK NOTES
Orders for admission, patient is aware of plan and ready to go upstairs. Any questions, please call ED KATHRYN steele at extension [phone].     Patient Covid vaccination status: Fully vaccinated     COVID Test Ordered in ED: SARS-CoV-2/Flu A and B/RSV by PCR (GeneXpert)    COVID Suspicion at Admission: N/A    Running Infusions:  None    Mental Status/LOC at time of transport: a/o x4    Other pertinent information: pt normally wears 4 L NC.   CIWA score: N/A   NIH score:  N/A

## 2025-02-11 LAB
ANION GAP SERPL CALC-SCNC: 4 MMOL/L (ref 0–18)
BASOPHILS # BLD AUTO: 0.03 X10(3) UL (ref 0–0.2)
BASOPHILS NFR BLD AUTO: 0.3 %
BUN BLD-MCNC: 21 MG/DL (ref 9–23)
BUN/CREAT SERPL: 21.2 (ref 10–20)
CALCIUM BLD-MCNC: 9.5 MG/DL (ref 8.7–10.4)
CHLORIDE SERPL-SCNC: 99 MMOL/L (ref 98–112)
CO2 SERPL-SCNC: 39 MMOL/L (ref 21–32)
CREAT BLD-MCNC: 0.99 MG/DL
DEPRECATED RDW RBC AUTO: 52.8 FL (ref 35.1–46.3)
EGFRCR SERPLBLD CKD-EPI 2021: 80 ML/MIN/1.73M2 (ref 60–?)
EOSINOPHIL # BLD AUTO: 0 X10(3) UL (ref 0–0.7)
EOSINOPHIL NFR BLD AUTO: 0 %
ERYTHROCYTE [DISTWIDTH] IN BLOOD BY AUTOMATED COUNT: 14.2 % (ref 11–15)
GLUCOSE BLD-MCNC: 148 MG/DL (ref 70–99)
GLUCOSE BLDC GLUCOMTR-MCNC: 130 MG/DL (ref 70–99)
GLUCOSE BLDC GLUCOMTR-MCNC: 148 MG/DL (ref 70–99)
GLUCOSE BLDC GLUCOMTR-MCNC: 183 MG/DL (ref 70–99)
GLUCOSE BLDC GLUCOMTR-MCNC: 90 MG/DL (ref 70–99)
HCT VFR BLD AUTO: 48.2 %
HGB BLD-MCNC: 14.3 G/DL
IMM GRANULOCYTES # BLD AUTO: 0.06 X10(3) UL (ref 0–1)
IMM GRANULOCYTES NFR BLD: 0.6 %
LYMPHOCYTES # BLD AUTO: 0.67 X10(3) UL (ref 1–4)
LYMPHOCYTES NFR BLD AUTO: 6.7 %
MCH RBC QN AUTO: 29.7 PG (ref 26–34)
MCHC RBC AUTO-ENTMCNC: 29.7 G/DL (ref 31–37)
MCV RBC AUTO: 100.2 FL
MONOCYTES # BLD AUTO: 0.27 X10(3) UL (ref 0.1–1)
MONOCYTES NFR BLD AUTO: 2.7 %
NEUTROPHILS # BLD AUTO: 9.02 X10 (3) UL (ref 1.5–7.7)
NEUTROPHILS # BLD AUTO: 9.02 X10(3) UL (ref 1.5–7.7)
NEUTROPHILS NFR BLD AUTO: 89.7 %
OSMOLALITY SERPL CALC.SUM OF ELEC: 300 MOSM/KG (ref 275–295)
PLATELET # BLD AUTO: 217 10(3)UL (ref 150–450)
POTASSIUM SERPL-SCNC: 4.8 MMOL/L (ref 3.5–5.1)
RBC # BLD AUTO: 4.81 X10(6)UL
SODIUM SERPL-SCNC: 142 MMOL/L (ref 136–145)
WBC # BLD AUTO: 10.1 X10(3) UL (ref 4–11)

## 2025-02-11 PROCEDURE — 99233 SBSQ HOSP IP/OBS HIGH 50: CPT | Performed by: INTERNAL MEDICINE

## 2025-02-11 PROCEDURE — 99233 SBSQ HOSP IP/OBS HIGH 50: CPT | Performed by: HOSPITALIST

## 2025-02-11 RX ORDER — METHYLPREDNISOLONE SODIUM SUCCINATE 40 MG/ML
40 INJECTION INTRAMUSCULAR; INTRAVENOUS EVERY 12 HOURS
Status: DISCONTINUED | OUTPATIENT
Start: 2025-02-11 | End: 2025-02-12

## 2025-02-11 NOTE — PROGRESS NOTES
[facility]  Progress Note     Campbell Rao  : 1950    Status: Inpatient  Day #: 1    Attending: Ochoa Bey MD  PCP: Johny Kang MD     Assessment and Plan:    Acute on chronic combined respiratory failure  AECOPD  MARIA M  -4L O2 at baseline  -pulm on consult, recs appreciated  -cont CPAP nightly  -not nebs, steroids IV    HTN  -cont monitor    HL  -cont statin    Knees \"buckling\"  -s/p recent steroid injections to b/l knees  -PT/OT    Chronic HFpEF  -cont home diuretics    Morbid obesity Body mass index is 46.93 kg/m².        DVT Mechanical Prophylaxis:        DVT Pharmacologic Prophylaxis   Medication    enoxaparin (Lovenox) 80 MG/0.8ML SUBQ injection 70 mg               Subjective:      Initial Chief Complaint:  SOB    Feeling a little better. Breathing comfortably at rest    10 point ROS completed and was negative, except for pertinent positive and negatives stated in subjective.      Objective:      Temp:  [97.6 °F (36.4 °C)-99.3 °F (37.4 °C)] 98 °F (36.7 °C)  Pulse:  [] 105  Resp:  [18-28] 18  BP: ()/(60-90) 114/65  SpO2:  [70 %-98 %] 92 %  General:  Alert, no distress  HEENT:  Normocephalic, atraumatic  Cardiac:  Regular rate, regular rhythm  Pulmonary:  diminished BS b/l  Gastrointestinal:  Soft, non-tender, normal bowel sounds  Musculoskeletal:  No joint swelling  Extremities:  No edema, no cyanosis, no clubbing  Neurologic:  nonfocal  Psychiatric:  Normal affect, calm and appropriate    Intake/Output Summary (Last 24 hours) at 2025 1016  Last data filed at 2025 0811  Gross per 24 hour   Intake 890 ml   Output 1900 ml   Net -1010 ml         Recent Labs   Lab 02/10/25  1240 25  0504   WBC 9.2 10.1   HGB 14.3 14.3   HCT 48.3 48.2   .0 217.0   RBC 4.68 4.81   .2* 100.2*   MCH 30.6 29.7   MCHC 29.6* 29.7*   RDW 14.3 14.2   NEPRELIM 6.37 9.02*     Recent Labs   Lab 02/10/25  1240 25  0505   BUN 17 21   CREATSERUM 0.93 0.99   CA 9.3  9.5    142   K 4.8 4.8    99   CO2 39.0* 39.0*   * 148*       XR CHEST AP PORTABLE  (CPT=71045)    Result Date: 2/10/2025  CONCLUSION:   Stable linear atelectasis or scarring in the left lung base.  No other new or worsening focal airspace disease.  Stable cardiomegaly.   elm-remote  Dictated by (CST): Ross Cole MD on 2/10/2025 at 1:05 PM     Finalized by (CST): Ross Cole MD on 2/10/2025 at 1:07 PM         EKG 12 Lead    Result Date: 2/10/2025  Normal sinus rhythm Anterior infarct (cited on or before 11-JAN-2025) Abnormal ECG When compared with ECG of 11-JAN-2025 10:07, No significant change was found Confirmed by ESTUARDO SORIANO ([id number]) on 2/10/2025 1:15:36 PM   Medications:   enoxaparin  0.5 mg/kg Subcutaneous q12h    methylPREDNISolone  40 mg Intravenous Q8H    insulin aspart  1-7 Units Subcutaneous TID CC    atorvastatin  20 mg Oral Nightly    fluticasone-salmeterol  1 puff Inhalation BID    spironolactone  12.5 mg Oral Daily    torsemide  20 mg Oral BID    umeclidinium bromide  1 puff Inhalation Daily    albuterol  2.5 mg Nebulization 4 times per day      PRN Meds:   acetaminophen    guaiFENesin    benzonatate    ipratropium-albuterol    glucose **OR** glucose **OR** glucose-vitamin C **OR** dextrose **OR** glucose **OR** glucose **OR** glucose-vitamin C    Supplementary Documentation:   DVT Mechanical Prophylaxis:        DVT Pharmacologic Prophylaxis   Medication    enoxaparin (Lovenox) 80 MG/0.8ML SUBQ injection 70 mg                Code Status: Full Code  Orellana: External urinary catheter in place  Orellana Duration (in days):   Central line:    PETRA: 2/12/2025        **Certification      PHYSICIAN Certification of Need for Inpatient Hospitalization - Initial Certification    Patient will require inpatient services that will reasonably be expected to span two midnight's based on the clinical documentation in H+P.   Based on patients current state of illness, I anticipate that, after  discharge, patient will require TBD.            MDM High. Time spent on chart/note review, review of labs/imaging, discussion with patient, physical exam, discussion with staff, consultants, coordinating care, writing progress note, discussion of plan of care.      Ochoa Bey MD

## 2025-02-11 NOTE — OCCUPATIONAL THERAPY NOTE
OCCUPATIONAL THERAPY EVALUATION - INPATIENT     Room Number: 517/517-A  Evaluation Date: 2/11/2025  Type of Evaluation: Initial  Presenting Problem: acute on chronic respiratory failure    Physician Order: IP Consult to Occupational Therapy  Reason for Therapy: ADL/IADL Dysfunction and Discharge Planning    OCCUPATIONAL THERAPY ASSESSMENT   Patient is a 74 year old male admitted 2/10/2025 for acute on chronic respiratory failure. Pt reports several falls in the past couple of weeks citing h/o \"bad knees\".   Prior to admission, patient's baseline is generally Mod I or BADls using LHAE and household mobility using R/W. Pt uses 4LO2 at home. Pt lives with wife in one story house- wife works days    Patient is currently functioning below baseline with BADLs and functional mobility.  Patient is requiring contact guard assist and moderate assist as a result of the following impairments: decreased functional reach, decreased endurance, and decreased muscular endurance. Occupational Therapy will continue to follow for duration of hospitalization.    Patient will benefit from continued skilled OT Services to promote return to prior level of function and safety with continuous assistance and gradual rehabilitative therapy.    PLAN DURING HOSPITALIZATION     OT Treatment Plan: ADL training;Energy conservation/work simplification techniques;Functional transfer training;UE strengthening/ROM;Endurance training;Patient/Family education;Patient/Family training;Equipment eval/education;Compensatory technique education     OCCUPATIONAL THERAPY MEDICAL/SOCIAL HISTORY   Problem List  Principal Problem:    Acute hypoxic on chronic hypercapnic respiratory failure (HCC)  Active Problems:    Acute on chronic respiratory failure (HCC)    HOME SITUATION  Type of Home: House  Home Layout: One level  Lives With: Spouse (wife works days)  Toilet and Equipment: Standard height toilet  Occupation/Status: does not work  Patient Regularly Uses:  Reading glasses; Home O2; Rolling walker      Use of Assistive Device(s): R/W, home O2 at $L    Prior Level of [place]: Pt is typically mod I for BADls and household mobility while wife at work    SUBJECTIVE  \"\"My knee just gave out!\"    OCCUPATIONAL THERAPY EXAMINATION      OBJECTIVE  Precautions: Bed/chair alarm (supplemental O2)  Fall Risk: High fall risk      PAIN ASSESSMENT  Rating: Unable to rate  Location: LT hand  Management Techniques: -- (reports Mm spasms)      ACTIVITY TOLERANCE  Fair limited to short in room distance with SOB    O2 SATURATIONS  Oxygen Therapy  SPO2% Ambulation on Oxygen: 90  Ambulation oxygen flow (liters per minute): 4    COGNITION  Alert and O  Impulsive      Behavioral/Emotional/Social: cooperative    RANGE OF MOTION   Upper extremity ROM is within functional limits     STRENGTH ASSESSMENT  Upper extremity strength is within functional limits       ACTIVITIES OF DAILY LIVING ASSESSMENT  AM-PAC ‘6-Clicks’ Inpatient Daily Activity Short Form  How much help from another person does the patient currently need…  -   Putting on and taking off regular lower body clothing?: A Lot  -   Bathing (including washing, rinsing, drying)?: A Lot  -   Toileting, which includes using toilet, bedpan or urinal? : A Little  -   Putting on and taking off regular upper body clothing?: A Little  -   Taking care of personal grooming such as brushing teeth?: A Little  -   Eating meals?: None    AM-PAC Score:  Score: 17  Approx Degree of Impairment: 50.11%  Standardized Score (AM-PAC Scale): 37.26  CMS Modifier (G-Code): CK    FUNCTIONAL TRANSFER ASSESSMENT  Provide CGA for chair, toilet, and bed xfers using R/W with cues to manage walker safely    BED MOBILITY  Sit to supine: Min A using rails    FUNCTIONAL ADL ASSESSMENT  Pt unable to demo the functional reach to reach lower legs/feet d/t limited flexibility and body habitus. Pt reports using LHAE for LE self care PTA.     Skilled Therapy Provided: Pt  received sitting up in chair,no visitors present. Pt's chief c/o is \"bad knees\" citing several falls at home in recent weeks. Pt expresses concern regarding return homg home and being alone days d/t issues with his knees. Pt may benefit from [facility] stay to improve safety and I prior to discharge home.     EDUCATION PROVIDED  Patient Education : Role of Occupational Therapy; Plan of Care; Discharge Recommendations; Functional Transfer Techniques; Fall Prevention; Energy Conservation  Patient's Response to Education: Verbalized Understanding    The patient's Approx Degree of Impairment: 50.11% has been calculated based on documentation in the [facility] '6 clicks' Inpatient Daily Activity Short Form.  Research supports that patients with this level of impairment may benefit from [facility].  Final disposition will be made by interdisciplinary medical team.     Patient End of Session: In bed;Needs met;Call light within reach;RN aware of session/findings;All patient questions and concerns addressed    OT Goals  Patients self stated goal is: lose weight     Patient will complete functional transfer with Mod I  Comment:     Patient will complete toileting with Mod I  Comment:     Patient will tolerate standing for 3-5 minutes in prep for adls with SBA   Comment:    Patient will complete item retrieval with Mod I  Comment:    Pt eugenie perform LE dressing using LHAE mod I 1x      Goals  on: 25  Frequency: 3-5x/week    Patient Evaluation Complexity Level:   Occupational Profile/Medical History LOW - Brief history including review of medical or therapy records    Specific performance deficits impacting engagement in ADL/IADL LOW  1 - 3 performance deficits    Client Assessment/Performance Deficits LOW - No comorbidities nor modifications of tasks    Clinical Decision Making LOW - Analysis of occupational profile, problem-focused assessments, limited treatment options    Overall Complexity LOW   Self-Care Home Management: 20  minutes

## 2025-02-11 NOTE — PLAN OF CARE
Problem: MUSCULOSKELETAL - ADULT  Goal: Return mobility to safest level of function  Description: INTERVENTIONS:  - Assess patient stability and activity tolerance for standing, transferring and ambulating w/ or w/o assistive devices  - Assist with transfers and ambulation using safe patient handling equipment as needed  - Ensure adequate protection for wounds/incisions during mobilization  - Obtain PT/OT consults as needed  - Advance activity as appropriate  - Communicate ordered activity level and limitations with patient/family  Outcome: Progressing     Problem: GENITOURINARY - ADULT  Goal: Absence of urinary retention  Description: INTERVENTIONS:  - Assess patient’s ability to void and empty bladder  - Monitor intake/output and perform bladder scan as needed  - Follow urinary retention protocol/standard of care  - Consider collaborating with pharmacy to review patient's medication profile  - Implement strategies to promote bladder emptying  Outcome: Progressing     Problem: RESPIRATORY - ADULT  Goal: Achieves optimal ventilation and oxygenation  Description: INTERVENTIONS:  - Assess for changes in respiratory status  - Assess for changes in mentation and behavior  - Position to facilitate oxygenation and minimize respiratory effort  - Oxygen supplementation based on oxygen saturation or ABGs  - Provide Smoking Cessation handout, if applicable  - Encourage broncho-pulmonary hygiene including cough, deep breathe, Incentive Spirometry  - Assess the need for suctioning and perform as needed  - Assess and instruct to report SOB or any respiratory difficulty  - Respiratory Therapy support as indicated  - Manage/alleviate anxiety  - Monitor for signs/symptoms of CO2 retention  Outcome: Progressing     Compliant on Bipap for only 4 hours.  Became agitated and difficult to redirect. Placed on 4L HFNC, Sats in low 90's.

## 2025-02-11 NOTE — PROGRESS NOTES
[facility]  part of [facility]    Progress Note    Campbell Rao Patient Status:  Inpatient    1950 MRN [id number]   Location [facility]5W Attending Ochoa Bey MD   Hosp Day # 1 PCP Johny Kang MD         Subjective:     Constitutional:  Positive for fatigue. Negative for fever.   HENT:  Negative for congestion.    Respiratory:  Positive for shortness of breath.    Cardiovascular:  Negative for chest pain.   Gastrointestinal:  Negative for abdominal distention.   Skin:  Negative for color change.   Neurological:  Negative for seizures.   Psychiatric/Behavioral:  Negative for agitation.      Feeling better today  Did very well on BiPAP last night  More awake and alert and up to the chair  No active cough or sputum  Chronic lower extremity edema  No fever  Objective:   Blood pressure 113/80, pulse 103, temperature 97.9 °F (36.6 °C), temperature source Oral, resp. rate 15, weight (!) 308 lb 10.3 oz (140 kg), SpO2 92%.  Physical Exam  Constitutional:       General: He is not in acute distress.     Appearance: He is obese. He is ill-appearing.   HENT:      Head: Atraumatic.      Nose: Nose normal.      Mouth/Throat:      Mouth: Mucous membranes are moist.   Eyes:      General: No scleral icterus.  Cardiovascular:      Rate and Rhythm: Normal rate.      Heart sounds:      No gallop.   Pulmonary:      Effort: No respiratory distress.      Breath sounds: No stridor. No wheezing, rhonchi or rales.      Comments: Diminished breath sounds bilaterally  No wheezes or rales  Abdominal:      General: Bowel sounds are normal. There is distension.      Palpations: Abdomen is soft.      Tenderness: There is no guarding or rebound.   Musculoskeletal:      Right lower leg: Edema present.      Left lower leg: Edema present.   Lymphadenopathy:      Cervical: No cervical adenopathy.   Skin:     General: Skin is dry.   Neurological:      Mental Status: He is oriented to person, place, and  time.         Results:   Lab Results   Component Value Date    WBC 10.1 02/11/2025    HGB 14.3 02/11/2025    HCT 48.2 02/11/2025    .0 02/11/2025    CREATSERUM 0.99 02/11/2025    BUN 21 02/11/2025     02/11/2025    K 4.8 02/11/2025    CL 99 02/11/2025    CO2 39.0 (H) 02/11/2025     (H) 02/11/2025    CA 9.5 02/11/2025    ALB 4.3 01/12/2025    ALKPHO 92 01/12/2025    BILT 0.6 01/12/2025    TP 6.6 01/12/2025    AST 24 01/12/2025    ALT 45 01/12/2025    TSH 2.777 04/18/2024    GGT 38 04/18/2024    DDIMER 0.40 10/18/2016    CRP 0.40 01/11/2025    MG 2.1 01/12/2025    PHOS 4.1 04/26/2024    TROP <0.045 04/10/2021    TROPHS 5 02/10/2025    B12 765 04/27/2021       XR CHEST AP PORTABLE  (CPT=71045)    Result Date: 2/10/2025  CONCLUSION:   Stable linear atelectasis or scarring in the left lung base.  No other new or worsening focal airspace disease.  Stable cardiomegaly.   elm-remote  Dictated by (CST): Ross Cole MD on 2/10/2025 at 1:05 PM     Finalized by (CST): Ross Cole MD on 2/10/2025 at 1:07 PM         EKG 12 Lead    Result Date: 2/10/2025  Normal sinus rhythm Anterior infarct (cited on or before 11-JAN-2025) Abnormal ECG When compared with ECG of 11-JAN-2025 10:07, No significant change was found Confirmed by ESTUARDO SORIANO ([id number]) on 2/10/2025 1:15:36 PM     Assessment & Plan:       1-acute on chronic hypoxemic and hypercapnic respiratory failure  pH 7.27 pCO2 95 and pO2 109  CXR chronic changes with no acute findings     -Decompensated overlap syndrome with COPD/[facility] M/OHD with morbid obesity     Plan ;  BiPAP 12/8 cwp  during sleep and daytime as needed  Taper steroid and bronchial hygiene and nebulizers  ICS/LABA/LAMA inhaler  PT and OT  Avoid sedative or narcotic      2-DVT prophylaxis  Heparin subcu     3-full code        D/w pt and staff               Yesi Salinas MD  2/11/2025

## 2025-02-11 NOTE — PLAN OF CARE
Problem: Patient Centered Care  Goal: Patient preferences are identified and integrated in the patient's plan of care  Description: Interventions:  - What would you like us to know as we care for you? From home with wife   - Provide timely, complete, and accurate information to patient/family  - Incorporate patient and family knowledge, values, beliefs, and cultural backgrounds into the planning and delivery of care  - Encourage patient/family to participate in care and decision-making at the level they choose  - Honor patient and family perspectives and choices  Outcome: Progressing     Problem: Patient/Family Goals  Goal: Patient/Family Long Term Goal  Description: Patient's Long Term Goal: dc home    Interventions:  - vitals   - oxygen therapy   - See additional Care Plan goals for specific interventions  Outcome: Progressing  Goal: Patient/Family Short Term Goal  Description: Patient's Short Term Goal: relief of shortness of breath     Interventions:   - Bipap at night   - IV steroids   - See additional Care Plan goals for specific interventions  Outcome: Progressing     Problem: CARDIOVASCULAR - ADULT  Goal: Maintains optimal cardiac output and hemodynamic stability  Description: INTERVENTIONS:  - Monitor vital signs, rhythm, and trends  - Monitor for bleeding, hypotension and signs of decreased cardiac output  - Evaluate effectiveness of vasoactive medications to optimize hemodynamic stability  - Monitor arterial and/or venous puncture sites for bleeding and/or hematoma  - Assess quality of pulses, skin color and temperature  - Assess for signs of decreased coronary artery perfusion - ex. Angina  - Evaluate fluid balance, assess for edema, trend weights  Outcome: Progressing  Goal: Absence of cardiac arrhythmias or at baseline  Description: INTERVENTIONS:  - Continuous cardiac monitoring, monitor vital signs, obtain 12 lead EKG if indicated  - Evaluate effectiveness of antiarrhythmic and heart rate control  medications as ordered  - Initiate emergency measures for life threatening arrhythmias  - Monitor electrolytes and administer replacement therapy as ordered  Outcome: Progressing     Problem: RESPIRATORY - ADULT  Goal: Achieves optimal ventilation and oxygenation  Description: INTERVENTIONS:  - Assess for changes in respiratory status  - Assess for changes in mentation and behavior  - Position to facilitate oxygenation and minimize respiratory effort  - Oxygen supplementation based on oxygen saturation or ABGs  - Provide Smoking Cessation handout, if applicable  - Encourage broncho-pulmonary hygiene including cough, deep breathe, Incentive Spirometry  - Assess the need for suctioning and perform as needed  - Assess and instruct to report SOB or any respiratory difficulty  - Respiratory Therapy support as indicated  - Manage/alleviate anxiety  - Monitor for signs/symptoms of CO2 retention  Outcome: Progressing     Problem: GENITOURINARY - ADULT  Goal: Absence of urinary retention  Description: INTERVENTIONS:  - Assess patient’s ability to void and empty bladder  - Monitor intake/output and perform bladder scan as needed  - Follow urinary retention protocol/standard of care  - Consider collaborating with pharmacy to review patient's medication profile  - Implement strategies to promote bladder emptying  Outcome: Progressing     Problem: MUSCULOSKELETAL - ADULT  Goal: Return mobility to safest level of function  Description: INTERVENTIONS:  - Assess patient stability and activity tolerance for standing, transferring and ambulating w/ or w/o assistive devices  - Assist with transfers and ambulation using safe patient handling equipment as needed  - Ensure adequate protection for wounds/incisions during mobilization  - Obtain PT/OT consults as needed  - Advance activity as appropriate  - Communicate ordered activity level and limitations with patient/family  Outcome: Progressing

## 2025-02-11 NOTE — CM/SW NOTE
[facility] order received. Entered in error and has been adjusted.      Yana Altamirano, DEE ext [phone]

## 2025-02-11 NOTE — PHYSICAL THERAPY NOTE
PHYSICAL THERAPY EVALUATION - INPATIENT     Room Number: 517/517-A  Evaluation Date: 2/11/2025  Type of Evaluation: Initial   Physician Order: PT Eval and Treat    Presenting Problem: acute hypoxic on chronic hypercapnic respiratory failure     Reason for Therapy: Mobility Dysfunction and Discharge Planning    PHYSICAL THERAPY ASSESSMENT   Patient is a 74 year old male admitted 2/10/2025 for acute hypoxic on chronic hypercapnic respiratory failure.  Prior to admission, patient's baseline is independent with ADLs and functional mobility with rollator.  Patient is currently functioning below baseline with bed mobility, transfers, gait, and standing prolonged periods.  Patient is requiring contact guard assist as a result of the following impairments: decreased functional strength, decreased endurance/aerobic capacity, impaired dynamic standing balance, decreased muscular endurance, and medical status.  Physical Therapy will continue to follow for duration of hospitalization.    Patient will benefit from continued skilled PT Services at discharge to promote prior level of function and safety with additional support and return home with home health PT.    PLAN DURING HOSPITALIZATION  Nursing Mobility Recommendation : 1 Assist  PT Device Recommendation: Rolling walker;Gait belt  PT Treatment Plan: Bed mobility;Body mechanics;Endurance;Energy conservation;Patient education;Gait training;Strengthening;Transfer training;Balance training;Stoop training  Rehab Potential : Good  Frequency (Obs): 5x/week     PHYSICAL THERAPY MEDICAL/SOCIAL HISTORY     Problem List  Principal Problem:    Acute hypoxic on chronic hypercapnic respiratory failure (HCC)  Active Problems:    Acute on chronic respiratory failure (HCC)    HOME SITUATION  Type of Home: House  Home Layout: One level  Stairs to Enter : 1   Lives With: Spouse (wife works during the day)    Patient Regularly Uses: Rolling walker;Home O2 (3-4 L O2)     Prior Level of  [place]: independent with wife available to assist as needed, ambulates with RW or rollator     SUBJECTIVE  Agreeable to activity.     PHYSICAL THERAPY EXAMINATION   OBJECTIVE  Precautions: Bed/chair alarm  Fall Risk: High fall risk    PAIN ASSESSMENT  Ratin    COGNITION  Overall Cognitive Status:  WFL - within functional limits    RANGE OF MOTION AND STRENGTH ASSESSMENT  Upper extremity ROM and strength are within functional limits.  Lower extremity ROM is within functional limits.  Lower extremity strength is within functional limits.    BALANCE  Static Sitting: Good  Dynamic Sitting: Fair +  Static Standing: Fair -  Dynamic Standing: Fair -    ACTIVITY TOLERANCE  Pulse: 105  Heart Rate Source: Monitor    O2 WALK  Oxygen Therapy  SPO2% Ambulation on Oxygen: 90  Ambulation oxygen flow (liters per minute): 4    AM-PAC '6-Clicks' INPATIENT SHORT FORM - BASIC MOBILITY  How much difficulty does the patient currently have...  Patient Difficulty: Turning over in bed (including adjusting bedclothes, sheets and blankets)?: A Little   Patient Difficulty: Sitting down on and standing up from a chair with arms (e.g., wheelchair, bedside commode, etc.): A Little   Patient Difficulty: Moving from lying on back to sitting on the side of the bed?: A Little   How much help from another person does the patient currently need...   Help from Another: Moving to and from a bed to a chair (including a wheelchair)?: A Little   Help from Another: Need to walk in hospital room?: A Little   Help from Another: Climbing 3-5 steps with a railing?: A Lot     AM-PAC Score:  Raw Score: 17   Approx Degree of Impairment: 50.57%   Standardized Score (AM-PAC Scale): 42.13   CMS Modifier (G-Code): CK    FUNCTIONAL ABILITY STATUS  Functional Mobility/Gait Assessment  Gait Assistance: Contact guard assist  Distance (ft): 15  Assistive Device: Rolling walker  Pattern: Shuffle (slow, flexed posture)  Sit to Stand: contact guard  assist    Exercise/Education Provided:  Education Provided To: Patient     Patient Education: Role of Physical Therapy;Plan of Care;DME Recommendations;Functional Transfer Techniques;Fall Prevention;Posture/Positioning;Energy Conservation;Proper Body Mechanics;Gait Training     Patient's Response to Education: Verbalized Understanding;Requires Further Education     Skilled Therapy Provided: Pt received sitting in chair. Introduced self and role. Pt agreeable to activity however requested assistance to the bathroom. Maintained on 4 L O2 this date. Able to stand from chair with RW and CGA. Ambulated to the bathroom with RW and CGA; toilet transfer with CGA. Pt requested time to sit on toilet. SPO2=90% on 4 L with short distance ambulation. Pt was left sitting on toilet with call light in reach, handed off to PCT and RN.     The patient's Approx Degree of Impairment: 50.57% has been calculated based on documentation in the [facility] '6 clicks' Inpatient Basic Mobility Short Form.  Research supports that patients with this level of impairment may benefit from rehab facility however anticipate benefit from home with  PT.  Final disposition will be made by interdisciplinary medical team.    Patient End of Session: Needs met;Call light within reach;RN aware of session/findings;All patient questions and concerns addressed;Hospital anti-slip socks;In bathroom - nursing staff aware    CURRENT GOALS  Goals to be met by: 2/18/25  Patient Goal Patient's self-stated goal is: go home   Goal #1 Patient is able to demonstrate supine - sit EOB @ level: supervision     Goal #1   Current Status    Goal #2 Patient is able to demonstrate transfers Sit to/from Stand at assistance level: supervision with walker - rolling     Goal #2  Current Status    Goal #3 Patient is able to ambulate 100 feet with assist device: walker - rolling at assistance level: supervision   Goal #3   Current Status    Goal #4    Goal #4   Current Status    Goal #5  Patient to demonstrate independence with home activity/exercise instructions provided to patient in preparation for discharge.   Goal #5   Current Status    Goal #6    Goal #6  Current Status      Patient Evaluation Complexity Level:  History Moderate - 1 or 2 personal factors and/or co-morbidities   Examination of body systems Moderate - addressing a total of 3 or more elements   Clinical Presentation  Moderate - Evolving   Clinical Decision Making  Moderate Complexity     Therapeutic Activity:  12 minutes

## 2025-02-12 LAB
BASOPHILS # BLD AUTO: 0.04 X10(3) UL (ref 0–0.2)
BASOPHILS NFR BLD AUTO: 0.3 %
BUN BLD-MCNC: 26 MG/DL (ref 9–23)
BUN/CREAT SERPL: 24.5 (ref 10–20)
CALCIUM BLD-MCNC: 9.3 MG/DL (ref 8.7–10.4)
CHLORIDE SERPL-SCNC: 93 MMOL/L (ref 98–112)
CO2 SERPL-SCNC: >40 MMOL/L (ref 21–32)
CREAT BLD-MCNC: 1.06 MG/DL
DEPRECATED RDW RBC AUTO: 51.9 FL (ref 35.1–46.3)
EGFRCR SERPLBLD CKD-EPI 2021: 74 ML/MIN/1.73M2 (ref 60–?)
EOSINOPHIL # BLD AUTO: 0.01 X10(3) UL (ref 0–0.7)
EOSINOPHIL NFR BLD AUTO: 0.1 %
ERYTHROCYTE [DISTWIDTH] IN BLOOD BY AUTOMATED COUNT: 14.4 % (ref 11–15)
GLUCOSE BLD-MCNC: 122 MG/DL (ref 70–99)
GLUCOSE BLDC GLUCOMTR-MCNC: 127 MG/DL (ref 70–99)
GLUCOSE BLDC GLUCOMTR-MCNC: 183 MG/DL (ref 70–99)
GLUCOSE BLDC GLUCOMTR-MCNC: 210 MG/DL (ref 70–99)
GLUCOSE BLDC GLUCOMTR-MCNC: 246 MG/DL (ref 70–99)
HCT VFR BLD AUTO: 46.4 %
HGB BLD-MCNC: 14 G/DL
IMM GRANULOCYTES # BLD AUTO: 0.06 X10(3) UL (ref 0–1)
IMM GRANULOCYTES NFR BLD: 0.5 %
LYMPHOCYTES # BLD AUTO: 1.21 X10(3) UL (ref 1–4)
LYMPHOCYTES NFR BLD AUTO: 10.6 %
MAGNESIUM SERPL-MCNC: 2.1 MG/DL (ref 1.6–2.6)
MCH RBC QN AUTO: 29.7 PG (ref 26–34)
MCHC RBC AUTO-ENTMCNC: 30.2 G/DL (ref 31–37)
MCV RBC AUTO: 98.3 FL
MONOCYTES # BLD AUTO: 0.8 X10(3) UL (ref 0.1–1)
MONOCYTES NFR BLD AUTO: 7 %
NEUTROPHILS # BLD AUTO: 9.31 X10 (3) UL (ref 1.5–7.7)
NEUTROPHILS # BLD AUTO: 9.31 X10(3) UL (ref 1.5–7.7)
NEUTROPHILS NFR BLD AUTO: 81.5 %
OSMOLALITY SERPL CALC.SUM OF ELEC: 294 MOSM/KG (ref 275–295)
PLATELET # BLD AUTO: 231 10(3)UL (ref 150–450)
POTASSIUM SERPL-SCNC: 4 MMOL/L (ref 3.5–5.1)
POTASSIUM SERPL-SCNC: 4.2 MMOL/L (ref 3.5–5.1)
RBC # BLD AUTO: 4.72 X10(6)UL
SODIUM SERPL-SCNC: 139 MMOL/L (ref 136–145)
UFH PPP CHRO-ACNC: 0.19 IU/ML
WBC # BLD AUTO: 11.4 X10(3) UL (ref 4–11)

## 2025-02-12 PROCEDURE — 99232 SBSQ HOSP IP/OBS MODERATE 35: CPT | Performed by: INTERNAL MEDICINE

## 2025-02-12 PROCEDURE — 99233 SBSQ HOSP IP/OBS HIGH 50: CPT | Performed by: HOSPITALIST

## 2025-02-12 RX ORDER — ECHINACEA PURPUREA EXTRACT 125 MG
1 TABLET ORAL
Status: DISCONTINUED | OUTPATIENT
Start: 2025-02-12 | End: 2025-02-14

## 2025-02-12 NOTE — PLAN OF CARE
Problem: Patient Centered Care  Goal: Patient preferences are identified and integrated in the patient's plan of care  Description: Interventions:  - What would you like us to know as we care for you? From home with wife   - Provide timely, complete, and accurate information to patient/family  - Incorporate patient and family knowledge, values, beliefs, and cultural backgrounds into the planning and delivery of care  - Encourage patient/family to participate in care and decision-making at the level they choose  - Honor patient and family perspectives and choices  Outcome: Progressing     Problem: Patient/Family Goals  Goal: Patient/Family Long Term Goal  Description: Patient's Long Term Goal: dc home     Interventions:  - vitals  - oxygen therapy   - IV steroids   - See additional Care Plan goals for specific interventions  Outcome: Progressing  Goal: Patient/Family Short Term Goal  Description: Patient's Short Term Goal: Relief of shortness of breath     Interventions:   - increase oxygen as needed   - Bipap at night/PRN  - See additional Care Plan goals for specific interventions  Outcome: Progressing     Problem: CARDIOVASCULAR - ADULT  Goal: Maintains optimal cardiac output and hemodynamic stability  Description: INTERVENTIONS:  - Monitor vital signs, rhythm, and trends  - Monitor for bleeding, hypotension and signs of decreased cardiac output  - Evaluate effectiveness of vasoactive medications to optimize hemodynamic stability  - Monitor arterial and/or venous puncture sites for bleeding and/or hematoma  - Assess quality of pulses, skin color and temperature  - Assess for signs of decreased coronary artery perfusion - ex. Angina  - Evaluate fluid balance, assess for edema, trend weights  Outcome: Progressing  Goal: Absence of cardiac arrhythmias or at baseline  Description: INTERVENTIONS:  - Continuous cardiac monitoring, monitor vital signs, obtain 12 lead EKG if indicated  - Evaluate effectiveness of  antiarrhythmic and heart rate control medications as ordered  - Initiate emergency measures for life threatening arrhythmias  - Monitor electrolytes and administer replacement therapy as ordered  Outcome: Progressing     Problem: RESPIRATORY - ADULT  Goal: Achieves optimal ventilation and oxygenation  Description: INTERVENTIONS:  - Assess for changes in respiratory status  - Assess for changes in mentation and behavior  - Position to facilitate oxygenation and minimize respiratory effort  - Oxygen supplementation based on oxygen saturation or ABGs  - Provide Smoking Cessation handout, if applicable  - Encourage broncho-pulmonary hygiene including cough, deep breathe, Incentive Spirometry  - Assess the need for suctioning and perform as needed  - Assess and instruct to report SOB or any respiratory difficulty  - Respiratory Therapy support as indicated  - Manage/alleviate anxiety  - Monitor for signs/symptoms of CO2 retention  Outcome: Progressing     Problem: GENITOURINARY - ADULT  Goal: Absence of urinary retention  Description: INTERVENTIONS:  - Assess patient’s ability to void and empty bladder  - Monitor intake/output and perform bladder scan as needed  - Follow urinary retention protocol/standard of care  - Consider collaborating with pharmacy to review patient's medication profile  - Implement strategies to promote bladder emptying  Outcome: Progressing     Problem: MUSCULOSKELETAL - ADULT  Goal: Return mobility to safest level of function  Description: INTERVENTIONS:  - Assess patient stability and activity tolerance for standing, transferring and ambulating w/ or w/o assistive devices  - Assist with transfers and ambulation using safe patient handling equipment as needed  - Ensure adequate protection for wounds/incisions during mobilization  - Obtain PT/OT consults as needed  - Advance activity as appropriate  - Communicate ordered activity level and limitations with patient/family  Outcome: Progressing      Problem: PAIN - ADULT  Goal: Verbalizes/displays adequate comfort level or patient's stated pain goal  Description: INTERVENTIONS:  - Encourage pt to monitor pain and request assistance  - Assess pain using appropriate pain scale  - Administer analgesics based on type and severity of pain and evaluate response  - Implement non-pharmacological measures as appropriate and evaluate response  - Consider cultural and social influences on pain and pain management  - Manage/alleviate anxiety  - Utilize distraction and/or relaxation techniques  - Monitor for opioid side effects  - Notify MD/LIP if interventions unsuccessful or patient reports new pain  - Anticipate increased pain with activity and pre-medicate as appropriate  Outcome: Progressing     Problem: RISK FOR INFECTION - ADULT  Goal: Absence of fever/infection during anticipated neutropenic period  Description: INTERVENTIONS  - Monitor WBC  - Administer growth factors as ordered  - Implement neutropenic guidelines  Outcome: Progressing     Problem: SAFETY ADULT - FALL  Goal: Free from fall injury  Description: INTERVENTIONS:  - Assess pt frequently for physical needs  - Identify cognitive and physical deficits and behaviors that affect risk of falls.  - [place] fall precautions as indicated by assessment.  - Educate pt/family on patient safety including physical limitations  - Instruct pt to call for assistance with activity based on assessment  - Modify environment to reduce risk of injury  - Provide assistive devices as appropriate  - Consider OT/PT consult to assist with strengthening/mobility  - Encourage toileting schedule  Outcome: Progressing     Problem: DISCHARGE PLANNING  Goal: Discharge to home or other facility with appropriate resources  Description: INTERVENTIONS:  - Identify barriers to discharge w/pt and caregiver  - Include patient/family/discharge partner in discharge planning  - Arrange for needed discharge resources and transportation as  appropriate  - Identify discharge learning needs (meds, wound care, etc)  - Arrange for interpreters to assist at discharge as needed  - Consider post-discharge preferences of patient/family/discharge partner  - Complete POLST form as appropriate  - Assess patient's ability to be responsible for managing their own health  - Refer to Case Management Department for coordinating discharge planning if the patient needs post-hospital services based on physician/LIP order or complex needs related to functional status, cognitive ability or social support system  Outcome: Progressing

## 2025-02-12 NOTE — PHYSICAL THERAPY NOTE
Chart reviewed for PT treatment. Patient presents in bed with PCT in room, he is just getting back to bed after sitting for hours and requesting we return tomorrow. Will check back.

## 2025-02-12 NOTE — CM/SW NOTE
02/12/25 1300   CM/SW Referral Data   Referral Source    Reason for Referral Discharge planning   Informant Patient;EMR   Readmission Assessment   Factors that patient feels contributed to this readmission Acute/Chronic Clinical Presentation   Pt's living situation prior to admission? Home with family   Pt's level of independence at discharge? No assist/independent (minimal)   Pt. received education on diagnoses at time of discharge? Yes   Did you know who and how to call someone if you felt worse? Yes   Did any new symptoms or issues develop after you were discharged? Yes   ----Post D/C symptoms: Symptoms/issue related to previous hospitalization Yes   Did you understand your discharge instructions? Yes   Were medications taken as indicated on discharge instructions? Yes   Was full assessment completed by SW/EDUARDO on prior admission? Yes   Was the recommended discharge plan achieved? No   ----D/C plan not achieved: What were the contributing factors? Patient not in agreement   Was pt. discharged w/out services? Yes   ----D/C services: Reason(s) why patient was discharged w/out services? Patient refused   Medical Hx   Does patient have an established PCP? Yes  (Johny Kang)   Patient Info   Patient's Current Mental Status at Time of Assessment Alert;Oriented   Patient's Home Environment House   Number of Levels in Home 1   Number of Stair in Home 1 step to enter   Patient lives with Spouse/Significant other   Patient Status Prior to Admission   Independent with ADLs and Mobility Yes   Services in place prior to admission DME/Supplies at home   DME Provider/Supplier HME for home O2   Type of DME/Supplies Standard Walker;Wheelchair;Shower Chair;Grab Bars;Home Oxygen   Discharge Needs   Anticipated D/C needs Subacute rehab   Services Requested   Submitted to [facility] Yes   PASRR Level 1 Submitted Yes     Pt discussed during nursing rounds. Dx respiratory failure 2/2 COPD, on 4L O2 which is baseline.  Home w/spouse, independent w/walker prior to admission. Anticipated therapy need: Gradual Rehabilitative Therapy vs Home with Home Health. Pt declining HH but [facility] [place] List of accepting facilities will be needed for choice.    PASRR level 1 screen completed and uploaded to aidin referral.    1355: Current w/HME for home O2.    Plan: [facility] pending [facility] review, facility choice, and medical clearance.    / to remain available for support and/or discharge planning.     Joni Toussaint, AGUILARN    [phone]

## 2025-02-12 NOTE — PLAN OF CARE
Problem: Patient Centered Care  Goal: Patient preferences are identified and integrated in the patient's plan of care  Description: Interventions:  - What would you like us to know as we care for you? From home with wife   - Provide timely, complete, and accurate information to patient/family  - Incorporate patient and family knowledge, values, beliefs, and cultural backgrounds into the planning and delivery of care  - Encourage patient/family to participate in care and decision-making at the level they choose  - Honor patient and family perspectives and choices  Outcome: Progressing     Problem: Patient/Family Goals  Goal: Patient/Family Long Term Goal  Description: Patient's Long Term Goal: to discharge home     Interventions:  - Monitor vital signs  - Monitor appropriate labs   - Monitor blood glucose levels   - Pain management   - Administer medications per order   - Follow MD orders   - Diagnostics per order   - Update/inform patient and family on plan of care   - Discharge planning   - See additional Care Plan goals for specific interventions  Outcome: Progressing  Goal: Patient/Family Short Term Goal  Description: Patient's Short Term Goal: to breathe better     Interventions:   - Monitor vital signs  - Monitor appropriate labs   - Monitor blood glucose levels   - Pain management   - Administer medications per order   - Follow MD orders   - BIPAP at night and prn   - Diagnostics per order   - Update/inform patient and family on plan of care   - Discharge planning   - See additional Care Plan goals for specific interventions  Outcome: Progressing     Problem: CARDIOVASCULAR - ADULT  Goal: Maintains optimal cardiac output and hemodynamic stability  Description: INTERVENTIONS:  - Monitor vital signs, rhythm, and trends  - Monitor for bleeding, hypotension and signs of decreased cardiac output  - Evaluate effectiveness of vasoactive medications to optimize hemodynamic stability  - Monitor arterial and/or venous  puncture sites for bleeding and/or hematoma  - Assess quality of pulses, skin color and temperature  - Assess for signs of decreased coronary artery perfusion - ex. Angina  - Evaluate fluid balance, assess for edema, trend weights  Outcome: Progressing  Goal: Absence of cardiac arrhythmias or at baseline  Description: INTERVENTIONS:  - Continuous cardiac monitoring, monitor vital signs, obtain 12 lead EKG if indicated  - Evaluate effectiveness of antiarrhythmic and heart rate control medications as ordered  - Initiate emergency measures for life threatening arrhythmias  - Monitor electrolytes and administer replacement therapy as ordered  Outcome: Progressing     Problem: RESPIRATORY - ADULT  Goal: Achieves optimal ventilation and oxygenation  Description: INTERVENTIONS:  - Assess for changes in respiratory status  - Assess for changes in mentation and behavior  - Position to facilitate oxygenation and minimize respiratory effort  - Oxygen supplementation based on oxygen saturation or ABGs  - Provide Smoking Cessation handout, if applicable  - Encourage broncho-pulmonary hygiene including cough, deep breathe, Incentive Spirometry  - Assess the need for suctioning and perform as needed  - Assess and instruct to report SOB or any respiratory difficulty  - Respiratory Therapy support as indicated  - Manage/alleviate anxiety  - Monitor for signs/symptoms of CO2 retention  Outcome: Progressing     Problem: GENITOURINARY - ADULT  Goal: Absence of urinary retention  Description: INTERVENTIONS:  - Assess patient’s ability to void and empty bladder  - Monitor intake/output and perform bladder scan as needed  - Follow urinary retention protocol/standard of care  - Consider collaborating with pharmacy to review patient's medication profile  - Implement strategies to promote bladder emptying  Outcome: Progressing     Problem: MUSCULOSKELETAL - ADULT  Goal: Return mobility to safest level of function  Description:  INTERVENTIONS:  - Assess patient stability and activity tolerance for standing, transferring and ambulating w/ or w/o assistive devices  - Assist with transfers and ambulation using safe patient handling equipment as needed  - Ensure adequate protection for wounds/incisions during mobilization  - Obtain PT/OT consults as needed  - Advance activity as appropriate  - Communicate ordered activity level and limitations with patient/family  Outcome: Progressing     Problem: PAIN - ADULT  Goal: Verbalizes/displays adequate comfort level or patient's stated pain goal  Description: INTERVENTIONS:  - Encourage pt to monitor pain and request assistance  - Assess pain using appropriate pain scale  - Administer analgesics based on type and severity of pain and evaluate response  - Implement non-pharmacological measures as appropriate and evaluate response  - Consider cultural and social influences on pain and pain management  - Manage/alleviate anxiety  - Utilize distraction and/or relaxation techniques  - Monitor for opioid side effects  - Notify MD/LIP if interventions unsuccessful or patient reports new pain  - Anticipate increased pain with activity and pre-medicate as appropriate  Outcome: Progressing     Problem: RISK FOR INFECTION - ADULT  Goal: Absence of fever/infection during anticipated neutropenic period  Description: INTERVENTIONS  - Monitor WBC  - Administer growth factors as ordered  - Implement neutropenic guidelines  Outcome: Progressing     Problem: SAFETY ADULT - FALL  Goal: Free from fall injury  Description: INTERVENTIONS:  - Assess pt frequently for physical needs  - Identify cognitive and physical deficits and behaviors that affect risk of falls.  - [place] fall precautions as indicated by assessment.  - Educate pt/family on patient safety including physical limitations  - Instruct pt to call for assistance with activity based on assessment  - Modify environment to reduce risk of injury  - Provide  assistive devices as appropriate  - Consider OT/PT consult to assist with strengthening/mobility  - Encourage toileting schedule  Outcome: Progressing     Problem: DISCHARGE PLANNING  Goal: Discharge to home or other facility with appropriate resources  Description: INTERVENTIONS:  - Identify barriers to discharge w/pt and caregiver  - Include patient/family/discharge partner in discharge planning  - Arrange for needed discharge resources and transportation as appropriate  - Identify discharge learning needs (meds, wound care, etc)  - Arrange for interpreters to assist at discharge as needed  - Consider post-discharge preferences of patient/family/discharge partner  - Complete POLST form as appropriate  - Assess patient's ability to be responsible for managing their own health  - Refer to Case Management Department for coordinating discharge planning if the patient needs post-hospital services based on physician/LIP order or complex needs related to functional status, cognitive ability or social support system  Outcome: Progressing

## 2025-02-12 NOTE — PROGRESS NOTES
[facility]  part of [facility]    Progress Note    Campbell Rao Patient Status:  Inpatient    1950 MRN [id number]   Location [facility]5W Attending Tee Dee MD   Hosp Day # 2 PCP Johny Kang MD       Subjective:     Constitutional:  Negative for fever.   Respiratory:  Negative for cough and shortness of breath.    Cardiovascular:  Negative for chest pain.   Gastrointestinal:  Negative for abdominal distention.     Feeling better today  Up to the chair  No cough or sputum or wheezes  No fever or chills  Objective:   Blood pressure 111/65, pulse 100, temperature 97.4 °F (36.3 °C), temperature source Oral, resp. rate 20, weight (!) 308 lb 10.3 oz (140 kg), SpO2 91%.  Physical Exam  Constitutional:       General: He is not in acute distress.     Appearance: He is obese. He is ill-appearing.   Cardiovascular:      Rate and Rhythm: Normal rate.      Heart sounds:      No gallop.   Pulmonary:      Effort: No respiratory distress.      Breath sounds: No stridor. No wheezing, rhonchi or rales.      Comments: Diminished with fair air exchange otherwise clear  Abdominal:      General: Abdomen is flat. Bowel sounds are normal.      Palpations: Abdomen is soft.   Neurological:      General: No focal deficit present.      Mental Status: He is oriented to person, place, and time.         Results:   Lab Results   Component Value Date    WBC 11.4 (H) 2025    HGB 14.0 2025    HCT 46.4 2025    .0 2025    CREATSERUM 1.06 2025    BUN 26 (H) 2025     2025    K 4.0 2025    CL 93 (L) 2025    CO2 >40.0 (HH) 2025     (H) 2025    CA 9.3 2025    ALB 4.3 2025    ALKPHO 92 2025    BILT 0.6 2025    TP 6.6 2025    AST 24 2025    ALT 45 2025    TSH 2.777 2024    GGT 38 2024    DDIMER 0.40 10/18/2016    CRP 0.40 2025    MG 2.1 2025    PHOS  4.1 04/26/2024    TROP <0.045 04/10/2021    TROPHS 5 02/10/2025    B12 765 04/27/2021       XR CHEST AP PORTABLE  (CPT=71045)    Result Date: 2/10/2025  CONCLUSION:   Stable linear atelectasis or scarring in the left lung base.  No other new or worsening focal airspace disease.  Stable cardiomegaly.   elm-remote  Dictated by (CST): Ross Cole MD on 2/10/2025 at 1:05 PM     Finalized by (CST): Ross Cole MD on 2/10/2025 at 1:07 PM         EKG 12 Lead    Result Date: 2/10/2025  Normal sinus rhythm Anterior infarct (cited on or before 11-JAN-2025) Abnormal ECG When compared with ECG of 11-JAN-2025 10:07, No significant change was found Confirmed by ESTUARDO SORIANO ([id number]) on 2/10/2025 1:15:36 PM     Assessment & Plan:       1-acute on chronic hypoxemic and hypercapnic respiratory failure  CXR chronic changes with no acute findings     -Decompensated overlap syndrome with COPD/MARIA M/OHD with morbid obesity     Plan ;  BiPAP 12/8 cwp  during sleep and daytime as needed  Patient already had PAP at home  Taper steroid and bronchial hygiene and nebulizers  ICS/LABA/LAMA inhaler  PT and OT  Avoid sedative or narcotic      2-DVT prophylaxis  Heparin subcu     3-full code        D/w pt and staff   Better overall  Discharge planning            Yesi Salinas MD  2/12/2025

## 2025-02-12 NOTE — PROGRESS NOTES
[facility]  part of [facility]    Progress Note    Campbell Rao Patient Status:  Inpatient    1950 MRN [id number]   Location [facility]5W Attending Tee Dee MD   Hosp Day # 2 PCP Johny Kang MD       Subjective:     Denies SOB.  Agreeable to rehab on dc.    Objective:   Blood pressure 126/78, pulse 98, temperature 98 °F (36.7 °C), temperature source Oral, resp. rate 16, weight (!) 308 lb 10.3 oz (140 kg), SpO2 94%.    Gen:   NAD.  A and O x 3  CV:   RRR, no m/g/r  Pulm:   CTA bilat, poor air movement  Abd:   +bs, soft, NT, ND  LE:   No c/c/e  Neuro:   nonfocal    Results:     Lab Results   Component Value Date    WBC 11.4 (H) 2025    HGB 14.0 2025    HCT 46.4 2025    .0 2025    CREATSERUM 1.06 2025    BUN 26 (H) 2025     2025    K 4.0 2025    CL 93 (L) 2025    CO2 >40.0 (HH) 2025     (H) 2025    CA 9.3 2025    ALB 4.3 2025    ALKPHO 92 2025    BILT 0.6 2025    TP 6.6 2025    AST 24 2025    ALT 45 2025    TSH 2.777 2024    GGT 38 2024    DDIMER 0.40 10/18/2016    CRP 0.40 2025    MG 2.1 2025    PHOS 4.1 2024    TROP <0.045 04/10/2021    B12 765 2021       No results found.        Assessment and Plan:     Acute on chronic combined respiratory failure  AECOPD  MARIA M  Currently on 4L o2 which is pt's baseline  - pulm on consult, recs appreciated  - cont CPAP nightly  - cont nebs, steroids now PO  DC planning for rehab.     HTN  - cont monitor     HL  - cont statin     Knees \"buckling\"  - s/p recent steroid injections to b/l knees  - PT/OT     Chronic HFpEF  - cont home diuretics     Morbid obesity Body mass index is 46.93 kg/m².     dvt proph:    lovenox    Code status:    Full       MDM:    High Tee Armando MD  2025

## 2025-02-13 LAB
ATRIAL RATE: 81 BPM
BUN BLD-MCNC: 33 MG/DL (ref 9–23)
BUN/CREAT SERPL: 29.7 (ref 10–20)
CALCIUM BLD-MCNC: 9.2 MG/DL (ref 8.7–10.4)
CHLORIDE SERPL-SCNC: 92 MMOL/L (ref 98–112)
CHOLEST SERPL-MCNC: 175 MG/DL (ref ?–200)
CO2 SERPL-SCNC: >40 MMOL/L (ref 21–32)
CREAT BLD-MCNC: 1.11 MG/DL
EGFRCR SERPLBLD CKD-EPI 2021: 70 ML/MIN/1.73M2 (ref 60–?)
GLUCOSE BLD-MCNC: 142 MG/DL (ref 70–99)
GLUCOSE BLDC GLUCOMTR-MCNC: 137 MG/DL (ref 70–99)
GLUCOSE BLDC GLUCOMTR-MCNC: 218 MG/DL (ref 70–99)
GLUCOSE BLDC GLUCOMTR-MCNC: 248 MG/DL (ref 70–99)
GLUCOSE BLDC GLUCOMTR-MCNC: 84 MG/DL (ref 70–99)
HDLC SERPL-MCNC: 58 MG/DL (ref 40–59)
LDLC SERPL CALC-MCNC: 100 MG/DL (ref ?–100)
MAGNESIUM SERPL-MCNC: 2.1 MG/DL (ref 1.6–2.6)
NONHDLC SERPL-MCNC: 117 MG/DL (ref ?–130)
OSMOLALITY SERPL CALC.SUM OF ELEC: 298 MOSM/KG (ref 275–295)
P AXIS: 44 DEGREES
P-R INTERVAL: 136 MS
POTASSIUM SERPL-SCNC: 3.4 MMOL/L (ref 3.5–5.1)
POTASSIUM SERPL-SCNC: 4.4 MMOL/L (ref 3.5–5.1)
Q-T INTERVAL: 400 MS
QRS DURATION: 106 MS
QTC CALCULATION (BEZET): 464 MS
R AXIS: -10 DEGREES
SODIUM SERPL-SCNC: 139 MMOL/L (ref 136–145)
T AXIS: 37 DEGREES
TRIGL SERPL-MCNC: 93 MG/DL (ref 30–149)
TROPONIN I SERPL HS-MCNC: 332 NG/L
TROPONIN I SERPL HS-MCNC: 340 NG/L
TROPONIN I SERPL HS-MCNC: 354 NG/L
VENTRICULAR RATE: 81 BPM
VLDLC SERPL CALC-MCNC: 15 MG/DL (ref 0–30)

## 2025-02-13 PROCEDURE — 99232 SBSQ HOSP IP/OBS MODERATE 35: CPT | Performed by: INTERNAL MEDICINE

## 2025-02-13 PROCEDURE — 99233 SBSQ HOSP IP/OBS HIGH 50: CPT | Performed by: HOSPITALIST

## 2025-02-13 RX ORDER — POTASSIUM CHLORIDE 1.5 G/1.58G
40 POWDER, FOR SOLUTION ORAL EVERY 4 HOURS
Status: COMPLETED | OUTPATIENT
Start: 2025-02-13 | End: 2025-02-13

## 2025-02-13 RX ORDER — ENOXAPARIN SODIUM 100 MG/ML
80 INJECTION SUBCUTANEOUS EVERY 12 HOURS
Status: DISCONTINUED | OUTPATIENT
Start: 2025-02-13 | End: 2025-02-14

## 2025-02-13 RX ORDER — ENOXAPARIN SODIUM 100 MG/ML
90 INJECTION SUBCUTANEOUS EVERY 12 HOURS
Status: DISCONTINUED | OUTPATIENT
Start: 2025-02-13 | End: 2025-02-13

## 2025-02-13 NOTE — CONGREGATE LIVING REVIEW
[facility] Living Authorization    The [facility] Review Committee has reviewed this case and the patient IS APPROVED for discharge to a facility for Short Term Skilled once the following procedure is followed:     - The physician discharge instructions (contained within the ЮЛИ[facility] note for SNF) must inlcude the below appropriate and approved COVID instructions to the facility    For questions regarding CLRC approval process, please contact the CM assigned to the case.  For questions regarding RN discharge workflow, please contact the unit Clinical Leader.

## 2025-02-13 NOTE — PLAN OF CARE
Patient alert and oriented. Tele Call light within reach. Frequent rounding by staff.  Problem: Patient Centered Care  Goal: Patient preferences are identified and integrated in the patient's plan of care  Description: Interventions:  - What would you like us to know as we care for you?   - Provide timely, complete, and accurate information to patient/family  - Incorporate patient and family knowledge, values, beliefs, and cultural backgrounds into the planning and delivery of care  - Encourage patient/family to participate in care and decision-making at the level they choose  - Honor patient and family perspectives and choices  Outcome: Progressing     Problem: Patient/Family Goals  Goal: Patient/Family Long Term Goal  Description: Patient's Long Term Goal: discharge    Interventions:  - - Monitor vitals  - Monitor appropriate labs  - Pain management  - Follow MD order  - Diagnostics per order  - Update/Informing patient and family on plan of care  - Discharge planning  - See additional Care Plan goals for specific interventions  Outcome: Progressing  Goal: Patient/Family Short Term Goal  Description: Patient's Short Term Goal: feel better    Interventions:   - - Monitor vitals  - Monitor appropriate labs  - Pain management  - Follow MD order  - Diagnostics per order  - Update/Informing patient and family on plan of care  - Discharge planning  - See additional Care Plan goals for specific interventions  Outcome: Progressing     Problem: CARDIOVASCULAR - ADULT  Goal: Maintains optimal cardiac output and hemodynamic stability  Description: INTERVENTIONS:  - Monitor vital signs, rhythm, and trends  - Monitor for bleeding, hypotension and signs of decreased cardiac output  - Evaluate effectiveness of vasoactive medications to optimize hemodynamic stability  - Monitor arterial and/or venous puncture sites for bleeding and/or hematoma  - Assess quality of pulses, skin color and temperature  - Assess for signs of  decreased coronary artery perfusion - ex. Angina  - Evaluate fluid balance, assess for edema, trend weights  Outcome: Progressing  Goal: Absence of cardiac arrhythmias or at baseline  Description: INTERVENTIONS:  - Continuous cardiac monitoring, monitor vital signs, obtain 12 lead EKG if indicated  - Evaluate effectiveness of antiarrhythmic and heart rate control medications as ordered  - Initiate emergency measures for life threatening arrhythmias  - Monitor electrolytes and administer replacement therapy as ordered  Outcome: Progressing     Problem: RESPIRATORY - ADULT  Goal: Achieves optimal ventilation and oxygenation  Description: INTERVENTIONS:  - Assess for changes in respiratory status  - Assess for changes in mentation and behavior  - Position to facilitate oxygenation and minimize respiratory effort  - Oxygen supplementation based on oxygen saturation or ABGs  - Provide Smoking Cessation handout, if applicable  - Encourage broncho-pulmonary hygiene including cough, deep breathe, Incentive Spirometry  - Assess the need for suctioning and perform as needed  - Assess and instruct to report SOB or any respiratory difficulty  - Respiratory Therapy support as indicated  - Manage/alleviate anxiety  - Monitor for signs/symptoms of CO2 retention  Outcome: Progressing     Problem: GENITOURINARY - ADULT  Goal: Absence of urinary retention  Description: INTERVENTIONS:  - Assess patient’s ability to void and empty bladder  - Monitor intake/output and perform bladder scan as needed  - Follow urinary retention protocol/standard of care  - Consider collaborating with pharmacy to review patient's medication profile  - Implement strategies to promote bladder emptying  Outcome: Progressing     Problem: MUSCULOSKELETAL - ADULT  Goal: Return mobility to safest level of function  Description: INTERVENTIONS:  - Assess patient stability and activity tolerance for standing, transferring and ambulating w/ or w/o assistive  devices  - Assist with transfers and ambulation using safe patient handling equipment as needed  - Ensure adequate protection for wounds/incisions during mobilization  - Obtain PT/OT consults as needed  - Advance activity as appropriate  - Communicate ordered activity level and limitations with patient/family  Outcome: Progressing     Problem: PAIN - ADULT  Goal: Verbalizes/displays adequate comfort level or patient's stated pain goal  Description: INTERVENTIONS:  - Encourage pt to monitor pain and request assistance  - Assess pain using appropriate pain scale  - Administer analgesics based on type and severity of pain and evaluate response  - Implement non-pharmacological measures as appropriate and evaluate response  - Consider cultural and social influences on pain and pain management  - Manage/alleviate anxiety  - Utilize distraction and/or relaxation techniques  - Monitor for opioid side effects  - Notify MD/LIP if interventions unsuccessful or patient reports new pain  - Anticipate increased pain with activity and pre-medicate as appropriate  Outcome: Progressing     Problem: RISK FOR INFECTION - ADULT  Goal: Absence of fever/infection during anticipated neutropenic period  Description: INTERVENTIONS  - Monitor WBC  - Administer growth factors as ordered  - Implement neutropenic guidelines  Outcome: Progressing     Problem: SAFETY ADULT - FALL  Goal: Free from fall injury  Description: INTERVENTIONS:  - Assess pt frequently for physical needs  - Identify cognitive and physical deficits and behaviors that affect risk of falls.  - [place] fall precautions as indicated by assessment.  - Educate pt/family on patient safety including physical limitations  - Instruct pt to call for assistance with activity based on assessment  - Modify environment to reduce risk of injury  - Provide assistive devices as appropriate  - Consider OT/PT consult to assist with strengthening/mobility  - Encourage toileting  schedule  Outcome: Progressing     Problem: DISCHARGE PLANNING  Goal: Discharge to home or other facility with appropriate resources  Description: INTERVENTIONS:  - Identify barriers to discharge w/pt and caregiver  - Include patient/family/discharge partner in discharge planning  - Arrange for needed discharge resources and transportation as appropriate  - Identify discharge learning needs (meds, wound care, etc)  - Arrange for interpreters to assist at discharge as needed  - Consider post-discharge preferences of patient/family/discharge partner  - Complete POLST form as appropriate  - Assess patient's ability to be responsible for managing their own health  - Refer to Case Management Department for coordinating discharge planning if the patient needs post-hospital services based on physician/LIP order or complex needs related to functional status, cognitive ability or social support system  Outcome: Progressing

## 2025-02-13 NOTE — PROGRESS NOTES
Campbell Rao Patient Status:  Inpatient    1950 MRN [id number]   Location [facility]5W Attending Tee Dee MD   Hosp Day # 3 PCP Johny Kang MD     Cardiology Nocturnal APN Note    Briefly: (Documentation from chart review)     Campbell Rao is a 73 y/o male who presented with NSVT and has a PMH/PSH of:       Past Medical History:    Atherosclerosis of coronary artery    coronary calcification on ct chest    Back problem    Cataract    Chronic respiratory failure (HCC)    Congestive heart disease (HCC)    COPD (HCC)    freq exacerbations. ? 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    Osteoarthritis    Pneumonia    Pulmonary nodules    Sleep apnea    Tobacco abuse       Primary Cardiologist Chiqui    Vital Signs:       2025    12:46 AM 2025     1:30 AM   Vitals History   /80    BP Location Right arm    Pulse 84 80   Resp 13    Temp 97.5 °F (36.4 °C)    SpO2 94 % 95 %        Labs:   Lab Results   Component Value Date    WBC 11.4 2025    HGB 14.0 2025    HCT 46.4 2025    .0 2025    CREATSERUM 1.06 2025    BUN 26 2025     2025    K 4.2 2025    CL 93 2025    CO2 >40.0 2025     2025    CA 9.3 2025    MG 2.1 2025    TROPHS 340 2025       Diagnostics:   XR CHEST AP PORTABLE  (CPT=71045)    Result Date: 2/10/2025  PROCEDURE: XR CHEST AP PORTABLE  (CPT=71045) TIME: 1250.   COMPARISON: [facility], XR CHEST AP PORTABLE (CPT=71045), 2024, 8:46 PM.  [facility], XR CHEST AP PORTABLE (CPT=71045), 2025, 6:25 AM.  INDICATIONS: Follow up for shortness of breath x2 days.  TECHNIQUE:    Single view.   FINDINGS:  COMMENT: Overlying wires and treatment artifacts obscure fine detail. CARDIAC/VASC: The cardiomediastinal silhouette is mild-to-moderately enlarged. There is mild tortuosity of the thoracic aorta with peripheral atherosclerotic vascular calcification. The pulmonary vascularity is within normal limits. MEDIAST/JUSTO: No visible mass or adenopathy. LUNGS/PLEURA: No airspace consolidation, pleural effusion, or pneumothorax is evident.  Stable chronic partial obscuration of the left lung base secondary to cardiomegaly and probable epicardial fat with stable discoid left basilar opacities. BONES: Mild degenerative changes of the thoracic spine are apparent. OTHER:   Negative.          CONCLUSION:   Stable linear atelectasis or scarring in the left lung base.  No other new or worsening focal airspace disease.  Stable cardiomegaly.   elm-remote  Dictated by (CST): Ross Cole MD on 2/10/2025 at 1:05 PM     Finalized by (CST): Ross Cole MD on 2/10/2025 at 1:07 PM            Allergies:  Allergies[1]    Medications:    sodium chloride    predniSONE    enoxaparin    acetaminophen    guaiFENesin    benzonatate    ipratropium-albuterol    glucose **OR** glucose **OR** glucose-vitamin C **OR** dextrose **OR** glucose **OR** glucose **OR** glucose-vitamin C    insulin aspart    atorvastatin    fluticasone-salmeterol    spironolactone    torsemide    umeclidinium bromide    albuterol    Assessment:   NSVT  - Monomorphic  - Asymptomatic  - Longest run appears to be 20 beats  - Currently hospitalized for acute on chronic resp failure, though now on baseline O2  - Monitor on tele  - Agree with repeat echo      Plan:    - Continue to monitor overnight  - Formal Cardiology consult to follow in AM.       BRYNN Muniz  [facility]  2/13/2025  2:48 AM         [1] No Known Allergies

## 2025-02-13 NOTE — PROGRESS NOTES
[facility]  part of [facility]    Progress Note    Campbell Rao Patient Status:  Inpatient    1950 MRN [id number]   Location [facility]5W Attending Tee Dee MD   Hosp Day # 3 PCP Johny Kang MD       Subjective:     Breathing stable.  No CP.    Objective:   Blood pressure 108/82, pulse 99, temperature 99.5 °F (37.5 °C), temperature source Oral, resp. rate 18, weight (!) 308 lb 10.3 oz (140 kg), SpO2 (!) 89%.    Gen:   NAD.  A and O x 3  CV:   RRR, no m/g/r  Pulm:   CTA bilat, poor air movement  Abd:   +bs, soft, NT, ND  LE:   No c/c/e  Neuro:   nonfocal    Results:     Lab Results   Component Value Date    WBC 11.4 (H) 2025    HGB 14.0 2025    HCT 46.4 2025    .0 2025    CREATSERUM 1.11 2025    BUN 33 (H) 2025     2025    K 4.4 2025    CL 92 (L) 2025    CO2 >40.0 (HH) 2025     (H) 2025    CA 9.2 2025    ALB 4.3 2025    ALKPHO 92 2025    BILT 0.6 2025    TP 6.6 2025    AST 24 2025    ALT 45 2025    TSH 2.777 2024    GGT 38 2024    DDIMER 0.40 10/18/2016    CRP 0.40 2025    MG 2.1 2025    PHOS 4.1 2024    TROP <0.045 04/10/2021    B12 765 2021       No results found.  EKG 12 Lead    Result Date: 2025  Normal sinus rhythm Incomplete right bundle branch block Cannot rule out Anterior infarct (cited on or before 2025) Abnormal ECG When compared with ECG of 10-FEB-2025 12:43, Questionable change in initial forces of Anterior leads Confirmed by ROXANNA ZAMORA ([id number]) on 2025 7:39:58 AM     Assessment and Plan:     Acute on chronic combined respiratory failure  AECOPD  MARIA M  Currently on 4L o2 which is pt's baseline  - pulm on consult, recs appreciated  - cont CPAP nightly  - cont nebs, steroids now PO  DC planning for rehab.    NSVT  - cardiology on consult  - maintain K and Mg  protocols  - check echo     HTN  - cont monitor     HL  - cont statin     Knees \"buckling\"  - s/p recent steroid injections to b/l knees  - PT/OT     Chronic HFpEF  - cont home diuretics     Morbid obesity Body mass index is 46.93 kg/m².     dvt proph:    lovenox     MDM:    High Tee Armando MD  2/13/2025

## 2025-02-13 NOTE — PHYSICAL THERAPY NOTE
PHYSICAL THERAPY TREATMENT NOTE - INPATIENT     Room Number: 517/517-A       Presenting Problem: acute hypoxic on chronic hypercapnic respiratory failure  Co-Morbidities : morbid obesity with BMI of 47,    Problem List  Principal Problem:    Acute hypoxic on chronic hypercapnic respiratory failure (HCC)  Active Problems:    Acute on chronic respiratory failure (HCC)      PHYSICAL THERAPY ASSESSMENT   Patient demonstrates steady progress this session, goals  remain in progress.      Patient is requiring minimal assist as a result of the following impairments: decreased functional strength, decreased endurance/aerobic capacity, impaired standing  balance, decreased muscular endurance, medical status, and increased O2 needs from baseline.     Patient continues to function below baseline with bed mobility, transfers, gait, standing prolonged periods, and performing household tasks.  Next session anticipate patient to progress bed mobility, transfers, gait, standing prolonged periods, and performing household tasks.  Physical Therapy will continue to follow patient for duration of hospitalization.    Patient continues to benefit from continued skilled PT services: to promote return to prior level of function and safety with continuous assistance and gradual rehabilitative therapy  vs ability to progress to DC to home setting with increase support and HH PT . Pt will need a BRW at DC and per pt O2 dependent on 4-5L .  Per pt his spouse works therefore not home to help pt during day .  Pt with hx of fall prior to admission .  BRW provided for use during hospital stay.   SW working with pt optimal DC Plan.       PLAN DURING HOSPITALIZATION  Nursing Mobility Recommendation : 1 Assist  PT Device Recommendation: Rolling walker;Gait belt  PT Treatment Plan: Bed mobility;Endurance;Energy conservation;Patient education;Family education;Gait training;Range of motion;Transfer training;Balance training;Stoop training  Frequency  (Obs): 5x/week     SUBJECTIVE  Pt reports unsure of DC goal at this time.   Pt is considering rehab facility yet concern about COVID , pt with SOB noted and reported during activity.  Pt reports hx of fall prior to admission , today denies LE knee buckling...     OBJECTIVE  Precautions: Cardiac;Bed/chair alarm (supplemental O2)    WEIGHT BEARING RESTRICTION       PAIN ASSESSMENT   Rating: Other (Comment)  Location: Pt denies any pain  Management Techniques: Activity promotion;Body mechanics;Breathing techniques;Relaxation;Repositioning    BALANCE  Static Sitting: Good  Dynamic Sitting: Fair +  Static Standing: Fair -  Dynamic Standing: Fair -    ACTIVITY TOLERANCE  Pulse: (!) 128 (actvity -128    resting HR approximately 100-103)        BP: 115/70 (resting   post activity  131/82)              O2 WALK  Oxygen Therapy  SPO2% on Oxygen at Rest: 95  At rest oxygen flow (liters per minute): 4  SPO2% Ambulation on Oxygen: 88 (activity O2 sats 88-90)  Ambulation oxygen flow (liters per minute): 4    AM-PAC '6-Clicks' INPATIENT SHORT FORM - BASIC MOBILITY  How much difficulty does the patient currently have...  Patient Difficulty: Turning over in bed (including adjusting bedclothes, sheets and blankets)?: A Little   Patient Difficulty: Sitting down on and standing up from a chair with arms (e.g., wheelchair, bedside commode, etc.): A Little   Patient Difficulty: Moving from lying on back to sitting on the side of the bed?: A Little   How much help from another person does the patient currently need...   Help from Another: Moving to and from a bed to a chair (including a wheelchair)?: A Little   Help from Another: Need to walk in hospital room?: A Little   Help from Another: Climbing 3-5 steps with a railing?: A Lot     AM-PAC Score:  Raw Score: 17   Approx Degree of Impairment: 50.57%   Standardized Score (AM-PAC Scale): 42.13   CMS Modifier (G-Code): CK    FUNCTIONAL ABILITY STATUS  Functional Mobility/Gait  Assessment  Gait Assistance: Minimum assistance  Distance (ft): 50 ft x 3   rest break encouraged due to noted SOB elevated HR to 128 and desat to 88 %on 4L  during activity .   Pt with  seated rest of approximately 3-4  min breaks able to return to baseline between distances  Assistive Device: Rolling walker (needs BRW)  Pattern: R Steppage;L Steppage;R Foot flat;L Foot flat    Supine to Sit: minimal assist  Sit to Supine: minimal assist  Sit to Stand: contact guard assist    Skilled Therapy Provided: Education Provided To: Patient   Patient Education: Role of Physical Therapy;Plan of Care;Discharge Recommendations;DME Recommendations;Functional Transfer Techniques;Fall Prevention;Posture/Positioning;Energy Conservation;Gait Training   Patient's Response to Education: Verbalized Understanding;Returned Demonstration;Requires Further Education (decrease insight into current deficits and safety / importance of pacing / introduced modified 5 pt RPE scale)      Pt education , fxn mobility training , B AP and DC Planning , tolieting activity with set up support and distant supervision once seated on toliet     The patient's Approx Degree of Impairment: 50.57% has been calculated based on documentation in the [facility] '6 clicks' Inpatient Daily Activity Short Form.  Research supports that patients with this level of impairment may benefit from [place] vs home with HH PT increase support .  Final disposition will be made by interdisciplinary medical team.      Patient End of Session: In bed;Needs met;Call light within reach;RN aware of session/findings;All patient questions and concerns addressed;Alarm set;Hospital anti-slip socks    CURRENT GOALS   Goals to be met by: 2/18/25  Patient Goal Patient's self-stated goal is: go home   Goal #1 Patient is able to demonstrate supine - sit EOB @ level: supervision      Goal #1   Current Status  Min assist needed    Goal #2 Patient is able to demonstrate transfers Sit to/from Stand at  assistance level: supervision with walker - rolling      Goal #2  Current Status  CGA support    Goal #3 Patient is able to ambulate 100 feet with assist device: walker - rolling at assistance level: supervision   Goal #3   Current Status  see above    Goal #4     Goal #4   Current Status     Goal #5 Patient to demonstrate independence with home activity/exercise instructions provided to patient in preparation for discharge.   Goal #5   Current Status  in progress    Goal #6     Goal #6  Current Status     Therapy activity 2 units

## 2025-02-13 NOTE — HISTORICAL OFFICE NOTE
[facility]  Outside Information  Continuity of Care Document  11/13/2024  Campbell Rao - 74 y.o. Male; born [birth year]Summary of episode note, generated on Nov. 21, 2024November 21, 2024   Additional Documents     ClinicalDocument.xml - Referral Note (Last Received: 11/14/2024  4:52 PM)   Plain Text Document (Last Received: 11/14/2024  4:52 PM)   Continuity of Care Document (Last Received: 11/21/2024  1:37 PM) - Currently Viewing  CHIEF COMPLAINT    CHIEF COMPLAINT  Reason for Visit/Chief Complaint   consult  SOB   Interval note 11/14/24  74-year-old male with chronic hypoxic respiratory failure, COPD, obesity hypoventilation presenting for follow-up. Patient was admitted last month for acute on chronic hypoxic respiratory failure secondary to viral illness, pneumonia and COPD exacerbation. He was diuresed, given antibiotics and pulmonary meds adjusted and discharged in good condition. Patient has no new cardiac symptoms but just persistent dyspnea.Intitial note 9/12/24  75yo male here for follow up regarding chronic hypoxic respiratory failure, HFpEF. Patient is a former smoker, quit several years ago after smoking for 30+ years. He was hospitalized for hypercapnic, hypoxic respiratory failure secondary to COPD, hypoventilation syndrome as well as HFpEF. Patient diuresed well and was discharged on continuous oxygen. EF was normal that admission. Patient has lost about 30 pounds since discharge diuretics. He does see Dr. Montague once after discharge who made recommendations regarding diuretics.     PROBLEMS  Reconcile with Patient's ChartPROBLEMS  Problem Effective Dates Date resolved Problem Status   RVF (right ventricular failure), [SNOMED-CT: 090391402] 8/6/2024 - Active   Chronic Diastolic HF (heart failure), Class III, [SNOMED-CT: 996245117] 8/6/2024 - Active   Pulmonary emphysema, [SNOMED-CT: 94386686] 2/19/2023 - Active   MARIA M (obstructive sleep apnea), [SNOMED-CT:  36102951] 2/19/2023 - Active   Dyslipidemia, [SNOMED-CT: 200960041] 2/19/2023 - Active   Morbid (severe) obesity due to excess calories, [SNOMED-CT: 015053742] 12/15/2021 - Active   Ascending aorta enlargement, [SNOMED-CT: 721130920] 10/3/2021 - Active   Congestive heart failure, [SNOMED-CT: 54347849] 4/16/2021 - Active     ENCOUNTER    ENCOUNTER  Problem Effective Dates Date resolved Problem Status   RVF (right ventricular failure), [SNOMED-CT: 978889405] 8/6/2024 - Active   Chronic Diastolic HF (heart failure), Class III, [SNOMED-CT: 386915249] 8/6/2024 - Active   Pulmonary emphysema, [SNOMED-CT: 61080813] 2/19/2023 - Active   MARIA M (obstructive sleep apnea), [SNOMED-CT: 11216279] 2/19/2023 - Active   Dyslipidemia, [SNOMED-CT: 695334416] 2/19/2023 - Active   Morbid (severe) obesity due to excess calories, [SNOMED-CT: 914907715] 12/15/2021 - Active   Ascending aorta enlargement, [SNOMED-CT: 654598515] 10/3/2021 - Active   Congestive heart failure, [SNOMED-CT: 49266392] 4/16/2021 - Active     VITAL SIGNS    VITAL SIGNS  Date / Time: 11/14/2024   BP Systolic 134 mmHg   BP Diastolic 80 mmHg   Height 68 inches   Weight 305 lbs   Pulse Rate 95 bpm   BSA (Body Surface Area) 2.6 cc/m2   BMI (Body Mass Index) 46.4 cc/m2   Blood Pressure 134 / 80 mmHg     PHYSICAL EXAMINATION    PHYSICAL EXAMINATION  Header Details   Constitutional o2sat 92%   Vitals Left Arm Sitting  / 80 mmHg, Pulse rate 95 bpm, Regular, Height in 5' 8\", BMI: 46.4, Weight in 304.24 lbs (or) 138 kgs, BSA : 2.65 cc/m²   General Appearance No Acute Distress, Well groomed, Appropriate   Head/Eyes/Ears/Nose/Mouth/Throat Conjunctiva pink, Sclera Clear, Mucous membranes Moist, No pallor, No icterus   Neck Normal carotid pulsations, No carotid bruits, No JVD   Respiratory Unlabored, Lungs clear with normal breath sounds, Equal bilaterally   Cardiovascular Intact distal pulses, Regular rhythm. Normal rate present. Normal and normal S1 and S2   Gastrointestinal  Abdomen soft, Non-tender, Normoactive bowel sounds, No organomegaly, No masses, No pulsations   Musculoskeletal Normal spine   Gait Normal gait   Strength and tone Normal muscle strength, Normal muscle tone     ALLERGIES, ADVERSE REACTIONS, ALERTS    No data available    MEDICATIONS ADMINISTERED DURING VISIT    No data available    MEDICATIONS    MEDICATIONS  Medication Start Date Route/Frequency Status   acetaminophen (TYLENOL) 325 MG Oral Tab, [RxNorm: 097704] 8/6/2024 Take 1 tablet (325 mg total) by mouth every 6 (six) hours as needed for Pain. Active   albuterol 108 (90 Base) MCG/ACT Inhalation Aero Soln, [RxNorm: 3961939] 8/6/2024 Inhale 2 puffs into the lungs every 4 to 6 hours as needed for Wheezing. inhale 2 puff by inhalation route every 4 - 6 hours as needed Active   atorvastatin 20 MG Oral Tab, [RxNorm: 467301] 8/6/2024 Take 1 tablet (20 mg total) by mouth nightly. Active   fluticasone propionate 50 MCG/ACT Nasal Suspension, [RxNorm: 4299161] 8/6/2024 2 sprays by Nasal route daily. Active   fluticasone-umeclidin-vilant (TRELEGY ELLIPTA) 100-62.5-25 MCG/ACT Inhalation Aerosol Powder, Breath Activated, [RxNorm: 8967987] 8/6/2024 Inhale 1 puff into the lungs daily. Active   metoprolol succinate ER 50 mg tablet,extended release 24 hr, [RxNorm: 163379] 10/10/2024 Take 1 tablet orally once a day. Active   potassium chloride ER 10 mEq capsule,extended release, [RxNorm: 287482] 10/1/2024 Take 1 capsule orally once a day. Active   spironolactone 25 MG Oral Tab, [RxNorm: 649696] 8/6/2024 Take 0.5 tablets (12.5 mg total) by mouth daily. Active   torsemide 20 mg tablet, [RxNorm: 429584] 10/1/2024 Take 1 tablet orally once a day. Active     ASSESSMENT    Recent COPD/PNA hospitalization 11/3/2024  - Admitted for COPD exacerbation secondary to pneumonia  - Breathing is at baseline, on chronic 4 L nasal cannula  - If optimized from pulmonary standpoint but having new or worsening dyspnea then consider ischemic  evaluationHFpEF  - LVEF 60%  -Continue Aldactone, torsemide  - Unable to afford JardianceCoronary artery calcifications  - Dense diffuse calcification seen on CT chest performed earlier this year  - Currently on statin  - Monitor lipidsChronic hypoxic respiratory failure  - followed by PulmRecommendations  - RTC 4 months     FAMILY HISTORY    No data available    GENERAL STATUS    No data available    PAST MEDICAL HISTORY    PAST MEDICAL HISTORY  Problem Date diagonsed Date resolved Status   RVF (right ventricular failure), [SNOMED-CT: 980306769] 8/6/2024 - Active   Chronic Diastolic HF (heart failure), Class III, [SNOMED-CT: 878818909] 8/6/2024 - Active   Pulmonary emphysema, [SNOMED-CT: 90440008] 2/19/2023 - Active   MARIA M (obstructive sleep apnea), [SNOMED-CT: 52217574] 2/19/2023 - Active   Dyslipidemia, [SNOMED-CT: 620904815] 2/19/2023 - Active   Morbid (severe) obesity due to excess calories, [SNOMED-CT: 936378246] 12/15/2021 - Active   Ascending aorta enlargement, [SNOMED-CT: 601789235] 10/3/2021 - Active   Congestive heart failure, [SNOMED-CT: 40984180] 4/16/2021 - Active     HISTORY OF PRESENT ILLNESS    Interval note 11/14/24  74-year-old male with chronic hypoxic respiratory failure, COPD, obesity hypoventilation presenting for follow-up. Patient was admitted last month for acute on chronic hypoxic respiratory failure secondary to viral illness, pneumonia and COPD exacerbation. He was diuresed, given antibiotics and pulmonary meds adjusted and discharged in good condition. Patient has no new cardiac symptoms but just persistent dyspnea.Intitial note 9/12/24  73yo male here for follow up regarding chronic hypoxic respiratory failure, HFpEF. Patient is a former smoker, quit several years ago after smoking for 30+ years. He was hospitalized for hypercapnic, hypoxic respiratory failure secondary to COPD, hypoventilation syndrome as well as HFpEF. Patient diuresed well and was discharged on continuous oxygen. EF  was normal that admission. Patient has lost about 30 pounds since discharge diuretics. He does see Dr. Montague once after discharge who made recommendations regarding diuretics.     IMMUNIZATIONS    No data available    PLAN OF CARE    PLAN OF CARE  Planned Care Date   Referral Visit - Johny Hawkinsdorislori ([email or web address]) : 1/1/1900   Follow up visit - Pawan Florian MD 1/1/1900     PROCEDURES    No data available    RESULTS    RESULTS  Name Result Date Location - Ordered By   GLUCOSE [LOINC: 2339-0] 116 mg/dL [High] 09/18/2024 04:32:00 PM [facility] ([facility])  Address: [address]  [facility]  [address]  tel:   SODIUM [LOINC: 2951-2] 146 mmol/L [High] 09/18/2024 04:32:00 PM [facility] ([facility])  Address: [address]  [facility]  [address]  tel:   POTASSIUM [LOINC: 2823-3] 4.1 mmol/L 09/18/2024 04:32:00 PM [facility] ([facility])  Address: [address]  [facility]  [address]  tel:   CHLORIDE [LOINC: 2075-0] 110 mmol/L 09/18/2024 04:32:00 PM [facility] ([facility])  Address: [address]  [facility]  [address]  tel:   CO2 [LOINC: 2028-9] 31.0 mmol/L 09/18/2024 04:32:00 PM [facility] ([facility])  Address: [address]  [facility]  [address]  tel:   ANION GAP [LOINC: 33037-3] 5 mmol/L 09/18/2024 04:32:00 PM [facility] ([facility])  Address: [address]  [facility]  [address]  tel:   BUN [LOINC: 6299-2] 26 mg/dL [High] 09/18/2024 04:32:00 PM [facility] ([facility])  Address: [address]  [facility]  [address]  tel:   CREATININE [LOINC: 97625-3] 1.02 mg/dL 09/18/2024 04:32:00 PM [facility] ([facility])  Address: [address]  [facility]  [address]  tel:   BUN/ CREAT RATIO [LOINC: 3097-3] 25.5 [High] 09/18/2024 04:32:00 PM [facility] ([facility]  [facility])  Address: [address]  [facility]  [address]  tel:   CALCIUM [LOINC: 83164-1] 9.8 mg/dL 09/18/2024 04:32:00 PM [facility] ([facility])  Address: [address]  [facility]  [address]  tel:   OSMOLALITY CALCULATED [LOINC: 10066-1] 308 mOsm/kg [High] 09/18/2024 04:32:00 PM [facility] ([facility])  Address: [address]  [facility]  [address]  tel:   E GFR CR [LOINC: 27358-9] 78 mL/min/1.73m2 09/18/2024 04:32:00 PM [facility] ([facility])  Address: [address]  [facility]  [address]  tel:   FASTING PATIENT BMP ANSWER [LOINC: 10015-8] No 09/18/2024 04:32:00 PM [facility] ([facility])  Address: [address]  [facility]  [address]  tel:     REVIEW OF SYSTEMS    REVIEW OF SYSTEMS  Header Details   Cardiovascular DURÁN  No history of Chest pain, Palpitations, Syncope, PND, Orthopnea, Edema, Claudication   Respiratory SOB  No history of Wheezing, Sputum   Neurological No history of Numbness, Limb weakness   Hem/Lymphatic No history of Easy bruising, Blood clots, Hx of blood transfusion, Anemia, Bleeding problems     SOCIAL HISTORY    SOCIAL HISTORY  Social History Element Description Effective Dates   Smoking status Former smoker -     FUNCTIONAL STATUS    No data available    MEDICAL EQUIPMENT    No data available    Goals Sections    No data available    REASON FOR REFERRAL           Health Concerns Section    No data available    COGNITIVE/MENTAL STATUS    No data available    Patient Demographics    Patient Demographics  Patient Address Patient Name Communication   [address] [place] [address] Campbell Rao [phone] (Mobile)     Patient Demographics  Language Race / Ethnicity Marital Status   English - Spoken (Preferred) White / Not  or       Document Information    Primary Care Provider Other Service Providers Document Coverage Dates   Setu UNK  Chiqui  NPI: [id number]  [phone] (Work)  [address], [address]  [place] [address]  [place] [address]  Interpreting Physicians  [facility]  [phone] (Work)  [address] [place] [address] Lydiadb Alvaradoespie  NPI: [id number]  [phone] (Work)  [address], [address]  [place] [address]  [place] [address]  Nurses     Yumi Carter  NPI: [id number]  [phone] (Work)  [address], [address]  [place] [address]  [place] [address]  Nurses Nov. 14, 2024November 14, 2024      Organization   [facility]  [phone] (Work)  [address], [address]  [place] [address]  [place] [address]     Encounter Providers Encounter Date    Nov. 14, 2024November 14, 2024     Legal Authenticator    Pawan Florian  NPI: [id number]  [phone] (Work)  [address], [address]  [place] [address]  [place] [address]

## 2025-02-13 NOTE — PROGRESS NOTES
[facility]  part of [facility]    Progress Note      Assessment and Plan:   1.  Acute on chronic respiratory failure-patient has COPD exacerbation superimposed on the overlap syndrome of obesity-hypoventilation with sleep apnea.  He feels like his breathing is better.  Would progress with discharge planning.    Recommendations:  1.  PAP therapy nightly  2.  Weight loss  3.  MDI  4.  Nebulizer  5.  Will follow clinically and patient to see Dr. Salinas in the office in 1 to 2 months.  6.  Decrease prednisone to 20 mg daily and taper as outpatient.    2.  DVT prophylaxis-Lovenox    3.  Impaired ambulation-patient describes how his knees are buckling.  Rehabilitative services.    Subjective:   Campbell Rao is a(n) 74 year old male alert white male    Objective:   Blood pressure 108/82, pulse 99, temperature 99.5 °F (37.5 °C), temperature source Oral, resp. rate 18, weight (!) 308 lb 10.3 oz (140 kg), SpO2 (!) 89%.    Physical Exam alert white male  HEENT examination is unremarkable with pupils equal round and reactive to light and accommodation.   Neck without adenopathy, thyromegaly, JVD nor bruit.   Lungs diminished to auscultation and percussion.  Cardiac regular rate and rhythm no murmur.   Abdomen nontender, without hepatosplenomegaly and no mass appreciable.   Extremities without clubbing cyanosis with 2+ lower extremity edema.   Neurologic grossly intact with symmetric tone and strength and reflex.  Skin without gross abnormality     Results:     Lab Results   Component Value Date    CREATSERUM 1.11 02/13/2025    BUN 33 02/13/2025     02/13/2025    K 4.4 02/13/2025    CL 92 02/13/2025    CO2 >40.0 02/13/2025     02/13/2025    CA 9.2 02/13/2025    MG 2.1 02/13/2025       Kris Jimenez MD  Medical Director, Critical Care, [facility]  Medical Director, [facility] [place]  Pager: [phone]

## 2025-02-13 NOTE — PLAN OF CARE
Problem: Patient Centered Care  Goal: Patient preferences are identified and integrated in the patient's plan of care  Description: Interventions:  - What would you like us to know as we care for you? Discharge to rehab  - Provide timely, complete, and accurate information to patient/family  - Incorporate patient and family knowledge, values, beliefs, and cultural backgrounds into the planning and delivery of care  - Encourage patient/family to participate in care and decision-making at the level they choose  - Honor patient and family perspectives and choices  Outcome: Progressing     Problem: Patient/Family Goals  Goal: Patient/Family Long Term Goal  Description: Patient's Long Term Goal: get back home    Interventions:  - pt ot rehab  - See additional Care Plan goals for specific interventions  Outcome: Progressing  Goal: Patient/Family Short Term Goal  Description: Patient's Short Term Goal: conditioning     Interventions:   - pt ot rehab  - See additional Care Plan goals for specific interventions  Outcome: Progressing     Problem: CARDIOVASCULAR - ADULT  Goal: Maintains optimal cardiac output and hemodynamic stability  Description: INTERVENTIONS:  - Monitor vital signs, rhythm, and trends  - Monitor for bleeding, hypotension and signs of decreased cardiac output  - Evaluate effectiveness of vasoactive medications to optimize hemodynamic stability  - Monitor arterial and/or venous puncture sites for bleeding and/or hematoma  - Assess quality of pulses, skin color and temperature  - Assess for signs of decreased coronary artery perfusion - ex. Angina  - Evaluate fluid balance, assess for edema, trend weights  Outcome: Progressing  Goal: Absence of cardiac arrhythmias or at baseline  Description: INTERVENTIONS:  - Continuous cardiac monitoring, monitor vital signs, obtain 12 lead EKG if indicated  - Evaluate effectiveness of antiarrhythmic and heart rate control medications as ordered  - Initiate emergency  measures for life threatening arrhythmias  - Monitor electrolytes and administer replacement therapy as ordered  Outcome: Progressing     Problem: RESPIRATORY - ADULT  Goal: Achieves optimal ventilation and oxygenation  Description: INTERVENTIONS:  - Assess for changes in respiratory status  - Assess for changes in mentation and behavior  - Position to facilitate oxygenation and minimize respiratory effort  - Oxygen supplementation based on oxygen saturation or ABGs  - Provide Smoking Cessation handout, if applicable  - Encourage broncho-pulmonary hygiene including cough, deep breathe, Incentive Spirometry  - Assess the need for suctioning and perform as needed  - Assess and instruct to report SOB or any respiratory difficulty  - Respiratory Therapy support as indicated  - Manage/alleviate anxiety  - Monitor for signs/symptoms of CO2 retention  Outcome: Progressing     Problem: GENITOURINARY - ADULT  Goal: Absence of urinary retention  Description: INTERVENTIONS:  - Assess patient’s ability to void and empty bladder  - Monitor intake/output and perform bladder scan as needed  - Follow urinary retention protocol/standard of care  - Consider collaborating with pharmacy to review patient's medication profile  - Implement strategies to promote bladder emptying  Outcome: Progressing     Problem: MUSCULOSKELETAL - ADULT  Goal: Return mobility to safest level of function  Description: INTERVENTIONS:  - Assess patient stability and activity tolerance for standing, transferring and ambulating w/ or w/o assistive devices  - Assist with transfers and ambulation using safe patient handling equipment as needed  - Ensure adequate protection for wounds/incisions during mobilization  - Obtain PT/OT consults as needed  - Advance activity as appropriate  - Communicate ordered activity level and limitations with patient/family  Outcome: Progressing     Problem: PAIN - ADULT  Goal: Verbalizes/displays adequate comfort level or  patient's stated pain goal  Description: INTERVENTIONS:  - Encourage pt to monitor pain and request assistance  - Assess pain using appropriate pain scale  - Administer analgesics based on type and severity of pain and evaluate response  - Implement non-pharmacological measures as appropriate and evaluate response  - Consider cultural and social influences on pain and pain management  - Manage/alleviate anxiety  - Utilize distraction and/or relaxation techniques  - Monitor for opioid side effects  - Notify MD/LIP if interventions unsuccessful or patient reports new pain  - Anticipate increased pain with activity and pre-medicate as appropriate  Outcome: Progressing     Problem: SAFETY ADULT - FALL  Goal: Free from fall injury  Description: INTERVENTIONS:  - Assess pt frequently for physical needs  - Identify cognitive and physical deficits and behaviors that affect risk of falls.  - [place] fall precautions as indicated by assessment.  - Educate pt/family on patient safety including physical limitations  - Instruct pt to call for assistance with activity based on assessment  - Modify environment to reduce risk of injury  - Provide assistive devices as appropriate  - Consider OT/PT consult to assist with strengthening/mobility  - Encourage toileting schedule  Outcome: Progressing     Problem: DISCHARGE PLANNING  Goal: Discharge to home or other facility with appropriate resources  Description: INTERVENTIONS:  - Identify barriers to discharge w/pt and caregiver  - Include patient/family/discharge partner in discharge planning  - Arrange for needed discharge resources and transportation as appropriate  - Identify discharge learning needs (meds, wound care, etc)  - Arrange for interpreters to assist at discharge as needed  - Consider post-discharge preferences of patient/family/discharge partner  - Complete POLST form as appropriate  - Assess patient's ability to be responsible for managing their own health  - Refer  to Case Management Department for coordinating discharge planning if the patient needs post-hospital services based on physician/LIP order or complex needs related to functional status, cognitive ability or social support system  Outcome: Progressing

## 2025-02-13 NOTE — CM/SW NOTE
ELDER followed up on DC planning.     ELDER met with and discussed SNF list at bedside at length.     Choice is [facility] in [place].     Pt uses a Bipap at night and would need a bariatric bed    ELDER spoke with Shyla at [facility] who confirmed she will have a bed for the pt tomorrow as she needs time to gather all the equipment pt needs     PLAN: DC to [facility] in [facility] tomorrow      Shyla JAMES LSW, MSW ext. [phone]

## 2025-02-13 NOTE — CONSULTS
[facility] - CARDIOLOGY CONSULT NOTE    Campbell Rao Patient Status:  Inpatient    1950 MRN [id number]   Location [facility]5W Attending Tee Dee MD   Hosp Day # 3 PCP Johny Kang MD     Date of Admission:  2/10/2025  Date of Consult:  2025  I was asked by Tee Dee MD to provide recommendations for evaluation and management of cardiac issues.  Impression:     NSVT  History of normal ejection fraction  repeat echo pending  No syncope, asymptomatic was sleeping  Likely related to cardiopulmonary interaction with COPD exacerbation  With normal EF and nonsustained arrhythmia no further inpatient workup    COPD   Exacerbation with hypoxic and hypercarbic respiratory failure    Hypertension    Dyslipidemia  On statin    Heart failure with preserved ejection fraction history  On home diuretics    Morbid obesity      Recommendations:    Continue with COPD treatment  Echocardiogram pending  Continue telemetry while inpatient  No inpatient cardiac electrophysiology workup or treatment necessary    Reason for Consultation:     nsvt    History of Present Illness:   Patient is a 74 year old male who was admitted to the hospital for sob.    Patient with a history of severe COPD who accidentally disconnected his oxygen concentrator and humidifier few days ago is on straight oxygen at home using his CPAP at night but became more confused drowsy and had multiple falls brought him in for evaluation and is found to be hypoxic with 70% on room air when he was admitted 3 days ago.  Been treated for COPD exacerbation hypoxic respiratory failure hypercarbic and noted have nonsustained VT and cardiology consult called.  Had past history of some diastolic heart failure in addition to COPD.  Currently sleeping on his side no CPAP on.  No reported history of syncope    Cardiac tests:  EKG sinus rhythm incomplete right bundle branch block poor progression  Potassium 4.4 carbon  dioxide greater than 40 creatinine 1.11  Troponin 340, 332, 354  Cholesterol 175  Results:     Lab Results   Component Value Date    WBC 11.4 (H) 02/12/2025    HGB 14.0 02/12/2025    HCT 46.4 02/12/2025    .0 02/12/2025    CREATSERUM 1.11 02/13/2025    BUN 33 (H) 02/13/2025     02/13/2025    K 4.4 02/13/2025    CL 92 (L) 02/13/2025    CO2 >40.0 (HH) 02/13/2025     (H) 02/13/2025    CA 9.2 02/13/2025    ALB 4.3 01/12/2025    ALKPHO 92 01/12/2025    BILT 0.6 01/12/2025    TP 6.6 01/12/2025    AST 24 01/12/2025    ALT 45 01/12/2025    TSH 2.777 04/18/2024    GGT 38 04/18/2024    DDIMER 0.40 10/18/2016    CRP 0.40 01/11/2025    MG 2.1 02/13/2025    PHOS 4.1 04/26/2024    TROP <0.045 04/10/2021    B12 765 04/27/2021       No results found.  EKG 12 Lead    Result Date: 2/13/2025  Normal sinus rhythm Incomplete right bundle branch block Cannot rule out Anterior infarct (cited on or before 11-JAN-2025) Abnormal ECG When compared with ECG of 10-FEB-2025 12:43, Questionable change in initial forces of Anterior leads Confirmed by ROXANNA ZAMORA ([id number]) on 2/13/2025 7:39:58 AM     Past Medical History  Past Medical History:    Atherosclerosis of coronary artery    coronary calcification on ct chest    Back problem    Cataract    Chronic respiratory failure (HCC)    Congestive heart disease (HCC)    COPD (HCC)    freq exacerbations. ? 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    Osteoarthritis    Pneumonia    Pulmonary nodules    Sleep apnea    Tobacco abuse       Past Surgical History  Past Surgical History:   Procedure Laterality Date    Appendectomy      Appendectomy      Cataract Bilateral     2/2024, 3/2024    Electrocardiogram, complete  01/30/2012    scanned  to media tab       Family History  Family History   Problem Relation Age of Onset    Cancer Father         lung cancer    Cancer Mother         lung cancer    No Known Problems Daughter     No Known Problems Son     No Known Problems Maternal Grandmother     No Known Problems Maternal Grandfather     No Known Problems Paternal Grandmother     No Known Problems Paternal Grandfather     No Known Problems Sister     Diabetes Brother     Lipids Brother         hyperlipidemia    Hypertension Brother     No Known Problems Other        Social History  Pediatric History   Patient Parents    Not on file     Other Topics Concern     Service Not Asked    Blood Transfusions Not Asked    Caffeine Concern Yes     Comment: coffee, >32 oz daily    Occupational Exposure Not Asked    Hobby Hazards Not Asked    Sleep Concern Not Asked    Stress Concern Not Asked    Weight Concern Not Asked    Special Diet Not Asked    Back Care Not Asked    Exercise Not Asked    Bike Helmet Not Asked    Seat Belt Not Asked    Self-Exams Not Asked   Social History Narrative    Not on file           Current Medications:  Current Facility-Administered Medications   Medication Dose Route Frequency    enoxaparin (Lovenox) 80 MG/0.8ML SUBQ injection 80 mg  80 mg Subcutaneous q12h    sodium chloride (Saline Mist) 0.65 % nasal solution 1 spray  1 spray Each Nare Q3H PRN    predniSONE (Deltasone) tab 30 mg  30 mg Oral Daily with breakfast    acetaminophen (Tylenol Extra Strength) tab 500 mg  500 mg Oral Q4H PRN    guaiFENesin (Robitussin) 100 MG/5 ML oral liquid 200 mg  200 mg Oral Q4H PRN    benzonatate (Tessalon) cap 200 mg  200 mg Oral TID PRN    ipratropium-albuterol (Duoneb) 0.5-2.5 (3) MG/3ML inhalation solution 3 mL  3 mL Nebulization Q6H PRN    glucose (Dex4) 15 GM/59ML oral liquid 15 g  15 g Oral Q15 Min PRN    Or    glucose (Glutose) 40% oral gel 15 g  15 g Oral Q15 Min PRN    Or    glucose-vitamin C (Dex-4) chewable tab 4 tablet  4 tablet  Oral Q15 Min PRN    Or    dextrose 50% injection 50 mL  50 mL Intravenous Q15 Min PRN    Or    glucose (Dex4) 15 GM/59ML oral liquid 30 g  30 g Oral Q15 Min PRN    Or    glucose (Glutose) 40% oral gel 30 g  30 g Oral Q15 Min PRN    Or    glucose-vitamin C (Dex-4) chewable tab 8 tablet  8 tablet Oral Q15 Min PRN    insulin aspart (NovoLOG) 100 Units/mL FlexPen 1-7 Units  1-7 Units Subcutaneous TID CC    atorvastatin (Lipitor) tab 20 mg  20 mg Oral Nightly    fluticasone-salmeterol (Advair Diskus) 250-50 MCG/ACT inhaler 1 puff  1 puff Inhalation BID    spironolactone (Aldactone) partial tab 12.5 mg  12.5 mg Oral Daily    torsemide (Demadex) tab 20 mg  20 mg Oral BID    umeclidinium bromide (Incruse Ellipta) 62.5 MCG/ACT inhaler 1 puff  1 puff Inhalation Daily    albuterol (Ventolin) (2.5 MG/3ML) 0.083% nebulizer solution 2.5 mg  2.5 mg Nebulization 4 times per day     Medications Prior to Admission   Medication Sig    ergocalciferol (DRISDOL) 1.25 MG (06362 UT) Oral Cap Take 1 capsule (50,000 Units total) by mouth once a week for 12 doses.    naproxen (ALEVE) 220 MG Oral Tab Take 1-2 tablets (220-440 mg total) by mouth daily as needed.    fluticasone-salmeterol 250-50 MCG/ACT Inhalation Aerosol Powder, Breath Activated Inhale 1 puff into the lungs 2 (two) times daily.    umeclidinium bromide 62.5 MCG/ACT Inhalation Aerosol Powder, Breath Activated Inhale 1 puff into the lungs daily.    atorvastatin 20 MG Oral Tab Take 1 tablet (20 mg total) by mouth nightly.    Potassium Chloride ER 10 MEQ Oral Cap CR Take 1 capsule (10 mEq total) by mouth daily.    spironolactone 25 MG Oral Tab Take 0.5 tablets (12.5 mg total) by mouth daily.    acetaminophen (TYLENOL) 325 MG Oral Tab Take 1 tablet (325 mg total) by mouth every 6 (six) hours as needed for Pain.    torsemide 20 MG Oral Tab Take 1 tablet (20 mg total) by mouth 2 (two) times daily.    Tirzepatide (MOUNJARO) 2.5 MG/0.5ML Subcutaneous Solution Auto-injector Inject 2.5 mg  into the skin every 7 days. (Patient not taking: Reported on 2/10/2025)    albuterol 108 (90 Base) MCG/ACT Inhalation Aero Soln Inhale 2 puffs into the lungs every 4 to 6 hours as needed for Wheezing.       Allergies  Allergies[1]    Review of Systems:   10 pt ROS performed, separate from HPI  Review of Systems:  GENERAL: no fevers, chills, sweats  HEENT: no visual or hearing changes  SKIN: denies any unusual skin lesions or rashes  RESPIRATORY: denies shortness of breath with exertion  CARDIOVASCULAR: no active chest pain, no claudication  GI: denies abdominal pain and denies heartburn  : no dysuria or hematuria  NEURO: denies headaches, focal weaknesses or paresthesias  All other systems reviewed and negative.  fatigue is present  All other systems were reviewed are negative  Physical Exam:   Blood pressure 108/82, pulse 99, temperature 99.5 °F (37.5 °C), temperature source Oral, resp. rate 18, weight (!) 308 lb 10.3 oz (140 kg), SpO2 (!) 89%.    Scheduled Meds:    enoxaparin  80 mg Subcutaneous q12h    predniSONE  30 mg Oral Daily with breakfast    insulin aspart  1-7 Units Subcutaneous TID CC    atorvastatin  20 mg Oral Nightly    fluticasone-salmeterol  1 puff Inhalation BID    spironolactone  12.5 mg Oral Daily    torsemide  20 mg Oral BID    umeclidinium bromide  1 puff Inhalation Daily    albuterol  2.5 mg Nebulization 4 times per day         Physical Exam:    General: Alert and oriented. No apparent distress. No respiratory or constitutional distress.  HEENT: Normocephalic, anicteric sclera, neck supple  Neck: No JVD, carotids 2+, supple  Cardiac: Regular rate. No pathologic murmur.  Lungs: Clear with normal effort.  Normal excursions and effort.  Abdomen: Soft, non-tender. BS-present.  Extremities: Without clubbing, cyanosis.  Peripheral pulsespresent.  Neurologic: Alert and oriented, normal affect. Motor ok.  Skin: Warm and dry.     Imaging: I independently visualized all relevant chest imaging in PACS,  agree with radiology interpretation except where noted.  Thank you for allowing me to participate in the care of your patient.    Pranay Pate MD  [facility]  Cardiac Electrophysiology  2/13/2025  5 level consult         [1] No Known Allergies

## 2025-02-13 NOTE — PHYSICAL THERAPY NOTE
Chart reviewed, RN approve participation.  Pt received sitting up in chair on phone asking therapist to return later ..  Attempt x 1

## 2025-02-14 ENCOUNTER — APPOINTMENT (OUTPATIENT)
Dept: CV DIAGNOSTICS | Facility: HOSPITAL | Age: 75
End: 2025-02-14
Attending: INTERNAL MEDICINE
Payer: MEDICARE

## 2025-02-14 VITALS
RESPIRATION RATE: 20 BRPM | BODY MASS INDEX: 47 KG/M2 | DIASTOLIC BLOOD PRESSURE: 67 MMHG | OXYGEN SATURATION: 94 % | WEIGHT: 308.63 LBS | SYSTOLIC BLOOD PRESSURE: 118 MMHG | TEMPERATURE: 99 F | HEART RATE: 104 BPM

## 2025-02-14 LAB
ANION GAP SERPL CALC-SCNC: 6 MMOL/L (ref 0–18)
BUN BLD-MCNC: 29 MG/DL (ref 9–23)
BUN/CREAT SERPL: 25.4 (ref 10–20)
CALCIUM BLD-MCNC: 9 MG/DL (ref 8.7–10.4)
CHLORIDE SERPL-SCNC: 95 MMOL/L (ref 98–112)
CO2 SERPL-SCNC: 39 MMOL/L (ref 21–32)
CREAT BLD-MCNC: 1.14 MG/DL
EGFRCR SERPLBLD CKD-EPI 2021: 68 ML/MIN/1.73M2 (ref 60–?)
GLUCOSE BLD-MCNC: 124 MG/DL (ref 70–99)
GLUCOSE BLDC GLUCOMTR-MCNC: 104 MG/DL (ref 70–99)
GLUCOSE BLDC GLUCOMTR-MCNC: 130 MG/DL (ref 70–99)
MAGNESIUM SERPL-MCNC: 2.1 MG/DL (ref 1.6–2.6)
OSMOLALITY SERPL CALC.SUM OF ELEC: 297 MOSM/KG (ref 275–295)
POTASSIUM SERPL-SCNC: 4 MMOL/L (ref 3.5–5.1)
SODIUM SERPL-SCNC: 140 MMOL/L (ref 136–145)

## 2025-02-14 PROCEDURE — 93306 TTE W/DOPPLER COMPLETE: CPT | Performed by: INTERNAL MEDICINE

## 2025-02-14 PROCEDURE — 99239 HOSP IP/OBS DSCHRG MGMT >30: CPT | Performed by: HOSPITALIST

## 2025-02-14 PROCEDURE — 99232 SBSQ HOSP IP/OBS MODERATE 35: CPT | Performed by: INTERNAL MEDICINE

## 2025-02-14 RX ORDER — ACETAMINOPHEN 325 MG/1
650 TABLET ORAL EVERY 6 HOURS PRN
Status: SHIPPED | COMMUNITY
Start: 2025-02-14

## 2025-02-14 RX ORDER — ALBUTEROL SULFATE 0.83 MG/ML
2.5 SOLUTION RESPIRATORY (INHALATION)
Status: DISCONTINUED | OUTPATIENT
Start: 2025-02-14 | End: 2025-02-14

## 2025-02-14 RX ORDER — PREDNISONE 10 MG/1
TABLET ORAL
Qty: 18 TABLET | Refills: 0 | Status: SHIPPED | OUTPATIENT
Start: 2025-02-14 | End: 2025-02-24 | Stop reason: ALTCHOICE

## 2025-02-14 RX ORDER — FLUTICASONE PROPIONATE AND SALMETEROL 250; 50 UG/1; UG/1
1 POWDER RESPIRATORY (INHALATION) 2 TIMES DAILY
Qty: 60 EACH | Refills: 1 | Status: SHIPPED | OUTPATIENT
Start: 2025-02-14 | End: 2025-03-16

## 2025-02-14 RX ORDER — METOPROLOL SUCCINATE 25 MG/1
25 TABLET, EXTENDED RELEASE ORAL
Status: DISCONTINUED | OUTPATIENT
Start: 2025-02-14 | End: 2025-02-14

## 2025-02-14 RX ORDER — METOPROLOL SUCCINATE 25 MG/1
25 TABLET, EXTENDED RELEASE ORAL
Qty: 60 TABLET | Refills: 1 | Status: SHIPPED | OUTPATIENT
Start: 2025-02-14

## 2025-02-14 NOTE — PLAN OF CARE
Pt a&ox3-4, mild forgetfulness, anxiety, pt declined [place], SBA w/ bariatric walker, pt calls appropriately but impulsive and high fall risk, gets up on own. Plan to be re-evaluated.       Problem: Patient Centered Care  Goal: Patient preferences are identified and integrated in the patient's plan of care  Description: Interventions:  - What would you like us to know as we care for you?   - Provide timely, complete, and accurate information to patient/family  - Incorporate patient and family knowledge, values, beliefs, and cultural backgrounds into the planning and delivery of care  - Encourage patient/family to participate in care and decision-making at the level they choose  - Honor patient and family perspectives and choices  Outcome: Progressing     Problem: Patient/Family Goals  Goal: Patient/Family Long Term Goal  Description: Patient's Long Term Goal:     Interventions:  -   - See additional Care Plan goals for specific interventions  Outcome: Progressing  Goal: Patient/Family Short Term Goal  Description: Patient's Short Term Goal:     Interventions:   -   - See additional Care Plan goals for specific interventions  Outcome: Progressing     Problem: CARDIOVASCULAR - ADULT  Goal: Maintains optimal cardiac output and hemodynamic stability  Description: INTERVENTIONS:  - Monitor vital signs, rhythm, and trends  - Monitor for bleeding, hypotension and signs of decreased cardiac output  - Evaluate effectiveness of vasoactive medications to optimize hemodynamic stability  - Monitor arterial and/or venous puncture sites for bleeding and/or hematoma  - Assess quality of pulses, skin color and temperature  - Assess for signs of decreased coronary artery perfusion - ex. Angina  - Evaluate fluid balance, assess for edema, trend weights  Outcome: Progressing  Goal: Absence of cardiac arrhythmias or at baseline  Description: INTERVENTIONS:  - Continuous cardiac monitoring, monitor vital signs, obtain 12 lead EKG if  indicated  - Evaluate effectiveness of antiarrhythmic and heart rate control medications as ordered  - Initiate emergency measures for life threatening arrhythmias  - Monitor electrolytes and administer replacement therapy as ordered  Outcome: Progressing     Problem: RESPIRATORY - ADULT  Goal: Achieves optimal ventilation and oxygenation  Description: INTERVENTIONS:  - Assess for changes in respiratory status  - Assess for changes in mentation and behavior  - Position to facilitate oxygenation and minimize respiratory effort  - Oxygen supplementation based on oxygen saturation or ABGs  - Provide Smoking Cessation handout, if applicable  - Encourage broncho-pulmonary hygiene including cough, deep breathe, Incentive Spirometry  - Assess the need for suctioning and perform as needed  - Assess and instruct to report SOB or any respiratory difficulty  - Respiratory Therapy support as indicated  - Manage/alleviate anxiety  - Monitor for signs/symptoms of CO2 retention  Outcome: Progressing     Problem: GENITOURINARY - ADULT  Goal: Absence of urinary retention  Description: INTERVENTIONS:  - Assess patient’s ability to void and empty bladder  - Monitor intake/output and perform bladder scan as needed  - Follow urinary retention protocol/standard of care  - Consider collaborating with pharmacy to review patient's medication profile  - Implement strategies to promote bladder emptying  Outcome: Progressing     Problem: MUSCULOSKELETAL - ADULT  Goal: Return mobility to safest level of function  Description: INTERVENTIONS:  - Assess patient stability and activity tolerance for standing, transferring and ambulating w/ or w/o assistive devices  - Assist with transfers and ambulation using safe patient handling equipment as needed  - Ensure adequate protection for wounds/incisions during mobilization  - Obtain PT/OT consults as needed  - Advance activity as appropriate  - Communicate ordered activity level and limitations with  patient/family  Outcome: Progressing     Problem: PAIN - ADULT  Goal: Verbalizes/displays adequate comfort level or patient's stated pain goal  Description: INTERVENTIONS:  - Encourage pt to monitor pain and request assistance  - Assess pain using appropriate pain scale  - Administer analgesics based on type and severity of pain and evaluate response  - Implement non-pharmacological measures as appropriate and evaluate response  - Consider cultural and social influences on pain and pain management  - Manage/alleviate anxiety  - Utilize distraction and/or relaxation techniques  - Monitor for opioid side effects  - Notify MD/LIP if interventions unsuccessful or patient reports new pain  - Anticipate increased pain with activity and pre-medicate as appropriate  Outcome: Progressing     Problem: RISK FOR INFECTION - ADULT  Goal: Absence of fever/infection during anticipated neutropenic period  Description: INTERVENTIONS  - Monitor WBC  - Administer growth factors as ordered  - Implement neutropenic guidelines  Outcome: Progressing     Problem: SAFETY ADULT - FALL  Goal: Free from fall injury  Description: INTERVENTIONS:  - Assess pt frequently for physical needs  - Identify cognitive and physical deficits and behaviors that affect risk of falls.  - [place] fall precautions as indicated by assessment.  - Educate pt/family on patient safety including physical limitations  - Instruct pt to call for assistance with activity based on assessment  - Modify environment to reduce risk of injury  - Provide assistive devices as appropriate  - Consider OT/PT consult to assist with strengthening/mobility  - Encourage toileting schedule  Outcome: Progressing     Problem: DISCHARGE PLANNING  Goal: Discharge to home or other facility with appropriate resources  Description: INTERVENTIONS:  - Identify barriers to discharge w/pt and caregiver  - Include patient/family/discharge partner in discharge planning  - Arrange for needed  discharge resources and transportation as appropriate  - Identify discharge learning needs (meds, wound care, etc)  - Arrange for interpreters to assist at discharge as needed  - Consider post-discharge preferences of patient/family/discharge partner  - Complete POLST form as appropriate  - Assess patient's ability to be responsible for managing their own health  - Refer to Case Management Department for coordinating discharge planning if the patient needs post-hospital services based on physician/LIP order or complex needs related to functional status, cognitive ability or social support system  Outcome: Progressing

## 2025-02-14 NOTE — PLAN OF CARE
Patient discharging home with wife. Refusing [facility] and home health. Discharge education given to patient and wife. Portable oxygen bedside for transport home. Safety precautions maintained. Call light in reach.    Problem: Patient Centered Care  Goal: Patient preferences are identified and integrated in the patient's plan of care  Description: Interventions:  - What would you like us to know as we care for you? From home with wife   - Provide timely, complete, and accurate information to patient/family  - Incorporate patient and family knowledge, values, beliefs, and cultural backgrounds into the planning and delivery of care  - Encourage patient/family to participate in care and decision-making at the level they choose  - Honor patient and family perspectives and choices  Outcome: Completed     Problem: CARDIOVASCULAR - ADULT  Goal: Maintains optimal cardiac output and hemodynamic stability  Description: INTERVENTIONS:  - Monitor vital signs, rhythm, and trends  - Monitor for bleeding, hypotension and signs of decreased cardiac output  - Evaluate effectiveness of vasoactive medications to optimize hemodynamic stability  - Monitor arterial and/or venous puncture sites for bleeding and/or hematoma  - Assess quality of pulses, skin color and temperature  - Assess for signs of decreased coronary artery perfusion - ex. Angina  - Evaluate fluid balance, assess for edema, trend weights  Outcome: Completed  Goal: Absence of cardiac arrhythmias or at baseline  Description: INTERVENTIONS:  - Continuous cardiac monitoring, monitor vital signs, obtain 12 lead EKG if indicated  - Evaluate effectiveness of antiarrhythmic and heart rate control medications as ordered  - Initiate emergency measures for life threatening arrhythmias  - Monitor electrolytes and administer replacement therapy as ordered  Outcome: Completed     Problem: RESPIRATORY - ADULT  Goal: Achieves optimal ventilation and oxygenation  Description:  INTERVENTIONS:  - Assess for changes in respiratory status  - Assess for changes in mentation and behavior  - Position to facilitate oxygenation and minimize respiratory effort  - Oxygen supplementation based on oxygen saturation or ABGs  - Provide Smoking Cessation handout, if applicable  - Encourage broncho-pulmonary hygiene including cough, deep breathe, Incentive Spirometry  - Assess the need for suctioning and perform as needed  - Assess and instruct to report SOB or any respiratory difficulty  - Respiratory Therapy support as indicated  - Manage/alleviate anxiety  - Monitor for signs/symptoms of CO2 retention  Outcome: Completed     Problem: GENITOURINARY - ADULT  Goal: Absence of urinary retention  Description: INTERVENTIONS:  - Assess patient’s ability to void and empty bladder  - Monitor intake/output and perform bladder scan as needed  - Follow urinary retention protocol/standard of care  - Consider collaborating with pharmacy to review patient's medication profile  - Implement strategies to promote bladder emptying  Outcome: Completed     Problem: MUSCULOSKELETAL - ADULT  Goal: Return mobility to safest level of function  Description: INTERVENTIONS:  - Assess patient stability and activity tolerance for standing, transferring and ambulating w/ or w/o assistive devices  - Assist with transfers and ambulation using safe patient handling equipment as needed  - Ensure adequate protection for wounds/incisions during mobilization  - Obtain PT/OT consults as needed  - Advance activity as appropriate  - Communicate ordered activity level and limitations with patient/family  Outcome: Completed     Problem: PAIN - ADULT  Goal: Verbalizes/displays adequate comfort level or patient's stated pain goal  Description: INTERVENTIONS:  - Encourage pt to monitor pain and request assistance  - Assess pain using appropriate pain scale  - Administer analgesics based on type and severity of pain and evaluate response  -  Implement non-pharmacological measures as appropriate and evaluate response  - Consider cultural and social influences on pain and pain management  - Manage/alleviate anxiety  - Utilize distraction and/or relaxation techniques  - Monitor for opioid side effects  - Notify MD/LIP if interventions unsuccessful or patient reports new pain  - Anticipate increased pain with activity and pre-medicate as appropriate  Outcome: Completed     Problem: RISK FOR INFECTION - ADULT  Goal: Absence of fever/infection during anticipated neutropenic period  Description: INTERVENTIONS  - Monitor WBC  - Administer growth factors as ordered  - Implement neutropenic guidelines  Outcome: Completed     Problem: SAFETY ADULT - FALL  Goal: Free from fall injury  Description: INTERVENTIONS:  - Assess pt frequently for physical needs  - Identify cognitive and physical deficits and behaviors that affect risk of falls.  - [place] fall precautions as indicated by assessment.  - Educate pt/family on patient safety including physical limitations  - Instruct pt to call for assistance with activity based on assessment  - Modify environment to reduce risk of injury  - Provide assistive devices as appropriate  - Consider OT/PT consult to assist with strengthening/mobility  - Encourage toileting schedule  Outcome: Completed     Problem: DISCHARGE PLANNING  Goal: Discharge to home or other facility with appropriate resources  Description: INTERVENTIONS:  - Identify barriers to discharge w/pt and caregiver  - Include patient/family/discharge partner in discharge planning  - Arrange for needed discharge resources and transportation as appropriate  - Identify discharge learning needs (meds, wound care, etc)  - Arrange for interpreters to assist at discharge as needed  - Consider post-discharge preferences of patient/family/discharge partner  - Complete POLST form as appropriate  - Assess patient's ability to be responsible for managing their own health  -  Refer to Case Management Department for coordinating discharge planning if the patient needs post-hospital services based on physician/LIP order or complex needs related to functional status, cognitive ability or social support system  Outcome: Completed

## 2025-02-14 NOTE — CM/SW NOTE
ELDER followed up on DC planning.     Received update that the pt and spouse have changed their minds and would likely like to go home instead    SW spoke with the pt who would like to discuss HHC with his wife prior to finalizing anything     HHC list tubed up and handed to the pt to review    Pt is at baseline with Home O2    PLAN: home with family - poss HHC pending above    Shyla JAMES LSW, MSW ext. [phone]

## 2025-02-14 NOTE — PROGRESS NOTES
[facility]  part of [facility]     Progress Note    Campbell Rao Patient Status:  Inpatient    1950 MRN [id number]   Location [facility]5W Attending Tee Dee MD   Hosp Day # 4 PCP Johny Kang MD       Subjective:   Patient seen and examined.  Admits to improvement in dyspnea.  Cough present    Objective:   Blood pressure 118/67, pulse 104, temperature 98.7 °F (37.1 °C), temperature source Oral, resp. rate 20, weight (!) 308 lb 10.3 oz (140 kg), SpO2 94%.  Intake/Output:   Last 3 shifts: I/O last 3 completed shifts:  In: 860 [P.O.:840; I.V.:20]  Out: 2650 [Urine:2650]   This shift: I/O this shift:  In: 360 [P.O.:360]  Out: -      Vent Settings:      Hemodynamic parameters (last 24 hours):      Scheduled Meds:   Current Facility-Administered Medications   Medication Dose Route Frequency    albuterol (Ventolin) (2.5 MG/3ML) 0.083% nebulizer solution 2.5 mg  2.5 mg Nebulization 2 times daily    metoprolol succinate ER (Toprol XL) 24 hr tab 25 mg  25 mg Oral 2x Daily(Beta Blocker)    enoxaparin (Lovenox) 80 MG/0.8ML SUBQ injection 80 mg  80 mg Subcutaneous q12h    sodium chloride (Saline Mist) 0.65 % nasal solution 1 spray  1 spray Each Nare Q3H PRN    predniSONE (Deltasone) tab 30 mg  30 mg Oral Daily with breakfast    acetaminophen (Tylenol Extra Strength) tab 500 mg  500 mg Oral Q4H PRN    guaiFENesin (Robitussin) 100 MG/5 ML oral liquid 200 mg  200 mg Oral Q4H PRN    benzonatate (Tessalon) cap 200 mg  200 mg Oral TID PRN    ipratropium-albuterol (Duoneb) 0.5-2.5 (3) MG/3ML inhalation solution 3 mL  3 mL Nebulization Q6H PRN    glucose (Dex4) 15 GM/59ML oral liquid 15 g  15 g Oral Q15 Min PRN    Or    glucose (Glutose) 40% oral gel 15 g  15 g Oral Q15 Min PRN    Or    glucose-vitamin C (Dex-4) chewable tab 4 tablet  4 tablet Oral Q15 Min PRN    Or    dextrose 50% injection 50 mL  50 mL Intravenous Q15 Min PRN    Or    glucose (Dex4) 15 GM/59ML oral liquid 30 g   30 g Oral Q15 Min PRN    Or    glucose (Glutose) 40% oral gel 30 g  30 g Oral Q15 Min PRN    Or    glucose-vitamin C (Dex-4) chewable tab 8 tablet  8 tablet Oral Q15 Min PRN    insulin aspart (NovoLOG) 100 Units/mL FlexPen 1-7 Units  1-7 Units Subcutaneous TID CC    atorvastatin (Lipitor) tab 20 mg  20 mg Oral Nightly    fluticasone-salmeterol (Advair Diskus) 250-50 MCG/ACT inhaler 1 puff  1 puff Inhalation BID    spironolactone (Aldactone) partial tab 12.5 mg  12.5 mg Oral Daily    torsemide (Demadex) tab 20 mg  20 mg Oral BID    umeclidinium bromide (Incruse Ellipta) 62.5 MCG/ACT inhaler 1 puff  1 puff Inhalation Daily       Continuous Infusions:     Physical Exam  Constitutional: no acute distress  Eyes: PERRL  ENT: nares pateint  Neck: supple, no JVD  Cardio: RRR, S1 S2  Respiratory: Expiratory breath sounds  GI: abdomen soft, non tender, active bowel sounds, no organomegaly  Extremities: no clubbing, cyanosis, + bilateral lower extremity edema  Neurologic: no gross motor deficits  Skin: warm, dry      Results:     Lab Results   Component Value Date    CREATSERUM 1.14 02/14/2025    BUN 29 02/14/2025     02/14/2025    K 4.0 02/14/2025    CL 95 02/14/2025    CO2 39.0 02/14/2025     02/14/2025    CA 9.0 02/14/2025    MG 2.1 02/14/2025       No results found.    EKG 12 Lead    Result Date: 2/13/2025  Normal sinus rhythm Incomplete right bundle branch block Cannot rule out Anterior infarct (cited on or before 11-JAN-2025) Abnormal ECG When compared with ECG of 10-FEB-2025 12:43, Questionable change in initial forces of Anterior leads Confirmed by ROXANNA ZAMORA ([id number]) on 2/13/2025 7:39:58 AM     Assessment   1.  Acute on chronic respiratory failure  2.  COPD with acute exacerbation  3.  MARIA M/OHS     Plan   -Patient presents with evidence of acute on chronic respiratory failure.  Concern for COPD exacerbation.  -Progressing well from pulmonary perspective  -Currently on baseline 4 L nasal cannula  oxygen  -Neb treatments  -DVT prophylaxis: Lovenox  -Ok for discharge from pulmonary perspective with 3 days of prednisone 20 mg.  Follow-up with Dr. Salinas in 2 weeks    Corey Del Toro DO  Pulmonary Critical Care Medicine  [facility]

## 2025-02-14 NOTE — PROGRESS NOTES
Progress Note  Campbellsabas Rao Patient Status:  Inpatient    1950 MRN [id number]   Location [facility]5W Attending Tee Dee MD   Hosp Day # 4 PCP Johny Kang MD     Subjective   Feels ok. No cardiac complaints.       Objective:   /87 (BP Location: Right arm)   Pulse 83   Temp 98 °F (36.7 °C) (Axillary)   Resp 22   Wt (!) 308 lb 10.3 oz (140 kg)   SpO2 92%   BMI 46.93 kg/m²     Telemetry: sinus to sinus tachycardia, brief runs NSVT     Intake/Output:    Intake/Output Summary (Last 24 hours) at 2025 05  Last data filed at 2025  Gross per 24 hour   Intake 730 ml   Output 1650 ml   Net -920 ml       Wt Readings from Last 3 Encounters:   02/10/25 (!) 308 lb 10.3 oz (140 kg)   25 (!) 310 lb (140.6 kg)   25 (!) 303 lb 6.4 oz (137.6 kg)         Labs:  Recent Labs   Lab 25  0505 25  0456 256 25  0325 25  1231   * 122*  --  142*  --    BUN 21 26*  --  33*  --    CREATSERUM 0.99 1.06  --  1.11  --    EGFRCR 80 74  --  70  --    CA 9.5 9.3  --  9.2  --     139  --  139  --    K 4.8 4.0 4.2 3.4* 4.4   CL 99 93*  --  92*  --    CO2 39.0* >40.0*  --  >40.0*  --      Recent Labs   Lab 02/10/25  1240 25  0504 25  0456   RBC 4.68 4.81 4.72   HGB 14.3 14.3 14.0   HCT 48.3 48.2 46.4   .2* 100.2* 98.3   MCH 30.6 29.7 29.7   MCHC 29.6* 29.7* 30.2*   RDW 14.3 14.2 14.4   NEPRELIM 6.37 9.02* 9.31*   WBC 9.2 10.1 11.4*   .0 217.0 231.0         Recent Labs   Lab 25  0059 25  0325 25  0621   TROPHS 340* 332* 354*       Review of Systems:     10 point review of systems completed and negative except as noted in HPI    Constitutional:No fevers, chills, fatigue or night sweats.  Respiratory: + sob, chronic, on supplemental O2   CV: Denies chest pain, palpitations, orthopnea, PND or dizziness .      Exam:     Physical Exam:  General: Alert and oriented x 3. No apparent distress.    HEENT: Normocephalic, neck supple, no thyromegaly or adenopathy.  Neck: No JVD, carotids 2+, no bruits.  Cardiac: Regular rate and rhythm. No murmur, pericardial rub, S3, or extra cardiac sounds.  Lungs: Diminished to auscultation   Abdomen: Soft, non-tender. BS-present.  Extremities: Without clubbing or cyanosis, mild BLE edema.    Neurologic: Alert and oriented, normal affect. No focal defects  Skin: Warm and dry.     Medications:     enoxaparin  80 mg Subcutaneous q12h    predniSONE  30 mg Oral Daily with breakfast    insulin aspart  1-7 Units Subcutaneous TID CC    atorvastatin  20 mg Oral Nightly    fluticasone-salmeterol  1 puff Inhalation BID    spironolactone  12.5 mg Oral Daily    torsemide  20 mg Oral BID    umeclidinium bromide  1 puff Inhalation Daily    albuterol  2.5 mg Nebulization 4 times per day         Diagnostic Studies:     Echo 7/16/24  Conclusions:     1. Left ventricle: The cavity size was normal. Wall thickness was at the      upper limits of normal. Systolic function was normal. The estimated      ejection fraction was 60-65%, by visual assessment. Wall motion is      normal; there are no regional wall motion abnormalities. Left ventricular      diastolic function parameters were normal.   2. Aortic root: The aortic root was dilated and 4.0cm diameter.   3. Right ventricle: The cavity size was mildly increased. Systolic function      was normal.   4. Right atrium: The atrium was dilated.   Impressions:  No significant change since prior study.     Assessment and Plan:     Assessment:  # COPD exacerbation with hypoxic and hypercapnic respiratory failure   # MARIA M   On 4L O2 which is his baseline  Cpap nightly   Further recs and management per pulm/ primary team   # NSVT   Echo pending   Maintain K >=4 and Mag >=2  Start low dose BB; treat underlying etiologies   Tele monitoring   # HFpEF, LVEF 60-65%  Echo pending   Overall volume compensated   Daily I/Os and daily weights   GDMT PTA:  spironolactone and torsemide   # dyslipidemia - on statin   # morbid obesity, BMI 46.9  Likely playing a role with obesity-hypoventilation syndrome   Weight loss       Plan:  Echo pending   Continue home spironolactone and torsemide   Start low dose toprol 25 mg bid   Ok to discharge later today if echo unremarkable     Plan of care discussed with patient, RN.      Zoraida Miles, APRN  2/14/2025  5:57 AM  Ph [phone] ([place])  Ph [phone] ([place])

## 2025-02-14 NOTE — PLAN OF CARE
Problem: Patient Centered Care  Goal: Patient preferences are identified and integrated in the patient's plan of care  Description: Interventions:  - What would you like us to know as we care for you? Discharge home  - Provide timely, complete, and accurate information to patient/family  - Incorporate patient and family knowledge, values, beliefs, and cultural backgrounds into the planning and delivery of care  - Encourage patient/family to participate in care and decision-making at the level they choose  - Honor patient and family perspectives and choices  Outcome: Progressing     Problem: Patient/Family Goals  Goal: Patient/Family Long Term Goal  Description: Patient's Long Term Goal: discharge home    Interventions:  -oxygen therapy   - See additional Care Plan goals for specific interventions  Outcome: Progressing  Goal: Patient/Family Short Term Goal  Description: Patient's Short Term Goal: discharge home    Interventions:   - PT OT Home Health encouragement   - See additional Care Plan goals for specific interventions  Outcome: Progressing     Problem: CARDIOVASCULAR - ADULT  Goal: Maintains optimal cardiac output and hemodynamic stability  Description: INTERVENTIONS:  - Monitor vital signs, rhythm, and trends  - Monitor for bleeding, hypotension and signs of decreased cardiac output  - Evaluate effectiveness of vasoactive medications to optimize hemodynamic stability  - Monitor arterial and/or venous puncture sites for bleeding and/or hematoma  - Assess quality of pulses, skin color and temperature  - Assess for signs of decreased coronary artery perfusion - ex. Angina  - Evaluate fluid balance, assess for edema, trend weights  Outcome: Progressing     Problem: RESPIRATORY - ADULT  Goal: Achieves optimal ventilation and oxygenation  Description: INTERVENTIONS:  - Assess for changes in respiratory status  - Assess for changes in mentation and behavior  - Position to facilitate oxygenation and minimize  respiratory effort  - Oxygen supplementation based on oxygen saturation or ABGs  - Provide Smoking Cessation handout, if applicable  - Encourage broncho-pulmonary hygiene including cough, deep breathe, Incentive Spirometry  - Assess the need for suctioning and perform as needed  - Assess and instruct to report SOB or any respiratory difficulty  - Respiratory Therapy support as indicated  - Manage/alleviate anxiety  - Monitor for signs/symptoms of CO2 retention  Outcome: Progressing     Problem: GENITOURINARY - ADULT  Goal: Absence of urinary retention  Description: INTERVENTIONS:  - Assess patient’s ability to void and empty bladder  - Monitor intake/output and perform bladder scan as needed  - Follow urinary retention protocol/standard of care  - Consider collaborating with pharmacy to review patient's medication profile  - Implement strategies to promote bladder emptying  Outcome: Progressing     Problem: PAIN - ADULT  Goal: Verbalizes/displays adequate comfort level or patient's stated pain goal  Description: INTERVENTIONS:  - Encourage pt to monitor pain and request assistance  - Assess pain using appropriate pain scale  - Administer analgesics based on type and severity of pain and evaluate response  - Implement non-pharmacological measures as appropriate and evaluate response  - Consider cultural and social influences on pain and pain management  - Manage/alleviate anxiety  - Utilize distraction and/or relaxation techniques  - Monitor for opioid side effects  - Notify MD/LIP if interventions unsuccessful or patient reports new pain  - Anticipate increased pain with activity and pre-medicate as appropriate  Outcome: Progressing     Problem: RISK FOR INFECTION - ADULT  Goal: Absence of fever/infection during anticipated neutropenic period  Description: INTERVENTIONS  - Monitor WBC  - Administer growth factors as ordered  - Implement neutropenic guidelines  Outcome: Progressing     Problem: SAFETY ADULT -  FALL  Goal: Free from fall injury  Description: INTERVENTIONS:  - Assess pt frequently for physical needs  - Identify cognitive and physical deficits and behaviors that affect risk of falls.  - [place] fall precautions as indicated by assessment.  - Educate pt/family on patient safety including physical limitations  - Instruct pt to call for assistance with activity based on assessment  - Modify environment to reduce risk of injury  - Provide assistive devices as appropriate  - Consider OT/PT consult to assist with strengthening/mobility  - Encourage toileting schedule  Outcome: Progressing     Problem: DISCHARGE PLANNING  Goal: Discharge to home or other facility with appropriate resources  Description: INTERVENTIONS:  - Identify barriers to discharge w/pt and caregiver  - Include patient/family/discharge partner in discharge planning  - Arrange for needed discharge resources and transportation as appropriate  - Identify discharge learning needs (meds, wound care, etc)  - Arrange for interpreters to assist at discharge as needed  - Consider post-discharge preferences of patient/family/discharge partner  - Complete POLST form as appropriate  - Assess patient's ability to be responsible for managing their own health  - Refer to Case Management Department for coordinating discharge planning if the patient needs post-hospital services based on physician/LIP order or complex needs related to functional status, cognitive ability or social support system  Outcome: Progressing

## 2025-02-14 NOTE — PLAN OF CARE
Patient's wife came to nurses station asking for discharge planning. Patient agreeable to rehab and gave  choice earlier this afternoon. Asking to be discharged home. Will notify MD and  for discharge plan TOMORROW.

## 2025-02-14 NOTE — DISCHARGE SUMMARY
[facility]  part of [facility]    Discharge Summary    Campbell Rao Patient Status:  Inpatient    1950 MRN [id number]   Location [facility]5W Attending Tee Dee MD   Hosp Day # 4 PCP Johny Kang MD     Date of Admission: 2/10/2025 Disposition:   Home or Self Care     Date of Discharge:   2025     Admitting Diagnosis:   Acute hypoxic on chronic hypercapnic respiratory failure (HCC) [J96.01, J96.12]    Hospital Discharge Diagnoses:    Acute on chronic combined respiratory failure  AECOPD  MARIA M  NSVT  Hx of HTN  Hx of HL  Hx of knees buckling  Hx of chronic HFpEF  Hx of morbid obesity Body mass index is 46.93 kg/m².       Problem List:     Patient Active Problem List   Diagnosis    Colon cancer screening    Medicare annual wellness visit, subsequent    Pulmonary emphysema (HCC)    Congestive heart failure (HCC)    Ascending aorta enlargement    Morbid (severe) obesity due to excess calories (HCC)    Obesity hypoventilation syndrome (HCC)    MARIA M (obstructive sleep apnea)    Chronic respiratory failure with hypoxia and hypercapnia (HCC)    Dyslipidemia    At risk for falls    Azotemia    Intractable back pain    Acute on chronic respiratory failure with hypoxia (HCC)    Acute on chronic respiratory failure with hypoxia and hypercapnia (HCC)    Hypernatremia    Acute on chronic heart failure with preserved ejection fraction (HFpEF) (HCC)    COPD exacerbation (HCC)    Acute exacerbation of chronic obstructive pulmonary disease (COPD) (HCC)    Community acquired pneumonia, unspecified laterality    Hypoxic respiratory failure (HCC)    Acute hypoxic on chronic hypercapnic respiratory failure (HCC)    Acute on chronic respiratory failure (HCC)       Physical Exam:      /67 (BP Location: Right arm)   Pulse 104   Temp 98.7 °F (37.1 °C) (Oral)   Resp 20   Wt (!) 308 lb 10.3 oz (140 kg)   SpO2 94%   BMI 46.93 kg/m²     Gen:   NAD.  A and O x 3  CV:   RRR,  no m/g/r  Pulm:   CTA bilat, poor air movement  Abd:   +bs, soft, NT, ND  LE:   No c/c/e  Neuro:   nonfocal      Reason for Admission:   Acute on chronic respiratory failure.     HISTORY OF PRESENT ILLNESS:  The patient is a 74-year-old  male with underlying severe chronic obstructive pulmonary disease and obstructive sleep apnea.  He accidentally disconnected his oxygen concentrator and humidifier cord a few days ago, and his wife placed him on straight up oxygen at home.  He has been using his CPAP machine at night but he continued to become more confused, drowsy, with multiple falls.  Brought him in today for evaluation.  Upon arrival to the emergency room noted to have pulse ox of 70% on room air.  He was started on BiPAP machine with high-flow nasal cannula oxygen.  Arterial blood gas showed pCO2 of 95, pH 7.27, and pO2 of 109.  CBC and chemistry otherwise were unremarkable.  The patient will be admitted to the hospital for further management.  Chest x-ray today showed no acute findings.    Hospital Course:     Acute on chronic combined respiratory failure - resolved  AECOPD - reoslved  MARIA M  Currently on 4L o2 which is pt's baseline  - pulm was on consult, recs appreciated  - cont CPAP nightly  - was given nebs, steroids now PO  Home on pred taper.  Pt and wife refusing rehab.  PCP and pulm and cards f/u.     NSVT  - cardiology was on consult  - maintain K and Mg protocols  - f/u echo  - started on toprol bid, rx given     HTN  - cont monitor     HL  - cont statin     Knees \"buckling\"  - s/p recent steroid injections to b/l knees  - PT/OT     Chronic HFpEF  - cont home diuretics     Morbid obesity Body mass index is 46.93 kg/m².     dvt proph:    lovenox    Consultations:   Pulmonology, cardiology    Discharge Condition:  Good    Discharge Medications:      Discharge Medications        START taking these medications        Instructions Prescription details   metoprolol succinate ER 25 MG Tb24  Commonly  known as: Toprol XL      Take 1 tablet (25 mg total) by mouth 2x Daily(Beta Blocker).   Quantity: 60 tablet  Refills: 1     predniSONE 10 MG Tabs  Commonly known as: Deltasone      Take 3 tabs daily for 3 days.  Then take 2 tabs daily for 3 days.   Then take 1 tabs daily for 3 days and then stop.   Quantity: 18 tablet  Refills: 0            CHANGE how you take these medications        Instructions Prescription details   Tylenol 325 MG Tabs  Generic drug: acetaminophen  What changed: how much to take      Take 2 tablets (650 mg total) by mouth every 6 (six) hours as needed for Pain.   Refills: 0            CONTINUE taking these medications        Instructions Prescription details   albuterol 108 (90 Base) MCG/ACT Aers  Commonly known as: Ventolin HFA      Inhale 2 puffs into the lungs every 4 to 6 hours as needed for Wheezing.   Refills: 0     Aleve 220 MG Tabs  Generic drug: naproxen      Take 1-2 tablets (220-440 mg total) by mouth daily as needed.   Refills: 0     atorvastatin 20 MG Tabs  Commonly known as: Lipitor      Take 1 tablet (20 mg total) by mouth nightly.   Refills: 0     ergocalciferol 1.25 MG (82090 UT) Caps  Commonly known as: Drisdol      Take 1 capsule (50,000 Units total) by mouth once a week for 12 doses.   Quantity: 12 capsule  Refills: 0     fluticasone-salmeterol 250-50 MCG/ACT Aepb  Commonly known as: Advair Diskus      Inhale 1 puff into the lungs 2 (two) times daily.   Stop taking on: March 16, 2025  Quantity: 60 each  Refills: 1     Potassium Chloride ER 10 MEQ Cpcr  Commonly known as: MICRO-K      Take 1 capsule (10 mEq total) by mouth daily.   Refills: 0     spironolactone 25 MG Tabs  Commonly known as: Aldactone      Take 0.5 tablets (12.5 mg total) by mouth daily.   Quantity: 30 tablet  Refills: 1     torsemide 20 MG Tabs  Commonly known as: Demadex      Take 1 tablet (20 mg total) by mouth 2 (two) times daily.   Quantity: 180 tablet  Refills: 3     umeclidinium bromide 62.5 MCG/ACT  Aepb  Commonly known as: Incruse Ellipta      Inhale 1 puff into the lungs daily.   Quantity: 30 each  Refills: 0            ASK your doctor about these medications        Instructions Prescription details   Mounjaro 2.5 MG/0.5ML Soaj  Generic drug: Tirzepatide      Inject 2.5 mg into the skin every 7 days.   Quantity: 2 mL  Refills: 0               Where to Get Your Medications        These medications were sent to [id number] - [place], [place] - [address] AT [address] [phone], [phone]  [address] [address]      Phone: [phone]   fluticasone-salmeterol 250-50 MCG/ACT Aepb  metoprolol succinate ER 25 MG Tb24  predniSONE 10 MG Tabs         Follow up Visits:     Follow-up Information       Johny Kang MD. Schedule an appointment as soon as possible for a visit in 1 week(s).    Specialty: Internal Medicine  Contact information:  [address] [place] [address] [address]  [facility] [address]  [phone]               Yesi Salinas MD. Schedule an appointment as soon as possible for a visit in 2 week(s).    Specialties: PULMONARY DISEASES, Internal Medicine, Critical Care  Contact information:  [address] [facility] [address]  [facility] [address] [phone]               Pranay Pate MD Follow up.    Specialties: Cardiac Electrophysiology, CARDIOLOGY  Why: As needed for cardiology.  Contact information:  [address] [facility] [address]  [facility] [id number] [phone]                             Hospital Discharge Diagnoses:  AECOPD    Lace+ Score: 83  59-90 High Risk  29-58 Medium Risk  0-28   Low Risk.    TCM Follow-Up Recommendation:  LACE > 58: High Risk of readmission after discharge from the hospital.    >35 minutes spent preparing this discharge.    Tee Armando MD  2/14/2025  11:14 AM

## 2025-02-17 ENCOUNTER — PATIENT OUTREACH (OUTPATIENT)
Dept: CASE MANAGEMENT | Age: 75
End: 2025-02-17

## 2025-02-17 NOTE — PROGRESS NOTES
TCM request (discharged 02/14)    Dr Johny Kang  Internal Medicine  [address] [place] [address]  [phone]    Attempt #1:  Left message on voicemail for patient to call transitions specialist back to schedule follow up appointments. Provided Transitions specialist scheduling phone number [phone].

## 2025-02-18 NOTE — PROGRESS NOTES
TCM reached out for scheduling assistance.    Johny Kang MD  Internal Medicine  [facility]  [address]  [address] [place] [address]  [phone]  Monday 3/31 @3  Existing appointment.    Patient's spouse will contact the office to schedule.    Confirmed with patient.    Closing encounter

## 2025-02-24 ENCOUNTER — PATIENT OUTREACH (OUTPATIENT)
Dept: CASE MANAGEMENT | Age: 75
End: 2025-02-24

## 2025-02-24 ENCOUNTER — TELEPHONE (OUTPATIENT)
Dept: CASE MANAGEMENT | Age: 75
End: 2025-02-24

## 2025-02-24 DIAGNOSIS — I50.32 CHRONIC DIASTOLIC CONGESTIVE HEART FAILURE (HCC): Primary | ICD-10-CM

## 2025-02-24 DIAGNOSIS — E78.5 DYSLIPIDEMIA: ICD-10-CM

## 2025-02-24 DIAGNOSIS — J44.1 COPD EXACERBATION (HCC): ICD-10-CM

## 2025-02-24 NOTE — TELEPHONE ENCOUNTER
Talked to patient offered him an appointment refused, he says his wife will make the appointment.

## 2025-02-24 NOTE — PROGRESS NOTES
Spoke to Campbell for Chronic Care Management.      Updates to patient care team/comments: UTD.   Patient reported changes in medications: together we reviewed and updated.   Med Adherence  Comment: pt reports taking all medications as prescribed.       Health Maintenance:   Reviewed with pt    Health Maintenance   Topic Date Due    Diabetes Care Dilated Eye Exam  Never done- reminded     Colorectal Cancer Screening  Never done- Ordered on 3/28/2024     Zoster Vaccines (1 of 2) Never done- unsure     COVID-19 Vaccine (6 - 2024-25 season) 09/01/2024- unsure     Diabetes Care: Foot Exam (Annual)  Never done- reminded     Diabetes Care: Microalb/Creat Ratio (Annual)  Never done- Ordered on 3/28/2024     Annual Physical  03/28/2025    Diabetes Care A1C  08/10/2025    Diabetes Care: GFR  02/14/2026    PSA  01/29/2027    Influenza Vaccine  Completed    Annual Depression Screening  Completed    Fall Risk Screening (Annual)  Completed    Pneumococcal Vaccine: 50+ Years  Completed    Meningococcal B Vaccine  Aged Out    Lung Cancer Screening  Discontinued       Patient updates/concerns:   Listened to pt and provided support.     Recent hospital visit for SOB.      CPAP  Using now he says however wasn't using consistently.      Pt was given Wixela inhaler at discharge on 2/14/25 and is wondering if he should be taking this and the one he was originally prescribed Umeclidinium Bromide? Notified I would contact the office and a nurse will call him back for further assessment. Understanding verbalized by pt.   Telephone encounter sent to EM RN Triage     Per hospitalist pt to follow up with the below physicians.  Pulmonary   Yesi Salinas MD   Cardiology   Pranay Pate MD Follow up.   He is also supposed to follow up with Dr. Kang.    Reiterated the above to pt.  Pt says his wife is his transportation and supposed to be calling to get these visits scheduled.  I notified pt if his wife needs assistance I can help to let  her know this.      Goals/Action Plan:    Active goal from previous outreach: weight management      Patient reported progress towards goals: ongoing                - What: diet control            - Where/When/How: pt plans on controlling his portions for dinner at least 3 days a week  Patient Reported Barriers and Concerns: pt says he usually likes to eat till he's full.  Pt says he's kind of a carnivore.                     - Plan for overcoming barriers: he plans on only serving himself one portion and not going back for seconds just  because his stomach isn't full.       Care Managers Interventions:   Encouraged patient:   Self care: Take the time to do the things you love.   Nutrition:  Good nutrition helps us to maintain our weight, fight off infection, and help reduce the risk of developing other chronic issues.   Physical activity:  Physical activity is important to help maintain independence and improve quality of life.     Future Appointments:   Future Appointments   Date Time Provider Department Center   3/31/2025  3:00 PM Johny Kang MD [facility]         Next Care Manager Follow Up Date: in 1 month.     Reason For Follow Up: review progress and or barriers towards patient's goals.     Time Spent This Encounter Total: 21 min medical record review, telephone communication, care plan updates where needed, education, goals, and action plan recreation/update. Provided acknowledgment and validation to patient's concerns.   Monthly Minute Total including today: 21  Physical assessment, complete health history, and need for CCM established by Johny Kang MD.

## 2025-02-24 NOTE — TELEPHONE ENCOUNTER
DR Kang=see below, would you like to discuss these further at his upcoming visit ? Any recommendation regarding the inhalers.    =please assist for hospital follow up appointment .     Per patient , Hospitalist prescribed Wixela( fluticasone-salmeterol 250-50 MCG/ACT Inhalation Aerosol Powder, Breath Activated ) and Incruse Ellipta (umeclidinium bromide 62.5 MCG/ACT ) which are expensive, total cost for both for a month supply is $400.    He started using the Wixela today (first dose ).   He also on albuterol inhaler as needed which he has not been using  for years.     Per chart review, Hospital AVS listed both the  inhalers to resume at home.   Patient's upcoming appointment is on 3/31/25 for MEDICARE .       Future Appointments   Date Time Provider Department Center   3/31/2025  3:00 PM Johny Kang MD [facility]

## 2025-02-24 NOTE — TELEPHONE ENCOUNTER
Triage: Good Day :)     Pt was given Wixela inhaler at discharge on 2/14/25 and is wondering if he should be taking this and the one he was originally prescribed Umeclidinium Bromide?  Notified I would contact the office and a nurse will call him back for further assessment.  Understanding verbalized by pt.      Please contact pt and thank you in advance!

## 2025-02-25 NOTE — TELEPHONE ENCOUNTER
Future Appointments   Date Time Provider Department Center   3/11/2025  2:30 PM Johny Kang MD [facility]   3/31/2025  3:00 PM Johny Kang MD [facility]

## 2025-02-25 NOTE — TELEPHONE ENCOUNTER
Patient has been notified that Dr. Kang will discuss medication questions at his next office visit.

## 2025-03-10 ENCOUNTER — TELEPHONE (OUTPATIENT)
Dept: PULMONOLOGY | Facility: CLINIC | Age: 75
End: 2025-03-10

## 2025-03-10 NOTE — TELEPHONE ENCOUNTER
Received fax from [facility] with order for oxygen renewal. Placed in Dr Salinas's folder for signature

## 2025-03-11 ENCOUNTER — OFFICE VISIT (OUTPATIENT)
Dept: INTERNAL MEDICINE CLINIC | Facility: CLINIC | Age: 75
End: 2025-03-11

## 2025-03-11 VITALS
OXYGEN SATURATION: 92 % | HEART RATE: 94 BPM | WEIGHT: 302.38 LBS | BODY MASS INDEX: 46 KG/M2 | DIASTOLIC BLOOD PRESSURE: 85 MMHG | SYSTOLIC BLOOD PRESSURE: 132 MMHG

## 2025-03-11 DIAGNOSIS — J96.21 ACUTE ON CHRONIC RESPIRATORY FAILURE WITH HYPOXIA AND HYPERCAPNIA (HCC): ICD-10-CM

## 2025-03-11 DIAGNOSIS — I50.32 CHRONIC DIASTOLIC CONGESTIVE HEART FAILURE (HCC): ICD-10-CM

## 2025-03-11 DIAGNOSIS — I25.10 CORONARY ARTERY CALCIFICATION SEEN ON CT SCAN: ICD-10-CM

## 2025-03-11 DIAGNOSIS — E66.01 MORBID OBESITY WITH BMI OF 40.0-44.9, ADULT (HCC): ICD-10-CM

## 2025-03-11 DIAGNOSIS — G47.33 OSA AND COPD OVERLAP SYNDROME (HCC): ICD-10-CM

## 2025-03-11 DIAGNOSIS — E11.8 CONTROLLED TYPE 2 DIABETES MELLITUS WITH COMPLICATION, WITHOUT LONG-TERM CURRENT USE OF INSULIN (HCC): ICD-10-CM

## 2025-03-11 DIAGNOSIS — J96.22 ACUTE ON CHRONIC RESPIRATORY FAILURE WITH HYPOXIA AND HYPERCAPNIA (HCC): ICD-10-CM

## 2025-03-11 DIAGNOSIS — I77.89 ASCENDING AORTA ENLARGEMENT: ICD-10-CM

## 2025-03-11 DIAGNOSIS — J44.9 OSA AND COPD OVERLAP SYNDROME (HCC): ICD-10-CM

## 2025-03-11 DIAGNOSIS — J44.1 COPD EXACERBATION (HCC): Primary | ICD-10-CM

## 2025-03-11 PROCEDURE — 99214 OFFICE O/P EST MOD 30 MIN: CPT | Performed by: INTERNAL MEDICINE

## 2025-03-11 RX ORDER — SPIRONOLACTONE 25 MG/1
12.5 TABLET ORAL DAILY
Qty: 30 TABLET | Refills: 2 | Status: SHIPPED | OUTPATIENT
Start: 2025-03-11

## 2025-03-11 RX ORDER — TIRZEPATIDE 5 MG/.5ML
5 INJECTION, SOLUTION SUBCUTANEOUS WEEKLY
Qty: 6 ML | Refills: 0 | Status: SHIPPED | OUTPATIENT
Start: 2025-03-11

## 2025-03-11 RX ORDER — ATORVASTATIN CALCIUM 20 MG/1
20 TABLET, FILM COATED ORAL NIGHTLY
Qty: 90 TABLET | Refills: 1 | Status: SHIPPED | OUTPATIENT
Start: 2025-03-11

## 2025-03-14 NOTE — PROGRESS NOTES
Subjective:     Patient ID: Campbell Rao is a 74 year old male.    Pt presents today with spouse for posthospital ffup. He was admitted again at [facility] for copd exacerbation with acute on chronic resp failure with hypercarbia. Pt treated with bipap, IV steroids, nebss and abx. Pt had episode of NSVT, seen by cardio, felt to be due to pulmonary issues. Started on toprol bid.  No further cardiac workup in pt advised by cardio.  Pt weaned off IV steroids and then to po steroid and abx.  Pt improved and discharged home.         History/Other:   Review of Systems   Constitutional: Negative.    Respiratory:  Positive for shortness of breath and wheezing. Negative for cough.    Cardiovascular:  Negative for chest pain, palpitations and leg swelling.   Gastrointestinal: Negative.    Genitourinary: Negative.      Current Outpatient Medications   Medication Sig Dispense Refill    Tirzepatide (MOUNJARO) 5 MG/0.5ML Subcutaneous Solution Auto-injector Inject 5 mg into the skin once a week. 6 mL 0    spironolactone 25 MG Oral Tab Take 0.5 tablets (12.5 mg total) by mouth daily. 30 tablet 2    atorvastatin 20 MG Oral Tab Take 1 tablet (20 mg total) by mouth nightly. 90 tablet 1    acetaminophen (TYLENOL) 325 MG Oral Tab Take 2 tablets (650 mg total) by mouth every 6 (six) hours as needed for Pain.      fluticasone-salmeterol 250-50 MCG/ACT Inhalation Aerosol Powder, Breath Activated Inhale 1 puff into the lungs 2 (two) times daily. 60 each 1    metoprolol succinate ER 25 MG Oral Tablet 24 Hr Take 1 tablet (25 mg total) by mouth 2x Daily(Beta Blocker). 60 tablet 1    ergocalciferol (DRISDOL) 1.25 MG (72786 UT) Oral Cap Take 1 capsule (50,000 Units total) by mouth once a week for 12 doses. 12 capsule 0    naproxen (ALEVE) 220 MG Oral Tab Take 1-2 tablets (220-440 mg total) by mouth daily as needed.      umeclidinium bromide 62.5 MCG/ACT Inhalation Aerosol Powder, Breath Activated Inhale 1 puff into the lungs daily. 30 each 0     albuterol 108 (90 Base) MCG/ACT Inhalation Aero Soln Inhale 2 puffs into the lungs every 4 to 6 hours as needed for Wheezing.      Potassium Chloride ER 10 MEQ Oral Cap CR Take 1 capsule (10 mEq total) by mouth daily.      torsemide 20 MG Oral Tab Take 1 tablet (20 mg total) by mouth 2 (two) times daily. 180 tablet 3     Allergies:Allergies[1]    Past Medical History:    Atherosclerosis of coronary artery    coronary calcification on ct chest    Back problem    Cataract    Chronic respiratory failure (HCC)    Congestive heart disease (HCC)    COPD (HCC)    freq exacerbations. ? 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    Osteoarthritis    Pneumonia    Pulmonary nodules    Sleep apnea    Tobacco abuse      Past Surgical History:   Procedure Laterality Date    Appendectomy      Appendectomy      Cataract Bilateral     2/2024, 3/2024    Electrocardiogram, complete  01/30/2012    scanned to media tab      Family History   Problem Relation Age of Onset    Cancer Father         lung cancer    Cancer Mother         lung cancer    No Known Problems Daughter     No Known Problems Son     No Known Problems Maternal Grandmother     No Known Problems Maternal Grandfather     No Known Problems Paternal Grandmother     No Known Problems Paternal Grandfather     No Known Problems Sister     Diabetes Brother     Lipids Brother         hyperlipidemia    Hypertension Brother     No Known Problems Other       Social History:   Social History     Socioeconomic History    Marital status:    Tobacco Use    Smoking status: Former     Current packs/day: 0.00     Average packs/day: 2.0 packs/day for 40.0 years (80.0 ttl pk-yrs)     Types: Cigarettes     Start date: 11/1981     Quit date:  11/2021     Years since quitting: 3.3     Passive exposure: Past    Smokeless tobacco: Never   Vaping Use    Vaping status: Never Used   Substance and Sexual Activity    Alcohol use: Not Currently     Alcohol/week: 0.0 standard drinks of alcohol     Comment: socially    Drug use: Not Currently   Other Topics Concern    Caffeine Concern Yes     Comment: coffee, >32 oz daily     Social Drivers of Health     Food Insecurity: No Food Insecurity (2/10/2025)    NCSS - Food Insecurity     Worried About Running Out of Food in the Last Year: No     Ran Out of Food in the Last Year: No   Transportation Needs: No Transportation Needs (2/10/2025)    NCSS - Transportation     Lack of Transportation: No   Stress: No Stress Concern Present (1/16/2024)    Stress     Feeling of Stress : No   Housing Stability: Not At Risk (2/10/2025)    NCSS - Housing/Utilities     Has Housing: Yes     Worried About Losing Housing: No     Unable to Get Utilities: No   Recent Concern: Housing Stability - At Risk (2/10/2025)    NCSS - Housing/Utilities     Has Housing: No     Worried About Losing Housing: No     Unable to Get Utilities: No        Objective:   Physical Exam  Constitutional:       General: He is not in acute distress.     Appearance: He is obese. He is not ill-appearing, toxic-appearing or diaphoretic.      Comments: Pt on O2 via [place]     HENT:      Right Ear: External ear normal.      Left Ear: External ear normal.   Cardiovascular:      Rate and Rhythm: Normal rate and regular rhythm.      Pulses:           Dorsalis pedis pulses are 2+ on the right side and 2+ on the left side.        Posterior tibial pulses are 2+ on the right side and 2+ on the left side.      Heart sounds: Normal heart sounds. No murmur heard.  Pulmonary:      Effort: Pulmonary effort is normal. No respiratory distress.      Breath sounds: Normal breath sounds. No wheezing or rales.   Abdominal:      General: Bowel sounds are normal. There is no distension.       Palpations: Abdomen is soft.      Tenderness: There is no abdominal tenderness. There is no guarding or rebound.   Musculoskeletal:      Cervical back: Normal range of motion and neck supple. No rigidity or tenderness.      Right lower leg: No edema.      Left lower leg: No edema.   Feet:      Right foot:      Protective Sensation: 10 sites tested.  10 sites sensed.      Skin integrity: Dry skin present. No ulcer, blister, skin breakdown or erythema.      Toenail Condition: Right toenails are abnormally thick.      Left foot:      Protective Sensation: 10 sites tested.  10 sites sensed.      Skin integrity: Dry skin present. No ulcer, blister, skin breakdown or erythema.      Toenail Condition: Left toenails are abnormally thick.   Lymphadenopathy:      Cervical: No cervical adenopathy.   Skin:     Coloration: Skin is not jaundiced or pale.   Neurological:      Mental Status: He is alert.         Assessment & Plan:   (J44.1) COPD exacerbation ([facility])  (primary encounter diagnosis)  Plan: pt had copd exacerbation, treated w steroids, abx, and nebs.  He is back to his baseline breathing; pt to cotninue home O2, pt to ffup with his pulmo; pt had episode of NSVT, felt by cardio to be due to his pulmonary issue, had been started on toprol bid and will ffup with cardio.     (J96.21,  J96.22) Acute on chronic respiratory failure with hypoxia and hypercapnia ([facility])  Plan: see above.     (I50.32) Chronic diastolic congestive heart failure ([facility])  Plan: pt on torsemide and aldactone; pt appears to be compensated. Continue ffup with cardio.     (E11.8) Controlled type 2 diabetes mellitus with complication, without long-term current use of insulin ([facility])  Plan: recent A1c at 6.2 so dm well controlled. He is on mounjaro which we will continue.     (E66.01,  Z68.41) Morbid obesity with BMI of 40.0-44.9, adult ([facility])  Plan: pt on mounjaro for his dm and will also help lose weight, he is down by 10 lbs from last ov. I had increased his  mounjaro to 5mg weekly .    (G47.33,  J44.9) MARIA M and COPD overlap syndrome (HCC)  Plan: pt had been using cpap at least 4 hrs daily; advised can use longer.     (I77.89) Ascending aorta enlargement  Plan: stable on 2decho;  ff by cardio.    (I25.10) Coronary artery calcification seen on CT scan  Plan: pt with no complaint of chest pains,  2decho with no WM abnormalities, normal LV systolic fxn; pt on statin and asa .   Pt had been ff by cardil    No orders of the defined types were placed in this encounter.      Meds This Visit:  Requested Prescriptions     Signed Prescriptions Disp Refills    Tirzepatide (MOUNJARO) 5 MG/0.5ML Subcutaneous Solution Auto-injector 6 mL 0     Sig: Inject 5 mg into the skin once a week.    spironolactone 25 MG Oral Tab 30 tablet 2     Sig: Take 0.5 tablets (12.5 mg total) by mouth daily.    atorvastatin 20 MG Oral Tab 90 tablet 1     Sig: Take 1 tablet (20 mg total) by mouth nightly.       Imaging & Referrals:  None          [1] No Known Allergies

## 2025-03-20 ENCOUNTER — PATIENT OUTREACH (OUTPATIENT)
Dept: CASE MANAGEMENT | Age: 75
End: 2025-03-20

## 2025-03-20 RX ORDER — TORSEMIDE 20 MG/1
20 TABLET ORAL 2 TIMES DAILY
Qty: 180 TABLET | Refills: 3 | Status: SHIPPED | OUTPATIENT
Start: 2025-03-20

## 2025-03-20 NOTE — PROGRESS NOTES
SNEHA verified for CCM monthly outreach.   I called patient and left a detailed message to call back.   I will follow up with patient at a later time.      Medical record reviewed including recent office visits and test results

## 2025-03-20 NOTE — TELEPHONE ENCOUNTER
Refill Per Protocol     Requested Prescriptions   Pending Prescriptions Disp Refills    TORSEMIDE 20 MG Oral Tab [Pharmacy Med Name: TORSEMIDE 20MG TABLETS] 180 tablet 3     Sig: TAKE 1 TABLET(20 MG) BY MOUTH TWICE DAILY       Hypertension Medications Protocol Passed - 3/20/2025  7:02 AM        Passed - CMP or BMP in past 12 months        Passed - Last BP reading less than 140/90     BP Readings from Last 1 Encounters:   03/11/25 132/85               Passed - In person appointment or virtual visit in the past 12 mos or appointment in next 3 mos     Recent Outpatient Visits              1 week ago COPD exacerbation (HCC)    [facility] Johny Kang MD    Office Visit    1 month ago Primary osteoarthritis of both knees    [facility], Aurea Ragsdale PA-C    Office Visit    1 month ago COPD exacerbation ([facility])    [facility] Johny Kang MD    Office Visit    3 months ago COPD exacerbation ([facility])    [facility] Johny Kang MD    Office Visit    4 months ago Primary osteoarthritis of both knees    [facility], Aurea Ragsdale PA-C    Office Visit          Future Appointments         Provider Department Appt Notes    In 1 week Johny Kang MD [facility], [address], [place] [facility]are [facility]  Diabetes Care: Foot Exam (Annual) (Yearly - January to December) Never done                    Passed - EGFRCR or GFRNAA > 50     GFR Evaluation  EGFRCR: 68 , resulted on 2/14/2025          Passed - Medication is active on med list

## 2025-03-31 ENCOUNTER — OFFICE VISIT (OUTPATIENT)
Dept: INTERNAL MEDICINE CLINIC | Facility: CLINIC | Age: 75
End: 2025-03-31

## 2025-03-31 VITALS
HEIGHT: 68 IN | BODY MASS INDEX: 46.61 KG/M2 | WEIGHT: 307.56 LBS | HEART RATE: 87 BPM | DIASTOLIC BLOOD PRESSURE: 70 MMHG | SYSTOLIC BLOOD PRESSURE: 114 MMHG

## 2025-03-31 DIAGNOSIS — I50.32 CHRONIC DIASTOLIC CONGESTIVE HEART FAILURE (HCC): ICD-10-CM

## 2025-03-31 DIAGNOSIS — M81.0 AGE-RELATED OSTEOPOROSIS WITHOUT CURRENT PATHOLOGICAL FRACTURE: ICD-10-CM

## 2025-03-31 DIAGNOSIS — I25.10 CORONARY ARTERY CALCIFICATION SEEN ON CT SCAN: ICD-10-CM

## 2025-03-31 DIAGNOSIS — E66.01 MORBID OBESITY WITH BMI OF 40.0-44.9, ADULT (HCC): ICD-10-CM

## 2025-03-31 DIAGNOSIS — Z00.00 MEDICARE ANNUAL WELLNESS VISIT, SUBSEQUENT: Primary | ICD-10-CM

## 2025-03-31 DIAGNOSIS — F43.21 SITUATIONAL DEPRESSION: ICD-10-CM

## 2025-03-31 DIAGNOSIS — E11.8 CONTROLLED TYPE 2 DIABETES MELLITUS WITH COMPLICATION, WITHOUT LONG-TERM CURRENT USE OF INSULIN (HCC): ICD-10-CM

## 2025-03-31 DIAGNOSIS — E66.2 OBESITY HYPOVENTILATION SYNDROME (HCC): ICD-10-CM

## 2025-03-31 DIAGNOSIS — K59.09 OTHER CONSTIPATION: ICD-10-CM

## 2025-03-31 DIAGNOSIS — I77.89 ASCENDING AORTA ENLARGEMENT: ICD-10-CM

## 2025-03-31 DIAGNOSIS — Z91.81 AT RISK FOR FALLS: ICD-10-CM

## 2025-03-31 DIAGNOSIS — Z12.5 PROSTATE CANCER SCREENING: ICD-10-CM

## 2025-03-31 DIAGNOSIS — E78.5 DYSLIPIDEMIA: ICD-10-CM

## 2025-03-31 DIAGNOSIS — Z53.20 LUNG CANCER SCREENING DECLINED BY PATIENT: ICD-10-CM

## 2025-03-31 DIAGNOSIS — J44.9 OSA AND COPD OVERLAP SYNDROME (HCC): ICD-10-CM

## 2025-03-31 DIAGNOSIS — Z98.890 S/P KYPHOPLASTY: ICD-10-CM

## 2025-03-31 DIAGNOSIS — G47.33 OSA AND COPD OVERLAP SYNDROME (HCC): ICD-10-CM

## 2025-03-31 NOTE — PROGRESS NOTES
Subjective:   Campbell Rao is a 75 year old male who presents for a Medicare Subsequent Annual Wellness visit (Pt already had Initial Annual Wellness) and scheduled follow up of multiple significant but stable problems.       History/Other:   Fall Risk Assessment:   He has been screened for Falls and is High Risk. Fall Prevention information provided to patient in After Visit Summary.    Do you feel unsteady when standing or walking?: Yes  Do you worry about falling?: Yes  Have you fallen in the past year?: Yes  How many times have you fallen?: 4  Were you injured?: Yes     Cognitive Assessment:   He had a completely normal cognitive assessment - see flowsheet entries       Functional Ability/Status:   Campbell Rao has some abnormal functions as listed below:  He has Dressing and/or Bathing issues based on screening of functional status.  Difficulty dressing or bathing?: Yes  Bathing or Showering: Able without help  Dressing: Need some help  He has difficulties Shopping for Groceries based on screening of functional status. He has difficulties Affording Meds based on screening of functional status. He has Hearing problems based on screening of functional status.He has Walking problems based on screening of functional status. He has problems with Daily Activities based on screening of functional status.       Depression Screening (PHQ):  PHQ-9 TOTAL SCORE: 12  , done 3/31/2025   Little interest or pleasure in doing things: 3    Feeling down, depressed, or hopeless: 3    Feeling tired or having little energy: 3    Feeling bad about yourself - or that you are a failure or have let yourself or your family down: 3    If you checked off any problems, how difficult have these problems made it for you to do your work, take care of things at home, or get along with other people?: Not difficult at all    Last [place] Suicide Screening on 3/31/2025 was No Risk.         Advanced Directives:   He does NOT have a  Living Will. [Do you have a living will?: No]  He does NOT have a Power of  for Health Care. [Do you have a healthcare power of ?: No]  Discussed Advance Care Planning with patient (and family/surrogate if present). Standard forms made available to patient in After Visit Summary.      Patient Active Problem List   Diagnosis    Colon cancer screening    Prostate cancer screening    Pulmonary emphysema (HCC)    Congestive heart failure (HCC)    Ascending aorta enlargement    Morbid obesity with BMI of 40.0-44.9, adult (HCC)    Obesity hypoventilation syndrome (HCC)    MARIA M and COPD overlap syndrome (HCC)    Chronic respiratory failure with hypoxia and hypercapnia (HCC)    Dyslipidemia    At risk for falls    Intractable back pain    Hypoxic respiratory failure (HCC)    Controlled type 2 diabetes mellitus with complication, without long-term current use of insulin (HCC)    Other constipation    Coronary artery calcification seen on CT scan    Age-related osteoporosis without current pathological fracture    S/P kyphoplasty    Lung cancer screening declined by patient    Situational depression     Allergies:  He has No Known Allergies.    Current Medications:  Outpatient Medications Marked as Taking for the 3/31/25 encounter (Office Visit) with Johny Kang MD   Medication Sig    torsemide 20 MG Oral Tab Take 1 tablet (20 mg total) by mouth 2 (two) times daily.    Tirzepatide (MOUNJARO) 5 MG/0.5ML Subcutaneous Solution Auto-injector Inject 5 mg into the skin once a week.    spironolactone 25 MG Oral Tab Take 0.5 tablets (12.5 mg total) by mouth daily.    atorvastatin 20 MG Oral Tab Take 1 tablet (20 mg total) by mouth nightly.    acetaminophen (TYLENOL) 325 MG Oral Tab Take 2 tablets (650 mg total) by mouth every 6 (six) hours as needed for Pain.    metoprolol succinate ER 25 MG Oral Tablet 24 Hr Take 1 tablet (25 mg total) by mouth 2x Daily(Beta Blocker).    ergocalciferol (DRISDOL) 1.25 MG  (06547 UT) Oral Cap Take 1 capsule (50,000 Units total) by mouth once a week for 12 doses.    umeclidinium bromide 62.5 MCG/ACT Inhalation Aerosol Powder, Breath Activated Inhale 1 puff into the lungs daily.    albuterol 108 (90 Base) MCG/ACT Inhalation Aero Soln Inhale 2 puffs into the lungs every 4 to 6 hours as needed for Wheezing.    Potassium Chloride ER 10 MEQ Oral Cap CR Take 1 capsule (10 mEq total) by mouth daily.       Medical History:  He  has a past medical history of Atherosclerosis of coronary artery, Back problem, Cataract, Chronic respiratory failure (HCC), Congestive heart disease (HCC), Controlled type 2 diabetes mellitus with complication, without long-term current use of insulin (HCC) (4/2/2025), COPD ([facility]), Coronary atherosclerosis, Diastolic dysfunction, Essential hypertension, Hearing impairment, High blood pressure, High cholesterol, Hyperlipidemia, Obesity, On home O2, Osteoarthritis, Pneumonia, Pulmonary nodules (10/2016), Sleep apnea, and Tobacco abuse.  Surgical History:  He  has a past surgical history that includes electrocardiogram, complete (01/30/2012); appendectomy; appendectomy; and cataract (Bilateral).   Family History:  His family history includes Cancer in his father and mother; Diabetes in his brother; Hypertension in his brother; Lipids in his brother; No Known Problems in his daughter, maternal grandfather, maternal grandmother, paternal grandfather, paternal grandmother, sister, son, and another family member.  Social History:  He  reports that he quit smoking about 3 years ago. His smoking use included cigarettes. He started smoking about 43 years ago. He has a 80 pack-year smoking history. He has been exposed to tobacco smoke. He has never used smokeless tobacco. He reports that he does not currently use alcohol. He reports that he does not currently use drugs.    Tobacco:  He smoked tobacco in the past but quit greater than 12 months ago.  Social History     Tobacco Use    Smoking Status Former    Current packs/day: 0.00    Average packs/day: 2.0 packs/day for 40.0 years (80.0 ttl pk-yrs)    Types: Cigarettes    Start date: 11/1981    Quit date: 11/2021    Years since quitting: 3.4    Passive exposure: Past   Smokeless Tobacco Never        CAGE Alcohol Screen:   CAGE screening score of 0 on 3/31/2025, showing low risk of alcohol abuse.      Patient Care Team:  Johny Kang MD as PCP - General (Internal Medicine)  Joni Anderson MD (PULMONARY DISEASES)  Domitila Collins MD as Referring Physician (OPHTHALMOLOGY)  Levy Goldsmith MD (SURGERY, ORTHOPEDIC)  Yesi Salinas MD (PULMONARY DISEASES)  Daya Reeder RMA as [facility] Care Manager    Review of Systems  GENERAL: feels well otherwise  SKIN: denies any unusual skin lesions  EYES: denies blurred vision or double vision  HEENT: denies nasal congestion, sinus pain or ST  LUNGS: denies shortness of breath with exertion; no hemoptysis  CARDIOVASCULAR: denies chest pain on exertion; no syncope ; no palpitaitonsl  GI: denies abdominal pain, denies heartburn; no  hematochezia  : 3 per night nocturia, no complaint of urinary incontinence; nohematuria   MUSCULOSKELETAL: denies back pain  NEURO: denies headaches  PSYCHE: denies depression or anxiety  HEMATOLOGIC: denies hx of anemia  ENDOCRINE: denies thyroid history  ALL/ASTHMA: denies hx of allergy or asthma    Objective:   Physical Exam  General Appearance:  Alert, cooperative, no distress, appears stated age   Head:  Normocephalic, without obvious abnormality, atraumatic   Eyes:  PERRL, conjunctiva/corneas clear, EOM's intact, both eyes   Ears:  Normal TM's and external ear canals, both ears   Nose: Nares normal, septum midline, mucosa normal, no drainage or sinus tenderness   Throat: Lips, mucosa, and tongue normal; teeth and gums normal   Neck: Supple, symmetrical, trachea midline, no adenopathy, thyroid: not enlarged, symmetric, no tenderness/mass/nodules, no carotid bruit  or JVD   Back:   Symmetric, no curvature, ROM normal, no CVA tenderness   Lungs:   Clear to auscultation bilaterally, respirations unlabored   Chest Wall:  No tenderness or deformity   Heart:  Regular rate and rhythm, S1, S2 normal, no murmur, rub or gallop   Abdomen:   Soft, non-tender, bowel sounds active all four quadrants,  no masses, no organomegaly   Genitalia: deferred   Rectal: deferred   Extremities: Extremities normal, atraumatic, no cyanosis or edema   Pulses: 2+ and symmetric; ; feet very dry skin; monofilament testing normal both feet   Skin: Skin color, texture, turgor normal, no rashes or lesions   Lymph nodes: Cervical, supraclavicular,  nodes normal   Neurologic: Normal     /70   Pulse 87   Ht 5' 8\" (1.727 m)   Wt (!) 307 lb 9 oz (139.5 kg)   BMI 46.76 kg/m²  Estimated body mass index is 46.76 kg/m² as calculated from the following:    Height as of this encounter: 5' 8\" (1.727 m).    Weight as of this encounter: 307 lb 9 oz (139.5 kg).    Medicare Hearing Assessment:   Hearing Screening    Screening Method: Finger Rub  Finger Rub Result: Pass         Visual Acuity:   Right Eye Visual Acuity: Corrected Right Eye Chart Acuity: 20/30   Left Eye Visual Acuity: Corrected Left Eye Chart Acuity: 20/20   Both Eyes Visual Acuity: Corrected Both Eyes Chart Acuity: 20/13   Able To Tolerate Visual Acuity: Yes        Assessment & Plan:   Campbell Rao is a 75 year old male who presents for a Medicare Assessment.     (Z00.00) Medicare annual wellness visit, subsequent  (primary encounter diagnosis)  Plan: Patient ready had his labs and were reviewed.  He is current with his vaccines like flu shot, patient was told to get his RSV as well as COVID booster from pharmacy    (E11.8) Controlled type 2 diabetes mellitus with complication, without long-term current use of insulin (HCC)  Plan: Microalb/Creat Ratio, Random Urine [E]        Controlled with Mounjaro with recent A1c down to 6.2; will continue  to benefit with use of Mounjaro to also have him lose weight.  Discussed with the patient and spouse regarding increasing dose of current dose of Mounjaro to 7.5 mg weekly.  Will see him back in 3 months.    (E78.5) Dyslipidemia  Plan: Patient ready on statin therapy.  CPM.    (K59.09) Other constipation  Plan: Gastro Referral - In Network        Patient has not had colonoscopy in the past which had been discussed multiple times.  He is complaining constipation.  He can take Colace as well as MiraLAX however was told to see a GI  for colonoscopy    (I50.32) Chronic diastolic congestive heart failure (HCC)  Plan: Patient appears to be compensated.  He is currently on torsemide, Aldactone and beta-blocker.  He continues to be followed by cardiology.    (I25.10) Coronary artery calcification seen on CT scan  Plan: Patient been followed by cardiology and had been recommended to have stress test by cardiologist before.  He is already on statin as well as aspirin.  He denies having had any chest pains.    (G47.33,  J44.9) MARIA M and COPD overlap syndrome (HCC)  Plan: Patient had been using his CPAP machine at least 4 hours a day however was told that he should use it longer.  He is also on home O2 for his COPD continues to use his inhalers managed by his pulmonologist.    (E66.2) Obesity hypoventilation syndrome (HCC)  Plan: Continue to work on the patient's losing weight.  Patient ready started on Mounjaro for diabetes which will also hopefully help him to lose weight.    (E66.01,  Z68.41) Morbid obesity with BMI of 40.0-44.9, adult (HCC)  Plan: Discussed with the patient about continued effort to lose weight.  We had already started him on Cyndi for his diabetes and that this will also help him lose weight.    (I77.89) Ascending aorta enlargement  Plan: This has been stable on 2D echo that has been followed by cardiologist.    (M81.0) Age-related osteoporosis without current pathological fracture  Plan: Patient had history  of vertebral compression fracture that was treated with kyphoplasty before.  The patient has been told to do his DEXA scan and this has been ordered.    (Z98.890) S/P kyphoplasty  Plan: See above.    (Z53.20) Lung cancer screening declined by patient  Plan: Patient had declined to do a lung CT screening for cancer screening.    (Z12.5) Prostate cancer screening  Plan: Had a normal PSA back in January 2025.    (Z91.81) At risk for falls  Plan: Fall precaution.    (F43.21) Situational depression  Plan: Patient expresses frustration regarding his current medical condition since he is not able to move much at all given his morbid obesity as well as his COPD.  However he does not feel that he needs to be treated and denies having any suicidal thoughts.     The patient indicates understanding of these issues and agrees to the plan.  Reinforced healthy diet, lifestyle, and exercise.      No follow-ups on file.     Johny Kang MD, 3/31/2025     Supplementary Documentation:   General Health:  In the past six months, have you lost more than 10 pounds without trying?: 3 - Don't know  Has your appetite been poor?: No  Type of Diet: Balanced  How does the patient maintain a good energy level?: Other  How would you describe your daily physical activity?: None  How would you describe your current health state?: Poor  How do you maintain positive mental well-being?: Puzzles  On a scale of 0 to 10, with 0 being no pain and 10 being severe pain, what is your pain level?: 7 - (Severe)  In the past six months, have you experienced urine leakage?: 0-No  At any time do you feel concerned for the safety/well-being of yourself and/or your children, in your home or elsewhere?: No  Have you had any immunizations at another office such as Influenza, Hepatitis B, Tetanus, or Pneumococcal?: No    Health Maintenance   Topic Date Due    Colorectal Cancer Screening  Never done    Zoster Vaccines (1 of 2) Never done    COVID-19 Vaccine (6  - 2024-25 season) 09/01/2024    Diabetes Care: Microalb/Creat Ratio (Annual)  Never done    Diabetes Care Dilated Eye Exam  10/15/2025 (Originally 4/1/1950)    Diabetes Care: Foot Exam (Annual)  03/14/2026 (Originally 1/1/2025)    Diabetes Care A1C  08/10/2025    Diabetes Care: GFR  02/14/2026    Annual Physical  03/31/2026    PSA  01/29/2027    Influenza Vaccine  Completed    Annual Depression Screening  Completed    Fall Risk Screening (Annual)  Completed    Pneumococcal Vaccine: 50+ Years  Completed    Meningococcal B Vaccine  Aged Out    Lung Cancer Screening  Discontinued

## 2025-04-02 PROBLEM — I50.33 ACUTE ON CHRONIC HEART FAILURE WITH PRESERVED EJECTION FRACTION (HFPEF) (HCC): Status: RESOLVED | Noted: 2024-08-05 | Resolved: 2025-04-02

## 2025-04-02 PROBLEM — E87.0 HYPERNATREMIA: Status: RESOLVED | Noted: 2024-07-14 | Resolved: 2025-04-02

## 2025-04-02 PROBLEM — J96.21 ACUTE ON CHRONIC RESPIRATORY FAILURE WITH HYPOXIA (HCC): Status: RESOLVED | Noted: 2024-07-14 | Resolved: 2025-04-02

## 2025-04-02 PROBLEM — R79.89 AZOTEMIA: Status: RESOLVED | Noted: 2024-04-22 | Resolved: 2025-04-02

## 2025-04-02 PROBLEM — K59.09 OTHER CONSTIPATION: Status: ACTIVE | Noted: 2025-04-02

## 2025-04-02 PROBLEM — J96.22 ACUTE ON CHRONIC RESPIRATORY FAILURE WITH HYPOXIA AND HYPERCAPNIA (HCC): Status: RESOLVED | Noted: 2024-07-14 | Resolved: 2025-04-02

## 2025-04-02 PROBLEM — I25.10 CORONARY ARTERY CALCIFICATION SEEN ON CT SCAN: Status: ACTIVE | Noted: 2025-04-02

## 2025-04-02 PROBLEM — Z98.890 S/P KYPHOPLASTY: Status: ACTIVE | Noted: 2025-04-02

## 2025-04-02 PROBLEM — J44.1 ACUTE EXACERBATION OF CHRONIC OBSTRUCTIVE PULMONARY DISEASE (COPD) (HCC): Status: RESOLVED | Noted: 2024-11-03 | Resolved: 2025-04-02

## 2025-04-02 PROBLEM — J96.12 ACUTE HYPOXIC ON CHRONIC HYPERCAPNIC RESPIRATORY FAILURE (HCC): Status: RESOLVED | Noted: 2025-02-10 | Resolved: 2025-04-02

## 2025-04-02 PROBLEM — J96.01 ACUTE HYPOXIC ON CHRONIC HYPERCAPNIC RESPIRATORY FAILURE (HCC): Status: RESOLVED | Noted: 2025-02-10 | Resolved: 2025-04-02

## 2025-04-02 PROBLEM — Z53.20 LUNG CANCER SCREENING DECLINED BY PATIENT: Status: ACTIVE | Noted: 2025-04-02

## 2025-04-02 PROBLEM — M81.0 AGE-RELATED OSTEOPOROSIS WITHOUT CURRENT PATHOLOGICAL FRACTURE: Status: ACTIVE | Noted: 2025-04-02

## 2025-04-02 PROBLEM — E11.8 CONTROLLED TYPE 2 DIABETES MELLITUS WITH COMPLICATION, WITHOUT LONG-TERM CURRENT USE OF INSULIN (HCC): Status: ACTIVE | Noted: 2025-04-02

## 2025-04-02 PROBLEM — F43.21 SITUATIONAL DEPRESSION: Status: ACTIVE | Noted: 2025-04-02

## 2025-04-02 PROBLEM — J96.20 ACUTE ON CHRONIC RESPIRATORY FAILURE (HCC): Status: RESOLVED | Noted: 2025-02-10 | Resolved: 2025-04-02

## 2025-04-02 PROBLEM — J18.9 COMMUNITY ACQUIRED PNEUMONIA, UNSPECIFIED LATERALITY: Status: RESOLVED | Noted: 2024-11-03 | Resolved: 2025-04-02

## 2025-04-02 PROBLEM — E66.01 MORBID OBESITY WITH BMI OF 40.0-44.9, ADULT (HCC): Status: ACTIVE | Noted: 2021-12-15

## 2025-04-02 PROBLEM — J96.21 ACUTE ON CHRONIC RESPIRATORY FAILURE WITH HYPOXIA AND HYPERCAPNIA (HCC): Status: RESOLVED | Noted: 2024-07-14 | Resolved: 2025-04-02

## 2025-04-02 PROBLEM — J44.1 COPD EXACERBATION (HCC): Status: RESOLVED | Noted: 2024-08-05 | Resolved: 2025-04-02

## 2025-04-18 RX ORDER — TIRZEPATIDE 7.5 MG/.5ML
7.5 INJECTION, SOLUTION SUBCUTANEOUS WEEKLY
Qty: 6 ML | Refills: 0 | Status: SHIPPED | OUTPATIENT
Start: 2025-04-18

## 2025-04-18 NOTE — TELEPHONE ENCOUNTER
The patient called asking for a refill of his Mounjaro, he stated he is ok with increasing if that is what the doctor suggested. Dr. Kang please advise, medication pended for approval at increased dose. He is out now      Which GLP is the patient on: mounjaro  Current dose: 5 MG  Patient seeking refill or increase to next dose: yes, increase   How many doses of current dose completed: four  Side effects, if any: none  Current weight / how much weight loss: current, 303. Lost 5 lbs  Last visit: 03/31

## 2025-04-21 ENCOUNTER — PATIENT OUTREACH (OUTPATIENT)
Dept: CASE MANAGEMENT | Age: 75
End: 2025-04-21

## 2025-04-21 NOTE — PROGRESS NOTES
Spoke to Campbell for Chronic Care Management.      Updates to patient care team/comments: UTD   Patient reported changes in medications: reports changes   Med Adherence  Comment: reports adherence      Health Maintenance:   Health Maintenance   Topic Date Due    Colorectal Cancer Screening  Never done    Zoster Vaccines (1 of 2) Never done    COVID-19 Vaccine (6 - 2024-25 season) 09/01/2024    Diabetes Care: Microalb/Creat Ratio (Annual)  Never done    Diabetes Care Dilated Eye Exam  10/15/2025 (Originally 4/1/1950)    Diabetes Care: Foot Exam (Annual)  03/14/2026 (Originally 1/1/2025)    Diabetes Care A1C  08/10/2025    Diabetes Care: GFR  02/14/2026    Annual Physical  03/31/2026    PSA  01/29/2027    Influenza Vaccine  Completed    Annual Depression Screening  Completed    Fall Risk Screening (Annual)  Completed    Pneumococcal Vaccine: 50+ Years  Completed    Meningococcal B Vaccine  Aged Out    Lung Cancer Screening  Discontinued       Patient updates/concerns:   Spoke with Patient.  Patient relates doing well. Mood good\//stable.  Recently seen PCP. Starting dose increase of mounjaro. FBS stable. CHF stable followed by cardiology. Weight the same maintaining. Edema stable. B/P and pulse stable. Reports breathing stable using ihalers as needed. Using sleep apnea machine nightly. Followed by pulm. Denies any urinary symptoms.     Goals/Action Plan:    Active goal from previous outreach: lose weight, Staying healthy, maintain independence, and stability.    Patient reported progress towards goals: progressing                - What: continues to follow up on health conditions            - Where/When/How: seeing PCP and specialist   Patient Reported Barriers and Concerns: as per above                    - Plan for overcoming barriers: encouraged patient to call with any questions or concerns.     Care Managers Interventions: Continue to provide encouragement and education for healthy coping and  diagnosis.      Future Appointments: No future appointments.      Next Care Manager Follow Up Date: 1 month     Reason For Follow Up: review progress and or barriers towards patient's goals.     Time Spent This Encounter Total: 18 min medical record review, telephone communication, care plan updates where needed, education, goals, and action plan recreation/update. Provided acknowledgment and validation to patient's concerns.   Monthly Minute Total including today: 18 min   Physical assessment, complete health history, and need for CCM established by Johny Kang MD.

## 2025-05-13 NOTE — TELEPHONE ENCOUNTER
ER DISCHARGE NOTE:



Patient is cleared to be discharged per ERMD, pt is aox4, on room air, with 
stable vital signs. pt was given dc and prescription instructions, pt was able 
to verbalize understanding, pt id band and iv site removed without 
complications. pt is able to ambulate with steady gait. pt took all belongings. Last seen 12-14-16   Last refill 12-14-16   Routed to  for sign off. Please continue to schedule follow-up sessions once per week.

## 2025-05-19 ENCOUNTER — HOSPITAL ENCOUNTER (INPATIENT)
Facility: HOSPITAL | Age: 75
LOS: 3 days | Discharge: NURSING, REHAB OR LONG-TERM CARE | End: 2025-05-22
Attending: EMERGENCY MEDICINE | Admitting: INTERNAL MEDICINE
Payer: MEDICARE

## 2025-05-19 ENCOUNTER — APPOINTMENT (OUTPATIENT)
Dept: GENERAL RADIOLOGY | Facility: HOSPITAL | Age: 75
End: 2025-05-19
Attending: EMERGENCY MEDICINE
Payer: MEDICARE

## 2025-05-19 DIAGNOSIS — J96.01 ACUTE RESPIRATORY FAILURE WITH HYPOXIA AND HYPERCAPNIA (HCC): Primary | ICD-10-CM

## 2025-05-19 DIAGNOSIS — J96.02 ACUTE RESPIRATORY FAILURE WITH HYPOXIA AND HYPERCAPNIA (HCC): Primary | ICD-10-CM

## 2025-05-19 PROBLEM — E87.29 RESPIRATORY ACIDOSIS: Status: ACTIVE | Noted: 2025-05-19

## 2025-05-19 LAB
ANION GAP SERPL CALC-SCNC: 3 MMOL/L (ref 0–18)
BASE EXCESS BLD CALC-SCNC: 15.3 MMOL/L (ref ?–2)
BASOPHILS # BLD AUTO: 0.12 X10(3) UL (ref 0–0.2)
BASOPHILS NFR BLD AUTO: 1.1 %
BILIRUB UR QL: NEGATIVE
BLOOD GAS EPAP: 5 CM H2O
BLOOD GAS IPAP: 18 CM H2O
BUN BLD-MCNC: 18 MG/DL (ref 9–23)
BUN/CREAT SERPL: 20.2 (ref 10–20)
CA-I BLD-SCNC: 1.27 MMOL/L (ref 0.95–1.32)
CALCIUM BLD-MCNC: 8.8 MG/DL (ref 8.7–10.4)
CHLORIDE SERPL-SCNC: 102 MMOL/L (ref 98–112)
CLARITY UR: CLEAR
CO2 SERPL-SCNC: 39 MMOL/L (ref 21–32)
COHGB MFR BLD: 2 % (ref 0–3)
CREAT BLD-MCNC: 0.89 MG/DL (ref 0.7–1.3)
DEPRECATED RDW RBC AUTO: 55.5 FL (ref 35.1–46.3)
EGFRCR SERPLBLD CKD-EPI 2021: 90 ML/MIN/1.73M2 (ref 60–?)
EOSINOPHIL # BLD AUTO: 0.08 X10(3) UL (ref 0–0.7)
EOSINOPHIL NFR BLD AUTO: 0.7 %
ERYTHROCYTE [DISTWIDTH] IN BLOOD BY AUTOMATED COUNT: 14.2 % (ref 11–15)
EST. AVERAGE GLUCOSE BLD GHB EST-MCNC: 128 MG/DL (ref 68–126)
FLUAV + FLUBV RNA SPEC NAA+PROBE: NEGATIVE
FLUAV + FLUBV RNA SPEC NAA+PROBE: NEGATIVE
GLUCOSE BLD-MCNC: 122 MG/DL (ref 70–99)
GLUCOSE BLDC GLUCOMTR-MCNC: 95 MG/DL (ref 70–99)
GLUCOSE BLDC GLUCOMTR-MCNC: 95 MG/DL (ref 70–99)
GLUCOSE UR-MCNC: NORMAL MG/DL
HBA1C MFR BLD: 6.1 % (ref ?–5.7)
HCO3 BLDA-SCNC: 36.7 MEQ/L (ref 21–27)
HCT VFR BLD AUTO: 47.9 % (ref 39–53)
HGB BLD-MCNC: 13.7 G/DL (ref 13–17.5)
HGB BLD-MCNC: 14.5 G/DL (ref 13–17.5)
HGB UR QL STRIP.AUTO: NEGATIVE
IMM GRANULOCYTES # BLD AUTO: 0.28 X10(3) UL (ref 0–1)
IMM GRANULOCYTES NFR BLD: 2.5 %
KETONES UR-MCNC: NEGATIVE MG/DL
LACTATE BLD-SCNC: 0.6 MMOL/L (ref 0.5–2)
LEUKOCYTE ESTERASE UR QL STRIP.AUTO: NEGATIVE
LYMPHOCYTES # BLD AUTO: 1.77 X10(3) UL (ref 1–4)
LYMPHOCYTES NFR BLD AUTO: 16 %
MCH RBC QN AUTO: 29.9 PG (ref 26–34)
MCHC RBC AUTO-ENTMCNC: 28.6 G/DL (ref 31–37)
MCV RBC AUTO: 104.6 FL (ref 80–100)
METHGB MFR BLD: 1.2 % SAT (ref 0.4–1.5)
MODIFIED ALLEN TEST: POSITIVE
MONOCYTES # BLD AUTO: 1.21 X10(3) UL (ref 0.1–1)
MONOCYTES NFR BLD AUTO: 10.9 %
NEUTROPHILS # BLD AUTO: 7.61 X10 (3) UL (ref 1.5–7.7)
NEUTROPHILS # BLD AUTO: 7.61 X10(3) UL (ref 1.5–7.7)
NEUTROPHILS NFR BLD AUTO: 68.8 %
NITRITE UR QL STRIP.AUTO: NEGATIVE
NT-PROBNP SERPL-MCNC: 89 PG/ML (ref ?–125)
NT-PROBNP SERPL-MCNC: 97 PG/ML (ref ?–125)
O2 CT BLD-SCNC: 18.9 VOL% (ref 15–23)
O2 CT BLD-SCNC: 19.6 VOL% (ref 15–23)
O2 CT BLD-SCNC: 19.8 VOL% (ref 15–23)
OSMOLALITY SERPL CALC.SUM OF ELEC: 301 MOSM/KG (ref 275–295)
OXYGEN LITERS/MINUTE: 10 L/MIN
OXYGEN LITERS/MINUTE: 10 L/MIN
PCO2 BLDA: 110 MM HG (ref 35–45)
PCO2 BLDA: 119 MM HG (ref 35–45)
PCO2 BLDA: 119 MM HG (ref 35–45)
PH BLDA: 7.19 [PH] (ref 7.35–7.45)
PH BLDA: 7.21 [PH] (ref 7.35–7.45)
PH BLDA: 7.25 [PH] (ref 7.35–7.45)
PH UR: 6 [PH] (ref 5–8)
PLATELET # BLD AUTO: 207 10(3)UL (ref 150–450)
PO2 BLDA: 116 MM HG (ref 80–100)
PO2 BLDA: 159 MM HG (ref 80–100)
PO2 BLDA: 72 MM HG (ref 80–100)
POTASSIUM BLD-SCNC: 4.8 MMOL/L (ref 3.6–5.1)
POTASSIUM SERPL-SCNC: 4.6 MMOL/L (ref 3.5–5.1)
PROT UR-MCNC: NEGATIVE MG/DL
PUNCTURE CHARGE: YES
RBC # BLD AUTO: 4.58 X10(6)UL (ref 3.8–5.8)
RESP RATE: 18 BPM
RSV RNA SPEC NAA+PROBE: NEGATIVE
SAO2 % BLDA: 95.4 % (ref 94–100)
SAO2 % BLDA: 98.7 % (ref 94–100)
SAO2 % BLDA: >99 % (ref 94–100)
SARS-COV-2 RNA RESP QL NAA+PROBE: NOT DETECTED
SODIUM BLD-SCNC: 143 MMOL/L (ref 135–145)
SODIUM SERPL-SCNC: 144 MMOL/L (ref 136–145)
SP GR UR STRIP: 1.03 (ref 1–1.03)
TROPONIN I SERPL HS-MCNC: 5 NG/L (ref ?–53)
UROBILINOGEN UR STRIP-ACNC: NORMAL
WBC # BLD AUTO: 11.1 X10(3) UL (ref 4–11)

## 2025-05-19 PROCEDURE — 71045 X-RAY EXAM CHEST 1 VIEW: CPT | Performed by: EMERGENCY MEDICINE

## 2025-05-19 PROCEDURE — 99223 1ST HOSP IP/OBS HIGH 75: CPT | Performed by: INTERNAL MEDICINE

## 2025-05-19 RX ORDER — POLYETHYLENE GLYCOL 3350 17 G/17G
17 POWDER, FOR SOLUTION ORAL DAILY PRN
Status: DISCONTINUED | OUTPATIENT
Start: 2025-05-19 | End: 2025-05-22

## 2025-05-19 RX ORDER — FUROSEMIDE 10 MG/ML
40 INJECTION INTRAMUSCULAR; INTRAVENOUS
Status: DISCONTINUED | OUTPATIENT
Start: 2025-05-19 | End: 2025-05-22

## 2025-05-19 RX ORDER — HYDROCODONE BITARTRATE AND ACETAMINOPHEN 5; 325 MG/1; MG/1
2 TABLET ORAL EVERY 4 HOURS PRN
Refills: 0 | Status: DISCONTINUED | OUTPATIENT
Start: 2025-05-19 | End: 2025-05-22

## 2025-05-19 RX ORDER — SPIRONOLACTONE 25 MG/1
12.5 TABLET ORAL NIGHTLY
Status: DISCONTINUED | OUTPATIENT
Start: 2025-05-19 | End: 2025-05-22

## 2025-05-19 RX ORDER — DEXTROSE MONOHYDRATE 25 G/50ML
50 INJECTION, SOLUTION INTRAVENOUS
Status: DISCONTINUED | OUTPATIENT
Start: 2025-05-19 | End: 2025-05-22

## 2025-05-19 RX ORDER — NICOTINE POLACRILEX 4 MG
30 LOZENGE BUCCAL
Status: DISCONTINUED | OUTPATIENT
Start: 2025-05-19 | End: 2025-05-22

## 2025-05-19 RX ORDER — ACETAMINOPHEN 500 MG
500 TABLET ORAL EVERY 4 HOURS PRN
Status: DISCONTINUED | OUTPATIENT
Start: 2025-05-19 | End: 2025-05-22

## 2025-05-19 RX ORDER — HEPARIN SODIUM 5000 [USP'U]/ML
5000 INJECTION, SOLUTION INTRAVENOUS; SUBCUTANEOUS EVERY 8 HOURS SCHEDULED
Status: DISCONTINUED | OUTPATIENT
Start: 2025-05-19 | End: 2025-05-19 | Stop reason: DRUGHIGH

## 2025-05-19 RX ORDER — HEPARIN SODIUM 5000 [USP'U]/ML
7500 INJECTION, SOLUTION INTRAVENOUS; SUBCUTANEOUS EVERY 8 HOURS SCHEDULED
Status: DISCONTINUED | OUTPATIENT
Start: 2025-05-19 | End: 2025-05-22

## 2025-05-19 RX ORDER — SODIUM PHOSPHATE, DIBASIC AND SODIUM PHOSPHATE, MONOBASIC 7; 19 G/230ML; G/230ML
1 ENEMA RECTAL ONCE AS NEEDED
Status: DISCONTINUED | OUTPATIENT
Start: 2025-05-19 | End: 2025-05-22

## 2025-05-19 RX ORDER — HYDROCODONE BITARTRATE AND ACETAMINOPHEN 5; 325 MG/1; MG/1
1 TABLET ORAL EVERY 4 HOURS PRN
Refills: 0 | Status: DISCONTINUED | OUTPATIENT
Start: 2025-05-19 | End: 2025-05-22

## 2025-05-19 RX ORDER — ATORVASTATIN CALCIUM 20 MG/1
20 TABLET, FILM COATED ORAL NIGHTLY
Status: DISCONTINUED | OUTPATIENT
Start: 2025-05-19 | End: 2025-05-22

## 2025-05-19 RX ORDER — ONDANSETRON 2 MG/ML
4 INJECTION INTRAMUSCULAR; INTRAVENOUS EVERY 6 HOURS PRN
Status: DISCONTINUED | OUTPATIENT
Start: 2025-05-19 | End: 2025-05-22

## 2025-05-19 RX ORDER — SENNOSIDES 8.6 MG
17.2 TABLET ORAL NIGHTLY PRN
Status: DISCONTINUED | OUTPATIENT
Start: 2025-05-19 | End: 2025-05-22

## 2025-05-19 RX ORDER — IPRATROPIUM BROMIDE AND ALBUTEROL SULFATE 2.5; .5 MG/3ML; MG/3ML
3 SOLUTION RESPIRATORY (INHALATION)
Status: DISCONTINUED | OUTPATIENT
Start: 2025-05-19 | End: 2025-05-21

## 2025-05-19 RX ORDER — BUDESONIDE 0.5 MG/2ML
0.5 INHALANT ORAL
Status: DISCONTINUED | OUTPATIENT
Start: 2025-05-19 | End: 2025-05-22

## 2025-05-19 RX ORDER — BISACODYL 10 MG
10 SUPPOSITORY, RECTAL RECTAL
Status: DISCONTINUED | OUTPATIENT
Start: 2025-05-19 | End: 2025-05-22

## 2025-05-19 RX ORDER — METHYLPREDNISOLONE SODIUM SUCCINATE 40 MG/ML
40 INJECTION INTRAMUSCULAR; INTRAVENOUS EVERY 6 HOURS
Status: DISCONTINUED | OUTPATIENT
Start: 2025-05-19 | End: 2025-05-20

## 2025-05-19 RX ORDER — ACETAMINOPHEN 325 MG/1
650 TABLET ORAL EVERY 4 HOURS PRN
Status: DISCONTINUED | OUTPATIENT
Start: 2025-05-19 | End: 2025-05-22

## 2025-05-19 RX ORDER — NICOTINE POLACRILEX 4 MG
15 LOZENGE BUCCAL
Status: DISCONTINUED | OUTPATIENT
Start: 2025-05-19 | End: 2025-05-22

## 2025-05-19 NOTE — ED INITIAL ASSESSMENT (HPI)
Patient assisted out of his car by this rnSob and hypoxia for a day. Wife states he was 79% on 4 L yesterday. 80% today. 69% in triage. Uses 4 L at baseline. Placed on 15L nonrebreather - 97%

## 2025-05-19 NOTE — CONSULTS
Initial Pulmonary/ICU/Critical Care Consultation        Reason for Consultation: COPD exac  Referring Physician: Dr. Yoon       HPI: 76 yo male with hx of COPD, MARIA M, chronic hypoxemic resp failure on 4.5 LNC follows with Dr. Salinas and Dr. Jimenez, diastolic HF admitted with shortness of breath and low 02 levels for the past 4 days.   Denies associated coughing, wheezing, chest discomfort, fevers, chills or sweats  In the ED, was hypercapnic with PC02 119 and pH 7.2  Started on BIPAP   Pt appears more alert when seen now  Wife at bedside reports he looks better      REVIEW OF SYSTEMS:  Positives and negatives as noted in HPI. All other review of systems otherwise are either limited (due to pt/family inability to provide) or negative.      PAST MEDICAL HISTORY:  Past Medical History[1]      PAST SURGICAL HISTORY:  Past Surgical History[2]      PAST SOCIAL HISTORY:  Social Hx on file[3]      PAST FAMILY HISTORY:  Family History[4]      ALLERGIES:  Allergies[5]      MEDS:  Home Medications:  Medications Taking[6]    Scheduled Medication:  Scheduled Medications[7]  Continuous Infusing Medication:  Medication Infusions[8]  PRN Medications:  PRN Medications[9]       PHYSICAL EXAM:  /76   Pulse 85   Temp 98.1 °F (36.7 °C) (Temporal)   Resp 24   SpO2 95%        CONSTITUTIONAL: alert, oriented, no apparent distress  HEENT: atraumatic normocephalic  MOUTH: mucous membranes are moist. No OP exudates  NECK/THROAT: no JVD. Trachea midline. No obvious thyromegaly  LUNG: diminished BS b/l no wheezing, crackles. Chest symmetric with respiratory motion  HEART: regular rate and rhythm, no obvious murmers or gallops noted  ABD: soft non tender. + bowel sounds. No organomegaly noted  EXT: no clubbing, cyanosis, 2 + b/l LE edema  NEURO/MUSCULOSKELETAL: no gross deficits  SKIN: warm, dry. No obvious lesions noted  LYMPH: no obvious LAD      IMAGES:   CXR 5/19/25  CONCLUSION:   1. Bibasilar atelectasis, left greater than  right, with or without superimposed pneumonia and pleural effusions.   2. Mildly prominent bronchovascular markings, nonspecific, may indicate mild pulmonary edema.       TTE 2/2025  Conclusions:     1. Left ventricle: The cavity size was normal. Wall thickness was mildly      increased. Systolic function was normal. The estimated ejection fraction      was 55-60%, by visual assessment. Technical limitations of the study      preclude regional wall motion analysis. Left ventricular diastolic      function parameters were normal.   2. Right ventricle: The cavity size was increased. Systolic function was      normal.   3. Systemic arteries: The ascending aorta diameter is 4.2cm.   4. Ascending aorta: The ascending aorta was dilated.   Impressions:  This study is compared with previous dated 07/16/2024: No   significant change since prior study.       LABS:  Recent Labs   Lab 05/19/25  1507   RBC 4.58   HGB 13.7   HCT 47.9   .6*   MCH 29.9   MCHC 28.6*   RDW 14.2   NEPRELIM 7.61   WBC 11.1*   .0       Recent Labs   Lab 05/19/25  1507   *   BUN 18   CREATSERUM 0.89   EGFRCR 90   CA 8.8      K 4.6      CO2 39.0*       ASSESSMENT/PLAN:  Acute on chronic hypoxemic resp failure  -secondary to COPD exac  -solumedrol  -pulmicort bid and duonebs q 6hrs  -check resp viral panel  -more awake so wean off BIPAP to airvo   -recheck ABG  -place back on BIPAP overnight    Possible CAP  -start rocephin/doxy  -check urine leg, strep pneumo, MRSA nares    MARIA M  -BIPAP overnight for COPD exac and MARIA M    Diastolic HF  -resume home meds and diuretics    HTN, HLP  -resume home meds    DM  -insulin/accuchecks    Proph  -DVT: hep subcutaneous or lovenox as per primary    Dispo  -full code    Thank you for the opportunity to care for Campbell Rao.      JINA Rodríguez DO, MPH  Pulmonary Critical Care Medicine  [facility]                         [1]   Past Medical  History:  Diagnosis Date    Atherosclerosis of coronary artery     coronary calcification on ct chest    Back problem     Cataract     Chronic respiratory failure (HCC)     Congestive heart disease (HCC)     Controlled type 2 diabetes mellitus with complication, without long-term current use of insulin (HCC) 4/2/2025    COPD (HCC)     freq exacerbations. ? 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5     Passive exposure: Past    Smokeless tobacco: Never   Vaping Use    Vaping status: Never Used   Substance and Sexual Activity    Alcohol use: Not Currently     Alcohol/week: 0.0 standard drinks of alcohol     Comment: socially    Drug use: Not Currently   Other Topics Concern    Caffeine Concern Yes     Comment: coffee, >32 oz daily   [4]   Family History  Problem Relation Age of Onset    Cancer Father         lung cancer    Cancer Mother         lung cancer    No Known Problems Daughter     No Known Problems Son     No Known Problems Maternal Grandmother     No Known Problems Maternal Grandfather     No Known Problems Paternal Grandmother     No Known Problems Paternal  Grandfather     No Known Problems Sister     Diabetes Brother     Lipids Brother         hyperlipidemia    Hypertension Brother     No Known Problems Other    [5] No Known Allergies  [6]   Outpatient Medications Marked as Taking for the 5/19/25 encounter (Hospital Encounter)   Medication Sig Dispense Refill    Tirzepatide (MOUNJARO) 7.5 MG/0.5ML Subcutaneous Solution Auto-injector Inject 7.5 mg into the skin once a week. 6 mL 0    torsemide 20 MG Oral Tab Take 1 tablet (20 mg total) by mouth 2 (two) times daily. 180 tablet 3    spironolactone 25 MG Oral Tab Take 0.5 tablets (12.5 mg total) by mouth daily. 30 tablet 2    atorvastatin 20 MG Oral Tab Take 1 tablet (20 mg total) by mouth nightly. 90 tablet 1    acetaminophen (TYLENOL) 325 MG Oral Tab Take 2 tablets (650 mg total) by mouth every 6 (six) hours as needed for Pain.      metoprolol succinate ER 25 MG Oral Tablet 24 Hr Take 1 tablet (25 mg total) by mouth 2x Daily(Beta Blocker). 60 tablet 1    albuterol 108 (90 Base) MCG/ACT Inhalation Aero Soln Inhale 2 puffs into the lungs every 4 to 6 hours as needed for Wheezing.     [7] [8] [9]

## 2025-05-19 NOTE — ED PROVIDER NOTES
Patient Seen in: [facility] Emergency Department      History     Chief Complaint   Patient presents with    Difficulty Breathing     Stated Complaint: sob,hypoxia    Subjective:   HPI  History of Present Illness            Patient presents to the emergency department with shortness of breath and hypoxia.  Wife states that his pulse oximeter at home was reading very low despite oxygen supplementation and has become more drowsy.  When this has happened in the past he is retaining CO2.  There is no fever.  There is no increasing cough.  There is no vomiting or diarrhea.  There is no other aggravating relieving factors.      Objective:     No pertinent past medical history.            No pertinent past surgical history.              No pertinent social history.                              Physical Exam     ED Triage Vitals [05/19/25 1450]   BP (!) 159/129   Pulse 83   Resp 26   Temp 98.1 °F (36.7 °C)   Temp src Temporal   SpO2 99 %   O2 Device None (Room air)       Current Vitals:   Vital Signs  BP: 96/57  Pulse: 90  Resp: 24  Temp: 97.4 °F (36.3 °C)  Temp src: Temporal  MAP (mmHg): (!) 60    Oxygen Therapy  SpO2: 93 %  O2 Device: High flow nasal cannula  Mode: Spontaneous/Timed  FiO2 (%): 30 %  O2 Flow Rate (L/min): 10 L/min  Pulse Oximetry Type: Continuous  Oximetry Probe Site Changed: Yes  Pulse Ox Probe Location: Right hand          Physical Exam  Vitals and nursing note reviewed.   Constitutional:       General: He is not in acute distress.     Comments: Somnolent, arousable to painful stimuli.   HENT:      Head: Normocephalic.      Nose: Nose normal.      Mouth/Throat:      Mouth: Mucous membranes are moist.   Eyes:      Conjunctiva/sclera: Conjunctivae normal.   Cardiovascular:      Rate and Rhythm: Normal rate and regular rhythm.      Heart sounds: No murmur heard.  Pulmonary:      Effort: Pulmonary effort is normal. No respiratory distress.      Breath sounds: Decreased breath sounds present. No  wheezing.   Abdominal:      General: There is no distension.      Palpations: Abdomen is soft.      Tenderness: There is no abdominal tenderness.   Musculoskeletal:         General: No tenderness. Normal range of motion.      Cervical back: Normal range of motion and neck supple.   Skin:     General: Skin is warm and dry.      Capillary Refill: Capillary refill takes less than 2 seconds.      Findings: No rash.   Neurological:      General: No focal deficit present.      Mental Status: He is oriented to person, place, and time.                   ED Course     Labs Reviewed   CBC WITH DIFFERENTIAL WITH PLATELET - Abnormal; Notable for the following components:       Result Value    WBC 11.1 (*)     .6 (*)     MCHC 28.6 (*)     RDW-SD 55.5 (*)     Monocyte Absolute 1.21 (*)     All other components within normal limits   BASIC METABOLIC PANEL (8) - Abnormal; Notable for the following components:    Glucose 122 (*)     CO2 39.0 (*)     BUN/CREA Ratio 20.2 (*)     Calculated Osmolality 301 (*)     All other components within normal limits   ARTERIAL BLOOD GAS - Abnormal; Notable for the following components:    ABG pH 7.21 (*)     ABG pCO2 119 (*)     ABG PO2 159 (*)     All other components within normal limits   EXPANDED BLOOD GAS, ARTERIAL - Abnormal; Notable for the following components:    ABG pH 7.19 (*)     ABG pCO2 119 (*)     ABG PO2 116 (*)     All other components within normal limits   ARTERIAL BLOOD GAS - Abnormal; Notable for the following components:    ABG pH 7.25 (*)     ABG pCO2 110 (*)     ABG PO2 72 (*)     ABG HCO3 36.7 (*)     Blood Gas Base Excess 15.3 (*)     All other components within normal limits   HEMOGLOBIN A1C - Abnormal; Notable for the following components:    HgbA1C 6.1 (*)     Estimated Average Glucose 128 (*)     All other components within normal limits   BASIC METABOLIC PANEL (8) - Abnormal; Notable for the following components:    Glucose 129 (*)     Chloride 95 (*)     CO2  >40.0 (*)     Calculated Osmolality 297 (*)     All other components within normal limits   CBC WITH DIFFERENTIAL WITH PLATELET - Abnormal; Notable for the following components:    .1 (*)     MCHC 29.7 (*)     RDW-SD 53.2 (*)     Neutrophil Absolute Prelim 9.14 (*)     Neutrophil Absolute 9.14 (*)     Lymphocyte Absolute 0.88 (*)     All other components within normal limits   ARTERIAL BLOOD GAS - Abnormal; Notable for the following components:    ABG pH 7.34 (*)     ABG pCO2 95 (*)     ABG PO2 75 (*)     ABG HCO3 40.1 (*)     Blood Gas Base Excess 19.6 (*)     All other components within normal limits   TROPONIN I HIGH SENSITIVITY - Normal   PRO BETA NATRIURETIC PEPTIDE - Normal   PRO BETA NATRIURETIC PEPTIDE - Normal   MAGNESIUM - Normal   POCT GLUCOSE - Normal   POCT GLUCOSE - Normal   SARS-COV-2/FLU A AND B/RSV BY PCR (GENEXPERT) - Normal    Narrative:     This test is intended for the qualitative detection and differentiation of SARS-CoV-2, influenza A, influenza B, and respiratory syncytial virus (RSV) viral RNA in nasopharyngeal or nares swabs from individuals suspected of respiratory viral infection consistent with COVID-19 by their healthcare provider. Signs and symptoms of respiratory viral infection due to SARS-CoV-2, influenza, and RSV can be similar.    Test performed using the Xpert Xpress SARS-CoV-2/FLU/RSV (real time RT-PCR)  assay on the GeneXpert instrument, [employer], [place] [address].   This test is being used under the Food and Drug Administration's Emergency Use Authorization.    The authorized Fact Sheet for Healthcare Providers for this assay is available upon request from the laboratory.   ED/MRSA SCREEN BY PCR-CC - Normal   URINALYSIS, ROUTINE   LEGIONELLA URINE AG SEROGRP 1   STREPTOCOCCUS PNEUMONIAE AG, URINE   RAINBOW DRAW LAVENDER   RESPIRATORY FLU EXPAND PANEL + COVID-19   SPUTUM CULTURE     EKG    Rate, intervals and axes as noted on EKG Report.  Rate: 84 bpm   Rhythm: Sinus  Rhythm  Reading: incomplete right bundle branch block, abnormal              Results                                MDM          Admission disposition: 5/19/2025  4:02 PM           Medical Decision Making  Differential diagnosis considered for CO2 retention, COPD exacerbation, viral illness.    Problems Addressed:  Acute respiratory failure with hypoxia and hypercapnia (HCC): acute illness or injury    Amount and/or Complexity of Data Reviewed  Labs: ordered. Decision-making details documented in ED Course.     Details: CBC and chemistry panel unremarkable  Radiology: ordered and independent interpretation performed. Decision-making details documented in ED Course.     Details: Chest x-ray with nonspecific atelectasis versus mild basilar infiltrates  ECG/medicine tests: ordered and independent interpretation performed. Decision-making details documented in ED Course.  Discussion of management or test interpretation with external provider(s): Patient was started on BiPAP immediately upon arrival.  Hyper Neck and hypoxic respiratory failure present on blood gas.  Discussed with pulmonary and the hospitalist.  Will admit to the intensive care unit.    Risk  Prescription drug management.  Decision regarding hospitalization.  Risk Details:   Critical Care Documentation    There were greater than 30 minutes of critical care time spent directly on this patient and this time did not include procedures but did include:    7 minutes for documentation  10 minutes for physical exam, re-examination and discussing results with patient and family  10 minutes discussing case with admitting physicians and consultants  5 minutes interpreting labs and diagnostic testing        Critical Care  Total time providing critical care: 32 minutes        Disposition and Plan     Clinical Impression:  1. Acute respiratory failure with hypoxia and hypercapnia (HCC)         Disposition:  Admit  5/19/2025  4:02 pm    Follow-up:  No follow-up  provider specified.  We recommend that you schedule follow up care with a primary care provider within the next three months to obtain basic health screening including reassessment of your blood pressure.      Medications Prescribed:  Current Discharge Medication List                Supplementary Documentation:         Hospital Problems       Present on Admission  Date Reviewed: 4/2/2025          ICD-10-CM Noted POA    * (Principal) Acute respiratory failure with hypoxia and hypercapnia (HCC) J96.01, J96.02 5/19/2025 Unknown    Respiratory acidosis E87.29 5/19/2025 Yes

## 2025-05-19 NOTE — H&P
[facility]  part of [facility]  HISTORY AND PHYSICAL       Campbell Rao Patient Status:  Emergency    1950  75 year old CSN [id number]   Location 46/46 Attending Erasto Macias MD     PCP Johny Kang MD         DATE OF ADMISSION: 2025     CHIEF COMPLAINT: Shortness of breath    HISTORY OF PRESENT ILLNESS  This is a 75 year oldmale who presented planing of shortness of breath.  Patient states symptoms started around 3 to 4 days prior to admission.  Patient noted progressive shortness of breath.  Patient is on home O2 due to history of COPD, MARIA M.  Patient states he was monitoring his oxygen levels and found them to be low.  Symptoms persisted prompting visit to ED.  Patient denied associated coughing or sputum production.  Patient denied subjective fevers or chills.  Patient otherwise denied current chest pain, abdominal pain, nausea vomit, fever or chills.  In the ED, patient was noted to be sleepy and hypercapnic.  Patient was started on BiPAP.    PAST MEDICAL HISTORY  Past Medical History[1]     PAST SURGICAL HISTORY  Past Surgical History[2]    ALLERGIES   Patient has no known allergies.    MEDICATIONS  Patient's Medications   New Prescriptions    No medications on file   Previous Medications    ACETAMINOPHEN (TYLENOL) 325 MG ORAL TAB    Take 2 tablets (650 mg total) by mouth every 6 (six) hours as needed for Pain.    ALBUTEROL 108 (90 BASE) MCG/ACT INHALATION AERO SOLN    Inhale 2 puffs into the lungs every 4 to 6 hours as needed for Wheezing.    ATORVASTATIN 20 MG ORAL TAB    Take 1 tablet (20 mg total) by mouth nightly.    FLUTICASONE-SALMETEROL 250-50 MCG/ACT INHALATION AEROSOL POWDER, BREATH ACTIVATED    Inhale 1 puff into the lungs 2 (two) times daily.    METOPROLOL SUCCINATE ER 25 MG ORAL TABLET 24 HR    Take 1 tablet (25 mg total) by mouth 2x Daily(Beta Blocker).    SPIRONOLACTONE 25 MG ORAL TAB    Take 0.5 tablets (12.5 mg total) by mouth daily.     TIRZEPATIDE (MOUNJARO) 7.5 MG/0.5ML SUBCUTANEOUS SOLUTION AUTO-INJECTOR    Inject 7.5 mg into the skin once a week.    TORSEMIDE 20 MG ORAL TAB    Take 1 tablet (20 mg total) by mouth 2 (two) times daily.    UMECLIDINIUM BROMIDE 62.5 MCG/ACT INHALATION AEROSOL POWDER, BREATH ACTIVATED    Inhale 1 puff into the lungs daily.   Modified Medications    No medications on file   Discontinued Medications    ERGOCALCIFEROL (DRISDOL) 1.25 MG (92038 UT) ORAL CAP    Take 1 capsule (50,000 Units total) by mouth once a week for 12 doses.    POTASSIUM CHLORIDE ER 10 MEQ ORAL CAP CR    Take 1 capsule (10 mEq total) by mouth daily.       SOCIAL HISTORY  Social Hx on file[3]    FAMILY HISTORY  Family History[4]    PHYSICAL EXAM  Vital signs:  /72   Pulse 64   Temp 98.1 °F (36.7 °C) (Temporal)   Resp 21   SpO2 95%      Exam completed while patient on BiPAP  GENERAL: Sleepy, but briefly awakens to voice in no acute distress.  HEART:  Regular rhythm.  Regular rate   LUNGS:  Air entry was diffusely decreased.  No increased work of breathing or wheezes   ABDOMEN: Soft and non-tender.    EXTREMITIES: + lower extremity edema appreciated  PSYCHIATRIC: Flat mood      IMAGING    XR CHEST AP PORTABLE  (CPT=71045)  Result Date: 5/19/2025  CONCLUSION:   1. Bibasilar atelectasis, left greater than right, with or without superimposed pneumonia and pleural effusions.  2. Mildly prominent bronchovascular markings, nonspecific, may indicate mild pulmonary edema.     Dictated by (CST): Davey Raymond MD on 5/19/2025 at 4:06 PM     Finalized by (CST): aDvey Raymond MD on 5/19/2025 at 4:08 PM             Data:  Recent Labs   Lab 05/19/25  1507   RBC 4.58   HGB 13.7   HCT 47.9   .6*   MCH 29.9   MCHC 28.6*   RDW 14.2   NEPRELIM 7.61   WBC 11.1*   .0     Recent Labs   Lab 05/19/25  1507   *   BUN 18   CREATSERUM 0.89   CA 8.8      K 4.6      CO2 39.0*       ASSESSMENT/PLAN      Acute on chronic respiratory failure  with hypoxia and hypercapnia (HCC)    Respiratory acidosis  -Patient presenting with progressive shortness of breath and hypoxia over the past 3 to 4 days.  - Chest x-ray reviewed.  Noted bibasilar atelectasis and possible superimposed pneumonia with pleural effusions.  - Likely multifactorial in patient with COPD, MARIA M, CHF.  - Some concern for possible pneumonia.  - Initially requiring BiPAP, attempting to wean as able.  Continue while sleeping.  - Follow-up repeat ABG.  - Starting empiric antibiotics.  - Diuresis for possible CHF.  - Pulmonology on consult, appreciate further recommendations.    Acute on chronic heart failure with preserved ejection fraction   - Last known EF 55-60  - Starting Diuresis with Lasix 40mg IV BID  - Strict I&Os, Daily weights, Fluid restriction    Possible pneumonia  - Patient presenting with shortness of breath and hypoxia  - Chest x-ray unable to rule out pneumonia at this time.  - Starting on empiric antibiotics  - Follow-up strep and Legionella testing    COPD exacerbation  - Starting Solu-Medrol, DuoNebs and empiric antibiotics as above  - CPAP while sleeping  - Continuing supplemental O2, weaning as able  - Pulmonology on consult, appreciate further recommendations    MARIA M  - BiPAP nightly    HTN  - controlled  - restart home regimen  - Monitor and adjust as needed    Type 2 DM   - Monitoring Blood glucose with POC checks  - SSI to cover hyperglycemia  - Hypoglycemia protocol  - Will monitor and adjust agents as needed.      Hyperlipidemia  -Statin          Plan of care discussed with patient at bedside.  Discussed with ED physician and RN. Decision made that pt needs hospitalization for further management/monitoring.      Balwinder Yoon MD    This note was prepared using Dragon Medical voice recognition dictation software. As a result errors may occur. When identified these errors have been corrected. While every attempt is made to correct errors during dictation  discrepancies may still exist         [1]   Past Medical History:   Atherosclerosis of coronary artery    coronary calcification on ct chest    Back problem    Cataract    Chronic respiratory failure (HCC)    Congestive heart disease (HCC)    Controlled type 2 diabetes mellitus with complication, without long-term current use of insulin (HCC)    COPD (HCC)    freq exacerbations. ? 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    Osteoarthritis    Pneumonia    Pulmonary nodules    Sleep apnea    Tobacco abuse   [2]   Past Surgical History:  Procedure Laterality Date    Appendectomy      Appendectomy      Cataract Bilateral     2/2024, 3/2024    Electrocardiogram, complete  01/30/2012    scanned to media tab   [3]   Social History  Socioeconomic History    Marital status:    Tobacco Use    Smoking status: Former     Current packs/day: 0.00     Average packs/day: 2.0 packs/day for 40.0 years (80.0 ttl pk-yrs)     Types: Cigarettes     Start date: 11/1981     Quit date: 11/2021     Years since quitting: 3.5     Passive exposure: Past    Smokeless tobacco: Never   Vaping Use    Vaping status: Never Used   Substance and Sexual Activity    Alcohol use: Not Currently     Alcohol/week: 0.0 standard drinks of alcohol     Comment: socially    Drug use: Not Currently   Other Topics Concern    Caffeine Concern Yes     Comment: coffee, >32 oz daily   [4]   Family History  Problem Relation Age of Onset    Cancer Father         lung cancer    Cancer Mother         lung cancer    No Known Problems Daughter     No Known Problems Son     No Known Problems Maternal Grandmother     No Known Problems Maternal Grandfather     No Known Problems Paternal Grandmother     No Known Problems Paternal  Grandfather     No Known Problems Sister     Diabetes Brother     Lipids Brother         hyperlipidemia    Hypertension Brother     No Known Problems Other

## 2025-05-19 NOTE — RESPIRATORY THERAPY NOTE
Received this patient on BIPAP, RT Almaraz gave me report on this patient. Found the patient  on High flow nasal canula 10 L , BIPAP was changed for NC by MD Rodríguez , KATHRYN Marquis changed to NC.    ABG done on HFNC 10 L    PH 7.19  PCO2 119  PO2 116  HCO3- incalculable    Patient was placed again on BIPAP 18/5 R 18 , FIO2 was decreased from 50% to  40% new setting after ABG , verbal orders by MD Rodríguez.

## 2025-05-20 LAB
ADENOVIRUS PCR:: NOT DETECTED
ATRIAL RATE: 84 BPM
B PARAPERT DNA SPEC QL NAA+PROBE: NOT DETECTED
B PERT DNA SPEC QL NAA+PROBE: NOT DETECTED
BASE EXCESS BLD CALC-SCNC: 19.6 MMOL/L (ref ?–2)
BASOPHILS # BLD AUTO: 0.06 X10(3) UL (ref 0–0.2)
BASOPHILS NFR BLD AUTO: 0.6 %
BUN BLD-MCNC: 16 MG/DL (ref 9–23)
BUN/CREAT SERPL: 17.8 (ref 10–20)
C PNEUM DNA SPEC QL NAA+PROBE: NOT DETECTED
CALCIUM BLD-MCNC: 9.1 MG/DL (ref 8.7–10.4)
CHLORIDE SERPL-SCNC: 95 MMOL/L (ref 98–112)
CO2 SERPL-SCNC: >40 MMOL/L (ref 21–32)
CORONAVIRUS 229E PCR:: NOT DETECTED
CORONAVIRUS HKU1 PCR:: NOT DETECTED
CORONAVIRUS NL63 PCR:: NOT DETECTED
CORONAVIRUS OC43 PCR:: NOT DETECTED
CREAT BLD-MCNC: 0.9 MG/DL (ref 0.7–1.3)
DEPRECATED RDW RBC AUTO: 53.2 FL (ref 35.1–46.3)
EGFRCR SERPLBLD CKD-EPI 2021: 90 ML/MIN/1.73M2 (ref 60–?)
EOSINOPHIL # BLD AUTO: 0 X10(3) UL (ref 0–0.7)
EOSINOPHIL NFR BLD AUTO: 0 %
ERYTHROCYTE [DISTWIDTH] IN BLOOD BY AUTOMATED COUNT: 14.4 % (ref 11–15)
FLUAV RNA SPEC QL NAA+PROBE: NOT DETECTED
FLUBV RNA SPEC QL NAA+PROBE: NOT DETECTED
GLUCOSE BLD-MCNC: 129 MG/DL (ref 70–99)
GLUCOSE BLDC GLUCOMTR-MCNC: 123 MG/DL (ref 70–99)
GLUCOSE BLDC GLUCOMTR-MCNC: 131 MG/DL (ref 70–99)
GLUCOSE BLDC GLUCOMTR-MCNC: 136 MG/DL (ref 70–99)
GLUCOSE BLDC GLUCOMTR-MCNC: 189 MG/DL (ref 70–99)
HCO3 BLDA-SCNC: 40.1 MEQ/L (ref 21–27)
HCT VFR BLD AUTO: 47.4 % (ref 39–53)
HGB BLD-MCNC: 14.1 G/DL (ref 13–17.5)
IMM GRANULOCYTES # BLD AUTO: 0.18 X10(3) UL (ref 0–1)
IMM GRANULOCYTES NFR BLD: 1.7 %
L PNEUMO AG UR QL: NEGATIVE
LYMPHOCYTES # BLD AUTO: 0.88 X10(3) UL (ref 1–4)
LYMPHOCYTES NFR BLD AUTO: 8.1 %
MAGNESIUM SERPL-MCNC: 2.1 MG/DL (ref 1.6–2.6)
MCH RBC QN AUTO: 30.1 PG (ref 26–34)
MCHC RBC AUTO-ENTMCNC: 29.7 G/DL (ref 31–37)
MCV RBC AUTO: 101.1 FL (ref 80–100)
METAPNEUMOVIRUS PCR:: NOT DETECTED
MODIFIED ALLEN TEST: POSITIVE
MONOCYTES # BLD AUTO: 0.56 X10(3) UL (ref 0.1–1)
MONOCYTES NFR BLD AUTO: 5.2 %
MRSA DNA SPEC QL NAA+PROBE: NEGATIVE
MYCOPLASMA PNEUMONIA PCR:: NOT DETECTED
NEUTROPHILS # BLD AUTO: 9.14 X10 (3) UL (ref 1.5–7.7)
NEUTROPHILS # BLD AUTO: 9.14 X10(3) UL (ref 1.5–7.7)
NEUTROPHILS NFR BLD AUTO: 84.4 %
O2 CT BLD-SCNC: 19.5 VOL% (ref 15–23)
OSMOLALITY SERPL CALC.SUM OF ELEC: 297 MOSM/KG (ref 275–295)
OXYGEN LITERS/MINUTE: 10 L/MIN
P AXIS: 40 DEGREES
P-R INTERVAL: 136 MS
PARAINFLUENZA 1 PCR:: NOT DETECTED
PARAINFLUENZA 2 PCR:: NOT DETECTED
PARAINFLUENZA 3 PCR:: NOT DETECTED
PARAINFLUENZA 4 PCR:: NOT DETECTED
PCO2 BLDA: 95 MM HG (ref 35–45)
PH BLDA: 7.34 [PH] (ref 7.35–7.45)
PLATELET # BLD AUTO: 209 10(3)UL (ref 150–450)
PO2 BLDA: 75 MM HG (ref 80–100)
POTASSIUM SERPL-SCNC: 4.6 MMOL/L (ref 3.5–5.1)
PUNCTURE CHARGE: YES
Q-T INTERVAL: 376 MS
QRS DURATION: 96 MS
QTC CALCULATION (BEZET): 444 MS
R AXIS: 12 DEGREES
RBC # BLD AUTO: 4.69 X10(6)UL (ref 3.8–5.8)
RHINOVIRUS/ENTERO PCR:: NOT DETECTED
RSV RNA SPEC QL NAA+PROBE: NOT DETECTED
SAO2 % BLDA: 96.1 % (ref 94–100)
SARS-COV-2 RNA NPH QL NAA+NON-PROBE: NOT DETECTED
SODIUM SERPL-SCNC: 142 MMOL/L (ref 136–145)
STREP PNEUMO ANTIGEN, URINE: NEGATIVE
T AXIS: 48 DEGREES
VENTRICULAR RATE: 84 BPM
WBC # BLD AUTO: 10.8 X10(3) UL (ref 4–11)

## 2025-05-20 PROCEDURE — 99233 SBSQ HOSP IP/OBS HIGH 50: CPT | Performed by: INTERNAL MEDICINE

## 2025-05-20 PROCEDURE — 99232 SBSQ HOSP IP/OBS MODERATE 35: CPT | Performed by: INTERNAL MEDICINE

## 2025-05-20 RX ORDER — METHYLPREDNISOLONE SODIUM SUCCINATE 40 MG/ML
40 INJECTION INTRAMUSCULAR; INTRAVENOUS EVERY 8 HOURS
Status: DISCONTINUED | OUTPATIENT
Start: 2025-05-20 | End: 2025-05-22

## 2025-05-20 NOTE — OCCUPATIONAL THERAPY NOTE
OCCUPATIONAL THERAPY EVALUATION - INPATIENT     Room Number: 218/218-A  Evaluation Date: 5/20/2025  Type of Evaluation: Initial  Presenting Problem: SOB    Physician Order: IP Consult to Occupational Therapy  Reason for Therapy: ADL/IADL Dysfunction and Discharge Planning    OCCUPATIONAL THERAPY ASSESSMENT   Patient is a 75 year old male admitted 5/19/2025 for SOB and hypoxia.  Prior to admission, patient's baseline is I with BADLs and short distance household mobility (typically furniture walks) using 4L home O2. Pt typically remains on main level, sleeps in recliner. Pt uses [facility] for LE self care. Pt is alone days as his wife works F/T. Pt is retired and does not drive.  Patient is currently functioning below baseline with ADls and functional mobility.  Patient is requiring minimal assist and maximum assist as a result of the following impairments: decreased functional strength, decreased functional reach, decreased endurance, impaired stand balance, decreased muscular endurance, cognitive deficits (impulsive, impaired insight), medical status, and increased O2 needs from baseline. Occupational Therapy will continue to follow for duration of hospitalization.    Patient will benefit from continued skilled OT Services to promote return to prior level of function and safety with continuous assistance and gradual rehabilitative therapy.    PLAN DURING HOSPITALIZATION           OCCUPATIONAL THERAPY MEDICAL/SOCIAL HISTORY   Problem List  Principal Problem:    Acute respiratory failure with hypoxia and hypercapnia (HCC)  Active Problems:    Respiratory acidosis    HOME SITUATION  Type of Home: House  Home Layout: One level  Lives With: Spouse (wife works F/T days)  Occupation/Status: retired  Drives: No  Patient Regularly Uses: Glasses    Use of Assistive Device(s): typically none, occasionally cane    Prior Level of [place]:   Prior to admission, patient's baseline is I with BADLs and short distance household  mobility (typically furniture walks) using 4L home O2. Pt typically remains on main level, sleeps in recliner. Pt uses LHAE for LE self care. Pt is alone days as his wife works F/T. Pt is retired and does not drive.    SUBJECTIVE  \"Should I try to walk\"    OCCUPATIONAL THERAPY EXAMINATION      OBJECTIVE  Fall Risk: High fall risk      PAIN ASSESSMENT  Rating: -- (severe)  Location: [facility]  Management Techniques: Relaxation      ACTIVITY TOLERANCE  Pulse:  (at rst 93, with standing 108)     Resp: 20       O2 SATURATIONS  Oxygen Therapy  SPO2% on Oxygen at Rest: 94  At rest oxygen flow (liters per minute): 14  SPO2% Ambulation on Oxygen: 86 (with standing in front of chair)  Ambulation oxygen flow (liters per minute): 14    COGNITION  Pt is awake, alert, follows commands  Pt is however impulsive with impaired insight and does not always respond well to re direction    Communication: able to express his needs    Behavioral/Emotional/Social: impulsive does not respond well to redirection at times    RANGE OF MOTION   Upper extremity ROM is within functional limits     STRENGTH ASSESSMENT  Upper extremity strength is within functional limits       ACTIVITIES OF DAILY LIVING ASSESSMENT  AM-PAC ‘6-Clicks’ Inpatient Daily Activity Short Form  How much help from another person does the patient currently need…  -   Putting on and taking off regular lower body clothing?: A Lot  -   Bathing (including washing, rinsing, drying)?: A Lot  -   Toileting, which includes using toilet, bedpan or urinal? : A Lot  -   Putting on and taking off regular upper body clothing?: A Little  -   Taking care of personal grooming such as brushing teeth?: A Little  -   Eating meals?: None    AM-PAC Score:  Score: 16  Approx Degree of Impairment: 53.32%  Standardized Score (AM-PAC Scale): 35.96  CMS Modifier (G-Code): CK    FUNCTIONAL TRANSFER ASSESSMENT  Sit to Stand: Chair  Chair: Minimal Assist    BALANCE ASSESSMENT  Impaired supported stand  balance noting y tremors    FUNCTIONAL ADL ASSESSMENT  Feeding: I  Grooming: set up from sitting  UE self care: set up from sitting  LE self care: Mod A  Toileting: Mod A    Skilled Therapy Provided: Pt received sitting up in recliner. No visitors present.   Pt's chief c/o is tremors reports \"a few\" falls with KENIA LE tremors resulting in legs \"crumpling\" underneath him over the last several weeks.   Observe discolored skin LT lower leg  Pt stood from bed side chair on 2 occasions with Min/CGA.   With attempt to perform functional mobility- pt requires Mod A x2 with chair follow d/t tremors.     EDUCATION PROVIDED  Patient Education : Role of Occupational Therapy; Plan of Care; Fall Prevention    The patient's Approx Degree of Impairment: 53.32% has been calculated based on documentation in the [facility] '6 clicks' Inpatient Daily Activity Short Form.  Research supports that patients with this level of impairment may benefit from [place].  Final disposition will be made by interdisciplinary medical team.     Patient End of Session: Up in chair, Needs met, Call light within reach, RN aware of session/findings, All patient questions and concerns addressed, Hospital anti-slip socks    OT Goals  Patients self stated goal is: return to PLOF     Patient will complete functional transfer with Min A  Comment:     Patient will complete toileting with Min A  Comment:     Patient will tolerate standing for 5 minutes in prep for adls with CGA   Comment:     Comment:          Goals  on: 6/3/25  Frequency: 3-5x/week    Patient Evaluation Complexity Level:   Occupational Profile/Medical History MODERATE - Expanded review of history including review of medical or therapy record   Specific performance deficits impacting engagement in ADL/IADL MODERATE  3 - 5 performance deficits   Client Assessment/Performance Deficits MODERATE - Comorbidities and min to mod modifications of tasks    Clinical Decision Making MODERATE - Analysis of  occupational profile, detailed assessments, several treatment options    Overall Complexity MODERATE     Self-Care Home Management: 15 minutes

## 2025-05-20 NOTE — PROGRESS NOTES
[facility]  part of [facility]     Progress Note    Campbell Rao Patient Status:  Inpatient    1950 MRN [id number]   Location [facility] 2W/SW Attending Beltran Cruz MD   Hosp Day # 1 PCP Johny Kang MD       Subjective:   Patient seen and examined.  Dyspnea gradually improving.  Some occasional cough present.    Objective:   Blood pressure 97/66, pulse (!) 133, temperature 97 °F (36.1 °C), temperature source Temporal, resp. rate 20, height 5' 8\" (1.727 m), weight (!) 306 lb 10.6 oz (139.1 kg), SpO2 95%.  Intake/Output:   Last 3 shifts: I/O last 3 completed shifts:  In: 600 [P.O.:600]  Out: 2700 [Urine:2700]   This shift: I/O this shift:  In: -   Out: 305 [Urine:305]     Vent Settings: FiO2 (%):  [30 %] 30 %    Hemodynamic parameters (last 24 hours):      Scheduled Meds: Current Hospital Medications[1]    Continuous Infusions: Medication Infusions[2]    Physical Exam  Constitutional: no acute distress  Eyes: PERRL  ENT: nares pateint  Neck: supple, no JVD  Cardio: RRR, S1 S2  Respiratory: Diminished expiratory breath sounds with some expiratory Rales present  GI: abdomen soft, non tender, active bowel sounds, no organomegaly  Extremities: no clubbing, cyanosis, + bilateral lower extremity edema  Neurologic: no gross motor deficits  Skin: warm, dry      Results:     Lab Results   Component Value Date    WBC 10.8 2025    HGB 14.1 2025    HCT 47.4 2025    .0 2025    CREATSERUM 0.90 2025    BUN 16 2025     2025    K 4.6 2025    CL 95 2025    CO2 >40.0 2025     2025    CA 9.1 2025    MG 2.1 2025       XR CHEST AP PORTABLE  (CPT=71045)  Result Date: 2025  CONCLUSION:   1. Bibasilar atelectasis, left greater than right, with or without superimposed pneumonia and pleural effusions.  2. Mildly prominent bronchovascular markings, nonspecific, may indicate mild pulmonary  edema.     Dictated by (CST): Davey Raymond MD on 5/19/2025 at 4:06 PM     Finalized by (CST): Davey Raymond MD on 5/19/2025 at 4:08 PM            EKG 12 Lead  Result Date: 5/20/2025  Normal sinus rhythm Incomplete right bundle branch block Septal infarct (cited on or before 11-JAN-2025) Abnormal ECG When compared with ECG of 13-FEB-2025 01:09, Questionable change in initial forces of Anterior-septal leads      Assessment   1.  Acute on chronic hypoxemic hypercapnic respiratory failure  2.  Acute COPD exacerbation  3.  Possible community-acquired pneumonia  4.  MARIA M  5.  Diastolic CHF  6.  Hypertension  7.  Hyperlipidemia  8.  Diabetes mellitus     Plan   -Patient presents with evidence of acute on chronic hypoxemic hypercapnic respiratory failure with concern for COPD exacerbation and possible community-acquired pneumonia.  - ABG reviewed.  Nightly BiPAP therapy.  - Wean oxygen as tolerated throughout the day  - Gradually wean steroid therapy  - Nebulizer treatments  - Continue antibiotic therapy with Rocephin and doxycycline  - Diuretics as tolerated  - DVT prophylaxis: Heparin  - Okay to transfer to floor from pulmonary perspective    Corey Del Toro DO  Pulmonary Critical Care Medicine  [facility]        [1]   Current Facility-Administered Medications   Medication Dose Route Frequency    budesonide (Pulmicort) 0.5 MG/2ML nebulizer suspension 0.5 mg  0.5 mg Nebulization 2 times daily    ipratropium-albuterol (Duoneb) 0.5-2.5 (3) MG/3ML inhalation solution 3 mL  3 mL Nebulization 4 times per day    cefTRIAXone (Rocephin) 2 g in sodium chloride 0.9% 100 mL IVPB-ADDV  2 g Intravenous Q24H    doxycycline (Vibramycin) 100 mg in sodium chloride 0.9% 100mL IVPB-[place]  100 mg Intravenous Q12H    methylPREDNISolone sodium succinate (Solu-MEDROL) injection 40 mg  40 mg Intravenous Q6H    atorvastatin (Lipitor) tab 20 mg  20 mg Oral Nightly    spironolactone (Aldactone) partial tab 12.5 mg  12.5 mg Oral Nightly     furosemide (Lasix) 10 mg/mL injection 40 mg  40 mg Intravenous BID (Diuretic)    acetaminophen (Tylenol Extra Strength) tab 500 mg  500 mg Oral Q4H PRN    acetaminophen (Tylenol) tab 650 mg  650 mg Oral Q4H PRN    Or    HYDROcodone-acetaminophen ([place]) 5-325 MG per tab 1 tablet  1 tablet Oral Q4H PRN    Or    HYDROcodone-acetaminophen (Norco) 5-325 MG per tab 2 tablet  2 tablet Oral Q4H PRN    ondansetron (Zofran) 4 MG/2ML injection 4 mg  4 mg Intravenous Q6H PRN    polyethylene glycol (PEG 3350) (Miralax) 17 g oral packet 17 g  17 g Oral Daily PRN    sennosides (Senokot) tab 17.2 mg  17.2 mg Oral Nightly PRN    bisacodyl (Dulcolax) 10 MG rectal suppository 10 mg  10 mg Rectal Daily PRN    fleet enema (Fleet) rectal enema 133 mL  1 enema Rectal Once PRN    glucose (Dex4) 15 GM/59ML oral liquid 15 g  15 g Oral Q15 Min PRN    Or    glucose (Glutose) 40% oral gel 15 g  15 g Oral Q15 Min PRN    Or    glucose-vitamin C (Dex-4) chewable tab 4 tablet  4 tablet Oral Q15 Min PRN    Or    dextrose 50% injection 50 mL  50 mL Intravenous Q15 Min PRN    Or    glucose (Dex4) 15 GM/59ML oral liquid 30 g  30 g Oral Q15 Min PRN    Or    glucose (Glutose) 40% oral gel 30 g  30 g Oral Q15 Min PRN    Or    glucose-vitamin C (Dex-4) chewable tab 8 tablet  8 tablet Oral Q15 Min PRN    insulin aspart (NovoLOG) 100 Units/mL FlexPen 1-5 Units  1-5 Units Subcutaneous TID CC and HS    heparin (Porcine) 5000 UNIT/ML injection 7,500 Units  7,500 Units Subcutaneous Q8H MAXINE   [2]

## 2025-05-20 NOTE — PROGRESS NOTES
[facility]  part of [facility]     Hospitalist Progress Note     Campbell Rao Patient Status:  Inpatient    1950 MRN [id number]   Location [facility] 2W/SW Attending Beltran Cruz MD   Hosp Day # 1 PCP Johny Kang MD     Subjective:     Patient sitting up in a chair and in NAD. He was trying to get out of bed on his own.   PT/OT in the room and getting ready to work with him.  No overnight events reported.       Objective:    Review of Systems:   ROS completed; pertinent positive and negatives stated in subjective.      Vital signs:  Temp:  [96.7 °F (35.9 °C)-98.1 °F (36.7 °C)] 97 °F (36.1 °C)  Pulse:  [] 133  Resp:  [17-39] 20  BP: ()/() 97/66  SpO2:  [74 %-99 %] 95 %  FiO2 (%):  [30 %] 30 %      Physical Exam:    Gen: NAD AO x3  Chest: good air entry CTABL  CVS: normal s1 and s2 RR  Abd: NABS soft NT ND  Neuro: CN 2-12 grossly intact  Ext: 1-2+ edema in bilateral LE      Diagnostic Data:    Labs:  Recent Labs   Lab 25  1507 25  0340   WBC 11.1* 10.8   HGB 13.7 14.1   .6* 101.1*   .0 209.0       Recent Labs   Lab 25  1507 25  0340   * 129*   BUN 18 16   CREATSERUM 0.89 0.90   CA 8.8 9.1    142   K 4.6 4.6    95*   CO2 39.0* >40.0*       Estimated Creatinine Clearance: 69.7 mL/min (based on SCr of 0.9 mg/dL).    No results for input(s): \"PTP\", \"INR\" in the last 168 hours.           Imaging: Imaging data reviewed in Epic.    Medications: Scheduled Medications[1]    Assessment & Plan:     Acute on chronic hypoxic, hypercapnic respiratory failure likely 2/2 CAP  AECOPD  CXR reviewed  Weaned off of the BiPAP  ABG reviewed  Started on IV abx, solu-medrol, duonebs  Pulm on consult  BiPAP nightly  Wean steroids  Continue Ceftriaxone and Doxycycline  Diuretics as tolerated  Wean O2 as tolerated  Acute on chronic HFpEF  EF 55-60%  Lasix 40 mg IV BID  Strict Is and Os, daily weights  Net IO Since  Admission: -2,405 mL [05/20/25 0939]  MARIA M  Continue protocol  HTN  BP well controlled  Continue home meds  Monitor vitals  T2DM  SSI and frequent accuchecks  Hypoglycemia protocol  HLD  Continue statin      Plan of care discussed with patient or family at bedside.      Supplementary Documentation:     Quality:  DVT Prophylaxis: Heparin s/c  CODE status: Full      Estimated date of discharge: TBD  Discharge is dependent on: clinical stability  At this point Mr. Rao is expected to be discharge to: home             **Certification      PHYSICIAN Certification of Need for Inpatient Hospitalization - Initial Certification    Patient will require inpatient services that will reasonably be expected to span two midnight's based on the clinical documentation in H+P.   Based on patients current state of illness, I anticipate that, after discharge, patient will require TBD.      Beltran Cruz MD  Hospitalist    MDM: High, I personally reviewed the available laboratories, imaging. I discussed the case with RN. I ordered laboratories, studies including AM labs.  Medical decision making high, risk is high.       The 21st Century Cures Act makes medical notes like these available to patients in the interest of transparency. Please be advised this is a medical document. Medical documents are intended to carry relevant information, facts as evident, and the clinical opinion of the practitioner. The medical note is intended as peer to peer communication and may appear blunt or direct. It is written in medical language and may contain abbreviations or verbiage that are unfamiliar.        [1]    methylPREDNISolone  40 mg Intravenous Q8H    budesonide  0.5 mg Nebulization 2 times daily    ipratropium-albuterol  3 mL Nebulization 4 times per day    cefTRIAXone  2 g Intravenous Q24H    doxycycline  100 mg Intravenous Q12H    atorvastatin  20 mg Oral Nightly    spironolactone  12.5 mg Oral Nightly    furosemide  40 mg Intravenous BID  (Diuretic)    insulin aspart  1-5 Units Subcutaneous TID CC and HS    heparin  7,500 Units Subcutaneous Q8H MAXINE

## 2025-05-20 NOTE — PHYSICAL THERAPY NOTE
PHYSICAL THERAPY EVALUATION - INPATIENT     Room Number: 515/515-A  Evaluation Date: 5/20/2025  Type of Evaluation: Initial   Physician Order: PT Eval and Treat    Presenting Problem: hypoxia, acute respiratory failure, SOB with falls and tremors at home  Co-Morbidities : DM, OP with kyphoplasty  Reason for Therapy: Mobility Dysfunction and Discharge Planning    PHYSICAL THERAPY ASSESSMENT   Patient is a 75 year old male admitted 5/19/2025 for SOB and hypoxia, frequent falls and reports of new tremors.  Prior to admission, patient's baseline is independent at home using 4L O2 and walking without a device, does hold walls and furniture. He lives with his wife and is alone in the daytime while wife works.  Patient is currently functioning below baseline with bed mobility, transfers, gait, stair negotiation, maintaining seated position, and standing prolonged periods.  Patient is requiring minimal assist and moderate assist as a result of the following impairments: decreased functional strength, decreased endurance/aerobic capacity, impaired standing balance, impaired motor planning, decreased muscular endurance, medical status, and increased O2 needs from baseline.  Physical Therapy will continue to follow for duration of hospitalization.    Patient will benefit from continued skilled PT Services to promote return to prior level of function and safety with continuous assistance and gradual rehabilitative therapy .    PLAN DURING HOSPITALIZATION  Nursing Mobility Recommendation : 1 Assist (2 person assistance)  PT Device Recommendation: Rolling walker  PT Treatment Plan: Bed mobility, Endurance, Energy conservation, Patient education, Gait training, Strengthening, Stoop training, Transfer training, Balance training  Rehab Potential : Fair  Frequency (Obs): 3-5x/week     PHYSICAL THERAPY MEDICAL/SOCIAL HISTORY   History related to current admission: This is a 75 year oldmale who presented planing of shortness of breath.   Patient states symptoms started around 3 to 4 days prior to admission.  Patient noted progressive shortness of breath.  Patient is on home O2 due to history of COPD, MARIA M.  Patient states he was monitoring his oxygen levels and found them to be low.  Symptoms persisted prompting visit to ED.  Patient denied associated coughing or sputum production.  Patient denied subjective fevers or chills.  Patient otherwise denied current chest pain, abdominal pain, nausea vomit, fever or chills.  In the ED, patient was noted to be sleepy and hypercapnic.  Patient was started on BiPAP      Problem List  Principal Problem:    Acute respiratory failure with hypoxia and hypercapnia (HCC)  Active Problems:    Respiratory acidosis      HOME SITUATION  Type of Home: House  Home Layout: One level  Stairs to Enter : 1   Railing: No    Stairs to Bedroom: 0         Lives With: Spouse (wife works F/T days)    Drives: No   Patient Regularly Uses: Glasses     Prior Level of Weed: Patient reports living in a one level home with a small step to enter. He reports being normally independent without any devices. Lives with wife who is gone working in the daytime. He uses 4L O2 at baseline    SUBJECTIVE  \"I don't know why you're so jumpy. I am just getting up\"    PHYSICAL THERAPY EXAMINATION   OBJECTIVE  Precautions: Bed/chair alarm  Fall Risk: High fall risk    PAIN ASSESSMENT  Ratin  Location: no complaints of pain  Management Techniques: Activity promotion    COGNITION  Overall Cognitive Status:  A&Ox3 but impulsive with poor safety  Safety Judgement:  decreased awareness of need for assistance    RANGE OF MOTION AND STRENGTH ASSESSMENT  Upper extremity ROM and strength are within functional limits   Lower extremity ROM is within functional limits   Lower extremity strength is within functional limits but fatigues quickly and pt notes shaking/tremors in legs when standing or walking    BALANCE  Static Sitting: Good  Dynamic Sitting:  Fair +  Static Standing: Poor +  Dynamic Standing: Poor    NEUROLOGICAL FINDINGS   Noted tremors of UE when pushing and holding weight on walker. Also noted knee shaking/flexing/bouncing when walking   Patient reports all of this is new, MD is aware of this per RN     ACTIVITY TOLERANCE  Pulse: 100  Heart Rate Source: Monitor  Resp: (!) 29  BP: 97/66  BP Location: Right arm  BP Method: Automatic  Patient Position: Sitting    O2 WALK  Oxygen Therapy  SPO2% on Oxygen at Rest: 94  At rest oxygen flow (liters per minute): 14  SPO2% Ambulation on Oxygen: 86 (with standing in front of chair and short walk)  Ambulation oxygen flow (liters per minute): 14    AM-PAC '6-Clicks' INPATIENT SHORT FORM - BASIC MOBILITY  How much difficulty does the patient currently have...  Patient Difficulty: Turning over in bed (including adjusting bedclothes, sheets and blankets)?: A Little   Patient Difficulty: Sitting down on and standing up from a chair with arms (e.g., wheelchair, bedside commode, etc.): A Lot   Patient Difficulty: Moving from lying on back to sitting on the side of the bed?: A Little   How much help from another person does the patient currently need...   Help from Another: Moving to and from a bed to a chair (including a wheelchair)?: A Lot   Help from Another: Need to walk in hospital room?: A Lot   Help from Another: Climbing 3-5 steps with a railing?: Total     AM-PAC Score:  Raw Score: 13   Approx Degree of Impairment: 64.91%   Standardized Score (AM-PAC Scale): 36.74   CMS Modifier (G-Code): CL    FUNCTIONAL ABILITY STATUS  Functional Mobility/Gait Assessment  Gait Assistance: Moderate assistance  Distance (ft): 5' with chair follow, unsteady  Assistive Device: Rolling walker, Other (Comment) (14L O2)  Pattern: Shuffle, Comment (pt with tremors in BLE/knees)  Rolling: minimal assist  Supine to Sit: minimal assist  Sit to Supine: minimal assist  Sit to Stand: moderate assist    Exercise/Education Provided:  Bed  mobility  Energy conservation  Functional activity tolerated  Gait training  Posture  Strengthening  Transfer training    Skilled Therapy Provided: RN approves participation in therapy session. Reports he demanded to get up to chair and was impulsive and unstable during transfer as well as SpO2 drop to 80% on 10L. He is now on 14L and SpO2 drops to 86% with minimal activity. He is confused about level of oxygen and reports his sats must be off the charts. Education that he is not even at 100% and that they are dropping into 80's with minimal activity, further education needed.  He is able to stand at chair with min A and tolerates 1 min x 2 reps, able to walk 5' with RW and mod A x 2 with close chair follow, then onset of tremors.  Tremors are inconsistent, improve with upright posture for UE tremors. Pt unable to provide better information about what triggers the tremors vs what stops them. He is up in chair with alarm set and RN aware, will benefit from gradual rehab as he does not have the skills to return home.     The patient's Approx Degree of Impairment: 64.91% has been calculated based on documentation in the [facility] '6 clicks' Inpatient Basic Mobility Short Form.  Research supports that patients with this level of impairment may benefit from inpatient rehab and gradual rehab is the recommendation. Final disposition will be made by interdisciplinary medical team.    Patient End of Session: Up in chair, With  staff, Needs met, Call light within reach, RN aware of session/findings, All patient questions and concerns addressed, Hospital anti-slip socks, Alarm set, Discussed recommendations with /    CURRENT GOALS  Goals to be met by: 6/10/25  Patient Goal Patient's self-stated goal is: to go home   Goal #1 Patient is able to demonstrate supine - sit EOB @ level: supervision     Goal #1   Current Status    Goal #2 Patient is able to demonstrate transfers Sit to/from Stand at assistance  level: minimum assistance with walker - rolling     Goal #2  Current Status    Goal #3 Patient is able to ambulate 50 feet with assist device: walker - rolling at assistance level: moderate assistance and maintain SpO2 of 93% or better   Goal #3   Current Status    Goal #4 Patient will negotiate one curb w/ assistive device and moderate assistance   Goal #4   Current Status    Goal #5 Patient to demonstrate independence with home activity/exercise instructions provided to patient in preparation for discharge.   Goal #5   Current Status    Goal #6    Goal #6  Current Status      Patient Evaluation Complexity Level:  History High - 3 or more personal factors and/or co-morbidities   Examination of body systems Moderate - addressing a total of 3 or more elements   Clinical Presentation  Moderate - Evolving   Clinical Decision Making  Moderate Complexity     Therapeutic Activity:  18 minutes

## 2025-05-20 NOTE — PLAN OF CARE
-good wound healing with superficial defect remaining  -pt to stop wet to dry dressing but change guaze twice daily   -RTC in 10 days for follow up    More alert throughout night.  Received on BiPap, tolerates HFNC for short periods of time; placed back on BiPAP;     20:00pm ABG results: pH 7.25/Pco2 110/Po2 72/HCO3 36.7.      530am ABG: pH 7.34/Pco2 95/Po2 75/ HCO3 40.1    Pt reports multiple falls at home d/t \"tremors\" in legs that cause his knees to \"giving out\".  States that tremors have been in both legs and arms for past 1-2 weeks; has not seen anyone outpatient for it.       Problem: RESPIRATORY - ADULT  Goal: Achieves optimal ventilation and oxygenation  Description: INTERVENTIONS:  - Assess for changes in respiratory status  - Assess for changes in mentation and behavior  - Position to facilitate oxygenation and minimize respiratory effort  - Oxygen supplementation based on oxygen saturation or ABGs  - Provide Smoking Cessation handout, if applicable  - Encourage broncho-pulmonary hygiene including cough, deep breathe, Incentive Spirometry  - Assess the need for suctioning and perform as needed  - Assess and instruct to report SOB or any respiratory difficulty  - Respiratory Therapy support as indicated  - Manage/alleviate anxiety  - Monitor for signs/symptoms of CO2 retention  Outcome: Progressing     Problem: CARDIOVASCULAR - ADULT  Goal: Maintains optimal cardiac output and hemodynamic stability  Description: INTERVENTIONS:  - Monitor vital signs, rhythm, and trends  - Monitor for bleeding, hypotension and signs of decreased cardiac output  - Evaluate effectiveness of vasoactive medications to optimize hemodynamic stability  - Monitor arterial and/or venous puncture sites for bleeding and/or hematoma  - Assess quality of pulses, skin color and temperature  - Assess for signs of decreased coronary artery perfusion - ex. Angina  - Evaluate fluid balance, assess for edema, trend weights  Outcome: Progressing  Goal: Absence of cardiac arrhythmias or at baseline  Description: INTERVENTIONS:  - Continuous cardiac monitoring, monitor vital signs, obtain 12  lead EKG if indicated  - Evaluate effectiveness of antiarrhythmic and heart rate control medications as ordered  - Initiate emergency measures for life threatening arrhythmias  - Monitor electrolytes and administer replacement therapy as ordered  Outcome: Progressing

## 2025-05-20 NOTE — PLAN OF CARE
Problem: Patient Centered Care  Goal: Patient preferences are identified and integrated in the patient's plan of care  Description: Interventions:  - What would you like us to know as we care for you?   - Provide timely, complete, and accurate information to patient/family  - Incorporate patient and family knowledge, values, beliefs, and cultural backgrounds into the planning and delivery of care  - Encourage patient/family to participate in care and decision-making at the level they choose  - Honor patient and family perspectives and choices  Outcome: Progressing     Problem: CARDIOVASCULAR - ADULT  Goal: Maintains optimal cardiac output and hemodynamic stability  Description: INTERVENTIONS:  - Monitor vital signs, rhythm, and trends  - Monitor for bleeding, hypotension and signs of decreased cardiac output  - Evaluate effectiveness of vasoactive medications to optimize hemodynamic stability  - Monitor arterial and/or venous puncture sites for bleeding and/or hematoma  - Assess quality of pulses, skin color and temperature  - Assess for signs of decreased coronary artery perfusion - ex. Angina  - Evaluate fluid balance, assess for edema, trend weights  Outcome: Progressing  Goal: Absence of cardiac arrhythmias or at baseline  Description: INTERVENTIONS:  - Continuous cardiac monitoring, monitor vital signs, obtain 12 lead EKG if indicated  - Evaluate effectiveness of antiarrhythmic and heart rate control medications as ordered  - Initiate emergency measures for life threatening arrhythmias  - Monitor electrolytes and administer replacement therapy as ordered  Outcome: Progressing     Problem: RESPIRATORY - ADULT  Goal: Achieves optimal ventilation and oxygenation  Description: INTERVENTIONS:  - Assess for changes in respiratory status  - Assess for changes in mentation and behavior  - Position to facilitate oxygenation and minimize respiratory effort  - Oxygen supplementation based on oxygen saturation or ABGs  -  Provide Smoking Cessation handout, if applicable  - Encourage broncho-pulmonary hygiene including cough, deep breathe, Incentive Spirometry  - Assess the need for suctioning and perform as needed  - Assess and instruct to report SOB or any respiratory difficulty  - Respiratory Therapy support as indicated  - Manage/alleviate anxiety  - Monitor for signs/symptoms of CO2 retention  Outcome: Progressing

## 2025-05-20 NOTE — PLAN OF CARE
PT/OT worked with patient: standing at bedside. Up to chair with x2 assist and RW. Patient on 10-15L high-flow NC. Safety measures maintained, call light within reach. Report given to Adrienne PERALTA.     Problem: Patient Centered Care  Goal: Patient preferences are identified and integrated in the patient's plan of care  Description: Interventions:  - What would you like us to know as we care for you? I have been having tremors for the past 1-2 weeks.   - Provide timely, complete, and accurate information to patient/family  - Incorporate patient and family knowledge, values, beliefs, and cultural backgrounds into the planning and delivery of care  - Encourage patient/family to participate in care and decision-making at the level they choose  - Honor patient and family perspectives and choices  Outcome: Progressing     Problem: CARDIOVASCULAR - ADULT  Goal: Maintains optimal cardiac output and hemodynamic stability  Description: INTERVENTIONS:  - Monitor vital signs, rhythm, and trends  - Monitor for bleeding, hypotension and signs of decreased cardiac output  - Evaluate effectiveness of vasoactive medications to optimize hemodynamic stability  - Monitor arterial and/or venous puncture sites for bleeding and/or hematoma  - Assess quality of pulses, skin color and temperature  - Assess for signs of decreased coronary artery perfusion - ex. Angina  - Evaluate fluid balance, assess for edema, trend weights  Outcome: Progressing  Goal: Absence of cardiac arrhythmias or at baseline  Description: INTERVENTIONS:  - Continuous cardiac monitoring, monitor vital signs, obtain 12 lead EKG if indicated  - Evaluate effectiveness of antiarrhythmic and heart rate control medications as ordered  - Initiate emergency measures for life threatening arrhythmias  - Monitor electrolytes and administer replacement therapy as ordered  Outcome: Progressing     Problem: RESPIRATORY - ADULT  Goal: Achieves optimal ventilation and  oxygenation  Description: INTERVENTIONS:  - Assess for changes in respiratory status  - Assess for changes in mentation and behavior  - Position to facilitate oxygenation and minimize respiratory effort  - Oxygen supplementation based on oxygen saturation or ABGs  - Provide Smoking Cessation handout, if applicable  - Encourage broncho-pulmonary hygiene including cough, deep breathe, Incentive Spirometry  - Assess the need for suctioning and perform as needed  - Assess and instruct to report SOB or any respiratory difficulty  - Respiratory Therapy support as indicated  - Manage/alleviate anxiety  - Monitor for signs/symptoms of CO2 retention  Outcome: Progressing

## 2025-05-21 LAB
ALBUMIN SERPL-MCNC: 4.4 G/DL (ref 3.2–4.8)
ALBUMIN/GLOB SERPL: 2.1 {RATIO} (ref 1–2)
ALP LIVER SERPL-CCNC: 93 U/L (ref 45–117)
ALT SERPL-CCNC: 34 U/L (ref 10–49)
AST SERPL-CCNC: 15 U/L (ref ?–34)
BASOPHILS # BLD AUTO: 0.03 X10(3) UL (ref 0–0.2)
BASOPHILS NFR BLD AUTO: 0.2 %
BILIRUB SERPL-MCNC: 0.6 MG/DL (ref 0.2–1.1)
BUN BLD-MCNC: 28 MG/DL (ref 9–23)
BUN/CREAT SERPL: 26.4 (ref 10–20)
CALCIUM BLD-MCNC: 9.5 MG/DL (ref 8.7–10.4)
CHLORIDE SERPL-SCNC: 92 MMOL/L (ref 98–112)
CO2 SERPL-SCNC: >40 MMOL/L (ref 21–32)
CREAT BLD-MCNC: 1.06 MG/DL (ref 0.7–1.3)
DEPRECATED RDW RBC AUTO: 49.1 FL (ref 35.1–46.3)
EGFRCR SERPLBLD CKD-EPI 2021: 74 ML/MIN/1.73M2 (ref 60–?)
EOSINOPHIL # BLD AUTO: 0 X10(3) UL (ref 0–0.7)
EOSINOPHIL NFR BLD AUTO: 0 %
ERYTHROCYTE [DISTWIDTH] IN BLOOD BY AUTOMATED COUNT: 13.8 % (ref 11–15)
GLOBULIN PLAS-MCNC: 2.1 G/DL (ref 2–3.5)
GLUCOSE BLD-MCNC: 164 MG/DL (ref 70–99)
GLUCOSE BLDC GLUCOMTR-MCNC: 158 MG/DL (ref 70–99)
GLUCOSE BLDC GLUCOMTR-MCNC: 177 MG/DL (ref 70–99)
GLUCOSE BLDC GLUCOMTR-MCNC: 188 MG/DL (ref 70–99)
GLUCOSE BLDC GLUCOMTR-MCNC: 270 MG/DL (ref 70–99)
HCT VFR BLD AUTO: 47.4 % (ref 39–53)
HGB BLD-MCNC: 14.3 G/DL (ref 13–17.5)
IMM GRANULOCYTES # BLD AUTO: 0.18 X10(3) UL (ref 0–1)
IMM GRANULOCYTES NFR BLD: 1.2 %
LYMPHOCYTES # BLD AUTO: 0.97 X10(3) UL (ref 1–4)
LYMPHOCYTES NFR BLD AUTO: 6.5 %
MAGNESIUM SERPL-MCNC: 2.1 MG/DL (ref 1.6–2.6)
MCH RBC QN AUTO: 29.2 PG (ref 26–34)
MCHC RBC AUTO-ENTMCNC: 30.2 G/DL (ref 31–37)
MCV RBC AUTO: 96.7 FL (ref 80–100)
MONOCYTES # BLD AUTO: 0.56 X10(3) UL (ref 0.1–1)
MONOCYTES NFR BLD AUTO: 3.8 %
NEUTROPHILS # BLD AUTO: 13.09 X10 (3) UL (ref 1.5–7.7)
NEUTROPHILS # BLD AUTO: 13.09 X10(3) UL (ref 1.5–7.7)
NEUTROPHILS NFR BLD AUTO: 88.3 %
OSMOLALITY SERPL CALC.SUM OF ELEC: 301 MOSM/KG (ref 275–295)
PHOSPHATE SERPL-MCNC: 2.9 MG/DL (ref 2.4–5.1)
PLATELET # BLD AUTO: 211 10(3)UL (ref 150–450)
POTASSIUM SERPL-SCNC: 4.2 MMOL/L (ref 3.5–5.1)
PROT SERPL-MCNC: 6.5 G/DL (ref 5.7–8.2)
RBC # BLD AUTO: 4.9 X10(6)UL (ref 3.8–5.8)
SODIUM SERPL-SCNC: 141 MMOL/L (ref 136–145)
WBC # BLD AUTO: 14.8 X10(3) UL (ref 4–11)

## 2025-05-21 PROCEDURE — 99233 SBSQ HOSP IP/OBS HIGH 50: CPT | Performed by: HOSPITALIST

## 2025-05-21 PROCEDURE — 99233 SBSQ HOSP IP/OBS HIGH 50: CPT | Performed by: INTERNAL MEDICINE

## 2025-05-21 RX ORDER — IPRATROPIUM BROMIDE AND ALBUTEROL SULFATE 2.5; .5 MG/3ML; MG/3ML
3 SOLUTION RESPIRATORY (INHALATION)
Status: DISCONTINUED | OUTPATIENT
Start: 2025-05-21 | End: 2025-05-22

## 2025-05-21 RX ORDER — IPRATROPIUM BROMIDE AND ALBUTEROL SULFATE 2.5; .5 MG/3ML; MG/3ML
3 SOLUTION RESPIRATORY (INHALATION) EVERY 6 HOURS PRN
Status: DISCONTINUED | OUTPATIENT
Start: 2025-05-21 | End: 2025-05-22

## 2025-05-21 NOTE — PROGRESS NOTES
[facility]  part of [facility] Hospitalist Progress Note     Campbell Rao Patient Status:  Inpatient    1950 MRN [id number]   Location [facility] 2W/SW Attending Nicanor Ervin MD   Hosp Day # 2 PCP Johny Kang MD     Subjective:   Pt was seen and examined  No acute events overnight  Resting in chair NAD  Breathing ok, no sob, cp, f,c,n,v abd pain or HA     Objective:    Review of Systems:   ROS completed; pertinent positive and negatives stated in subjective.      Vital signs:  Temp:  [97.4 °F (36.3 °C)-98.4 °F (36.9 °C)] 98.3 °F (36.8 °C)  Pulse:  [] 86  Resp:  [18-23] 18  BP: ()/(61-90) 106/70  SpO2:  [93 %-100 %] 95 %      Physical Exam:    Gen: NAD AO x3  Chest: good air entry CTABL  CVS: normal s1 and s2 RR  Abd: NABS soft NT ND  Neuro: CN 2-12 grossly intact  Ext: 1-2+ edema in bilateral LE      Diagnostic Data:    Labs:  Recent Labs   Lab 25  1507 25  0340 25  0501   WBC 11.1* 10.8 14.8*   HGB 13.7 14.1 14.3   .6* 101.1* 96.7   .0 209.0 211.0       Recent Labs   Lab 25  1507 25  0340 25  0501   * 129* 164*   BUN 18 16 28*   CREATSERUM 0.89 0.90 1.06   CA 8.8 9.1 9.5   ALB  --   --  4.4    142 141   K 4.6 4.6 4.2    95* 92*   CO2 39.0* >40.0* >40.0*   ALKPHO  --   --  93   AST  --   --  15   ALT  --   --  34   BILT  --   --  0.6   TP  --   --  6.5       Estimated Creatinine Clearance: 59.2 mL/min (based on SCr of 1.06 mg/dL).    No results for input(s): \"PTP\", \"INR\" in the last 168 hours.           Imaging: Imaging data reviewed in Epic.    Medications: Scheduled Medications[1]    Assessment & Plan:     Acute on chronic hypoxic, hypercapnic respiratory failure likely 2/2 CAP  AECOPD  CXR reviewed  Weaned off of the BiPAP  ABG reviewed  Started on IV abx, solu-medrol, duonebs  Pulm on consult  BiPAP nightly  Wean steroids per pulm  Continue Ceftriaxone and Doxycycline  Cont  diuretics  Wean O2 as tolerated  Acute on chronic HFpEF  EF 55-60%  Lasix 40 mg IV BID, cont  Strict Is and Os, daily weights  Net IO Since Admission: -2,855 mL [05/21/25 2253]   MARIA M  Continue protocol  HTN  BP well controlled  Continue home meds  Monitor vitals  T2DM  SSI and frequent accuchecks  Hypoglycemia protocol  HLD  Continue statin      Plan of care discussed with patient or family at bedside.      Supplementary Documentation:     Quality:  DVT Prophylaxis: Heparin s/c  CODE status: Full      Estimated date of discharge: TBD  Discharge is dependent on: clinical stability  At this point Mr. Rao is expected to be discharge to: SNF    MDM: High         [1]    ipratropium-albuterol  3 mL Nebulization QID    methylPREDNISolone  40 mg Intravenous Q8H    budesonide  0.5 mg Nebulization 2 times daily    cefTRIAXone  2 g Intravenous Q24H    doxycycline  100 mg Intravenous Q12H    atorvastatin  20 mg Oral Nightly    spironolactone  12.5 mg Oral Nightly    furosemide  40 mg Intravenous BID (Diuretic)    insulin aspart  1-5 Units Subcutaneous TID CC and HS    heparin  7,500 Units Subcutaneous Q8H MAXINE

## 2025-05-21 NOTE — CM/SW NOTE
05/21/25 1600   CM/SW Referral Data   Referral Source    Reason for Referral Discharge planning   Informant Patient   Medical Hx   Does patient have an established PCP? Yes   Significant Past Medical/Mental Health Hx osteoarthritis knees   Patient Info   Patient's Current Mental Status at Time of Assessment Alert;Oriented   Patient's Home Environment House   Number of Stair in Home 1/2 stair to enter   Patient lives with Spouse/Significant other   Patient Status Prior to Admission   Independent with ADLs and Mobility No   Pt. requires assistance with Driving;Housework;Ambulating   Discharge Needs   Anticipated D/C needs Subacute rehab   Services Requested   Submitted to [facility] Yes     Met with patient and wife.  They are agreeable to [facility].  Questions answered re: [facility] rehab.  PASRR uploaded.  List provided for choice.  Will need 3 midnights prior to transfer.  On O2 4L at home.    PLAN:  [facility] at [place] pending choice and medical clearance.    Vivi Fritz MBA BSN RN CARLITO SMITH  RN Case Manager  [phone]

## 2025-05-21 NOTE — PLAN OF CARE
Pt monitor via remote tele with cont pulse ox. Pt remain on BiPAP at night for 4 1/2 hours. Pt is ambulating. No complaints of pain.     Problem: Patient Centered Care  Goal: Patient preferences are identified and integrated in the patient's plan of care  Description: Interventions:  - What would you like us to know as we care for you?   - Provide timely, complete, and accurate information to patient/family  - Incorporate patient and family knowledge, values, beliefs, and cultural backgrounds into the planning and delivery of care  - Encourage patient/family to participate in care and decision-making at the level they choose  - Honor patient and family perspectives and choices  Outcome: Progressing     Problem: CARDIOVASCULAR - ADULT  Goal: Maintains optimal cardiac output and hemodynamic stability  Description: INTERVENTIONS:  - Monitor vital signs, rhythm, and trends  - Monitor for bleeding, hypotension and signs of decreased cardiac output  - Evaluate effectiveness of vasoactive medications to optimize hemodynamic stability  - Monitor arterial and/or venous puncture sites for bleeding and/or hematoma  - Assess quality of pulses, skin color and temperature  - Assess for signs of decreased coronary artery perfusion - ex. Angina  - Evaluate fluid balance, assess for edema, trend weights  Outcome: Progressing  Goal: Absence of cardiac arrhythmias or at baseline  Description: INTERVENTIONS:  - Continuous cardiac monitoring, monitor vital signs, obtain 12 lead EKG if indicated  - Evaluate effectiveness of antiarrhythmic and heart rate control medications as ordered  - Initiate emergency measures for life threatening arrhythmias  - Monitor electrolytes and administer replacement therapy as ordered  Outcome: Progressing     Problem: RESPIRATORY - ADULT  Goal: Achieves optimal ventilation and oxygenation  Description: INTERVENTIONS:  - Assess for changes in respiratory status  - Assess for changes in mentation and  behavior  - Position to facilitate oxygenation and minimize respiratory effort  - Oxygen supplementation based on oxygen saturation or ABGs  - Provide Smoking Cessation handout, if applicable  - Encourage broncho-pulmonary hygiene including cough, deep breathe, Incentive Spirometry  - Assess the need for suctioning and perform as needed  - Assess and instruct to report SOB or any respiratory difficulty  - Respiratory Therapy support as indicated  - Manage/alleviate anxiety  - Monitor for signs/symptoms of CO2 retention  Outcome: Progressing

## 2025-05-21 NOTE — CM/SW NOTE
Anticipated therapy need: Gradual Rehabilitative Therapy.    [facility] referrals sent.  Will need PASRR and list for choice if agreeable to [facility].    / to remain available for support and/or discharge planning.      Vivi Fritz MBA BSN RN CRRSILVINA SMITH  RN Case Manager  [phone]

## 2025-05-21 NOTE — PROGRESS NOTES
Pulmonary/ICU/Critical Care Progress Note        Reason for Consultation: COPD exac  Referring Physician: Dr. Yoon       Subjective:  On 4 LHFNC  Wore BIPAP overnight   Afebrile      From the initial consultation   HPI: 76 yo male with hx of COPD, MARIA M, chronic hypoxemic resp failure on 4.5 LNC follows with Dr. Salinas and Dr. Jimenez, diastolic HF admitted with shortness of breath and low 02 levels for the past 4 days.   Denies associated coughing, wheezing, chest discomfort, fevers, chills or sweats  In the ED, was hypercapnic with PC02 119 and pH 7.2  Started on BIPAP   Pt appears more alert when seen now  Wife at bedside reports he looks better      REVIEW OF SYSTEMS:  Positives and negatives as noted in HPI. All other review of systems otherwise are either limited (due to pt/family inability to provide) or negative.      PAST MEDICAL HISTORY:  Past Medical History[1]      PAST SURGICAL HISTORY:  Past Surgical History[2]      PAST SOCIAL HISTORY:  Social Hx on file[3]      PAST FAMILY HISTORY:  Family History[4]      ALLERGIES:  Allergies[5]      MEDS:  Home Medications:  Medications Taking[6]    Scheduled Medication:  Scheduled Medications[7]  Continuous Infusing Medication:  Medication Infusions[8]  PRN Medications:  PRN Medications[9]       PHYSICAL EXAM:  /70 (BP Location: Right arm)   Pulse 86   Temp 98.3 °F (36.8 °C) (Oral)   Resp 18   Ht 5' 8\" (1.727 m)   Wt 293 lb 12.8 oz (133.3 kg)   SpO2 95%   BMI 44.67 kg/m²      CONSTITUTIONAL: alert, oriented, no apparent distress  HEENT: atraumatic normocephalic  MOUTH: mucous membranes are moist. No OP exudates  NECK/THROAT: no JVD. Trachea midline. No obvious thyromegaly  LUNG: diminished BS b/l no wheezing, crackles. Chest symmetric with respiratory motion  HEART: regular rate and rhythm, no obvious murmers or gallops noted  ABD: soft non tender. + bowel sounds. No organomegaly noted  EXT: no clubbing, cyanosis, 2 + b/l LE  edema  NEURO/MUSCULOSKELETAL: no gross deficits  SKIN: warm, dry. No obvious lesions noted  LYMPH: no obvious LAD      IMAGES:   CXR 5/19/25  CONCLUSION:   1. Bibasilar atelectasis, left greater than right, with or without superimposed pneumonia and pleural effusions.   2. Mildly prominent bronchovascular markings, nonspecific, may indicate mild pulmonary edema.       TTE 2/2025  Conclusions:     1. Left ventricle: The cavity size was normal. Wall thickness was mildly      increased. Systolic function was normal. The estimated ejection fraction      was 55-60%, by visual assessment. Technical limitations of the study      preclude regional wall motion analysis. Left ventricular diastolic      function parameters were normal.   2. Right ventricle: The cavity size was increased. Systolic function was      normal.   3. Systemic arteries: The ascending aorta diameter is 4.2cm.   4. Ascending aorta: The ascending aorta was dilated.   Impressions:  This study is compared with previous dated 07/16/2024: No   significant change since prior study.       LABS:  Recent Labs   Lab 05/19/25  1507 05/20/25  0340 05/21/25  0501   RBC 4.58 4.69 4.90   HGB 13.7 14.1 14.3   HCT 47.9 47.4 47.4   .6* 101.1* 96.7   MCH 29.9 30.1 29.2   MCHC 28.6* 29.7* 30.2*   RDW 14.2 14.4 13.8   NEPRELIM 7.61 9.14* 13.09*   WBC 11.1* 10.8 14.8*   .0 209.0 211.0       Recent Labs   Lab 05/19/25  1507 05/20/25  0340 05/21/25  0501   * 129* 164*   BUN 18 16 28*   CREATSERUM 0.89 0.90 1.06   EGFRCR 90 90 74   CA 8.8 9.1 9.5   ALB  --   --  4.4    142 141   K 4.6 4.6 4.2    95* 92*   CO2 39.0* >40.0* >40.0*   ALKPHO  --   --  93   AST  --   --  15   ALT  --   --  34   BILT  --   --  0.6   TP  --   --  6.5       ASSESSMENT/PLAN:  Acute on chronic hypoxemic resp failure  -secondary to COPD exac  --continue solumedrol-tomorrow switch to prednisone 40 mg and taper as follows: 40 mg/day x 3 days, then 30 mg/day x 3 days, then 20  mg/day x 3 days, then 10 mg/day x 3 days    -pulmicort bid and duonebs q 6hrs  -checked resp viral panel-neg  -more awake  -rechecked ABG shows improvement   -BIPAP overnight and with naps    Possible CAP  -on rocephin/doxy  -check urine leg, strep pneumo, MRSA nares    MARIA M  -BIPAP overnight for COPD exac and MARIA M    Diastolic HF  -resume home meds and diuretics    HTN, HLP  -resume home meds    DM  -insulin/accuchecks    Proph  -DVT: hep subcutaneous    Dispo  -full code    Thank you for the opportunity to care for Campbell Rao.      JINA Rodríguez DO, MPH  Pulmonary Critical Care Medicine  [facility] Pulmonary and Critical Care Medicine                         [1]   Past Medical History:  Diagnosis Date    Atherosclerosis of coronary artery     coronary calcification on ct chest    Back problem     Cataract     Chronic respiratory failure (HCC)     Congestive heart disease (HCC)     Controlled type 2 diabetes mellitus with complication, without long-term current use of insulin (HCC) 4/2/2025    COPD (HCC)     freq exacerbations. ? 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    Osteoarthritis     Pneumonia     Pulmonary nodules 10/2016    Sleep apnea     Tobacco abuse    [2]   Past Surgical History:  Procedure Laterality Date    Appendectomy      Appendectomy      Cataract Bilateral     2/2024, 3/2024    Electrocardiogram, complete  01/30/2012    scanned to media tab   [3]   Social History  Socioeconomic History    Marital status:    Tobacco Use    Smoking status: Former     Current packs/day: 0.00     Average packs/day: 2.0 packs/day for 40.0 years (80.0 ttl pk-yrs)     Types: Cigarettes     Start date: 11/1981     Quit date: 11/2021     Years  since quitting: 3.5     Passive exposure: Past    Smokeless tobacco: Never   Vaping Use    Vaping status: Never Used   Substance and Sexual Activity    Alcohol use: Not Currently     Alcohol/week: 0.0 standard drinks of alcohol     Comment: socially    Drug use: Not Currently   Other Topics Concern    Caffeine Concern Yes     Comment: coffee, >32 oz daily   [4]   Family History  Problem Relation Age of Onset    Cancer Father         lung cancer    Cancer Mother         lung cancer    No Known Problems Daughter     No Known Problems Son     No Known Problems Maternal Grandmother     No Known Problems Maternal Grandfather     No Known Problems Paternal Grandmother     No Known Problems Paternal Grandfather     No Known Problems Sister     Diabetes Brother     Lipids Brother         hyperlipidemia    Hypertension Brother     No Known Problems Other    [5] No Known Allergies  [6]   Outpatient Medications Marked as Taking for the 5/19/25 encounter (Hospital Encounter)   Medication Sig Dispense Refill    Tirzepatide (MOUNJARO) 7.5 MG/0.5ML Subcutaneous Solution Auto-injector Inject 7.5 mg into the skin once a week. 6 mL 0    torsemide 20 MG Oral Tab Take 1 tablet (20 mg total) by mouth 2 (two) times daily. 180 tablet 3    spironolactone 25 MG Oral Tab Take 0.5 tablets (12.5 mg total) by mouth daily. 30 tablet 2    atorvastatin 20 MG Oral Tab Take 1 tablet (20 mg total) by mouth nightly. 90 tablet 1    acetaminophen (TYLENOL) 325 MG Oral Tab Take 2 tablets (650 mg total) by mouth every 6 (six) hours as needed for Pain.      metoprolol succinate ER 25 MG Oral Tablet 24 Hr Take 1 tablet (25 mg total) by mouth 2x Daily(Beta Blocker). 60 tablet 1    albuterol 108 (90 Base) MCG/ACT Inhalation Aero Soln Inhale 2 puffs into the lungs every 4 to 6 hours as needed for Wheezing.     [7]    ipratropium-albuterol  3 mL Nebulization QID    methylPREDNISolone  40 mg Intravenous Q8H    budesonide  0.5 mg Nebulization 2 times daily     cefTRIAXone  2 g Intravenous Q24H    doxycycline  100 mg Intravenous Q12H    atorvastatin  20 mg Oral Nightly    spironolactone  12.5 mg Oral Nightly    furosemide  40 mg Intravenous BID (Diuretic)    insulin aspart  1-5 Units Subcutaneous TID CC and HS    heparin  7,500 Units Subcutaneous Q8H MAXINE   [8] [9]   melatonin    ipratropium-albuterol    acetaminophen    acetaminophen **OR** HYDROcodone-acetaminophen **OR** HYDROcodone-acetaminophen    ondansetron    polyethylene glycol (PEG 3350)    sennosides    bisacodyl    fleet enema    glucose **OR** glucose **OR** glucose-vitamin C **OR** dextrose **OR** glucose **OR** glucose **OR** glucose-vitamin C

## 2025-05-22 VITALS
DIASTOLIC BLOOD PRESSURE: 84 MMHG | RESPIRATION RATE: 18 BRPM | HEART RATE: 98 BPM | BODY MASS INDEX: 44.83 KG/M2 | HEIGHT: 68 IN | TEMPERATURE: 98 F | SYSTOLIC BLOOD PRESSURE: 104 MMHG | WEIGHT: 295.81 LBS | OXYGEN SATURATION: 94 %

## 2025-05-22 LAB
ALBUMIN SERPL-MCNC: 4.5 G/DL (ref 3.2–4.8)
ANION GAP SERPL CALC-SCNC: 7 MMOL/L (ref 0–18)
BASOPHILS # BLD AUTO: 0.05 X10(3) UL (ref 0–0.2)
BASOPHILS NFR BLD AUTO: 0.3 %
BUN BLD-MCNC: 39 MG/DL (ref 9–23)
BUN/CREAT SERPL: 31.5 (ref 10–20)
CALCIUM BLD-MCNC: 9.6 MG/DL (ref 8.7–10.4)
CHLORIDE SERPL-SCNC: 93 MMOL/L (ref 98–112)
CO2 SERPL-SCNC: 38 MMOL/L (ref 21–32)
CREAT BLD-MCNC: 1.24 MG/DL (ref 0.7–1.3)
DEPRECATED RDW RBC AUTO: 50 FL (ref 35.1–46.3)
EGFRCR SERPLBLD CKD-EPI 2021: 61 ML/MIN/1.73M2 (ref 60–?)
EOSINOPHIL # BLD AUTO: 0 X10(3) UL (ref 0–0.7)
EOSINOPHIL NFR BLD AUTO: 0 %
ERYTHROCYTE [DISTWIDTH] IN BLOOD BY AUTOMATED COUNT: 14.1 % (ref 11–15)
GLUCOSE BLD-MCNC: 246 MG/DL (ref 70–99)
GLUCOSE BLDC GLUCOMTR-MCNC: 149 MG/DL (ref 70–99)
GLUCOSE BLDC GLUCOMTR-MCNC: 152 MG/DL (ref 70–99)
GLUCOSE BLDC GLUCOMTR-MCNC: 158 MG/DL (ref 70–99)
HCT VFR BLD AUTO: 49.4 % (ref 39–53)
HGB BLD-MCNC: 14.8 G/DL (ref 13–17.5)
IMM GRANULOCYTES # BLD AUTO: 0.22 X10(3) UL (ref 0–1)
IMM GRANULOCYTES NFR BLD: 1.2 %
LYMPHOCYTES # BLD AUTO: 0.74 X10(3) UL (ref 1–4)
LYMPHOCYTES NFR BLD AUTO: 4.2 %
MAGNESIUM SERPL-MCNC: 2 MG/DL (ref 1.6–2.6)
MCH RBC QN AUTO: 29 PG (ref 26–34)
MCHC RBC AUTO-ENTMCNC: 30 G/DL (ref 31–37)
MCV RBC AUTO: 96.7 FL (ref 80–100)
MONOCYTES # BLD AUTO: 0.98 X10(3) UL (ref 0.1–1)
MONOCYTES NFR BLD AUTO: 5.5 %
NEUTROPHILS # BLD AUTO: 15.74 X10 (3) UL (ref 1.5–7.7)
NEUTROPHILS # BLD AUTO: 15.74 X10(3) UL (ref 1.5–7.7)
NEUTROPHILS NFR BLD AUTO: 88.8 %
OSMOLALITY SERPL CALC.SUM OF ELEC: 304 MOSM/KG (ref 275–295)
PHOSPHATE SERPL-MCNC: 3.3 MG/DL (ref 2.4–5.1)
PLATELET # BLD AUTO: 227 10(3)UL (ref 150–450)
POTASSIUM SERPL-SCNC: 4.3 MMOL/L (ref 3.5–5.1)
RBC # BLD AUTO: 5.11 X10(6)UL (ref 3.8–5.8)
SODIUM SERPL-SCNC: 138 MMOL/L (ref 136–145)
WBC # BLD AUTO: 17.7 X10(3) UL (ref 4–11)

## 2025-05-22 PROCEDURE — 99232 SBSQ HOSP IP/OBS MODERATE 35: CPT | Performed by: INTERNAL MEDICINE

## 2025-05-22 PROCEDURE — 99239 HOSP IP/OBS DSCHRG MGMT >30: CPT | Performed by: HOSPITALIST

## 2025-05-22 RX ORDER — METHYLPREDNISOLONE SODIUM SUCCINATE 40 MG/ML
40 INJECTION INTRAMUSCULAR; INTRAVENOUS EVERY 12 HOURS
Status: DISCONTINUED | OUTPATIENT
Start: 2025-05-22 | End: 2025-05-22

## 2025-05-22 RX ORDER — IPRATROPIUM BROMIDE AND ALBUTEROL SULFATE 2.5; .5 MG/3ML; MG/3ML
3 SOLUTION RESPIRATORY (INHALATION)
Status: SHIPPED | COMMUNITY
Start: 2025-05-22

## 2025-05-22 RX ORDER — TORSEMIDE 20 MG/1
40 TABLET ORAL
Status: SHIPPED | COMMUNITY
Start: 2025-05-22

## 2025-05-22 RX ORDER — DOXYCYCLINE 100 MG/1
100 CAPSULE ORAL 2 TIMES DAILY
Status: SHIPPED | COMMUNITY
Start: 2025-05-22

## 2025-05-22 RX ORDER — PREDNISONE 10 MG/1
40 TABLET ORAL DAILY
Status: SHIPPED | COMMUNITY
Start: 2025-05-23

## 2025-05-22 NOTE — CM/SW NOTE
Choice for [facility] [place] is [facility] in [place].  [place] site reserved in [facility].  Awaiting medical clearance for dc.    Vivi Fritz MBA BSN RN CRRSILVINA SMITH  RN Case Manager  [phone]

## 2025-05-22 NOTE — PLAN OF CARE
Patient stable for discharge later this evening. Report given to RN at facility. Wife and patient updated bedside.     Problem: Patient Centered Care  Goal: Patient preferences are identified and integrated in the patient's plan of care  Description: Interventions:  - What would you like us to know as we care for you? From home with wife   - Provide timely, complete, and accurate information to patient/family  - Incorporate patient and family knowledge, values, beliefs, and cultural backgrounds into the planning and delivery of care  - Encourage patient/family to participate in care and decision-making at the level they choose  - Honor patient and family perspectives and choices  Outcome: Completed     Problem: CARDIOVASCULAR - ADULT  Goal: Maintains optimal cardiac output and hemodynamic stability  Description: INTERVENTIONS:  - Monitor vital signs, rhythm, and trends  - Monitor for bleeding, hypotension and signs of decreased cardiac output  - Evaluate effectiveness of vasoactive medications to optimize hemodynamic stability  - Monitor arterial and/or venous puncture sites for bleeding and/or hematoma  - Assess quality of pulses, skin color and temperature  - Assess for signs of decreased coronary artery perfusion - ex. Angina  - Evaluate fluid balance, assess for edema, trend weights  Outcome: Completed  Goal: Absence of cardiac arrhythmias or at baseline  Description: INTERVENTIONS:  - Continuous cardiac monitoring, monitor vital signs, obtain 12 lead EKG if indicated  - Evaluate effectiveness of antiarrhythmic and heart rate control medications as ordered  - Initiate emergency measures for life threatening arrhythmias  - Monitor electrolytes and administer replacement therapy as ordered  Outcome: Completed     Problem: RESPIRATORY - ADULT  Goal: Achieves optimal ventilation and oxygenation  Description: INTERVENTIONS:  - Assess for changes in respiratory status  - Assess for changes in mentation and behavior  -  Position to facilitate oxygenation and minimize respiratory effort  - Oxygen supplementation based on oxygen saturation or ABGs  - Provide Smoking Cessation handout, if applicable  - Encourage broncho-pulmonary hygiene including cough, deep breathe, Incentive Spirometry  - Assess the need for suctioning and perform as needed  - Assess and instruct to report SOB or any respiratory difficulty  - Respiratory Therapy support as indicated  - Manage/alleviate anxiety  - Monitor for signs/symptoms of CO2 retention  Outcome: Completed

## 2025-05-22 NOTE — CM/SW NOTE
05/22/25 1129   Discharge disposition   Expected discharge disposition subacute   Post Acute Care Provider Other  ([facility]:  [facility])   Discharge transportation [facility]     Will leave at 2pm.  Needs ambulance due to having O2 needs.  Report can be called to [phone].    Wife aware of dc time.    Vivi SHEIKHN RN CRRSILVINA SMITH  RN Case Manager  [phone]

## 2025-05-22 NOTE — PHYSICAL THERAPY NOTE
PHYSICAL THERAPY TREATMENT NOTE - INPATIENT     Room Number: 515/515-A       Presenting Problem: hypoxia, acute respiratory failure, SOB with falls and tremors at home  Co-Morbidities : DM, OP with kyphoplasty    Problem List  Principal Problem:    Acute respiratory failure with hypoxia and hypercapnia (HCC)  Active Problems:    Respiratory acidosis      PHYSICAL THERAPY ASSESSMENT   Patient demonstrates good  progress this session, goals  remain in progress.      Patient is requiring contact guard assist as a result of the following impairments: decreased functional strength, decreased muscular endurance, and medical status.     Patient continues to function near baseline with transfers, gait, maintaining seated position, and standing prolonged periods.  Next session anticipate patient to progress bed mobility, transfers, and gait.  Physical Therapy will continue to follow patient for duration of hospitalization.    Patient continues to benefit from continued skilled PT services: to promote return to prior level of function and safety with continuous assistance and gradual rehabilitative therapy .    PLAN DURING HOSPITALIZATION  Nursing Mobility Recommendation : 1 Assist  PT Device Recommendation: Rolling walker  PT Treatment Plan: Body mechanics, Patient education, Gait training, Strengthening, Transfer training, Balance training  Frequency (Obs): 3-5x/week     SUBJECTIVE  I like to walk outside of my room    OBJECTIVE  Precautions: Bed/chair alarm    WEIGHT BEARING RESTRICTION       PAIN ASSESSMENT   Ratin  Location: denies pain  Management Techniques: Activity promotion, Body mechanics, Breathing techniques    BALANCE  Static Sitting: Good  Dynamic Sitting: Fair +  Static Standing: Fair  Dynamic Standing: Fair -    ACTIVITY TOLERANCE                          O2 WALK  Oxygen Therapy  SPO2% on Oxygen at Rest: 95  At rest oxygen flow (liters per minute): 4  SPO2% Ambulation on Oxygen: 83  Ambulation oxygen flow  (liters per minute): 4    AM-PAC '6-Clicks' INPATIENT SHORT FORM - BASIC MOBILITY  How much difficulty does the patient currently have...  Patient Difficulty: Turning over in bed (including adjusting bedclothes, sheets and blankets)?: A Little   Patient Difficulty: Sitting down on and standing up from a chair with arms (e.g., wheelchair, bedside commode, etc.): A Little   Patient Difficulty: Moving from lying on back to sitting on the side of the bed?: A Little   How much help from another person does the patient currently need...   Help from Another: Moving to and from a bed to a chair (including a wheelchair)?: A Little   Help from Another: Need to walk in hospital room?: A Little   Help from Another: Climbing 3-5 steps with a railing?: A Lot     AM-PAC Score:  Raw Score: 17   Approx Degree of Impairment: 50.57%   Standardized Score (AM-PAC Scale): 42.13   CMS Modifier (G-Code): CK    FUNCTIONAL ABILITY STATUS  Functional Mobility/Gait Assessment  Gait Assistance: Contact guard assist  Distance (ft): 60 ft x 2  Assistive Device: Rolling walker  Pattern: Shuffle  Stairs: Other (comment)  Rolling: NT  Supine to Sit: NT  Sit to Supine: NT  Sit to Stand: contact guard assist    Skilled Therapy Provided: RN approved PT session. Pt sitting in chair with 02 on 4L. Instructed on seated thera exs with ble's to promote functional ability. Sit to stand with RW CGA and pt amb to bathroom. Cont to amb outside of room with RW, rest break in sitting position and cont to amb back to room. After amb 60 ft pt 02 on 4L 83% and within 2 min increased to 91%. Pt educated on deep breathing technique due pt has tendency to breath through mouth. Return to room and pt position in chair . All needs in reach.     The patient's Approx Degree of Impairment: 50.57% has been calculated based on documentation in the [facility] '6 clicks' Inpatient Daily Activity Short Form.  Research supports that patients with this level of impairment may benefit  from gradual rehabilitative therapy.  Final disposition will be made by interdisciplinary medical team.    THERAPEUTIC EXERCISES  Lower Extremity Alternating marching  Ankle pumps  Knee extension     Position Sitting       Patient End of Session: Up in chair, With  staff, Needs met, Call light within reach, RN aware of session/findings, All patient questions and concerns addressed    CURRENT GOALS   Goals to be met by: 6/10/25  Patient Goal Patient's self-stated goal is: to go home   Goal #1 Patient is able to demonstrate supine - sit EOB @ level: supervision     Goal #1   Current Status NT   Goal #2 Patient is able to demonstrate transfers Sit to/from Stand at assistance level: minimum assistance with walker - rolling     Goal #2  Current Status CGA   Goal #3 Patient is able to ambulate 50 feet with assist device: walker - rolling at assistance level: moderate assistance and maintain SpO2 of 93% or better   Goal #3   Current Status CGA with Rw on 4 L02 60 ft x 2    Goal #4 Patient will negotiate one curb w/ assistive device and moderate assistance   Goal #4   Current Status NT   Goal #5 Patient to demonstrate independence with home activity/exercise instructions provided to patient in preparation for discharge.   Goal #5   Current Status In progress   Goal #6    Goal #6  Current Status        Gait Trainin minutes  Therapeutic Activity: 12 minutes

## 2025-05-22 NOTE — PLAN OF CARE
Problem: Patient Centered Care  Goal: Patient preferences are identified and integrated in the patient's plan of care  Description: Interventions:  - What would you like us to know as we care for you?   - Provide timely, complete, and accurate information to patient/family  - Incorporate patient and family knowledge, values, beliefs, and cultural backgrounds into the planning and delivery of care  - Encourage patient/family to participate in care and decision-making at the level they choose  - Honor patient and family perspectives and choices  Outcome: Progressing     Problem: CARDIOVASCULAR - ADULT  Goal: Maintains optimal cardiac output and hemodynamic stability  Description: INTERVENTIONS:  - Monitor vital signs, rhythm, and trends  - Monitor for bleeding, hypotension and signs of decreased cardiac output  - Evaluate effectiveness of vasoactive medications to optimize hemodynamic stability  - Monitor arterial and/or venous puncture sites for bleeding and/or hematoma  - Assess quality of pulses, skin color and temperature  - Assess for signs of decreased coronary artery perfusion - ex. Angina  - Evaluate fluid balance, assess for edema, trend weights  Outcome: Progressing  Goal: Absence of cardiac arrhythmias or at baseline  Description: INTERVENTIONS:  - Continuous cardiac monitoring, monitor vital signs, obtain 12 lead EKG if indicated  - Evaluate effectiveness of antiarrhythmic and heart rate control medications as ordered  - Initiate emergency measures for life threatening arrhythmias  - Monitor electrolytes and administer replacement therapy as ordered  Outcome: Progressing     Problem: RESPIRATORY - ADULT  Goal: Achieves optimal ventilation and oxygenation  Description: INTERVENTIONS:  - Assess for changes in respiratory status  - Assess for changes in mentation and behavior  - Position to facilitate oxygenation and minimize respiratory effort  - Oxygen supplementation based on oxygen saturation or ABGs  -  Provide Smoking Cessation handout, if applicable  - Encourage broncho-pulmonary hygiene including cough, deep breathe, Incentive Spirometry  - Assess the need for suctioning and perform as needed  - Assess and instruct to report SOB or any respiratory difficulty  - Respiratory Therapy support as indicated  - Manage/alleviate anxiety  - Monitor for signs/symptoms of CO2 retention  Outcome: Progressing    Monitoring vital signs, stable at this time. No acute changes at this moment.  Monitoring blood glucose levels.  Pain medication provided as needed. Fall precautions in place- bed alarm on, bed locked in lowest position, call light within reach. Frequent rounding by nursing staff.

## 2025-05-22 NOTE — CONGREGATE LIVING REVIEW
[facility] Living Authorization    The [facility] Review Committee has reviewed this case and the patient IS APPROVED for discharge to a facility for Short Term Skilled once the following procedure is followed:     - The physician discharge instructions (contained within the [facility] note for SNF) must inlcude the below appropriate and approved COVID instructions to the facility    For questions regarding CLRC approval process, please contact the CM assigned to the case.  For questions regarding RN discharge workflow, please contact the unit Clinical Leader.

## 2025-05-22 NOTE — PROGRESS NOTES
[facility]  part of [facility]     Progress Note    Campbell Rao Patient Status:  Inpatient    1950 MRN [id number]   Location [facility] 2W/SW Attending Beltran Cruz MD   Hosp Day # 3 PCP Johny Kang MD       Subjective:   Patient seen and examined.  Dyspnea gradually improving.  Some occasional cough present.    Objective:   Blood pressure 94/50, pulse 95, temperature 98 °F (36.7 °C), temperature source Oral, resp. rate 20, height 5' 8\" (1.727 m), weight 295 lb 12.8 oz (134.2 kg), SpO2 99%.  Intake/Output:   Last 3 shifts: I/O last 3 completed shifts:  In: 770 [P.O.:750; I.V.:20]  Out: -    This shift: I/O this shift:  In: 210 [P.O.:200; I.V.:10]  Out: 250 [Urine:250]     Vent Settings:      Hemodynamic parameters (last 24 hours):      Scheduled Meds: Current Hospital Medications[1]    Continuous Infusions: Medication Infusions[2]    Physical Exam  Constitutional: no acute distress  Eyes: PERRL  ENT: nares pateint  Neck: supple, no JVD  Cardio: RRR, S1 S2  Respiratory: Diminished expiratory breath sounds with some expiratory Rales present  GI: abdomen soft, non tender, active bowel sounds, no organomegaly  Extremities: no clubbing, cyanosis, + bilateral lower extremity edema  Neurologic: no gross motor deficits  Skin: warm, dry      Results:     Lab Results   Component Value Date    WBC 17.7 2025    HGB 14.8 2025    HCT 49.4 2025    .0 2025    CREATSERUM 1.24 2025    BUN 39 2025     2025    K 4.3 2025    CL 93 2025    CO2 38.0 2025     2025    CA 9.6 2025    ALB 4.5 2025    MG 2.0 2025    PHOS 3.3 2025       No results found.              Assessment   1.  Acute on chronic hypoxemic hypercapnic respiratory failure  2.  Acute COPD exacerbation  3.  Possible community-acquired pneumonia  4.  MARIA M  5.  Diastolic CHF  6.  Hypertension  7.  Hyperlipidemia  8.   Diabetes mellitus     Plan   -Patient presents with evidence of acute on chronic hypoxemic hypercapnic respiratory failure with concern for COPD exacerbation and possible community-acquired pneumonia.  - ABG reviewed.  Nightly BiPAP therapy.  - Wean oxygen as tolerated  - Gradually wean steroid therapy  - Nebulizer treatments  - Continue antibiotic therapy with Rocephin and doxycycline  - Diuretics as tolerated  - DVT prophylaxis: Heparin  - Okay for discharge from pulmonary perspective.  Recommend tapering course of prednisone 40 mg for 3 days 30 mg for 3 days 20 mg for 3 days and 10 mg for 3 days upon discharge and completion of total of 7-day course of doxycycline    Corey Del Toro DO  Pulmonary Critical Care Medicine  [facility]          [1]   Current Facility-Administered Medications   Medication Dose Route Frequency    melatonin tab 3 mg  3 mg Oral Nightly PRN    ipratropium-albuterol (Duoneb) 0.5-2.5 (3) MG/3ML inhalation solution 3 mL  3 mL Nebulization QID    ipratropium-albuterol (Duoneb) 0.5-2.5 (3) MG/3ML inhalation solution 3 mL  3 mL Nebulization Q6H PRN    methylPREDNISolone sodium succinate (Solu-MEDROL) injection 40 mg  40 mg Intravenous Q8H    budesonide (Pulmicort) 0.5 MG/2ML nebulizer suspension 0.5 mg  0.5 mg Nebulization 2 times daily    cefTRIAXone (Rocephin) 2 g in sodium chloride 0.9% 100 mL IVPB-ADDV  2 g Intravenous Q24H    doxycycline (Vibramycin) 100 mg in sodium chloride 0.9% 100mL IVPB-Ren  100 mg Intravenous Q12H    atorvastatin (Lipitor) tab 20 mg  20 mg Oral Nightly    spironolactone (Aldactone) partial tab 12.5 mg  12.5 mg Oral Nightly    furosemide (Lasix) 10 mg/mL injection 40 mg  40 mg Intravenous BID (Diuretic)    acetaminophen (Tylenol Extra Strength) tab 500 mg  500 mg Oral Q4H PRN    acetaminophen (Tylenol) tab 650 mg  650 mg Oral Q4H PRN    Or    HYDROcodone-acetaminophen (Norco) 5-325 MG per tab 1 tablet  1 tablet Oral Q4H PRN    Or    HYDROcodone-acetaminophen  (Norco) 5-325 MG per tab 2 tablet  2 tablet Oral Q4H PRN    ondansetron (Zofran) 4 MG/2ML injection 4 mg  4 mg Intravenous Q6H PRN    polyethylene glycol (PEG 3350) (Miralax) 17 g oral packet 17 g  17 g Oral Daily PRN    sennosides (Senokot) tab 17.2 mg  17.2 mg Oral Nightly PRN    bisacodyl (Dulcolax) 10 MG rectal suppository 10 mg  10 mg Rectal Daily PRN    fleet enema (Fleet) rectal enema 133 mL  1 enema Rectal Once PRN    glucose (Dex4) 15 GM/59ML oral liquid 15 g  15 g Oral Q15 Min PRN    Or    glucose (Glutose) 40% oral gel 15 g  15 g Oral Q15 Min PRN    Or    glucose-vitamin C (Dex-4) chewable tab 4 tablet  4 tablet Oral Q15 Min PRN    Or    dextrose 50% injection 50 mL  50 mL Intravenous Q15 Min PRN    Or    glucose (Dex4) 15 GM/59ML oral liquid 30 g  30 g Oral Q15 Min PRN    Or    glucose (Glutose) 40% oral gel 30 g  30 g Oral Q15 Min PRN    Or    glucose-vitamin C (Dex-4) chewable tab 8 tablet  8 tablet Oral Q15 Min PRN    insulin aspart (NovoLOG) 100 Units/mL FlexPen 1-5 Units  1-5 Units Subcutaneous TID CC and HS    heparin (Porcine) 5000 UNIT/ML injection 7,500 Units  7,500 Units Subcutaneous Q8H MAXINE   [2]

## 2025-05-29 ENCOUNTER — PATIENT OUTREACH (OUTPATIENT)
Dept: CASE MANAGEMENT | Age: 75
End: 2025-05-29

## 2025-05-29 NOTE — PROGRESS NOTES
HIPPA verified for CCM monthly outreach.   Recently hospitalized and at SNF. Patient has no questions or concerns at this time. Unsure of discharge timeline.   I will follow up with patient at a later time.      Medical record reviewed including recent office visits and test results

## 2025-05-29 NOTE — DISCHARGE SUMMARY
[place] [place] [facility]  DISCHARGE SUMMARY     Campbell Rao Patient Status:  Inpatient    1950 MRN [id number]   Location [facility]5W Attending No att. providers found   Hosp Day # 3 PCP Johny Kang MD     Date of Admission: 2025  Date of Discharge: 2025  Discharge Disposition: SNF Subacute Rehab    Admitting Chief Complaint:   Acute respiratory failure with hypoxia and hypercapnia (HCC) [J96.01, J96.02]    PCP: Johny Kang MD    Discharge Diagnosis:   Acute on chronic hypoxic, hypercapnic respiratory failure likely 2/2 CAP  AECOPD  Acute on chronic HFpEF  MARIA M  HTN  T2DM  HLD      History of Present Illness:   Per Dr. Yoon  This is a 75 year oldmale who presented planing of shortness of breath.  Patient states symptoms started around 3 to 4 days prior to admission.  Patient noted progressive shortness of breath.  Patient is on home O2 due to history of COPD, MARIA M.  Patient states he was monitoring his oxygen levels and found them to be low.  Symptoms persisted prompting visit to ED.  Patient denied associated coughing or sputum production.  Patient denied subjective fevers or chills.  Patient otherwise denied current chest pain, abdominal pain, nausea vomit, fever or chills.  In the ED, patient was noted to be sleepy and hypercapnic.  Patient was started on BiPAP.           Brief Synopsis:   Acute on chronic hypoxic, hypercapnic respiratory failure likely 2/2 CAP  AECOPD  CXR reviewed  Weaned off of the BiPAP  ABG reviewed  Started on IV abx, solu-medrol, duonebs with improvement  Pulm on consult  BiPAP nightly  Wean steroids per pulm  Continue Ceftriaxone and Doxycycline  Cont diuretics  Wean O2 as tolerated  Acute on chronic HFpEF  EF 55-60%  Lasix 40 mg IV BID, home on torsemide (increase dose)  Strict Is and Os, daily weights  Net IO Since Admission: -2,855 mL [25 2253] , per patient \"I finally lost some wt here\"  MARIA M  Continue protocol  HTN  BP well  controlled  Continue home meds  Monitor vitals  T2DM  SSI and frequent accuchecks  Hypoglycemia protocol  HLD  Continue statin           Discharge Medication List:     Discharge Medications        START taking these medications        Instructions Prescription details   ipratropium-albuterol 0.5-2.5 (3) MG/3ML Soln  Commonly known as: Duoneb      Take 3 mL by nebulization in the morning and 3 mL at noon and 3 mL in the evening.   Refills: 0     melatonin 3 MG Tabs       Refills: 0            CHANGE how you take these medications        Instructions Prescription details   torsemide 20 MG Tabs  Commonly known as: Demadex  What changed:   how much to take  when to take this      Take 2 tablets (40 mg total) by mouth BID (Diuretic).   Refills: 0            CONTINUE taking these medications        Instructions Prescription details   albuterol 108 (90 Base) MCG/ACT Aers  Commonly known as: Ventolin HFA      Inhale 2 puffs into the lungs every 4 to 6 hours as needed for Wheezing.   Refills: 0     atorvastatin 20 MG Tabs  Commonly known as: Lipitor      Take 1 tablet (20 mg total) by mouth nightly.   Quantity: 90 tablet  Refills: 1     doxycycline 100 MG Caps  Commonly known as: Vibramycin      Take 1 capsule (100 mg total) by mouth 2 (two) times daily. For 4 days (8 doses).   Refills: 0     metoprolol succinate ER 25 MG Tb24  Commonly known as: Toprol XL      Take 1 tablet (25 mg total) by mouth 2x Daily(Beta Blocker).   Quantity: 60 tablet  Refills: 1     Mounjaro 7.5 MG/0.5ML Soaj  Generic drug: Tirzepatide      Inject 7.5 mg into the skin once a week.   Quantity: 6 mL  Refills: 0     predniSONE 10 MG Tabs  Commonly known as: Deltasone      Take 4 tablets (40 mg total) by mouth daily. taper as follows: 40 mg/day x 3 days, then 30 mg/day x 3 days, then 20 mg/day x 3 days, then 10 mg/day x 3 days   Refills: 0     spironolactone 25 MG Tabs  Commonly known as: Aldactone      Take 0.5 tablets (12.5 mg total) by mouth daily.    Quantity: 30 tablet  Refills: 2     Tylenol 325 MG Tabs  Generic drug: acetaminophen      Take 2 tablets (650 mg total) by mouth every 6 (six) hours as needed for Pain.   Refills: 0            STOP taking these medications      fluticasone-salmeterol 250-50 MCG/ACT Aepb  Commonly known as: Advair Diskus        umeclidinium bromide 62.5 MCG/ACT Aepb  Commonly known as: Incruse Ellipta                 Follow-up appointment:   No follow-up provider specified.    Vital signs:       Physical Exam:    General: No acute distress.   Respiratory: diminished to auscultation bilaterally. No wheezes. No rhonchi.  Cardiovascular: S1, S2. Regular rate and rhythm. No murmurs, rubs or gallops.   Abdomen: Soft, nontender, nondistended.  Positive bowel sounds. No rebound or guarding.  Neurologic: No focal neurological deficits.   Musculoskeletal: Moves all extremities.  Extremities: +edema.  -----------------------------------------------------------------------------------------------  PATIENT DISCHARGE INSTRUCTIONS: See electronic chart    I d/w pt  the available results, management plan, medications changes, and discharge instructions including follow ups as outlined above.          Hospital Discharge Diagnoses: COPD    Lace+ Score: 76  59-90 High Risk  29-58 Medium Risk  0-28   Low Risk.    TCM Follow-Up Recommendation:  LACE > 58: High Risk of readmission after discharge from the hospital.        MONE PERALTA MD 5/29/2025    Time spent:  > 30 minutes

## 2025-06-11 ENCOUNTER — TELEPHONE (OUTPATIENT)
Dept: INTERNAL MEDICINE CLINIC | Facility: CLINIC | Age: 75
End: 2025-06-11

## 2025-06-11 ENCOUNTER — PATIENT OUTREACH (OUTPATIENT)
Dept: CASE MANAGEMENT | Age: 75
End: 2025-06-11

## 2025-06-11 NOTE — TELEPHONE ENCOUNTER
Pt's spouse in office requesting an appt with EL for hospital follow up from 5/19/25. Pt has been in nursing home and wasn't able to schedule sooner. Soonest appt not until mid July. Spouse Yasmin is requesting sooner appt and would like for both of them to come in same day. Please advise.

## 2025-06-12 ENCOUNTER — MED REC SCAN ONLY (OUTPATIENT)
Dept: INTERNAL MEDICINE CLINIC | Facility: CLINIC | Age: 75
End: 2025-06-12

## 2025-06-12 ENCOUNTER — TELEPHONE (OUTPATIENT)
Dept: INTERNAL MEDICINE CLINIC | Facility: CLINIC | Age: 75
End: 2025-06-12

## 2025-06-12 NOTE — TELEPHONE ENCOUNTER
Physician's order for CPAP supplies received from [employer] [facility] [employer], placed on Dr Kang's desk for review and signature.

## 2025-06-15 ENCOUNTER — HOSPITAL ENCOUNTER (INPATIENT)
Facility: HOSPITAL | Age: 75
LOS: 5 days | Discharge: NURSING, REHAB OR LONG-TERM CARE | End: 2025-06-21
Attending: STUDENT IN AN ORGANIZED HEALTH CARE EDUCATION/TRAINING PROGRAM | Admitting: HOSPITALIST
Payer: MEDICARE

## 2025-06-15 ENCOUNTER — APPOINTMENT (OUTPATIENT)
Dept: GENERAL RADIOLOGY | Facility: HOSPITAL | Age: 75
End: 2025-06-15
Attending: STUDENT IN AN ORGANIZED HEALTH CARE EDUCATION/TRAINING PROGRAM
Payer: MEDICARE

## 2025-06-15 DIAGNOSIS — J96.21 ACUTE ON CHRONIC RESPIRATORY FAILURE WITH HYPOXIA AND HYPERCAPNIA (HCC): Primary | ICD-10-CM

## 2025-06-15 DIAGNOSIS — J96.22 ACUTE ON CHRONIC RESPIRATORY FAILURE WITH HYPOXIA AND HYPERCAPNIA (HCC): Primary | ICD-10-CM

## 2025-06-15 LAB
ANION GAP SERPL CALC-SCNC: 4 MMOL/L (ref 0–18)
BASOPHILS # BLD AUTO: 0.11 X10(3) UL (ref 0–0.2)
BASOPHILS NFR BLD AUTO: 1 %
BLOOD GAS EPAP: 5 CM H2O
BUN BLD-MCNC: 24 MG/DL (ref 9–23)
BUN/CREAT SERPL: 20.5 (ref 10–20)
CALCIUM BLD-MCNC: 9.6 MG/DL (ref 8.7–10.4)
CHLORIDE SERPL-SCNC: 103 MMOL/L (ref 98–112)
CO2 SERPL-SCNC: 39 MMOL/L (ref 21–32)
CREAT BLD-MCNC: 1.17 MG/DL (ref 0.7–1.3)
DEPRECATED RDW RBC AUTO: 55.8 FL (ref 35.1–46.3)
EGFRCR SERPLBLD CKD-EPI 2021: 65 ML/MIN/1.73M2 (ref 60–?)
EOSINOPHIL # BLD AUTO: 0.06 X10(3) UL (ref 0–0.7)
EOSINOPHIL NFR BLD AUTO: 0.5 %
ERYTHROCYTE [DISTWIDTH] IN BLOOD BY AUTOMATED COUNT: 14.1 % (ref 11–15)
FLUAV + FLUBV RNA SPEC NAA+PROBE: NEGATIVE
FLUAV + FLUBV RNA SPEC NAA+PROBE: NEGATIVE
GLUCOSE BLD-MCNC: 161 MG/DL (ref 70–99)
HCT VFR BLD AUTO: 47.1 % (ref 39–53)
HGB BLD-MCNC: 13.7 G/DL (ref 13–17.5)
IMM GRANULOCYTES # BLD AUTO: 0.41 X10(3) UL (ref 0–1)
IMM GRANULOCYTES NFR BLD: 3.7 %
INSPIRATION SETTING TIME VENT: 1 SEC (ref 0.1–5)
LYMPHOCYTES # BLD AUTO: 1.61 X10(3) UL (ref 1–4)
LYMPHOCYTES NFR BLD AUTO: 14.5 %
MCH RBC QN AUTO: 30.7 PG (ref 26–34)
MCHC RBC AUTO-ENTMCNC: 29.1 G/DL (ref 31–37)
MCV RBC AUTO: 105.6 FL (ref 80–100)
MODIFIED ALLEN TEST: POSITIVE
MONOCYTES # BLD AUTO: 1 X10(3) UL (ref 0.1–1)
MONOCYTES NFR BLD AUTO: 9 %
NEUTROPHILS # BLD AUTO: 7.93 X10 (3) UL (ref 1.5–7.7)
NEUTROPHILS # BLD AUTO: 7.93 X10(3) UL (ref 1.5–7.7)
NEUTROPHILS NFR BLD AUTO: 71.3 %
NT-PROBNP SERPL-MCNC: 701 PG/ML (ref ?–125)
O2 CT BLD-SCNC: 19.2 VOL% (ref 15–23)
O2/TOTAL GAS SETTING VFR VENT: 100 %
OSMOLALITY SERPL CALC.SUM OF ELEC: 310 MOSM/KG (ref 275–295)
PCO2 BLDA: 119 MM HG (ref 35–45)
PH BLDA: 7.12 [PH] (ref 7.35–7.45)
PLATELET # BLD AUTO: 178 10(3)UL (ref 150–450)
PO2 BLDA: 118 MM HG (ref 80–100)
POTASSIUM SERPL-SCNC: 4.2 MMOL/L (ref 3.5–5.1)
PROCALCITONIN SERPL-MCNC: 0.13 NG/ML (ref ?–0.05)
PUNCTURE CHARGE: YES
RBC # BLD AUTO: 4.46 X10(6)UL (ref 3.8–5.8)
RESP RATE: 16 BPM
RSV RNA SPEC NAA+PROBE: NEGATIVE
SAO2 % BLDA: 98.8 % (ref 94–100)
SARS-COV-2 RNA RESP QL NAA+PROBE: NOT DETECTED
SODIUM SERPL-SCNC: 146 MMOL/L (ref 136–145)
SPECIMEN VOL 24H UR: 500 ML
WBC # BLD AUTO: 11.1 X10(3) UL (ref 4–11)

## 2025-06-15 PROCEDURE — 0BH17EZ INSERTION OF ENDOTRACHEAL AIRWAY INTO TRACHEA, VIA NATURAL OR ARTIFICIAL OPENING: ICD-10-PCS | Performed by: STUDENT IN AN ORGANIZED HEALTH CARE EDUCATION/TRAINING PROGRAM

## 2025-06-15 PROCEDURE — 5A09357 ASSISTANCE WITH RESPIRATORY VENTILATION, LESS THAN 24 CONSECUTIVE HOURS, CONTINUOUS POSITIVE AIRWAY PRESSURE: ICD-10-PCS | Performed by: STUDENT IN AN ORGANIZED HEALTH CARE EDUCATION/TRAINING PROGRAM

## 2025-06-15 PROCEDURE — 5A1945Z RESPIRATORY VENTILATION, 24-96 CONSECUTIVE HOURS: ICD-10-PCS | Performed by: STUDENT IN AN ORGANIZED HEALTH CARE EDUCATION/TRAINING PROGRAM

## 2025-06-15 PROCEDURE — 71045 X-RAY EXAM CHEST 1 VIEW: CPT | Performed by: STUDENT IN AN ORGANIZED HEALTH CARE EDUCATION/TRAINING PROGRAM

## 2025-06-15 RX ORDER — ALBUTEROL SULFATE 5 MG/ML
10 SOLUTION RESPIRATORY (INHALATION) ONCE
Status: COMPLETED | OUTPATIENT
Start: 2025-06-15 | End: 2025-06-15

## 2025-06-15 RX ORDER — ETOMIDATE 2 MG/ML
INJECTION INTRAVENOUS
Status: COMPLETED
Start: 2025-06-15 | End: 2025-06-16

## 2025-06-15 RX ORDER — METHYLPREDNISOLONE SODIUM SUCCINATE 125 MG/2ML
125 INJECTION INTRAMUSCULAR; INTRAVENOUS ONCE
Status: COMPLETED | OUTPATIENT
Start: 2025-06-15 | End: 2025-06-15

## 2025-06-16 ENCOUNTER — APPOINTMENT (OUTPATIENT)
Dept: CT IMAGING | Facility: HOSPITAL | Age: 75
End: 2025-06-16
Attending: STUDENT IN AN ORGANIZED HEALTH CARE EDUCATION/TRAINING PROGRAM
Payer: MEDICARE

## 2025-06-16 ENCOUNTER — APPOINTMENT (OUTPATIENT)
Dept: GENERAL RADIOLOGY | Facility: HOSPITAL | Age: 75
End: 2025-06-16
Attending: INTERNAL MEDICINE
Payer: MEDICARE

## 2025-06-16 ENCOUNTER — APPOINTMENT (OUTPATIENT)
Dept: GENERAL RADIOLOGY | Facility: HOSPITAL | Age: 75
End: 2025-06-16
Attending: HOSPITALIST
Payer: MEDICARE

## 2025-06-16 PROBLEM — J96.20 ACUTE ON CHRONIC RESPIRATORY FAILURE (HCC): Status: ACTIVE | Noted: 2025-06-16

## 2025-06-16 PROBLEM — J96.21 ACUTE ON CHRONIC RESPIRATORY FAILURE WITH HYPOXIA AND HYPERCAPNIA (HCC): Status: ACTIVE | Noted: 2025-06-16

## 2025-06-16 PROBLEM — J96.22 ACUTE ON CHRONIC RESPIRATORY FAILURE WITH HYPOXIA AND HYPERCAPNIA (HCC): Status: ACTIVE | Noted: 2025-06-16

## 2025-06-16 LAB
ALBUMIN SERPL-MCNC: 4.1 G/DL (ref 3.2–4.8)
ALBUMIN/GLOB SERPL: 2 {RATIO} (ref 1–2)
ALP LIVER SERPL-CCNC: 98 U/L (ref 45–117)
ALT SERPL-CCNC: 65 U/L (ref 10–49)
ANION GAP SERPL CALC-SCNC: 8 MMOL/L (ref 0–18)
AST SERPL-CCNC: 27 U/L (ref ?–34)
BASE EXCESS BLD CALC-SCNC: 16.4 MMOL/L (ref ?–2)
BASE EXCESS BLD CALC-SCNC: 9.2 MMOL/L (ref ?–2)
BASOPHILS # BLD AUTO: 0.07 X10(3) UL (ref 0–0.2)
BASOPHILS NFR BLD AUTO: 0.7 %
BILIRUB SERPL-MCNC: 0.8 MG/DL (ref 0.2–1.1)
BUN BLD-MCNC: 25 MG/DL (ref 9–23)
BUN/CREAT SERPL: 23.4 (ref 10–20)
CA-I BLD-SCNC: 1.2 MMOL/L (ref 0.95–1.32)
CALCIUM BLD-MCNC: 9.6 MG/DL (ref 8.7–10.4)
CHLORIDE SERPL-SCNC: 101 MMOL/L (ref 98–112)
CO2 SERPL-SCNC: 38 MMOL/L (ref 21–32)
COHGB MFR BLD: 2.7 % (ref 0–3)
CREAT BLD-MCNC: 1.07 MG/DL (ref 0.7–1.3)
DEPRECATED RDW RBC AUTO: 54.8 FL (ref 35.1–46.3)
EGFRCR SERPLBLD CKD-EPI 2021: 73 ML/MIN/1.73M2 (ref 60–?)
EOSINOPHIL # BLD AUTO: 0 X10(3) UL (ref 0–0.7)
EOSINOPHIL NFR BLD AUTO: 0 %
ERYTHROCYTE [DISTWIDTH] IN BLOOD BY AUTOMATED COUNT: 14.1 % (ref 11–15)
GLOBULIN PLAS-MCNC: 2.1 G/DL (ref 2–3.5)
GLUCOSE BLD-MCNC: 133 MG/DL (ref 70–99)
GLUCOSE BLDC GLUCOMTR-MCNC: 114 MG/DL (ref 70–99)
GLUCOSE BLDC GLUCOMTR-MCNC: 116 MG/DL (ref 70–99)
GLUCOSE BLDC GLUCOMTR-MCNC: 136 MG/DL (ref 70–99)
GLUCOSE BLDC GLUCOMTR-MCNC: 145 MG/DL (ref 70–99)
GLUCOSE BLDC GLUCOMTR-MCNC: 93 MG/DL (ref 70–99)
HCO3 BLDA-SCNC: 32.1 MEQ/L (ref 21–27)
HCO3 BLDA-SCNC: 37.6 MEQ/L (ref 21–27)
HCT VFR BLD AUTO: 46.5 % (ref 39–53)
HGB BLD-MCNC: 13.5 G/DL (ref 13–17.5)
HGB BLD-MCNC: 13.9 G/DL (ref 13–17.5)
IMM GRANULOCYTES # BLD AUTO: 0.26 X10(3) UL (ref 0–1)
IMM GRANULOCYTES NFR BLD: 2.5 %
LACTATE BLD-SCNC: 0.9 MMOL/L (ref 0.5–2)
LYMPHOCYTES # BLD AUTO: 0.91 X10(3) UL (ref 1–4)
LYMPHOCYTES NFR BLD AUTO: 8.8 %
MAGNESIUM SERPL-MCNC: 2.4 MG/DL (ref 1.6–2.6)
MCH RBC QN AUTO: 30.5 PG (ref 26–34)
MCHC RBC AUTO-ENTMCNC: 29 G/DL (ref 31–37)
MCV RBC AUTO: 105 FL (ref 80–100)
METHGB MFR BLD: 1.3 % SAT (ref 0.4–1.5)
MODIFIED ALLEN TEST: POSITIVE
MONOCYTES # BLD AUTO: 0.46 X10(3) UL (ref 0.1–1)
MONOCYTES NFR BLD AUTO: 4.5 %
NEUTROPHILS # BLD AUTO: 8.61 X10 (3) UL (ref 1.5–7.7)
NEUTROPHILS # BLD AUTO: 8.61 X10(3) UL (ref 1.5–7.7)
NEUTROPHILS NFR BLD AUTO: 83.5 %
O2 CT BLD-SCNC: 18 VOL% (ref 15–23)
O2 CT BLD-SCNC: 18.9 VOL% (ref 15–23)
O2/TOTAL GAS SETTING VFR VENT: 60 %
O2/TOTAL GAS SETTING VFR VENT: 70 %
OSMOLALITY SERPL CALC.SUM OF ELEC: 310 MOSM/KG (ref 275–295)
PCO2 BLDA: 55 MM HG (ref 35–45)
PCO2 BLDA: 75 MM HG (ref 35–45)
PEEP SETTING VENT: 5 CM H2O
PEEP SETTING VENT: 5 CM H2O
PH BLDA: 7.39 [PH] (ref 7.35–7.45)
PH BLDA: 7.42 [PH] (ref 7.35–7.45)
PHOSPHATE SERPL-MCNC: 1.8 MG/DL (ref 2.4–5.1)
PLATELET # BLD AUTO: 149 10(3)UL (ref 150–450)
PO2 BLDA: 62 MM HG (ref 80–100)
PO2 BLDA: 85 MM HG (ref 80–100)
POTASSIUM BLD-SCNC: 4.5 MMOL/L (ref 3.6–5.1)
POTASSIUM SERPL-SCNC: 4.8 MMOL/L (ref 3.5–5.1)
PROT SERPL-MCNC: 6.2 G/DL (ref 5.7–8.2)
PUNCTURE CHARGE: YES
PUNCTURE CHARGE: YES
RBC # BLD AUTO: 4.43 X10(6)UL (ref 3.8–5.8)
RESP RATE: 18 BPM
RESP RATE: 18 BPM
SAO2 % BLDA: 95.8 % (ref 94–100)
SAO2 % BLDA: 98.2 % (ref 94–100)
SODIUM BLD-SCNC: 142 MMOL/L (ref 135–145)
SODIUM SERPL-SCNC: 147 MMOL/L (ref 136–145)
SPECIMEN VOL 24H UR: 500 ML
SPECIMEN VOL 24H UR: 500 ML
WBC # BLD AUTO: 10.3 X10(3) UL (ref 4–11)

## 2025-06-16 PROCEDURE — 71045 X-RAY EXAM CHEST 1 VIEW: CPT | Performed by: INTERNAL MEDICINE

## 2025-06-16 PROCEDURE — 99291 CRITICAL CARE FIRST HOUR: CPT | Performed by: INTERNAL MEDICINE

## 2025-06-16 PROCEDURE — 99222 1ST HOSP IP/OBS MODERATE 55: CPT | Performed by: HOSPITALIST

## 2025-06-16 PROCEDURE — 71260 CT THORAX DX C+: CPT | Performed by: STUDENT IN AN ORGANIZED HEALTH CARE EDUCATION/TRAINING PROGRAM

## 2025-06-16 PROCEDURE — 71045 X-RAY EXAM CHEST 1 VIEW: CPT | Performed by: HOSPITALIST

## 2025-06-16 RX ORDER — ACETAMINOPHEN 650 MG/1
650 SUPPOSITORY RECTAL EVERY 4 HOURS PRN
Status: DISCONTINUED | OUTPATIENT
Start: 2025-06-16 | End: 2025-06-21

## 2025-06-16 RX ORDER — ROCURONIUM BROMIDE 10 MG/ML
50 INJECTION, SOLUTION INTRAVENOUS ONCE
Status: COMPLETED | OUTPATIENT
Start: 2025-06-16 | End: 2025-06-16

## 2025-06-16 RX ORDER — FUROSEMIDE 10 MG/ML
40 INJECTION INTRAMUSCULAR; INTRAVENOUS ONCE
Status: COMPLETED | OUTPATIENT
Start: 2025-06-16 | End: 2025-06-16

## 2025-06-16 RX ORDER — SENNOSIDES 8.8 MG/5ML
10 LIQUID ORAL 2 TIMES DAILY
Status: DISCONTINUED | OUTPATIENT
Start: 2025-06-16 | End: 2025-06-21

## 2025-06-16 RX ORDER — IPRATROPIUM BROMIDE AND ALBUTEROL SULFATE 2.5; .5 MG/3ML; MG/3ML
3 SOLUTION RESPIRATORY (INHALATION)
Status: DISCONTINUED | OUTPATIENT
Start: 2025-06-16 | End: 2025-06-17

## 2025-06-16 RX ORDER — HEPARIN SODIUM 5000 [USP'U]/ML
7500 INJECTION, SOLUTION INTRAVENOUS; SUBCUTANEOUS EVERY 12 HOURS SCHEDULED
Status: DISCONTINUED | OUTPATIENT
Start: 2025-06-16 | End: 2025-06-21

## 2025-06-16 RX ORDER — SPIRONOLACTONE 25 MG/1
12.5 TABLET ORAL DAILY
Status: DISCONTINUED | OUTPATIENT
Start: 2025-06-16 | End: 2025-06-17

## 2025-06-16 RX ORDER — NICOTINE POLACRILEX 4 MG
15 LOZENGE BUCCAL
Status: DISCONTINUED | OUTPATIENT
Start: 2025-06-16 | End: 2025-06-21

## 2025-06-16 RX ORDER — MINERAL OIL AND PETROLATUM 150; 830 MG/G; MG/G
1 OINTMENT OPHTHALMIC NIGHTLY
Status: DISCONTINUED | OUTPATIENT
Start: 2025-06-16 | End: 2025-06-18

## 2025-06-16 RX ORDER — METOCLOPRAMIDE HYDROCHLORIDE 5 MG/ML
5 INJECTION INTRAMUSCULAR; INTRAVENOUS EVERY 8 HOURS PRN
Status: DISCONTINUED | OUTPATIENT
Start: 2025-06-16 | End: 2025-06-21

## 2025-06-16 RX ORDER — METHYLPREDNISOLONE SODIUM SUCCINATE 40 MG/ML
40 INJECTION INTRAMUSCULAR; INTRAVENOUS EVERY 12 HOURS
Status: DISCONTINUED | OUTPATIENT
Start: 2025-06-16 | End: 2025-06-21

## 2025-06-16 RX ORDER — NICOTINE POLACRILEX 4 MG
30 LOZENGE BUCCAL
Status: DISCONTINUED | OUTPATIENT
Start: 2025-06-16 | End: 2025-06-21

## 2025-06-16 RX ORDER — DEXTROSE MONOHYDRATE 25 G/50ML
50 INJECTION, SOLUTION INTRAVENOUS
Status: DISCONTINUED | OUTPATIENT
Start: 2025-06-16 | End: 2025-06-21

## 2025-06-16 RX ORDER — METOPROLOL SUCCINATE 25 MG/1
25 TABLET, EXTENDED RELEASE ORAL
Status: DISCONTINUED | OUTPATIENT
Start: 2025-06-16 | End: 2025-06-21

## 2025-06-16 RX ORDER — ACETAMINOPHEN 10 MG/ML
1000 INJECTION, SOLUTION INTRAVENOUS EVERY 6 HOURS PRN
Status: DISCONTINUED | OUTPATIENT
Start: 2025-06-16 | End: 2025-06-21

## 2025-06-16 RX ORDER — ONDANSETRON 2 MG/ML
4 INJECTION INTRAMUSCULAR; INTRAVENOUS EVERY 6 HOURS PRN
Status: DISCONTINUED | OUTPATIENT
Start: 2025-06-16 | End: 2025-06-21

## 2025-06-16 RX ORDER — ETOMIDATE 2 MG/ML
40 INJECTION INTRAVENOUS ONCE
Status: COMPLETED | OUTPATIENT
Start: 2025-06-16 | End: 2025-06-16

## 2025-06-16 RX ORDER — CHLORHEXIDINE GLUCONATE ORAL RINSE 1.2 MG/ML
15 SOLUTION DENTAL
Status: DISCONTINUED | OUTPATIENT
Start: 2025-06-16 | End: 2025-06-18

## 2025-06-16 RX ORDER — METHYLPREDNISOLONE SODIUM SUCCINATE 125 MG/2ML
60 INJECTION INTRAMUSCULAR; INTRAVENOUS EVERY 8 HOURS
Status: DISCONTINUED | OUTPATIENT
Start: 2025-06-16 | End: 2025-06-16

## 2025-06-16 RX ORDER — SODIUM CHLORIDE 9 MG/ML
INJECTION, SOLUTION INTRAVENOUS CONTINUOUS
Status: DISCONTINUED | OUTPATIENT
Start: 2025-06-16 | End: 2025-06-17

## 2025-06-16 RX ORDER — BISACODYL 10 MG
10 SUPPOSITORY, RECTAL RECTAL
Status: DISCONTINUED | OUTPATIENT
Start: 2025-06-16 | End: 2025-06-21

## 2025-06-16 RX ORDER — POLYETHYLENE GLYCOL 3350 17 G/17G
17 POWDER, FOR SOLUTION ORAL DAILY PRN
Status: DISCONTINUED | OUTPATIENT
Start: 2025-06-16 | End: 2025-06-21

## 2025-06-16 RX ORDER — ACETAMINOPHEN 160 MG/5ML
650 SOLUTION ORAL EVERY 4 HOURS PRN
Status: DISCONTINUED | OUTPATIENT
Start: 2025-06-16 | End: 2025-06-21

## 2025-06-16 NOTE — RESPIRATORY THERAPY NOTE
Pt. Received from ED intubated. Neb treatment done. ABG result follows. ET tube  size 7.5, 25 cm at the lips.  RT continue to monitor.    Latest Reference Range & Units 06/16/25 04:58   ABG PH 7.35 - 7.45  7.42   ABG PCO2 35 - 45 mm Hg 55 (H)   ABG PO2 80 - 100 mm Hg 85   ABG HCO3 21.0 - 27.0 mEq/L 32.1 (H)   ABG O2 SATURATION 94.0 - 100.0 % 98.2      06/16/25 0525   Vent Information   Vent Mode VC/AC   Settings   FiO2 (%) (S)  60 %   Resp Rate (Set) 18   Vt (Set, mL) 500 mL   Waveform Decelerating ramp   PEEP/CPAP (cm H2O) 5 cm H20

## 2025-06-16 NOTE — PROGRESS NOTES
This is a follow-up from an admission from earlier today.  Patient was admitted with acute on chronic hypoxic and hypercapnic respiratory failure.  He decompensated shortly after admission and was intubated.  Seen by pulmonology.  At this point we will continue mechanical ventilation support.  Continue broad antibiotics with Unasyn and azithromycin.  Continue IV Solu-Medrol.  Hold IV fluids, give 1 dose of IV Lasix, reassess fluid status in the morning.

## 2025-06-16 NOTE — PROGRESS NOTES
ADMISSION NOTE    75 year old male with COPD, MARIA M and obesity hypoventilation syndrome presents with AMS hallucinations.  Required intubation for acute on chronic hypercapneic ,hypoxic respiratory failure . Available medical records partially reviewed. Dictation to follow.    Mya Hernandez M.D.  6/16/2025

## 2025-06-16 NOTE — H&P
On call Alvarez[facility]ist 06/16/25  Patient arrives from ED intubated, sedated and stable.  Presented with AMS, with hypoxic hypercapnic resp failure.  Intubated initial PCO2 119.  Repeat after mechanical ventilation PCO2 75.  Per notes had been non compliant with home Oxygen and bipap.    /81 (BP Location: Right arm)   Pulse 77   Temp 98 °F (36.7 °C) (Temporal)   Resp 25   Wt 295 lb 13.7 oz (134.2 kg)   SpO2 95%   BMI 44.98 kg/m²   Intubated sedated  Lungs-diminished with scant exp wheeze  CV-rr  Abd-morbidly obese, soft, + ds  Ext-stasis changes ble with pedal edema  Neuro-sedated    -cont vent/sedation overnight reassess in a.m.  -available for any emergent needs overnight-currently stable

## 2025-06-16 NOTE — ED QUICK NOTES
Orders for admission, patient is aware of plan and ready to go upstairs. Any questions, please call ED RN Radha at extension [phone].     Patient Covid vaccination status: Fully vaccinated     COVID Test Ordered in ED: SARS-CoV-2/Flu A and B/RSV by PCR (GeneXpert)    COVID Suspicion at Admission: N/A    Running Infusions: Medication Infusions[1]     Mental Status/LOC at time of transport: intubated    Other pertinent information:   CIWA score: N/A   NIH score:  N/A             [1]    propofol 37.258 mcg/kg/min (06/16/25 0006)

## 2025-06-16 NOTE — ED PROVIDER NOTES
Patient Seen in: [facility] Emergency Department        History  Chief Complaint   Patient presents with    Altered Mental Status     Stated Complaint: hallucinations    Subjective:   HPI            75-year-old male with history of COPD, diabetes mellitus, HFpEF, CAD, hypertension, hyperlipidemia, obesity, presenting for evaluation of altered mental status.  Is brought in by EMS from home.  Family states that patient has been progressively lethargic and confused since Saturday.  Today he thought there was a party at his house and he was not sure if he was .  Family relates history of frequent COPD exacerbations with hypercapnia. Has been intubated in the past. Former smoker.         Objective:     Past Medical History:    Atherosclerosis of coronary artery    coronary calcification on ct chest    Back problem    Cataract    Chronic respiratory failure (HCC)    Congestive heart disease (HCC)    Controlled type 2 diabetes mellitus with complication, without long-term current use of insulin (HCC)    COPD (HCC)    freq exacerbations. ? 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    Osteoarthritis    Pneumonia    Pulmonary nodules    Sleep apnea    Tobacco abuse              Past Surgical History:   Procedure Laterality Date    Appendectomy      Appendectomy      Cataract Bilateral     2/2024, 3/2024    Electrocardiogram, complete  01/30/2012    scanned to media tab                Social History     Socioeconomic History    Marital status:    Tobacco Use    Smoking status: Former     Current packs/day: 0.00     Average packs/day: 2.0 packs/day for 40.0 years (80.0 ttl pk-yrs)     Types: Cigarettes     Start date: 11/1981     Quit date: 11/2021     Years since  quitting: 3.6     Passive exposure: Past    Smokeless tobacco: Never   Vaping Use    Vaping status: Never Used   Substance and Sexual Activity    Alcohol use: Not Currently     Alcohol/week: 0.0 standard drinks of alcohol     Comment: socially    Drug use: Not Currently   Other Topics Concern    Caffeine Concern Yes     Comment: coffee, >32 oz daily     Social Drivers of Health     Food Insecurity: No Food Insecurity (5/19/2025)    NCSS - Food Insecurity     Worried About Running Out of Food in the Last Year: No     Ran Out of Food in the Last Year: No   Transportation Needs: No Transportation Needs (5/19/2025)    NCSS - Transportation     Lack of Transportation: No   Housing Stability: Not At Risk (5/19/2025)    NCSS - Housing/Utilities     Has Housing: Yes     Worried About Losing Housing: No     Unable to Get Utilities: No                                Physical Exam    ED Triage Vitals [06/15/25 2229]   /69   Pulse 102   Resp 18   Temp 98 °F (36.7 °C)   Temp src Temporal   SpO2 (!) 74 %   O2 Device Nasal cannula       Current Vitals:   Vital Signs  BP: 97/66  Pulse: 93  Resp: 19  Temp: 98 °F (36.7 °C)  Temp src: Temporal  MAP (mmHg): 77    Oxygen Therapy  SpO2: (!) 87 %  O2 Device: Ventilator  Mode: AVAPS  ETCO2 (mmHg): 45 mmHg  FiO2 (%): 60 %  O2 Flow Rate (L/min): 4 L/min            Physical Exam  Constitutional: somnolent, arousal to voice  HENT: mmm, no lesions,  Neck: normal range of motion, no tenderness, supple.  Eyes: PERRL, EOMI, conjunctiva normal, no discharge. Sclera anicteric.  Cardiovascular: rr no murmur  Respiratory: diminished, exp wheeze  GI: Bowel sounds normal, Soft, no tenderness, no masses, no pulsatile masses.  : No CVA tenderness.  Skin: Warm, dry, no erythema, no rash.  Musculoskeletal: Intact distal pulses, no edema, no tenderness, no cyanosis, no clubbing. Good range of motion in all major joints. No tenderness to palpation or major deformities noted. Back- No  tenderness.  Neurologic: GCS E3V1M5 - 9, moves all four ext, does not follow commands. Face symmetric, pupils midrange, reactive.   Psych: Calm, cooperative, nl affect            ED Course  Labs Reviewed   BASIC METABOLIC PANEL (8) - Abnormal; Notable for the following components:       Result Value    Glucose 161 (*)     Sodium 146 (*)     CO2 39.0 (*)     BUN 24 (*)     BUN/CREA Ratio 20.5 (*)     Calculated Osmolality 310 (*)     All other components within normal limits   CBC WITH DIFFERENTIAL WITH PLATELET - Abnormal; Notable for the following components:    WBC 11.1 (*)     .6 (*)     MCHC 29.1 (*)     RDW-SD 55.8 (*)     Neutrophil Absolute Prelim 7.93 (*)     Neutrophil Absolute 7.93 (*)     All other components within normal limits   ARTERIAL BLOOD GAS - Abnormal; Notable for the following components:    ABG pH 7.12 (*)     ABG pCO2 119 (*)     ABG PO2 118 (*)     All other components within normal limits   PROCALCITONIN - Abnormal; Notable for the following components:    Procalcitonin 0.13 (*)     All other components within normal limits   PRO BETA NATRIURETIC PEPTIDE - Abnormal; Notable for the following components:    Pro-Beta Natriuretic Peptide 701 (*)     All other components within normal limits   EXPANDED BLOOD GAS, ARTERIAL - Abnormal; Notable for the following components:    ABG pCO2 75 (*)     ABG PO2 62 (*)     ABG HCO3 37.6 (*)     Blood Gas Base Excess 16.4 (*)     All other components within normal limits   SARS-COV-2/FLU A AND B/RSV BY PCR (GENEXPERT) - Normal    Narrative:     This test is intended for the qualitative detection and differentiation of SARS-CoV-2, influenza A, influenza B, and respiratory syncytial virus (RSV) viral RNA in nasopharyngeal or nares swabs from individuals suspected of respiratory viral infection consistent with COVID-19 by their healthcare provider. Signs and symptoms of respiratory viral infection due to SARS-CoV-2, influenza, and RSV can be  similar.    Test performed using the Xpert Xpress SARS-CoV-2/FLU/RSV (real time RT-PCR)  assay on the GeneXpert instrument, [employer], [place] [address].   This test is being used under the Food and Drug Administration's Emergency Use Authorization.    The authorized Fact Sheet for Healthcare Providers for this assay is available upon request from the laboratory.   RAINBOW DRAW GOLD   RAINBOW DRAW BLUE       ED Course as of 06/16/25 0134  ------------------------------------------------------------  Time: 06/15 2354  Comment: Patient's mental status declined while on AVAPS, decision was made to intubate.  Family at the bedside when decision was made they reiterated that patient remains full code.  ------------------------------------------------------------  Time: 06/16 0129  Comment: Preliminary Radiology Report  [facility], [facility]  [phone] - Phone    [facility]    NAME: NAOMI HOWARD    DATE OF EXAM: 06/16/2025  Patient No:  [id number]  Physician:  LATISHA  YOB: 1950    Past Medical History (entered by Technologist):    Reason For Exam (entered by Technologist):  ET placement.  Other Notes (entered by Technologist): Room 13. Pod 1.    Additional Information (per Vision Radiologist): ET tube placement      Chest x-ray AP    Comparison: 5/19/2025 and 7/14/2024      IMPRESSION:  Endotracheal tube in place with tip 4.1 cm above jg    Prominence of the mediastinum which could be partially due to technique(slight leftward rotation)  However would be difficult to exclude mass or adenopathy  3 cm long stricture in the trachea seen on previous exams(could potentially be related to extrinsic compression)  Consider contrast-enhanced CT for further evaluation    Irregular linear opacities and calcifications in both lower lungs, left greater than right  Suggesting chronic scarring and/or atelectasis  Increased small left pleural effusion versus pleural thickening in the  left lower chest                         MDM     75M hx as above who presents with altered mental status  On arrival he is markedly hypoxic, he was transition from nonrebreather to AVAPS  -On initial examination he has borderline mental status with GCS of 9  DDx: Acute exacerbation of COPD, pneumonia, pneumothorax, pulmonary edema    Labs show WBC 11.1, Pro-Jesse 0.13.  BMP not markedly elevated.  CO2 39 suggestive of chronic hypercapnia with metabolic compensation.  ABG with marked respiratory acidosis, CO2 of 119.    Discussed abnormal CXR findins with reading radiologist - CT chest recommended for further eval which was ordered.     Rapid sequence intubation:   Indication for the procedure was airway protection d/t poor mental status.  The patient was preoxygenated with 100% oxygen by face mask.  The patient was given the following IV medications:  etomidate, rocuronium .  The patient was orally endotracheally intubated under direct visualization with a 7-5 ETT.  In line stabilization was performed during the procedure.  There was good misting on the tube; breath sounds were auscultated equally bilaterally; good color change with [place] CO2 detector.  Chest X-ray shows ETT in good position.  The procedure was performed by myself.    Reviewed postprocedural chest x-ray which shows ET tube in appropriate position with no pneumothorax.    I spent a total of 56 minutes of critical care time in obtaining history, performing a physical exam, bedside monitoring of interventions, collecting and interpreting tests and discussion with consultants but not including time spent performing procedures.      Admission disposition: 6/16/2025 12:22 AM           Medical Decision Making      Disposition and Plan     Clinical Impression:  1. Acute on chronic respiratory failure with hypoxia and hypercapnia (HCC)         Disposition:  Admit  6/16/2025 12:22 am    Follow-up:  No follow-up provider specified.  We recommend that  you schedule follow up care with a primary care provider within the next three months to obtain basic health screening including reassessment of your blood pressure.      Medications Prescribed:  Current Discharge Medication List                Supplementary Documentation:         Hospital Problems       Present on Admission  Date Reviewed: 4/2/2025          ICD-10-CM Noted POA    Acute on chronic respiratory failure (HCC) J96.20 6/16/2025 Unknown

## 2025-06-16 NOTE — PLAN OF CARE
Received pt from ED intubated for respiratory failure. ABGs improving. Prop gtt. Non violent soft wrist restraints on. Family updated on POC. Pt safety maintained.     Problem: CARDIOVASCULAR - ADULT  Goal: Maintains optimal cardiac output and hemodynamic stability  Description: INTERVENTIONS:  - Monitor vital signs, rhythm, and trends  - Monitor for bleeding, hypotension and signs of decreased cardiac output  - Evaluate effectiveness of vasoactive medications to optimize hemodynamic stability  - Monitor arterial and/or venous puncture sites for bleeding and/or hematoma  - Assess quality of pulses, skin color and temperature  - Assess for signs of decreased coronary artery perfusion - ex. Angina  - Evaluate fluid balance, assess for edema, trend weights  Outcome: Progressing     Problem: CARDIOVASCULAR - ADULT  Goal: Absence of cardiac arrhythmias or at baseline  Description: INTERVENTIONS:  - Continuous cardiac monitoring, monitor vital signs, obtain 12 lead EKG if indicated  - Evaluate effectiveness of antiarrhythmic and heart rate control medications as ordered  - Initiate emergency measures for life threatening arrhythmias  - Monitor electrolytes and administer replacement therapy as ordered  Outcome: Progressing     Problem: RESPIRATORY - ADULT  Goal: Achieves optimal ventilation and oxygenation  Description: INTERVENTIONS:  - Assess for changes in respiratory status  - Assess for changes in mentation and behavior  - Position to facilitate oxygenation and minimize respiratory effort  - Oxygen supplementation based on oxygen saturation or ABGs  - Provide Smoking Cessation handout, if applicable  - Encourage broncho-pulmonary hygiene including cough, deep breathe, Incentive Spirometry  - Assess the need for suctioning and perform as needed  - Assess and instruct to report SOB or any respiratory difficulty  - Respiratory Therapy support as indicated  - Manage/alleviate anxiety  - Monitor for signs/symptoms of CO2  retention  Outcome: Progressing

## 2025-06-16 NOTE — PLAN OF CARE
Problem: Safety Risk - Non-Violent Restraints  Goal: Patient will remain free from self-harm  Description: INTERVENTIONS:  - Apply the least restrictive restraint to prevent harm  - Notify patient and family of reasons restraints applied  - Assess for any contributing factors to confusion (electrolyte disturbances, delirium, medications)  - Discontinue any unnecessary medical devices as soon as possible  - Assess the patient's physical comfort, circulation, skin condition, hydration, nutrition and elimination needs   - Reorient and redirection as needed  - Assess for the need to continue restraints  6/16/2025 1601 by Benitez Paul, RN  Outcome: Progressing  6/16/2025 1601 by Benitez Paul, RN  Outcome: Progressing
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
## 2025-06-16 NOTE — ED INITIAL ASSESSMENT (HPI)
Pt presents with altered mental status starting yesterday. Per pts wife, \"he thinks there is a party at out house and he isn't sure if we're .\" Per pts daughter his CO2 is usually high when this happens. Pt has been tearing off CPAP and oxygen. Pt is also very lethargic which is not normal for him. Pt on 4L nasal cannula

## 2025-06-16 NOTE — RESPIRATORY THERAPY NOTE
Pt received intubated and on full vent support with the following settings AC 14/500/40%/+5. Bilateral bs heard on auscultation. Nebulizer tx given as scheduled. Suction provided as needed.

## 2025-06-16 NOTE — CONSULTS
[facility]  part of [facility]    Report of Consultation    Campbell Rao Patient Status:  Inpatient    1950 MRN [id number]   Location [facility] 2W/SW Attending Bernard Willingham MD   Hosp Day # 0 PCP Johny Kang MD     Date of Admission:  6/15/2025  Date of Consult: 2025    Reason for Consultation:   Consults  Respiratory failure  Aspiration pneumonia  COPD exacerbation      HPI:     Chief Complaint   Patient presents with    Altered Mental Status     HPI    75-year-old male with history of severe COPD on home O2 at 4 L, MARIA M on CPAP, chronic respiratory failure on home oxygen at 4 L nasal cannula, HTN, HL, CAD,HFpEF.    Presented from home with lethargy and increased work of breathing and he was very lethargic and obtunded in ER and he was intubated and placed on mechanical ventilation and chest CT showed extensive infiltrate in the left lung with possible aspiration and ABGs showed pH of 7.12 and PCO2 of 119 and pO2 of 118  Patient now intubated on mechanical ventilation with 40% FiO2 and PEEP of 5  He is on low-dose of sedation and he is arousable and following commands  No reported abdominal pain or diarrhea  Chronic lower extremity edema  At this point patient intubated on mechanical ventilation with no further review of system unable to obtain  Chart and notes and labs and records and x-rays and CTs are reviewed      History   Past Medical History[1]  Past Surgical History[2]  Family History[3]  Social History:  Short Social Hx on File[4]  Allergies/Medications:   Allergies: Allergies[5]  Prescriptions Prior to Admission[6]    Review of Systems:     Unable to perform ROS      Physical Exam:   Vital Signs:   weight is 295 lb 13.7 oz (134.2 kg). His temporal temperature is 97.5 °F (36.4 °C). His blood pressure is 114/78 and his pulse is 83. His respiration is 18 and oxygen saturation is 97%.   Physical Exam  Constitutional:       General: He is in acute  distress.      Appearance: He is obese. He is ill-appearing.   HENT:      Head: Atraumatic.      Nose: Nose normal.      Mouth/Throat:      Mouth: Mucous membranes are dry.   Eyes:      General: No scleral icterus.  Cardiovascular:      Rate and Rhythm: Normal rate.      Heart sounds:      No gallop.   Pulmonary:      Effort: Respiratory distress present.      Breath sounds: No stridor. Rales present. No wheezing or rhonchi.   Abdominal:      General: Abdomen is flat. Bowel sounds are normal. There is no distension.      Palpations: Abdomen is soft.      Tenderness: There is no guarding.   Musculoskeletal:      Cervical back: Neck supple.      Right lower leg: Edema present.      Left lower leg: Edema present.   Lymphadenopathy:      Cervical: No cervical adenopathy.   Skin:     General: Skin is dry.      Comments: Chronic lower extremity ischemic changes   Neurological:      General: No focal deficit present.         Results:     Lab Results   Component Value Date    WBC 10.3 06/16/2025    HGB 13.5 06/16/2025    HCT 46.5 06/16/2025    .0 (L) 06/16/2025    CREATSERUM 1.07 06/16/2025    BUN 25 (H) 06/16/2025     (H) 06/16/2025    K 4.8 06/16/2025     06/16/2025    CO2 38.0 (H) 06/16/2025     (H) 06/16/2025    CA 9.6 06/16/2025    ALB 4.1 06/16/2025    ALKPHO 98 06/16/2025    BILT 0.8 06/16/2025    TP 6.2 06/16/2025    AST 27 06/16/2025    ALT 65 (H) 06/16/2025    TSH 2.777 04/18/2024    GGT 38 04/18/2024    DDIMER 0.40 10/18/2016    CRP 0.40 01/11/2025    MG 2.4 06/16/2025    PHOS 1.8 (L) 06/16/2025    TROP <0.045 04/10/2021    TROPHS 5 05/19/2025    B12 765 04/27/2021     CXR and chest ct reviewed : Proper position of ET tube with extensive left lung infiltrate and possible aspiration with underlying COPD changes    Impression:         1-acute on chronic hypercapnic and hypoxemic respiratory failure  Required intubation in ER  6/16/25  PH 7.12 and PCO2 119 and pO2 118  Decompensated overlap  syndrome with severe COPD and MARIA M    Vent support and management  COPD exacerbation therapy with neb and steroid  Antibiotics and suctioning of secretion  Nebulizers and bronchodilator  Extubate when appropriate    2-severe COPD with acute exacerbation with acute infiltrate in the left lung indicating possible aspiration pneumonitis  Chronically on 4 L of oxygen  IV steroid and bronchodilator and nebulizers  Antibiotics with Unasyn and azithromycin  Check cultures    3-obesity BMI 45 and MARIA M on CPAP  Now intubated    4-HTN, HL, obesity, CAD,HFpEF     5-DVT prophylaxis  Heparin subcu      D/w staff and RT     Upon my evaluation, this patient had a high probability of imminent or life-threatening deterioration due to critical findings of laboratory tests and respiratory failure, which required my direct attention, intervention, and personal management.    I have personally provided 35  minutes of critical care time, exclusive of time spent on separately billable procedures.  Time includes review of all pertinent laboratory/radiology results, discussion with consultants, and monitoring for potential decompensation.  Performed interventions included fluids and managing mechanical ventilation.         Yesi Salinas MD  6/16/2025         [1]   Past Medical History:   Atherosclerosis of coronary artery    coronary calcification on ct chest    Back problem    Cataract    Chronic respiratory failure (HCC)    Congestive heart disease (HCC)    Controlled type 2 diabetes mellitus with complication, without long-term current use of insulin (HCC)    COPD (HCC)    freq exacerbations. ? 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    Osteoarthritis    Pneumonia    Pulmonary nodules    Sleep  apnea    Tobacco abuse   [2]   Past Surgical History:  Procedure Laterality Date    Appendectomy      Appendectomy      Cataract Bilateral     2/2024, 3/2024    Electrocardiogram, complete  01/30/2012    scanned to media tab   [3]   Family History  Problem Relation Age of Onset    Cancer Father         lung cancer    Cancer Mother         lung cancer    No Known Problems Daughter     No Known Problems Son     No Known Problems Maternal Grandmother     No Known Problems Maternal Grandfather     No Known Problems Paternal Grandmother     No Known Problems Paternal Grandfather     No Known Problems Sister     Diabetes Brother     Lipids Brother         hyperlipidemia    Hypertension Brother     No Known Problems Other    [4]   Social History  Socioeconomic History    Marital status:    Tobacco Use    Smoking status: Former     Current packs/day: 0.00     Average packs/day: 2.0 packs/day for 40.0 years (80.0 ttl pk-yrs)     Types: Cigarettes     Start date: 11/1981     Quit date: 11/2021     Years since quitting: 3.6     Passive exposure: Past    Smokeless tobacco: Never   Vaping Use    Vaping status: Never Used   Substance and Sexual Activity    Alcohol use: Not Currently     Alcohol/week: 0.0 standard drinks of alcohol     Comment: socially    Drug use: Not Currently   Other Topics Concern    Caffeine Concern Yes     Comment: coffee, >32 oz daily     Social Drivers of Health     Food Insecurity: No Food Insecurity (5/19/2025)    NCSS - Food Insecurity     Worried About Running Out of Food in the Last Year: No     Ran Out of Food in the Last Year: No   Transportation Needs: No Transportation Needs (5/19/2025)    NCSS - Transportation     Lack of Transportation: No   Stress: No Stress Concern Present (1/16/2024)    Stress     Feeling of Stress : No   Housing Stability: Not At Risk (5/19/2025)    NCSS - Housing/Utilities     Has Housing: Yes     Worried About Losing Housing: No     Unable to Get Utilities: No    [5] No Known Allergies  [6]   Medications Prior to Admission   Medication Sig    ipratropium-albuterol 0.5-2.5 (3) MG/3ML Inhalation Solution Take 3 mL by nebulization in the morning and 3 mL at noon and 3 mL in the evening.    melatonin 3 MG Oral Tab     predniSONE 10 MG Oral Tab Take 4 tablets (40 mg total) by mouth daily. taper as follows: 40 mg/day x 3 days, then 30 mg/day x 3 days, then 20 mg/day x 3 days, then 10 mg/day x 3 days    torsemide 20 MG Oral Tab Take 2 tablets (40 mg total) by mouth BID (Diuretic).    doxycycline 100 MG Oral Cap Take 1 capsule (100 mg total) by mouth 2 (two) times daily. For 4 days (8 doses).    Tirzepatide (MOUNJARO) 7.5 MG/0.5ML Subcutaneous Solution Auto-injector Inject 7.5 mg into the skin once a week.    spironolactone 25 MG Oral Tab Take 0.5 tablets (12.5 mg total) by mouth daily.    atorvastatin 20 MG Oral Tab Take 1 tablet (20 mg total) by mouth nightly.    acetaminophen (TYLENOL) 325 MG Oral Tab Take 2 tablets (650 mg total) by mouth every 6 (six) hours as needed for Pain.    metoprolol succinate ER 25 MG Oral Tablet 24 Hr Take 1 tablet (25 mg total) by mouth 2x Daily(Beta Blocker).    albuterol 108 (90 Base) MCG/ACT Inhalation Aero Soln Inhale 2 puffs into the lungs every 4 to 6 hours as needed for Wheezing.

## 2025-06-17 ENCOUNTER — APPOINTMENT (OUTPATIENT)
Dept: GENERAL RADIOLOGY | Facility: HOSPITAL | Age: 75
End: 2025-06-17
Attending: INTERNAL MEDICINE
Payer: MEDICARE

## 2025-06-17 LAB
ANION GAP SERPL CALC-SCNC: 8 MMOL/L (ref 0–18)
BASE EXCESS BLD CALC-SCNC: 12.3 MMOL/L (ref ?–2)
BASOPHILS # BLD AUTO: 0.11 X10(3) UL (ref 0–0.2)
BASOPHILS NFR BLD AUTO: 0.8 %
BASOPHILS NFR BRONCH: 0 %
BUN BLD-MCNC: 19 MG/DL (ref 9–23)
BUN/CREAT SERPL: 14.1 (ref 10–20)
CALCIUM BLD-MCNC: 9.4 MG/DL (ref 8.7–10.4)
CHLORIDE SERPL-SCNC: 104 MMOL/L (ref 98–112)
CO2 SERPL-SCNC: 32 MMOL/L (ref 21–32)
CREAT BLD-MCNC: 1.35 MG/DL (ref 0.7–1.3)
DEPRECATED RDW RBC AUTO: 57.5 FL (ref 35.1–46.3)
EGFRCR SERPLBLD CKD-EPI 2021: 55 ML/MIN/1.73M2 (ref 60–?)
EOSINOPHIL # BLD AUTO: 0 X10(3) UL (ref 0–0.7)
EOSINOPHIL NFR BLD AUTO: 0 %
EOSINOPHIL NFR BRONCH: 0 %
ERYTHROCYTE [DISTWIDTH] IN BLOOD BY AUTOMATED COUNT: 14.7 % (ref 11–15)
GLUCOSE BLD-MCNC: 142 MG/DL (ref 70–99)
GLUCOSE BLDC GLUCOMTR-MCNC: 109 MG/DL (ref 70–99)
GLUCOSE BLDC GLUCOMTR-MCNC: 119 MG/DL (ref 70–99)
GLUCOSE BLDC GLUCOMTR-MCNC: 134 MG/DL (ref 70–99)
GLUCOSE BLDC GLUCOMTR-MCNC: 152 MG/DL (ref 70–99)
HCO3 BLDA-SCNC: 34.4 MEQ/L (ref 21–27)
HCT VFR BLD AUTO: 49.5 % (ref 39–53)
HGB BLD-MCNC: 14.3 G/DL (ref 13–17.5)
IMM GRANULOCYTES # BLD AUTO: 0.32 X10(3) UL (ref 0–1)
IMM GRANULOCYTES NFR BLD: 2.2 %
LYMPHOCYTES # BLD AUTO: 1.37 X10(3) UL (ref 1–4)
LYMPHOCYTES NFR BLD AUTO: 9.4 %
LYMPHOCYTES NFR BRONCH: 6 %
MCH RBC QN AUTO: 30.5 PG (ref 26–34)
MCHC RBC AUTO-ENTMCNC: 28.9 G/DL (ref 31–37)
MCV RBC AUTO: 105.5 FL (ref 80–100)
MODIFIED ALLEN TEST: POSITIVE
MONOCYTES # BLD AUTO: 1.81 X10(3) UL (ref 0.1–1)
MONOCYTES NFR BLD AUTO: 12.4 %
MONOS+MACROS NFR BRONCH: 6 %
MRSA DNA SPEC QL NAA+PROBE: NEGATIVE
NEUTROPHILS # BLD AUTO: 10.99 X10 (3) UL (ref 1.5–7.7)
NEUTROPHILS # BLD AUTO: 10.99 X10(3) UL (ref 1.5–7.7)
NEUTROPHILS NFR BLD AUTO: 75.2 %
NEUTROPHILS NFR BRONCH: 88 %
O2 CT BLD-SCNC: 18.8 VOL% (ref 15–23)
O2/TOTAL GAS SETTING VFR VENT: 40 %
OSMOLALITY SERPL CALC.SUM OF ELEC: 303 MOSM/KG (ref 275–295)
PCO2 BLDA: 51 MM HG (ref 35–45)
PEEP SETTING VENT: 5 CM H2O
PH BLDA: 7.48 [PH] (ref 7.35–7.45)
PLATELET # BLD AUTO: 162 10(3)UL (ref 150–450)
PLATELETS.RETICULATED NFR BLD AUTO: 4.5 % (ref 0–7)
PO2 BLDA: 65 MM HG (ref 80–100)
POTASSIUM SERPL-SCNC: 4.1 MMOL/L (ref 3.5–5.1)
PUNCTURE CHARGE: YES
RBC # BLD AUTO: 4.69 X10(6)UL (ref 3.8–5.8)
RBC # FLD: ABNORMAL /CUMM (ref ?–1)
RESP RATE: 14 BPM
SAO2 % BLDA: 95.1 % (ref 94–100)
SODIUM SERPL-SCNC: 144 MMOL/L (ref 136–145)
SPECIMEN VOL 24H UR: 500 ML
TOTAL CELLS COUNTED BRONCH: 1808 /CUMM (ref ?–1)
TOTAL CELLS COUNTED FLD: 100
WBC # BLD AUTO: 14.6 X10(3) UL (ref 4–11)
WBC CALC (IRIS) BRW: 1808 /CUMM

## 2025-06-17 PROCEDURE — 71045 X-RAY EXAM CHEST 1 VIEW: CPT | Performed by: INTERNAL MEDICINE

## 2025-06-17 PROCEDURE — 99291 CRITICAL CARE FIRST HOUR: CPT | Performed by: INTERNAL MEDICINE

## 2025-06-17 PROCEDURE — 0BB88ZX EXCISION OF LEFT UPPER LOBE BRONCHUS, VIA NATURAL OR ARTIFICIAL OPENING ENDOSCOPIC, DIAGNOSTIC: ICD-10-PCS | Performed by: INTERNAL MEDICINE

## 2025-06-17 PROCEDURE — 0B9J8ZX DRAINAGE OF LEFT LOWER LUNG LOBE, VIA NATURAL OR ARTIFICIAL OPENING ENDOSCOPIC, DIAGNOSTIC: ICD-10-PCS | Performed by: INTERNAL MEDICINE

## 2025-06-17 PROCEDURE — 0B9G8ZX DRAINAGE OF LEFT UPPER LUNG LOBE, VIA NATURAL OR ARTIFICIAL OPENING ENDOSCOPIC, DIAGNOSTIC: ICD-10-PCS | Performed by: INTERNAL MEDICINE

## 2025-06-17 PROCEDURE — 0BC88ZZ EXTIRPATION OF MATTER FROM LEFT UPPER LOBE BRONCHUS, VIA NATURAL OR ARTIFICIAL OPENING ENDOSCOPIC: ICD-10-PCS | Performed by: INTERNAL MEDICINE

## 2025-06-17 PROCEDURE — 99233 SBSQ HOSP IP/OBS HIGH 50: CPT | Performed by: HOSPITALIST

## 2025-06-17 RX ORDER — IPRATROPIUM BROMIDE AND ALBUTEROL SULFATE 2.5; .5 MG/3ML; MG/3ML
3 SOLUTION RESPIRATORY (INHALATION)
Status: DISCONTINUED | OUTPATIENT
Start: 2025-06-17 | End: 2025-06-20

## 2025-06-17 RX ORDER — FUROSEMIDE 10 MG/ML
40 INJECTION INTRAMUSCULAR; INTRAVENOUS ONCE
Status: COMPLETED | OUTPATIENT
Start: 2025-06-17 | End: 2025-06-17

## 2025-06-17 RX ORDER — FUROSEMIDE 10 MG/ML
40 INJECTION INTRAMUSCULAR; INTRAVENOUS DAILY
Status: DISCONTINUED | OUTPATIENT
Start: 2025-06-18 | End: 2025-06-20

## 2025-06-17 RX ORDER — FUROSEMIDE 10 MG/ML
40 INJECTION INTRAMUSCULAR; INTRAVENOUS DAILY
Status: DISCONTINUED | OUTPATIENT
Start: 2025-06-17 | End: 2025-06-17

## 2025-06-17 RX ORDER — SODIUM BICARBONATE 650 MG/1
650 TABLET ORAL AS NEEDED
Status: DISCONTINUED | OUTPATIENT
Start: 2025-06-17 | End: 2025-06-18

## 2025-06-17 RX ORDER — ACETYLCYSTEINE 200 MG/ML
2 SOLUTION ORAL; RESPIRATORY (INHALATION) EVERY 6 HOURS
Status: DISCONTINUED | OUTPATIENT
Start: 2025-06-17 | End: 2025-06-19

## 2025-06-17 RX ORDER — ACETYLCYSTEINE 200 MG/ML
2 SOLUTION ORAL; RESPIRATORY (INHALATION) 2 TIMES DAILY
Status: DISCONTINUED | OUTPATIENT
Start: 2025-06-17 | End: 2025-06-17

## 2025-06-17 NOTE — PROGRESS NOTES
Call placed to Patient's spouse Yasmin regarding consent for planned bronchoscopy today, unable to reach, left voicemail.

## 2025-06-17 NOTE — PROGRESS NOTES
[facility]  part of [facility]  Hospitalist Progress Note     Campbell Rao Patient Status:  Inpatient    1950  75 year old CSN [id number]   Location 224/224-A Attending Bernard Willingham MD   Hosp Day # 1 PCP Johny Kang MD     Assessment & Plan:   ----------------------------------  Respiratory failure, acute on chronic hypoxic and hypercapnic.  Due to COPD, possible aspiration, possible pneumonia, mucous plugging.  Oxygen requirement is still high.  Continues to be on ventilator support  -Supplemental oxygen as needed, titrate down  -Pulmonology consultation appreciated  - Bronchoscopy   -Treat contributing factors as described    Pneumonia. aspiration.  Patient is hypoxic and not septic.  Patient with + leukocytosis, no fever, ? infiltrate on chest imaging.   -IV antibiotics: Azithromycin, Zosyn  -Blood cultures: NGTD  -Monitor temperature, blood pressure and fever curve  -Pulmonology consult  -Repeat chest imaging to be decided    Acute COPD exacerbation.  Due to infection.  Patient with  hypoxia.  Symptoms improving.  Wheezing improving.  -Pulmonology consult appreciated  -Solu-Medrol 40 every 12  -Nebulizers  -Inhalers  -Supplemental oxygen as needed, titrate down as tolerated  -Antibiotics: As above  -Repeat imaging if clinical change    Acute on chronic HFpEF. Most recent EF normal. Patient with ypoxia, radiographic/physical exam evidence of pulmonary edema, peripheral edema.  Creatinine rising slowly, will need to be cautious with diuresis  -Cardiology consult appreciated  -Diuresis with iv lasix 40 today, re=eval tomorrow  -GDMT per cards  -Echo done   -Strict I/O  -Daily weights  -Low salt diet  -Monitor renal function  -Telemetry  -Supp O2 as needed, titrate off as tolerated    Other problems  Morbid obesity and BMI 45  History of colon cancer  Dyslipidemia  Type 2 diabetes    Supplementary Documentation:   DVT Mechanical Prophylaxis:   SCDs,    DVT  Pharmacologic Prophylaxis   Medication    heparin (Porcine) 5000 UNIT/ML injection 7,500 Units                Code Status: Full Code  Orellana: External urinary catheter in place  Orellana Duration (in days):   Central line:    PETRA:         **Certification      PHYSICIAN Certification of Need for Inpatient Hospitalization - Initial Certification    Patient will require inpatient services that will reasonably be expected to span two midnight's based on the clinical documentation in H+P.   Based on patients current state of illness, I anticipate that, after discharge, patient will require TBD.           I personally reviewed the available laboratories, imaging including. I discussed/will discuss the case with consultants. I ordered laboratories and/or radiographic studies. I adjusted medications as detailed above.  Medical decision making high, risk is high.    Subjective:   ----------------------------------  Intubated, sedated      Objective:   Chief Complaint:   Chief Complaint   Patient presents with    Altered Mental Status     ----------------------------------  Temp:  [96.9 °F (36.1 °C)-98.1 °F (36.7 °C)] 96.9 °F (36.1 °C)  Pulse:  [65-99] 92  Resp:  [14-28] 21  BP: ()/() 125/98  SpO2:  [91 %-100 %] 91 %  FiO2 (%):  [40 %] 40 %  Gen: Intubated sedated  HEENT: NCAT, neck supple, no carotid bruit.  CV: RRR, S1S2, and intact distal pulses. No gallop, rub, murmur.  Pulm: Decreased breath sounds at the left base, ventilated breath sounds  Abd: Soft, NTND, BS normal, no mass, no HSM, no rebound/guarding.   Neuro: Moving all 4 extremities  MS: No joint effusions.  1+ peripheral edema.  Skin: Skin is warm and dry. No rashes, erythema, diaphoresis.   Psych: Deferred    Labs:  Lab Results   Component Value Date    HGB 14.3 06/17/2025    WBC 14.6 (H) 06/17/2025    .0 06/17/2025     06/17/2025    K 4.1 06/17/2025    CREATSERUM 1.35 (H) 06/17/2025    VHCO3 37.3 (H) 01/11/2025    AST 27 06/16/2025    ALT  65 (H) 06/16/2025    TROP <0.045 04/10/2021           Scheduled Medications[1]  PRN Medications[2]             [1]    piperacillin-tazobactam  3.375 g Intravenous Q8H    ipratropium-albuterol  3 mL Nebulization 4 times per day    acetylcysteine  2 mL Nebulization BID    fentaNYL        fentaNYL  50 mcg Intravenous Once    heparin  7,500 Units Subcutaneous 2 times per day    insulin regular human  1-7 Units Subcutaneous 4 times per day    azithromycin  500 mg Intravenous Q24H    methylPREDNISolone  40 mg Intravenous Q12H    senna  10 mL Per NG Tube BID    docusate  100 mg Per NG Tube BID    chlorhexidine gluconate  15 mL Mouth/Throat BID@0800,2000    mineral oil-white petrolatum  1 Application Both Eyes Nightly    pantoprazole  40 mg Intravenous QAM AC    [Held by provider] metoprolol succinate ER  25 mg Oral 2x Daily(Beta Blocker)   [2]   fentaNYL    glucose **OR** glucose **OR** glucose-vitamin C **OR** dextrose **OR** glucose **OR** glucose **OR** glucose-vitamin C    ondansetron    metoclopramide    acetaminophen **OR** acetaminophen **OR** acetaminophen    polyethylene glycol (PEG 3350)    bisacodyl

## 2025-06-17 NOTE — RESPIRATORY THERAPY NOTE
PT  received  on the  vent  with settings  AC/VC/  RR 14/  / PEEP 5/  FIO2 40%.   ETT  size  7.5 @ 25  at the  lips. Neb tx  is done and sx done as  needed ,continue  to monitor the  PT

## 2025-06-17 NOTE — DIETARY NOTE
ADULT NUTRITION INITIAL ASSESSMENT    Pt is at high nutrition risk.  Pt does not meet malnutrition criteria.      RECOMMENDATIONS TO MD:   RD ordered EN feeds and FWF per protocol. Appropriate labs ordered for monitoring.   See Nutrition Intervention for Enteral nutrition (EN) and free water flushes (FWF) specifics     ADMITTING DIAGNOSIS:  Acute on chronic respiratory failure with hypoxia and hypercapnia (HCC) [J96.21, J96.22]  PERTINENT PAST MEDICAL HISTORY: Past Medical History[1]    PATIENT STATUS:   Initial 06/17/25: Pt assessed due to critical illness, appropriate for early initiation nutrition support. Pt presented with c/o progressive lethargy and confusion x2-3 days PTA with AMS and hallucinations. Pt with extensive PMH as listed above. Pt is orally intubated, sedated on Propofol at time of visit. No family at bedside at time of visit for diet and wt history. Pt discussed during interdisciplinary rounds. Plan for bronchoscopy today then likely to initiate EN feeds. Per RN, bed scale not functioning properly therefore recent wt from previous admission on 5/22/25 utilized for current wt. UPDATE 06/17/25 15:12: Received order to initiate EN feeds.       FOOD/NUTRITION RELATED HISTORY:  Appetite: Unknown PTA - symptom onset 2-3 days PTA  Intake: NPO since admission x2 days  Intake Meeting Needs: No  Percent Meals Eaten (last 6 days)       None        Food Allergies: No Known Food Allergies (NKFA)  Cultural/Ethnic/Buddhist Preferences: Not Obtained    GASTROINTESTINAL: +BM last unknown, OG tube, and soft,rounded, hypoactive, present  UO: 2200 ml output over the past 24 hrs  I/Os: Net +198 ml total this admission    MEDICATIONS: reviewed; Propofol @ 16.1 ml/hr (425 kcal); IV solumedrol; IV Zosyn in 300 ml D5W (15 g dextrose, 51 kcal), Zithromax in 250 ml 0.9NS;    propofol 20 mcg/kg/min (06/17/25 0809)    [Held by provider] sodium chloride Stopped (06/16/25 1421)      piperacillin-tazobactam  3.375 g Intravenous  Q8H    ipratropium-albuterol  3 mL Nebulization 4 times per day    acetylcysteine  2 mL Nebulization BID    heparin  7,500 Units Subcutaneous 2 times per day    insulin regular human  1-7 Units Subcutaneous 4 times per day    azithromycin  500 mg Intravenous Q24H    methylPREDNISolone  40 mg Intravenous Q12H    senna  10 mL Per NG Tube BID    docusate  100 mg Per NG Tube BID    chlorhexidine gluconate  15 mL Mouth/Throat BID@0800,2000    mineral oil-white petrolatum  1 Application Both Eyes Nightly    pantoprazole  40 mg Intravenous QAM AC    [Held by provider] metoprolol succinate ER  25 mg Oral 2x Daily(Beta Blocker)     LABS: reviewed; Recent A1C 6.1 on 5/19/25;   Recent Labs     06/15/25  2248 06/16/25  0449 06/17/25  0459   * 133* 142*   BUN 24* 25* 19   CREATSERUM 1.17 1.07 1.35*   CA 9.6 9.6 9.4   MG  --  2.4  --    * 147* 144   K 4.2 4.8 4.1    101 104   CO2 39.0* 38.0* 32.0   PHOS  --  1.8*  --    OSMOCALC 310* 310* 303*     WEIGHT HISTORY:  Patient Weight(s) for the past 336 hrs:   Weight   06/16/25 0250 134.2 kg (295 lb 13.7 oz)     Wt Readings from Last 10 Encounters:   06/16/25 134.2 kg (295 lb 13.7 oz)   05/22/25 134.2 kg (295 lb 12.8 oz)   03/31/25 (!) 139.5 kg (307 lb 9 oz)   03/11/25 (!) 137.2 kg (302 lb 6 oz)   02/10/25 (!) 140 kg (308 lb 10.3 oz)   01/21/25 (!) 140.6 kg (310 lb)   01/14/25 (!) 137.6 kg (303 lb 6.4 oz)   11/21/24 (!) 138.9 kg (306 lb 4.8 oz)   11/03/24 (!) 138.7 kg (305 lb 12.8 oz)   08/07/24 (!) 137 kg (302 lb)     ANTHROPOMETRICS:  HT: 172.7 cm (5'8\")   Wt Readings from Last 1 Encounters:   06/16/25 134.2 kg (295 lb 13.7 oz)   Last weight: No new wt from this admission available due malfunctioning bed scale. Wt per EMR appear relatively stable over the past 1 year ~300 lbs.   Dosing Weight: 134 kg (296 lbs) - recent weight taken on 5/22/25, utilized for anthropometric calculations  BMI: Body mass index is 44.98 kg/m².  BMI CLASSIFICATION: greater than 40 kg/m2  - morbid obesity class III  IBW/lbs (Calculated) Male: 154 lbs           192% IBW  Usual Body Wt: 300 lbs       99% UBW    NUTRITION RELATED PHYSICAL FINDINGS:  - Nutrition Focused Physical Exam (NFPE): well nourished and no wasting noted  - Fluid Accumulation: +2 Bilateral Lower extremity and Feet. See RN documentation for details  - Skin Integrity: at risk and intact. See RN documentation for details    NUTRITION DIAGNOSIS/PROBLEM:   Inadequate protein energy intake related to Decreased ability to consume sufficient energy as evidenced by orally intubated/sedated since admission x2 days, unable to take PO.     NUTRITION INTERVENTION:     NUTRITION PRESCRIPTION:   Estimated Nutrition needs: --dosing wt of 134 kg - wt taken on 5/22/25  Calories: 2227 calories/day (PSU 2010 (11.4 L/min, 36.7C Tmax, calculated 6/17/25) or 16 calories per kg Dosing wt)   8972-5490 kcal = 70-80% estimated needs 1st wk critical illness  Protein: 140-175 g protein/day (2-2.5 g protein/kg Ideal body wt (IBW) (70 kg))  Fluid Needs: 2153 ml/day (25 ml/kg adjusted BW (86 kg) for obesity)    - Diet:       Procedures    NPO      - Enteral Nutrition:   Promote at 20 ml/hr per OG tube.   Recommend advance by 10 ml q 8 hrs to initial goal rate of 45 ml/hr. Based on average 22 hour infusion time Goal rate provides 990 ml total TF volume, 990 kcal, 62 grams protein, 832 ml total free water, and 66% RDI's.    Flush with 100 ml H2O q 4 hrs (600 ml total).  Provide 5 packet(s) Prosource daily (2 q AM, 2 q PM, 1 at bedtime) (to provide 200 kcal, 55 g protein and 225 ml daily)  TF with Prosource, H2O flushes and lipid calories from propofol (425 kcal) will provide 1615 kcal (12 kcal/kg), 117 g protein (1.7 g/kg IBW) and 1657 ml fluids total daily.   Meets 73% of estimated energy and 84% of estimated protein needs.      - Nutrition Care Plan: Recommend EN (enteral nutrition) support if unable to take adequate PO safely within 1-2 days  - ONS (Oral  Nutrition Supplements)/Meals/Snacks: NPO   - Vitamin and mineral supplements: NPO and none  - Feeding assistance: NPO  - Nutrition education: not appropriate at this time  - Coordination of nutrition care: collaboration with other providers and discussed in Care Rounds   - Discharge and transfer of nutrition care to new setting or provider: monitor plans - from home with family    MONITOR AND EVALUATE/NUTRITION GOALS:  - Food and Nutrient Intake:      Monitor: Ability to safely take adequate nutrition PO  - Food and Nutrient Administration:      Monitor: enteral nutrition initiation, tolerance to enteral nutrition, adequacy of enteral nutrition, for enteral nutrition adjustment, and free water flushes adequacy and adjustment.   - Anthropometric Measurement:    Monitor weight  - Nutrition Goals:      EN + non-nutritive kcal >80% energy needs, labs within acceptable limits, euglycemia, minimize lean body mass loss, prevent skin breakdown, and support body systems    DIETITIAN FOLLOW UP: RD to follow and monitor nutrition status    Maricarmen De Souza MS, RD, LDN, [facility]  [id number]         [1]   Past Medical History:   Atherosclerosis of coronary artery    coronary calcification on ct chest    Back problem    Cataract    Chronic respiratory failure (HCC)    Congestive heart disease (HCC)    Controlled type 2 diabetes mellitus with complication, without long-term current use of insulin (HCC)    COPD (HCC)    freq exacerbations. ? 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

## 2025-06-17 NOTE — PLAN OF CARE
Problem: Patient Centered Care  Goal: Patient preferences are identified and integrated in the patient's plan of care  Description: Interventions:  - What would you like us to know as we care for you?   - Provide timely, complete, and accurate information to patient/family  - Incorporate patient and family knowledge, values, beliefs, and cultural backgrounds into the planning and delivery of care  - Encourage patient/family to participate in care and decision-making at the level they choose  - Honor patient and family perspectives and choices  Outcome: Progressing     Problem: Diabetes/Glucose Control  Goal: Glucose maintained within prescribed range  Description: INTERVENTIONS:  - Monitor Blood Glucose as ordered  - Assess for signs and symptoms of hyperglycemia and hypoglycemia  - Administer ordered medications to maintain glucose within target range  - Assess barriers to adequate nutritional intake and initiate nutrition consult as needed  - Instruct patient on self management of diabetes  Outcome: Progressing       Problem: CARDIOVASCULAR - ADULT  Goal: Maintains optimal cardiac output and hemodynamic stability  Description: INTERVENTIONS:  - Monitor vital signs, rhythm, and trends  - Monitor for bleeding, hypotension and signs of decreased cardiac output  - Evaluate effectiveness of vasoactive medications to optimize hemodynamic stability  - Monitor arterial and/or venous puncture sites for bleeding and/or hematoma  - Assess quality of pulses, skin color and temperature  - Assess for signs of decreased coronary artery perfusion - ex. Angina  - Evaluate fluid balance, assess for edema, trend weights  Outcome: Progressing  Goal: Absence of cardiac arrhythmias or at baseline  Description: INTERVENTIONS:  - Continuous cardiac monitoring, monitor vital signs, obtain 12 lead EKG if indicated  - Evaluate effectiveness of antiarrhythmic and heart rate control medications as ordered  - Initiate emergency measures  for life threatening arrhythmias  - Monitor electrolytes and administer replacement therapy as ordered  Outcome: Progressing     Problem: RESPIRATORY - ADULT  Goal: Achieves optimal ventilation and oxygenation  Description: INTERVENTIONS:  - Assess for changes in respiratory status  - Assess for changes in mentation and behavior  - Position to facilitate oxygenation and minimize respiratory effort  - Oxygen supplementation based on oxygen saturation or ABGs  - Provide Smoking Cessation handout, if applicable  - Encourage broncho-pulmonary hygiene including cough, deep breathe, Incentive Spirometry  - Assess the need for suctioning and perform as needed  - Assess and instruct to report SOB or any respiratory difficulty  - Respiratory Therapy support as indicated  - Manage/alleviate anxiety  - Monitor for signs/symptoms of CO2 retention  Outcome: Progressing

## 2025-06-17 NOTE — PLAN OF CARE
Problem: Safety Risk - Non-Violent Restraints  Goal: Patient will remain free from self-harm  Description: INTERVENTIONS:  - Apply the least restrictive restraint to prevent harm  - Notify patient and family of reasons restraints applied  - Assess for any contributing factors to confusion (electrolyte disturbances, delirium, medications)  - Discontinue any unnecessary medical devices as soon as possible  - Assess the patient's physical comfort, circulation, skin condition, hydration, nutrition and elimination needs   - Reorient and redirection as needed  - Assess for the need to continue restraints  6/16/2025 1951 by Jamison Gunter, RN  Outcome: Progressing  6/16/2025 0700 by Jamison Gunter, RN  Outcome: Progressing

## 2025-06-17 NOTE — PROGRESS NOTES
[facility]  part of [facility]    Progress Note    Campbell Rao Patient Status:  Inpatient    1950 MRN [id number]   Location [facility] 2W/SW Attending Bernard Willingham MD   Hosp Day # 1 PCP Johny Kang MD       Subjective:     Unable to perform ROS  Patient was seen and examined  Intubated and sedated on mechanical ventilation  Off sedation awake and following commands  Did not tolerate awaking or spontaneous breathing trial became tachypneic hypoxic  Mild oral endotracheal secretion  Worsening chest x-ray with left lower lobe atelectasis    Objective:   Blood pressure 117/84, pulse 84, temperature 98.1 °F (36.7 °C), temperature source Temporal, resp. rate 14, weight 295 lb 13.7 oz (134.2 kg), SpO2 94%.  Physical Exam  Constitutional:       General: He is in acute distress.      Appearance: He is ill-appearing.   HENT:      Head: Atraumatic.      Nose: Nose normal.      Mouth/Throat:      Mouth: Mucous membranes are moist.   Eyes:      General: No scleral icterus.  Cardiovascular:      Rate and Rhythm: Normal rate.      Heart sounds:      No gallop.   Pulmonary:      Comments: Diminished breath sounds in the left lower lung field  Crackles in the left lung  Prolonged expiration  No wheezes or rhonchi  Abdominal:      General: Bowel sounds are normal. There is distension.      Palpations: Abdomen is soft.      Tenderness: There is no guarding or rebound.   Musculoskeletal:      Right lower leg: Edema present.      Left lower leg: Edema present.   Neurological:      General: No focal deficit present.         Results:   Lab Results   Component Value Date    WBC 14.6 (H) 2025    HGB 14.3 2025    HCT 49.5 2025    .0 2025    CREATSERUM 1.35 (H) 2025    BUN 19 2025     2025    K 4.1 2025     2025    CO2 32.0 2025     (H) 2025    CA 9.4 2025    ALB 4.1 2025    ALKPHO 98  06/16/2025    BILT 0.8 06/16/2025    TP 6.2 06/16/2025    AST 27 06/16/2025    ALT 65 (H) 06/16/2025    TSH 2.777 04/18/2024    GGT 38 04/18/2024    DDIMER 0.40 10/18/2016    CRP 0.40 01/11/2025    MG 2.4 06/16/2025    PHOS 1.8 (L) 06/16/2025    TROP <0.045 04/10/2021    TROPHS 5 05/19/2025    B12 765 04/27/2021       XR CHEST AP PORTABLE  (CPT=71045)  Result Date: 6/17/2025  CONCLUSION:   Increasing opacities in the left lower lung suggesting enlarging pleural effusion and worsening atelectasis with or without superimposed pneumonia.  Pulmonary edema again seen.  Unchanged endotracheal and esophagogastric tubes.     Dictated by (CST): Davey Raymond MD on 6/17/2025 at 7:39 AM     Finalized by (CST): Davey Raymond MD on 6/17/2025 at 7:42 AM          XR CHEST AP PORTABLE  (CPT=71045)  Result Date: 6/16/2025  CONCLUSION:   Post placement of a nasogastric tube with tip and side hole within the stomach and below the diaphragm.  Mild interstitial edema, slightly improved.  Patchy left lower lobe airspace opacity with a trace left pleural effusion has improved but not resolved and is suggestive of improving atelectasis. Underlying pneumonia can have a similar appearance.   Dictated by (CST): Ranulfo Rollins MD on 6/16/2025 at 3:32 PM     Finalized by (CST): Ranulfo Rollins MD on 6/16/2025 at 3:33 PM          CT CHEST(CONTRAST ONLY) (CPT=71260)  Result Date: 6/16/2025  CONCLUSION:  1. No suspicious mediastinal mass.  Mediastinal prominence on previous day chest radiograph likely relates to a combination of tortuous vasculature and mediastinal fat. 2. Endotracheal tube tip is located 0.8 cm above the level of the jg.  Suggest retraction by 2-3 cm. 3. Left lower lobe airspace disease with intrinsic air bronchograms and probable distal endobronchial impaction.  Consider infection/aspiration or atelectasis from mucous plugging and suggest follow-up to resolution. 4. Additional multifocal discoid atelectasis or scarring throughout  both lungs. 5. No significant pleural effusion. 6. Cardiomegaly with multi-vessel coronary artery calcification. Dilatation of the main pulmonary artery trunk may relate to underlying pulmonary hypertension. 7. Chronic-appearing T7 vertebral body compression fracture with kyphoplasty at this level. 8. Subacute-appearing right anterior 5th rib fracture with surrounding callus.  There is also a subacute chronic-appearing right posterior 3rd rib fracture.  Please correlate for any history of recent right rib trauma. 9. Mild paraseptal emphysema noted. 10. Fatty liver. 11. Lesser incidental findings as above.     A preliminary report was issued by the [facility] teleradiology service. There are no major discrepancies.  elm-remote  Dictated by (CST): Ross Cole MD on 6/16/2025 at 1:17 PM     Finalized by (CST): Ross Cole MD on 6/16/2025 at 1:26 PM          XR CHEST AP PORTABLE  (CPT=71045)  Result Date: 6/16/2025  CONCLUSION:   Endotracheal tube with tip at the level of the jg.  Retraction by 2 -3 cm suggested.  Patchy left lower lobe airspace opacity with a small left pleural effusion suggestive of atelectasis and is unchanged.  Mild interstitial edema is otherwise unchanged.    Dictated by (CST): Ranulfo Rollins MD on 6/16/2025 at 12:55 PM     Finalized by (CST): Ranulfo Rollins MD on 6/16/2025 at 12:56 PM          XR CHEST AP PORTABLE  (CPT=71045)  Result Date: 6/16/2025  CONCLUSION:  1. Left suprahilar prominence and prominence of the right paratracheal stripe.  Although findings may relate to tortuous mediastinal vasculature, suggest further evaluation with a contrast enhanced chest CT to exclude a mediastinal mass. 2. Multifocal streaky left lung opacities could relate to any combination of atelectasis or infection/aspiration.  Possible small left pleural effusion. 3. Endotracheal tube tip projects 2 cm above the level of the jg. 4. Lesser incidental findings as above.   A preliminary report was  issued by the [facility] teleradiology service. There are no major discrepancies.  elm-remote  Dictated by (CST): Ross Cole MD on 6/16/2025 at 10:28 AM     Finalized by (CST): Ross Cole MD on 6/16/2025 at 10:31 AM                Assessment & Plan:     1-acute on chronic hypercapnic and hypoxemic respiratory failure  Required intubation in ER  6/16/25 with  PH 7.12 and PCO2 119 and pO2 118  Decompensated overlap syndrome with severe COPD and MARIA M  Acute LLL pneumonia / ? Aspiration      Vent support and management  Currently on 40% FiO2 did not tolerate waking or SBT with tachycardia and tachypnea and hypoxia  COPD exacerbation therapy with neb and steroid  CXR still significant left lower lobe infiltrate and atelectasis  Antibiotics and suctioning of secretion  Nebulizers and bronchodilator  Bronchoscopy today     2-severe COPD with acute exacerbation with acute infiltrate in the left lung indicating possible aspiration pneumonitis  Chronically on 4 L of oxygen  IV steroid and bronchodilator and nebulizers  Antibiotics with Unasyn and azithromycin  Chest CT with extensive left lower lobe infiltrate and atelectasis  Chest x-ray today still with left lower lobe atelectasis and consolidation  Bronchoscopy today     3-obesity BMI 45 and MARIA M on CPAP  Now intubated     4-HTN, HL, obesity, CAD,HFpEF      5-DVT prophylaxis  Heparin subcu    6- Full code       D/w staff   D/w wife she is agreeable for bronchoscopy      Upon my evaluation, this patient had a high probability of imminent or life-threatening deterioration due to critical findings of laboratory tests, hypoxemia, and respiratory failure, which required my direct attention, intervention, and personal management.    I have personally provided 35  minutes of critical care time, exclusive of time spent on separately billable procedures.  Time includes review of all pertinent laboratory/radiology results, discussion with consultants, and monitoring  for potential decompensation.  Performed interventions included managing mechanical ventilation.        Yesi Salinas MD  6/17/2025

## 2025-06-17 NOTE — RESPIRATORY THERAPY NOTE
Per Dr. Salinas keep Peep +7, titrated fio2 down and maintain ETT to 24 @lips. Pt had bronchoscopy done at bedside. No adverse effect. Tolerated well. Will continue to monitor.

## 2025-06-17 NOTE — CM/SW NOTE
06/17/25 1300   CM/SW Referral Data   Referral Source    Reason for Referral Readmission;Discharge planning   Informant Spouse/Significant Other;Daughter   Readmission Assessment   Factors that patient feels contributed to this readmission Acute/Chronic Clinical Presentation   Pt's living situation prior to admission? Home with family   Pt's level of independence at discharge? No assist/independent (minimal)   Pt. received education on diagnoses at time of discharge? Yes   Did you know who and how to call someone if you felt worse? Yes   Did any new symptoms or issues develop after you were discharged? Yes   ----Post D/C symptoms: Symptoms/issue related to previous hospitalization Yes   Did you understand your discharge instructions? Yes   Were medications taken as indicated on discharge instructions? Yes   Was full assessment completed by SW/CM on prior admission? Yes   Was the recommended discharge plan achieved? Yes   Was pt. discharged w/out services? No   Medical Hx   Does patient have an established PCP? Yes  (Johny Kang)   Patient Info   Patient's Current Mental Status at Time of Assessment Confused or unable to complete assessment   Patient's Home Environment House   Number of Levels in Home 1   Number of Stair in Home 2 MOLLY   Patient lives with Spouse/Significant other   Patient Status Prior to Admission   Independent with ADLs and Mobility Yes   Services in place prior to admission DME/Supplies at home   Type of DME/Supplies Wheeled Walker   Discharge Needs   Anticipated D/C needs To be determined     Pt discussed during nursing rounds. Dx scute hypoxic and hypercapnic respiratory failure 2/2 severe COPD and possible aspiration pneumonitis, currently intubated on sedation (4L via NC is baseline), bronchoscopy scheduled later today. Home w/spouse, independent w/RW prior to admission. Current w/HME for home O2.  Pt recently discharged from [facility] for [place], no HH  was assigned at [facility]. Pt's spouse Yasmin, who is POC for dc planning, notified  that patient would be open to return for [place] at [facility] if recommended. PT/OT evals will be ordered once appropriate.     6/18 at 1335: Pt extubated, now on 10L high flow (4-5L baseline). Pt started on PO diet. PT/OT evals scheduled for Thursday.    6/20 at 1320: Pt now on 10-12L O2 w/activity (4-5L baseline). [place] referral sent to [facility] per pt and spouse's request. Facility able to accept and reserved in [facility]. [facility] [facility] placed on will call from 6/21-24, PCS updated.    Plan: [facility] for [place] pending medical clearance.    / to remain available for support and/or discharge planning.     Joni Toussaint, AGUILARN    [phone]

## 2025-06-17 NOTE — PROCEDURES
[facility]  part of [facility]     Bronchoscopy Procedure Report     Preop diagnosis:       Respiratory failure with left lower lobe atelectasis and aspiration pneumonia  Postop diagnosis:      Same    ASA class: IV    Informed Consent:  Informed consent was obtained. Risks/benefits/alternatives were discussed with the patient. All questions were answered.     Procedure performed: Bronchoscopy with endobronchial biopsies and BAL and suctioning of foreign body / aspiration  .    Anesthesia:  Patient already intubated on propofol on mechanical ventilation  Patient was giving 100 mcg of fentanyl     Description of procedure: HR, BP, Sats monitored throughout the procedure.                                              Patient intubated on mechanical ventilation    Bronchoscope was inserted via ET tube    ET tube 2 to 3 cm above the jg  Significant tracheal inflammation and secretions suctioned with multiple attempt  Severe diffuse airway inflammation in both lungs    Right lung ; patent airway, right mainstem and bronchus intermedius patent /right upper lobe and right middle lobe and right lower lobe all patent with no endobronchial lesion seen                      Severe COPD changes with collapsible airway and severe airway inflammation .    Left lung ; left mainstem patent                   Organized mucous plug /foreign body and friable material , completely obstructing the lingula on part of left upper lobe bronchus with severe surrounding inflammation and nodularities , easily bleeding                   Extensive effort to suction this large organized mucous plug and foreign body with suctioning and  installing Mucomyst and utilizing forceps biopsies with multiple passes                     Left lower lobe with severe inflammatory changes and collapsible airway but overall patent            Samples obtained:              1- BAL LLL   2- BAL DEZ  3-endobronchial biopsies of the left upper  lobe lesions and foreign body material with multiple passes    Estimated Blood Loss:   25 ml     No post procedure CXR necessary.    Patient tolerated the procedure well and was transferred to the recovery area.

## 2025-06-17 NOTE — RESPIRATORY THERAPY NOTE
Pt received intubated and on ventilator with the following vent settings: VC/AC 14/500/40%/+5. Nebs given as scheduled, bilateral breath sounds on auscultation, suctioned  and ETT rotated. No resp. Distress noted will continue to monitor.

## 2025-06-17 NOTE — PLAN OF CARE
Problem: RESPIRATORY - ADULT  Goal: Achieves optimal ventilation and oxygenation  Description: INTERVENTIONS:  - Assess for changes in respiratory status  - Assess for changes in mentation and behavior  - Position to facilitate oxygenation and minimize respiratory effort  - Oxygen supplementation based on oxygen saturation or ABGs  - Provide Smoking Cessation handout, if applicable  - Encourage broncho-pulmonary hygiene including cough, deep breathe, Incentive Spirometry  - Assess the need for suctioning and perform as needed  - Assess and instruct to report SOB or any respiratory difficulty  - Respiratory Therapy support as indicated  - Manage/alleviate anxiety  - Monitor for signs/symptoms of CO2 retention  Outcome: Progressing    Received patient on full vent support of AC/VC 14/500/+7/60%,   During bronch increased fio2 60% from 40%, so fio2 back on 40%, jake. Breath sound auscultated and suction the patient when indicated, no other changes/acute events on this shift.       06/17/25 1700   Vent Information   Interface Invasive   Vent Type    Vent plugged into main power? Yes   Vent ID    Vent Mode VC/AC   Settings   FiO2 (%) (S)  40 %   Resp Rate (Set) 14   Vt (Set, mL) 500 mL   Waveform Decelerating ramp   PEEP/CPAP (cm H2O) 7 cm H20   Insp Flow (L/sec) 60 L/sec   Trigger Sensitivity Flow (L/min) 3 L/min   Humidification Heat and moisture exchanger   Readings   Total RR 20   Minute Ventilation (L/min) 11.6 L/min   Expiratory Tidal Volume 560 mL   PIP Observed (cm H2O) 25 cm H2O   MAP (cm H2O) 14   I/E Ratio 1:1.9   Plateau Pressure (cm H2O) 21 cm H2O   Static Compliance (L/cm H2O) 34   Dynamic Compliance (L/cm H2O) 32 L/cm H2O   Alarms   High RR 40   Insp Pressure High (cm H2O) 50 cm H2O   MV High (L/min) 20 L/min   MV Low (L/min) 3 L/min   Apnea Interval (sec) 20 seconds   Apnea Rate 14   Apnea Volume (mL) 500 mL

## 2025-06-18 ENCOUNTER — APPOINTMENT (OUTPATIENT)
Dept: GENERAL RADIOLOGY | Facility: HOSPITAL | Age: 75
End: 2025-06-18
Attending: INTERNAL MEDICINE
Payer: MEDICARE

## 2025-06-18 LAB
ANION GAP SERPL CALC-SCNC: 7 MMOL/L (ref 0–18)
BASOPHILS # BLD AUTO: 0.05 X10(3) UL (ref 0–0.2)
BASOPHILS NFR BLD AUTO: 0.4 %
BUN BLD-MCNC: 43 MG/DL (ref 9–23)
BUN/CREAT SERPL: 22.6 (ref 10–20)
CALCIUM BLD-MCNC: 8.8 MG/DL (ref 8.7–10.4)
CHLORIDE SERPL-SCNC: 104 MMOL/L (ref 98–112)
CO2 SERPL-SCNC: 33 MMOL/L (ref 21–32)
CREAT BLD-MCNC: 1.9 MG/DL (ref 0.7–1.3)
DEPRECATED RDW RBC AUTO: 54.3 FL (ref 35.1–46.3)
EGFRCR SERPLBLD CKD-EPI 2021: 37 ML/MIN/1.73M2 (ref 60–?)
EOSINOPHIL # BLD AUTO: 0 X10(3) UL (ref 0–0.7)
EOSINOPHIL NFR BLD AUTO: 0 %
ERYTHROCYTE [DISTWIDTH] IN BLOOD BY AUTOMATED COUNT: 15.2 % (ref 11–15)
GLUCOSE BLD-MCNC: 183 MG/DL (ref 70–99)
GLUCOSE BLDC GLUCOMTR-MCNC: 120 MG/DL (ref 70–99)
GLUCOSE BLDC GLUCOMTR-MCNC: 149 MG/DL (ref 70–99)
GLUCOSE BLDC GLUCOMTR-MCNC: 188 MG/DL (ref 70–99)
GLUCOSE BLDC GLUCOMTR-MCNC: 93 MG/DL (ref 70–99)
HCT VFR BLD AUTO: 41.9 % (ref 39–53)
HGB BLD-MCNC: 12.9 G/DL (ref 13–17.5)
IMM GRANULOCYTES # BLD AUTO: 0.4 X10(3) UL (ref 0–1)
IMM GRANULOCYTES NFR BLD: 2.9 %
LYMPHOCYTES # BLD AUTO: 0.76 X10(3) UL (ref 1–4)
LYMPHOCYTES NFR BLD AUTO: 5.6 %
MAGNESIUM SERPL-MCNC: 2.4 MG/DL (ref 1.6–2.6)
MCH RBC QN AUTO: 30.4 PG (ref 26–34)
MCHC RBC AUTO-ENTMCNC: 30.8 G/DL (ref 31–37)
MCV RBC AUTO: 98.8 FL (ref 80–100)
MONOCYTES # BLD AUTO: 1.13 X10(3) UL (ref 0.1–1)
MONOCYTES NFR BLD AUTO: 8.3 %
NEUTROPHILS # BLD AUTO: 11.34 X10 (3) UL (ref 1.5–7.7)
NEUTROPHILS # BLD AUTO: 11.34 X10(3) UL (ref 1.5–7.7)
NEUTROPHILS NFR BLD AUTO: 82.8 %
OSMOLALITY SERPL CALC.SUM OF ELEC: 314 MOSM/KG (ref 275–295)
PHOSPHATE SERPL-MCNC: 4.2 MG/DL (ref 2.4–5.1)
PLATELET # BLD AUTO: 156 10(3)UL (ref 150–450)
POTASSIUM SERPL-SCNC: 3.9 MMOL/L (ref 3.5–5.1)
RBC # BLD AUTO: 4.24 X10(6)UL (ref 3.8–5.8)
SODIUM SERPL-SCNC: 144 MMOL/L (ref 136–145)
WBC # BLD AUTO: 13.7 X10(3) UL (ref 4–11)

## 2025-06-18 PROCEDURE — 5A0935A ASSISTANCE WITH RESPIRATORY VENTILATION, LESS THAN 24 CONSECUTIVE HOURS, HIGH NASAL FLOW/VELOCITY: ICD-10-PCS | Performed by: HOSPITALIST

## 2025-06-18 PROCEDURE — 71045 X-RAY EXAM CHEST 1 VIEW: CPT | Performed by: INTERNAL MEDICINE

## 2025-06-18 PROCEDURE — 99233 SBSQ HOSP IP/OBS HIGH 50: CPT | Performed by: HOSPITALIST

## 2025-06-18 PROCEDURE — 99291 CRITICAL CARE FIRST HOUR: CPT | Performed by: INTERNAL MEDICINE

## 2025-06-18 NOTE — PLAN OF CARE
Problem: Safety Risk - Non-Violent Restraints  Goal: Patient will remain free from self-harm  Description: INTERVENTIONS:  - Apply the least restrictive restraint to prevent harm  - Notify patient and family of reasons restraints applied  - Assess for any contributing factors to confusion (electrolyte disturbances, delirium, medications)  - Discontinue any unnecessary medical devices as soon as possible  - Assess the patient's physical comfort, circulation, skin condition, hydration, nutrition and elimination needs   - Reorient and redirection as needed  - Assess for the need to continue restraints  Outcome: Progressing     Problem: Patient Centered Care  Goal: Patient preferences are identified and integrated in the patient's plan of care  Description: Interventions:  - Provide timely, complete, and accurate information to patient/family  - Incorporate patient and family knowledge, values, beliefs, and cultural backgrounds into the planning and delivery of care  - Encourage patient/family to participate in care and decision-making at the level they choose  - Honor patient and family perspectives and choices  Outcome: Progressing     Problem: Diabetes/Glucose Control  Goal: Glucose maintained within prescribed range  Description: INTERVENTIONS:  - Monitor Blood Glucose as ordered  - Assess for signs and symptoms of hyperglycemia and hypoglycemia  - Administer ordered medications to maintain glucose within target range  - Assess barriers to adequate nutritional intake and initiate nutrition consult as needed  - Instruct patient on self management of diabetes  Outcome: Progressing     Problem: Patient/Family Goals  Goal: Patient/Family Long Term Goal  Description: Patient's Long Term Goal: to be discharged home    Interventions:  - Completion of IV antibiotics  - Wean off Ventilator / O2 as tolerated/indicated and ordered per MD  - See additional Care Plan goals for specific interventions  Outcome:  Progressing  Goal: Patient/Family Short Term Goal  Description: Patient's Short Term Goal: to be comfortable    Interventions:   - Reposition Q2HR /PRN  - Oral and ETT suctioning and care Q4HR /PRN  - See additional Care Plan goals for specific interventions  Outcome: Progressing     Problem: CARDIOVASCULAR - ADULT  Goal: Maintains optimal cardiac output and hemodynamic stability  Description: INTERVENTIONS:  - Monitor vital signs, rhythm, and trends  - Monitor for bleeding, hypotension and signs of decreased cardiac output  - Evaluate effectiveness of vasoactive medications to optimize hemodynamic stability  - Monitor arterial and/or venous puncture sites for bleeding and/or hematoma  - Assess quality of pulses, skin color and temperature  - Assess for signs of decreased coronary artery perfusion - ex. Angina  - Evaluate fluid balance, assess for edema, trend weights  Outcome: Progressing  Goal: Absence of cardiac arrhythmias or at baseline  Description: INTERVENTIONS:  - Continuous cardiac monitoring, monitor vital signs, obtain 12 lead EKG if indicated  - Evaluate effectiveness of antiarrhythmic and heart rate control medications as ordered  - Initiate emergency measures for life threatening arrhythmias  - Monitor electrolytes and administer replacement therapy as ordered  Outcome: Progressing     Problem: RESPIRATORY - ADULT  Goal: Achieves optimal ventilation and oxygenation  Description: INTERVENTIONS:  - Assess for changes in respiratory status  - Assess for changes in mentation and behavior  - Position to facilitate oxygenation and minimize respiratory effort  - Oxygen supplementation based on oxygen saturation or ABGs  - Provide Smoking Cessation handout, if applicable  - Encourage broncho-pulmonary hygiene including cough, deep breathe, Incentive Spirometry  - Assess the need for suctioning and perform as needed  - Assess and instruct to report SOB or any respiratory difficulty  - Respiratory Therapy  support as indicated  - Manage/alleviate anxiety  - Monitor for signs/symptoms of CO2 retention  Outcome: Not Progressing    Received patient at 0700, sedated on propofol gtt and on mechanical ventilator. SAT done today; patient opens eyes, able to track and follow commands. NSR/ST on tele-monitor. No SBT today as per Dr Salinas. Propofol gtt restarted as documented on MAR. Scheduled bronchoscopy done today as per Dr Salinas, Telephone verbal consent obtained and reviewed with patient's spouse Yasmin. Post-bronch, ETT adjusted to 24cm, Peep 7+ and FIO2 60% per RT as per Dr Salinas; Patient remains on AC FIO2 40% with Peep 7+. Kaushik SWR in place for patient safety and prevention of removal of therapeutic lines. Patient with episode of desaturation to SPO2 87% while supine and in left side-lying position during linen change, 100% O2 provided, patient recovered upon reposition, SPO2 92%. OGT patent, tolerating tube feeding ordered per MD. External cath to suction, patent. Patient's spouse updated in patient's condition and plan of care, bed locked in lowest position, call light within reach.

## 2025-06-18 NOTE — PROGRESS NOTES
[facility]  part of [facility]    Progress Note    Campbell Rao Patient Status:  Inpatient    1950 MRN [id number]   Location [facility] 2W/SW Attending Nichol Bates MD   Hosp Day # 2 PCP Johny Kang MD       Subjective:   Subjective:  Seen and examined several times today  Earlier intubated and sedated on mechanical ventilation  Spontaneous awakening trial and patient was awake and alert moving extremities and following commands  Mild endotracheal secretion  I watched the patient on SBT with CPAP of 5 and a pressure support of 5 for 45 minutes and he tolerated  Patient extubated successfully at 10:30 AM  Patient was able to cough up thick white material was sent for analysis  Was awake and alert and overall doing well on 5 L of oxygen  Objective:   Blood pressure 134/76, pulse 98, temperature 96.7 °F (35.9 °C), temperature source Temporal, resp. rate 26, height 5' 8\" (1.727 m), weight 294 lb 8.6 oz (133.6 kg), SpO2 98%.  Physical Exam  Vitals and nursing note reviewed.   Constitutional:       General: He is not in acute distress.     Appearance: He is ill-appearing.   HENT:      Head: Atraumatic.      Nose: Nose normal.      Mouth/Throat:      Mouth: Mucous membranes are moist.   Eyes:      General: No scleral icterus.  Cardiovascular:      Rate and Rhythm: Normal rate.      Heart sounds:      No gallop.   Pulmonary:      Comments: Still diminished breath sounds in the left apex but overall better air exchange and more wheezes in the left side with no rales or rhonchi  Abdominal:      General: Abdomen is flat. Bowel sounds are normal. There is no distension.      Palpations: Abdomen is soft.      Tenderness: There is no guarding or rebound.   Musculoskeletal:      Right lower leg: Edema present.      Left lower leg: Edema present.   Lymphadenopathy:      Cervical: No cervical adenopathy.   Skin:     General: Skin is dry.   Neurological:      General: No  focal deficit present.      Mental Status: He is oriented to person, place, and time.         Results:   Lab Results   Component Value Date    WBC 13.7 (H) 06/18/2025    HGB 12.9 (L) 06/18/2025    HCT 41.9 06/18/2025    .0 06/18/2025    CREATSERUM 1.90 (H) 06/18/2025    BUN 43 (H) 06/18/2025     06/18/2025    K 3.9 06/18/2025     06/18/2025    CO2 33.0 (H) 06/18/2025     (H) 06/18/2025    CA 8.8 06/18/2025    ALB 4.1 06/16/2025    ALKPHO 98 06/16/2025    BILT 0.8 06/16/2025    TP 6.2 06/16/2025    AST 27 06/16/2025    ALT 65 (H) 06/16/2025    TSH 2.777 04/18/2024    GGT 38 04/18/2024    DDIMER 0.40 10/18/2016    CRP 0.40 01/11/2025    MG 2.4 06/18/2025    PHOS 4.2 06/18/2025    TROP <0.045 04/10/2021    TROPHS 5 05/19/2025    B12 765 04/27/2021       XR CHEST AP PORTABLE  (CPT=71045)  Result Date: 6/18/2025  CONCLUSION: No new abnormality.  Persistent left lower lung opacities, correlating with atelectasis noted on the prior CT.  Unchanged endotracheal and esophagogastric tubes.      Dictated by (CST): Davey Raymond MD on 6/18/2025 at 8:04 AM     Finalized by (CST): Davey Raymond MD on 6/18/2025 at 8:10 AM          XR CHEST AP PORTABLE  (CPT=71045)  Result Date: 6/17/2025  CONCLUSION:   Increasing opacities in the left lower lung suggesting enlarging pleural effusion and worsening atelectasis with or without superimposed pneumonia.  Pulmonary edema again seen.  Unchanged endotracheal and esophagogastric tubes.     Dictated by (CST): Davey Raymond MD on 6/17/2025 at 7:39 AM     Finalized by (CST): Davey Raymond MD on 6/17/2025 at 7:42 AM          XR CHEST AP PORTABLE  (CPT=71045)  Result Date: 6/16/2025  CONCLUSION:   Post placement of a nasogastric tube with tip and side hole within the stomach and below the diaphragm.  Mild interstitial edema, slightly improved.  Patchy left lower lobe airspace opacity with a trace left pleural effusion has improved but not resolved and is suggestive of  improving atelectasis. Underlying pneumonia can have a similar appearance.   Dictated by (CST): Ranulfo Rollins MD on 6/16/2025 at 3:32 PM     Finalized by (CST): Ranulfo Rollins MD on 6/16/2025 at 3:33 PM          XR CHEST AP PORTABLE  (CPT=71045)  Result Date: 6/16/2025  CONCLUSION:   Endotracheal tube with tip at the level of the jg.  Retraction by 2 -3 cm suggested.  Patchy left lower lobe airspace opacity with a small left pleural effusion suggestive of atelectasis and is unchanged.  Mild interstitial edema is otherwise unchanged.    Dictated by (CST): Ranulfo Rollins MD on 6/16/2025 at 12:55 PM     Finalized by (CST): Ranulfo Rollins MD on 6/16/2025 at 12:56 PM                Assessment & Plan:       1-acute on chronic hypercapnic and hypoxemic respiratory failure  Required intubation in ER  6/16/25 with  PH 7.12 and PCO2 119 and pO2 118    -Decompensated overlap syndrome with severe COPD and MARIA M with morbid obesity  -Aspiration pneumonia    S/p bronchoscopy 6/17/25 with large organized mucous plug and foreign body /?food material  Obstructing left upper lobe mainly the lingula with extensive effort to aspirate with multiple suctioning and using forceps with multiple passes .  Today patient comfortable large white piece after extubation    Tolerated SBT as discussed above  Extubated successfully at 10:30 AM today  Continue with bronchial hygiene with bronchodilator and Mucomyst  Aspiration precautions  Antibiotics with Zosyn     2-severe COPD with acute exacerbation  / Chronically on 4 L of oxygen  IV steroid and bronchodilator and nebulizers , Zosyn     3-morbid obesity BMI 45 and MARIA M on CPAP  Pap nightly      4-HTN, HL, obesity, CAD,HFpEF      5-DVT prophylaxis  Heparin subcu     6- Full code         D/w staff and RT     Upon my evaluation, this patient had a high probability of imminent or life-threatening deterioration due to hypoxemia and respiratory failure, which required my direct attention, intervention, and  personal management.    I have personally provided 32 minutes of critical care time, exclusive of time spent on separately billable procedures.  Time includes review of all pertinent laboratory/radiology results, discussion with consultants, and monitoring for potential decompensation.  Performed interventions included managing mechanical ventilation.         Yesi Salinas MD  6/18/2025

## 2025-06-18 NOTE — PROGRESS NOTES
[facility]  part of [facility]  Hospitalist Progress Note     Campbell Rao Patient Status:  Inpatient    1950  75 year old CSN [id number]   Location 224/224-A Attending Bernard Willingham MD   Hosp Day # 2 PCP Johny Kang MD     Assessment & Plan:   ----------------------------------  Respiratory failure, acute on chronic hypoxic and hypercapnic.  Due to COPD, possible aspiration, possible pneumonia, mucous plugging and decompensated overlap syndrome with severe COPD and MARIA M with morbid obesity, noncompliant with CPAP.    -s/p ventilator support  -Pulmonology consultation appreciated  - Bronchoscopy  with large organized mucous plug and foreign body /?food material. Obstructing left upper lobe mainly the lingula with extensive effort to aspirate with multiple suctioning and using forceps with multiple passes (per pulmonary report).  - Patient was able to cough up thick white material was sent for analysis   -now extubated, doing well    Pneumonia. aspiration.  Patient is hypoxic and not septic.  Patient with + leukocytosis, no fever, ? infiltrate on chest imaging.   -IV antibiotics: Azithromycin, Zosyn  -Blood cultures: NGTD  -Monitor temperature, blood pressure and fever curve  -Pulmonology consult  -Repeat chest imaging to be decided    Acute COPD exacerbation.  Due to infection.  Patient with  hypoxia.  Symptoms improving.  Wheezing improving.  -Pulmonology consult appreciated  -Solu-Medrol 40 every 12  -Nebulizers  -Inhalers  -Supplemental oxygen as needed, titrate down as tolerated  -Antibiotics: As above  -Repeat imaging if clinical change    Acute on chronic HFpEF. Most recent EF normal. Patient with ypoxia, radiographic/physical exam evidence of pulmonary edema, peripheral edema.  Creatinine rising slowly, will need to be cautious with diuresis  -Cardiology consult appreciated  -Diuresis with iv lasix 40 today, re=eval tomorrow  -GDMT per cards  -Echo done  2/25  -Strict I/O  -Daily weights  -Low salt diet  -Monitor renal function  -Telemetry  -Supp O2 as needed, titrate off as tolerated    Other problems  Morbid obesity and BMI 45  History of colon cancer  Dyslipidemia  Type 2 diabetes    Social: home with wife    Dispo: PT/OT eval, likely rehab, wife concern about discharge medications, states the last time pt was discharged without trelegy and potassium supplements    Supplementary Documentation:   DVT Mechanical Prophylaxis:   SCDs,    DVT Pharmacologic Prophylaxis   Medication    heparin (Porcine) 5000 UNIT/ML injection 7,500 Units                Code Status: Full Code  Orellana: External urinary catheter in place  Orellana Duration (in days):   Central line:    PETRA:         **Certification      PHYSICIAN Certification of Need for Inpatient Hospitalization - Initial Certification    Patient will require inpatient services that will reasonably be expected to span two midnight's based on the clinical documentation in H+P.   Based on patients current state of illness, I anticipate that, after discharge, patient will require TBD.           I personally reviewed the available laboratories, imaging including. I discussed/will discuss the case with consultants. I ordered laboratories and/or radiographic studies. I adjusted medications as detailed above.  Medical decision making high, risk is high.    Subjective:   ----------------------------------  extubated, doing well  Wife at bedside      Objective:   Chief Complaint:   Chief Complaint   Patient presents with    Altered Mental Status     ----------------------------------  Temp:  [96.7 °F (35.9 °C)-98.6 °F (37 °C)] 98.6 °F (37 °C)  Pulse:  [76-98] 94  Resp:  [14-33] 28  BP: (100-149)/(63-98) 116/98  SpO2:  [87 %-100 %] 94 %  FiO2 (%):  [35 %-40 %] 35 %  Gen: AOx3, interactive, engaging in conversation  HEENT: NCAT, neck supple, no carotid bruit.  CV: RRR, S1S2, and intact distal pulses. No gallop, rub, murmur.  Pulm:  Decreased breath sounds at the left base   Abd: Soft, NTND, BS normal, no mass, no HSM, no rebound/guarding.   Neuro: Moving all 4 extremities  MS: No joint effusions.  1+ peripheral edema.  Skin: Skin is warm and dry. No rashes, erythema, diaphoresis.   Psych: calm    Labs:  Lab Results   Component Value Date    HGB 12.9 (L) 06/18/2025    WBC 13.7 (H) 06/18/2025    .0 06/18/2025     06/18/2025    K 3.9 06/18/2025    CREATSERUM 1.90 (H) 06/18/2025    VHCO3 37.3 (H) 01/11/2025    AST 27 06/16/2025    ALT 65 (H) 06/16/2025    TROP <0.045 04/10/2021           Scheduled Medications[1]  PRN Medications[2]             [1]    piperacillin-tazobactam  3.375 g Intravenous Q8H    ipratropium-albuterol  3 mL Nebulization 4 times per day    acetylcysteine  2 mL Nebulization q6h    furosemide  40 mg Intravenous Daily    heparin  7,500 Units Subcutaneous 2 times per day    insulin regular human  1-7 Units Subcutaneous 4 times per day    methylPREDNISolone  40 mg Intravenous Q12H    senna  10 mL Per NG Tube BID    docusate  100 mg Per NG Tube BID    pantoprazole  40 mg Intravenous QAM AC    [Held by provider] metoprolol succinate ER  25 mg Oral 2x Daily(Beta Blocker)   [2]   glucose **OR** glucose **OR** glucose-vitamin C **OR** dextrose **OR** glucose **OR** glucose **OR** glucose-vitamin C    ondansetron    metoclopramide    acetaminophen **OR** acetaminophen **OR** acetaminophen    polyethylene glycol (PEG 3350)    bisacodyl

## 2025-06-18 NOTE — PLAN OF CARE
Problem: RESPIRATORY - ADULT  Goal: Achieves optimal ventilation and oxygenation  Description: INTERVENTIONS:  - Assess for changes in respiratory status  - Assess for changes in mentation and behavior  - Position to facilitate oxygenation and minimize respiratory effort  - Oxygen supplementation based on oxygen saturation or ABGs  - Provide Smoking Cessation handout, if applicable  - Encourage broncho-pulmonary hygiene including cough, deep breathe, Incentive Spirometry  - Assess the need for suctioning and perform as needed  - Assess and instruct to report SOB or any respiratory difficulty  - Respiratory Therapy support as indicated  - Manage/alleviate anxiety  - Monitor for signs/symptoms of CO2 retention  Outcome: Progressing   Patient received intubated and on ventilator A/C 14/500/+7/40%. Bilateral breath sounds auscultated. Suction provided when indicated. Nebulized medications given as ordered. SBT initiated at 0925, see documentation below. Patient was extubated to nasal cannula at 1000.  No acute events during the daytime. RT will continue to monitor.    SBT: PASS/FAIL? (Pass)  Start time:  0925  Settings:  Pressure Support: 5    CPAP: 7    FiO2:  40  Reason for Failure if present: (N/A)    Weaning Parameters:  RR: 28  RSBI: 73  NIF: -42  VT: 377  VC: 1.4L   (Note: If VC unable, enter same as VT in Epic to charge)    Returned to Full support: (Time:N/A)  MD notified: (Physician: Rita)

## 2025-06-18 NOTE — TELEPHONE ENCOUNTER
Faxed Physician's order for CPAP supplies    to [facility] to fax number [phone]. Confirmation has been received.

## 2025-06-18 NOTE — PLAN OF CARE
Patient axox0. Patient's l ac IV came out. Attempted twice. CHG bath given.   Problem: Patient Centered Care  Goal: Patient preferences are identified and integrated in the patient's plan of care  Description: Interventions:  - What would you like us to know as we care for you?   - Provide timely, complete, and accurate information to patient/family  - Incorporate patient and family knowledge, values, beliefs, and cultural backgrounds into the planning and delivery of care  - Encourage patient/family to participate in care and decision-making at the level they choose  - Honor patient and family perspectives and choices  Outcome: Progressing     Problem: Diabetes/Glucose Control  Goal: Glucose maintained within prescribed range  Description: INTERVENTIONS:  - Monitor Blood Glucose as ordered  - Assess for signs and symptoms of hyperglycemia and hypoglycemia  - Administer ordered medications to maintain glucose within target range  - Assess barriers to adequate nutritional intake and initiate nutrition consult as needed  - Instruct patient on self management of diabetes  Outcome: Progressing     Problem: Patient/Family Goals  Goal: Patient/Family Long Term Goal  Description: Patient's Long Term Goal: to be discharged home    Interventions:  - Completion of IV antibiotics  - Wean off Ventilator / O2 as tolerated/indicated and ordered per MD  - See additional Care Plan goals for specific interventions  Outcome: Progressing  Goal: Patient/Family Short Term Goal  Description: Patient's Short Term Goal: to be comfortable    Interventions:   - Reposition Q2HR /PRN  - Oral and ETT suctioning and care Q4HR /PRN  - See additional Care Plan goals for specific interventions  Outcome: Progressing     Problem: CARDIOVASCULAR - ADULT  Goal: Maintains optimal cardiac output and hemodynamic stability  Description: INTERVENTIONS:  - Monitor vital signs, rhythm, and trends  - Monitor for bleeding, hypotension and signs of decreased  cardiac output  - Evaluate effectiveness of vasoactive medications to optimize hemodynamic stability  - Monitor arterial and/or venous puncture sites for bleeding and/or hematoma  - Assess quality of pulses, skin color and temperature  - Assess for signs of decreased coronary artery perfusion - ex. Angina  - Evaluate fluid balance, assess for edema, trend weights  Outcome: Progressing  Goal: Absence of cardiac arrhythmias or at baseline  Description: INTERVENTIONS:  - Continuous cardiac monitoring, monitor vital signs, obtain 12 lead EKG if indicated  - Evaluate effectiveness of antiarrhythmic and heart rate control medications as ordered  - Initiate emergency measures for life threatening arrhythmias  - Monitor electrolytes and administer replacement therapy as ordered  Outcome: Progressing     Problem: RESPIRATORY - ADULT  Goal: Achieves optimal ventilation and oxygenation  Description: INTERVENTIONS:  - Assess for changes in respiratory status  - Assess for changes in mentation and behavior  - Position to facilitate oxygenation and minimize respiratory effort  - Oxygen supplementation based on oxygen saturation or ABGs  - Provide Smoking Cessation handout, if applicable  - Encourage broncho-pulmonary hygiene including cough, deep breathe, Incentive Spirometry  - Assess the need for suctioning and perform as needed  - Assess and instruct to report SOB or any respiratory difficulty  - Respiratory Therapy support as indicated  - Manage/alleviate anxiety  - Monitor for signs/symptoms of CO2 retention  Outcome: Progressing

## 2025-06-18 NOTE — PROGRESS NOTES
06/18/25 1056   VISIT TYPE   SLP Inpatient Visit Type (Documentation Required) Attempted Evaluation  (BSE order received and acknowledged, chart reviewed. Will complete 6/19 per extubation protocol.)   FOLLOW UP/PLAN   Follow Up Needed (Documentation Required) Yes   SLP Follow-up Date 06/19/25     Silvia Stevens M.S. CCC-SLP  Speech Language Pathologist  Phone Number Ext. [phone]

## 2025-06-18 NOTE — PLAN OF CARE
Problem: Safety Risk - Non-Violent Restraints  Goal: Patient will remain free from self-harm  Description: INTERVENTIONS:  - Apply the least restrictive restraint to prevent harm  - Notify patient and family of reasons restraints applied  - Assess for any contributing factors to confusion (electrolyte disturbances, delirium, medications)  - Discontinue any unnecessary medical devices as soon as possible  - Assess the patient's physical comfort, circulation, skin condition, hydration, nutrition and elimination needs   - Reorient and redirection as needed  - Assess for the need to continue restraints  6/18/2025 1016 by Nannette Clay, RN  Outcome: Completed

## 2025-06-18 NOTE — PLAN OF CARE
Problem: Patient Centered Care  Goal: Patient preferences are identified and integrated in the patient's plan of care  Description: Interventions:  - What would you like us to know as we care for you?   - Provide timely, complete, and accurate information to patient/family  - Incorporate patient and family knowledge, values, beliefs, and cultural backgrounds into the planning and delivery of care  - Encourage patient/family to participate in care and decision-making at the level they choose  - Honor patient and family perspectives and choices  6/18/2025 0506 by Maricarmen Pike RN  Outcome: Progressing  6/18/2025 0500 by Maricarmen Pike RN  Outcome: Progressing     Problem: Diabetes/Glucose Control  Goal: Glucose maintained within prescribed range  Description: INTERVENTIONS:  - Monitor Blood Glucose as ordered  - Assess for signs and symptoms of hyperglycemia and hypoglycemia  - Administer ordered medications to maintain glucose within target range  - Assess barriers to adequate nutritional intake and initiate nutrition consult as needed  - Instruct patient on self management of diabetes  6/18/2025 0506 by Maricarmen Pike RN  Outcome: Progressing  6/18/2025 0500 by Maricarmen Pike RN  Outcome: Progressing     Problem: Patient/Family Goals  Goal: Patient/Family Long Term Goal  Description: Patient's Long Term Goal: to be discharged home    Interventions:  - Completion of IV antibiotics  - Wean off Ventilator / O2 as tolerated/indicated and ordered per MD  - See additional Care Plan goals for specific interventions  6/18/2025 0506 by Maricarmen Pike RN  Outcome: Progressing  6/18/2025 0500 by Maricarmen Pike RN  Outcome: Progressing  Goal: Patient/Family Short Term Goal  Description: Patient's Short Term Goal: to be comfortable    Interventions:   - Reposition Q2HR /PRN  - Oral and ETT suctioning and care Q4HR /PRN  - See additional Care Plan goals for specific interventions  6/18/2025 0506 by Maricarmen Pike  RN  Outcome: Progressing  6/18/2025 0500 by Maricarmen Pike RN  Outcome: Progressing     Problem: CARDIOVASCULAR - ADULT  Goal: Maintains optimal cardiac output and hemodynamic stability  Description: INTERVENTIONS:  - Monitor vital signs, rhythm, and trends  - Monitor for bleeding, hypotension and signs of decreased cardiac output  - Evaluate effectiveness of vasoactive medications to optimize hemodynamic stability  - Monitor arterial and/or venous puncture sites for bleeding and/or hematoma  - Assess quality of pulses, skin color and temperature  - Assess for signs of decreased coronary artery perfusion - ex. Angina  - Evaluate fluid balance, assess for edema, trend weights  6/18/2025 0506 by Maricarmen Pike RN  Outcome: Progressing  6/18/2025 0500 by Maricarmen Pike RN  Outcome: Progressing  Goal: Absence of cardiac arrhythmias or at baseline  Description: INTERVENTIONS:  - Continuous cardiac monitoring, monitor vital signs, obtain 12 lead EKG if indicated  - Evaluate effectiveness of antiarrhythmic and heart rate control medications as ordered  - Initiate emergency measures for life threatening arrhythmias  - Monitor electrolytes and administer replacement therapy as ordered  6/18/2025 0506 by Maricarmen Pike RN  Outcome: Progressing  6/18/2025 0500 by Maricarmen Pike RN  Outcome: Progressing     Problem: RESPIRATORY - ADULT  Goal: Achieves optimal ventilation and oxygenation  Description: INTERVENTIONS:  - Assess for changes in respiratory status  - Assess for changes in mentation and behavior  - Position to facilitate oxygenation and minimize respiratory effort  - Oxygen supplementation based on oxygen saturation or ABGs  - Provide Smoking Cessation handout, if applicable  - Encourage broncho-pulmonary hygiene including cough, deep breathe, Incentive Spirometry  - Assess the need for suctioning and perform as needed  - Assess and instruct to report SOB or any respiratory difficulty  - Respiratory Therapy  support as indicated  - Manage/alleviate anxiety  - Monitor for signs/symptoms of CO2 retention  6/18/2025 0506 by Maricarmen Pike RN  Outcome: Progressing  6/18/2025 0500 by Maricarmen Pike RN  Outcome: Progressing     Problem: Safety Risk - Non-Violent Restraints  Goal: Patient will remain free from self-harm  Description: INTERVENTIONS:  - Apply the least restrictive restraint to prevent harm  - Notify patient and family of reasons restraints applied  - Assess for any contributing factors to confusion (electrolyte disturbances, delirium, medications)  - Discontinue any unnecessary medical devices as soon as possible  - Assess the patient's physical comfort, circulation, skin condition, hydration, nutrition and elimination needs   - Reorient and redirection as needed  - Assess for the need to continue restraints  6/18/2025 0506 by Maricarmen Pike RN  Outcome: Not Progressing  6/18/2025 0500 by Maricarmen Pike RN  Outcome: Not Progressing

## 2025-06-19 ENCOUNTER — APPOINTMENT (OUTPATIENT)
Dept: GENERAL RADIOLOGY | Facility: HOSPITAL | Age: 75
End: 2025-06-19
Attending: INTERNAL MEDICINE
Payer: MEDICARE

## 2025-06-19 LAB
ANION GAP SERPL CALC-SCNC: 7 MMOL/L (ref 0–18)
BASOPHILS # BLD AUTO: 0.09 X10(3) UL (ref 0–0.2)
BASOPHILS NFR BLD AUTO: 0.7 %
BUN BLD-MCNC: 38 MG/DL (ref 9–23)
BUN/CREAT SERPL: 22.2 (ref 10–20)
CALCIUM BLD-MCNC: 8.6 MG/DL (ref 8.7–10.4)
CHLORIDE SERPL-SCNC: 105 MMOL/L (ref 98–112)
CO2 SERPL-SCNC: 35 MMOL/L (ref 21–32)
CREAT BLD-MCNC: 1.71 MG/DL (ref 0.7–1.3)
DEPRECATED RDW RBC AUTO: 56.8 FL (ref 35.1–46.3)
EGFRCR SERPLBLD CKD-EPI 2021: 41 ML/MIN/1.73M2 (ref 60–?)
EOSINOPHIL # BLD AUTO: 0 X10(3) UL (ref 0–0.7)
EOSINOPHIL NFR BLD AUTO: 0 %
ERYTHROCYTE [DISTWIDTH] IN BLOOD BY AUTOMATED COUNT: 15.7 % (ref 11–15)
GLUCOSE BLD-MCNC: 148 MG/DL (ref 70–99)
GLUCOSE BLDC GLUCOMTR-MCNC: 119 MG/DL (ref 70–99)
GLUCOSE BLDC GLUCOMTR-MCNC: 135 MG/DL (ref 70–99)
GLUCOSE BLDC GLUCOMTR-MCNC: 142 MG/DL (ref 70–99)
GLUCOSE BLDC GLUCOMTR-MCNC: 147 MG/DL (ref 70–99)
GLUCOSE BLDC GLUCOMTR-MCNC: 92 MG/DL (ref 70–99)
HCT VFR BLD AUTO: 46.7 % (ref 39–53)
HGB BLD-MCNC: 14.1 G/DL (ref 13–17.5)
IMM GRANULOCYTES # BLD AUTO: 0.47 X10(3) UL (ref 0–1)
IMM GRANULOCYTES NFR BLD: 3.9 %
LYMPHOCYTES # BLD AUTO: 0.6 X10(3) UL (ref 1–4)
LYMPHOCYTES NFR BLD AUTO: 4.9 %
MCH RBC QN AUTO: 30.3 PG (ref 26–34)
MCHC RBC AUTO-ENTMCNC: 30.2 G/DL (ref 31–37)
MCV RBC AUTO: 100.4 FL (ref 80–100)
MONOCYTES # BLD AUTO: 0.88 X10(3) UL (ref 0.1–1)
MONOCYTES NFR BLD AUTO: 7.2 %
NEUTROPHILS # BLD AUTO: 10.12 X10 (3) UL (ref 1.5–7.7)
NEUTROPHILS # BLD AUTO: 10.12 X10(3) UL (ref 1.5–7.7)
NEUTROPHILS NFR BLD AUTO: 83.3 %
OSMOLALITY SERPL CALC.SUM OF ELEC: 316 MOSM/KG (ref 275–295)
PLATELET # BLD AUTO: 148 10(3)UL (ref 150–450)
POTASSIUM SERPL-SCNC: 4.3 MMOL/L (ref 3.5–5.1)
RBC # BLD AUTO: 4.65 X10(6)UL (ref 3.8–5.8)
SODIUM SERPL-SCNC: 147 MMOL/L (ref 136–145)
WBC # BLD AUTO: 12.2 X10(3) UL (ref 4–11)

## 2025-06-19 PROCEDURE — 71045 X-RAY EXAM CHEST 1 VIEW: CPT | Performed by: INTERNAL MEDICINE

## 2025-06-19 PROCEDURE — 99233 SBSQ HOSP IP/OBS HIGH 50: CPT | Performed by: HOSPITALIST

## 2025-06-19 RX ORDER — ACETYLCYSTEINE 200 MG/ML
2 SOLUTION ORAL; RESPIRATORY (INHALATION) 3 TIMES DAILY
Status: DISCONTINUED | OUTPATIENT
Start: 2025-06-19 | End: 2025-06-20

## 2025-06-19 RX ORDER — LORAZEPAM 2 MG/ML
0.5 INJECTION INTRAMUSCULAR NIGHTLY PRN
Status: DISCONTINUED | OUTPATIENT
Start: 2025-06-19 | End: 2025-06-21

## 2025-06-19 NOTE — CM/SW NOTE
Anticipated therapy need: Gradual Rehabilitative Therapy    [facility] sent  Tentative referral sent to [facility] in [place] (per previous CM note, spouse would be open to patient returning for [place] if needed).   PASRR done and uploaded to referral    Shyla Roman, RN, BSN

## 2025-06-19 NOTE — PLAN OF CARE
Patient is axox3. 15L high flow NC. Patient tolerated bipap at night for three hours; took it off. Was educated and explained the importance of wearing his bi-pap at night by nurse and RT. Stated he understood and did not want it on.   Problem: Patient Centered Care  Goal: Patient preferences are identified and integrated in the patient's plan of care  Description: Interventions:  - What would you like us to know as we care for you?   - Provide timely, complete, and accurate information to patient/family  - Incorporate patient and family knowledge, values, beliefs, and cultural backgrounds into the planning and delivery of care  - Encourage patient/family to participate in care and decision-making at the level they choose  - Honor patient and family perspectives and choices  Outcome: Progressing     Problem: Diabetes/Glucose Control  Goal: Glucose maintained within prescribed range  Description: INTERVENTIONS:  - Monitor Blood Glucose as ordered  - Assess for signs and symptoms of hyperglycemia and hypoglycemia  - Administer ordered medications to maintain glucose within target range  - Assess barriers to adequate nutritional intake and initiate nutrition consult as needed  - Instruct patient on self management of diabetes  Outcome: Progressing     Problem: Patient/Family Goals  Goal: Patient/Family Long Term Goal  Description: Patient's Long Term Goal: to be discharged home    Interventions:  - Completion of IV antibiotics  - Wean off Ventilator / O2 as tolerated/indicated and ordered per MD  - See additional Care Plan goals for specific interventions  Outcome: Progressing  Goal: Patient/Family Short Term Goal  Description: Patient's Short Term Goal: to be comfortable    Interventions:   - Reposition Q2HR /PRN  - Oral and ETT suctioning and care Q4HR /PRN  - See additional Care Plan goals for specific interventions  Outcome: Progressing     Problem: CARDIOVASCULAR - ADULT  Goal: Maintains optimal cardiac output and  hemodynamic stability  Description: INTERVENTIONS:  - Monitor vital signs, rhythm, and trends  - Monitor for bleeding, hypotension and signs of decreased cardiac output  - Evaluate effectiveness of vasoactive medications to optimize hemodynamic stability  - Monitor arterial and/or venous puncture sites for bleeding and/or hematoma  - Assess quality of pulses, skin color and temperature  - Assess for signs of decreased coronary artery perfusion - ex. Angina  - Evaluate fluid balance, assess for edema, trend weights  Outcome: Progressing  Goal: Absence of cardiac arrhythmias or at baseline  Description: INTERVENTIONS:  - Continuous cardiac monitoring, monitor vital signs, obtain 12 lead EKG if indicated  - Evaluate effectiveness of antiarrhythmic and heart rate control medications as ordered  - Initiate emergency measures for life threatening arrhythmias  - Monitor electrolytes and administer replacement therapy as ordered  Outcome: Progressing     Problem: RESPIRATORY - ADULT  Goal: Achieves optimal ventilation and oxygenation  Description: INTERVENTIONS:  - Assess for changes in respiratory status  - Assess for changes in mentation and behavior  - Position to facilitate oxygenation and minimize respiratory effort  - Oxygen supplementation based on oxygen saturation or ABGs  - Provide Smoking Cessation handout, if applicable  - Encourage broncho-pulmonary hygiene including cough, deep breathe, Incentive Spirometry  - Assess the need for suctioning and perform as needed  - Assess and instruct to report SOB or any respiratory difficulty  - Respiratory Therapy support as indicated  - Manage/alleviate anxiety  - Monitor for signs/symptoms of CO2 retention  Outcome: Progressing

## 2025-06-19 NOTE — PROGRESS NOTES
Daily Pulmonary/ICU/Critical Care Progress Note          SUBJECTIVE:  On HFNC. Wore CPAP overnight  Afebrile  Sitting in a chair -feel better      ALLERGIES:  Allergies[1]      MEDS:  Home Medications:  Medications Taking[2]  Scheduled Medication:  Scheduled Medications[3]  Continuous Infusing Medication:  Medication Infusions[4]  PRN Medications:  PRN Medications[5]       PHYSICAL EXAM:  /77 (BP Location: Right arm)   Pulse 95   Temp 96.9 °F (36.1 °C) (Temporal)   Resp (!) 30   Ht 5' 8\" (1.727 m)   Wt 294 lb 8.6 oz (133.6 kg)   SpO2 98%   BMI 44.78 kg/m²   FiO2 (%):  [35 %-40 %] 35 %  CONSTITUTIONAL: alert, oriented, no apparent distress  HEENT: atraumatic normocephalic  MOUTH: mucous membranes are moist. No OP exudates  NECK/THROAT: no JVD. Trachea midline. No obvious thyromegaly  LUNG: clear upper b/l no wheezing, +crackles. Diminished bases. Chest symmetric with respiratory motion  HEART: regular rate and rhythm, no obvious murmers or gallops noted  ABD: soft non tender. + bowel sounds. No organomegaly noted  EXT: no clubbing, cyanosis. + trace LE edema       IMAGES:   Reviewed in EMR  CXR 6/19/25  CONCLUSION:   The endotracheal and esophagogastric tubes have been removed.   No new abnormality.  Persistent pulmonary edema and left pleural effusion with left basilar opacity.     LABS:  Recent Labs   Lab 06/17/25  0459 06/18/25  0502 06/19/25  0354   RBC 4.69 4.24 4.65   HGB 14.3 12.9* 14.1   HCT 49.5 41.9 46.7   .5* 98.8 100.4*   MCH 30.5 30.4 30.3   MCHC 28.9* 30.8* 30.2*   RDW 14.7 15.2* 15.7*   NEPRELIM 10.99* 11.34* 10.12*   WBC 14.6* 13.7* 12.2*   .0 156.0 148.0*       Recent Labs   Lab 06/16/25  0449 06/17/25  0459 06/18/25  0515 06/19/25  0354   * 142* 183* 148*   BUN 25* 19 43* 38*   CREATSERUM 1.07 1.35* 1.90* 1.71*   EGFRCR 73 55* 37* 41*   CA 9.6 9.4 8.8 8.6*   ALB 4.1  --   --   --    * 144 144 147*   K 4.8 4.1 3.9 4.3    104 104 105   CO2 38.0* 32.0 33.0*  35.0*   ALKPHO 98  --   --   --    AST 27  --   --   --    ALT 65*  --   --   --    BILT 0.8  --   --   --    TP 6.2  --   --   --        ASSESSMENT/PLAN:  Acute on chronic hypoxemic resp failure  -s/p intubation 6/16/25   -s/p bronchoscopy on 6/17. Reported obstructing left upper lobe mainly the lingula with extensive effort to aspirate with multiple suctioning and using forceps with multiple passes   -extubated on 6/18/25  -on 10 LHFNC  -bronchial hygiene with nebs   -on broad spectrum abx with zosyn     Severe COPD with exac  -chronic 4 LNC 02 at home   -on duonebs, solumedrol    MARIA M  -PAP tx with naps and sleep    HFpEF, HTN, CAD  -lasix, outpt meds    HEYDI  -improving   -on diuretics  -monitor lytes and I/Os    Proph  -DVT: hep subcutaneous  -swallow eval    Dispo  -full code  -can transfer to PCU status today    Thank you for the opportunity to care for Campbell Rodríguez DO, MPH  Pulmonary Critical Care Medicine  [facility]          [1] No Known Allergies  [2]   No outpatient medications have been marked as taking for the 6/15/25 encounter (Hospital Encounter).   [3]    acetylcysteine  2 mL Nebulization TID    lidocaine-menthol  1 patch Transdermal Nightly    piperacillin-tazobactam  3.375 g Intravenous Q8H    ipratropium-albuterol  3 mL Nebulization 4 times per day    furosemide  40 mg Intravenous Daily    heparin  7,500 Units Subcutaneous 2 times per day    insulin regular human  1-7 Units Subcutaneous 4 times per day    methylPREDNISolone  40 mg Intravenous Q12H    senna  10 mL Per NG Tube BID    docusate  100 mg Per NG Tube BID    pantoprazole  40 mg Intravenous QAM AC    [Held by provider] metoprolol succinate ER  25 mg Oral 2x Daily(Beta Blocker)   [4] [5]   LORazepam    glucose **OR** glucose **OR** glucose-vitamin C **OR** dextrose **OR** glucose **OR** glucose **OR** glucose-vitamin C    ondansetron    metoclopramide    acetaminophen **OR**  acetaminophen **OR** acetaminophen    polyethylene glycol (PEG 3350)    bisacodyl

## 2025-06-19 NOTE — PLAN OF CARE
Patient on high-flow nasal cannula today. PT/OT got patient up to chair. SLP performed swallow evaluation with patient. Call light within reach, safety measures maintained.      Problem: Patient Centered Care  Goal: Patient preferences are identified and integrated in the patient's plan of care  Description: Interventions:  - What would you like us to know as we care for you? I am on 4L nasal cannula at home.    - Provide timely, complete, and accurate information to patient/family  - Incorporate patient and family knowledge, values, beliefs, and cultural backgrounds into the planning and delivery of care  - Encourage patient/family to participate in care and decision-making at the level they choose  - Honor patient and family perspectives and choices  Outcome: Progressing     Problem: Diabetes/Glucose Control  Goal: Glucose maintained within prescribed range  Description: INTERVENTIONS:  - Monitor Blood Glucose as ordered  - Assess for signs and symptoms of hyperglycemia and hypoglycemia  - Administer ordered medications to maintain glucose within target range  - Assess barriers to adequate nutritional intake and initiate nutrition consult as needed  - Instruct patient on self management of diabetes  Outcome: Progressing     Problem: CARDIOVASCULAR - ADULT  Goal: Maintains optimal cardiac output and hemodynamic stability  Description: INTERVENTIONS:  - Monitor vital signs, rhythm, and trends  - Monitor for bleeding, hypotension and signs of decreased cardiac output  - Evaluate effectiveness of vasoactive medications to optimize hemodynamic stability  - Monitor arterial and/or venous puncture sites for bleeding and/or hematoma  - Assess quality of pulses, skin color and temperature  - Assess for signs of decreased coronary artery perfusion - ex. Angina  - Evaluate fluid balance, assess for edema, trend weights  Outcome: Progressing  Goal: Absence of cardiac arrhythmias or at baseline  Description: INTERVENTIONS:  -  Continuous cardiac monitoring, monitor vital signs, obtain 12 lead EKG if indicated  - Evaluate effectiveness of antiarrhythmic and heart rate control medications as ordered  - Initiate emergency measures for life threatening arrhythmias  - Monitor electrolytes and administer replacement therapy as ordered  Outcome: Progressing     Problem: RESPIRATORY - ADULT  Goal: Achieves optimal ventilation and oxygenation  Description: INTERVENTIONS:  - Assess for changes in respiratory status  - Assess for changes in mentation and behavior  - Position to facilitate oxygenation and minimize respiratory effort  - Oxygen supplementation based on oxygen saturation or ABGs  - Provide Smoking Cessation handout, if applicable  - Encourage broncho-pulmonary hygiene including cough, deep breathe, Incentive Spirometry  - Assess the need for suctioning and perform as needed  - Assess and instruct to report SOB or any respiratory difficulty  - Respiratory Therapy support as indicated  - Manage/alleviate anxiety  - Monitor for signs/symptoms of CO2 retention  Outcome: Progressing

## 2025-06-19 NOTE — PHYSICAL THERAPY NOTE
PHYSICAL THERAPY EVALUATION - INPATIENT     Room Number: 224/224-A  Evaluation Date: 6/19/2025  Type of Evaluation: Initial   Physician Order: PT Eval and Treat    Presenting Problem: acute on chronic CHF  Co-Morbidities : recent hospitalization  with dc to [facility] in May 2025  Reason for Therapy: Mobility Dysfunction and Discharge Planning    PHYSICAL THERAPY ASSESSMENT   Patient is a 75 year old male admitted 6/15/2025 for acute on chronic respiratory failure and intubated, extubated yesterday and on 10L HFNC. Recently hospital last month and DC to [facility], was back home at time of admit, questionable compliance with CPAP.  Prior to admission, patient's baseline is living in a home with his wife and ambulates holding furniture, independence, using 4L O2 He reports being very sedentary at home.  Patient is currently functioning below baseline with bed mobility, transfers, gait, stair negotiation, maintaining seated position, and standing prolonged periods.  Patient is requiring moderate assist and maximum assist as a result of the following impairments: decreased functional strength, decreased endurance/aerobic capacity, impaired standing balance, decreased muscular endurance, medical status, and increased O2 needs from baseline.  Physical Therapy will continue to follow for duration of hospitalization.    Patient will benefit from continued skilled PT Services to promote return to prior level of function and safety with continuous assistance and gradual rehabilitative therapy .    PLAN DURING HOSPITALIZATION  Nursing Mobility Recommendation : 1 Assist (2 person pivot (or lift if needed))  PT Device Recommendation: Rolling walker  PT Treatment Plan: Bed mobility, Endurance, Energy conservation, Patient education, Gait training, Family education, Range of motion, Strengthening, Stoop training, Transfer training, Balance training  Rehab Potential : Fair  Frequency (Obs): 3-5x/week     PHYSICAL THERAPY MEDICAL/SOCIAL HISTORY    History related to current admission: This is a 75-year-old gentleman with a past medical history of COPD, obstructive sleep apnea, and obesity hypoventilation syndrome who presented to the emergency room because of hallucinations.  In the ER, he was found to have respiratory distress with an acute on chronic hypercapnic hypoxic picture requiring intubation for a pCO2 of 119.  The patient had apparently been noncompliant with his oxygen and BiPAP at home.  When I saw him, he was sedated and unable to provide any history so the history is obtained through the medical record.  According to the family, the patient had been progressively lethargic and confused for 2 or 3 days prior to admission.  He is a former smoker and has been intubated in the past.         Problem List  Principal Problem:    Acute on chronic respiratory failure with hypoxia and hypercapnia (HCC)  Active Problems:    Acute on chronic respiratory failure (HCC)      HOME SITUATION  Type of Home: House  Home Layout: Two level, Able to live on main level (sleeps in recliner on main level)  Stairs to Enter : 0.5 (pt reports a small step to enter)   Railing: No    Stairs to Bedroom: 0         Lives With: Spouse, Other (Comment) (who works full time)    Drives: No   Patient Regularly Uses: Glasses, Reading glasses, Home O2     Prior Level of [place]: home with wife who works full time and is gone all day. independent per his report for all ADL and mobility without a device. Does use 4L O2. OF NOTE pt sleeps in recliner at home, does not lie flat    SUBJECTIVE  \"I hate the bed. I prefer to be in the chair\" Pt sleeps in recliner at home    PHYSICAL THERAPY EXAMINATION   OBJECTIVE  Precautions: Aspiration, Other (Comment) (10L O2,)  Fall Risk: High fall risk    WEIGHT BEARING RESTRICTION       PAIN ASSESSMENT  Ratin  Location: no complaints of pain  Management Techniques: Activity promotion, Repositioning    COGNITION  Overall Cognitive Status:   A&Ox2, impulsive, poor insight into deficits, poor safety    RANGE OF MOTION AND STRENGTH ASSESSMENT  Upper extremity ROM and strength are within functional limits   Lower extremity ROM is within functional limits   Lower extremity strength is limited, grossly 3/5 but shaky and unsteady when standing    BALANCE  Static Sitting: Fair +  Dynamic Sitting: Fair -  Static Standing: Poor +  Dynamic Standing: Poor -    ACTIVITY TOLERANCE  Pulse: 99  Heart Rate Source: Monitor  Resp: (!) 31  BP: (!) 115/95  BP Location: Right arm (forearm)  BP Method: Automatic  Patient Position: Sitting    O2 WALK  Oxygen Therapy  SPO2% on Oxygen at Rest: 97  At rest oxygen flow (liters per minute): 10  SPO2% Ambulation on Oxygen: 90  Ambulation oxygen flow (liters per minute): 10    AM-PAC '6-Clicks' INPATIENT SHORT FORM - BASIC MOBILITY  How much difficulty does the patient currently have...  Patient Difficulty: Turning over in bed (including adjusting bedclothes, sheets and blankets)?: A Little   Patient Difficulty: Sitting down on and standing up from a chair with arms (e.g., wheelchair, bedside commode, etc.): A Lot   Patient Difficulty: Moving from lying on back to sitting on the side of the bed?: A Little   How much help from another person does the patient currently need...   Help from Another: Moving to and from a bed to a chair (including a wheelchair)?: A Lot   Help from Another: Need to walk in hospital room?: Total   Help from Another: Climbing 3-5 steps with a railing?: Total     AM-PAC Score:  Raw Score: 12   Approx Degree of Impairment: 68.66%   Standardized Score (AM-PAC Scale): 35.33   CMS Modifier (G-Code): CL    FUNCTIONAL ABILITY STATUS  Functional Mobility/Gait Assessment  Gait Assistance: Not tested (stand and pivot to chair only, knees quaking)  Rolling: moderate assist  Supine to Sit: maximum assist  Sit to Supine: maximum assist  Sit to Stand: maximum assist    Exercise/Education Provided:  Bed mobility  Energy  conservation  Functional activity tolerated  Posture  Strengthening  Transfer training    Skilled Therapy Provided: RN approves participation in therapy session. Patient presents in bed and frequently asking to drink water (pt needs to be seen by ST to clear for diet). Patient is awake but confused and tangential, poor safety awareness. He is on 10L and needs cues for breathing during activity to maintain SpO2 of 90%. He fatigues quickly and is SOB with minimal exs and activity. He is very unsteady in standing and needs 2 person assist. Legs are shaking but he is able to take steps to chair with Rw and 2 person assist. He is educated in seated exs and encouraged to elevate LE's to prevent edema. He is up in chair with all needs in reach and RN aware.    The patient's Approx Degree of Impairment: 68.66% has been calculated based on documentation in the [facility] '6 clicks' Inpatient Basic Mobility Short Form.  Research supports that patients with this level of impairment may benefit from iinpatient rehab and [place] is the recommendation.  Final disposition will be made by interdisciplinary medical team.    Patient End of Session: Up in chair, Needs met, Call light within reach, RN aware of session/findings, All patient questions and concerns addressed, Hospital anti-slip socks, Alarm set    CURRENT GOALS  Goals to be met by: 6/30/25  Patient Goal Patient's self-stated goal is: to go home   Goal #1 Patient is able to demonstrate supine - sit EOB @ level: minimum assistance     Goal #1   Current Status    Goal #2 Patient is able to demonstrate transfers Sit to/from Stand at assistance level: moderate assistance with walker - rolling     Goal #2  Current Status    Goal #3 Patient is able to ambulate 25 feet with assist device: walker - rolling at assistance level: moderate assistance   Goal #3   Current Status    Goal #4 Patient will negotiate bed to/from chair transfers with a Rw with moderate assistance   Goal #4    Current Status    Goal #5 Patient to demonstrate independence with home activity/exercise instructions provided to patient in preparation for discharge.   Goal #5   Current Status    Goal #6    Goal #6  Current Status      Patient Evaluation Complexity Level:  History High - 3 or more personal factors and/or co-morbidities   Examination of body systems Moderate - addressing a total of 3 or more elements   Clinical Presentation  Moderate - Evolving   Clinical Decision Making  Moderate Complexity     Therapeutic Activity:  26 minutes

## 2025-06-19 NOTE — SLP NOTE
ADULT SWALLOWING EVALUATION    ASSESSMENT    ASSESSMENT/OVERALL IMPRESSION:  PPE REQUIRED. THIS THERAPIST WORE GLOVES FOR DURATION OF EVALUATION. HANDS WASHED UPON ENTRANCE/EXIT.    Per MD H&P, \"This is a 75-year-old gentleman with a past medical history of COPD, obstructive sleep apnea, and obesity hypoventilation syndrome who presented to the emergency room because of hallucinations.\"    SLP BSSE orders received and acknowledged. A swallow evaluation warranted per extubation. Pt afebrile with weak vocal quality, on 6L/HF, with oxygen saturation at 96%. Pt with hx of dysphagia at [facility], BSE 4/14/21 with recommendations for regular/thin liquids.   Pt positioned 90 degrees in bedside chair, alert/cooperative. Pt with no complaints of pain. Oral motor skills within functional limits. Pt presented with trials of soft solids, puree and thin liquids via straw/cup. Pt with adequate oral acceptance and bilabial seal across all trials. Pt with intact bite, reduced mastication of solids, and timely A-P transit. Pt's swallow response appears timely with reduced hyolaryngeal elevation/excursion. No clinical signs of aspiration (e.g., immediate/delayed throat clear, immediate/delayed cough, wet vocal quality, increased O2 effort) observed across all trials. 6/19 CXR indicates \"The endotracheal and esophagogastric tubes have been removed. No new abnormality.  Persistent pulmonary edema and left pleural effusion with left basilar opacity\". Oxygen status remained stable t/o the entire evaluation.     At this time, pt presents with mild oral dysphagia and probable pharyngeal dysfunction. Recommend a soft bite sized diet and thin liquids with strict adherence to safe swallowing compensatory strategies. Results and recommendations reviewed with RN, pt. Pt v/u to all results/recommendations. Recommendations remain written on whiteboard. SLP collaborated with RN for MD diet orders.     PLAN: SLP to f/u x2 meal assessments, monitor  imaging, and VFSS if clinically indicated       RECOMMENDATIONS   Diet Recommendations - Solids: Mechanical soft chopped/ Soft & Bite Sized  Diet Recommendations - Liquids: Thin Liquids                       Aspiration Precautions: Upright position, Slow rate, Small bites, Small sips  Medication Administration Recommendations:  (as tolerated)  Treatment Plan/Recommendations: Aspiration precautions    HISTORY   MEDICAL HISTORY  Reason for Referral:  (extubation)    Problem List  Principal Problem:    Acute on chronic respiratory failure with hypoxia and hypercapnia (HCC)  Active Problems:    Acute on chronic respiratory failure (HCC)      Past Medical History  Past Medical History[1]    Prior Living Situation: Home with spouse  Diet Prior to Admission: Regular, Thin liquids  Precautions: Aspiration    Patient/Family Goals: eat/drink    SWALLOWING HISTORY  Current Diet Consistency: NPO  Dysphagia History: see above  Imaging Results: 6/16 CT CHEST  CONCLUSION:   1. No suspicious mediastinal mass.  Mediastinal prominence on previous day chest radiograph likely relates to a combination of tortuous vasculature and mediastinal fat.   2. Endotracheal tube tip is located 0.8 cm above the level of the jg.  Suggest retraction by 2-3 cm.   3. Left lower lobe airspace disease with intrinsic air bronchograms and probable distal endobronchial impaction.  Consider infection/aspiration or atelectasis from mucous plugging and suggest follow-up to resolution.   4. Additional multifocal discoid atelectasis or scarring throughout both lungs.   5. No significant pleural effusion.   6. Cardiomegaly with multi-vessel coronary artery calcification. Dilatation of the main pulmonary artery trunk may relate to underlying pulmonary hypertension.   7. Chronic-appearing T7 vertebral body compression fracture with kyphoplasty at this level.   8. Subacute-appearing right anterior 5th rib fracture with surrounding callus.  There is also a  subacute chronic-appearing right posterior 3rd rib fracture.  Please correlate for any history of recent right rib trauma.   9. Mild paraseptal emphysema noted.   10. Fatty liver.   11. Lesser incidental findings as above.       SUBJECTIVE       OBJECTIVE   ORAL MOTOR EXAMINATION  Dentition:  (no upper)  Symmetry: Within Functional Limits  Strength: Within Functional Limits     Range of Motion: Within Functional Limits  Rate of Motion: Within Functional Limits    Voice Quality: Weak  Respiratory Status: Supplemental O2, Nasal cannula  Consistencies Trialed: Soft solid, Puree, Thin liquids  Method of Presentation: Self presentation, Staff/Clinician assistance, Cup, Spoon, Straw  Patient Positioned: Upright, Midline, Bedside chair    Oral Phase of Swallow: Impaired  Bolus Retrieval: Intact  Bilabial Seal: Intact  Bolus Formation: Intact  Bolus Propulsion: Intact  Mastication: Impaired  Retention: Intact    Pharyngeal Phase of Swallow: Appears Impaired  Laryngeal Elevation Timing: Appears intact  Laryngeal Elevation Strength: Appears impaired  Laryngeal Elevation Coordination: Appears intact  (Please note: Silent aspiration cannot be evaluated clinically. Videofluoroscopic Swallow Study is required to rule-out silent aspiration.)    Esophageal Phase of Swallow: No complaints consistent with possible esophageal involvement  Comments: NA          GOALS  Goal #1 The patient will tolerate soft bite sized consistency and thin liquids without overt signs or symptoms of aspiration with 100 % accuracy over 1-2 session(s).  In Progress   Goal #2 The patient/family/caregiver will demonstrate understanding and implementation of aspiration precautions and swallow strategies independently over 1-2 session(s).    In Progress   Goal #3 The patient will tolerate trial upgrade of soft easy to chew? consistency and thin liquids without overt signs or symptoms of aspiration with 100 % accuracy over 1-2 session(s).  In Progress   Goal #4  The patient will utilize compensatory strategies as outlined by  BSSE (clinical evaluation) including Slow rate, Small bites, Small sips, Alternate liquids/solids, Upright 90 degrees, Eliminate distractions with PRN assistance 100 % of the time across 2 sessions.    In Progress     FOLLOW UP  Treatment Plan/Recommendations: Aspiration precautions  Duration: 1 week  Follow Up Needed (Documentation Required): Yes  SLP Follow-up Date: 06/20/25    Thank you for your referral.   If you have any questions, please contact MAGDALENA Everett M.S. CCC-SLP  Speech Language Pathologist  Phone Number Ext. [phone]         [1]   Past Medical History:   Atherosclerosis of coronary artery    coronary calcification on ct chest    Back problem    Cataract    Chronic respiratory failure (HCC)    Congestive heart disease (HCC)    Controlled type 2 diabetes mellitus with complication, without long-term current use of insulin (HCC)    COPD (HCC)    freq exacerbations. ? 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

## 2025-06-19 NOTE — PROGRESS NOTES
[facility]  part of [facility]  Hospitalist Progress Note     Campbell Rao Patient Status:  Inpatient    1950  75 year old CSN [id number]   Location 224/224-A Attending Bernard Willingham MD   Hosp Day # 3 PCP Johny Kang MD     Assessment & Plan:   ----------------------------------  Respiratory failure, acute on chronic hypoxic and hypercapnic.  Due to COPD, possible aspiration, possible pneumonia, mucous plugging and decompensated overlap syndrome with severe COPD and MARIA M with morbid obesity, noncompliant with CPAP.    -s/p ventilator support  -Pulmonology consultation appreciated  - Bronchoscopy  with large organized mucous plug and foreign body /?food material. Obstructing left upper lobe mainly the lingula with extensive effort to aspirate with multiple suctioning and using forceps with multiple passes (per pulmonary report).  - Patient was able to cough up thick white material was sent for analysis   -now extubated, doing well, but tired  -mucomyst nebs added    Pneumonia. aspiration.  Patient is hypoxic and not septic.  Patient with + leukocytosis, no fever, ? infiltrate on chest imaging.   -IV antibiotics: Azithromycin, Zosyn  -Blood cultures: NGTD  -Monitor temperature, blood pressure and fever curve  -Pulmonology consult  -Repeat chest imaging to be decided    Acute COPD exacerbation.  Due to infection.  Patient with  hypoxia.  Symptoms improving.  Wheezing improving.  -Pulmonology consult appreciated  -Solu-Medrol 40 every 12  -Nebulizers  -Inhalers  -Supplemental oxygen as needed, titrate down as tolerated  -Antibiotics: As above  -Repeat imaging if clinical change    Acute on chronic HFpEF. Most recent EF normal. Patient with ypoxia, radiographic/physical exam evidence of pulmonary edema, peripheral edema.  Creatinine rising slowly, will need to be cautious with diuresis  -Cardiology consult appreciated  -Diuresis with iv lasix 40 today, re=eval tomorrow  -GDMT  per cards  -Echo done 2/25  -Strict I/O  -Daily weights  -Low salt diet  -Monitor renal function  -Telemetry  -Supp O2 as needed, titrate off as tolerated  -labs in am    Other problems  Morbid obesity and BMI 45  History of colon cancer  Dyslipidemia    Type 2 diabetes, recent A1c good  -Sugars worse with steroids, adjust insulins as needed  [facility]    Social: home with wife    Dispo: PT/OT eval, likely rehab, wife concern about discharge medications, states the last time pt was discharged without trelegy and potassium supplements    Supplementary Documentation:   DVT Mechanical Prophylaxis:   SCDs,    DVT Pharmacologic Prophylaxis   Medication    heparin (Porcine) 5000 UNIT/ML injection 7,500 Units                Code Status: Full Code  Orellana: External urinary catheter in place  Orellana Duration (in days):   Central line:    PETRA:         **Certification      PHYSICIAN Certification of Need for Inpatient Hospitalization - Initial Certification    Patient will require inpatient services that will reasonably be expected to span two midnight's based on the clinical documentation in H+P.   Based on patients current state of illness, I anticipate that, after discharge, patient will require TBD.           I personally reviewed the available laboratories, imaging including. I discussed/will discuss the case with consultants. I ordered laboratories and/or radiographic studies. I adjusted medications as detailed above.  Medical decision making high, risk is high.    Subjective:   ----------------------------------  Extubated yesterday, doing well, 10 L HF earlier this morning--> 6 L now (on 4 L at home)  BP ok, slightly tachy  Afebrile  Feels better, up in chair           Objective:   Chief Complaint:   Chief Complaint   Patient presents with    Altered Mental Status     ----------------------------------  Temp:  [96.9 °F (36.1 °C)-98.6 °F (37 °C)] 96.9 °F (36.1 °C)  Pulse:  [] 90  Resp:  [16-36] 18  BP:  (100-139)/(46-98) 115/95  SpO2:  [89 %-99 %] 98 %  FiO2 (%):  [35 %] 35 %  Gen: AOx3, interactive, engaging in conversation  HEENT: NCAT, neck supple, no carotid bruit.  CV: RRR, S1S2, and intact distal pulses. No gallop, rub, murmur.  Pulm: Decreased breath sounds at the left base   Abd: Soft, NTND, BS normal, no mass, no HSM, no rebound/guarding.   Neuro: Moving all 4 extremities  MS: No joint effusions.  1+ peripheral edema.  Skin: Skin is warm and dry. No rashes, erythema, diaphoresis.   Psych: calm    Labs:  Lab Results   Component Value Date    HGB 14.1 06/19/2025    WBC 12.2 (H) 06/19/2025    .0 (L) 06/19/2025     (H) 06/19/2025    K 4.3 06/19/2025    CREATSERUM 1.71 (H) 06/19/2025    VHCO3 37.3 (H) 01/11/2025    AST 27 06/16/2025    ALT 65 (H) 06/16/2025    TROP <0.045 04/10/2021           Scheduled Medications[1]  PRN Medications[2]             [1]    acetylcysteine  2 mL Nebulization TID    lidocaine-menthol  1 patch Transdermal Nightly    piperacillin-tazobactam  3.375 g Intravenous Q8H    ipratropium-albuterol  3 mL Nebulization 4 times per day    furosemide  40 mg Intravenous Daily    heparin  7,500 Units Subcutaneous 2 times per day    insulin regular human  1-7 Units Subcutaneous 4 times per day    methylPREDNISolone  40 mg Intravenous Q12H    senna  10 mL Per NG Tube BID    docusate  100 mg Per NG Tube BID    pantoprazole  40 mg Intravenous QAM AC    [Held by provider] metoprolol succinate ER  25 mg Oral 2x Daily(Beta Blocker)   [2]   LORazepam    glucose **OR** glucose **OR** glucose-vitamin C **OR** dextrose **OR** glucose **OR** glucose **OR** glucose-vitamin C    ondansetron    metoclopramide    acetaminophen **OR** acetaminophen **OR** acetaminophen    polyethylene glycol (PEG 3350)    bisacodyl

## 2025-06-20 LAB
ALBUMIN SERPL-MCNC: 3.7 G/DL (ref 3.2–4.8)
ANION GAP SERPL CALC-SCNC: 6 MMOL/L (ref 0–18)
BASOPHILS # BLD: 0 X10(3) UL (ref 0–0.2)
BASOPHILS NFR BLD: 0 %
BUN BLD-MCNC: 27 MG/DL (ref 9–23)
BUN/CREAT SERPL: 17 (ref 10–20)
CALCIUM BLD-MCNC: 8.4 MG/DL (ref 8.7–10.4)
CHLORIDE SERPL-SCNC: 103 MMOL/L (ref 98–112)
CO2 SERPL-SCNC: 29 MMOL/L (ref 21–32)
CREAT BLD-MCNC: 1.59 MG/DL (ref 0.7–1.3)
DEPRECATED RDW RBC AUTO: 56.8 FL (ref 35.1–46.3)
EGFRCR SERPLBLD CKD-EPI 2021: 45 ML/MIN/1.73M2 (ref 60–?)
EOSINOPHIL # BLD: 0 X10(3) UL (ref 0–0.7)
EOSINOPHIL NFR BLD: 0 %
ERYTHROCYTE [DISTWIDTH] IN BLOOD BY AUTOMATED COUNT: 15.6 % (ref 11–15)
GLUCOSE BLD-MCNC: 177 MG/DL (ref 70–99)
GLUCOSE BLDC GLUCOMTR-MCNC: 114 MG/DL (ref 70–99)
GLUCOSE BLDC GLUCOMTR-MCNC: 124 MG/DL (ref 70–99)
GLUCOSE BLDC GLUCOMTR-MCNC: 126 MG/DL (ref 70–99)
GLUCOSE BLDC GLUCOMTR-MCNC: 142 MG/DL (ref 70–99)
HCT VFR BLD AUTO: 44.7 % (ref 39–53)
HGB BLD-MCNC: 13.4 G/DL (ref 13–17.5)
LYMPHOCYTES NFR BLD: 1.21 X10(3) UL (ref 1–4)
LYMPHOCYTES NFR BLD: 10 %
MAGNESIUM SERPL-MCNC: 2.5 MG/DL (ref 1.6–2.6)
MCH RBC QN AUTO: 30 PG (ref 26–34)
MCHC RBC AUTO-ENTMCNC: 30 G/DL (ref 31–37)
MCV RBC AUTO: 100 FL (ref 80–100)
METAMYELOCYTES # BLD: 0.12 X10(3) UL (ref ?–0.01)
METAMYELOCYTES NFR BLD: 1 %
MONOCYTES # BLD: 0.36 X10(3) UL (ref 0.1–1)
MONOCYTES NFR BLD: 3 %
NEUTROPHILS # BLD AUTO: 9.78 X10 (3) UL (ref 1.5–7.7)
NEUTROPHILS NFR BLD: 86 %
NEUTS HYPERSEG # BLD: 10.41 X10(3) UL (ref 1.5–7.7)
OSMOLALITY SERPL CALC.SUM OF ELEC: 295 MOSM/KG (ref 275–295)
PHOSPHATE SERPL-MCNC: 4.9 MG/DL (ref 2.4–5.1)
PLATELET # BLD AUTO: 122 10(3)UL (ref 150–450)
PLATELET MORPHOLOGY: NORMAL
PLATELETS.RETICULATED NFR BLD AUTO: 6.5 % (ref 0–7)
POTASSIUM SERPL-SCNC: 4.6 MMOL/L (ref 3.5–5.1)
RBC # BLD AUTO: 4.47 X10(6)UL (ref 3.8–5.8)
SODIUM SERPL-SCNC: 138 MMOL/L (ref 136–145)
TOTAL CELLS COUNTED BLD: 100
WBC # BLD AUTO: 12.1 X10(3) UL (ref 4–11)

## 2025-06-20 PROCEDURE — 99233 SBSQ HOSP IP/OBS HIGH 50: CPT | Performed by: HOSPITALIST

## 2025-06-20 RX ORDER — IPRATROPIUM BROMIDE AND ALBUTEROL SULFATE 2.5; .5 MG/3ML; MG/3ML
3 SOLUTION RESPIRATORY (INHALATION) 3 TIMES DAILY
Status: DISCONTINUED | OUTPATIENT
Start: 2025-06-20 | End: 2025-06-20

## 2025-06-20 RX ORDER — FUROSEMIDE 20 MG/1
20 TABLET ORAL DAILY
Status: DISCONTINUED | OUTPATIENT
Start: 2025-06-20 | End: 2025-06-21

## 2025-06-20 RX ORDER — ACETYLCYSTEINE 200 MG/ML
2 SOLUTION ORAL; RESPIRATORY (INHALATION) DAILY
Status: DISCONTINUED | OUTPATIENT
Start: 2025-06-21 | End: 2025-06-21

## 2025-06-20 RX ORDER — IPRATROPIUM BROMIDE AND ALBUTEROL SULFATE 2.5; .5 MG/3ML; MG/3ML
3 SOLUTION RESPIRATORY (INHALATION)
Status: DISCONTINUED | OUTPATIENT
Start: 2025-06-20 | End: 2025-06-21

## 2025-06-20 RX ORDER — PANTOPRAZOLE SODIUM 40 MG/1
40 TABLET, DELAYED RELEASE ORAL
Status: DISCONTINUED | OUTPATIENT
Start: 2025-06-21 | End: 2025-06-21

## 2025-06-20 NOTE — CONGREGATE LIVING REVIEW
[facility] Living Authorization    The [facility] Review Committee has reviewed this case and the patient IS APPROVED for discharge to a facility for Short Term Skilled once the following procedure is followed:     - The physician discharge instructions (contained within the ЮЛИЯ note for SNF) must inlcude the below appropriate and approved COVID instructions to the facility    For questions regarding CLRC approval process, please contact the CM assigned to the case.  For questions regarding RN discharge workflow, please contact the unit Clinical Leader.

## 2025-06-20 NOTE — PLAN OF CARE
Patient on 8L high-flow nasal cannula. Call light within reach, safety measures maintained. Transfer order in, report given to Yuan PERALTA. All personal belongings sent up with patient.      Problem: Patient Centered Care  Goal: Patient preferences are identified and integrated in the patient's plan of care  Description: Interventions:  - What would you like us to know as we care for you? I like to sit in the chair while at the hospital.    - Provide timely, complete, and accurate information to patient/family  - Incorporate patient and family knowledge, values, beliefs, and cultural backgrounds into the planning and delivery of care  - Encourage patient/family to participate in care and decision-making at the level they choose  - Honor patient and family perspectives and choices  Outcome: Progressing     Problem: Diabetes/Glucose Control  Goal: Glucose maintained within prescribed range  Description: INTERVENTIONS:  - Monitor Blood Glucose as ordered  - Assess for signs and symptoms of hyperglycemia and hypoglycemia  - Administer ordered medications to maintain glucose within target range  - Assess barriers to adequate nutritional intake and initiate nutrition consult as needed  - Instruct patient on self management of diabetes  Outcome: Progressing     Problem: CARDIOVASCULAR - ADULT  Goal: Maintains optimal cardiac output and hemodynamic stability  Description: INTERVENTIONS:  - Monitor vital signs, rhythm, and trends  - Monitor for bleeding, hypotension and signs of decreased cardiac output  - Evaluate effectiveness of vasoactive medications to optimize hemodynamic stability  - Monitor arterial and/or venous puncture sites for bleeding and/or hematoma  - Assess quality of pulses, skin color and temperature  - Assess for signs of decreased coronary artery perfusion - ex. Angina  - Evaluate fluid balance, assess for edema, trend weights  Outcome: Progressing  Goal: Absence of cardiac arrhythmias or at  baseline  Description: INTERVENTIONS:  - Continuous cardiac monitoring, monitor vital signs, obtain 12 lead EKG if indicated  - Evaluate effectiveness of antiarrhythmic and heart rate control medications as ordered  - Initiate emergency measures for life threatening arrhythmias  - Monitor electrolytes and administer replacement therapy as ordered  Outcome: Progressing     Problem: RESPIRATORY - ADULT  Goal: Achieves optimal ventilation and oxygenation  Description: INTERVENTIONS:  - Assess for changes in respiratory status  - Assess for changes in mentation and behavior  - Position to facilitate oxygenation and minimize respiratory effort  - Oxygen supplementation based on oxygen saturation or ABGs  - Provide Smoking Cessation handout, if applicable  - Encourage broncho-pulmonary hygiene including cough, deep breathe, Incentive Spirometry  - Assess the need for suctioning and perform as needed  - Assess and instruct to report SOB or any respiratory difficulty  - Respiratory Therapy support as indicated  - Manage/alleviate anxiety  - Monitor for signs/symptoms of CO2 retention  Outcome: Progressing

## 2025-06-20 NOTE — PLAN OF CARE
Problem: Patient Centered Care  Goal: Patient preferences are identified and integrated in the patient's plan of care  Description: Interventions:  - What would you like us to know as we care for you? discharge  - Provide timely, complete, and accurate information to patient/family  - Incorporate patient and family knowledge, values, beliefs, and cultural backgrounds into the planning and delivery of care  - Encourage patient/family to participate in care and decision-making at the level they choose  - Honor patient and family perspectives and choices  Outcome: Progressing     Problem: Diabetes/Glucose Control  Goal: Glucose maintained within prescribed range  Description: INTERVENTIONS:  - Monitor Blood Glucose as ordered  - Assess for signs and symptoms of hyperglycemia and hypoglycemia  - Administer ordered medications to maintain glucose within target range  - Assess barriers to adequate nutritional intake and initiate nutrition consult as needed  - Instruct patient on self management of diabetes  Outcome: Progressing     Problem: Patient/Family Goals  Goal: Patient/Family Long Term Goal  Description: Patient's Long Term Goal: to be discharged home    Interventions:  - Completion of IV antibiotics  - Wean off Ventilator / O2 as tolerated/indicated and ordered per MD  - See additional Care Plan goals for specific interventions  Outcome: Progressing  Goal: Patient/Family Short Term Goal  Description: Patient's Short Term Goal: to be comfortable    Interventions:   - Reposition Q2HR /PRN  - Oral and ETT suctioning and care Q4HR /PRN  - See additional Care Plan goals for specific interventions  Outcome: Progressing     Problem: CARDIOVASCULAR - ADULT  Goal: Maintains optimal cardiac output and hemodynamic stability  Description: INTERVENTIONS:  - Monitor vital signs, rhythm, and trends  - Monitor for bleeding, hypotension and signs of decreased cardiac output  - Evaluate effectiveness of vasoactive medications to  optimize hemodynamic stability  - Monitor arterial and/or venous puncture sites for bleeding and/or hematoma  - Assess quality of pulses, skin color and temperature  - Assess for signs of decreased coronary artery perfusion - ex. Angina  - Evaluate fluid balance, assess for edema, trend weights  Outcome: Progressing  Goal: Absence of cardiac arrhythmias or at baseline  Description: INTERVENTIONS:  - Continuous cardiac monitoring, monitor vital signs, obtain 12 lead EKG if indicated  - Evaluate effectiveness of antiarrhythmic and heart rate control medications as ordered  - Initiate emergency measures for life threatening arrhythmias  - Monitor electrolytes and administer replacement therapy as ordered  Outcome: Progressing     Problem: RESPIRATORY - ADULT  Goal: Achieves optimal ventilation and oxygenation  Description: INTERVENTIONS:  - Assess for changes in respiratory status  - Assess for changes in mentation and behavior  - Position to facilitate oxygenation and minimize respiratory effort  - Oxygen supplementation based on oxygen saturation or ABGs  - Provide Smoking Cessation handout, if applicable  - Encourage broncho-pulmonary hygiene including cough, deep breathe, Incentive Spirometry  - Assess the need for suctioning and perform as needed  - Assess and instruct to report SOB or any respiratory difficulty  - Respiratory Therapy support as indicated  - Manage/alleviate anxiety  - Monitor for signs/symptoms of CO2 retention  Outcome: Progressing

## 2025-06-20 NOTE — PLAN OF CARE
-Used BIPAP from 715 pm to 220 am then switched to O2 per high flow cannula at 10 liters. Ate dinner after midnight. Pleasant to staff.    Problem: Patient Centered Care  Goal: Patient preferences are identified and integrated in the patient's plan of care  Description: Interventions:  - What would you like us to know as we care for you?   - Provide timely, complete, and accurate information to patient/family  - Incorporate patient and family knowledge, values, beliefs, and cultural backgrounds into the planning and delivery of care  - Encourage patient/family to participate in care and decision-making at the level they choose  - Honor patient and family perspectives and choices  Outcome: Progressing     Problem: Diabetes/Glucose Control  Goal: Glucose maintained within prescribed range  Description: INTERVENTIONS:  - Monitor Blood Glucose as ordered  - Assess for signs and symptoms of hyperglycemia and hypoglycemia  - Administer ordered medications to maintain glucose within target range  - Assess barriers to adequate nutritional intake and initiate nutrition consult as needed  - Instruct patient on self management of diabetes  Outcome: Progressing     Problem: CARDIOVASCULAR - ADULT  Goal: Maintains optimal cardiac output and hemodynamic stability  Description: INTERVENTIONS:  - Monitor vital signs, rhythm, and trends  - Monitor for bleeding, hypotension and signs of decreased cardiac output  - Evaluate effectiveness of vasoactive medications to optimize hemodynamic stability  - Monitor arterial and/or venous puncture sites for bleeding and/or hematoma  - Assess quality of pulses, skin color and temperature  - Assess for signs of decreased coronary artery perfusion - ex. Angina  - Evaluate fluid balance, assess for edema, trend weights  Outcome: Progressing  Goal: Absence of cardiac arrhythmias or at baseline  Description: INTERVENTIONS:  - Continuous cardiac monitoring, monitor vital signs, obtain 12 lead EKG if  indicated  - Evaluate effectiveness of antiarrhythmic and heart rate control medications as ordered  - Initiate emergency measures for life threatening arrhythmias  - Monitor electrolytes and administer replacement therapy as ordered  Outcome: Progressing     Problem: RESPIRATORY - ADULT  Goal: Achieves optimal ventilation and oxygenation  Description: INTERVENTIONS:  - Assess for changes in respiratory status  - Assess for changes in mentation and behavior  - Position to facilitate oxygenation and minimize respiratory effort  - Oxygen supplementation based on oxygen saturation or ABGs  - Provide Smoking Cessation handout, if applicable  - Encourage broncho-pulmonary hygiene including cough, deep breathe, Incentive Spirometry  - Assess the need for suctioning and perform as needed  - Assess and instruct to report SOB or any respiratory difficulty  - Respiratory Therapy support as indicated  - Manage/alleviate anxiety  - Monitor for signs/symptoms of CO2 retention  Outcome: Progressing

## 2025-06-20 NOTE — PROGRESS NOTES
[facility]  part of [facility]    Progress Note    Campbell Rao Patient Status:  Inpatient    1950 MRN [id number]   Location [facility] 2W/SW Attending Nichol Bates MD   Hosp Day # 4 PCP Johny Kang MD       Subjective:   Subjective:  Feeling better  Up to the chair  On 10 L of oxygen  Less cough and no wheezes  No chest pain  No fever  Objective:   Blood pressure 110/80, pulse 76, temperature 97.4 °F (36.3 °C), temperature source Temporal, resp. rate 18, height 5' 8\" (1.727 m), weight 295 lb 6.7 oz (134 kg), SpO2 96%.  Physical Exam  Constitutional:       General: He is not in acute distress.     Appearance: He is ill-appearing.   HENT:      Head: Atraumatic.      Mouth/Throat:      Mouth: Mucous membranes are moist.   Eyes:      General: No scleral icterus.  Cardiovascular:      Rate and Rhythm: Normal rate.      Heart sounds:      No gallop.   Pulmonary:      Comments: Diminished and distant breath sounds  Prolonged expiration  No wheezes or rales or rhonchi  Abdominal:      General: Abdomen is flat. Bowel sounds are normal.      Palpations: Abdomen is soft.      Tenderness: There is no guarding.   Musculoskeletal:      Right lower leg: Edema present.      Left lower leg: Edema present.   Lymphadenopathy:      Cervical: No cervical adenopathy.   Neurological:      General: No focal deficit present.      Mental Status: He is oriented to person, place, and time.         Results:   Lab Results   Component Value Date    WBC 12.1 (H) 2025    HGB 13.4 2025    HCT 44.7 2025    .0 (L) 2025    CREATSERUM 1.59 (H) 2025    BUN 27 (H) 2025     2025    K 4.6 2025     2025    CO2 29.0 2025     (H) 2025    CA 8.4 (L) 2025    ALB 3.7 2025    ALKPHO 98 2025    BILT 0.8 2025    TP 6.2 2025    AST 27 2025    ALT 65 (H) 2025    TSH 2.777  04/18/2024    GGT 38 04/18/2024    DDIMER 0.40 10/18/2016    CRP 0.40 01/11/2025    MG 2.5 06/20/2025    PHOS 4.9 06/20/2025    TROP <0.045 04/10/2021    TROPHS 5 05/19/2025    B12 765 04/27/2021       XR CHEST AP PORTABLE  (CPT=71045)  Result Date: 6/19/2025  CONCLUSION:   The endotracheal and esophagogastric tubes have been removed.  No new abnormality.  Persistent pulmonary edema and left pleural effusion with left basilar opacity.      Dictated by (CST): Davey Raymond MD on 6/19/2025 at 7:45 AM     Finalized by (CST): Davey Raymond MD on 6/19/2025 at 7:47 AM                Assessment & Plan:       1-acute on chronic hypercapnic and hypoxemic respiratory failure  -Decompensated overlap syndrome with severe COPD and MARIA M with morbid obesity  -Aspiration pneumonia    Intubated in ER  6/16/25 with  PH 7.12 and PCO2 119 and pO2 118  Extubated 6/18/25 and tolerated   Now on 8-10 L NC / baseline 5 L NC at home / continue to wean      S/p bronchoscopy 6/17/25 with mucous plug and food material / suctioned   Negative culture and pathology     bronchial hygiene with bronchodilator and Mucomyst  Aspiration precautions  Antibiotics with Zosyn     2-severe COPD with acute exacerbation  / Chronically on 4 L of oxygen  IV steroid and bronchodilator and nebulizers , Zosyn     3-morbid obesity BMI 45 and MARIA M on CPAP  Pap nightly      4-HTN, HL, obesity, CAD,HFpEF      5-DVT prophylaxis  Heparin subcu     6- Full code      Transfer to medical floor              Yesi Salinas MD  6/20/2025

## 2025-06-21 ENCOUNTER — APPOINTMENT (OUTPATIENT)
Dept: GENERAL RADIOLOGY | Facility: HOSPITAL | Age: 75
End: 2025-06-21
Attending: HOSPITALIST
Payer: MEDICARE

## 2025-06-21 VITALS
HEART RATE: 79 BPM | WEIGHT: 298.75 LBS | OXYGEN SATURATION: 98 % | RESPIRATION RATE: 20 BRPM | TEMPERATURE: 98 F | BODY MASS INDEX: 45.28 KG/M2 | DIASTOLIC BLOOD PRESSURE: 64 MMHG | HEIGHT: 68 IN | SYSTOLIC BLOOD PRESSURE: 109 MMHG

## 2025-06-21 LAB
ANION GAP SERPL CALC-SCNC: 3 MMOL/L (ref 0–18)
BASOPHILS # BLD: 0 X10(3) UL (ref 0–0.2)
BASOPHILS NFR BLD: 0 %
BUN BLD-MCNC: 35 MG/DL (ref 9–23)
BUN/CREAT SERPL: 25.5 (ref 10–20)
CALCIUM BLD-MCNC: 9 MG/DL (ref 8.7–10.4)
CHLORIDE SERPL-SCNC: 101 MMOL/L (ref 98–112)
CO2 SERPL-SCNC: 36 MMOL/L (ref 21–32)
CREAT BLD-MCNC: 1.37 MG/DL (ref 0.7–1.3)
DEPRECATED RDW RBC AUTO: 55.1 FL (ref 35.1–46.3)
EGFRCR SERPLBLD CKD-EPI 2021: 54 ML/MIN/1.73M2 (ref 60–?)
EOSINOPHIL # BLD: 0 X10(3) UL (ref 0–0.7)
EOSINOPHIL NFR BLD: 0 %
ERYTHROCYTE [DISTWIDTH] IN BLOOD BY AUTOMATED COUNT: 15 % (ref 11–15)
GLUCOSE BLD-MCNC: 198 MG/DL (ref 70–99)
GLUCOSE BLDC GLUCOMTR-MCNC: 130 MG/DL (ref 70–99)
GLUCOSE BLDC GLUCOMTR-MCNC: 133 MG/DL (ref 70–99)
HCT VFR BLD AUTO: 42.8 % (ref 39–53)
HGB BLD-MCNC: 12.8 G/DL (ref 13–17.5)
LYMPHOCYTES NFR BLD: 0.86 X10(3) UL (ref 1–4)
LYMPHOCYTES NFR BLD: 8 %
MAGNESIUM SERPL-MCNC: 2.4 MG/DL (ref 1.6–2.6)
MCH RBC QN AUTO: 29.9 PG (ref 26–34)
MCHC RBC AUTO-ENTMCNC: 29.9 G/DL (ref 31–37)
MCV RBC AUTO: 100 FL (ref 80–100)
METAMYELOCYTES # BLD: 0.21 X10(3) UL (ref ?–0.01)
METAMYELOCYTES NFR BLD: 2 %
MONOCYTES # BLD: 0.32 X10(3) UL (ref 0.1–1)
MONOCYTES NFR BLD: 3 %
MYELOCYTES # BLD: 0.21 X10(3) UL (ref ?–0.01)
MYELOCYTES NFR BLD: 2 %
NEUTROPHILS # BLD AUTO: 8.38 X10 (3) UL (ref 1.5–7.7)
NEUTROPHILS NFR BLD: 85 %
NEUTS HYPERSEG # BLD: 9.1 X10(3) UL (ref 1.5–7.7)
OSMOLALITY SERPL CALC.SUM OF ELEC: 304 MOSM/KG (ref 275–295)
PLATELET # BLD AUTO: 117 10(3)UL (ref 150–450)
PLATELET MORPHOLOGY: NORMAL
POTASSIUM SERPL-SCNC: 4.3 MMOL/L (ref 3.5–5.1)
RBC # BLD AUTO: 4.28 X10(6)UL (ref 3.8–5.8)
SODIUM SERPL-SCNC: 140 MMOL/L (ref 136–145)
TOTAL CELLS COUNTED BLD: 100
WBC # BLD AUTO: 10.7 X10(3) UL (ref 4–11)

## 2025-06-21 PROCEDURE — 99239 HOSP IP/OBS DSCHRG MGMT >30: CPT | Performed by: HOSPITALIST

## 2025-06-21 PROCEDURE — 71045 X-RAY EXAM CHEST 1 VIEW: CPT | Performed by: HOSPITALIST

## 2025-06-21 RX ORDER — TORSEMIDE 20 MG/1
40 TABLET ORAL DAILY
Status: SHIPPED | COMMUNITY
Start: 2025-06-21

## 2025-06-21 RX ORDER — ACETYLCYSTEINE 200 MG/ML
2 SOLUTION ORAL; RESPIRATORY (INHALATION) DAILY
Status: SHIPPED | COMMUNITY
Start: 2025-06-22

## 2025-06-21 RX ORDER — FLUTICASONE FUROATE, UMECLIDINIUM BROMIDE AND VILANTEROL TRIFENATATE 100; 62.5; 25 UG/1; UG/1; UG/1
1 POWDER RESPIRATORY (INHALATION) DAILY
Status: SHIPPED | COMMUNITY
Start: 2025-06-21

## 2025-06-21 RX ORDER — POTASSIUM CHLORIDE 750 MG/1
10 TABLET, EXTENDED RELEASE ORAL DAILY
Status: SHIPPED | COMMUNITY
Start: 2025-06-21

## 2025-06-21 RX ORDER — PREDNISONE 10 MG/1
40 TABLET ORAL DAILY
Status: SHIPPED | COMMUNITY
Start: 2025-06-22

## 2025-06-21 NOTE — CM/SW NOTE
06/21/25 1200   Discharge disposition   Expected discharge disposition subacute   Post Acute Care Provider   ([facility]: [facility])   Discharge transportation [facility]     SW was informed pt is ready for discharge. SW reached out to [facility] for bed availability.     Pt will be going to Room 149 report number is [phone], RN made aware. ELDER set up transportation via [facility] 4pm, (6LO2), PCS form completed.     PLAN: [facility]    SW/EDUARDO to remain available for support and/or discharge planning.    MS Ana LauraW, LSW [id number]

## 2025-06-21 NOTE — PLAN OF CARE
Patient discharged to [facility]. IV removed, discharge education provided, patient sent with all personal belongings, and discharge instructions. Addressed additional questions. Report given to EJ RN at facility via phone call.    Problem: Patient Centered Care  Goal: Patient preferences are identified and integrated in the patient's plan of care  Description: Interventions:  - What would you like us to know as we care for you?   - Provide timely, complete, and accurate information to patient/family  - Incorporate patient and family knowledge, values, beliefs, and cultural backgrounds into the planning and delivery of care  - Encourage patient/family to participate in care and decision-making at the level they choose  - Honor patient and family perspectives and choices  Outcome: Adequate for Discharge     Problem: Diabetes/Glucose Control  Goal: Glucose maintained within prescribed range  Description: INTERVENTIONS:  - Monitor Blood Glucose as ordered  - Assess for signs and symptoms of hyperglycemia and hypoglycemia  - Administer ordered medications to maintain glucose within target range  - Assess barriers to adequate nutritional intake and initiate nutrition consult as needed  - Instruct patient on self management of diabetes  Outcome: Adequate for Discharge     Problem: Patient/Family Goals  Goal: Patient/Family Long Term Goal  Description: Patient's Long Term Goal: to be discharged home    Interventions:  - Completion of IV antibiotics  - Wean off Ventilator / O2 as tolerated/indicated and ordered per MD  - See additional Care Plan goals for specific interventions  Outcome: Adequate for Discharge  Goal: Patient/Family Short Term Goal  Description: Patient's Short Term Goal: to be comfortable    Interventions:   - Reposition Q2HR /PRN  - Oral and ETT suctioning and care Q4HR /PRN  - See additional Care Plan goals for specific interventions  Outcome: Adequate for Discharge     Problem: CARDIOVASCULAR -  ADULT  Goal: Maintains optimal cardiac output and hemodynamic stability  Description: INTERVENTIONS:  - Monitor vital signs, rhythm, and trends  - Monitor for bleeding, hypotension and signs of decreased cardiac output  - Evaluate effectiveness of vasoactive medications to optimize hemodynamic stability  - Monitor arterial and/or venous puncture sites for bleeding and/or hematoma  - Assess quality of pulses, skin color and temperature  - Assess for signs of decreased coronary artery perfusion - ex. Angina  - Evaluate fluid balance, assess for edema, trend weights  Outcome: Adequate for Discharge  Goal: Absence of cardiac arrhythmias or at baseline  Description: INTERVENTIONS:  - Continuous cardiac monitoring, monitor vital signs, obtain 12 lead EKG if indicated  - Evaluate effectiveness of antiarrhythmic and heart rate control medications as ordered  - Initiate emergency measures for life threatening arrhythmias  - Monitor electrolytes and administer replacement therapy as ordered  Outcome: Adequate for Discharge     Problem: RESPIRATORY - ADULT  Goal: Achieves optimal ventilation and oxygenation  Description: INTERVENTIONS:  - Assess for changes in respiratory status  - Assess for changes in mentation and behavior  - Position to facilitate oxygenation and minimize respiratory effort  - Oxygen supplementation based on oxygen saturation or ABGs  - Provide Smoking Cessation handout, if applicable  - Encourage broncho-pulmonary hygiene including cough, deep breathe, Incentive Spirometry  - Assess the need for suctioning and perform as needed  - Assess and instruct to report SOB or any respiratory difficulty  - Respiratory Therapy support as indicated  - Manage/alleviate anxiety  - Monitor for signs/symptoms of CO2 retention  Outcome: Adequate for Discharge     Problem: PAIN - ADULT  Goal: Verbalizes/displays adequate comfort level or patient's stated pain goal  Description: INTERVENTIONS:  - Encourage pt to monitor  pain and request assistance  - Assess pain using appropriate pain scale  - Administer analgesics based on type and severity of pain and evaluate response  - Implement non-pharmacological measures as appropriate and evaluate response  - Consider cultural and social influences on pain and pain management  - Manage/alleviate anxiety  - Utilize distraction and/or relaxation techniques  - Monitor for opioid side effects  - Notify MD/LIP if interventions unsuccessful or patient reports new pain  - Anticipate increased pain with activity and pre-medicate as appropriate  Outcome: Adequate for Discharge     Problem: RISK FOR INFECTION - ADULT  Goal: Absence of fever/infection during anticipated neutropenic period  Description: INTERVENTIONS  - Monitor WBC  - Administer growth factors as ordered  - Implement neutropenic guidelines  Outcome: Adequate for Discharge     Problem: SAFETY ADULT - FALL  Goal: Free from fall injury  Description: INTERVENTIONS:  - Assess pt frequently for physical needs  - Identify cognitive and physical deficits and behaviors that affect risk of falls.  - [place] fall precautions as indicated by assessment.  - Educate pt/family on patient safety including physical limitations  - Instruct pt to call for assistance with activity based on assessment  - Modify environment to reduce risk of injury  - Provide assistive devices as appropriate  - Consider OT/PT consult to assist with strengthening/mobility  - Encourage toileting schedule  Outcome: Adequate for Discharge     Problem: DISCHARGE PLANNING  Goal: Discharge to home or other facility with appropriate resources  Description: INTERVENTIONS:  - Identify barriers to discharge w/pt and caregiver  - Include patient/family/discharge partner in discharge planning  - Arrange for needed discharge resources and transportation as appropriate  - Identify discharge learning needs (meds, wound care, etc)  - Arrange for interpreters to assist at discharge as  needed  - Consider post-discharge preferences of patient/family/discharge partner  - Complete POLST form as appropriate  - Assess patient's ability to be responsible for managing their own health  - Refer to Case Management Department for coordinating discharge planning if the patient needs post-hospital services based on physician/LIP order or complex needs related to functional status, cognitive ability or social support system  Outcome: Adequate for Discharge     Problem: SKIN/TISSUE INTEGRITY - ADULT  Goal: Skin integrity remains intact  Description: INTERVENTIONS  - Assess and document risk factors for pressure ulcer development  - Assess and document skin integrity  - Monitor for areas of redness and/or skin breakdown  - Initiate interventions, skin care algorithm/standards of care as needed  Outcome: Adequate for Discharge  Goal: Incision(s), wounds(s) or drain site(s) healing without S/S of infection  Description: INTERVENTIONS:  - Assess and document risk factors for pressure ulcer development  - Assess and document skin integrity  - Assess and document dressing/incision, wound bed, drain sites and surrounding tissue  - Implement wound care per orders  - Initiate isolation precautions as appropriate  - Initiate Pressure Ulcer prevention bundle as indicated  Outcome: Adequate for Discharge     Problem: HEMATOLOGIC - ADULT  Goal: Maintains hematologic stability  Description: INTERVENTIONS  - Assess for signs and symptoms of bleeding or hemorrhage  - Monitor labs and vital signs for trends  - Administer supportive blood products/factors, fluids and medications as ordered and appropriate  - Administer supportive blood products/factors as ordered and appropriate  Outcome: Adequate for Discharge  Goal: Free from bleeding injury  Description: (Example usage: patient with low platelets)  INTERVENTIONS:  - Avoid intramuscular injections, enemas and rectal medication administration  - Ensure safe mobilization of  patient  - Hold pressure on venipuncture sites to achieve adequate hemostasis  - Assess for signs and symptoms of internal bleeding  - Monitor lab trends  - Patient is to report abnormal signs of bleeding to staff  - Avoid use of toothpicks and dental floss  - Use electric shaver for shaving  - Use soft bristle tooth brush  - Limit straining and forceful nose blowing  Outcome: Adequate for Discharge     Problem: MUSCULOSKELETAL - ADULT  Goal: Return mobility to safest level of function  Description: INTERVENTIONS:  - Assess patient stability and activity tolerance for standing, transferring and ambulating w/ or w/o assistive devices  - Assist with transfers and ambulation using safe patient handling equipment as needed  - Ensure adequate protection for wounds/incisions during mobilization  - Obtain PT/OT consults as needed  - Advance activity as appropriate  - Communicate ordered activity level and limitations with patient/family  Outcome: Adequate for Discharge

## 2025-06-21 NOTE — PROGRESS NOTES
[facility]  part of [facility]     Progress Note    Campbell Rao Patient Status:  Inpatient    1950 MRN [id number]   Location [facility]5W Attending Nichol Bates MD   Hosp Day # 5 PCP Johny Kang MD       Subjective:   Patient seen and examined.  Resting in chair.  Denies significant dyspnea.  Some occasional cough.  On 7 L oxygen    Objective:   Blood pressure 108/60, pulse 81, temperature 98.6 °F (37 °C), temperature source Oral, resp. rate 18, height 5' 8\" (1.727 m), weight 298 lb 11.6 oz (135.5 kg), SpO2 97%.  Intake/Output:   Last 3 shifts: I/O last 3 completed shifts:  In: 772.7 [P.O.:200; I.V.:272.7; IV PIGGYBACK:300]  Out: 2700 [Urine:2700]   This shift: No intake/output data recorded.     Vent Settings:      Hemodynamic parameters (last 24 hours):      Scheduled Meds: Current Hospital Medications[1]    Continuous Infusions: Medication Infusions[2]    Physical Exam  Constitutional: no acute distress  Eyes: PERRL  ENT: nares pateint  Neck: supple, no JVD  Cardio: RRR, S1 S2  Respiratory: Diminished expiratory breath sounds  GI: abdomen soft, non tender, active bowel sounds, no organomegaly  Extremities: no clubbing, cyanosis, + bilateral lower extremity edema  Neurologic: no gross motor deficits  Skin: warm, dry      Results:     Lab Results   Component Value Date    WBC 10.7 2025    HGB 12.8 2025    HCT 42.8 2025    .0 2025    CREATSERUM 1.37 2025    BUN 35 2025     2025    K 4.3 2025     2025    CO2 36.0 2025     2025    CA 9.0 2025    MG 2.4 2025       No results found.          Assessment   1.  Acute on chronic hypercapnic hypoxemic respiratory failure  2.  Severe COPD with acute exacerbation  3.  MARIA M  4.  Hypertension  5.  Hyperlipidemia  6.  Coronary artery disease  7.  HFpEF  8.  Aspiration pneumonia     Plan   -Patient presents with evidence  of acute on chronic hypercapnic hypoxemic respiratory failure underlying severe COPD and MARIA M with morbid obesity.  Concern for aspiration pneumonia  - Intubated on 6/16/2025.  Tolerating extubation on 6/18/2025.  - Status post bronchoscopy on 6/17/2025 with mucous plugging with some food material suctioned.  Cultures negative to date.  Cytology with no evidence of malignancy.  - Continue antibiotic therapy with Zosyn  - Nebulizer treatments  - Aspiration precautions  - Gradually wean steroid therapy  - Nightly PAP therapy  - DVT prophylaxis: Heparin  -    Corey Del Toro DO  Pulmonary Critical Care Medicine  [facility]        [1]   Current Facility-Administered Medications   Medication Dose Route Frequency    insulin aspart (NovoLOG) 100 Units/mL FlexPen 1-7 Units  1-7 Units Subcutaneous TID CC and HS    acetylcysteine (Mucomyst) 20 % nebulizer solution 2 mL  2 mL Nebulization Daily    furosemide (Lasix) tab 20 mg  20 mg Oral Daily    ipratropium-albuterol (Duoneb) 0.5-2.5 (3) MG/3ML inhalation solution 3 mL  3 mL Nebulization 4 times per day    pantoprazole (Protonix) DR tab 40 mg  40 mg Oral QAM AC    LORazepam (Ativan) 2 mg/mL injection 0.5 mg  0.5 mg Intravenous Nightly PRN    lidocaine-menthol 4-1 % patch 1 patch  1 patch Transdermal Nightly    piperacillin-tazobactam (Zosyn) 3.375 g in dextrose 5% 100 mL IVPB-ADDV  3.375 g Intravenous Q8H    glucose (Dex4) 15 GM/59ML oral liquid 15 g  15 g Oral Q15 Min PRN    Or    glucose (Glutose) 40% oral gel 15 g  15 g Oral Q15 Min PRN    Or    glucose-vitamin C (Dex-4) chewable tab 4 tablet  4 tablet Oral Q15 Min PRN    Or    dextrose 50% injection 50 mL  50 mL Intravenous Q15 Min PRN    Or    glucose (Dex4) 15 GM/59ML oral liquid 30 g  30 g Oral Q15 Min PRN    Or    glucose (Glutose) 40% oral gel 30 g  30 g Oral Q15 Min PRN    Or    glucose-vitamin C (Dex-4) chewable tab 8 tablet  8 tablet Oral Q15 Min PRN    heparin (Porcine) 5000 UNIT/ML injection 7,500 Units   7,500 Units Subcutaneous 2 times per day    ondansetron (Zofran) 4 MG/2ML injection 4 mg  4 mg Intravenous Q6H PRN    metoclopramide (Reglan) 5 mg/mL injection 5 mg  5 mg Intravenous Q8H PRN    methylPREDNISolone sodium succinate (Solu-MEDROL) injection 40 mg  40 mg Intravenous Q12H    acetaminophen (Tylenol) 160 MG/5ML oral liquid 650 mg  650 mg Per NG Tube Q4H PRN    Or    acetaminophen (Tylenol) rectal suppository 650 mg  650 mg Rectal Q4H PRN    Or    acetaminophen (Ofirmev) 10 mg/mL infusion premix 1,000 mg  1,000 mg Intravenous Q6H PRN    senna (Senokot) 8.8 MG/5ML oral syrup 17.6 mg  10 mL Per NG Tube BID    docusate (Colace) 50 MG/5ML oral solution 100 mg  100 mg Per NG Tube BID    polyethylene glycol (PEG 3350) (Miralax) 17 g oral packet 17 g  17 g Per NG Tube Daily PRN    bisacodyl (Dulcolax) 10 MG rectal suppository 10 mg  10 mg Rectal Daily PRN    metoprolol succinate ER (Toprol XL) 24 hr tab 25 mg  25 mg Oral 2x Daily(Beta Blocker)   [2]

## 2025-06-21 NOTE — PLAN OF CARE
Problem: Patient Centered Care  Goal: Patient preferences are identified and integrated in the patient's plan of care  Description: Interventions:  - What would you like us to know as we care for you? From home with spouse   - Provide timely, complete, and accurate information to patient/family  - Incorporate patient and family knowledge, values, beliefs, and cultural backgrounds into the planning and delivery of care  - Encourage patient/family to participate in care and decision-making at the level they choose  - Honor patient and family perspectives and choices  6/20/2025 2204 by Stacia Ware RN  Outcome: Progressing  6/20/2025 2203 by Stacia Ware RN  Outcome: Progressing     Problem: Diabetes/Glucose Control  Goal: Glucose maintained within prescribed range  Description: INTERVENTIONS:  - Monitor Blood Glucose as ordered  - Assess for signs and symptoms of hyperglycemia and hypoglycemia  - Administer ordered medications to maintain glucose within target range  - Assess barriers to adequate nutritional intake and initiate nutrition consult as needed  - Instruct patient on self management of diabetes  6/20/2025 2204 by Stacia Ware RN  Outcome: Progressing  6/20/2025 2203 by Stacia Ware RN  Outcome: Progressing     Problem: Patient/Family Goals  Goal: Patient/Family Long Term Goal  Description: Patient's Long Term Goal: to be discharged home    Interventions:  - Completion of IV antibiotics  - Wean off Ventilator / O2 as tolerated/indicated and ordered per MD  - See additional Care Plan goals for specific interventions  6/20/2025 2204 by Stacia Ware RN  Outcome: Progressing  6/20/2025 2203 by Stacia Ware RN  Outcome: Progressing  Goal: Patient/Family Short Term Goal  Description: Patient's Short Term Goal: to be comfortable    Interventions:   - Reposition Q2HR /PRN  - Oral and ETT suctioning and care Q4HR /PRN  - See additional Care Plan goals for specific  interventions  6/20/2025 2204 by Stacia Ware RN  Outcome: Progressing  6/20/2025 2203 by Stacia Ware RN  Outcome: Progressing     Problem: CARDIOVASCULAR - ADULT  Goal: Maintains optimal cardiac output and hemodynamic stability  Description: INTERVENTIONS:  - Monitor vital signs, rhythm, and trends  - Monitor for bleeding, hypotension and signs of decreased cardiac output  - Evaluate effectiveness of vasoactive medications to optimize hemodynamic stability  - Monitor arterial and/or venous puncture sites for bleeding and/or hematoma  - Assess quality of pulses, skin color and temperature  - Assess for signs of decreased coronary artery perfusion - ex. Angina  - Evaluate fluid balance, assess for edema, trend weights  6/20/2025 2204 by Stacia Ware RN  Outcome: Progressing  6/20/2025 2203 by Stacia Ware RN  Outcome: Progressing  Goal: Absence of cardiac arrhythmias or at baseline  Description: INTERVENTIONS:  - Continuous cardiac monitoring, monitor vital signs, obtain 12 lead EKG if indicated  - Evaluate effectiveness of antiarrhythmic and heart rate control medications as ordered  - Initiate emergency measures for life threatening arrhythmias  - Monitor electrolytes and administer replacement therapy as ordered  6/20/2025 2204 by Stacia Ware RN  Outcome: Progressing  6/20/2025 2203 by Stacia Ware RN  Outcome: Progressing     Problem: RESPIRATORY - ADULT  Goal: Achieves optimal ventilation and oxygenation  Description: INTERVENTIONS:  - Assess for changes in respiratory status  - Assess for changes in mentation and behavior  - Position to facilitate oxygenation and minimize respiratory effort  - Oxygen supplementation based on oxygen saturation or ABGs  - Provide Smoking Cessation handout, if applicable  - Encourage broncho-pulmonary hygiene including cough, deep breathe, Incentive Spirometry  - Assess the need for suctioning and perform as needed  - Assess and instruct  to report SOB or any respiratory difficulty  - Respiratory Therapy support as indicated  - Manage/alleviate anxiety  - Monitor for signs/symptoms of CO2 retention  6/20/2025 2204 by Stacia Ware RN  Outcome: Progressing  6/20/2025 2203 by Stacia Ware RN  Outcome: Progressing     Problem: PAIN - ADULT  Goal: Verbalizes/displays adequate comfort level or patient's stated pain goal  Description: INTERVENTIONS:  - Encourage pt to monitor pain and request assistance  - Assess pain using appropriate pain scale  - Administer analgesics based on type and severity of pain and evaluate response  - Implement non-pharmacological measures as appropriate and evaluate response  - Consider cultural and social influences on pain and pain management  - Manage/alleviate anxiety  - Utilize distraction and/or relaxation techniques  - Monitor for opioid side effects  - Notify MD/LIP if interventions unsuccessful or patient reports new pain  - Anticipate increased pain with activity and pre-medicate as appropriate  Outcome: Progressing     Problem: RISK FOR INFECTION - ADULT  Goal: Absence of fever/infection during anticipated neutropenic period  Description: INTERVENTIONS  - Monitor WBC  - Administer growth factors as ordered  - Implement neutropenic guidelines  Outcome: Progressing     Problem: SAFETY ADULT - FALL  Goal: Free from fall injury  Description: INTERVENTIONS:  - Assess pt frequently for physical needs  - Identify cognitive and physical deficits and behaviors that affect risk of falls.  - [place] fall precautions as indicated by assessment.  - Educate pt/family on patient safety including physical limitations  - Instruct pt to call for assistance with activity based on assessment  - Modify environment to reduce risk of injury  - Provide assistive devices as appropriate  - Consider OT/PT consult to assist with strengthening/mobility  - Encourage toileting schedule  Outcome: Progressing     Problem: DISCHARGE  PLANNING  Goal: Discharge to home or other facility with appropriate resources  Description: INTERVENTIONS:  - Identify barriers to discharge w/pt and caregiver  - Include patient/family/discharge partner in discharge planning  - Arrange for needed discharge resources and transportation as appropriate  - Identify discharge learning needs (meds, wound care, etc)  - Arrange for interpreters to assist at discharge as needed  - Consider post-discharge preferences of patient/family/discharge partner  - Complete POLST form as appropriate  - Assess patient's ability to be responsible for managing their own health  - Refer to Case Management Department for coordinating discharge planning if the patient needs post-hospital services based on physician/LIP order or complex needs related to functional status, cognitive ability or social support system  Outcome: Progressing     Problem: SKIN/TISSUE INTEGRITY - ADULT  Goal: Skin integrity remains intact  Description: INTERVENTIONS  - Assess and document risk factors for pressure ulcer development  - Assess and document skin integrity  - Monitor for areas of redness and/or skin breakdown  - Initiate interventions, skin care algorithm/standards of care as needed  Outcome: Progressing  Goal: Incision(s), wounds(s) or drain site(s) healing without S/S of infection  Description: INTERVENTIONS:  - Assess and document risk factors for pressure ulcer development  - Assess and document skin integrity  - Assess and document dressing/incision, wound bed, drain sites and surrounding tissue  - Implement wound care per orders  - Initiate isolation precautions as appropriate  - Initiate Pressure Ulcer prevention bundle as indicated  Outcome: Progressing     Problem: HEMATOLOGIC - ADULT  Goal: Maintains hematologic stability  Description: INTERVENTIONS  - Assess for signs and symptoms of bleeding or hemorrhage  - Monitor labs and vital signs for trends  - Administer supportive blood  products/factors, fluids and medications as ordered and appropriate  - Administer supportive blood products/factors as ordered and appropriate  Outcome: Progressing  Goal: Free from bleeding injury  Description: (Example usage: patient with low platelets)  INTERVENTIONS:  - Avoid intramuscular injections, enemas and rectal medication administration  - Ensure safe mobilization of patient  - Hold pressure on venipuncture sites to achieve adequate hemostasis  - Assess for signs and symptoms of internal bleeding  - Monitor lab trends  - Patient is to report abnormal signs of bleeding to staff  - Avoid use of toothpicks and dental floss  - Use electric shaver for shaving  - Use soft bristle tooth brush  - Limit straining and forceful nose blowing  Outcome: Progressing     Problem: MUSCULOSKELETAL - ADULT  Goal: Return mobility to safest level of function  Description: INTERVENTIONS:  - Assess patient stability and activity tolerance for standing, transferring and ambulating w/ or w/o assistive devices  - Assist with transfers and ambulation using safe patient handling equipment as needed  - Ensure adequate protection for wounds/incisions during mobilization  - Obtain PT/OT consults as needed  - Advance activity as appropriate  - Communicate ordered activity level and limitations with patient/family  Outcome: Progressing

## 2025-06-21 NOTE — SLP NOTE
SPEECH DAILY NOTE - INPATIENT    ASSESSMENT & PLAN   ASSESSMENT  PPE REQUIRED. THIS THERAPIST WORE GLOVES FOR DURATION OF SESSION. HANDS WASHED UPON ENTRANCE/EXIT.    SLP f/u for ongoing dysphagia tx/meal assessment per recommendations of soft bite sized/thin liquids per BSE. RN reports pt tolerates diet and medication well with no overt clinical s/s aspiration. Pt denies any swallowing challenges.     Pt positioned upright in bedside chair, alert/cooperative. Pt afebrile, tolerating 6L/Min O2NC with oxygen status 98% prior to the start of oral trials. SLP reviewed aspiration precautions and safe swallowing compensatory strategies with the patient. Safe swallow guidelines remain written on the white board in purple. Patient v/u. Provided no assistance, pt tolerates soft solids and thin liquids via straw with no overt clinical signs/symptoms of aspiration. Pt presented with trials of hard solids. Pt with adequate oral acceptance and bilabial seal. Pt with intact bite, reduced/prolonged mastication, and delayed A/P transfer. Pharyngeal swallow response appears delayed with reduced laryngeal elevation/excursion. No clinical signs of aspiration (e.g., immediate/delayed throat clear, immediate/delayed cough, wet vocal quality, increased O2 effort) observed across all trials. Oxygen status remained stable t/o the entire session. Recommend upgrade to soft easy to chew consistency and remain on thin liquids with strict adherence to aspiration precautions and swallow strategies.     SLP to f/u with meal assessment x1-2, monitor  CXR, and VFSS if any overt CSA and/or decline in CXR. RN alerted with results and recommendations.     MOST RECENT CXR 6/19  CONCLUSION:   The endotracheal and esophagogastric tubes have been removed.   No new abnormality.  Persistent pulmonary edema and left pleural effusion with left basilar opacity.          Diet Recommendations - Solids: Soft/ Easy to chew  Diet Recommendations - Liquids: Thin  Liquids     Aspiration Precautions: Upright position, Slow rate, Small bites, Small sips  Medication Administration Recommendations:  (as tolerated)    Patient Experiencing Pain: No              Treatment Plan  Treatment Plan/Recommendations: Aspiration precautions    Interdisciplinary Communication: Discussed with RN          GOALS  Goal #1 The patient will tolerate soft easy to chew consistency and thin liquids without overt signs or symptoms of aspiration with 100 % accuracy over 1-2 session(s).  Revised 6/21   Goal #2 The patient/family/caregiver will demonstrate understanding and implementation of aspiration precautions and swallow strategies independently over 1-2 session(s).    In Progress   Goal #3 The patient will tolerate trial upgrade of soft easy to chew? consistency and thin liquids without overt signs or symptoms of aspiration with 100 % accuracy over 1-2 session(s).  Met 6/21   Goal #4 The patient will utilize compensatory strategies as outlined by  BSSE (clinical evaluation) including Slow rate, Small bites, Small sips, Alternate liquids/solids, Upright 90 degrees, Eliminate distractions with PRN assistance 100 % of the time across 2 sessions.    In Progress     FOLLOW UP  Follow Up Needed (Documentation Required): Yes  SLP Follow-up Date: 06/22/25  Duration: 1 week    Session: 1    If you have any questions, please contact MAGDALENA Everett M.S. CCC-SLP  Speech Language Pathologist  Phone Number [phone]

## 2025-06-21 NOTE — PROGRESS NOTES
[facility]  part of [facility]  Hospitalist Progress Note     Campbell Rao Patient Status:  Inpatient    1950  75 year old CSN [id number]   Location 224/224-A Attending Bernard Willingham MD   Hosp Day # 4 PCP Johny Kang MD     Assessment & Plan:   ----------------------------------  Respiratory failure, acute on chronic hypoxic and hypercapnic.  Due to COPD, possible aspiration, possible pneumonia, mucous plugging and decompensated overlap syndrome with severe COPD and MARIA M with morbid obesity, noncompliant with CPAP.    -s/p ventilator support  -Pulmonology consultation appreciated  - Bronchoscopy  with large organized mucous plug and foreign body /?food material. Obstructing left upper lobe mainly the lingula with extensive effort to aspirate with multiple suctioning and using forceps with multiple passes (per pulmonary report).  - Patient was able to cough up thick white material was sent for analysis   -now extubated, doing well, but tired, still on 10 L suppl O2, tolerated BIPAP at night  -mucomyst nebs added  -cont diuretics for now  -CXR reviewed, still with pulmonary edema and left pleural effusions    Pneumonia. aspiration.  Patient is hypoxic and not septic.  Patient with + leukocytosis, no fever, ? infiltrate on chest imaging.   -IV antibiotics: Azithromycin, Zosyn  -Blood cultures: NGTD  -Monitor temperature, blood pressure and fever curve  -Pulmonology consult following    Acute COPD exacerbation.  Due to infection.  Patient with  hypoxia.  Symptoms improving.  Wheezing improving.  -Pulmonology consult appreciated  -Solu-Medrol 40 every 12  -Nebulizers  -Inhalers  -Supplemental oxygen as needed, titrate down as tolerated  -Antibiotics: As above  -Repeat imaging if clinical change    Acute on chronic HFpEF. Most recent EF normal. Patient with ypoxia, radiographic/physical exam evidence of pulmonary edema, peripheral edema.  Creatinine rising slowly, will need to  be cautious with diuresis  -Cardiology consult appreciated  -Diuresis with iv lasix 40 today, re=eval tomorrow  -GDMT per cards  -Echo done 2/25  -Strict I/O  -Daily weights  -Low salt diet  -Monitor renal function  -Telemetry  -Supp O2 as needed, titrate off as tolerated  -labs in am    Other problems  Morbid obesity and BMI 45  History of colon cancer  Dyslipidemia    Type 2 diabetes, recent A1c good  -Sugars worse with steroids, adjust insulins as needed  [facility]  -pt claims he does not have diabetes    Social: home with wife    Dispo: PT/OT eval, likely rehab, wife concern about discharge medications, states the last time pt was discharged without trelegy and potassium supplements    Supplementary Documentation:   DVT Mechanical Prophylaxis:   SCDs,    DVT Pharmacologic Prophylaxis   Medication    heparin (Porcine) 5000 UNIT/ML injection 7,500 Units                Code Status: Full Code  Orellana: External urinary catheter in place  Orellana Duration (in days):   Central line:    PETRA:         **Certification      PHYSICIAN Certification of Need for Inpatient Hospitalization - Initial Certification    Patient will require inpatient services that will reasonably be expected to span two midnight's based on the clinical documentation in H+P.   Based on patients current state of illness, I anticipate that, after discharge, patient will require TBD.           I personally reviewed the available laboratories, imaging including. I discussed/will discuss the case with consultants. I ordered laboratories and/or radiographic studies. I adjusted medications as detailed above.  Medical decision making high, risk is high.    Subjective:   ----------------------------------  doing well, tolerated BiPAP overnight, now on 10 L HF   (on 4 L at home)  BP ok, slightly tachy  Afebrile  Feels better, up in chair  BP variable           Objective:   Chief Complaint:   Chief Complaint   Patient presents with    Altered Mental  Status     ----------------------------------  Temp:  [96.6 °F (35.9 °C)-98.3 °F (36.8 °C)] 98.3 °F (36.8 °C)  Pulse:  [67-88] 87  Resp:  [12-27] 18  BP: ()/() 117/64  SpO2:  [76 %-100 %] 95 %  Gen: AOx3, interactive, engaging in conversation  HEENT: NCAT, neck supple, no carotid bruit.  CV: RRR, S1S2, and intact distal pulses. No gallop, rub, murmur.  Pulm: Decreased breath sounds at the left base   Abd: Soft, NTND, BS normal, no mass, no HSM, no rebound/guarding.   Neuro: Moving all 4 extremities  MS: No joint effusions.  1+ peripheral edema.  Skin: Skin is warm and dry. No rashes, erythema, diaphoresis.   Psych: calm    Labs:  Lab Results   Component Value Date    HGB 13.4 06/20/2025    WBC 12.1 (H) 06/20/2025    .0 (L) 06/20/2025     06/20/2025    K 4.6 06/20/2025    CREATSERUM 1.59 (H) 06/20/2025    VHCO3 37.3 (H) 01/11/2025    AST 27 06/16/2025    ALT 65 (H) 06/16/2025    TROP <0.045 04/10/2021           Scheduled Medications[1]  PRN Medications[2]             [1]    insulin aspart  1-7 Units Subcutaneous TID CC and HS    [START ON 6/21/2025] acetylcysteine  2 mL Nebulization Daily    furosemide  20 mg Oral Daily    ipratropium-albuterol  3 mL Nebulization 4 times per day    [START ON 6/21/2025] pantoprazole  40 mg Oral QAM AC    lidocaine-menthol  1 patch Transdermal Nightly    piperacillin-tazobactam  3.375 g Intravenous Q8H    heparin  7,500 Units Subcutaneous 2 times per day    methylPREDNISolone  40 mg Intravenous Q12H    senna  10 mL Per NG Tube BID    docusate  100 mg Per NG Tube BID    metoprolol succinate ER  25 mg Oral 2x Daily(Beta Blocker)   [2]   LORazepam    glucose **OR** glucose **OR** glucose-vitamin C **OR** dextrose **OR** glucose **OR** glucose **OR** glucose-vitamin C    ondansetron    metoclopramide    acetaminophen **OR** acetaminophen **OR** acetaminophen    polyethylene glycol (PEG 3350)    bisacodyl

## 2025-06-22 NOTE — DISCHARGE SUMMARY
[facility]     Campbell Rao Patient Status:  Inpatient    1950 MRN [id number]   Location [facility] Attending No att. providers found   Hosp Day # 5 PCP Johny Kang MD     Date of Admission: 6/15/2025  Date of Discharge: 2025  Discharge Disposition: SNF Subacute Rehab    Admitting Chief Complaint:   Acute on chronic respiratory failure with hypoxia and hypercapnia (HCC) [J96.21, J96.22]    PCP: Johny Kang MD    Discharge Diagnosis:   Respiratory failure, acute on chronic hypoxic and hypercapnic.  Due to COPD, possible aspiration, possible pneumonia, mucous plugging and decompensated overlap syndrome with severe COPD and MARIA M with morbid obesity, noncompliant with CPAP.          Pneumonia. aspiration.        Acute COPD exacerbation.       Acute on chronic HFpEF.       Other problems  Morbid obesity and BMI 45  History of colon cancer  Dyslipidemia  Type 2 diabetes                    History of Present Illness:   Per Dr. Hernandez  This is a 75-year-old gentleman with a past medical history of COPD, obstructive sleep apnea, and obesity hypoventilation syndrome who presented to the emergency room because of hallucinations. In the ER, he was found to have respiratory distress with an acute on chronic hypercapnic hypoxic picture requiring intubation for a pCO2 of 119. The patient had apparently been noncompliant with his oxygen and BiPAP at home. When I saw him, he was sedated and unable to provide any history so the history is obtained through the medical record. According to the family, the patient had been progressively lethargic and confused for 2 or 3 days prior to admission. He is a former smoker and has been intubated in the past.         Brief Synopsis:   Respiratory failure, acute on chronic hypoxic and hypercapnic.  Due to COPD, possible aspiration, possible pneumonia, mucous plugging and decompensated overlap syndrome with severe COPD  and MARIA M with morbid obesity, noncompliant with CPAP.    -s/p ventilator support  -Pulmonology consultation appreciated  - Bronchoscopy 6/17 with large organized mucous plug and foreign body /?food material. Obstructing left upper lobe mainly the lingula with extensive effort to aspirate with multiple suctioning and using forceps with multiple passes (per pulmonary report).  - Patient was able to cough up thick white material was sent for analysis   -now extubated, doing well, but tired, supplemental O2 titrated down to 5 L on discharge, tolerated BIPAP at night  -mucomyst nebs added  -CXR  with pulmonary edema and left pleural effusions  -cont diuretics     Pneumonia. aspiration.  Patient is hypoxic and not septic.  Patient with + leukocytosis, no fever, ? infiltrate on chest imaging.   -IV antibiotics: Azithromycin, Zosyn  -Blood cultures: NGTD  -Monitor temperature, blood pressure and fever curve  -Pulmonology consult following     Acute COPD exacerbation.  Due to infection.  Patient with  hypoxia.  Symptoms improving.  Wheezing improving.  -Pulmonology consult appreciated  -Solu-Medrol 40 every 12  -Nebulizers  -Inhalers  -Supplemental oxygen as needed, titrate down as tolerated  -Antibiotics: As above  -Repeat imaging if clinical change     Acute on chronic HFpEF. Most recent EF normal. Patient with ypoxia, radiographic/physical exam evidence of pulmonary edema, peripheral edema.  Creatinine rising slowly, will need to be cautious with diuresis  -Cardiology consult appreciated  -Diuresis with iv lasix 40 today, re=eval tomorrow  -GDMT per cards  -Echo done 2/25  -Strict I/O  -Daily weights  -Low salt diet  -Monitor renal function  -Telemetry  -Supp O2 as needed, titrate off as tolerated  -labs reassuring now     Other problems  Morbid obesity and BMI 45  -->pt is NOT willing to comply with diet recommendations, angry yelling for food, accusing staff of \"cruel treatment\"    History of colon cancer  Dyslipidemia      Type 2 diabetes, recent A1c good  -Sugars worse with steroids, adjust insulins as needed  -On mounjaro outpatient  -pt claims he does not have diabetes     Social: home with wife     Dispo: PT/OT brandy, recomm rehab    D/w wife concerns about discharge medications, making sure he is discharged on trelegy and potassium supplements              Discharge Medication List:     Discharge Medications        START taking these medications        Instructions Prescription details   acetylcysteine 20 % Soln  Commonly known as: Mucomyst      Take 2 mL by nebulization daily. For 7 days, then as needed congestion/thick phlegm   Refills: 0     insulin aspart 100 Units/mL Sopn  Commonly known as: NovoLOG      Inject 1-7 Units into the skin TID CC and HS. CORRECTION FACTOR - MEDIUM DOSE  Continue to give correction insulin even if NPO  DO NOT HOLD OR ALTER INSULIN DOSE WITHOUT A PHYSICIAN ORDER     Give 1 unit for blood glucose 150-180 mg/dL  Give 2 units for blood glucose 181-210 mg/dL  Give 3 units for blood glucose 211-240 mg/dL  Give 4 units for blood glucose 241-270 mg/dL  Give 5 units for blood glucose 271-300 mg/dL  Give 6 units for blood glucose 301-330 mg/dL  Give 7 units for blood glucose 331-360 mg/dL  Call physician if blood glucose is greater than 360 mg/dL with time and last dose of correction insulin given.    While in rehab   Refills: 0     lidocaine-menthol 4-1 % Ptch      Place 1 patch onto the skin at bedtime.   Refills: 0            CHANGE how you take these medications        Instructions Prescription details   predniSONE 10 MG Tabs  Commonly known as: Deltasone  What changed: additional instructions      Take 4 tablets (40 mg total) by mouth daily. taper as follows: 40 mg/day x 2 days, then 30 mg/day x 1 day, then 20 mg/day x 1 day, then 10 mg/day x 1 day, then stop   Refills: 0     torsemide 20 MG Tabs  Commonly known as: Demadex  What changed: when to take this      Take 2 tablets (40 mg total) by mouth  daily.   Refills: 0            CONTINUE taking these medications        Instructions Prescription details   albuterol 108 (90 Base) MCG/ACT Aers  Commonly known as: Ventolin HFA      Inhale 2 puffs into the lungs every 4 to 6 hours as needed for Wheezing.   Refills: 0     amoxicillin clavulanate 875-125 MG Tabs  Commonly known as: Augmentin      Take 1 tablet by mouth 2 (two) times daily. For 4 days with EOT on Wednesday June 25, 2025   Refills: 0     atorvastatin 20 MG Tabs  Commonly known as: Lipitor      Take 1 tablet (20 mg total) by mouth nightly.   Quantity: 90 tablet  Refills: 1     ipratropium-albuterol 0.5-2.5 (3) MG/3ML Soln  Commonly known as: Duoneb      Take 3 mL by nebulization in the morning and 3 mL at noon and 3 mL in the evening.   Refills: 0     melatonin 3 MG Tabs       Refills: 0     metoprolol succinate ER 25 MG Tb24  Commonly known as: Toprol XL      Take 1 tablet (25 mg total) by mouth 2x Daily(Beta Blocker).   Quantity: 60 tablet  Refills: 1     Mounjaro 7.5 MG/0.5ML Soaj  Generic drug: Tirzepatide      Inject 7.5 mg into the skin once a week.   Quantity: 6 mL  Refills: 0     potassium chloride 10 MEQ Tbcr  Commonly known as: Klor-Con M10      Take 1 tablet (10 mEq total) by mouth in the morning.   Refills: 0     spironolactone 25 MG Tabs  Commonly known as: Aldactone      Take 0.5 tablets (12.5 mg total) by mouth daily.   Quantity: 30 tablet  Refills: 2     Trelegy Ellipta 100-62.5-25 MCG/ACT Aepb  Generic drug: fluticasone-umeclidin-vilant      Inhale 1 puff into the lungs daily.   Refills: 0     Tylenol 325 MG Tabs  Generic drug: acetaminophen      Take 2 tablets (650 mg total) by mouth every 6 (six) hours as needed for Pain.   Refills: 0            STOP taking these medications      doxycycline 100 MG Caps  Commonly known as: Vibramycin                 Follow-up appointment:   No follow-up provider specified.    Vital signs:       Physical Exam:    General: No acute distress.    Respiratory: Clear to auscultation bilaterally. No wheezes. No rhonchi.  Cardiovascular: S1, S2. Regular rate and rhythm. No murmurs, rubs or gallops.   Abdomen: Soft, nontender, nondistended.  Positive bowel sounds. No rebound or guarding.  Neurologic: No new focal neurological deficits.   Musculoskeletal: Moves all extremities.  Extremities: mild edema.  -----------------------------------------------------------------------------------------------  PATIENT DISCHARGE INSTRUCTIONS: See electronic chart    I d/w pt and family the available results, management plan, medications changes, and discharge instructions including follow ups as outlined above.          Hospital Discharge Diagnoses: resp failure    Lace+ Score: 79  59-90 High Risk  29-58 Medium Risk  0-28   Low Risk.    TCM Follow-Up Recommendation:  LACE > 58: High Risk of readmission after discharge from the hospital.    Note: This chart was prepared using voice recognition software and may contain unintended word substitution errors.     MONE PERALTA MD 6/22/2025    Time spent:  > 30 minutes

## 2025-06-23 ENCOUNTER — TELEPHONE (OUTPATIENT)
Dept: PULMONOLOGY | Facility: CLINIC | Age: 75
End: 2025-06-23

## 2025-06-23 NOTE — TELEPHONE ENCOUNTER
Verified name and  of patient.    Wife of patient (on release of information) calling to states that patient is currently in inpatient rehabilitation facility. Appointment for tomorrow 25 cancelled.     She will call back for assistance with scheduling follow up appointment after patient is discharged.

## 2025-06-23 NOTE — TELEPHONE ENCOUNTER
Patient states that he recently had a procedure with MD and was placed on a Cpap.  He wants to know if the data from the Cpap was sent to MD and he would like to discuss results.   Please call

## 2025-06-25 NOTE — TELEPHONE ENCOUNTER
Spoke to patient who is having issues with CPAP malfunctioning. Accepted appointment with Dr. Salinas.

## 2025-07-03 ENCOUNTER — PATIENT OUTREACH (OUTPATIENT)
Dept: CASE MANAGEMENT | Age: 75
End: 2025-07-03

## 2025-07-03 NOTE — TELEPHONE ENCOUNTER
cologuard form filled out by pt and signed by MD.  Presley Parks faxed to [employer] at [phone].  Fax confirmation received, copy sent to scan
Speaking Coherently

## 2025-07-08 ENCOUNTER — TELEPHONE (OUTPATIENT)
Dept: INTERNAL MEDICINE CLINIC | Facility: CLINIC | Age: 75
End: 2025-07-08

## 2025-07-08 NOTE — TELEPHONE ENCOUNTER
Left voice message for Hayley [facility]. We have not received forms. Gave fax number so forms can be faxed to us.

## 2025-07-08 NOTE — TELEPHONE ENCOUNTER
Per Hayley, they faxed last 07/02/2025 an Order for Auto immune screening to be completed and fax back to them @ [phone].

## 2025-07-11 NOTE — TELEPHONE ENCOUNTER
Received order for genetic buccal testing from [employer]. Form has been placed on Dr. Kang's desk for review and signature.

## 2025-07-11 NOTE — TELEPHONE ENCOUNTER
Showed form from [facility] to Dr. Kang. He states this is a fraud and he will not sign the form.

## 2025-07-17 ENCOUNTER — TELEPHONE (OUTPATIENT)
Dept: INTERNAL MEDICINE CLINIC | Facility: CLINIC | Age: 75
End: 2025-07-17

## 2025-07-17 NOTE — TELEPHONE ENCOUNTER
Yasmin/wife called states patient was admitted into hospital twice, once on 5/19/25 and 6/15/25 for respiratory failure. He was discharged to rehabilitation - he went 20 days without CPAP when admitted the first time and went to Nursing Home/rehabilitation. She is requesting a visit for a hospital follow-up for patient, no visits in the near future available. I made her aware I will convey the above to Dr. Kang.  She verbalized understanding. No further questions or concerns at this time.    Dr. Kang - please advise [no visits available in the near future, can you add to schedule? If so, what date and time?]

## 2025-07-17 NOTE — TELEPHONE ENCOUNTER
Future Appointments   Date Time Provider Department Center   7/23/2025  1:30 PM Johny Kang MD [facility]     No further action required by RN Triage

## 2025-07-23 ENCOUNTER — OFFICE VISIT (OUTPATIENT)
Dept: INTERNAL MEDICINE CLINIC | Facility: CLINIC | Age: 75
End: 2025-07-23

## 2025-07-23 ENCOUNTER — LAB ENCOUNTER (OUTPATIENT)
Dept: LAB | Age: 75
End: 2025-07-23
Attending: INTERNAL MEDICINE
Payer: MEDICARE

## 2025-07-23 ENCOUNTER — PATIENT OUTREACH (OUTPATIENT)
Dept: CASE MANAGEMENT | Age: 75
End: 2025-07-23

## 2025-07-23 VITALS
HEART RATE: 93 BPM | DIASTOLIC BLOOD PRESSURE: 66 MMHG | SYSTOLIC BLOOD PRESSURE: 124 MMHG | BODY MASS INDEX: 46 KG/M2 | WEIGHT: 300.56 LBS

## 2025-07-23 DIAGNOSIS — G47.33 OSA (OBSTRUCTIVE SLEEP APNEA): ICD-10-CM

## 2025-07-23 DIAGNOSIS — J44.1 COPD EXACERBATION (HCC): ICD-10-CM

## 2025-07-23 DIAGNOSIS — E11.8 CONTROLLED TYPE 2 DIABETES MELLITUS WITH COMPLICATION, WITHOUT LONG-TERM CURRENT USE OF INSULIN (HCC): ICD-10-CM

## 2025-07-23 DIAGNOSIS — I50.33 ACUTE ON CHRONIC DIASTOLIC HEART FAILURE (HCC): ICD-10-CM

## 2025-07-23 DIAGNOSIS — E78.5 DYSLIPIDEMIA: ICD-10-CM

## 2025-07-23 DIAGNOSIS — E55.9 VITAMIN D DEFICIENCY: ICD-10-CM

## 2025-07-23 DIAGNOSIS — J44.1 COPD EXACERBATION (HCC): Primary | ICD-10-CM

## 2025-07-23 DIAGNOSIS — I25.10 CORONARY ARTERY CALCIFICATION SEEN ON CT SCAN: ICD-10-CM

## 2025-07-23 LAB
ALBUMIN SERPL-MCNC: 4.4 G/DL (ref 3.2–4.8)
ALBUMIN/GLOB SERPL: 1.6 {RATIO} (ref 1–2)
ALP LIVER SERPL-CCNC: 73 U/L (ref 45–117)
ALT SERPL-CCNC: 40 U/L (ref 10–49)
ANION GAP SERPL CALC-SCNC: 6 MMOL/L (ref 0–18)
AST SERPL-CCNC: 23 U/L (ref ?–34)
BASOPHILS # BLD AUTO: 0.1 X10(3) UL (ref 0–0.2)
BASOPHILS NFR BLD AUTO: 1.1 %
BILIRUB SERPL-MCNC: 0.5 MG/DL (ref 0.2–1.1)
BUN BLD-MCNC: 14 MG/DL (ref 9–23)
BUN/CREAT SERPL: 14 (ref 10–20)
CALCIUM BLD-MCNC: 9.9 MG/DL (ref 8.7–10.4)
CHLORIDE SERPL-SCNC: 102 MMOL/L (ref 98–112)
CHOLEST SERPL-MCNC: 164 MG/DL (ref ?–200)
CO2 SERPL-SCNC: 35 MMOL/L (ref 21–32)
CREAT BLD-MCNC: 1 MG/DL (ref 0.7–1.3)
CREAT UR-SCNC: 136 MG/DL
DEPRECATED RDW RBC AUTO: 51.1 FL (ref 35.1–46.3)
EGFRCR SERPLBLD CKD-EPI 2021: 79 ML/MIN/1.73M2 (ref 60–?)
EOSINOPHIL # BLD AUTO: 0.28 X10(3) UL (ref 0–0.7)
EOSINOPHIL NFR BLD AUTO: 3.2 %
ERYTHROCYTE [DISTWIDTH] IN BLOOD BY AUTOMATED COUNT: 13.6 % (ref 11–15)
FASTING PATIENT LIPID ANSWER: YES
FASTING STATUS PATIENT QL REPORTED: YES
GLOBULIN PLAS-MCNC: 2.7 G/DL (ref 2–3.5)
GLUCOSE BLD-MCNC: 113 MG/DL (ref 70–99)
HCT VFR BLD AUTO: 42.3 % (ref 39–53)
HDLC SERPL-MCNC: 50 MG/DL (ref 40–59)
HGB BLD-MCNC: 12.9 G/DL (ref 13–17.5)
IMM GRANULOCYTES # BLD AUTO: 0.14 X10(3) UL (ref 0–1)
IMM GRANULOCYTES NFR BLD: 1.6 %
LDLC SERPL CALC-MCNC: 96 MG/DL (ref ?–100)
LYMPHOCYTES # BLD AUTO: 2.14 X10(3) UL (ref 1–4)
LYMPHOCYTES NFR BLD AUTO: 24.2 %
MCH RBC QN AUTO: 30.9 PG (ref 26–34)
MCHC RBC AUTO-ENTMCNC: 30.5 G/DL (ref 31–37)
MCV RBC AUTO: 101.4 FL (ref 80–100)
MICROALBUMIN UR-MCNC: 0.9 MG/DL
MICROALBUMIN/CREAT 24H UR-RTO: 6.6 UG/MG (ref ?–30)
MONOCYTES # BLD AUTO: 0.9 X10(3) UL (ref 0.1–1)
MONOCYTES NFR BLD AUTO: 10.2 %
NEUTROPHILS # BLD AUTO: 5.29 X10 (3) UL (ref 1.5–7.7)
NEUTROPHILS # BLD AUTO: 5.29 X10(3) UL (ref 1.5–7.7)
NEUTROPHILS NFR BLD AUTO: 59.7 %
NONHDLC SERPL-MCNC: 114 MG/DL (ref ?–130)
OSMOLALITY SERPL CALC.SUM OF ELEC: 297 MOSM/KG (ref 275–295)
PLATELET # BLD AUTO: 290 10(3)UL (ref 150–450)
POTASSIUM SERPL-SCNC: 4.5 MMOL/L (ref 3.5–5.1)
PROT SERPL-MCNC: 7.1 G/DL (ref 5.7–8.2)
PTH-INTACT SERPL-MCNC: 89.1 PG/ML (ref 18.5–88)
RBC # BLD AUTO: 4.17 X10(6)UL (ref 3.8–5.8)
SODIUM SERPL-SCNC: 143 MMOL/L (ref 136–145)
TRIGL SERPL-MCNC: 99 MG/DL (ref 30–149)
VIT D+METAB SERPL-MCNC: 33.2 NG/ML (ref 30–100)
VLDLC SERPL CALC-MCNC: 16 MG/DL (ref 0–30)
WBC # BLD AUTO: 8.9 X10(3) UL (ref 4–11)

## 2025-07-23 PROCEDURE — 36415 COLL VENOUS BLD VENIPUNCTURE: CPT

## 2025-07-23 PROCEDURE — 80061 LIPID PANEL: CPT

## 2025-07-23 PROCEDURE — 82043 UR ALBUMIN QUANTITATIVE: CPT

## 2025-07-23 PROCEDURE — 99214 OFFICE O/P EST MOD 30 MIN: CPT | Performed by: INTERNAL MEDICINE

## 2025-07-23 PROCEDURE — 82570 ASSAY OF URINE CREATININE: CPT

## 2025-07-23 PROCEDURE — 83970 ASSAY OF PARATHORMONE: CPT

## 2025-07-23 PROCEDURE — 85025 COMPLETE CBC W/AUTO DIFF WBC: CPT

## 2025-07-23 PROCEDURE — 80053 COMPREHEN METABOLIC PANEL: CPT

## 2025-07-23 PROCEDURE — 82306 VITAMIN D 25 HYDROXY: CPT

## 2025-07-23 RX ORDER — CICLOPIROX 80 MG/ML
1 SOLUTION TOPICAL NIGHTLY
Qty: 1 EACH | Refills: 11 | Status: SHIPPED | OUTPATIENT
Start: 2025-07-23

## 2025-07-23 RX ORDER — TIRZEPATIDE 10 MG/.5ML
10 INJECTION, SOLUTION SUBCUTANEOUS
Qty: 6 ML | Refills: 0 | Status: SHIPPED | OUTPATIENT
Start: 2025-07-23

## 2025-07-23 RX ORDER — METOPROLOL SUCCINATE 25 MG/1
25 TABLET, EXTENDED RELEASE ORAL
Qty: 60 TABLET | Refills: 2 | Status: SHIPPED | OUTPATIENT
Start: 2025-07-23

## 2025-07-23 NOTE — PROGRESS NOTES
Attempt to reach Campbell Rao to do CCM Monthly call. Left message for patient to call CCM to do monthly.    Total time -  4 min.  Total Monthly time-  4 min.   Next Care Manager Follow Up Date: 2-4 Weeks.

## 2025-07-28 NOTE — PROGRESS NOTES
Subjective:     Patient ID: Campbell Rao is a 75 year old male.    Patient presents with spouse for posthospital\" SNF follow-up.  He was admitted again no emergency room or hospital about a month ago due to acute exacerbation of COPD likely due to aspiration as well as acute on chronic diastolic heart failure.  Patient was treated initially with IV antibiotics, IV steroids, nebulizers as well as diuretics.  He was seen by his pulmonologist who did a bronchoscopy.  He was also followed by cardiology.  Given the improving lung Transferred to skilled nursing facility where he stayed for about a couple of weeks according to the spouse.  He did finish his antibiotics while he was in SNF.      History/Other:   Review of Systems   Constitutional: Negative.    Respiratory:  Positive for shortness of breath. Negative for cough and wheezing.         Chronic SOB from his COPD   Cardiovascular:  Positive for leg swelling. Negative for chest pain and palpitations.   Gastrointestinal: Negative.    Genitourinary: Negative.      Current Medications[1]  Allergies:Allergies[2]    Past Medical History[3]   Past Surgical History[4]   Family History[5]   Social History: Short Social Hx on File[6]     Objective:   Physical Exam  Constitutional:       General: He is not in acute distress.     Appearance: He is obese. He is not ill-appearing, toxic-appearing or diaphoretic.   Cardiovascular:      Rate and Rhythm: Regular rhythm.      Pulses:           Dorsalis pedis pulses are 2+ on the right side and 2+ on the left side.        Posterior tibial pulses are 2+ on the right side and 2+ on the left side.      Heart sounds: Normal heart sounds. No murmur heard.     No gallop.   Pulmonary:      Effort: Pulmonary effort is normal. No respiratory distress.      Breath sounds: Decreased breath sounds present. No wheezing or rales.   Abdominal:      General: Bowel sounds are normal.      Palpations: Abdomen is soft.      Tenderness: There is  no abdominal tenderness. There is no rebound.   Musculoskeletal:      Cervical back: No rigidity or tenderness.      Right lower leg: Edema present.      Left lower leg: Edema present.      Right foot: No deformity.      Left foot: No deformity.   Feet:      Right foot:      Protective Sensation: 10 sites tested.  5 sites sensed.      Skin integrity: Dry skin present. No erythema or callus.      Toenail Condition: Right toenails are abnormally thick.      Left foot:      Protective Sensation: 10 sites tested.  5 sites sensed.      Skin integrity: Dry skin present. No erythema or callus.      Toenail Condition: Left toenails are abnormally thick.   Lymphadenopathy:      Cervical: No cervical adenopathy.   Skin:     Coloration: Skin is not jaundiced or pale.      Comments: Stasis dermatitis both lower legs   Neurological:      Mental Status: He is alert.       Assessment & Plan:   1. COPD exacerbation (HCC)  Pt possibly had aspirated and had to have bronchoscopy done; he already had finished his abx prescribed and pt currently on his inhalers and nebs as well as O2, pt  to ffup with his pulmo.   - CBC With Differential With Platelet; Future    2. Acute on chronic diastolic heart failure (HCC)  Pt had been diuresed in hospital and now appears to be compensated. He remains on his diuretic. Pt told his metoprolol was prescribed by cardio for his CHF before and should be taking it.     3. MARIA M (obstructive sleep apnea)  Pt had been trying to be compliant with use of  cpap per spouse     4. Coronary artery calcification seen on CT scan  Pt had been ff by cardio and had been treated with statin and asa.     5. Controlled type 2 diabetes mellitus with complication, without long-term current use of insulin ([facility])  Recent A1c at 6.1 so dm controlled.  I did recommend increasing his Mounjaro to 10 mg weekly to help with the patient's severe morbid obesity.  Return to clinic in 3 months.  - Microalb/Creat Ratio, Random Urine;  Future    6. Dyslipidemia  Check lipid panel; continue with statin.   - Comp Metabolic Panel (14); Future  - Lipid Panel; Future    7. Vitamin D deficiency  Check vit D level.   - Vitamin D; Future     Orders Placed This Encounter   Procedures   • CBC With Differential With Platelet   • Comp Metabolic Panel (14)   • Lipid Panel   • Microalb/Creat Ratio, Random Urine   • Vitamin D       Meds This Visit:  Requested Prescriptions     Signed Prescriptions Disp Refills   • Tirzepatide (MOUNJARO) 10 MG/0.5ML Subcutaneous Solution Auto-injector 6 mL 0     Sig: Inject 10 mg into the skin every 7 days.   • metoprolol succinate ER 25 MG Oral Tablet 24 Hr 60 tablet 2     Sig: Take 1 tablet (25 mg total) by mouth 2x Daily(Beta Blocker).   • Ciclopirox 8 % External Solution 1 each 11     Sig: Apply 1 Application topically nightly.       Imaging & Referrals:  None              [1]  Current Outpatient Medications   Medication Sig Dispense Refill   • Tirzepatide (MOUNJARO) 10 MG/0.5ML Subcutaneous Solution Auto-injector Inject 10 mg into the skin every 7 days. 6 mL 0   • metoprolol succinate ER 25 MG Oral Tablet 24 Hr Take 1 tablet (25 mg total) by mouth 2x Daily(Beta Blocker). 60 tablet 2   • Ciclopirox 8 % External Solution Apply 1 Application topically nightly. 1 each 11   • torsemide 20 MG Oral Tab Take 2 tablets (40 mg total) by mouth daily.     • amoxicillin clavulanate 875-125 MG Oral Tab Take 1 tablet by mouth 2 (two) times daily. For 4 days with EOT on Wednesday June 25, 2025     • potassium chloride 10 MEQ Oral Tab CR Take 1 tablet (10 mEq total) by mouth in the morning.     • fluticasone-umeclidin-vilant (TRELEGY ELLIPTA) 100-62.5-25 MCG/ACT Inhalation Aerosol Powder, Breath Activated Inhale 1 puff into the lungs daily.     • ipratropium-albuterol 0.5-2.5 (3) MG/3ML Inhalation Solution Take 3 mL by nebulization in the morning and 3 mL at noon and 3 mL in the evening.     • melatonin 3 MG Oral Tab      • spironolactone 25  MG Oral Tab Take 0.5 tablets (12.5 mg total) by mouth daily. 30 tablet 2   • atorvastatin 20 MG Oral Tab Take 1 tablet (20 mg total) by mouth nightly. 90 tablet 1   • acetaminophen (TYLENOL) 325 MG Oral Tab Take 2 tablets (650 mg total) by mouth every 6 (six) hours as needed for Pain.     • albuterol 108 (90 Base) MCG/ACT Inhalation Aero Soln Inhale 2 puffs into the lungs every 4 to 6 hours as needed for Wheezing.     • acetylcysteine 20 % Inhalation Solution Take 2 mL by nebulization daily. For 7 days, then as needed congestion/thick phlegm (Patient not taking: Reported on 7/23/2025)     • insulin aspart 100 Units/mL Subcutaneous Solution Pen-injector Inject 1-7 Units into the skin TID CC and HS. CORRECTION FACTOR - MEDIUM DOSE  Continue to give correction insulin even if NPO  DO NOT HOLD OR ALTER INSULIN DOSE WITHOUT A PHYSICIAN ORDER     Give 1 unit for blood glucose 150-180 mg/dL  Give 2 units for blood glucose 181-210 mg/dL  Give 3 units for blood glucose 211-240 mg/dL  Give 4 units for blood glucose 241-270 mg/dL  Give 5 units for blood glucose 271-300 mg/dL  Give 6 units for blood glucose 301-330 mg/dL  Give 7 units for blood glucose 331-360 mg/dL  Call physician if blood glucose is greater than 360 mg/dL with time and last dose of correction insulin given.    While in rehab (Patient not taking: Reported on 7/23/2025)     • lidocaine-menthol 4-1 % External Patch Place 1 patch onto the skin at bedtime. (Patient not taking: Reported on 7/23/2025)     [2]  No Known Allergies  [3]  Past Medical History:  • Atherosclerosis of coronary artery    coronary calcification on ct chest   • Back problem   • Cataract   • Chronic respiratory failure (HCC)   • Congestive heart disease (HCC)   • Controlled type 2 diabetes mellitus with complication, without long-term current use of insulin (HCC)   • COPD (HCC)    freq exacerbations. ? 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   • Osteoarthritis   • Pneumonia   • Pulmonary nodules   • Sleep apnea   • Tobacco abuse   [4]  Past Surgical History:  Procedure Laterality Date   • Appendectomy     • Appendectomy     • Cataract Bilateral     2/2024, 3/2024   • Electrocardiogram, complete  01/30/2012    scanned to media tab   [5]  Family History  Problem Relation Age of Onset   • Cancer Father         lung cancer   • Cancer Mother         lung cancer   • No Known Problems Daughter    • No Known Problems Son    • No Known Problems Maternal Grandmother    • No Known Problems Maternal Grandfather    • No Known Problems Paternal Grandmother    • No Known Problems Paternal Grandfather    • No Known Problems Sister    • Diabetes Brother    • Lipids Brother         hyperlipidemia   • Hypertension Brother    • No Known Problems Other    [6]  Social History  Socioeconomic History   • Marital status:    Tobacco Use   • Smoking status: Former     Current packs/day: 0.00     Average packs/day: 2.0 packs/day for 40.0 years (80.0 ttl pk-yrs)     Types: Cigarettes     Start date: 11/1981     Quit date: 11/2021     Years since quitting: 3.7     Passive exposure: Past   • Smokeless tobacco: Never   Vaping Use   • Vaping status: Never Used   Substance and Sexual Activity   • Alcohol use: Not Currently     Alcohol/week: 0.0 standard drinks of alcohol     Comment: socially   • Drug use: Not Currently   Other Topics Concern   • Caffeine Concern Yes     Comment: coffee, >32 oz daily     Social Drivers of Health     Food Insecurity: No Food Insecurity (6/21/2025)    NCSS - Food Insecurity    • Worried About Running Out of Food in the Last Year: No    • Ran Out of Food in the Last Year: No   Transportation Needs: No Transportation Needs (6/21/2025)    NCSS -  Transportation    • Lack of Transportation: No   Stress: No Stress Concern Present (1/16/2024)    Stress    • Feeling of Stress : No   Housing Stability: Not At Risk (6/21/2025)    NCSS - Housing/Utilities    • Has Housing: Yes    • Worried About Losing Housing: No    • Unable to Get Utilities: No

## 2025-07-30 ENCOUNTER — OFFICE VISIT (OUTPATIENT)
Dept: PULMONOLOGY | Facility: CLINIC | Age: 75
End: 2025-07-30

## 2025-07-30 VITALS
SYSTOLIC BLOOD PRESSURE: 122 MMHG | RESPIRATION RATE: 22 BRPM | OXYGEN SATURATION: 93 % | WEIGHT: 300 LBS | BODY MASS INDEX: 45.47 KG/M2 | HEART RATE: 105 BPM | HEIGHT: 68 IN | DIASTOLIC BLOOD PRESSURE: 73 MMHG

## 2025-07-30 DIAGNOSIS — G47.33 OSA (OBSTRUCTIVE SLEEP APNEA): ICD-10-CM

## 2025-07-30 DIAGNOSIS — J44.9 CHRONIC OBSTRUCTIVE PULMONARY DISEASE, UNSPECIFIED COPD TYPE (HCC): Primary | ICD-10-CM

## 2025-07-30 DIAGNOSIS — Z87.891 PERSONAL HISTORY OF TOBACCO USE, PRESENTING HAZARDS TO HEALTH: ICD-10-CM

## 2025-07-30 PROCEDURE — 99215 OFFICE O/P EST HI 40 MIN: CPT | Performed by: INTERNAL MEDICINE

## 2025-08-07 ENCOUNTER — TELEPHONE (OUTPATIENT)
Dept: PULMONOLOGY | Facility: CLINIC | Age: 75
End: 2025-08-07

## 2025-08-20 ENCOUNTER — PATIENT OUTREACH (OUTPATIENT)
Dept: CASE MANAGEMENT | Age: 75
End: 2025-08-20

## 2025-08-26 ENCOUNTER — TELEPHONE (OUTPATIENT)
Dept: INTERNAL MEDICINE CLINIC | Facility: CLINIC | Age: 75
End: 2025-08-26

## (undated) DEVICE — SINGLE USE BIOPSY VALVE MAJ-210: Brand: SINGLE USE BIOPSY VALVE (STERILE)

## (undated) DEVICE — FORCEPS BX 100CM 1.8MM RJ STD

## (undated) DEVICE — KIT CLEAN ENDOKIT 1.1OZ GOWNX2

## (undated) DEVICE — 60 ML SYRINGE REGULAR TIP: Brand: MONOJECT

## (undated) DEVICE — V2 SPECIMEN COLLECTION MANIFOLD KIT: Brand: NEPTUNE

## (undated) DEVICE — SYRINGE MED 10ML SLIP TIP CLR BRL TAPR PLUNG

## (undated) DEVICE — SINGLE USE SUCTION VALVE MAJ-209: Brand: SINGLE USE SUCTION VALVE (STERILE)

## (undated) DEVICE — CONMED SCOPE SAVER BITE BLOCK, 20X27 MM: Brand: SCOPE SAVER

## (undated) DEVICE — YANKAUER,BULB TIP,W/O VENT,RIGID,STERILE: Brand: MEDLINE

## (undated) DEVICE — 35 ML SYRINGE REGULAR TIP: Brand: MONOJECT

## (undated) DEVICE — Device

## (undated) DEVICE — DBD-NEBULIZER,AEROMISTBAN,TMTHPIECE,7'TU: Brand: MEDLINE

## (undated) DEVICE — MEDI-VAC NON-CONDUCTIVE SUCTION TUBING: Brand: CARDINAL HEALTH

## (undated) NOTE — LETTER
[place] [address]  Authorization for Invasive Procedures  Date: 04/23/2024           Time: 0805    I hereby authorize   Matt Alcantar   , my physician and his/her assistants (if applicable), which may include medical students, residents, and/or fellows, to perform the following surgical operation/ procedure and administer such anesthesia as may be determined necessary by my physician: Thoracic level 7 kyphoplasty  on Campbell JETER Elderrandamariela  2.   I recognize that during the surgical operation/procedure, unforeseen conditions may necessitate additional or different procedures than those listed above.  I, therefore, further authorize and request that the above-named surgeon, assistants, or designees perform such procedures as are, in their judgment, necessary and desirable.    3.   My surgeon/physician has discussed prior to my surgery the potential benefits, risks and side effects of this procedure; the likelihood of achieving goals; and potential problems that might occur during recuperation.  They also discussed reasonable alternatives to the procedure, including risks, benefits, and side effects related to the alternatives and risks related to not receiving this procedure.  I have had all my questions answered and I acknowledge that no guarantee has been made as to the result that may be obtained.    4.   Should the need arise during my operation/procedure, which includes change of level of care prior to discharge, I also consent to the administration of blood and/or blood products.  Further, I understand that despite careful testing and screening of blood or blood products by collecting agencies, I may still be subject to ill effects as a result of receiving a blood transfusion and/or blood products.  The following are some, but not all, of the potential risks that can occur: fever and allergic reactions, hemolytic reactions, transmission of diseases such as Hepatitis, AIDS and  Cytomegalovirus (CMV) and fluid overload.  In the event that I wish to have an autologous transfusion of my own blood, or a directed donor transfusion, I will discuss this with my physician.   Check only if Refusing Blood or Blood Products  I understand refusal of blood or blood products as deemed necessary by my physician may have serious consequences to my condition to include possible death. I hereby assume responsibility for my refusal and release the hospital, its personnel, and my physicians from any responsibility for the consequences of my refusal.         o  Refuse         5.   I authorize the use of any specimen, organs, tissues, body parts or foreign objects that may be removed from my body during the operation/procedure for diagnosis, research or teaching purposes and their subsequent disposal by hospital authorities.  I also authorize the release of specimen test results and/or written reports to my treating physician on the hospital medical staff or other referring or consulting physicians involved in my care, at the discretion of the Pathologist or my treating physician.    6.   I consent to the photographing or videotaping of the operations or procedures to be performed, including appropriate portions of my body for medical, scientific, or educational purposes, provided my identity is not revealed by the pictures or by descriptive texts accompanying them.  If the procedure has been photographed/videotaped, the surgeon will obtain the original picture, image, videotape or CD.  The hospital will not be responsible for storage, release or maintenance of the picture, image, tape or CD.    7.   I consent to the presence of a  or observers in the operating room as deemed necessary by my physician or their designees.    8.   I recognize that in the event my procedure results in extended X-Ray/fluoroscopy time, I may develop a skin reaction.    9. If I have a Do Not Attempt Resuscitation  (DNAR) order in place, that status will be suspended while in the operating room, procedural suite, and during the recovery period unless otherwise explicitly stated by me (or a person authorized to consent on my behalf). The surgeon or my attending physician will determine when the applicable recovery period ends for purposes of reinstating the DNAR order.  10. Patients having a sterilization procedure: I understand that if the procedure is successful the results will be permanent and it will therefore be impossible for me to inseminate, conceive, or bear children.  I also understand that the procedure is intended to result in sterility, although the result has not been guaranteed.   11. I acknowledge that my physician has explained sedation/analgesia administration to me including the risk and benefits I consent to the administration of sedation/analgesia as may be necessary or desirable in the judgment of my physician.    I CERTIFY THAT I HAVE READ AND FULLY UNDERSTAND THE ABOVE CONSENT TO OPERATION and/or OTHER PROCEDURE.        ____________________________________       _________________________________      ______________________________  Signature of Patient         Signature of Responsible Person        Printed Name of Responsible Person        ____________________________________      _________________________________      ______________________________       Signature of Witness          Relationship to Patient                       Date                                       Time  Patient Name: Campbell Rao  : 1950    Reviewed: 2024   Printed: 2024  Medical Record #: [id number] Page 1 of 2             STATEMENT OF PHYSICIAN My signature below affirms that prior to the time of the procedure; I have explained to the patient and/or his/her legal representative, the risks and benefits involved in the proposed treatment and any reasonable alternative to the proposed treatment. I  have also explained the risks and benefits involved in refusal of the proposed treatment and alternatives to the proposed treatment and have answered the patient's questions. If I have a significant financial interest in a co-management agreement or a significant financial interest in any product or implant, or other significant relationship used in this procedure/surgery, I have disclosed this and had a discussion with my patient.     _______________________________________________________________ _____________________________  (Signature of Physician)                                                                                         (Date)                                   (Time)  Patient Name: Campbell Rao  : 1950    Reviewed: 2024   Printed: 2024  Medical Record #: [id number] Page 2 of 2

## (undated) NOTE — Clinical Note
Hi Dr. Shade Valencia, I left a detailed message for pt to introduce CCM program.  I see he has a scheduled visit with you today and encouraged him to discuss this further on VM. Thank you in advance and have a great weekend.

## (undated) NOTE — Clinical Note
STEVE, TCM call made, see notes.  NCM confirmed with patient that he has a HFU on 4/27/2021 at 10:00 am.

## (undated) NOTE — Clinical Note
Initial assessment completed with patient.  Pt declined an appt with PCP as his spouse will call to schedule. A message sent to office to assist in scheduling.  Pt is doing better. TE sent to RN triage to FU on medication concern. Thank you!

## (undated) NOTE — Clinical Note
TCM call completed. The patient reported symptom improvement. The patient is requesting a refill of the albuterol inhaler. Thank you.

## (undated) NOTE — LETTER
[address] [facility] [address] [address]  [phone]                December 14, 2023    [facility] [address]      Dear Kai Lo: It was a pleasure speaking with you over the phone recently. To follow up, I wanted to send you my contact information to utilize when you have a question and or need some assistance. We are excited that you have decided to give our Chronic Care Management program a try. I am available to provide you with the support and education needed to keep you healthy. Together We Will Work On:    Coordination of Care: Helping to reduce duplicate orders/tests to save you time and money  Medications: Review, Educate, & Discuss any concerns or issues you may have  Personalized education and support regarding your health. These services, among others will help you take control of your health and provide you with optimal quality care. I have attached a more in depth review of the program to help outline the information we had talked about over the phone. If you have any questions or concerns please feel free to contact myself your Health Care Manager and/or your insurance company for more details. I look forward to working with you,      Margarita Chang  Management Dept. [facility], [address], [place], [address] [place] [address]  Office [phone]  [email or web address]

## (undated) NOTE — MR AVS SNAPSHOT
[address]  [address] [facility]  [phone]               Thank you for choosing us for your health care visit with Arely Pozo MD.  We are glad to serve you and happy to provide you with this s Commonly known as:  DUONEB           ketoconazole 2 % Crea   APPLY EXTERNALLY TO THE AFFECTED AREA TWICE DAILY   What changed:  See the new instructions.    Commonly known as:  NIZORAL           levofloxacin 750 MG Tabs   Take 1 tablet (750 mg total) by jacqueline Women and lighter weight persons: limit to <= 1 drink* per day. Healthy Diet and Regular Exercise  [employer] [address] for making healthy food choices  -   Enjoy your food, but eat less. Fully enjoy your food when eating.    Don’t

## (undated) NOTE — Clinical Note
Hi Dr. Karle Meckel, pt enrolled in CCM program.  If there is anything I can do ever help please let me know. I look forward to working with pt. Have a great rest of the week.   Nerissa

## (undated) NOTE — LETTER
08/29/18        Damaris Szymanski  [address] [place] [id number]      Dear Tila Amy,    Our records indicate that you have outstanding lab work and or testing that was ordered for you and has not yet been completed:          Occult Blood, Fecal,

## (undated) NOTE — LETTER
AUTHORIZATION FOR SURGICAL OPERATION OR OTHER PROCEDURE    1. I hereby authorize Levy Perry/ Aurea Arvizu PA-C and [facility] staff assigned to my case to perform the following operation and/or procedure at the [facility] site:    _______________________________________________________________________________________________    Bilateral knee cortisone injection  _______________________________________________________________________________________________    2.  My physician has explained the nature and purpose of the operation or other procedure, possible alternative methods of treatment, the risks involved, and the possibility of complication to me.  I acknowledge that no guarantee has been made as to the result that may be obtained.  3.  I recognize that, during the course of this operation, or other procedure, unforseen conditions may necessitate additional or different procedure than those listed above.  I, therefore, further authorize and request that the above named physician, his/her physician assistants or designees perform such procedures as are, in his/her professional opinion, necessary and desirable.  4.  Any tissue or organs removed in the operation or other procedure may be disposed of by and at the discretion of the [facility].  5.  I understand that in the event of a medical emergency, I will be transported by local paramedics to [facility] or other hospital emergency department.  6.  I certify that I have read and fully understand the above consent to operation and/or other procedure.    7.  I acknowledge that my physician has explained sedation/analgesia administration to me including the risks and benefits.  I consent to the administration of sedation/analgesia as may be necessary or desirable in the judgement of my physician.    Witness signature: ___________________________________________________ Date:   ______/______/_____                    Time:  ________ A.M.  P.M.       Patient Name:  ______________________________________________________  (please print)      Patient signature:  ___________________________________________________             Relationship to Patient:           []  Parent    Responsible person                          []  Spouse  In case of minor or                    [] Other  _____________   Incompetent name:  __________________________________________________                               (please print)      _____________      Responsible person  In case of minor or  Incompetent signature:  _______________________________________________    Statement of Physician  My signature below affirms that prior to the time of the procedure, I have explained to the patient and/or his/her guardian, the risks and benefits involved in the proposed treatment and any reasonable alternative to the proposed treatment.  I have also explained the risks and benefits involved in the refusal of the proposed treatment and have answered the patient's questions.                        Date:  ______/______/_______  Provider                      Signature:  __________________________________________________________       Time:  ___________ A.M    P.M.

## (undated) NOTE — LETTER
[id number] [facility]     I agree to have a Peripherally Inserted Central Catheter (PICC) placed in my arm.    1. The PICC insertion procedure, care, maintenance, risks, benefits, and complications have been explained to me b the PICC, including risks, benefits, and side effects related to the alternatives and risks related to not receiving this procedure.     8.  I have expressed any questions about this procedure to my physician or the PICC Proceduralist and he/she has answere

## (undated) NOTE — Clinical Note
Spoke with pt today for TCM--please see notes. Pt confirms 5/08/2024 IR f/u appt, TCM appt made for 5/09/2024 with you--first available TCM appt, without PCP approval. Sent condition update to RN triage RE: constipation and appt recommendations. Thank you.

## (undated) NOTE — LETTER
AUTHORIZATION FOR SURGICAL OPERATION OR OTHER PROCEDURE    1. I hereby authorize Dr. Damian Wilkins  and [facility] staff assigned to my case to perform the following operation and/or procedure at the [facility]:    Cortisone injection in Bilateral knees   _______________________________________________________________________________________________      _______________________________________________________________________________________________    2. My physician has explained the nature and purpose of the operation or other procedure, possible alternative methods of treatment, the risks involved, and the possibility of complication to me. I acknowledge that no guarantee has been made as to the result that may be obtained. 3.  I recognize that, during the course of this operation, or other procedure, unforseen conditions may necessitate additional or different procedure than those listed above. I, therefore, further authorize and request that the above named physician, his/her physician assistants or designees perform such procedures as are, in his/her professional opinion, necessary and desirable. 4.  Any tissue or organs removed in the operation or other procedure may be disposed of by and at the discretion of the [facility] and [facility]. 5.  I understand that in the event of a medical emergency, I will be transported by local paramedics to [facility] or other hospital emergency department. 6.  I certify that I have read and fully understand the above consent to operation and/or other procedure. 7.  I acknowledge that my physician has explained sedation/analgesia administration to me including the risks and benefits. I consent to the administration of sedation/analgesia as may be necessary or desirable in the judgement of my physician.     Witness signature: ___________________________________________________ Date: ______/______/_____                    Time:  ________ A. M.  P.M. Patient Name:  ______________________________________________________  (please print)      Patient signature:  ___________________________________________________             Relationship to Patient:           []  Parent    Responsible person                          []  Spouse  In case of minor or                    [] Other  _____________   Incompetent name:  __________________________________________________                               (please print)      _____________      Responsible person  In case of minor or  Incompetent signature:  _______________________________________________    Statement of Physician  My signature below affirms that prior to the time of the procedure, I have explained to the patient and/or his/her guardian, the risks and benefits involved in the proposed treatment and any reasonable alternative to the proposed treatment. I have also explained the risks and benefits involved in the refusal of the proposed treatment and have answered the patient's questions.                         Date:  ______/______/_______  Provider                      Signature:  __________________________________________________________       Time:  ___________ A.M    P.M.

## (undated) NOTE — LETTER
AUTHORIZATION FOR SURGICAL OPERATION OR OTHER PROCEDURE    1. I hereby authorize Dr. Bina Madrigal, Arabella Hawthorne, and [facility] staff assigned to my case to perform the following operation and/or procedure at the [facility]:    _______________________________________________________________________________________________    Cortisone Injection to both knees  _______________________________________________________________________________________________    2. My physician has explained the nature and purpose of the operation or other procedure, possible alternative methods of treatment, the risks involved, and the possibility of complication to me. I acknowledge that no guarantee has been made as to the result that may be obtained. 3.  I recognize that, during the course of this operation, or other procedure, unforseen conditions may necessitate additional or different procedure than those listed above. I, therefore, further authorize and request that the above named physician, his/her physician assistants or designees perform such procedures as are, in his/her professional opinion, necessary and desirable. 4.  Any tissue or organs removed in the operation or other procedure may be disposed of by and at the discretion of the [facility] and [facility]. 5.  I understand that in the event of a medical emergency, I will be transported by local paramedics to [facility] or other hospital emergency department. 6.  I certify that I have read and fully understand the above consent to operation and/or other procedure. 7.  I acknowledge that my physician has explained sedation/analgesia administration to me including the risks and benefits. I consent to the administration of sedation/analgesia as may be necessary or desirable in the judgement of my physician.     Witness signature: ___________________________________________________ Date: ______/______/_____                    Time:  ________ A. M.  P.M. Patient Name:  ______________________________________________________  (please print)      Patient signature:  ___________________________________________________             Relationship to Patient:           []  Parent    Responsible person                          []  Spouse  In case of minor or                    [] Other  _____________   Incompetent name:  __________________________________________________                               (please print)      _____________      Responsible person  In case of minor or  Incompetent signature:  _______________________________________________    Statement of Physician  My signature below affirms that prior to the time of the procedure, I have explained to the patient and/or his/her guardian, the risks and benefits involved in the proposed treatment and any reasonable alternative to the proposed treatment. I have also explained the risks and benefits involved in the refusal of the proposed treatment and have answered the patient's questions.                         Date:  ______/______/_______  Provider                      Signature:  __________________________________________________________       Time:  ___________ A.M    P.M.

## (undated) NOTE — LETTER
AUTHORIZATION FOR SURGICAL OPERATION OR OTHER PROCEDURE    1.  I hereby [facility] Osei Joiner, [facility] staff assigned to my case to perform the following operation and/or procedure at the [facility]:    ________________________________ Patient Name:  ______________________________________________________  (please print)      Patient signature:  ___________________________________________________             Relationship to Patient:           []  Parent    Responsible person

## (undated) NOTE — LETTER
AUTHORIZATION FOR SURGICAL OPERATION OR OTHER PROCEDURE    1. I hereby authorize Dr. Goldsmith/VARGAS Arvizu , and [facility] staff assigned to my case to perform the following operation and/or procedure at the [facility]:    Cortisone injection in Bilateral knees   _______________________________________________________________________________________________      _______________________________________________________________________________________________    2.  My physician has explained the nature and purpose of the operation or other procedure, possible alternative methods of treatment, the risks involved, and the possibility of complication to me.  I acknowledge that no guarantee has been made as to the result that may be obtained.  3.  I recognize that, during the course of this operation, or other procedure, unforseen conditions may necessitate additional or different procedure than those listed above.  I, therefore, further authorize and request that the above named physician, his/her physician assistants or designees perform such procedures as are, in his/her professional opinion, necessary and desirable.  4.  Any tissue or organs removed in the operation or other procedure may be disposed of by and at the discretion of the [facility] and [facility].  5.  I understand that in the event of a medical emergency, I will be transported by local paramedics to [facility] or other hospital emergency department.  6.  I certify that I have read and fully understand the above consent to operation and/or other procedure.    7.  I acknowledge that my physician has explained sedation/analgesia administration to me including the risks and benefits.  I consent to the administration of sedation/analgesia as may be necessary or desirable in the judgement of my physician.    Witness signature: ___________________________________________________ Date:   ______/______/_____                    Time:  ________ A.M.  P.M.       Patient Name:  ______________________________________________________  (please print)      Patient signature:  ___________________________________________________             Relationship to Patient:           []  Parent    Responsible person                          []  Spouse  In case of minor or                    [] Other  _____________   Incompetent name:  __________________________________________________                               (please print)      _____________      Responsible person  In case of minor or  Incompetent signature:  _______________________________________________    Statement of Physician  My signature below affirms that prior to the time of the procedure, I have explained to the patient and/or his/her guardian, the risks and benefits involved in the proposed treatment and any reasonable alternative to the proposed treatment.  I have also explained the risks and benefits involved in the refusal of the proposed treatment and have answered the patient's questions.                        Date:  ______/______/_______  Provider                      Signature:  __________________________________________________________       Time:  ___________ A.M    P.M.

## (undated) NOTE — LETTER
Hospital Discharge Documentation  Please phone to schedule a hospital follow up appointment.     From: [id number] Hospitalist's Office  Phone: [phone]    Patient discharged time/date: 2/27/2017  3:04 PM  Patient discharge disposition:  Home or Self Solu-Medrol 30 mg IV every 6 hours.  Patient will require substantial teaching to understand the use and application of his inhalers; o2 sat 86% on 2l nc and hr =124 with amb, not ready yet    Obesity  BMI 37.  Patient counseled encouraged diet and exercis

## (undated) NOTE — LETTER
4/27/2021              Marquez Coley        [address] [place] [address]         To whom it may oncern,    This is to certify that Mr Frida Hussein was seen and evaluated in our clinic today.  He may go back to work on April 2

## (undated) NOTE — Clinical Note
STEVE, TCM call made, see notes. BC attempted to schedule a TCM HFU, patient and his wife are requesting an appointment between 10:00 & 11:00 am, so his wife can bring him. Due to MD's full schedule BC sent message to MD's office for assistance in scheduling the appointment.

## (undated) NOTE — MR AVS SNAPSHOT
[place] [address]  [phone]               Thank you for choosing us for your health care visit with Tiffanie Davis MD.  We are glad to serve you and happy to provide you with this summar acquired. Please contact the Patient Business Office at [phone] if you have any questions related to insurance coverage. Thank you. Tuesday April 18, 2017     Imaging:  XR KNEE (1 OR 2 VIEWS), RIGHT (CPT=73560)    Instructions:   To schedule a Inhale 2 puffs into the lungs every 6 (six) hours as needed for Wheezing. inhale 2 puff by inhalation route  every 4 - 6 hours as needed           Budesonide-Formoterol Fumarate 160-4.5 MCG/ACT Aero   Inhale 2 puffs into the lungs 2 (two) times daily.    Co Kimi.tn

## (undated) NOTE — Clinical Note
Spoke with pt today for TCM--sent TE to office staff for appt clarification and f/u. CCM f/u within the week--thank you. No future appointments.

## (undated) NOTE — IP AVS SNAPSHOT
[address]  [address] [facility] ~ [phone]                Discharge Summary   2/24/2017    Kingsley Koch           Admission Information        Provider Department    2/24/2017 Mitchel Padilla MD Commonly known as:  SALINE MIST   Next dose due:  As Needed        1 spray by Nasal route every 3 (three) hours as needed for congestion.     Sanjeev Hightower                             CHANGE how you take these medications        Instructions Autho or nurse     Bring a paper prescription for each of these medications    - ipratropium-albuterol 0.5-2.5 (3) MG/3ML Soln  - levofloxacin 750 MG Tabs  - predniSONE 20 MG Tabs  - Saline Nasal Spray 0.65 % Soln              Patient Instructions       Oxygen 3 8 (02/27/17)  7 (02/27/17)  0 (02/27/17)  0 -- (02/27/17)  10.2 (H) (02/27/17)  1.0 (02/27/17)  0.8 (02/27/17)  0.0 (02/27/17)  0.0    (02/26/17)  89 (02/26/17)  7 (02/26/17)  4 (02/26/17)  0 (02/26/17)  0  (02/26/17)  9.6 (H) (02/26/17)  0.8 (L) (02/26/17 Candida 112.         Angeline                    _____________________________________________________________________________    Medication Side Effects - Medications to be taken at home  As your caregivers, we want you to be aware of the medicat mouth/tongue, shortness of breath, sensation of heart racing, rash, or itching           Steroids     predniSONE 20 MG Oral Tab         Use:  To treat inflammation, to prevent or treat allergic reactions, chemotherapy related nausea, used as part of some ch

## (undated) NOTE — LETTER
AUTHORIZATION FOR SURGICAL OPERATION OR OTHER PROCEDURE    1. I hereby authorize Dr. Sourav Briscoe, and [facility] assigned to my case to perform the following operation and/or procedure at the [facility]:    _______________________________________________________________________________________________    Cortisone Injection Bilateral Knee   _______________________________________________________________________________________________    2. My physician has explained the nature and purpose of the operation or other procedure, possible alternative methods of treatment, the risks involved, and the possibility of complication to me. I acknowledge that no guarantee has been made as to the result that may be obtained. 3.  I recognize that, during the course of this operation, or other procedure, unforseen conditions may necessitate additional or different procedure than those listed above. I, therefore, further authorize and request that the above named physician, his/her physician assistants or designees perform such procedures as are, in his/her professional opinion, necessary and desirable. 4.  Any tissue or organs removed in the operation or other procedure may be disposed of by and at the discretion of the [facility], [facility] and [facility]. 5.  I understand that in the event of a medical emergency, I will be transported by local paramedics to [facility] or other hospital emergency department. 6.  I certify that I have read and fully understand the above consent to operation and/or other procedure. 7.  I acknowledge that my physician has explained sedation/analgesia administration to me including the risks and benefits. I consent to the administration of sedation/analgesia as may be necessary or desirable in the judgement of my physician.     Witness signature: ___________________________________________________ Date:  ______/______/_____ Time:  ________ A. M.  P.M. Patient Name:  ______________________________________________________  (please print)      Patient signature:  ___________________________________________________             Relationship to Patient:           []  Parent    Responsible person                          []  Spouse  In case of minor or                    [] Other  _____________   Incompetent name:  __________________________________________________                               (please print)      _____________      Responsible person  In case of minor or  Incompetent signature:  _______________________________________________    Statement of Physician  My signature below affirms that prior to the time of the procedure, I have explained to the patient and/or his/her guardian, the risks and benefits involved in the proposed treatment and any reasonable alternative to the proposed treatment. I have also explained the risks and benefits involved in the refusal of the proposed treatment and have answered the patient's questions.                         Date:  ______/______/_______  Provider                      Signature:  __________________________________________________________       Time:  ___________ A.M    P.M.

## (undated) NOTE — LETTER
AUTHORIZATION FOR SURGICAL OPERATION OR OTHER PROCEDURE    1. I hereby authorize Dr. Goldsmith/Aurea Harden PA-C, and [facility] staff assigned to my case to perform the following operation and/or procedure at the [facility] site:    Bilateral knee cortisone injection   _______________________________________________________________________________________________      _______________________________________________________________________________________________    2.  My physician has explained the nature and purpose of the operation or other procedure, possible alternative methods of treatment, the risks involved, and the possibility of complication to me.  I acknowledge that no guarantee has been made as to the result that may be obtained.  3.  I recognize that, during the course of this operation, or other procedure, unforseen conditions may necessitate additional or different procedure than those listed above.  I, therefore, further authorize and request that the above named physician, his/her physician assistants or designees perform such procedures as are, in his/her professional opinion, necessary and desirable.  4.  Any tissue or organs removed in the operation or other procedure may be disposed of by and at the discretion of the [facility] and [facility].  5.  I understand that in the event of a medical emergency, I will be transported by local paramedics to [facility] or other hospital emergency department.  6.  I certify that I have read and fully understand the above consent to operation and/or other procedure.    7.  I acknowledge that my physician has explained sedation/analgesia administration to me including the risks and benefits.  I consent to the administration of sedation/analgesia as may be necessary or desirable in the judgement of my physician.    Witness signature: ___________________________________________________ Date:   ______/______/_____                    Time:  ________ A.M.  P.M.       Patient Name:  ______________________________________________________  (please print)      Patient signature:  ___________________________________________________             Relationship to Patient:           []  Parent    Responsible person                          []  Spouse  In case of minor or                    [] Other  _____________   Incompetent name:  __________________________________________________                               (please print)      _____________      Responsible person  In case of minor or  Incompetent signature:  _______________________________________________    Statement of Physician  My signature below affirms that prior to the time of the procedure, I have explained to the patient and/or his/her guardian, the risks and benefits involved in the proposed treatment and any reasonable alternative to the proposed treatment.  I have also explained the risks and benefits involved in the refusal of the proposed treatment and have answered the patient's questions.                        Date:  ______/______/_______  Provider                      Signature:  __________________________________________________________       Time:  ___________ A.M    P.M.

## (undated) NOTE — LETTER
[place] [address]  Authorization for Invasive Procedures  Date: 6/17/2025          Time: 10:21 AM    I hereby authorize Dr BELINDA Salinas, my physician and his/her assistants (if applicable), which may include medical students, residents, and/or fellows, to perform the following surgical operation/ procedure and administer such anesthesia as may be determined necessary by my physician: Bronchoscopy on Campbell Rao  2.   I recognize that during the surgical operation/procedure, unforeseen conditions may necessitate additional or different procedures than those listed above.  I, therefore, further authorize and request that the above-named surgeon, assistants, or designees perform such procedures as are, in their judgment, necessary and desirable.    3.   My surgeon/physician has discussed prior to my surgery the potential benefits, risks and side effects of this procedure; the likelihood of achieving goals; and potential problems that might occur during recuperation.  They also discussed reasonable alternatives to the procedure, including risks, benefits, and side effects related to the alternatives and risks related to not receiving this procedure.  I have had all my questions answered and I acknowledge that no guarantee has been made as to the result that may be obtained.    4.   Should the need arise during my operation/procedure, which includes change of level of care prior to discharge, I also consent to the administration of blood and/or blood products.  Further, I understand that despite careful testing and screening of blood or blood products by collecting agencies, I may still be subject to ill effects as a result of receiving a blood transfusion and/or blood products.  The following are some, but not all, of the potential risks that can occur: fever and allergic reactions, hemolytic reactions, transmission of diseases such as Hepatitis, AIDS and Cytomegalovirus (CMV) and fluid  overload.  In the event that I wish to have an autologous transfusion of my own blood, or a directed donor transfusion, I will discuss this with my physician.   Check only if Refusing Blood or Blood Products  I understand refusal of blood or blood products as deemed necessary by my physician may have serious consequences to my condition to include possible death. I hereby assume responsibility for my refusal and release the hospital, its personnel, and my physicians from any responsibility for the consequences of my refusal.         o  Refuse         5.   I authorize the use of any specimen, organs, tissues, body parts or foreign objects that may be removed from my body during the operation/procedure for diagnosis, research or teaching purposes and their subsequent disposal by hospital authorities.  I also authorize the release of specimen test results and/or written reports to my treating physician on the hospital medical staff or other referring or consulting physicians involved in my care, at the discretion of the Pathologist or my treating physician.    6.   I consent to the photographing or videotaping of the operations or procedures to be performed, including appropriate portions of my body for medical, scientific, or educational purposes, provided my identity is not revealed by the pictures or by descriptive texts accompanying them.  If the procedure has been photographed/videotaped, the surgeon will obtain the original picture, image, videotape or CD.  The hospital will not be responsible for storage, release or maintenance of the picture, image, tape or CD.    7.   I consent to the presence of a  or observers in the operating room as deemed necessary by my physician or their designees.    8.   I recognize that in the event my procedure results in extended X-Ray/fluoroscopy time, I may develop a skin reaction.    9. If I have a Do Not Attempt Resuscitation (DNAR) order in place, that status  will be suspended while in the operating room, procedural suite, and during the recovery period unless otherwise explicitly stated by me (or a person authorized to consent on my behalf). The surgeon or my attending physician will determine when the applicable recovery period ends for purposes of reinstating the DNAR order.  10. Patients having a sterilization procedure: I understand that if the procedure is successful the results will be permanent and it will therefore be impossible for me to inseminate, conceive, or bear children.  I also understand that the procedure is intended to result in sterility, although the result has not been guaranteed.   11. I acknowledge that my physician has explained sedation/analgesia administration to me including the risk and benefits I consent to the administration of sedation/analgesia as may be necessary or desirable in the judgment of my physician.    I CERTIFY THAT I HAVE READ AND FULLY UNDERSTAND THE ABOVE CONSENT TO OPERATION and/or OTHER PROCEDURE.        ____________________________________       _________________________________      ______________________________  Signature of Patient         Signature of Responsible Person        Printed Name of Responsible Person        ____________________________________      _________________________________      ______________________________       Signature of Witness          Relationship to Patient                       Date                                       Time  Patient Name: Campbell Rao  : 1950    Reviewed: 2024   Printed: 2025  Medical Record #: [id number] Page 1 of 2             STATEMENT OF PHYSICIAN My signature below affirms that prior to the time of the procedure; I have explained to the patient and/or his/her legal representative, the risks and benefits involved in the proposed treatment and any reasonable alternative to the proposed treatment. I have also explained the risks and  benefits involved in refusal of the proposed treatment and alternatives to the proposed treatment and have answered the patient's questions. If I have a significant financial interest in a co-management agreement or a significant financial interest in any product or implant, or other significant relationship used in this procedure/surgery, I have disclosed this and had a discussion with my patient.     _______________________________________________________________ _____________________________  (Signature of Physician)                                                                                         (Date)                                   (Time)  Patient Name: Campbell JETER Maira  : 1950    Reviewed: 2024   Printed: 2025  Medical Record #: [id number] Page 2 of 2

## (undated) NOTE — LETTER
AUTHORIZATION FOR SURGICAL OPERATION OR OTHER PROCEDURE    1.  I hereby authorize Dr. Pansy Boas  and [facility] staff assigned to my case to perform the following operation and/or procedure at the [facility]:    Cortisone injection in [place]  Time:  ________ A. M.  P.M.        Patient Name:  ______________________________________________________  (please print)      Patient signature:  ___________________________________________________             Relationship to Patient:

## (undated) NOTE — LETTER
AUTHORIZATION FOR SURGICAL OPERATION OR OTHER PROCEDURE    1. I hereby authorize Dr. Goldsmith/ Aurea Arvizu PA-C, and [facility] staff assigned to my case to perform the following operation and/or procedure at the [facility] site:    _______________________________________________________________________________________________    Cortisone injection to bilateral knees  _______________________________________________________________________________________________    2.  My physician has explained the nature and purpose of the operation or other procedure, possible alternative methods of treatment, the risks involved, and the possibility of complication to me.  I acknowledge that no guarantee has been made as to the result that may be obtained.  3.  I recognize that, during the course of this operation, or other procedure, unforseen conditions may necessitate additional or different procedure than those listed above.  I, therefore, further authorize and request that the above named physician, his/her physician assistants or designees perform such procedures as are, in his/her professional opinion, necessary and desirable.  4.  Any tissue or organs removed in the operation or other procedure may be disposed of by and at the discretion of the [facility] and [facility].  5.  I understand that in the event of a medical emergency, I will be transported by local paramedics to [facility] or other hospital emergency department.  6.  I certify that I have read and fully understand the above consent to operation and/or other procedure.    7.  I acknowledge that my physician has explained sedation/analgesia administration to me including the risks and benefits.  I consent to the administration of sedation/analgesia as may be necessary or desirable in the judgement of my physician.    Witness signature: ___________________________________________________ Date:   ______/______/_____                    Time:  ________ A.M.  P.M.       Patient Name:  ______________________________________________________  (please print)      Patient signature:  ___________________________________________________             Relationship to Patient:           []  Parent    Responsible person                          []  Spouse  In case of minor or                    [] Other  _____________   Incompetent name:  __________________________________________________                               (please print)      _____________      Responsible person  In case of minor or  Incompetent signature:  _______________________________________________    Statement of Physician  My signature below affirms that prior to the time of the procedure, I have explained to the patient and/or his/her guardian, the risks and benefits involved in the proposed treatment and any reasonable alternative to the proposed treatment.  I have also explained the risks and benefits involved in the refusal of the proposed treatment and have answered the patient's questions.                        Date:  ______/______/_______  Provider                      Signature:  __________________________________________________________       Time:  ___________ A.M    P.M.

## (undated) NOTE — LETTER
[facility]  Administration of Anesthesia  I, Campbell Rao agree to be cared for by a physician anesthesiologist alone and/or with a nurse anesthetist, who is specially trained to monitor me and give me medicine to put me to sleep or keep me comfortable during my procedure    I understand that my anesthesiologist and/or anesthetist is not an employee or agent of [facility] or [employer]. He or she works for [facility]    As the patient asking for anesthesia services, I agree to:  Allow the anesthesiologist (anesthesia doctor) to give me medicine and do additional procedures as necessary. Some examples are: Starting or using an “IV” to give me medicine, fluids or blood during my procedure, and having a breathing tube placed to help me breathe when I’m asleep (intubation). In the event that my heart stops working properly, I understand that my anesthesiologist will make every effort to sustain my life, unless otherwise directed by [facility] Do Not Resuscitate documents.  Tell my anesthesia doctor before my procedure:  If I am pregnant.  The last time that I ate or drank.  iii. All of the medicines I take (including prescriptions, herbal supplements, and pills I can buy without a prescription (including street drugs/illegal medications). Failure to inform my anesthesiologist about these medicines may increase my risk of anesthetic complications.  iv.If I am allergic to anything or have had a reaction to anesthesia before.  I understand how the anesthesia medicine will help me (benefits).  I understand that with any type of anesthesia medicine there are risks:  The most common risks are: nausea, vomiting, sore throat, muscle soreness, damage to my eyes, mouth, or teeth (from breathing tube placement).  Rare risks include: remembering what happened during my procedure, allergic reactions to medications, injury to my airway, heart, lungs, vision, nerves, or  muscles and in extremely rare instances death.  My doctor has explained to me other choices available to me for my care (alternatives).  Pregnant Patients (“epidural”):  I understand that the risks of having an epidural (medicine given into my back to help control pain during labor), include itching, low blood pressure, difficulty urinating, headache or slowing of the baby’s heart. Very rare risks include infection, bleeding, seizure, irregular heart rhythms and nerve injury.  Regional Anesthesia (“spinal”, “epidural”, & “nerve blocks”):  I understand that rare but potential complications include headache, bleeding, infection, seizure, irregular heart rhythms, and nerve injury.    _____________________________________________________________________________  Patient (or Representative) Signature/Relationship to Patient  Date   Time    _____________________________________________________________________________   Name (if used)    Language/Organization   Time    _____________________________________________________________________________  Nurse Anesthetist Signature     Date   Time  _____________________________________________________________________________  Anesthesiologist Signature     Date   Time  I have discussed the procedure and information above with the patient (or patient’s representative) and answered their questions. The patient or their representative has agreed to have anesthesia services.    _____________________________________________________________________________  Witness        Date   Time  I have verified that the signature is that of the patient or patient’s representative, and that it was signed before the procedure  Patient Name: Campbell Rao     : 1950                 Printed: 2024 at 1:18 PM    Medical Record #: [id number]                                            Page 1 of 1  ----------ANESTHESIA CONSENT----------